# Patient Record
Sex: MALE | Race: WHITE | NOT HISPANIC OR LATINO | Employment: FULL TIME | URBAN - METROPOLITAN AREA
[De-identification: names, ages, dates, MRNs, and addresses within clinical notes are randomized per-mention and may not be internally consistent; named-entity substitution may affect disease eponyms.]

---

## 2017-01-04 ENCOUNTER — TELEPHONE (OUTPATIENT)
Dept: TRANSPLANT | Facility: CLINIC | Age: 52
End: 2017-01-04

## 2017-01-05 ENCOUNTER — TELEPHONE (OUTPATIENT)
Dept: TRANSPLANT | Facility: CLINIC | Age: 52
End: 2017-01-05

## 2017-01-05 NOTE — TELEPHONE ENCOUNTER
Heart biopsy completed on Mr. Lai on 1/3/17 by Dr. Eden Baptiste; sample sent to pathology to stain for amyloidosis. Results are still pending. Will contact Mr. Lai as soon as results are posted.

## 2017-01-06 ENCOUNTER — PATIENT MESSAGE (OUTPATIENT)
Dept: HEMATOLOGY/ONCOLOGY | Facility: CLINIC | Age: 52
End: 2017-01-06

## 2017-01-10 ENCOUNTER — TELEPHONE (OUTPATIENT)
Dept: TRANSPLANT | Facility: CLINIC | Age: 52
End: 2017-01-10

## 2017-01-10 NOTE — TELEPHONE ENCOUNTER
Informed Mr. Lai of heart biopsy results performed 1/3/17, indicating positive stain for amyloidosis. Specimen will now go to [facility] for tissue typing. Verbalized understanding of results. Dr. Lao & mAol notified of results. Bone marrow biopsy scheduled 1/12/17.

## 2017-01-12 ENCOUNTER — SURGERY (OUTPATIENT)
Age: 52
End: 2017-01-12

## 2017-01-12 PROBLEM — E85.9 AMYLOIDOSIS: Status: ACTIVE | Noted: 2017-01-12

## 2017-01-12 PROBLEM — D47.2 MONOCLONAL GAMMOPATHY: Status: ACTIVE | Noted: 2017-01-12

## 2017-01-12 RX ADMIN — LIDOCAINE HYDROCHLORIDE 5 ML: 20 INJECTION, SOLUTION EPIDURAL; INFILTRATION; INTRACAUDAL; PERINEURAL at 12:01

## 2017-01-17 DIAGNOSIS — D47.2 MONOCLONAL GAMMOPATHY: Primary | ICD-10-CM

## 2017-01-19 ENCOUNTER — OFFICE VISIT (OUTPATIENT)
Dept: HEMATOLOGY/ONCOLOGY | Facility: CLINIC | Age: 52
End: 2017-01-19
Payer: COMMERCIAL

## 2017-01-19 ENCOUNTER — HOSPITAL ENCOUNTER (OUTPATIENT)
Dept: RADIOLOGY | Facility: HOSPITAL | Age: 52
Discharge: HOME | End: 2017-01-19
Attending: INTERNAL MEDICINE
Payer: COMMERCIAL

## 2017-01-19 VITALS
SYSTOLIC BLOOD PRESSURE: 117 MMHG | RESPIRATION RATE: 16 BRPM | BODY MASS INDEX: 26.23 KG/M2 | HEIGHT: 71 IN | HEART RATE: 78 BPM | TEMPERATURE: 98 F | OXYGEN SATURATION: 99 % | WEIGHT: 187.38 LBS | DIASTOLIC BLOOD PRESSURE: 73 MMHG

## 2017-01-19 DIAGNOSIS — E85.3 SECONDARY AMYLOIDOSIS: Primary | ICD-10-CM

## 2017-01-19 DIAGNOSIS — D47.2 MONOCLONAL GAMMOPATHY: ICD-10-CM

## 2017-01-19 PROCEDURE — 99999 PR PBB SHADOW E&M-EST. PATIENT-LVL IV: CPT | Mod: PBBFAC,,, | Performed by: INTERNAL MEDICINE

## 2017-01-19 PROCEDURE — 77075 RADEX OSSEOUS SURVEY COMPL: CPT | Mod: 26,,, | Performed by: RADIOLOGY

## 2017-01-19 PROCEDURE — 99213 OFFICE O/P EST LOW 20 MIN: CPT | Mod: S$GLB,,, | Performed by: INTERNAL MEDICINE

## 2017-01-19 PROCEDURE — 1159F MED LIST DOCD IN RCRD: CPT | Mod: S$GLB,,, | Performed by: INTERNAL MEDICINE

## 2017-01-19 NOTE — LETTER
January 21, 2017        Chon Harmon MD  [address] [facility]  [address]  [facility] [address]             [facility]  [address]  [place] [address]  Phone: [phone]   Patient: Roc Lai Jr.   MR Number: [id number]   YOB: 1965   Date of Visit: 1/19/2017       Dear Dr. Harmon:    Thank you for referring Roc Lai to me for evaluation. Below are the relevant portions of my assessment and plan of care.       1. Secondary amyloidosis         Mr. Lai has a monoclonal lambda light chain that could serve as a reservoir for amyloid production.  Despite his recent negative fat pad biopsy his cardiac biopsy did show amyloid; now await subtyping from [place] but very likely AL Amlyoid.      His bone marrow biopsy on 1/12/17 showed a 60% cellular marrow with 28% plasma cells by morphology but 50% by immunohistochemistry.  Hence, he has at least a smoldering myeloma.  He has no renal, calcium or hemoglobin issues and he has no bony symptoms to suggest multiple myeloma and plain films do not show any lytic lesions as of today.  However, I will pursue an MRI study to be sure he does not have lesions that would define him as multiple myeloma.    He does not seem to have other systemic manifestations of AL amyloidosis including the absence of enlarged tongue, organomegaly, coagulopathy, neuropathy, carpal tunnel syndrome or renal disease.    Given the potential for myeloma I will need to discuss his case with the PI of the [id number] trial prior to enrollment.      All of his questions were answered in the clinic today.  I will see him back once his biopsies are complete.    If you have questions, please do not hesitate to call me. I look forward to following Roc along with you.    Sincerely,      Elvis Carmichael MD           CC  James Lao Jr., MD

## 2017-01-19 NOTE — PROGRESS NOTES
Subjective:       Patient ID: Roc Lai Jr. is a 51 y.o. male.    Chief Complaint: No chief complaint on file.    HPI  Mr. Lai is here for follow up of cardiac amyloidosis with significant marrow plasma cells.  He has no new symptoms and his recent findings are noted below and raise concern between amyloid and myeloma.  No bony pain.    History: He was previously vigorous and ran over three miles thrice weekly 3 years ago.  However, over the 18 months prior to presentation, he has noted progressive decline in his exercise capacity to only being able to do 10 min of exercise now.  He has previously been studied for CAD with normal coronaries in 3/74585.  He was diagnosed with atrial flutter and underwent cardiac ablation without improvement in his exercise capacity.  He was found to have an atrial septal defect and this was closed 9/26/16 but this did not improve his symptoms either.  His symptoms progressed over the last 3 months to the point he can no longer exercise and notes dyspnea with carrying croceries, climbing stairs, etc.  He was admitted to cardiology 11/27/16 with 17 pounds of fluid loss and his is now sleeping better.  He was diagnosed with diastolic heart failure and cardia MRI was concerning for infiltrative cardiac disease.      SPEP 12/1/16 showed decreased gamma globulins and free lambda light chains increased with M-spike of 0.19 g/dL.  Free lambda light chain was elevated at 50 mg/dL with normal lappa 1.37 mg/dL and ratio 0.03.  However, abdominal fat pad biopsy was negative for amyoid with the presence of fat cells 11/30/16.  ATTR DNA test negative for familial amyloidosis.    CBC normal 12/1/16 with normal renal function, calcium, albumin and slightly low total protein levels.      PMH  Diastolic heart failure    Review of Systems   Constitutional: Negative for activity change, appetite change, diaphoresis, fatigue, fever and unexpected weight change.   HENT: Negative for mouth  sores, sore throat and trouble swallowing.    Respiratory: Negative for cough and shortness of breath.         All improved since diuresis   Cardiovascular: Negative for chest pain and leg swelling.   Gastrointestinal: Negative for abdominal pain, blood in stool, constipation and diarrhea.   Genitourinary: Negative for dysuria and hematuria.   Musculoskeletal: Negative for back pain and neck pain.   Skin: Negative for rash.   Neurological: Negative for tremors, weakness, light-headedness and headaches.   Hematological: Negative for adenopathy.   Psychiatric/Behavioral: Negative for confusion and sleep disturbance.       Objective:      Physical Exam   Constitutional: He is oriented to person, place, and time. He appears well-developed and well-nourished. No distress.   HENT:   Head: Normocephalic and atraumatic.   Mouth/Throat: Oropharynx is clear and moist. No oropharyngeal exudate.   Tongue not enlarged   Eyes: Conjunctivae are normal. Pupils are equal, round, and reactive to light.   Neck: Neck supple.   Cardiovascular: Normal rate and regular rhythm.    Pulmonary/Chest: Effort normal and breath sounds normal. He has no rales.   Abdominal: Soft. Bowel sounds are normal. He exhibits no mass. There is no splenomegaly. There is no tenderness.   Musculoskeletal: Normal range of motion. He exhibits no edema.   No rib or spine tenderness   Lymphadenopathy:     He has no cervical adenopathy.     He has no axillary adenopathy.        Right: No inguinal adenopathy present.        Left: No inguinal adenopathy present.   Neurological: He is alert and oriented to person, place, and time.   Skin: Skin is warm and dry. No rash noted.   No unusual bruising   Psychiatric: He has a normal mood and affect. Thought content normal.       Assessment:       1. Secondary amyloidosis        Plan:       Mr. Lai has a monoclonal lambda light chain that could serve as a reservoir for amyloid production.  Despite his recent negative fat  pad biopsy his cardiac biopsy did show amyloid; now await subtyping from [place] but very likely AL Amlyoid.      His bone marrow biopsy on 1/12/17 showed a 60% cellular marrow with 28% plasma cells by morphology but 50% by immunohistochemistry.  Hence, he has at least a smoldering myeloma.  He has no renal, calcium or hemoglobin issues and he has no bony symptoms to suggest multiple myeloma and plain films do not show any lytic lesions as of today.  However, I will pursue an MRI study to be sure he does not have lesions that would define him as multiple myeloma.    He does not seem to have other systemic manifestations of AL amyloidosis including the absence of enlarged tongue, organomegaly, coagulopathy, neuropathy, carpal tunnel syndrome or renal disease.    Given the potential for myeloma I will need to discuss his case with the PI of the [id number] trial prior to enrollment.      All of his questions were answered in the clinic today.  I will see him back once his biopsies are complete.

## 2017-01-19 NOTE — Clinical Note
Please schedule for MRI of entire spine and pelvis ASAP.  Back to see me shortly after that.  Thanks

## 2017-01-26 ENCOUNTER — TELEPHONE (OUTPATIENT)
Dept: TRANSPLANT | Facility: CLINIC | Age: 52
End: 2017-01-26

## 2017-01-26 DIAGNOSIS — I43 CARDIAC AMYLOIDOSIS: Primary | ICD-10-CM

## 2017-01-26 DIAGNOSIS — E85.4 CARDIAC AMYLOIDOSIS: Primary | ICD-10-CM

## 2017-01-26 NOTE — TELEPHONE ENCOUNTER
Spoke with  Joelle today to inform him of the heart biopsy results from [facility], which confirmed AL amyloidosis. Dr. Lao is aware and Dr. Carmichael has been notified. 3 month f/u appt has been scheduled with Dr. Lao on 3/31/17 with lab on 3/28/17. Echo has already been scheduled by Dr. Mendieta on 3/28/17. Strain has been requested. No further questions at this time. Aware of all appts. Appt letter mailed as well.

## 2017-01-27 ENCOUNTER — HOSPITAL ENCOUNTER (OUTPATIENT)
Dept: RADIOLOGY | Facility: HOSPITAL | Age: 52
Discharge: HOME | End: 2017-01-27
Attending: INTERNAL MEDICINE
Payer: COMMERCIAL

## 2017-01-27 DIAGNOSIS — E85.3 SECONDARY AMYLOIDOSIS: ICD-10-CM

## 2017-01-27 PROCEDURE — 72146 MRI CHEST SPINE W/O DYE: CPT | Mod: 26,,, | Performed by: RADIOLOGY

## 2017-01-27 PROCEDURE — 72146 MRI CHEST SPINE W/O DYE: CPT | Mod: TC

## 2017-01-27 PROCEDURE — 72195 MRI PELVIS W/O DYE: CPT | Mod: 26,,, | Performed by: RADIOLOGY

## 2017-01-27 PROCEDURE — 72148 MRI LUMBAR SPINE W/O DYE: CPT | Mod: TC

## 2017-01-27 PROCEDURE — 72141 MRI NECK SPINE W/O DYE: CPT | Mod: TC

## 2017-01-27 PROCEDURE — 72141 MRI NECK SPINE W/O DYE: CPT | Mod: 26,,, | Performed by: RADIOLOGY

## 2017-01-27 PROCEDURE — 72195 MRI PELVIS W/O DYE: CPT | Mod: TC

## 2017-01-27 PROCEDURE — 72148 MRI LUMBAR SPINE W/O DYE: CPT | Mod: 26,GC,, | Performed by: RADIOLOGY

## 2017-02-01 ENCOUNTER — TELEPHONE (OUTPATIENT)
Dept: TRANSPLANT | Facility: HOSPITAL | Age: 52
End: 2017-02-01

## 2017-02-01 ENCOUNTER — OFFICE VISIT (OUTPATIENT)
Dept: HEMATOLOGY/ONCOLOGY | Facility: CLINIC | Age: 52
End: 2017-02-01
Payer: COMMERCIAL

## 2017-02-01 ENCOUNTER — RESEARCH ENCOUNTER (OUTPATIENT)
Dept: RESEARCH | Facility: HOSPITAL | Age: 52
End: 2017-02-01

## 2017-02-01 VITALS
OXYGEN SATURATION: 100 % | HEART RATE: 92 BPM | DIASTOLIC BLOOD PRESSURE: 80 MMHG | RESPIRATION RATE: 16 BRPM | SYSTOLIC BLOOD PRESSURE: 124 MMHG | HEIGHT: 73 IN | TEMPERATURE: 98 F | WEIGHT: 193.13 LBS | BODY MASS INDEX: 25.6 KG/M2

## 2017-02-01 DIAGNOSIS — I50.32 CHRONIC DIASTOLIC CONGESTIVE HEART FAILURE: Primary | ICD-10-CM

## 2017-02-01 DIAGNOSIS — E85.81 AL AMYLOIDOSIS: ICD-10-CM

## 2017-02-01 DIAGNOSIS — D47.2 MONOCLONAL GAMMOPATHY: ICD-10-CM

## 2017-02-01 PROCEDURE — 99999 PR PBB SHADOW E&M-EST. PATIENT-LVL III: CPT | Mod: PBBFAC,,, | Performed by: INTERNAL MEDICINE

## 2017-02-01 PROCEDURE — 99213 OFFICE O/P EST LOW 20 MIN: CPT | Mod: S$GLB,,, | Performed by: INTERNAL MEDICINE

## 2017-02-01 NOTE — TELEPHONE ENCOUNTER
I have reviewed the echocardiogram performed 11/28/16 and performed multiple measurements of wall thickness.  I was able to reproducibly measure septal and posterior wall thickness of 1.5-1.6 cm.    James Lao MD, [facility], [facility]

## 2017-02-01 NOTE — TELEPHONE ENCOUNTER
----- Message from Heaven Cohen RN sent at 2/1/2017  9:03 AM CST -----  Hi Dr. Lao,   Can you please confirm as our conversation earlier that the wall thickness on the patient's echo done on 11/28/16 measures between 1.5 cm and 1.6cm?  Thank you so much again!  Heaven Cohen RN

## 2017-02-01 NOTE — LETTER
February 1, 2017        Chon Harmon MD  [address]  [address]  [facility] [address]             [facility]  [address]  [place] [address]  Phone: [phone]   Patient: Roc Lai Jr.   MR Number: [id number]   YOB: 1965   Date of Visit: 2/1/2017       Dear Dr. Harmon:    Thank you for referring Roc Lai to me for evaluation. Below are the relevant portions of my assessment and plan of care.       1. Chronic diastolic congestive heart failure    2. AL amyloidosis    3. Monoclonal gammopathy         Mr. Lai has a monoclonal lambda light chain coming from a plasma cell infiltrate in the marrow that serves as the reservoir for amyloid production affecting his heart.  His cardiac amyloid has been confirmed as AL Amlyoid.      His bone marrow biopsy on 1/12/17 showed a 60% cellular marrow with 28% plasma cells by morphology but 50% by immunohistochemistry.  Hence, he has at least a smoldering myeloma.  He has no renal, calcium or hemoglobin issues and he has no bony symptoms to suggest multiple myeloma and plain films do not show any lytic lesions as of today.  MRI study did not show lesions that would define him as multiple myeloma.    He does not seem to have other systemic manifestations of AL amyloidosis including the absence of enlarged tongue, organomegaly, coagulopathy, neuropathy, carpal tunnel syndrome or renal disease.  His cardiac symptoms are related to the AL amyloid infiltration and not the plasma cells in his marrow.  There is no family history of amyloidosis.    He signed consent for the [id number] trial and we4 plan to start therapy soon.      All of his questions were answered in the clinic today.     If you have questions, please do not hesitate to call me. I look forward to following Roc along with you.    Sincerely,      Elvis Carmichael MD           CC  James Lao Jr., MD

## 2017-02-02 NOTE — PROGRESS NOTES
"  Wednesday, February 1st, 2017    Study:  "A Phase 3, randomized, multicenter, double-blind, placebo-controlled, 2-arm, efficacy and safety study of [id number] plus standard of care versus placebo plus standard of care in subjects with light chain (AL) amyloidosis"  Protocol ID# [id number]  IRB# 2015.305.A  Sponsor:  Eliseo  Investigator:  Dr. Carmichael    Research RN met with patient and Dr. Carmichael to discuss participation in above-mentioned trial.  He is alert and oriented x 3. Mood and affect appropriate to the situation. Research RN had spoken with patient on prior day to provide brief description/discussion of protocol and informed consent form was e-mailed to the patient.  Dr. Carmichael discussed patient's current disease state in regards to myeloma; all tests and procedures performed to date are not suspicious for multiple myeloma, therefore Dr. Carmichael believes it most pertinent to treat the patient's AL at this time.  Study overview and alternatives to treatment were briefly discussed.  Patient states he wishes to proceed with informed consent process as described below.    Informed Consent Process:    Patient states that he is not participating in any other research studies.   Patient states that he understands that he has AL amyloidosis and that he may be eligible to participate in the above mentioned treatment study.   Purpose of study reviewed with patient and family. Patient states understanding of this information.   Length of study and number of participants reviewed with patient and family; he verbalizes understanding regarding this information.   Study design reviewed with patient and family. Patient states understanding of this information.  Study procedures discussed in detail with patient and family. Patient states understanding of this information.  All study associated risks reviewed with patient and family. Patient verbalized understanding.   Potential benefits of study along with costs and/or " payment reimbursement per insurance discussed with patient; he states he understands that his insurance will cover cost of all standard of care medications and procedures.   Alternative treatment methods discussed with patient; he states understanding of this information.   Study related questions/compensation for injury, and whom to contact regarding rights as a research subject all reviewed with patient; he verbalizes understanding of this information.   Optional extra blood samples for future testing discussed with patient and family in details, for which the patient checked YES and signed and dated this section of the ICF.  Voluntary participation and withdrawal from research stressed to patient; he states he understands that he may withdraw consent at any time without compromise to his care.   Confidentiality and HIPAA discussed with patient; process of protection and security of database explained to patient; he verbalizes understanding of this information.    Patient states his willingness to participate in study; patient signed and dated consent form; copy of consent form given to patient along with my contact information.     Throughout the consenting process, patient was given several opportunities to ask questions; all questions addressed and answered to patient's satisfaction per myself and Dr Carmichael. Informed patient that I would be in contact with him regarding his next appointments. Patient verbalized understanding. Instructed patient to notify myself and/or Dr Carmichael for any questions and/or concerns. Verbalized understanding.

## 2017-02-02 NOTE — PROGRESS NOTES
Subjective:       Patient ID: Roc Lai Jr. is a 51 y.o. male.    Chief Complaint: No chief complaint on file.    HPI  Mr. Lai is here to sign consent for the [id number] trial to treat cardiac AL amyloidosis with significant marrow plasma cells.  He remains well and without new issues today.  However, his walk test today confirmed his candidacy for the protocol and we plan to move ahead with a few remaining studies prior to start of therapy.    History: He was previously vigorous and ran over three miles thrice weekly 3 years ago.  However, over the 18 months prior to presentation, he has noted progressive decline in his exercise capacity to only being able to do 10 min of exercise now.  He has previously been studied for CAD with normal coronaries in 3/33587.  He was diagnosed with atrial flutter and underwent cardiac ablation without improvement in his exercise capacity.  He was found to have an atrial septal defect and this was closed 9/26/16 but this did not improve his symptoms either.  His symptoms progressed over the last 3 months to the point he can no longer exercise and notes dyspnea with carrying croceries, climbing stairs, etc.  He was admitted to cardiology 11/27/16 with 17 pounds of fluid loss and his is now sleeping better.  He was diagnosed with diastolic heart failure and cardia MRI was concerning for infiltrative cardiac disease.      SPEP 12/1/16 showed decreased gamma globulins and free lambda light chains increased with M-spike of 0.19 g/dL.  Free lambda light chain was elevated at 50 mg/dL with normal lappa 1.37 mg/dL and ratio 0.03.  However, abdominal fat pad biopsy was negative for amyoid with the presence of fat cells 11/30/16.  ATTR DNA test negative for familial amyloidosis.    CBC normal 12/1/16 with normal renal function, calcium, albumin and slightly low total protein levels.      Bone marrow biopsy done 1/12/17 showed a 60% plasma cell infiltrate.     Metastatic survey 1/19/17  was negative for bony disease.  MRI scans of the cervical, thoracic, lumbar and pelvic done 1/27/17 did not disclose specific lesions to change diagnosis to myeloma.    PMH  Diastolic heart failure    Review of Systems   Constitutional: Negative for activity change, appetite change, diaphoresis, fatigue, fever and unexpected weight change.   HENT: Negative for mouth sores, sore throat and trouble swallowing.    Respiratory: Negative for cough and shortness of breath.         All improved since diuresis   Cardiovascular: Negative for chest pain and leg swelling.   Gastrointestinal: Negative for abdominal pain, blood in stool, constipation and diarrhea.   Genitourinary: Negative for dysuria and hematuria.   Musculoskeletal: Negative for back pain and neck pain.   Skin: Negative for rash.   Neurological: Negative for tremors, weakness, light-headedness and headaches.   Hematological: Negative for adenopathy.   Psychiatric/Behavioral: Negative for confusion and sleep disturbance.       Objective:      Physical Exam   Constitutional: He is oriented to person, place, and time. He appears well-developed and well-nourished. No distress.   HENT:   Head: Normocephalic and atraumatic.   Mouth/Throat: Oropharynx is clear and moist. No oropharyngeal exudate.   Tongue not enlarged   Eyes: Conjunctivae are normal. Pupils are equal, round, and reactive to light.   Neck: Neck supple.   Cardiovascular: Normal rate and regular rhythm.    Pulmonary/Chest: Effort normal and breath sounds normal. He has no rales.   Abdominal: Soft. Bowel sounds are normal. He exhibits no mass. There is no splenomegaly. There is no tenderness.   Musculoskeletal: Normal range of motion. He exhibits no edema.   No rib or spine tenderness   Lymphadenopathy:     He has no cervical adenopathy.     He has no axillary adenopathy.        Right: No inguinal adenopathy present.        Left: No inguinal adenopathy present.   Neurological: He is alert and oriented to  person, place, and time.   Skin: Skin is warm and dry. No rash noted.   No unusual bruising   Psychiatric: He has a normal mood and affect. Thought content normal.       Assessment:       1. Chronic diastolic congestive heart failure    2. AL amyloidosis    3. Monoclonal gammopathy        Plan:       Mr. Lai has a monoclonal lambda light chain coming from a plasma cell infiltrate in the marrow that serves as the reservoir for amyloid production affecting his heart.  His cardiac amyloid has been confirmed as AL Amlyoid.      His bone marrow biopsy on 1/12/17 showed a 60% cellular marrow with 28% plasma cells by morphology but 50% by immunohistochemistry.  Hence, he has at least a smoldering myeloma.  He has no renal, calcium or hemoglobin issues and he has no bony symptoms to suggest multiple myeloma and plain films do not show any lytic lesions as of today.  MRI study did not show lesions that would define him as multiple myeloma.    He does not seem to have other systemic manifestations of AL amyloidosis including the absence of enlarged tongue, organomegaly, coagulopathy, neuropathy, carpal tunnel syndrome or renal disease.  His cardiac symptoms are related to the AL amyloid infiltration and not the plasma cells in his marrow.  There is no family history of amyloidosis.    He signed consent for the [id number] trial and we4 plan to start therapy soon.      All of his questions were answered in the clinic today.

## 2017-02-03 DIAGNOSIS — Z00.6 EXAMINATION OF PARTICIPANT IN CLINICAL TRIAL: ICD-10-CM

## 2017-02-03 DIAGNOSIS — E85.81 AL AMYLOIDOSIS: Primary | ICD-10-CM

## 2017-02-06 ENCOUNTER — RESEARCH ENCOUNTER (OUTPATIENT)
Dept: RESEARCH | Facility: HOSPITAL | Age: 52
End: 2017-02-06

## 2017-02-06 NOTE — PROGRESS NOTES
"  Monday, February 6th, 2017    Study: "A Phase 3, randomized, multicenter, double-blind, placebo-controlled, 2-arm, efficacy and safety study of [id number] plus standard of care versus placebo plus standard of care in subjects with light chain (AL) amyloidosis"  Protocol ID# [id number]  IRB# 2015.305.A  Sponsor: Eliseo  Investigator: Dr. Carmichael    Screening 1 Visit    Patient presented to clinic for screening one visit procedures per above-mentioned protocol.  See MD note dated 2/1/17 for medical history and complete PE.  See pathology reports for confirmation of amyloid diagnosis.  Patient signed ICF 2/1/17, see CRC note dated same day.  Eligibility review in process, initial eligibility checklist sent to sponsor for review.  Echocardiogram dated 11/28/17 within window for use as screening echo.  Liver imaging not necessary per protocol as patient's ALT/ ALP is not > 2.5x ULN.   See flowsheet on 2/1/17 for vital signs.  See flowsheet for conmeds.  Patient completed 24 hour urine for central labs according to instruction.  Specimen processed per  EVELIA Ignacio.   See documentation from MD regarding ECOG and NYHA Classification.    Patient completed trialslate questionnaires prior to any study procedures being conducted.  Patient then reported to phlebotomy lab to have all central labs drawn.  Labs drawn the processed according to lab manual per EVELIA Ignacio.      Patient then completed 6MWT per trialslate instructions including 10 minutes rest prior as well as vitals before and after walking.  Patient walked 1479.5 feet or 450 meters in 6 minutes.  This remains less than the 550 meters allowed for eligibility, therefore patient remains eligible for protocol based on this result.    Patient then proceeded to clinic where triplicate EKG's were performed by AZUL Barillas and ANUPAMA Cohen.  EKG's were uploaded via telephone connection and received by ERT.  Additionally Dr. Carmichael reviewed these results and find them " "satisfactory in regards to continued eligibility to trial.      Historical AE Assessment:    Heart Failure, Grade 1:  Patient has diagnosis of heart failure, but states he does not have symptoms unless engaging in rigorous physical activity.  He states he and his wife "walked three miles" around their neighborhood recently.  He tolerated 6MWT without difficulty or symptoms.    Fatigue, Grade 1:  Patient reports feeling "strong" but not at maximum capacity in regards to energy.  He is able to engage in all usual activities.  Will follow.  Alkaline Phosphatase Increased, Grade 1:  147 per [facility] labs.  Dr. Carmichael aware, NCS.  AE, grade 1.  Creatinine Increased, Grade 1:  1.5 per [facility] labs.  NCS per Dr. Carmichael.    Hyperglycemia, Grade 1:  Glucose 166.  This was a non-fasting result.  NCS per Dr. Carmichael.  AE, grade 1      Patient to report on Monday, 2/13 for screening 2 visit.  States having my contact information if needed.  Patient voices interest in attending chemotherapy class.  [facility] Irwin contacted and patient was scheduled for this.  Additionally, prescription of anti-viral prophylaxis was routed to MD for signature.  Patient informed to pick this up and start on M1, D1.  States understanding.  Will follow.              "

## 2017-02-09 DIAGNOSIS — E85.89 OTHER AMYLOIDOSIS: Primary | ICD-10-CM

## 2017-02-10 RX ORDER — HEPARIN 100 UNIT/ML
500 SYRINGE INTRAVENOUS
Status: CANCELLED | OUTPATIENT
Start: 2017-02-22

## 2017-02-10 RX ORDER — HEPARIN 100 UNIT/ML
500 SYRINGE INTRAVENOUS
Status: CANCELLED | OUTPATIENT
Start: 2017-02-15

## 2017-02-10 RX ORDER — HEPARIN 100 UNIT/ML
500 SYRINGE INTRAVENOUS
Status: CANCELLED | OUTPATIENT
Start: 2017-03-08

## 2017-02-10 RX ORDER — BORTEZOMIB 3.5 MG/1
1.4 INJECTION, POWDER, LYOPHILIZED, FOR SOLUTION INTRAVENOUS; SUBCUTANEOUS
Status: CANCELLED | OUTPATIENT
Start: 2017-03-01 | End: 2017-03-01

## 2017-02-10 RX ORDER — DEXTROMETHORPHAN HYDROBROMIDE, GUAIFENESIN 5; 100 MG/5ML; MG/5ML
650 LIQUID ORAL
Status: CANCELLED | OUTPATIENT
Start: 2017-02-15 | End: 2017-02-15

## 2017-02-10 RX ORDER — SODIUM CHLORIDE 0.9 % (FLUSH) 0.9 %
10 SYRINGE (ML) INJECTION
Status: CANCELLED | OUTPATIENT
Start: 2017-02-22

## 2017-02-10 RX ORDER — SODIUM CHLORIDE 0.9 % (FLUSH) 0.9 %
10 SYRINGE (ML) INJECTION
Status: CANCELLED | OUTPATIENT
Start: 2017-03-01

## 2017-02-10 RX ORDER — BORTEZOMIB 3.5 MG/1
1.4 INJECTION, POWDER, LYOPHILIZED, FOR SOLUTION INTRAVENOUS; SUBCUTANEOUS
Status: CANCELLED | OUTPATIENT
Start: 2017-02-15 | End: 2017-02-15

## 2017-02-10 RX ORDER — BORTEZOMIB 3.5 MG/1
1.4 INJECTION, POWDER, LYOPHILIZED, FOR SOLUTION INTRAVENOUS; SUBCUTANEOUS
Status: CANCELLED | OUTPATIENT
Start: 2017-02-22 | End: 2017-02-22

## 2017-02-10 RX ORDER — HEPARIN 100 UNIT/ML
500 SYRINGE INTRAVENOUS
Status: CANCELLED | OUTPATIENT
Start: 2017-03-01

## 2017-02-10 RX ORDER — ACYCLOVIR 400 MG/1
400 TABLET ORAL 2 TIMES DAILY
Qty: 60 TABLET | Refills: 11 | Status: SHIPPED | OUTPATIENT
Start: 2017-02-10 | End: 2017-11-15 | Stop reason: SDUPTHER

## 2017-02-10 RX ORDER — BORTEZOMIB 3.5 MG/1
1.4 INJECTION, POWDER, LYOPHILIZED, FOR SOLUTION INTRAVENOUS; SUBCUTANEOUS
Status: CANCELLED | OUTPATIENT
Start: 2017-03-08 | End: 2017-03-08

## 2017-02-10 RX ORDER — SODIUM CHLORIDE 0.9 % (FLUSH) 0.9 %
10 SYRINGE (ML) INJECTION
Status: CANCELLED | OUTPATIENT
Start: 2017-03-08

## 2017-02-10 RX ORDER — SODIUM CHLORIDE 0.9 % (FLUSH) 0.9 %
10 SYRINGE (ML) INJECTION
Status: CANCELLED | OUTPATIENT
Start: 2017-02-15

## 2017-02-13 ENCOUNTER — CLINICAL SUPPORT (OUTPATIENT)
Dept: HEMATOLOGY/ONCOLOGY | Facility: CLINIC | Age: 52
End: 2017-02-13
Payer: COMMERCIAL

## 2017-02-13 ENCOUNTER — RESEARCH ENCOUNTER (OUTPATIENT)
Dept: RESEARCH | Facility: HOSPITAL | Age: 52
End: 2017-02-13

## 2017-02-13 VITALS
RESPIRATION RATE: 20 BRPM | DIASTOLIC BLOOD PRESSURE: 63 MMHG | TEMPERATURE: 98 F | SYSTOLIC BLOOD PRESSURE: 100 MMHG | HEART RATE: 98 BPM

## 2017-02-13 VITALS
BODY MASS INDEX: 25.71 KG/M2 | SYSTOLIC BLOOD PRESSURE: 104 MMHG | HEART RATE: 97 BPM | WEIGHT: 194.88 LBS | DIASTOLIC BLOOD PRESSURE: 66 MMHG

## 2017-02-13 NOTE — PROGRESS NOTES
"  Monday, February 13th, 2017     Study: "A Phase 3, randomized, multicenter, double-blind, placebo-controlled, 2-arm, efficacy and safety study of [id number] plus standard of care versus placebo plus standard of care in subjects with light chain (AL) amyloidosis"  Protocol ID# [id number]  IRB# 2015.305.A  Sponsor: Eliseo  Investigator: Dr. Carmichael     Screening 2 Visit     Patient presented to clinic to complete screening 2 visit per above-mentioned protocol. Patient is ambulatory, awake, alert, and oriented to person, place, and time.      SF-36 Health Survey, KCCQ, FACT-GOG NTX questionnaires completed per trial slate prior to any procedures being performed.  Patient then reported to phlebotomy to have all central blood work drawn.  Patient also remitted 24 hour urine specimen that he completed this morning.  All specimens were processed by EVELIA Ignacio  according to lab manual.      Patient then completed second 6MWT per trialslate instructions including 10 minutes rest prior as well as vitals before and after walking. Patient walked 1615 feet or 492 meters in 6 minutes. This remains less than the 550 meters allowed for eligibility, therefore patient remains eligible for protocol based on this result.    Update on AE's:  Patient verbalizing no new complaints since screening 1.  Patient denies new concomitant medications since last week's visit.  He states understanding to initiate acyclovir on M1, D1 as previously directed.      Registration form updated with above information and set to AZUL SPICER, who responded with confirmation from medical monitor that patient is eligible for trial.      M1, D1 appt's reviewed with patient.  He states understanding of these.  He also agreed to attend today's chemotherapy education class and is aware of this appt.      "

## 2017-02-14 ENCOUNTER — RESEARCH ENCOUNTER (OUTPATIENT)
Dept: RESEARCH | Facility: HOSPITAL | Age: 52
End: 2017-02-14

## 2017-02-14 NOTE — PROGRESS NOTES
"  2017    Study: "A Phase 3, randomized, multicenter, double-blind, placebo-controlled, 2-arm, efficacy and safety study of [id number] plus standard of care versus placebo plus standard of care in subjects with light chain (AL) amyloidosis"  Protocol ID# [id number]  IRB# 2015.305.A  Sponsor: Eliseo  Investigator: Dr. Carmichael    Eligibility    Pt was screened as eligible to participate in the above mentioned trial as per the trial's inclusion and exclusion criteria. Pt's documents were also sent to a central review which were reviewed and pt was deemed eligible to participate in the above mentioned trial.    -Pt is over 18 years of age with a  of 67, 50 y/o male.  -Pt with ECOG "1" and NYHA class "II" per Dr. Carmichael.  -Pt is a newly diagnosed with AL amyloidosis; diagnosed by endocardial biopsy on 1/3; pt's bone marrow is consistent with plasma cell dyscrasia showing 50% plasma cells per bone marrow biopsy completed 17.  Positive histochemical stain showing green birefringent material in congo red-stained tissue and confirmed by mass spectroscopy done at [facility] on 1/3/17.  -Patient does not meet requirements for immunoelectron microscopy of amyloid material.  -Pt has cardiac involvement as follows:  diagnosis of heart failure per Dr. Panda per note dated 16; left ventricular wall thickness at diastole >12mm per Echo  and endomyocardial biopsy demonstrating AL echo report showing LV wall thickness >12mm on  and NT-proBNP 2679 per CCLs on 17.  -Pt to receive first line therapy with SQ velcade per Dr. Carmichael.  -Pt has adequate bone marrow reserve, hepatic and renal function as per CCLS labs dated 17.  -Pt's seated systolic blood pressure is between of 104 documented on 17.  -Distance walked during 6MWT is > 30 meters and <550 meters. First test was 450 meters and 2nd test was 492 meters.  -Pt is not a WOCBP and spouse is in not FCBP.  -Pt signed informed " consent form 2/1/17 and states understanding of participation requirements, risks, and plan of care.    -Pt does not meet any of the following exclusion criteria: non-AL amyloidosis, NT-pro>8500pg/ml (pts is 2679 per CCLS on 2/6/17), multiple myeloma as defined by the International Myeloma Working Group, eligible for ASCT, symptomatic orthostatic hypotension, MI, angina, severe uncontrolled arrhythmias, severe valvular stenosis, severe congenital heart disease, peripheral neuropathy above a grade 2, receiving oral or iv antibiotics or antivirals or antifungals, growth factors or blood transfusions w/in past week, prior radiotherapy within past 4 weeks, major surgery within past 4 weeks, any active malignancies, HIV, HEP B or C, or currently on another study.     Patient was randomized into IXRS today and received screening #: [id number]    Pt states his willingness to continue in the study and has no further questions at this time. Pt is aware of M1, D1 appt to report tomorrow, 2/15/17.  Will follow.

## 2017-02-15 ENCOUNTER — INFUSION (OUTPATIENT)
Dept: INFUSION THERAPY | Facility: HOSPITAL | Age: 52
End: 2017-02-15
Attending: INTERNAL MEDICINE
Payer: COMMERCIAL

## 2017-02-15 ENCOUNTER — OFFICE VISIT (OUTPATIENT)
Dept: HEMATOLOGY/ONCOLOGY | Facility: CLINIC | Age: 52
End: 2017-02-15
Payer: COMMERCIAL

## 2017-02-15 ENCOUNTER — RESEARCH ENCOUNTER (OUTPATIENT)
Dept: RESEARCH | Facility: HOSPITAL | Age: 52
End: 2017-02-15

## 2017-02-15 ENCOUNTER — LAB VISIT (OUTPATIENT)
Dept: LAB | Facility: HOSPITAL | Age: 52
End: 2017-02-15
Attending: INTERNAL MEDICINE
Payer: COMMERCIAL

## 2017-02-15 VITALS
SYSTOLIC BLOOD PRESSURE: 122 MMHG | BODY MASS INDEX: 27.47 KG/M2 | WEIGHT: 196.19 LBS | HEIGHT: 71 IN | HEART RATE: 96 BPM | DIASTOLIC BLOOD PRESSURE: 78 MMHG | TEMPERATURE: 98 F | RESPIRATION RATE: 20 BRPM

## 2017-02-15 VITALS
HEART RATE: 77 BPM | SYSTOLIC BLOOD PRESSURE: 102 MMHG | TEMPERATURE: 98 F | DIASTOLIC BLOOD PRESSURE: 64 MMHG | RESPIRATION RATE: 18 BRPM

## 2017-02-15 DIAGNOSIS — E85.81 AL AMYLOIDOSIS: ICD-10-CM

## 2017-02-15 DIAGNOSIS — E85.81 AL AMYLOIDOSIS: Primary | ICD-10-CM

## 2017-02-15 DIAGNOSIS — Z00.6 EXAMINATION OF PARTICIPANT IN CLINICAL TRIAL: ICD-10-CM

## 2017-02-15 LAB
DRUG STUDY SPECIMEN TYPE: NORMAL
DRUG STUDY TEST NAME: NORMAL
DRUG STUDY TEST RESULT: NORMAL

## 2017-02-15 PROCEDURE — 96401 CHEMO ANTI-NEOPL SQ/IM: CPT

## 2017-02-15 PROCEDURE — 96413 CHEMO IV INFUSION 1 HR: CPT

## 2017-02-15 PROCEDURE — 99213 OFFICE O/P EST LOW 20 MIN: CPT | Mod: S$PBB,,, | Performed by: INTERNAL MEDICINE

## 2017-02-15 PROCEDURE — 25000003 PHARM REV CODE 250: Performed by: INTERNAL MEDICINE

## 2017-02-15 PROCEDURE — 99999 PR PBB SHADOW E&M-EST. PATIENT-LVL II: CPT | Mod: PBBFAC,,, | Performed by: INTERNAL MEDICINE

## 2017-02-15 PROCEDURE — 63600175 PHARM REV CODE 636 W HCPCS: Performed by: INTERNAL MEDICINE

## 2017-02-15 PROCEDURE — 96367 TX/PROPH/DG ADDL SEQ IV INF: CPT

## 2017-02-15 PROCEDURE — 96415 CHEMO IV INFUSION ADDL HR: CPT

## 2017-02-15 RX ORDER — ACETAMINOPHEN 650 MG/20.3ML
650 LIQUID ORAL
Status: COMPLETED | OUTPATIENT
Start: 2017-02-15 | End: 2017-02-15

## 2017-02-15 RX ORDER — HEPARIN 100 UNIT/ML
500 SYRINGE INTRAVENOUS
Status: DISCONTINUED | OUTPATIENT
Start: 2017-02-15 | End: 2017-02-15 | Stop reason: HOSPADM

## 2017-02-15 RX ORDER — BORTEZOMIB 3.5 MG/1
1.4 INJECTION, POWDER, LYOPHILIZED, FOR SOLUTION INTRAVENOUS; SUBCUTANEOUS
Status: CANCELLED | OUTPATIENT
Start: 2017-02-15 | End: 2017-02-15

## 2017-02-15 RX ORDER — SODIUM CHLORIDE 0.9 % (FLUSH) 0.9 %
10 SYRINGE (ML) INJECTION
Status: DISCONTINUED | OUTPATIENT
Start: 2017-02-15 | End: 2017-02-15 | Stop reason: HOSPADM

## 2017-02-15 RX ORDER — BORTEZOMIB 3.5 MG/1
1.4 INJECTION, POWDER, LYOPHILIZED, FOR SOLUTION INTRAVENOUS; SUBCUTANEOUS
Status: COMPLETED | OUTPATIENT
Start: 2017-02-15 | End: 2017-02-15

## 2017-02-15 RX ADMIN — BORTEZOMIB 3 MG: 3.5 INJECTION, POWDER, LYOPHILIZED, FOR SOLUTION INTRAVENOUS; SUBCUTANEOUS at 01:02

## 2017-02-15 RX ADMIN — DIPHENHYDRAMINE HYDROCHLORIDE 25 MG: 50 INJECTION, SOLUTION INTRAMUSCULAR; INTRAVENOUS at 08:02

## 2017-02-15 RX ADMIN — ACETAMINOPHEN 650 MG: 160 SOLUTION ORAL at 08:02

## 2017-02-15 NOTE — PROGRESS NOTES
"  Wednesday, February 15th, 2017     Study: "A Phase 3, randomized, multicenter, double-blind, placebo-controlled, 2-arm, efficacy and safety study of [id number] plus standard of care versus placebo plus standard of care in subjects with light chain (AL) amyloidosis"  Protocol ID# [id number]  IRB# 2015.305.A  Sponsor: Eliseo  Investigator: Dr. Carmichael    Month 1, Day 1  IV [id number] + SQ Velcade    Patient presents for month 1, day 1 treatment per above-mentioned protocol.  Patient states he has no new issues to report with the exception of some "intermittent swelling."  He is ambulatory, awake, alert, and oriented to person, place, and time, accompanied by self.  Trial Slate and questionnaires (FACT-GOG NTX and VASPI) performed prior to any study related procedures.  Patient had centrally required blood work for bioanalytical testing completed and was processed by EVELIA Ignacio . Plan of care for the day reviewed with patient.  He states understanding.  Patient states continued willingness to participate in above-mentioned trial.    Review of Baseline AE's:    Heart Failure, Grade 1: Patient has diagnosis of NYHA class II heart failure, but states he does not have symptoms unless engaging in rigorous physical activity. He states over the weekend, he noticed he was not able to walk quite as far as he had previously be able to.  No overt symptoms in clinic today.  AE ongoing, will follow.   Fatigue, Grade 1: No changes in this and patient states he continues to travel for work and engage in usual activities. Will follow.  AE ongoing, will follow.  Alkaline Phosphatase Increased, Grade 1: 147 per CCLS labs. Dr. Carmichael aware, NCS. AE ongoing, will follow..  Creatinine Increased, Grade 1: 1.4 per CCLS labs. NCS per Dr. Carmichael.  AE ongoing, will follow.  Hyperglycemia, Grade 1: Glucose 176. This was a non-fasting result. NCS per Dr. Carmichael. AE, grade 1   Total Bilirubin Increased, Grade 1:  Total bilirubin " 1.4 per CCLS lab work dated 2/13/17.  Noted as NCS per Dr. Carmichael.  AE grade 1, will follow.  Edema , localized, grade 1:  As stated above, patient states that he has had intermittent lower extremity swelling.  He states his knee in particular has had this symptom.  No appreciable swelling noted on today's exam per Dr. Carmichael.  See MD note.  AE, grade 1.      Physical performed per Dr. Carmichael, see MD note for Complete PE, symptom-directed PE, ECOG PS and NYHA Classification.  See flowsheets for height, weight, and vital signs.  NIS-LL and VASPI completed, see chart for source documentation. Patient is not WOCBP, and no blood work required today per Dr. Carmichael, therefore patient had no local labs drawn.  No central labs outside of bioanalytical labs required at this timepoint Concomitant medications reviewed, no changes to con meds noted.  Patient confirms that per Dr. Carmichael, he has initiated acyclovir.  As stated in eligibility note, patient was randomized yesterday, 2/14 and assigned study number [id number]. Per Dr. Carmichael, patient is approved to initiate treatment today, 2/15/17.  Treatment plan updated with today's weight and signed.    Patient then proceeded to infusion suite.  Study-mandated procedures performed per protocol as follows:    0845:  Premeds administered, see MAR  0927:  Pre-dose vital signs performed, see flowsheet.  Vitals read as follows:  BP 98/59, HR 79, Temp 98.3, RR 18.  0935:  Triplicate EKG performed per research staff.  *Transmission issue was discussed with AZUL Norris, DANNIELLE and ERT staff.  Per ERT staff, tracings will be automatically extracted.  New machine will be sent to site for future transmissions.    0945:  Study drug infusion initiated   1045:  One hour intra-infusion vitals performed.  Vitals as follows:  BP-106/65, T-97.7, HR-80, RR-18  11:56:  Infusion completed one minute late due to bag overfill.  11:54:  Vital signs at end of infusion performed.  Vitals as follows:   104/67  12:01:  First PK drawn and processed per EVELIA Ignacio   12:25:  1/2 hour post infusion VS performed:  BP 98/64, HR 78, T 98.2, RR 18  12:27:  PK 1/2 hour post end of infusion drawn and processed per .  12:55:  1 hour post infusion VS performed:  /64, HR 77, T 97.7, RR 18  12:58:  1 hour post infusion PK performed and processed per .  13:03:  1 hour post infusion EKG performed per Research Staff    Patient was monitored for the full 90 minutes post infusion and tolerated the above with no difficulties, see infusion RN documentation.  Patient then received SQ Velcade injection, see MAR.       All of patient's questions were answered to his satisfaction.  Upcoming appointment calendar reviewed and given to patient.  Patient states understanding of plan of care and upcoming schedule.  He states having Research RN contact information as well as that of Dr. Carmichael.

## 2017-02-15 NOTE — PLAN OF CARE
Problem: Patient Care Overview  Goal: Plan of Care Review  Outcome: Ongoing (interventions implemented as appropriate)  Pt tolerated treatment well.  No s/s of reaction. Vitals stable, NAD.

## 2017-02-15 NOTE — PROGRESS NOTES
Subjective:       Patient ID: Roc Lai Jr. is a 51 y.o. male.    Chief Complaint: No chief complaint on file.    HPI  ECOG 1, NYHA II. Mr. Lai is here to start therapy as per the Swu168 trial to treat cardiac AL amyloidosis with significant marrow plasma cells.  He remains well and without new issues today.  His recent walk test today confirmed his candidacy for the protocol and he notes some progression with having to rest after a walk on flat surface of about a mile.  No swallowing issues, neurologic problems or other limitation.  He notes mild swelling at his lower extremity R>L.    History: He was previously vigorous and ran over three miles thrice weekly 3 years ago.  However, over the 18 months prior to presentation, he has noted progressive decline in his exercise capacity to only being able to do 10 min of exercise now.  He has previously been studied for CAD with normal coronaries in 3/47816.  He was diagnosed with atrial flutter and underwent cardiac ablation without improvement in his exercise capacity.  He was found to have an atrial septal defect and this was closed 9/26/16 but this did not improve his symptoms either.  His symptoms progressed over the last 3 months to the point he can no longer exercise and notes dyspnea with carrying croceries, climbing stairs, etc.  He was admitted to cardiology 11/27/16 with 17 pounds of fluid loss and his is now sleeping better.  He was diagnosed with diastolic heart failure and cardia MRI was concerning for infiltrative cardiac disease.      SPEP 12/1/16 showed decreased gamma globulins and free lambda light chains increased with M-spike of 0.19 g/dL.  Free lambda light chain was elevated at 50 mg/dL with normal lappa 1.37 mg/dL and ratio 0.03.  However, abdominal fat pad biopsy was negative for amyoid with the presence of fat cells 11/30/16.  ATTR DNA test negative for familial amyloidosis.    CBC normal 12/1/16 with normal renal function, calcium,  albumin and slightly low total protein levels.      Bone marrow biopsy done 1/12/17 showed a 60% plasma cell infiltrate.     Metastatic survey 1/19/17 was negative for bony disease.  MRI scans of the cervical, thoracic, lumbar and pelvic done 1/27/17 did not disclose specific lesions to change diagnosis to myeloma.    PMH  Diastolic heart failure    Review of Systems   Constitutional: Negative for activity change, appetite change, diaphoresis, fatigue, fever and unexpected weight change.   HENT: Negative for mouth sores, sore throat and trouble swallowing.    Respiratory: Negative for cough and shortness of breath.         All improved since diuresis but decreased exercise tolerance   Cardiovascular: Positive for leg swelling (mild). Negative for chest pain.   Gastrointestinal: Negative for abdominal pain, blood in stool, constipation and diarrhea.   Genitourinary: Negative for dysuria and hematuria.   Musculoskeletal: Negative for back pain and neck pain.   Skin: Negative for rash.   Neurological: Negative for tremors, weakness, light-headedness and headaches.   Hematological: Negative for adenopathy.   Psychiatric/Behavioral: Negative for confusion and sleep disturbance.       Objective:      Physical Exam   Constitutional: He is oriented to person, place, and time. He appears well-developed and well-nourished. No distress.   HENT:   Head: Normocephalic and atraumatic.   Mouth/Throat: Oropharynx is clear and moist. No oropharyngeal exudate.   Tongue not enlarged   Eyes: Conjunctivae are normal. Pupils are equal, round, and reactive to light.   Neck: Neck supple.   Cardiovascular: Normal rate and regular rhythm.    Pulmonary/Chest: Effort normal and breath sounds normal. He has no rales.   Abdominal: Soft. Bowel sounds are normal. He exhibits no mass. There is no splenomegaly. There is no tenderness.   Musculoskeletal: Normal range of motion. He exhibits no edema.   No rib or spine tenderness   Lymphadenopathy:      He has no cervical adenopathy.     He has no axillary adenopathy.        Right: No inguinal adenopathy present.        Left: No inguinal adenopathy present.   Neurological: He is alert and oriented to person, place, and time.   Reflex Scores:       Bicep reflexes are 3+ on the right side and 3+ on the left side.       Patellar reflexes are 3+ on the right side and 3+ on the left side.       Achilles reflexes are 2+ on the right side and 2+ on the left side.  Normal touch, sharp and proprioception.   Skin: Skin is warm and dry. No rash noted.   No unusual bruising   Psychiatric: He has a normal mood and affect. Thought content normal.       Assessment:       1. AL amyloidosis        Plan:       Mr. Lai has a monoclonal lambda light chain coming from a plasma cell infiltrate in the marrow that serves as the reservoir for amyloid production affecting his heart.  His cardiac amyloid has been confirmed as AL Amlyoid.  His exercise tolerance has been slowly decreasing.    His bone marrow biopsy on 1/12/17 showed a 60% cellular marrow with 28% plasma cells by morphology but 50% by immunohistochemistry.  Hence, he has at least a smoldering myeloma.  He has no renal, calcium or hemoglobin issues and he has no bony symptoms to suggest multiple myeloma and plain films do not show any lytic lesions as of today.  MRI study did not show lesions that would define him as multiple myeloma.    He does not seem to have other systemic manifestations of AL amyloidosis including the absence of enlarged tongue, organomegaly, coagulopathy, neuropathy, carpal tunnel syndrome or renal disease.  His cardiac symptoms are related to the AL amyloid infiltration and not the plasma cells in his marrow.  There is no family history of amyloidosis.    He has no baseline neuropathy.    He previously signed consent for the [id number] trial and we will start therapy today.      All of his questions were answered in the clinic today and he will follow up  as per protocol.

## 2017-02-15 NOTE — LETTER
February 15, 2017        Chon Harmon MD  [address]  [address]  [facility] [address]             [facility]  [address]  [place] [address]  Phone: [phone]   Patient: Roc Lai Jr.   MR Number: [id number]   YOB: 1965   Date of Visit: 2/15/2017       Dear Dr. Harmon:    Thank you for referring Roc Lai to me for evaluation. Below are the relevant portions of my assessment and plan of care.       1. AL amyloidosis         Mr. Lai has a monoclonal lambda light chain coming from a plasma cell infiltrate in the marrow that serves as the reservoir for amyloid production affecting his heart.  His cardiac amyloid has been confirmed as AL Amlyoid.  His exercise tolerance has been slowly decreasing.    His bone marrow biopsy on 1/12/17 showed a 60% cellular marrow with 28% plasma cells by morphology but 50% by immunohistochemistry.  Hence, he has at least a smoldering myeloma.  He has no renal, calcium or hemoglobin issues and he has no bony symptoms to suggest multiple myeloma and plain films do not show any lytic lesions as of today.  MRI study did not show lesions that would define him as multiple myeloma.    He does not seem to have other systemic manifestations of AL amyloidosis including the absence of enlarged tongue, organomegaly, coagulopathy, neuropathy, carpal tunnel syndrome or renal disease.  His cardiac symptoms are related to the AL amyloid infiltration and not the plasma cells in his marrow.  There is no family history of amyloidosis.    He has no baseline neuropathy.    He previously signed consent for the Vvi589 trial and we will start therapy today.      All of his questions were answered in the clinic today and he will follow up as per protocol.     If you have questions, please do not hesitate to call me. I look forward to following Roc along with you.    Sincerely,      MD DAWNA Chang  Shilo Chung MD

## 2017-02-16 ENCOUNTER — TELEPHONE (OUTPATIENT)
Dept: RESEARCH | Facility: HOSPITAL | Age: 52
End: 2017-02-16

## 2017-02-16 ENCOUNTER — DOCUMENTATION ONLY (OUTPATIENT)
Dept: RESEARCH | Facility: HOSPITAL | Age: 52
End: 2017-02-16

## 2017-02-16 NOTE — PROGRESS NOTES
"  Tuesday, February 14th, 2017     Study: "A Phase 3, randomized, multicenter, double-blind, placebo-controlled, 2-arm, efficacy and safety study of [id number] plus standard of care versus placebo plus standard of care in subjects with light chain (AL) amyloidosis"  Protocol ID# [id number]  IRB# 2015.305.A  Sponsor: Eliseo  Investigator: Dr. Carmichael     ERT Notification     Notification from ERT  "

## 2017-02-16 NOTE — TELEPHONE ENCOUNTER
"  Thursday, February 16th, 2017     Study: "A Phase 3, randomized, multicenter, double-blind, placebo-controlled, 2-arm, efficacy and safety study of [id number] plus standard of care versus placebo plus standard of care in subjects with light chain (AL) amyloidosis"  Protocol ID# [id number]  IRB# 2015.305.A  Sponsor: Eliseo  Investigator: Dr. Carmichael     ERT Notification    Notification received from ERT staff following receipt of yesterday's pre- and post-treatment triplicate EKG's.  RN notified that one of post treatment EKG tracings show QTcb of 511 ms.  This is expected as patient had similar intervals at baseline.  Message routed to Dr. Carmichael for notification.  "

## 2017-02-21 ENCOUNTER — PATIENT MESSAGE (OUTPATIENT)
Dept: HEMATOLOGY/ONCOLOGY | Facility: CLINIC | Age: 52
End: 2017-02-21

## 2017-02-22 ENCOUNTER — INFUSION (OUTPATIENT)
Dept: INFUSION THERAPY | Facility: HOSPITAL | Age: 52
End: 2017-02-22
Attending: INTERNAL MEDICINE
Payer: COMMERCIAL

## 2017-02-22 ENCOUNTER — RESEARCH ENCOUNTER (OUTPATIENT)
Dept: RESEARCH | Facility: HOSPITAL | Age: 52
End: 2017-02-22

## 2017-02-22 VITALS
DIASTOLIC BLOOD PRESSURE: 64 MMHG | RESPIRATION RATE: 18 BRPM | HEART RATE: 86 BPM | TEMPERATURE: 98 F | SYSTOLIC BLOOD PRESSURE: 118 MMHG

## 2017-02-22 VITALS — WEIGHT: 196.88 LBS | BODY MASS INDEX: 27.46 KG/M2

## 2017-02-22 DIAGNOSIS — E85.81 AL AMYLOIDOSIS: Primary | ICD-10-CM

## 2017-02-22 PROCEDURE — 63600175 PHARM REV CODE 636 W HCPCS: Performed by: INTERNAL MEDICINE

## 2017-02-22 PROCEDURE — 96401 CHEMO ANTI-NEOPL SQ/IM: CPT

## 2017-02-22 RX ORDER — BORTEZOMIB 3.5 MG/1
1.4 INJECTION, POWDER, LYOPHILIZED, FOR SOLUTION INTRAVENOUS; SUBCUTANEOUS
Status: COMPLETED | OUTPATIENT
Start: 2017-02-22 | End: 2017-02-22

## 2017-02-22 RX ADMIN — BORTEZOMIB 3 MG: 3.5 INJECTION, POWDER, LYOPHILIZED, FOR SOLUTION INTRAVENOUS; SUBCUTANEOUS at 11:02

## 2017-02-22 NOTE — PROGRESS NOTES
"  Wednesday, February 22nd, 2017      Study: "A Phase 3, randomized, multicenter, double-blind, placebo-controlled, 2-arm, efficacy and safety study of [id number] plus standard of care versus placebo plus standard of care in subjects with light chain (AL) amyloidosis"  Protocol ID# [id number]  IRB# 2015.305.A  Sponsor: Eliseo  Investigator: Dr. Carmichael     Month 1, Day 8  IV [id number] + SQ Velcade    0800:  Patient presents to 3rd floor clinic for blood work ahead of day 8 Velcade injection.  He is ambulatory, awake, alert, and oriented to person, place, and time.  He completed Trial Slate questionnaires prior to study procedures being initiated.  AE's reviewed:  he states he has had a few episodes of restless leg syndrome which came on over the weekend.  This was discussed with Dr. Carmichael, who states this may be anxiety but that if this problem persists and interferes with patient's sleep routinely, he may necessitate neurology consult.  However patient should keep us abreast of this symptom and let us know if it worsens.  Patient states he did not have that problem last night and he is feeling well today.  He states understanding of the former.  Nervous system disorders -Other, Restless Leg Syndrome, Grade 1.      Patient was then weighed on same scale as with previous weigh ins: weight today of 89.3 kg.  He states no changes in con meds.  He confirms he continues to take acyclovir as directed.  Patient then proceeded to phlebotomy lab for central lab and local lab blood draw.  Patient then checked in for chemo and will return around 11AM for Velcade injection once labs result and look okay for treatment.  This plan was discussed with G. Abadie, who is in agreement.  It was also discussed that a full set of vitals is necessary from a study standpoint and she states understanding of this.      Patient's labs resulted with no changes in grade on hematology or chemistry.  Total bilirubin remains elevated as per " baseline.  Patient returned to infusion suite for vitals as well as Velcade injection.  VSS and patient meets requirements to receive Velcade injection per protocol, see MAR.  Tolerated well per infusion RN, see note.      Today's central labs and M1, D1 frozen specimens shipped according to lab manual instructions.   Confirmation # [id number]. Tracking # [id number].    Patient given updated calendar and states understanding of plan of care for weekly Velcade injections in addition to next cycle's appts.  Will follow.

## 2017-02-24 ENCOUNTER — PATIENT MESSAGE (OUTPATIENT)
Dept: TRANSPLANT | Facility: CLINIC | Age: 52
End: 2017-02-24

## 2017-02-24 NOTE — TELEPHONE ENCOUNTER
2/24/17 1005 Spoke to the pharmacist Lesia she states that the RX that was sent over on December 27, 2016 was placed on hold and that the RX that patient was trying to fill was the old rx and that was why insurance company was saying it was too soon to fill. She Deleted old Rx and And states that The December prescription is now fillable.

## 2017-03-01 ENCOUNTER — INFUSION (OUTPATIENT)
Dept: INFUSION THERAPY | Facility: HOSPITAL | Age: 52
End: 2017-03-01
Attending: INTERNAL MEDICINE
Payer: COMMERCIAL

## 2017-03-01 ENCOUNTER — RESEARCH ENCOUNTER (OUTPATIENT)
Dept: RESEARCH | Facility: HOSPITAL | Age: 52
End: 2017-03-01

## 2017-03-01 VITALS
TEMPERATURE: 98 F | HEART RATE: 82 BPM | RESPIRATION RATE: 22 BRPM | SYSTOLIC BLOOD PRESSURE: 114 MMHG | DIASTOLIC BLOOD PRESSURE: 68 MMHG

## 2017-03-01 VITALS — WEIGHT: 200.38 LBS | BODY MASS INDEX: 27.95 KG/M2

## 2017-03-01 DIAGNOSIS — E85.81 AL AMYLOIDOSIS: Primary | ICD-10-CM

## 2017-03-01 PROCEDURE — 96401 CHEMO ANTI-NEOPL SQ/IM: CPT

## 2017-03-01 PROCEDURE — 63600175 PHARM REV CODE 636 W HCPCS: Performed by: INTERNAL MEDICINE

## 2017-03-01 RX ORDER — BORTEZOMIB 3.5 MG/1
1.4 INJECTION, POWDER, LYOPHILIZED, FOR SOLUTION INTRAVENOUS; SUBCUTANEOUS
Status: COMPLETED | OUTPATIENT
Start: 2017-03-01 | End: 2017-03-01

## 2017-03-01 RX ADMIN — BORTEZOMIB 3 MG: 3.5 INJECTION, POWDER, LYOPHILIZED, FOR SOLUTION INTRAVENOUS; SUBCUTANEOUS at 12:03

## 2017-03-01 NOTE — PROGRESS NOTES
"  Wednesday, March 1st, 2017      Study: "A Phase 3, randomized, multicenter, double-blind, placebo-controlled, 2-arm, efficacy and safety study of [id number] plus standard of care versus placebo plus standard of care in subjects with light chain (AL) amyloidosis"  Protocol ID# [id number]  IRB# 2015.305.A  Sponsor: Eliseo  Investigator: Dr. Carmichael      Month 1, Day 15  IV [id number]- + SQ Velcade     0800: Patient presents to 3rd floor clinic for blood work ahead of day 15 Velcade injection. He is ambulatory, awake, alert, and oriented to person, place, and time. He completed Trial Slate questionnaires prior to study procedures being initiated. He states shortness of breath symptom has remained stable. AE's reviewed: he states his restless leg issues have persisted "somewhat" and that he wakes several times a night with this symptom, however it is "tolerable."  He states understanding that he will notify us if this symptom begins to interfere with his quality of life. Nervous system disorders -Other, Restless Leg Syndrome, Grade 1.      Patient was then weighed on same scale as with previous weigh ins: weight today of 90.9 kg. This represents a 1.6 kg or 3.5 lb weight increase from last cycle.  Patient states that he has been "Awais Gras-ing" this past week and has been eating poorly and attributes weight gain to this.  He states no changes in con meds. He confirms he continues to take acyclovir as directed. Patient then proceeded to phlebotomy lab for central lab and local lab blood draw. Patient then checked in for chemo and will return around 1230PM for Velcade injection once labs result and look okay for treatment. This plan was discussed with G. Abadie, who is in agreement. It was also discussed that a full set of vitals is necessary from a study standpoint and she states understanding of this.      Patient's labs resulted with no changes in grade on hematology or chemistry. Total bilirubin remains elevated " as per baseline, slightly increased from last week at 1.9.  Dr. Carmichael aware of this. Patient returned to infusion suite for vitals as well as Velcade injection. VSS and patient meets requirements to receive Velcade injection per protocol, see MAR. Tolerated well per infusion RN, see note.      Today's central labs and M1, D8 frozen specimens shipped according to lab manual instructions. Confirmation # [id number]; Tracking #: [id number],.

## 2017-03-01 NOTE — NURSING
Patient here for injection-denies any side effects from previous injection-tolerated injection well.

## 2017-03-08 ENCOUNTER — RESEARCH ENCOUNTER (OUTPATIENT)
Dept: RESEARCH | Facility: HOSPITAL | Age: 52
End: 2017-03-08

## 2017-03-08 ENCOUNTER — INFUSION (OUTPATIENT)
Dept: INFUSION THERAPY | Facility: HOSPITAL | Age: 52
End: 2017-03-08
Attending: INTERNAL MEDICINE
Payer: COMMERCIAL

## 2017-03-08 VITALS
SYSTOLIC BLOOD PRESSURE: 99 MMHG | TEMPERATURE: 98 F | DIASTOLIC BLOOD PRESSURE: 60 MMHG | RESPIRATION RATE: 20 BRPM | HEART RATE: 83 BPM

## 2017-03-08 DIAGNOSIS — E85.81 AL AMYLOIDOSIS: Primary | ICD-10-CM

## 2017-03-08 PROCEDURE — 63600175 PHARM REV CODE 636 W HCPCS: Performed by: INTERNAL MEDICINE

## 2017-03-08 PROCEDURE — 96401 CHEMO ANTI-NEOPL SQ/IM: CPT

## 2017-03-08 RX ORDER — BORTEZOMIB 3.5 MG/1
1.4 INJECTION, POWDER, LYOPHILIZED, FOR SOLUTION INTRAVENOUS; SUBCUTANEOUS
Status: COMPLETED | OUTPATIENT
Start: 2017-03-08 | End: 2017-03-08

## 2017-03-08 RX ADMIN — BORTEZOMIB 3 MG: 3.5 INJECTION, POWDER, LYOPHILIZED, FOR SOLUTION INTRAVENOUS; SUBCUTANEOUS at 11:03

## 2017-03-08 NOTE — PROGRESS NOTES
"  Wednesday, March 8th, 2017      Study: "A Phase 3, randomized, multicenter, double-blind, placebo-controlled, 2-arm, efficacy and safety study of [id number] plus standard of care versus placebo plus standard of care in subjects with light chain (AL) amyloidosis"  Protocol ID# [id number]  IRB# 2015.305.A  Sponsor: Eliseo  Investigator: Dr. Carmichael      Month 1, Day 22  IV [id number]- + SQ Velcade      0800: Patient presents to 3rd floor clinic for blood work ahead of day 22 Velcade injection. He is ambulatory, awake, alert, and oriented to person, place, and time. He completed Trial Slate questionnaires prior to study procedures being initiated. He states shortness of breath symptom has remained stable to quote "no better, but no worse."  AE's reviewed: he states his restless leg issues have persisted "and states it recurs 2-3 nights a week, however i "tolerable." He states understanding that he will notify us if this symptom begins to interfere with his quality of life. Nervous system disorders -Other, Restless Leg Syndrome, Grade 1.       Patient was then weighed on same scale as with previous weigh ins: weight today of 89.7 kg. This is down from 90.9 at last week's visit.  He states no changes in con meds. He confirms he continues to take acyclovir as directed. Patient then proceeded to phlebotomy lab for central lab and local lab blood draw. Patient then checked in for chemo and will return around 11AM for Velcade injection once labs result and look okay for treatment. This plan was discussed with G. Abadie, who is in agreement. It was also discussed that a full set of vitals is necessary from a study standpoint and she states understanding of this.       Patient's labs resulted, slight decrease of platelet count to 148,000, platelet count decreased, grade 1.  This does not require a modification of dosing for today's Velcade. Total bilirubin remains elevated as per baseline, slightly increased from last " "week at 1.7. Dr. Carmichael aware of this. Patient returned to infusion suite for vitals as well as Velcade injection. VSS and patient meets requirements to receive Velcade injection per protocol, see MAR. Tolerated well per infusion RN, see note.       Today's central labs shipped according to lab manual instructions. Patient states understanding that next week is Month 2, Day 1 and it will be his "long day."  He states understanding of appointment times as well.  Will follow.  "

## 2017-03-15 ENCOUNTER — OFFICE VISIT (OUTPATIENT)
Dept: HEMATOLOGY/ONCOLOGY | Facility: CLINIC | Age: 52
End: 2017-03-15
Payer: COMMERCIAL

## 2017-03-15 ENCOUNTER — RESEARCH ENCOUNTER (OUTPATIENT)
Dept: RESEARCH | Facility: HOSPITAL | Age: 52
End: 2017-03-15

## 2017-03-15 ENCOUNTER — INFUSION (OUTPATIENT)
Dept: INFUSION THERAPY | Facility: HOSPITAL | Age: 52
End: 2017-03-15
Attending: INTERNAL MEDICINE
Payer: COMMERCIAL

## 2017-03-15 VITALS
DIASTOLIC BLOOD PRESSURE: 63 MMHG | HEART RATE: 77 BPM | RESPIRATION RATE: 18 BRPM | SYSTOLIC BLOOD PRESSURE: 105 MMHG | TEMPERATURE: 98 F

## 2017-03-15 VITALS
SYSTOLIC BLOOD PRESSURE: 105 MMHG | RESPIRATION RATE: 17 BRPM | WEIGHT: 201.06 LBS | DIASTOLIC BLOOD PRESSURE: 69 MMHG | BODY MASS INDEX: 28.15 KG/M2 | HEIGHT: 71 IN | HEART RATE: 84 BPM | TEMPERATURE: 98 F

## 2017-03-15 DIAGNOSIS — E85.81 AL AMYLOIDOSIS: Primary | ICD-10-CM

## 2017-03-15 DIAGNOSIS — I50.32 CHRONIC DIASTOLIC CONGESTIVE HEART FAILURE: ICD-10-CM

## 2017-03-15 DIAGNOSIS — D47.2 MONOCLONAL GAMMOPATHY: ICD-10-CM

## 2017-03-15 PROCEDURE — 99999 PR PBB SHADOW E&M-EST. PATIENT-LVL III: CPT | Mod: PBBFAC,,, | Performed by: INTERNAL MEDICINE

## 2017-03-15 PROCEDURE — 99213 OFFICE O/P EST LOW 20 MIN: CPT | Mod: S$PBB,,, | Performed by: INTERNAL MEDICINE

## 2017-03-15 PROCEDURE — 63600175 PHARM REV CODE 636 W HCPCS: Performed by: INTERNAL MEDICINE

## 2017-03-15 PROCEDURE — 25000003 PHARM REV CODE 250: Performed by: INTERNAL MEDICINE

## 2017-03-15 PROCEDURE — 96401 CHEMO ANTI-NEOPL SQ/IM: CPT

## 2017-03-15 PROCEDURE — 96413 CHEMO IV INFUSION 1 HR: CPT

## 2017-03-15 RX ORDER — DIPHENHYDRAMINE HCL 25 MG
25 CAPSULE ORAL EVERY 6 HOURS PRN
Status: CANCELLED
Start: 2017-04-05

## 2017-03-15 RX ORDER — HEPARIN 100 UNIT/ML
500 SYRINGE INTRAVENOUS
Status: CANCELLED | OUTPATIENT
Start: 2017-04-05

## 2017-03-15 RX ORDER — HEPARIN 100 UNIT/ML
500 SYRINGE INTRAVENOUS
Status: CANCELLED | OUTPATIENT
Start: 2017-03-29

## 2017-03-15 RX ORDER — BORTEZOMIB 3.5 MG/1
1.4 INJECTION, POWDER, LYOPHILIZED, FOR SOLUTION INTRAVENOUS; SUBCUTANEOUS
Status: CANCELLED | OUTPATIENT
Start: 2017-04-05 | End: 2017-04-05

## 2017-03-15 RX ORDER — DIPHENHYDRAMINE HCL 25 MG
25 CAPSULE ORAL EVERY 6 HOURS PRN
Status: CANCELLED
Start: 2017-03-22

## 2017-03-15 RX ORDER — HEPARIN 100 UNIT/ML
500 SYRINGE INTRAVENOUS
Status: DISCONTINUED | OUTPATIENT
Start: 2017-03-15 | End: 2017-03-15 | Stop reason: HOSPADM

## 2017-03-15 RX ORDER — BORTEZOMIB 3.5 MG/1
1.4 INJECTION, POWDER, LYOPHILIZED, FOR SOLUTION INTRAVENOUS; SUBCUTANEOUS
Status: CANCELLED | OUTPATIENT
Start: 2017-03-22 | End: 2017-03-22

## 2017-03-15 RX ORDER — BORTEZOMIB 3.5 MG/1
1.4 INJECTION, POWDER, LYOPHILIZED, FOR SOLUTION INTRAVENOUS; SUBCUTANEOUS
Status: CANCELLED | OUTPATIENT
Start: 2017-03-29 | End: 2017-03-29

## 2017-03-15 RX ORDER — DIPHENHYDRAMINE HCL 25 MG
25 CAPSULE ORAL ONCE
Status: COMPLETED | OUTPATIENT
Start: 2017-03-15 | End: 2017-03-15

## 2017-03-15 RX ORDER — BORTEZOMIB 3.5 MG/1
1.3 INJECTION, POWDER, LYOPHILIZED, FOR SOLUTION INTRAVENOUS; SUBCUTANEOUS
Status: COMPLETED | OUTPATIENT
Start: 2017-03-15 | End: 2017-03-15

## 2017-03-15 RX ORDER — SODIUM CHLORIDE 0.9 % (FLUSH) 0.9 %
10 SYRINGE (ML) INJECTION
Status: CANCELLED | OUTPATIENT
Start: 2017-03-15

## 2017-03-15 RX ORDER — HEPARIN 100 UNIT/ML
500 SYRINGE INTRAVENOUS
Status: CANCELLED | OUTPATIENT
Start: 2017-03-15

## 2017-03-15 RX ORDER — DIPHENHYDRAMINE HCL 25 MG
25 CAPSULE ORAL EVERY 6 HOURS PRN
Status: CANCELLED
Start: 2017-03-29

## 2017-03-15 RX ORDER — DEXTROMETHORPHAN HYDROBROMIDE, GUAIFENESIN 5; 100 MG/5ML; MG/5ML
650 LIQUID ORAL
Status: CANCELLED | OUTPATIENT
Start: 2017-03-15 | End: 2017-03-15

## 2017-03-15 RX ORDER — ACETAMINOPHEN 650 MG/20.3ML
650 LIQUID ORAL
Status: COMPLETED | OUTPATIENT
Start: 2017-03-15 | End: 2017-03-15

## 2017-03-15 RX ORDER — SODIUM CHLORIDE 0.9 % (FLUSH) 0.9 %
10 SYRINGE (ML) INJECTION
Status: CANCELLED | OUTPATIENT
Start: 2017-03-29

## 2017-03-15 RX ORDER — SODIUM CHLORIDE 0.9 % (FLUSH) 0.9 %
10 SYRINGE (ML) INJECTION
Status: DISCONTINUED | OUTPATIENT
Start: 2017-03-15 | End: 2017-03-15 | Stop reason: HOSPADM

## 2017-03-15 RX ORDER — DIPHENHYDRAMINE HCL 25 MG
25 CAPSULE ORAL ONCE
Status: CANCELLED
Start: 2017-03-15 | End: 2017-03-15

## 2017-03-15 RX ORDER — BORTEZOMIB 3.5 MG/1
1.3 INJECTION, POWDER, LYOPHILIZED, FOR SOLUTION INTRAVENOUS; SUBCUTANEOUS
Status: CANCELLED | OUTPATIENT
Start: 2017-03-15 | End: 2017-03-15

## 2017-03-15 RX ORDER — SODIUM CHLORIDE 0.9 % (FLUSH) 0.9 %
10 SYRINGE (ML) INJECTION
Status: CANCELLED | OUTPATIENT
Start: 2017-03-22

## 2017-03-15 RX ORDER — HEPARIN 100 UNIT/ML
500 SYRINGE INTRAVENOUS
Status: CANCELLED | OUTPATIENT
Start: 2017-03-22

## 2017-03-15 RX ORDER — SODIUM CHLORIDE 0.9 % (FLUSH) 0.9 %
10 SYRINGE (ML) INJECTION
Status: CANCELLED | OUTPATIENT
Start: 2017-04-05

## 2017-03-15 RX ADMIN — ACETAMINOPHEN 650 MG: 160 SOLUTION ORAL at 09:03

## 2017-03-15 RX ADMIN — BORTEZOMIB 2.8 MG: 3.5 INJECTION, POWDER, LYOPHILIZED, FOR SOLUTION INTRAVENOUS; SUBCUTANEOUS at 01:03

## 2017-03-15 RX ADMIN — DIPHENHYDRAMINE HYDROCHLORIDE 25 MG: 25 CAPSULE ORAL at 09:03

## 2017-03-15 NOTE — LETTER
March 15, 2017        Chon Harmon MD  [address] [facility]  [address]  [facility] [address]             [facility]  [address]  [place] [address]  Phone: [phone]   Patient: Roc Lai Jr.   MR Number: [id number]   YOB: 1965   Date of Visit: 3/15/2017       Dear Dr. Harmon:    Thank you for referring Roc Lai to me for evaluation. Below are the relevant portions of my assessment and plan of care.       1. AL amyloidosis    2. Monoclonal gammopathy    3. Chronic diastolic congestive heart failure         Mr. Lai has a monoclonal lambda light chain coming from a plasma cell infiltrate in the marrow that serves as the reservoir for amyloid production affecting his heart.  His cardiac amyloid has been confirmed as AL Amlyoid.  He is now one month into therapy on the [id number] trial.    His exercise tolerance has been slowly decreasing.  His symptoms over the last month have increased somewhat with more light headedness and dyspnea.  He seems to be overdiuresed and we will cut lasix to once a day with continued spironolactone.  Given his diastolic dysfunction he relies on preload so we will need to monitor his overall volume status closely and he will report back later this week.    His bone marrow biopsy on 1/12/17 showed a 60% cellular marrow with 28% plasma cells by morphology but 50% by immunohistochemistry.  Hence, he has at least a smoldering myeloma.  He has no renal, calcium or hemoglobin issues and he has no bony symptoms to suggest multiple myeloma and plain films do not show any lytic lesions as of today.  MRI study did not show lesions that would define him as multiple myeloma.    He does not seem to have other systemic manifestations of AL amyloidosis including the absence of enlarged tongue, organomegaly, coagulopathy, neuropathy, carpal tunnel syndrome or renal disease.  His cardiac symptoms are related to the AL amyloid infiltration and  not the plasma cells in his marrow.  There is no family history of amyloidosis.    He has no baseline neuropathy and no progression on exam today.      All of his questions were answered in the clinic today and he will follow up as per protocol.     If you have questions, please do not hesitate to call me. I look forward to following Roc along with you.    Sincerely,      MD DAWNA Chang Jr., MD

## 2017-03-15 NOTE — PROGRESS NOTES
Subjective:       Patient ID: Roc Lai Jr. is a 51 y.o. male.    Chief Complaint: No chief complaint on file.    HPI  ECOG 1, NYHA II. Mr. Lai is here for follow up one month into the [id number] trial to treat cardiac AL amyloidosis with significant marrow plasma cells.  He remains well and without new issues today.    No swallowing issues, neurologic problems or other limitation.  He notes mild swelling at his lower extremity R>L.    Complaints of light headedness and dyspnea increased over the last month.    History: Prior to diagnosis he was previously vigorous and ran over three miles thrice weekly 3 years ago.  However, over the 18 months prior to presentation, he has noted progressive decline in his exercise capacity to only being able to do 10 min of exercise now.  He has previously been studied for CAD with normal coronaries in 3/06283.  He was diagnosed with atrial flutter and underwent cardiac ablation without improvement in his exercise capacity.  He was found to have an atrial septal defect and this was closed 9/26/16 but this did not improve his symptoms either.  His symptoms progressed over the last 3 months to the point he can no longer exercise and notes dyspnea with carrying croceries, climbing stairs, etc.  He was admitted to cardiology 11/27/16 with 17 pounds of fluid loss and his is now sleeping better.  He was diagnosed with diastolic heart failure and cardia MRI was concerning for infiltrative cardiac disease.      SPEP 12/1/16 showed decreased gamma globulins and free lambda light chains increased with M-spike of 0.19 g/dL.  Free lambda light chain was elevated at 50 mg/dL with normal lappa 1.37 mg/dL and ratio 0.03.  However, abdominal fat pad biopsy was negative for amyoid with the presence of fat cells 11/30/16.  ATTR DNA test negative for familial amyloidosis.    CBC normal 12/1/16 with normal renal function, calcium, albumin and slightly low total protein levels.      Bone marrow  biopsy done 1/12/17 showed a 60% plasma cell infiltrate.     Metastatic survey 1/19/17 was negative for bony disease.  MRI scans of the cervical, thoracic, lumbar and pelvic done 1/27/17 did not disclose specific lesions to change diagnosis to myeloma.    His pre-trial walk test today confirmed his candidacy for the protocol and he notes some progression with having to rest after a walk on flat surface of about a mile.    [facility]  Diastolic heart failure    Review of Systems   Constitutional: Negative for activity change, appetite change, diaphoresis, fatigue, fever and unexpected weight change.   HENT: Negative for mouth sores, sore throat and trouble swallowing.    Respiratory: Negative for cough and shortness of breath.         All improved since diuresis but decreased exercise tolerance   Cardiovascular: Positive for leg swelling (mild). Negative for chest pain.   Gastrointestinal: Negative for abdominal pain, blood in stool, constipation and diarrhea.   Genitourinary: Negative for dysuria and hematuria.   Musculoskeletal: Negative for back pain and neck pain.   Skin: Negative for rash.   Neurological: Negative for tremors, weakness, light-headedness and headaches.   Hematological: Negative for adenopathy.   Psychiatric/Behavioral: Negative for confusion and sleep disturbance.       Objective:      Physical Exam   Constitutional: He is oriented to person, place, and time. He appears well-developed and well-nourished. No distress.   HENT:   Head: Normocephalic and atraumatic.   Mouth/Throat: Oropharynx is clear and moist. No oropharyngeal exudate.   Tongue not enlarged  No submandibular enlargement   Eyes: Conjunctivae are normal. Pupils are equal, round, and reactive to light.   Neck: Neck supple.   Cardiovascular: Normal rate and regular rhythm.    Pulmonary/Chest: Effort normal and breath sounds normal. He has no rales.   Abdominal: Soft. Bowel sounds are normal. He exhibits no distension (No ascites) and no mass.  There is no splenomegaly. There is no tenderness.   Musculoskeletal: Normal range of motion. He exhibits no edema.   No rib or spine tenderness   Lymphadenopathy:     He has no cervical adenopathy.     He has no axillary adenopathy.        Right: No inguinal adenopathy present.        Left: No inguinal adenopathy present.   Neurological: He is alert and oriented to person, place, and time.   Reflex Scores:       Bicep reflexes are 3+ on the right side and 3+ on the left side.       Patellar reflexes are 3+ on the right side and 3+ on the left side.       Achilles reflexes are 2+ on the right side and 2+ on the left side.  Normal touch, sharp and proprioception.   Skin: Skin is warm and dry. No rash noted.   No unusual bruising   Psychiatric: He has a normal mood and affect. Thought content normal.       Assessment:       1. AL amyloidosis    2. Monoclonal gammopathy    3. Chronic diastolic congestive heart failure        Plan:       Mr. Lai has a monoclonal lambda light chain coming from a plasma cell infiltrate in the marrow that serves as the reservoir for amyloid production affecting his heart.  His cardiac amyloid has been confirmed as AL Amlyoid.  He is now one month into therapy on the [id number] trial.    His exercise tolerance has been slowly decreasing.  His symptoms over the last month have increased somewhat with more light headedness and dyspnea.  He seems to be overdiuresed and we will cut lasix to once a day with continued spironolactone.  Given his diastolic dysfunction he relies on preload so we will need to monitor his overall volume status closely and he will report back later this week.    His bone marrow biopsy on 1/12/17 showed a 60% cellular marrow with 28% plasma cells by morphology but 50% by immunohistochemistry.  Hence, he has at least a smoldering myeloma.  He has no renal, calcium or hemoglobin issues and he has no bony symptoms to suggest multiple myeloma and plain films do not show any  lytic lesions as of today.  MRI study did not show lesions that would define him as multiple myeloma.    He does not seem to have other systemic manifestations of AL amyloidosis including the absence of enlarged tongue, organomegaly, coagulopathy, neuropathy, carpal tunnel syndrome or renal disease.  His cardiac symptoms are related to the AL amyloid infiltration and not the plasma cells in his marrow.  There is no family history of amyloidosis.    He has no baseline neuropathy and no progression on exam today.      All of his questions were answered in the clinic today and he will follow up as per protocol.

## 2017-03-22 ENCOUNTER — INFUSION (OUTPATIENT)
Dept: INFUSION THERAPY | Facility: HOSPITAL | Age: 52
End: 2017-03-22
Attending: INTERNAL MEDICINE
Payer: COMMERCIAL

## 2017-03-22 ENCOUNTER — RESEARCH ENCOUNTER (OUTPATIENT)
Dept: RESEARCH | Facility: HOSPITAL | Age: 52
End: 2017-03-22

## 2017-03-22 VITALS
HEART RATE: 77 BPM | TEMPERATURE: 98 F | DIASTOLIC BLOOD PRESSURE: 66 MMHG | RESPIRATION RATE: 16 BRPM | SYSTOLIC BLOOD PRESSURE: 107 MMHG

## 2017-03-22 DIAGNOSIS — E85.81 AL AMYLOIDOSIS: Primary | ICD-10-CM

## 2017-03-22 PROCEDURE — 96401 CHEMO ANTI-NEOPL SQ/IM: CPT

## 2017-03-22 PROCEDURE — 63600175 PHARM REV CODE 636 W HCPCS: Performed by: INTERNAL MEDICINE

## 2017-03-22 RX ORDER — BORTEZOMIB 3.5 MG/1
1.4 INJECTION, POWDER, LYOPHILIZED, FOR SOLUTION INTRAVENOUS; SUBCUTANEOUS
Status: COMPLETED | OUTPATIENT
Start: 2017-03-22 | End: 2017-03-22

## 2017-03-22 RX ADMIN — BORTEZOMIB 3 MG: 3.5 INJECTION, POWDER, LYOPHILIZED, FOR SOLUTION INTRAVENOUS; SUBCUTANEOUS at 11:03

## 2017-03-22 NOTE — PROGRESS NOTES
"Wednesday, March 22nd, 2017     Study: "A Phase 3, randomized, multicenter, double-blind, placebo-controlled, 2-arm, efficacy and safety study of [id number] plus standard of care versus placebo plus standard of care in subjects with light chain (AL) amyloidosis"  Protocol ID# [id number]  IRB# 2015.305.A  Sponsor: Eliseo  Investigator: Dr. Carmichael    Month 2, Day 8   SQ Velcade only today    Patient presents for month 2, day 8 treatment per above-mentioned protocol.  Patient states he is doing "okay" today. Pt states that Dr. Carmichael decreased his lasix dose last week, but the patient had to increase his dose due to increase in fluid gain. Pt stated that he increased his dose as of Friday 3/17/17. He states his RLS symptoms have remained stable and does not wish to further pursue this at this time.  He is ambulatory, awake, alert, and oriented to person, place, and time, accompanied by self.  Lower extremity edema stable.  He states fatigue is present but not limiting his activities.  Trial Slate and questionnaires (FACT-GOG NTX) performed prior to any study related procedures.  Patient had centrally required blood work for bioanalytical testing completed  and was processed by EVELIA Ignacio, . Pt also had local CBC and chemistry done.  Plan of care for the day reviewed with patient.  He states understanding.  Patient states continued willingness to participate in above-mentioned trial.    Review of AE's:    Restless Leg Syndrome, grade 1:  As stated above, patient states this is stable.  Occurs 2-3 nights per week, but tolerable.  AE ongoing.  Platelet Count decreased, grade 1:  Platelet count [id number] today.  Remains grade 1.  AE ongoing.  Total Bilirubin Increased, Grade 2:  Total bilirubin 2.1 per local blood work performed today.  Noted as NCS per Dr. Carmichael.  AE grade 2, will follow.      No other change in grade in baseline AE's. Concomitant medications reviewed.   Pt received his dose of SQ velcade " today without any difficulty.  Today's weight is 91.1kg and VS stable for today.        All of patient's questions were answered to his satisfaction.  Upcoming appointment calendar reviewed and given to patient.  Patient states understanding of plan of care and upcoming schedule.  He states having [facility] RN contact information as well as that of Dr. Carmichael.

## 2017-03-28 ENCOUNTER — LAB VISIT (OUTPATIENT)
Dept: LAB | Facility: HOSPITAL | Age: 52
End: 2017-03-28
Attending: INTERNAL MEDICINE
Payer: COMMERCIAL

## 2017-03-28 ENCOUNTER — HOSPITAL ENCOUNTER (OUTPATIENT)
Dept: CARDIOLOGY | Facility: CLINIC | Age: 52
Discharge: HOME | End: 2017-03-28
Payer: COMMERCIAL

## 2017-03-28 ENCOUNTER — OFFICE VISIT (OUTPATIENT)
Dept: CARDIOLOGY | Facility: CLINIC | Age: 52
End: 2017-03-28
Payer: COMMERCIAL

## 2017-03-28 VITALS
SYSTOLIC BLOOD PRESSURE: 115 MMHG | DIASTOLIC BLOOD PRESSURE: 76 MMHG | HEART RATE: 79 BPM | OXYGEN SATURATION: 95 % | HEIGHT: 71 IN | WEIGHT: 203.69 LBS | BODY MASS INDEX: 28.52 KG/M2

## 2017-03-28 DIAGNOSIS — E85.81 AL AMYLOIDOSIS: ICD-10-CM

## 2017-03-28 DIAGNOSIS — Q21.10 ASD (ATRIAL SEPTAL DEFECT): ICD-10-CM

## 2017-03-28 DIAGNOSIS — Q21.11 ASD (ATRIAL SEPTAL DEFECT), OSTIUM SECUNDUM: ICD-10-CM

## 2017-03-28 DIAGNOSIS — I43 CARDIAC AMYLOIDOSIS: ICD-10-CM

## 2017-03-28 DIAGNOSIS — Q21.11 ASD (ATRIAL SEPTAL DEFECT), OSTIUM SECUNDUM: Primary | ICD-10-CM

## 2017-03-28 DIAGNOSIS — E85.4 CARDIAC AMYLOIDOSIS: ICD-10-CM

## 2017-03-28 LAB
ANION GAP SERPL CALC-SCNC: 10 MMOL/L
BNP SERPL-MCNC: 708 PG/ML
BUN SERPL-MCNC: 26 MG/DL
CALCIUM SERPL-MCNC: 9.3 MG/DL
CHLORIDE SERPL-SCNC: 103 MMOL/L
CO2 SERPL-SCNC: 24 MMOL/L
CREAT SERPL-MCNC: 1.3 MG/DL
DIASTOLIC DYSFUNCTION: YES
EST. GFR  (AFRICAN AMERICAN): >60 ML/MIN/1.73 M^2
EST. GFR  (NON AFRICAN AMERICAN): >60 ML/MIN/1.73 M^2
GLOBAL PERICARDIAL EFFUSION: ABNORMAL
GLUCOSE SERPL-MCNC: 106 MG/DL
POTASSIUM SERPL-SCNC: 4.1 MMOL/L
RETIRED EF AND QEF - SEE NOTES: 40 (ref 55–65)
SODIUM SERPL-SCNC: 137 MMOL/L
TROPONIN I SERPL DL<=0.01 NG/ML-MCNC: 3.7 NG/ML

## 2017-03-28 PROCEDURE — 36415 COLL VENOUS BLD VENIPUNCTURE: CPT

## 2017-03-28 PROCEDURE — 1160F RVW MEDS BY RX/DR IN RCRD: CPT | Mod: S$GLB,,, | Performed by: INTERNAL MEDICINE

## 2017-03-28 PROCEDURE — 99999 PR PBB SHADOW E&M-EST. PATIENT-LVL IV: CPT | Mod: PBBFAC,,, | Performed by: INTERNAL MEDICINE

## 2017-03-28 PROCEDURE — 83880 ASSAY OF NATRIURETIC PEPTIDE: CPT

## 2017-03-28 PROCEDURE — 0399T 2D ECHO WITH BUBBLE CONTRAST: CPT | Mod: S$GLB,,, | Performed by: INTERNAL MEDICINE

## 2017-03-28 PROCEDURE — 93307 TTE W/O DOPPLER COMPLETE: CPT | Mod: S$GLB,,, | Performed by: INTERNAL MEDICINE

## 2017-03-28 PROCEDURE — 84484 ASSAY OF TROPONIN QUANT: CPT

## 2017-03-28 PROCEDURE — 99212 OFFICE O/P EST SF 10 MIN: CPT | Mod: S$GLB,,, | Performed by: INTERNAL MEDICINE

## 2017-03-28 PROCEDURE — 80048 BASIC METABOLIC PNL TOTAL CA: CPT

## 2017-03-28 NOTE — PROGRESS NOTES
Subjective:    Patient ID:  Roc Lai Jr. is a 51 y.o. male who presents for follow-up of PFO/ASD (6 months post closure)      HPI     Prior atrial flutter post ablation. ASD closed 9/26/16 then diagnosed with Amyloid now on chemotherapy directed by Dr. Carmichael and NYHA II treated with diuretics by Dr. Lao. He reports mildly worsening SOB on exertion, with 1+ PT edema and ECHO demonstrates LV wall thickness increase from .55cm in 11/16 to .79cm today. He is here today for 6 month follow up from ASD closure.       Review of Systems   Constitution: Positive for malaise/fatigue. Negative for weight gain and weight loss.   HENT: Negative for headaches.    Eyes: Negative for blurred vision, double vision, vision loss in left eye, vision loss in right eye and visual disturbance.   Cardiovascular: Positive for dyspnea on exertion (NYHA II). Negative for claudication, irregular heartbeat, leg swelling, near-syncope, orthopnea and palpitations.   Respiratory: Positive for shortness of breath (on exertion). Negative for snoring.    Hematologic/Lymphatic: Does not bruise/bleed easily.   Skin: Negative for rash.   Musculoskeletal: Negative for myalgias.   Gastrointestinal: Negative for bloating, abdominal pain, constipation, diarrhea, hematemesis, hematochezia, melena, nausea and vomiting.   Neurological: Negative for excessive daytime sleepiness, dizziness, light-headedness, loss of balance, numbness and vertigo.   Psychiatric/Behavioral: Negative for altered mental status.        Objective:    Physical Exam   Constitutional: He is oriented to person, place, and time. Vital signs are normal. He appears well-developed and well-nourished.   HENT:   Head: Normocephalic.   Eyes: Conjunctivae and EOM are normal. Pupils are equal, round, and reactive to light.   Neck: Normal range of motion. Neck supple. No hepatojugular reflux and no JVD present. Carotid bruit is not present. No tracheal deviation present. No thyromegaly  present.   Cardiovascular: Normal rate, regular rhythm, normal heart sounds, intact distal pulses and normal pulses.    Pulses:       Carotid pulses are 2+ on the right side, and 2+ on the left side.       Radial pulses are 2+ on the right side, and 2+ on the left side.        Femoral pulses are 2+ on the right side, and 2+ on the left side.       Popliteal pulses are 2+ on the right side, and 2+ on the left side.        Dorsalis pedis pulses are 2+ on the right side, and 2+ on the left side.        Posterior tibial pulses are 2+ on the right side, and 2+ on the left side.   Pulmonary/Chest: Effort normal and breath sounds normal. No respiratory distress.   Abdominal: Soft. Bowel sounds are normal. There is no hepatosplenomegaly.   Musculoskeletal: Normal range of motion. He exhibits edema (1+ PT edema.).   Neurological: He is alert and oriented to person, place, and time. He has normal strength and normal reflexes.   Skin: Skin is warm and dry.   Psychiatric: He has a normal mood and affect.   Nursing note and vitals reviewed.        Assessment:       1. ASD (atrial septal defect), ostium secundum s/p closure - Device positioned well with minimal bubbles.   2. AL amyloidosis         Plan:       Follow up in 1 yr with ECHO/no bubbles. D/C Plavix.   Follow up with Shilo in 3 days for medication titration.   Continued chemotherapy for Amyloidosis.

## 2017-03-29 ENCOUNTER — RESEARCH ENCOUNTER (OUTPATIENT)
Dept: RESEARCH | Facility: HOSPITAL | Age: 52
End: 2017-03-29

## 2017-03-29 ENCOUNTER — INFUSION (OUTPATIENT)
Dept: INFUSION THERAPY | Facility: HOSPITAL | Age: 52
End: 2017-03-29
Attending: INTERNAL MEDICINE
Payer: COMMERCIAL

## 2017-03-29 VITALS
TEMPERATURE: 97 F | HEART RATE: 81 BPM | WEIGHT: 202.19 LBS | DIASTOLIC BLOOD PRESSURE: 63 MMHG | SYSTOLIC BLOOD PRESSURE: 99 MMHG | RESPIRATION RATE: 28 BRPM | BODY MASS INDEX: 28.2 KG/M2

## 2017-03-29 DIAGNOSIS — E85.81 AL AMYLOIDOSIS: Primary | ICD-10-CM

## 2017-03-29 PROCEDURE — 63600175 PHARM REV CODE 636 W HCPCS: Performed by: INTERNAL MEDICINE

## 2017-03-29 PROCEDURE — 96401 CHEMO ANTI-NEOPL SQ/IM: CPT

## 2017-03-29 RX ORDER — BORTEZOMIB 3.5 MG/1
1.4 INJECTION, POWDER, LYOPHILIZED, FOR SOLUTION INTRAVENOUS; SUBCUTANEOUS
Status: COMPLETED | OUTPATIENT
Start: 2017-03-29 | End: 2017-03-29

## 2017-03-29 RX ADMIN — BORTEZOMIB 3 MG: 3.5 INJECTION, POWDER, LYOPHILIZED, FOR SOLUTION INTRAVENOUS; SUBCUTANEOUS at 11:03

## 2017-03-29 NOTE — NURSING
"Patient here for injection-complains of "light-headedness" and fatigue-instructed to rise slowly-tolerated injection well.  "

## 2017-03-29 NOTE — PROGRESS NOTES
"  Wednesday, March 29th, 2017      Study: "A Phase 3, randomized, multicenter, double-blind, placebo-controlled, 2-arm, efficacy and safety study of [id number] plus standard of care versus placebo plus standard of care in subjects with light chain (AL) amyloidosis"  Protocol ID# [id number]  IRB# 2015.305.A  Sponsor: Eliseo  Investigator: Dr. Carmichael      Month 2, Day 15  IV [id number] + SQ Velcade     0800: Patient presents to 3rd floor clinic for blood work ahead of day 15 Velcade injection. He is ambulatory, awake, alert, and oriented to person, place, and time. He completed Trial Slate questionnaires prior to study procedures being initiated. He states shortness of breath symptom has remained stable. AE's reviewed:his RLS symptoms continue to be "once or twice a week at night" and are stable. Nervous system disorders -Other, Restless Leg Syndrome, Grade 1.  He also states his shortness of breath and lower extremity edema are stable from that of baseline.  He is also reporting lightheadedness which Dr. Carmichael is aware of.  He states this usually happens after standing after being in a seated position for an extended period of time.  He was advised to change position carefully and take his time when going from a seated or lying position to standing.  He states understanding of this.  Dizziness, grade 1.       Patient was then weighed on same scale as with previous weigh ins: weight today of 91.7 kg. This is up from last week's weight of 91.1.  He states no changes in his intake since last week.  He states his cardiologist who performed his ASD closure took him off of Plavix and is no longer taking this medication. He confirms he continues to take acyclovir as directed. Patient then proceeded to phlebotomy lab for central lab and local lab blood draw. Patient then checked in for chemo and will return around 11AM for Velcade injection once labs result and look okay for treatment. This plan was discussed with RODNEY" Abadie, who is in agreement; she is also aware that a full set of vitals is necessary from a study standpoint.     Patient's labs resulted with no changes in grade on hematology or chemistry. Platelets WNL today at [id number].  AE resolved for now. Total bilirubin remains elevated as per baseline, stable from last week at 2.1.  Dr. Carmichael aware of this. Patient returned to infusion suite for vitals as well as Velcade injection. VSS and patient meets requirements to receive Velcade injection per protocol, see MAR. Tolerated well per infusion RN, see note.      Today's central lab specimens shipped according to lab manual instructions. Confirmation # [id number]; Tracking #: [id number],.

## 2017-03-30 ENCOUNTER — PATIENT MESSAGE (OUTPATIENT)
Dept: TRANSPLANT | Facility: CLINIC | Age: 52
End: 2017-03-30

## 2017-03-31 ENCOUNTER — OFFICE VISIT (OUTPATIENT)
Dept: TRANSPLANT | Facility: CLINIC | Age: 52
End: 2017-03-31
Attending: INTERNAL MEDICINE
Payer: COMMERCIAL

## 2017-03-31 VITALS
BODY MASS INDEX: 28.67 KG/M2 | DIASTOLIC BLOOD PRESSURE: 73 MMHG | WEIGHT: 204.81 LBS | HEART RATE: 84 BPM | HEIGHT: 71 IN | SYSTOLIC BLOOD PRESSURE: 106 MMHG

## 2017-03-31 DIAGNOSIS — I48.3 TYPICAL ATRIAL FLUTTER: ICD-10-CM

## 2017-03-31 DIAGNOSIS — E85.81 AL AMYLOIDOSIS: ICD-10-CM

## 2017-03-31 DIAGNOSIS — I42.8 OTHER CARDIOMYOPATHY: ICD-10-CM

## 2017-03-31 DIAGNOSIS — I50.33 ACUTE ON CHRONIC DIASTOLIC CHF (CONGESTIVE HEART FAILURE), NYHA CLASS 1: Primary | ICD-10-CM

## 2017-03-31 PROCEDURE — 99999 PR PBB SHADOW E&M-EST. PATIENT-LVL III: CPT | Mod: PBBFAC,,, | Performed by: INTERNAL MEDICINE

## 2017-03-31 PROCEDURE — 99214 OFFICE O/P EST MOD 30 MIN: CPT | Mod: S$GLB,,, | Performed by: INTERNAL MEDICINE

## 2017-03-31 PROCEDURE — 1160F RVW MEDS BY RX/DR IN RCRD: CPT | Mod: S$GLB,,, | Performed by: INTERNAL MEDICINE

## 2017-03-31 RX ORDER — BUMETANIDE 2 MG/1
2 TABLET ORAL 2 TIMES DAILY
Qty: 60 TABLET | Refills: 1 | Status: SHIPPED | OUTPATIENT
Start: 2017-03-31 | End: 2017-05-29 | Stop reason: SDUPTHER

## 2017-03-31 RX ORDER — POTASSIUM CHLORIDE 20 MEQ/1
20 TABLET, EXTENDED RELEASE ORAL 2 TIMES DAILY
Qty: 60 TABLET | Refills: 1 | Status: SHIPPED | OUTPATIENT
Start: 2017-03-31 | End: 2017-05-29 | Stop reason: SDUPTHER

## 2017-03-31 NOTE — PROGRESS NOTES
"Subjective:     Patient ID:  Roc Lai Jr. is a 51 y.o. male who presents for follow-up of Congestive Heart Failure    HPI:  50 yo WM with dx of AL amyloidosis here for f/u and reports PAULSON at 10 yds, sleeps on 1-2 pillows without PND.  On 3/28/17 EF dropped to 40-45%.  Wt up 9# past week.  Does feel lightheaded at times and having diarrhea day after velcade lasting 24 hrs.  No angina.    Review of Systems   Constitution: Positive for chills and weight gain (9# since last visit). Negative for fever, weakness and night sweats.   Cardiovascular: Positive for chest pain, dyspnea on exertion (30 feet--much worse that last visit), leg swelling and orthopnea (1-2 pillows). Negative for paroxysmal nocturnal dyspnea and syncope.   Respiratory: Positive for cough. Negative for sputum production and wheezing.    Endocrine: Positive for cold intolerance.   Gastrointestinal: Positive for bloating and diarrhea (lasts 24 hrs and starts day after velcade).   Genitourinary: Positive for nocturia.     Objective:   Physical Exam   Constitutional: He is oriented to person, place, and time. He appears well-developed and well-nourished. No distress.   /73  Pulse 84  Ht 5' 11" (1.803 m)  Wt 92.9 kg (204 lb 12.9 oz)  BMI 28.56 kg/m2   HENT:   Head: Normocephalic and atraumatic.   Eyes: Conjunctivae are normal. No scleral icterus.   Neck: JVD (20 cm) present. No tracheal deviation present. No thyromegaly present.   Cardiovascular: Normal rate, regular rhythm and normal heart sounds.  Exam reveals no gallop.    No murmur heard.  Pulmonary/Chest: Effort normal and breath sounds normal.   Abdominal: Soft. Bowel sounds are normal. He exhibits distension (liver span 16-18 cm). There is no tenderness. There is no rebound and no guarding.   Musculoskeletal: He exhibits edema (2+ edema). He exhibits no tenderness.   Neurological: He is alert and oriented to person, place, and time.   Skin: Skin is warm and dry. He is not " diaphoretic.   Psychiatric: He has a normal mood and affect. His behavior is normal.      3/28/17 ECHO CONCLUSIONS     1 - Concentric remodeling.     2 - Mildly to moderately depressed left ventricular systolic function (EF 40-45%).     3 - Left ventricular diastolic dysfunction.     4 - Biatrial enlargement.     5 - Right ventricular hypertrophy.     6 - Moderately to severely depressed right ventricular systolic function .     7 - Small pericardial effusion.     8 - Increased central venous pressure.     9 - Bilateral pleural effusion.     10 - There is an increased density to the myocardium, suggesting a myocardial infiltrative process (clinical correlation required).     11 - Atrial septal closure device present  with right to left shunt across the atrial septum.       Assessment:     1. Acute on chronic diastolic CHF (congestive heart failure), NYHA class 1    2. Other cardiomyopathy    3. Typical atrial flutter s/p ablation and restoration sinus rhythm 2016    4. AL amyloidosis         Plan:   Change lasix to bumex 2 mg BID with KCl 20 meq BID  Call Monday to update on wt loss  Looks like Dr. Carmichael checking CMP on Wednesday with velcade so I will use that lab and call results--BE CERTAIN THAT I or ONE OF THE HEART FAILURE NURSES CALL YOU Wednesday AFTERNOON TO REVIEW LABS AND WTS  Letter for [employer] (DONE) that due to medical illness he cannot travel even by air more than 2 hours for the foreseeable future  RTC 3 months assuming all OK    ADDENDUM  On 4/3/17 reported wt 182.5 and feeling much better.  BUN 37, Cr 1.5 K 3.8  Reduce bumex from 2 mg BID to 2 mg qd and see if able to keep euvolemic

## 2017-03-31 NOTE — PATIENT INSTRUCTIONS
Change lasix (furosemide) to bumetanide 2 mg twice a day  Add KCl 20 meq twice a day  Call Monday to update on wt loss  Looks like Dr. Carmichael checking CMP on Wednesday with velcade so I will use that lab and call results--BE CERTAIN THAT I or ONE OF THE HEART FAILURE NURSES CALL YOU Wednesday AFTERNOON TO REVIEW LABS AND WTS  Letter for [employer] that due to medical illness he cannot travel even by air more than 2 hours for the foreseeable future  RTC 3 months assuming all OK

## 2017-03-31 NOTE — MR AVS SNAPSHOT
[facility]  [address]  [facility] [address]  Phone: [phone]                  Roc Lai JrChristina   3/31/2017 2:00 PM   Office Visit    Description:  Male : 1965   Provider:  James Lao Jr., MD   Department:  [facility]           Reason for Visit     Congestive Heart Failure           Diagnoses this Visit        Comments    Acute on chronic diastolic CHF (congestive heart failure), NYHA class 1    -  Primary     Other cardiomyopathy         Typical atrial flutter         AL amyloidosis                To Do List           Future Appointments        Provider Department Dept Phone    2017 8:00 AM LAB, [facility] CANCER BLDG [facility] [phone]    2017 11:30 AM INJECTION, [facility] INFUSION [facility] [phone]    4/10/2017 8:10 AM LAB, [facility] CANCER BLDG [facility] [phone]    2017 8:00 AM MD [facility] [phone]    2017 8:30 AM [facility], CHEMO [facility] [phone]      Goals (5 Years of Data)     None       These Medications        Disp Refills Start End    bumetanide (BUMEX) 2 MG tablet 60 tablet 1 3/31/2017     Take 1 tablet (2 mg total) by mouth 2 (two) times daily. - Oral    Pharmacy: [facility] #[id number] - [place] - [address] Ph #: [phone]       potassium chloride SA (K-DUR,KLOR-CON) 20 MEQ tablet 60 tablet 1 3/31/2017     Take 1 tablet (20 mEq total) by mouth 2 (two) times daily. - Oral    Pharmacy: [facility]/pharm[facility] #[id number] - [place] - [address] Ph #: [phone]         [facility]     [facility] Care Line -  Assistance  Unless otherwise directed by your provider, please contact [facility], our nurse care line that is available for  assistance.     Registered nurses in the [facility] provide: appointment scheduling, clinical advisement,  "health education, and other advisory services.  Call: [phone] (toll free)               Medications           START taking these NEW medications        Refills    bumetanide (BUMEX) 2 MG tablet 1    Sig: Take 1 tablet (2 mg total) by mouth 2 (two) times daily.    Class: Normal    Route: Oral    potassium chloride SA (K-DUR,KLOR-CON) 20 MEQ tablet 1    Sig: Take 1 tablet (20 mEq total) by mouth 2 (two) times daily.    Class: Normal    Route: Oral      STOP taking these medications     clopidogrel (PLAVIX) 75 mg tablet Take 75 mg by mouth once daily.    furosemide (LASIX) 40 MG tablet Take 1 tablet (40 mg total) by mouth 2 (two) times daily. If weight exceeds 189# then take 80 mg BID that day           Verify that the below list of medications is an accurate representation of the medications you are currently taking.  If none reported, the list may be blank. If incorrect, please contact your healthcare provider. Carry this list with you in case of emergency.           Current Medications     acyclovir (ZOVIRAX) 400 MG tablet Take 1 tablet (400 mg total) by mouth 2 (two) times daily.    aspirin (ECOTRIN) 81 MG EC tablet Take 1 tablet (81 mg total) by mouth once daily.    BORTEZOMIB (VELCADE INJ) Inject as directed every 7 days.    spironolactone (ALDACTONE) 25 MG tablet Take 1 tablet (25 mg total) by mouth once daily.    bumetanide (BUMEX) 2 MG tablet Take 1 tablet (2 mg total) by mouth 2 (two) times daily.    potassium chloride SA (K-DUR,KLOR-CON) 20 MEQ tablet Take 1 tablet (20 mEq total) by mouth 2 (two) times daily.           Clinical Reference Information           Your Vitals Were     BP Pulse Height Weight BMI    106/73 84 5' 11" (1.803 m) 92.9 kg (204 lb 12.9 oz) 28.56 kg/m2      Blood Pressure          Most Recent Value    BP  106/73      Allergies as of 3/31/2017     No Known Allergies      Immunizations Administered on Date of Encounter - 3/31/2017     None      Instructions    Change lasix " (furosemide) to bumetanide 2 mg twice a day  Add KCl 20 meq twice a day  Call Monday to update on wt loss  Looks like Dr. Carmichael checking CMP on Wednesday with velcade so I will use that lab and call results--BE CERTAIN THAT I or ONE OF THE HEART FAILURE NURSES CALL YOU Wednesday AFTERNOON TO REVIEW LABS AND WTS  Letter for [facility] [employer] [facility] that due to medical illness he cannot travel even by air more than 2 hours for the foreseeable future  RTC 3 months assuming all OK       Language Assistance Services     ATTENTION: Language assistance services are available, free of charge. Please call [phone].      ATENCIÓN: Si habla español, tiene a mallory disposición servicios gratuitos de asistencia lingüística. Davide al [phone].     CHÚ Ý: N?u b?n nói Ti?ng Vi?t, có các d?ch v? h? tr? ngôn ng? mi?n phí dành cho b?n. G?i s? [phone].         [facility] complies with applicable Federal civil rights laws and does not discriminate on the basis of race, color, national origin, age, disability, or sex.

## 2017-04-03 ENCOUNTER — PATIENT MESSAGE (OUTPATIENT)
Dept: TRANSPLANT | Facility: CLINIC | Age: 52
End: 2017-04-03

## 2017-04-05 ENCOUNTER — INFUSION (OUTPATIENT)
Dept: INFUSION THERAPY | Facility: HOSPITAL | Age: 52
End: 2017-04-05
Attending: INTERNAL MEDICINE
Payer: COMMERCIAL

## 2017-04-05 ENCOUNTER — RESEARCH ENCOUNTER (OUTPATIENT)
Dept: RESEARCH | Facility: HOSPITAL | Age: 52
End: 2017-04-05

## 2017-04-05 VITALS
HEART RATE: 77 BPM | DIASTOLIC BLOOD PRESSURE: 68 MMHG | SYSTOLIC BLOOD PRESSURE: 101 MMHG | TEMPERATURE: 97 F | RESPIRATION RATE: 20 BRPM

## 2017-04-05 VITALS — WEIGHT: 188.94 LBS | BODY MASS INDEX: 26.35 KG/M2

## 2017-04-05 DIAGNOSIS — E85.81 AL AMYLOIDOSIS: Primary | ICD-10-CM

## 2017-04-05 PROCEDURE — 63600175 PHARM REV CODE 636 W HCPCS: Performed by: INTERNAL MEDICINE

## 2017-04-05 PROCEDURE — 96401 CHEMO ANTI-NEOPL SQ/IM: CPT

## 2017-04-05 RX ORDER — BORTEZOMIB 3.5 MG/1
1.4 INJECTION, POWDER, LYOPHILIZED, FOR SOLUTION INTRAVENOUS; SUBCUTANEOUS
Status: COMPLETED | OUTPATIENT
Start: 2017-04-05 | End: 2017-04-05

## 2017-04-05 RX ADMIN — BORTEZOMIB 3 MG: 3.5 INJECTION, POWDER, LYOPHILIZED, FOR SOLUTION INTRAVENOUS; SUBCUTANEOUS at 11:04

## 2017-04-05 NOTE — PROGRESS NOTES
"  Wednesday, April 5th, 2017      Study: "A Phase 3, randomized, multicenter, double-blind, placebo-controlled, 2-arm, efficacy and safety study of [id number] plus standard of care versus placebo plus standard of care in subjects with light chain (AL) amyloidosis"  Protocol ID# [id number]  IRB# 2015.305.A  Sponsor: Eliseo  Investigator: Dr. Carmichael      Month 2, Day 22  IV [id number] + SQ Velcade      0800: Patient presents to 3rd floor clinic for blood work ahead of day 22 Velcade injection. He is ambulatory, awake, alert, and oriented to person, place, and time. He completed Trial Slate questionnaires prior to study procedures being initiated.     He states he saw Dr. Panda in clinic on Friday and he made some changes to his medications.  He states he took him off of lasix and started Bumex and also added PO KCl.  The patient states he has lost 16 pounds since he started the Bumex and he states Dr. Panda is aware of this.  MD looking to check chemistries based on today's lab draw.  Patient also states that the diarrhea experienced the day or two following Velcade injections has been particularly worse this past week.  He states he was up every 50 minutes during the night having diarrhea.  He states he started immodium during this episode, but it did not seem to help much once it already was started.  Plans to discuss this in detail at visit with Dr. Carmichael on Tuesday.  Diarrhea, grade 2.        Patient was then weighed on same scale as with previous weigh ins: weight today of 85.7 kg.  As stated above this is a significant decrease since last week due to initiation of Bumex. He confirms he continues to take acyclovir as directed. Patient then proceeded to phlebotomy lab for central lab and local lab blood draw. Patient then checked in for chemo and will return around 11AM for Velcade injection once labs result and look okay for treatment. This plan was discussed with G. Abadie, who is in agreement. It " "was also discussed that a full set of vitals is necessary from a study standpoint and she states understanding of this.       Patient's labs resulted platelet count again slightly decreased at 144,000.  Platelet count decreased, grade 1.  This does not require a modification of dosing for today's Velcade. Total bilirubin remains elevated as per baseline, slightly increased from last week at 1.7. Dr. Carmichael aware of this. Patient returned to infusion suite for vitals as well as Velcade injection. VSS and patient meets requirements to receive Velcade injection per protocol, see MAR. Tolerated well per infusion RN, see note.       Today's central labs shipped according to lab manual instructions. Patient states understanding that next week is Month 3 Day 1 and it will be his "long day" and he is aware of these appointments.  He was provided with jug for 24 hour urine collection which is required at next week's visit.  He states understanding of appointment times as well.  Will follow.      "

## 2017-04-05 NOTE — NURSING
Patient here for injection-states lost 16lbs over week-end by taking diuretic-breathing has improved-states has diarrhea usually after velcade injection-tolerated injection well.

## 2017-04-06 ENCOUNTER — TELEPHONE (OUTPATIENT)
Dept: TRANSPLANT | Facility: CLINIC | Age: 52
End: 2017-04-06

## 2017-04-06 NOTE — TELEPHONE ENCOUNTER
"4/3/17 Pt reports 16lb wt loss taking bumex 2mg twice daily. Pt reports "feeling much better, denies any complaints or concerns. Wt 182.5 Pt says urine output is normal, and not concentrated. Per Dr. Almendarez, I instructed pt to decrease bumex to 2mg once daily. Pt verbalized understanding.  4/6/17 Reviewed labs. Bun/ cr 37/1.5 k 3.8. Pt reports wt stable no wt gain. Pt to continue bumex 2mg qd. Pt verbalizes understanding.    "

## 2017-04-10 ENCOUNTER — RESEARCH ENCOUNTER (OUTPATIENT)
Dept: RESEARCH | Facility: HOSPITAL | Age: 52
End: 2017-04-10

## 2017-04-11 ENCOUNTER — OFFICE VISIT (OUTPATIENT)
Dept: HEMATOLOGY/ONCOLOGY | Facility: CLINIC | Age: 52
End: 2017-04-11
Payer: COMMERCIAL

## 2017-04-11 ENCOUNTER — INFUSION (OUTPATIENT)
Dept: INFUSION THERAPY | Facility: HOSPITAL | Age: 52
End: 2017-04-11
Attending: INTERNAL MEDICINE
Payer: COMMERCIAL

## 2017-04-11 ENCOUNTER — RESEARCH ENCOUNTER (OUTPATIENT)
Dept: RESEARCH | Facility: HOSPITAL | Age: 52
End: 2017-04-11

## 2017-04-11 VITALS
HEART RATE: 74 BPM | SYSTOLIC BLOOD PRESSURE: 109 MMHG | RESPIRATION RATE: 18 BRPM | TEMPERATURE: 98 F | DIASTOLIC BLOOD PRESSURE: 56 MMHG

## 2017-04-11 VITALS
RESPIRATION RATE: 20 BRPM | TEMPERATURE: 98 F | DIASTOLIC BLOOD PRESSURE: 78 MMHG | HEART RATE: 78 BPM | HEART RATE: 85 BPM | BODY MASS INDEX: 26.14 KG/M2 | DIASTOLIC BLOOD PRESSURE: 77 MMHG | TEMPERATURE: 97 F | WEIGHT: 186.75 LBS | SYSTOLIC BLOOD PRESSURE: 105 MMHG | WEIGHT: 187.38 LBS | BODY MASS INDEX: 26.04 KG/M2 | SYSTOLIC BLOOD PRESSURE: 124 MMHG

## 2017-04-11 DIAGNOSIS — E85.81 AL AMYLOIDOSIS: Primary | ICD-10-CM

## 2017-04-11 DIAGNOSIS — I50.32 CHRONIC DIASTOLIC CONGESTIVE HEART FAILURE: ICD-10-CM

## 2017-04-11 DIAGNOSIS — D47.2 MONOCLONAL GAMMOPATHY: ICD-10-CM

## 2017-04-11 PROCEDURE — 96401 CHEMO ANTI-NEOPL SQ/IM: CPT

## 2017-04-11 PROCEDURE — 63600175 PHARM REV CODE 636 W HCPCS: Mod: JW | Performed by: INTERNAL MEDICINE

## 2017-04-11 PROCEDURE — 25000003 PHARM REV CODE 250: Performed by: INTERNAL MEDICINE

## 2017-04-11 PROCEDURE — 96413 CHEMO IV INFUSION 1 HR: CPT

## 2017-04-11 PROCEDURE — 99213 OFFICE O/P EST LOW 20 MIN: CPT | Mod: S$PBB,,, | Performed by: INTERNAL MEDICINE

## 2017-04-11 PROCEDURE — 99999 PR PBB SHADOW E&M-EST. PATIENT-LVL II: CPT | Mod: PBBFAC,,, | Performed by: INTERNAL MEDICINE

## 2017-04-11 RX ORDER — ACETAMINOPHEN 325 MG/1
650 TABLET ORAL
Status: COMPLETED | OUTPATIENT
Start: 2017-04-11 | End: 2017-04-11

## 2017-04-11 RX ORDER — HEPARIN 100 UNIT/ML
500 SYRINGE INTRAVENOUS
Status: CANCELLED | OUTPATIENT
Start: 2017-04-11

## 2017-04-11 RX ORDER — HEPARIN 100 UNIT/ML
500 SYRINGE INTRAVENOUS
Status: DISCONTINUED | OUTPATIENT
Start: 2017-04-11 | End: 2017-04-11 | Stop reason: HOSPADM

## 2017-04-11 RX ORDER — BORTEZOMIB 3.5 MG/1
1.4 INJECTION, POWDER, LYOPHILIZED, FOR SOLUTION INTRAVENOUS; SUBCUTANEOUS
Status: CANCELLED | OUTPATIENT
Start: 2017-04-11 | End: 2017-04-11

## 2017-04-11 RX ORDER — SODIUM CHLORIDE 0.9 % (FLUSH) 0.9 %
10 SYRINGE (ML) INJECTION
Status: DISCONTINUED | OUTPATIENT
Start: 2017-04-11 | End: 2017-04-11 | Stop reason: HOSPADM

## 2017-04-11 RX ORDER — DIPHENHYDRAMINE HCL 25 MG
25 CAPSULE ORAL ONCE
Status: COMPLETED | OUTPATIENT
Start: 2017-04-11 | End: 2017-04-11

## 2017-04-11 RX ORDER — DIPHENHYDRAMINE HCL 25 MG
25 CAPSULE ORAL ONCE
Status: CANCELLED
Start: 2017-04-11 | End: 2017-04-11

## 2017-04-11 RX ORDER — LOPERAMIDE HYDROCHLORIDE 2 MG/1
2 CAPSULE ORAL ONCE AS NEEDED
Status: COMPLETED | OUTPATIENT
Start: 2017-04-11 | End: 2017-04-11

## 2017-04-11 RX ORDER — DEXTROMETHORPHAN HYDROBROMIDE, GUAIFENESIN 5; 100 MG/5ML; MG/5ML
650 LIQUID ORAL
Status: CANCELLED | OUTPATIENT
Start: 2017-04-11 | End: 2017-04-11

## 2017-04-11 RX ORDER — SODIUM CHLORIDE 0.9 % (FLUSH) 0.9 %
10 SYRINGE (ML) INJECTION
Status: CANCELLED | OUTPATIENT
Start: 2017-04-11

## 2017-04-11 RX ORDER — BORTEZOMIB 3.5 MG/1
1.4 INJECTION, POWDER, LYOPHILIZED, FOR SOLUTION INTRAVENOUS; SUBCUTANEOUS
Status: COMPLETED | OUTPATIENT
Start: 2017-04-11 | End: 2017-04-11

## 2017-04-11 RX ADMIN — DIPHENHYDRAMINE HYDROCHLORIDE 25 MG: 25 CAPSULE ORAL at 09:04

## 2017-04-11 RX ADMIN — LOPERAMIDE HYDROCHLORIDE 2 MG: 2 CAPSULE ORAL at 11:04

## 2017-04-11 RX ADMIN — ACETAMINOPHEN 650 MG: 325 TABLET ORAL at 09:04

## 2017-04-11 RX ADMIN — BORTEZOMIB 2.9 MG: 3.5 INJECTION, POWDER, LYOPHILIZED, FOR SOLUTION INTRAVENOUS; SUBCUTANEOUS at 12:04

## 2017-04-11 NOTE — PROGRESS NOTES
"    Tuesday, April 11th, 2017     Study: "A Phase 3, randomized, multicenter, double-blind, placebo-controlled, 2-arm, efficacy and safety study of [id number] plus standard of care versus placebo plus standard of care in subjects with light chain (AL) amyloidosis"  Protocol ID# [id number]  IRB# 2015.305.A  Sponsor: Elise[employer]  Investigator: Dr. Carmichael    Month 3, Day 1  IV [id number] + SQ Velcade    Patient presents for month 3, day 1 treatment per above-mentioned protocol.  He is ambulatory, awake, alert, and oriented to person, place, and time, accompanied by self. Patient states he is doing "okay" and that shortness of breath and "lightheadedness" remain present, but that lightheadedness is improved from that of last month since Dr. Panda initiated him on bumex and took him off of lasix.  He states his RLS symptoms has remained stable though he now describes it as "tension" not pain.   Lower extremity edema is no longer present.  He states fatigue is present but not limiting his activities.  He is reporting occasionally feeling "chilly," but Dr. Carmichael attributes this to intermittently low blood pressure.  He states that diarrhea consistently appears following each weekly dose of Velcade and wishes to discuss this further at today's visit, see below.    See note from 4/10/17 regarding completion of trialslate questionnaires, 6MWT, and central labs on day -1.    Review of AE's:    Restless Leg Syndrome, grade 1:  As stated above, patient states this is stable.  Occurs 2-3 nights per week, but tolerable.  However it is attributing to him not being able to sleep well from 11pm to 3am.  He states that right at 3AM it seems to disappear and he does not experience this again the rest of the night and he is able to sleep.  AE ongoing.  Platelet Count decreased, grade 1:  Platelet count 128190 today.  Remains grade 1.  AE ongoing.  Total Bilirubin Increased, Grade 2:  Total bilirubin 1.9 per local blood work " "performed today.  Noted as NCS per Dr. Carmichael.  Dr. Carmichael is not concerned with this finding.  AE grade 2, will follow.  Hyperglycemia, Grade 1: Glucose WNL today.  Resolved for now. AE resolved  Diarrhea:  As stated above, patient is reporting diarrhea following each dose of Velcade.  He states it comes on the day after and persists for about a day and a half to two days.  He states his appetite is poor while he is experiencing the diarrhea.  Per Dr. Carmichael, he should take imodium prior to each dose of Velcade as a precaution.  Patient states understanding and is in agreement with this.  Dizziness (Lightheadedness), grade 1:  Patient states this is much improved since initiating Bumex.   Dry Skin, grade 1:  Patient points out a few "scabs" on his back that Dr. Carmichael examined at today's visit.  Per MD, this is likely dry skin and patient should begin using moisturizers.  He states understanding.  AE ongoing, will follow.  Hyponatremia, grade 1:  Sodium level 134 today.  NCS per Dr. Carmichael.      No other change in grade in baseline AE's noted.      Physical performed per Dr. Carmichael, see MD note for Complete PE, symptom-directed PE, ECOG PS and NYHA Classification.  See flowsheets for height, weight, and vital signs.  NIS-LL and VASPI completed, see chart for source documentation. Patient denies numbness/tingling secondary to Velcade.  Patient is not WOCBP, and no blood work required today per Dr. Carmichael, therefore patient had no local labs drawn.  Central labs and spot urine collected and shipped to [facility].   Concomitant medications reviewed.  Patient confirms that per Dr. Carmichael, he continues on acyclovir.  Per Dr. Carmichael, patient is approved to initiate M3,D1 treatment today.  Treatment plan updated with today's weight and signed.  Benadryl adjusted to PO due to previous experience with RLS.      Dr. Carmichael reiterated that it could take up to 6 months to see improvement from this treatment and that patient " should try to have patience with this.  He states understanding.      Patient then proceeded to infusion suite.  Study-mandated procedures performed per protocol as follows:    0900: Pre-dose PK's drawn per protocol  0905:  Premeds administered, see MAR  0951:  Pre-dose vital signs performed, see flowsheet.  Vitals read as follows:  BP 97/56, HR 76, Temp 97.7, RR 18.  1008:  Triplicate EKG performed per research staff.  *Transmission issue was discussed with DANNIELLE Gu and ERT staff.  Per ERT staff, tracings will be automatically extracted.  New machine will be sent to site for future transmissions.    1010:  Study drug infusion initiated   1113:  Infusion completed +3 minutes, well within 10 minute window..  1113:  Vital signs at end of infusion performed.  Vitals as follows:  108/64, 79, 18, 97.5  1213:  1 hour post infusion VS performed:  /56, HR 56, T 97.5, RR 18    Patient was monitored for the full 90 minutes post infusion (until 1243) and tolerated the above with no difficulties, see infusion RN documentation.  Patient then received SQ Velcade injection, see MAR.       All of patient's questions were answered to his satisfaction.  Upcoming appointment calendar reviewed and given to patient.  Patient states understanding of plan of care and upcoming schedule.  He states having Research RN contact information as well as that of Dr. Carmichael.

## 2017-04-11 NOTE — PROGRESS NOTES
"  Monday, April 10th, 2017    Study: "A Phase 3, randomized, multicenter, double-blind, placebo-controlled, 2-arm, efficacy and safety study of [id number] plus standard of care versus placebo plus standard of care in subjects with light chain (AL) amyloidosis"  Protocol ID# [id number]  IRB# 2015.305.A  Sponsor: Eliseo  Investigator: Dr. Carmichael     Month 3, Day -1    Patient presents today for blood work, questionnaires, and 6MWT per above-mentioned protocol.  Patient is awake, alert, and oriented to person, place, and time.  He states no worsening in symptoms for that of last report last week.  States he feels "better" today than has in previous weeks.  He is remitting 24 hour urine specimen per protocol.      Patient performed questionnaires per TrialSlate prior to any other procedures being performed.  Patient then presented for blood work including send outs and safety labs for tomorrow's infusion.  Patient completed 6MWT per manual and TrialSlate prompts including 10 minute rest period prior as well as vitals before and after walking, see flowsheets.  Patient walked 1333 feet in 6 minutes.    Patient then had weight performed per lab requisition.  All labs processed by  EVELIA Ignacio.  Tracking #: [id number].    Patient to report for M3, D1 tomorrow, 4/11.  He states understanding of these appointments.  "

## 2017-04-11 NOTE — LETTER
April 11, 2017        Chon Harmon MD  [address]  [address]  [facility] [address]             [facility]  [address]  [facility] [address]  Phone: [phone]   Patient: Roc Lai Jr.   MR Number: [id number]   YOB: 1965   Date of Visit: 4/11/2017       Dear Dr. Harmon:    Thank you for referring Roc Lai to me for evaluation. Below are the relevant portions of my assessment and plan of care.       1. AL amyloidosis    2. Monoclonal gammopathy    3. Chronic diastolic congestive heart failure         Mr. Lai has a monoclonal lambda light chain coming from a plasma cell infiltrate in the marrow that serves as the reservoir for amyloid production affecting his heart.  His cardiac amyloid has been confirmed as AL Amlyoid.  He is now two months into therapy on the [id number] trial.    His exercise tolerance has improved with Bumex and better diuresis.  Still on sprionolactone as well which he will discuss with Dr. Lao.  Given his diastolic dysfunction he relies on preload so we will need to monitor his overall volume status closely and he will report back later this week.    His bone marrow biopsy on 1/12/17 showed a 60% cellular marrow with 28% plasma cells by morphology but 50% by immunohistochemistry.  Hence, he has at least a smoldering myeloma.  He has no renal, calcium or hemoglobin issues and he has no bony symptoms to suggest multiple myeloma and plain films do not show any lytic lesions as of today.  MRI study did not show lesions that would define him as multiple myeloma.    He does not seem to have other systemic manifestations of AL amyloidosis including the absence of enlarged tongue, organomegaly, coagulopathy, neuropathy, carpal tunnel syndrome or renal disease.  His cardiac symptoms are related to the AL amyloid infiltration and not the plasma cells in his marrow.  There is no family history of amyloidosis.    He has no baseline  neuropathy and no progression on exam today.      All of his questions were answered in the clinic today and he will follow up as per protocol.           If you have questions, please do not hesitate to call me. I look forward to following Roc along with you.    Sincerely,      MD DAWNA Chang Jr., MD

## 2017-04-11 NOTE — PROGRESS NOTES
Subjective:       Patient ID: Roc Lai Jr. is a 51 y.o. male.    Chief Complaint: No chief complaint on file.    HPI  ECOG 1, NYHA II. Mr. Lai is here for follow up two months into the [id number] trial to treat cardiac AL amyloidosis with significant marrow plasma cells.  He remains well and without new issues today with improved diuresis on Bumex over the past 10 days.    No swallowing issues, neurologic problems or other limitation.  He notes mild swelling at his lower extremity R>L.    Complaints of light headedness and dyspnea increased over the last month.  Now on Bumex for past 10 days with much improved fluid output, weight loss and less dyspnea.  Able to climb stairs with less difficulty.  Some anorexia and diarrhea with his does of bortezomib.    History: Prior to diagnosis he was previously vigorous and ran over three miles thrice weekly 3 years ago.  However, over the 18 months prior to presentation, he has noted progressive decline in his exercise capacity to only being able to do 10 min of exercise now.  He has previously been studied for CAD with normal coronaries in 3/11118.  He was diagnosed with atrial flutter and underwent cardiac ablation without improvement in his exercise capacity.  He was found to have an atrial septal defect and this was closed 9/26/16 but this did not improve his symptoms either.  His symptoms progressed over the last 3 months to the point he can no longer exercise and notes dyspnea with carrying croceries, climbing stairs, etc.  He was admitted to cardiology 11/27/16 with 17 pounds of fluid loss and his is now sleeping better.  He was diagnosed with diastolic heart failure and cardia MRI was concerning for infiltrative cardiac disease.      SPEP 12/1/16 showed decreased gamma globulins and free lambda light chains increased with M-spike of 0.19 g/dL.  Free lambda light chain was elevated at 50 mg/dL with normal lappa 1.37 mg/dL and ratio 0.03.  However, abdominal fat  pad biopsy was negative for amyoid with the presence of fat cells 11/30/16.  ATTR DNA test negative for familial amyloidosis.    CBC normal 12/1/16 with normal renal function, calcium, albumin and slightly low total protein levels.      Bone marrow biopsy done 1/12/17 showed a 60% plasma cell infiltrate.     Metastatic survey 1/19/17 was negative for bony disease.  MRI scans of the cervical, thoracic, lumbar and pelvic done 1/27/17 did not disclose specific lesions to change diagnosis to myeloma.    His pre-trial walk test today confirmed his candidacy for the protocol and he notes some progression with having to rest after a walk on flat surface of about a mile.    PMH  Diastolic heart failure    Review of Systems   Constitutional: Negative for activity change, appetite change, diaphoresis, fatigue, fever and unexpected weight change.   HENT: Negative for mouth sores, sore throat and trouble swallowing.    Respiratory: Negative for cough and shortness of breath.         All improved since diuresis with increased exercise tolerance   Cardiovascular: Positive for leg swelling (minimal). Negative for chest pain.   Gastrointestinal: Negative for abdominal pain, blood in stool, constipation and diarrhea.   Genitourinary: Negative for dysuria and hematuria.   Musculoskeletal: Negative for back pain and neck pain.   Skin: Negative for rash.   Neurological: Negative for tremors, weakness, light-headedness and headaches.   Hematological: Negative for adenopathy.   Psychiatric/Behavioral: Negative for confusion and sleep disturbance.       Objective:      Physical Exam   Constitutional: He is oriented to person, place, and time. He appears well-developed and well-nourished. No distress.   HENT:   Head: Normocephalic and atraumatic.   Mouth/Throat: Oropharynx is clear and moist. No oropharyngeal exudate.   Tongue not enlarged  No submandibular enlargement   Eyes: Conjunctivae are normal. Pupils are equal, round, and reactive  to light.   Neck: Neck supple.   Cardiovascular: Normal rate and regular rhythm.    Pulmonary/Chest: Effort normal and breath sounds normal. He has no rales.   Abdominal: Soft. Bowel sounds are normal. He exhibits no distension (No ascites) and no mass. There is no splenomegaly. There is no tenderness.   Musculoskeletal: Normal range of motion. He exhibits no edema.   No rib or spine tenderness   Lymphadenopathy:     He has no cervical adenopathy.     He has no axillary adenopathy.        Right: No inguinal adenopathy present.        Left: No inguinal adenopathy present.   Neurological: He is alert and oriented to person, place, and time.   Reflex Scores:       Bicep reflexes are 3+ on the right side and 3+ on the left side.       Patellar reflexes are 3+ on the right side and 3+ on the left side.       Achilles reflexes are 2+ on the right side and 2+ on the left side.  Normal touch, sharp and proprioception.   Skin: Skin is warm and dry. No rash noted.   No unusual bruising   Psychiatric: He has a normal mood and affect. Thought content normal.       Assessment:       1. AL amyloidosis    2. Monoclonal gammopathy    3. Chronic diastolic congestive heart failure        Plan:       Mr. Lai has a monoclonal lambda light chain coming from a plasma cell infiltrate in the marrow that serves as the reservoir for amyloid production affecting his heart.  His cardiac amyloid has been confirmed as AL Amlyoid.  He is now two months into therapy on the [id number] trial.    His exercise tolerance has improved with Bumex and better diuresis.  Still on sprionolactone as well which he will discuss with Dr. Lao.  Given his diastolic dysfunction he relies on preload so we will need to monitor his overall volume status closely and he will report back later this week.    His bone marrow biopsy on 1/12/17 showed a 60% cellular marrow with 28% plasma cells by morphology but 50% by immunohistochemistry.  Hence, he has at least a  smoldering myeloma.  He has no renal, calcium or hemoglobin issues and he has no bony symptoms to suggest multiple myeloma and plain films do not show any lytic lesions as of today.  MRI study did not show lesions that would define him as multiple myeloma.    He does not seem to have other systemic manifestations of AL amyloidosis including the absence of enlarged tongue, organomegaly, coagulopathy, neuropathy, carpal tunnel syndrome or renal disease.  His cardiac symptoms are related to the AL amyloid infiltration and not the plasma cells in his marrow.  There is no family history of amyloidosis.    He has no baseline neuropathy and no progression on exam today.      All of his questions were answered in the clinic today and he will follow up as per protocol.

## 2017-04-18 RX ORDER — BORTEZOMIB 3.5 MG/1
1.4 INJECTION, POWDER, LYOPHILIZED, FOR SOLUTION INTRAVENOUS; SUBCUTANEOUS
Status: CANCELLED | OUTPATIENT
Start: 2017-04-18 | End: 2017-04-18

## 2017-04-18 RX ORDER — SODIUM CHLORIDE 0.9 % (FLUSH) 0.9 %
10 SYRINGE (ML) INJECTION
Status: CANCELLED | OUTPATIENT
Start: 2017-05-02

## 2017-04-18 RX ORDER — DIPHENHYDRAMINE HCL 25 MG
25 CAPSULE ORAL EVERY 6 HOURS PRN
Status: CANCELLED
Start: 2017-04-25

## 2017-04-18 RX ORDER — HEPARIN 100 UNIT/ML
500 SYRINGE INTRAVENOUS
Status: CANCELLED | OUTPATIENT
Start: 2017-05-02

## 2017-04-18 RX ORDER — DIPHENHYDRAMINE HCL 25 MG
25 CAPSULE ORAL EVERY 6 HOURS PRN
Status: CANCELLED
Start: 2017-05-02

## 2017-04-18 RX ORDER — BORTEZOMIB 3.5 MG/1
1.4 INJECTION, POWDER, LYOPHILIZED, FOR SOLUTION INTRAVENOUS; SUBCUTANEOUS
Status: CANCELLED | OUTPATIENT
Start: 2017-05-02 | End: 2017-05-02

## 2017-04-18 RX ORDER — HEPARIN 100 UNIT/ML
500 SYRINGE INTRAVENOUS
Status: CANCELLED | OUTPATIENT
Start: 2017-04-25

## 2017-04-18 RX ORDER — DIPHENHYDRAMINE HCL 25 MG
25 CAPSULE ORAL EVERY 6 HOURS PRN
Status: CANCELLED
Start: 2017-04-18

## 2017-04-18 RX ORDER — BORTEZOMIB 3.5 MG/1
1.4 INJECTION, POWDER, LYOPHILIZED, FOR SOLUTION INTRAVENOUS; SUBCUTANEOUS
Status: CANCELLED | OUTPATIENT
Start: 2017-04-25 | End: 2017-04-25

## 2017-04-18 RX ORDER — HEPARIN 100 UNIT/ML
500 SYRINGE INTRAVENOUS
Status: CANCELLED | OUTPATIENT
Start: 2017-04-18

## 2017-04-18 RX ORDER — SODIUM CHLORIDE 0.9 % (FLUSH) 0.9 %
10 SYRINGE (ML) INJECTION
Status: CANCELLED | OUTPATIENT
Start: 2017-04-25

## 2017-04-18 RX ORDER — SODIUM CHLORIDE 0.9 % (FLUSH) 0.9 %
10 SYRINGE (ML) INJECTION
Status: CANCELLED | OUTPATIENT
Start: 2017-04-18

## 2017-04-19 ENCOUNTER — INFUSION (OUTPATIENT)
Dept: INFUSION THERAPY | Facility: HOSPITAL | Age: 52
End: 2017-04-19
Attending: INTERNAL MEDICINE
Payer: COMMERCIAL

## 2017-04-19 ENCOUNTER — RESEARCH ENCOUNTER (OUTPATIENT)
Dept: RESEARCH | Facility: HOSPITAL | Age: 52
End: 2017-04-19

## 2017-04-19 VITALS
HEART RATE: 85 BPM | RESPIRATION RATE: 17 BRPM | DIASTOLIC BLOOD PRESSURE: 63 MMHG | SYSTOLIC BLOOD PRESSURE: 104 MMHG | TEMPERATURE: 98 F

## 2017-04-19 VITALS — WEIGHT: 189.81 LBS | BODY MASS INDEX: 26.47 KG/M2

## 2017-04-19 DIAGNOSIS — E85.81 AL AMYLOIDOSIS: Primary | ICD-10-CM

## 2017-04-19 PROCEDURE — 63600175 PHARM REV CODE 636 W HCPCS: Mod: JW | Performed by: INTERNAL MEDICINE

## 2017-04-19 PROCEDURE — 96401 CHEMO ANTI-NEOPL SQ/IM: CPT

## 2017-04-19 RX ORDER — BORTEZOMIB 3.5 MG/1
1.4 INJECTION, POWDER, LYOPHILIZED, FOR SOLUTION INTRAVENOUS; SUBCUTANEOUS
Status: COMPLETED | OUTPATIENT
Start: 2017-04-19 | End: 2017-04-19

## 2017-04-19 RX ADMIN — BORTEZOMIB 3 MG: 3.5 INJECTION, POWDER, LYOPHILIZED, FOR SOLUTION INTRAVENOUS; SUBCUTANEOUS at 11:04

## 2017-04-19 NOTE — PROGRESS NOTES
"  Wednesday, April 19th, 2017     Study: "A Phase 3, randomized, multicenter, double-blind, placebo-controlled, 2-arm, efficacy and safety study of [id number] plus standard of care versus placebo plus standard of care in subjects with light chain (AL) amyloidosis"  Protocol ID# [id number]  IRB# 2015.305.A  Sponsor: Eliseo  Investigator: Dr. Carmichael    Month 3, Day 8  SQ Velcade only today    Patient presents for month 3, day 8 treatment per above-mentioned protocol.  Patient states he had "the best two nights of sleep I have had in 2 months" over the past two nights.  He also states he has also had an "unusually good couple of days" and feels good today.  He took the two flights of stairs up to the 3rd floor today and tolerated the activity well with minimal dyspnea.  He states he had some significant lightheadedness over the weekend that was accompanied by a "significant amount of drinking [alcohol]."   Trial Slate and questionnaires (FACT-GOG NTX) performed prior to any study related procedures.  Patient had centrally required blood work for bioanalytical testing completed  and was processed by EVELIA Ignacio, . Pt also had local CBC and chemistry done.  Plan of care for the day reviewed with patient.  He states understanding.  Patient states continued willingness to participate in above-mentioned trial.    Review of pertinent AE's:    Restless Leg Syndrome, grade 1:  Occasionally present, but no complaints of this today.  AE ongoing.  Platelet Count decreased, grade 1:  Platelet count [id number] today.  Resolved for now.  Lymphocyte Count Decreased, Grade 1:  Total lymphocytes 0.9 today.  AE, grade 1.    Diarrhea, Grade 1:  Patient states the "preemptive strike" against the diarrhea [taking imodium before Velcade dose] seems to be helping and he will continue to do this.  AE ongoing.    No other change in grade in baseline or active AE's. Concomitant medications reviewed.   Pt received his dose of SQ velcade " today without any difficulty.  Today's weight is 86.1 kg and VS stable for today.     All of patient's questions were answered to his satisfaction. Patient states understanding of plan of care and upcoming schedule.  He states having Research RN contact information as well as that of Dr. Carmichael.

## 2017-04-26 ENCOUNTER — RESEARCH ENCOUNTER (OUTPATIENT)
Dept: RESEARCH | Facility: HOSPITAL | Age: 52
End: 2017-04-26

## 2017-04-26 ENCOUNTER — INFUSION (OUTPATIENT)
Dept: INFUSION THERAPY | Facility: HOSPITAL | Age: 52
End: 2017-04-26
Attending: INTERNAL MEDICINE
Payer: COMMERCIAL

## 2017-04-26 ENCOUNTER — TELEPHONE (OUTPATIENT)
Dept: RESEARCH | Facility: HOSPITAL | Age: 52
End: 2017-04-26

## 2017-04-26 VITALS
SYSTOLIC BLOOD PRESSURE: 97 MMHG | TEMPERATURE: 98 F | RESPIRATION RATE: 16 BRPM | HEART RATE: 80 BPM | DIASTOLIC BLOOD PRESSURE: 55 MMHG

## 2017-04-26 VITALS
BODY MASS INDEX: 26.69 KG/M2 | SYSTOLIC BLOOD PRESSURE: 102 MMHG | DIASTOLIC BLOOD PRESSURE: 63 MMHG | WEIGHT: 191.38 LBS

## 2017-04-26 DIAGNOSIS — E85.81 AL AMYLOIDOSIS: Primary | ICD-10-CM

## 2017-04-26 PROCEDURE — 96401 CHEMO ANTI-NEOPL SQ/IM: CPT

## 2017-04-26 PROCEDURE — 63600175 PHARM REV CODE 636 W HCPCS: Performed by: INTERNAL MEDICINE

## 2017-04-26 RX ORDER — CLOPIDOGREL BISULFATE 75 MG/1
75 TABLET ORAL DAILY
Refills: 5 | COMMUNITY
Start: 2017-02-26 | End: 2017-05-10

## 2017-04-26 RX ORDER — BORTEZOMIB 3.5 MG/1
1.4 INJECTION, POWDER, LYOPHILIZED, FOR SOLUTION INTRAVENOUS; SUBCUTANEOUS
Status: COMPLETED | OUTPATIENT
Start: 2017-04-26 | End: 2017-04-26

## 2017-04-26 RX ADMIN — BORTEZOMIB 3 MG: 3.5 INJECTION, POWDER, LYOPHILIZED, FOR SOLUTION INTRAVENOUS; SUBCUTANEOUS at 01:04

## 2017-04-26 NOTE — TELEPHONE ENCOUNTER
"Dr. Panda,    As you know your patient Mr. Lai is on our [id number] trial for his AL being followed by Dr. Carmichael.  He reports to us weekly for treatments.      He reported to me today that he had two episodes of syncope and falls on Sunday, 4/23.  He has been monitoring his BP and recording the readings.  Please see below:                He states the syncope occurred on the morning of 4/23 and evening of 4/23.  His falls followed a brisk "jog" out to get the paper in the rain and a "coughing spell."  Neither fall caused injury with the exception of a brush burn to his face from the second fall.      He has been adjusting Bumex per your instructions, usually taking qAM, but 2-3 times/week also takes in PM. His weight this morning was 86.8 kg.    Please let me know if you would like to see him or have any further instructions as it relates to this.    Thanks,    Camelia Fernandez  [phone]  "

## 2017-04-26 NOTE — PROGRESS NOTES
"  Wednesday, April 26th, 2017      Study: "A Phase 3, randomized, multicenter, double-blind, placebo-controlled, 2-arm, efficacy and safety study of [id number] plus standard of care versus placebo plus standard of care in subjects with light chain (AL) amyloidosis"  Protocol ID# [id number]  IRB# 2015.305.A  Sponsor: Eliseo  Investigator: Dr. Carmichael      Month 3, Day 15  IV [id number]- + SQ Velcade     0800: Patient presents to 3rd floor clinic for blood work ahead of day 15 Velcade injection. He is ambulatory, awake, alert, and oriented to person, place, and time. He completed Trial Slate questionnaires prior to study procedures being initiated. He is reporting two episodes of syncope, where he "briefly" lost consciousness and fell on two occassions on Sunday, 4/23.  The first occurred when he was "jogging" out to get the newspaper in the rain.  He was jogging back and got presyncopal, leaned against his car, then "passed out" and fell on "his bottom."  He woke up shortly thereafter and denies any injuries relating to that.  Later that evening he had a "coughing spell" and got up from seated position immediately after.  He states he again lost conciousness and fell face first onto the carpet.  He has a brush burn to his left face from this.  Syncope, grade 3.  Patient states he has purchased a BP machine that automatically logs all of his BP readings on his phone.  He forwarded these to me and they read as follows:                       He states he has taken the above readings while in a seated position.  Of note, the "79/43" reading from 4/24 at 0759PM was after he had been lying down for "a while."  He was not syncopal at this time.  The above information was relayed to Dr. Panda for review.  Will await further orders or instructions regarding this.      No change in other AE's noted.     Patient was then weighed on same scale as with previous weigh ins: weight today of 86.8 kg.   He states no changes in " his intake since last week.  He confirms he continues to take acyclovir as directed, no change in con meds noted. Patient then proceeded to phlebotomy lab for central lab and local lab blood draw. Patient then checked in for chemo and will return around 1PM for Velcade injection once labs result and look okay for treatment. I advised him I would like to obtain orthostatic BP's on him when he returns to report to Dr. Panda.  He states understanding of this.  This plan was discussed with G. Abadie, who is in agreement; she is also aware that a full set of vitals is necessary from a study standpoint.     Patient's local labs resulted with no changes in grade on hematology or chemistry.  Total bilirubin remains elevated as per baseline, slightly improved from last week from last week at 1.9.  Dr. Carmichael aware of this. Patient returned to infusion suite for vitals as well as Velcade injection. VSS and patient meets requirements to receive Velcade injection per protocol, see MAR. Tolerated well per infusion RN, see note.      Today's central lab specimens shipped according to lab manual instructions. Confirmation # [id number]; Tracking #: [id number].      Addendum:     Orthostatics as follows:    Lyin/67  Sittin/64  Standin/63

## 2017-05-03 ENCOUNTER — RESEARCH ENCOUNTER (OUTPATIENT)
Dept: RESEARCH | Facility: HOSPITAL | Age: 52
End: 2017-05-03

## 2017-05-03 ENCOUNTER — INFUSION (OUTPATIENT)
Dept: INFUSION THERAPY | Facility: HOSPITAL | Age: 52
End: 2017-05-03
Attending: INTERNAL MEDICINE
Payer: COMMERCIAL

## 2017-05-03 VITALS
HEART RATE: 81 BPM | RESPIRATION RATE: 20 BRPM | TEMPERATURE: 98 F | SYSTOLIC BLOOD PRESSURE: 107 MMHG | DIASTOLIC BLOOD PRESSURE: 66 MMHG

## 2017-05-03 DIAGNOSIS — E85.81 AL AMYLOIDOSIS: Primary | ICD-10-CM

## 2017-05-03 PROCEDURE — 63600175 PHARM REV CODE 636 W HCPCS: Performed by: INTERNAL MEDICINE

## 2017-05-03 PROCEDURE — 96401 CHEMO ANTI-NEOPL SQ/IM: CPT

## 2017-05-03 RX ORDER — BORTEZOMIB 3.5 MG/1
1.4 INJECTION, POWDER, LYOPHILIZED, FOR SOLUTION INTRAVENOUS; SUBCUTANEOUS
Status: COMPLETED | OUTPATIENT
Start: 2017-05-03 | End: 2017-05-03

## 2017-05-03 RX ADMIN — BORTEZOMIB 3 MG: 3.5 INJECTION, POWDER, LYOPHILIZED, FOR SOLUTION INTRAVENOUS; SUBCUTANEOUS at 12:05

## 2017-05-03 NOTE — NURSING
Patient here for injection-still with light-headness when rises too fast-tolerated injection well.

## 2017-05-03 NOTE — PROGRESS NOTES
"  Wednesday, May 3rd, 2017      Study: "A Phase 3, randomized, multicenter, double-blind, placebo-controlled, 2-arm, efficacy and safety study of [id number] plus standard of care versus placebo plus standard of care in subjects with light chain (AL) amyloidosis"  Protocol ID# [id number]  IRB# 2015.305.A  Sponsor: Eliseo  Investigator: Dr. Carmichael      Month 3, Day 22  IV [id number]- + SQ Velcade     0800: Patient presents to 3rd floor clinic for blood work ahead of day 22 Velcade injection. He is ambulatory, awake, alert, and oriented to person, place, and time. Pt expresses willingness to continue to participate in the above mentioned protocol. He completed Trial Slate questionnaires prior to study procedures being initiated. Pt was weighed on same scale as with previous weigh ins: weight today is 88 kg. He confirms he continues to take acyclovir as directed, no change in con meds noted. Patient then proceeded to phlebotomy lab for central lab and local lab blood draw. Patient then checked in for chemo and will return around 12pm for Velcade injection once labs result and look okay for treatment.  Pt states that he has not had any "passing out" episodes since his last visit. Pt is taking his Bumex only once per day and tolerating it well. Pt still complains of the lightheadedness/dizziness but states that it is stable, and he has been "taking it easy". Pt does state that for the past week, he has been having a bloody nose. He states that he will put a tissue covering his nostrils for a few minutes and it will stop the bleeding. Advised pt to try a nasal saline spray as this may be due to dry nostrils. Pt voiced understanding. Platelet count noted to be 155,000 today.   Patient's local labs resulted with no changes in grade on hematology or chemistry.  Total bilirubin remains elevated as per baseline, slightly improved from last week with level at 1.7 today.    Patient returned to infusion suite for vitals as " "well as Velcade injection. VSS and patient meets requirements to receive Velcade injection per protocol, see MAR. Tolerated well per infusion RN, see note. Pt aware of appts for M4D1 on 5/10/17. Patient aware to call MD or research for any worsening symptoms.      Today's central lab specimens shipped via UPS as per study protocol: Tracking # [id number]    Review of pertinent AE's:    Restless Leg Syndrome, grade 1:  Occasionally present, but no complaints of this today.  AE ongoing.  Lymphocyte Count Decreased, Grade 1:  Total lymphocytes 1.09 today.  AE, grade 1.    Diarrhea, Grade 1:  Patient states the "preemptive strike" against the diarrhea [taking imodium before Velcade dose] seems to be helping and he will continue to do this.  AE ongoing.   Syncope, grade 3: Pt denies any fainting spells this week. Pt states that he has been "taking it easy." Resolved  Epistaxis, grade 1: See note above. AE ongoing.  "

## 2017-05-10 ENCOUNTER — RESEARCH ENCOUNTER (OUTPATIENT)
Dept: RESEARCH | Facility: HOSPITAL | Age: 52
End: 2017-05-10

## 2017-05-10 ENCOUNTER — OFFICE VISIT (OUTPATIENT)
Dept: HEMATOLOGY/ONCOLOGY | Facility: CLINIC | Age: 52
End: 2017-05-10
Payer: COMMERCIAL

## 2017-05-10 ENCOUNTER — INFUSION (OUTPATIENT)
Dept: INFUSION THERAPY | Facility: HOSPITAL | Age: 52
End: 2017-05-10
Attending: INTERNAL MEDICINE
Payer: COMMERCIAL

## 2017-05-10 VITALS
HEIGHT: 71 IN | HEART RATE: 80 BPM | SYSTOLIC BLOOD PRESSURE: 112 MMHG | BODY MASS INDEX: 27.28 KG/M2 | DIASTOLIC BLOOD PRESSURE: 78 MMHG | WEIGHT: 194.88 LBS | TEMPERATURE: 98 F

## 2017-05-10 VITALS
SYSTOLIC BLOOD PRESSURE: 106 MMHG | DIASTOLIC BLOOD PRESSURE: 66 MMHG | TEMPERATURE: 97 F | HEART RATE: 77 BPM | RESPIRATION RATE: 18 BRPM

## 2017-05-10 DIAGNOSIS — E85.81 AL AMYLOIDOSIS: Primary | ICD-10-CM

## 2017-05-10 PROCEDURE — 63600175 PHARM REV CODE 636 W HCPCS: Performed by: INTERNAL MEDICINE

## 2017-05-10 PROCEDURE — 25000003 PHARM REV CODE 250: Performed by: INTERNAL MEDICINE

## 2017-05-10 PROCEDURE — 96413 CHEMO IV INFUSION 1 HR: CPT

## 2017-05-10 PROCEDURE — 99999 PR PBB SHADOW E&M-EST. PATIENT-LVL III: CPT | Mod: PBBFAC,,, | Performed by: INTERNAL MEDICINE

## 2017-05-10 PROCEDURE — 96401 CHEMO ANTI-NEOPL SQ/IM: CPT

## 2017-05-10 PROCEDURE — 1160F RVW MEDS BY RX/DR IN RCRD: CPT | Mod: ,,, | Performed by: INTERNAL MEDICINE

## 2017-05-10 PROCEDURE — 99213 OFFICE O/P EST LOW 20 MIN: CPT | Mod: S$PBB,,, | Performed by: INTERNAL MEDICINE

## 2017-05-10 RX ORDER — SODIUM CHLORIDE 0.9 % (FLUSH) 0.9 %
10 SYRINGE (ML) INJECTION
Status: CANCELLED | OUTPATIENT
Start: 2017-05-16

## 2017-05-10 RX ORDER — SODIUM CHLORIDE 0.9 % (FLUSH) 0.9 %
10 SYRINGE (ML) INJECTION
Status: CANCELLED | OUTPATIENT
Start: 2017-05-10

## 2017-05-10 RX ORDER — BORTEZOMIB 3.5 MG/1
1.4 INJECTION, POWDER, LYOPHILIZED, FOR SOLUTION INTRAVENOUS; SUBCUTANEOUS
Status: CANCELLED | OUTPATIENT
Start: 2017-05-10 | End: 2017-05-10

## 2017-05-10 RX ORDER — BORTEZOMIB 3.5 MG/1
1.4 INJECTION, POWDER, LYOPHILIZED, FOR SOLUTION INTRAVENOUS; SUBCUTANEOUS
Status: CANCELLED | OUTPATIENT
Start: 2017-05-30 | End: 2017-05-30

## 2017-05-10 RX ORDER — BORTEZOMIB 3.5 MG/1
1.4 INJECTION, POWDER, LYOPHILIZED, FOR SOLUTION INTRAVENOUS; SUBCUTANEOUS
Status: CANCELLED | OUTPATIENT
Start: 2017-05-16 | End: 2017-05-16

## 2017-05-10 RX ORDER — BORTEZOMIB 3.5 MG/1
1.4 INJECTION, POWDER, LYOPHILIZED, FOR SOLUTION INTRAVENOUS; SUBCUTANEOUS
Status: COMPLETED | OUTPATIENT
Start: 2017-05-10 | End: 2017-05-10

## 2017-05-10 RX ORDER — SODIUM CHLORIDE 0.9 % (FLUSH) 0.9 %
10 SYRINGE (ML) INJECTION
Status: DISCONTINUED | OUTPATIENT
Start: 2017-05-10 | End: 2017-05-10 | Stop reason: HOSPADM

## 2017-05-10 RX ORDER — HEPARIN 100 UNIT/ML
500 SYRINGE INTRAVENOUS
Status: CANCELLED | OUTPATIENT
Start: 2017-05-16

## 2017-05-10 RX ORDER — LANOLIN ALCOHOL/MO/W.PET/CERES
400 CREAM (GRAM) TOPICAL DAILY
Qty: 30 TABLET | Refills: 4 | Status: SHIPPED | OUTPATIENT
Start: 2017-05-10 | End: 2017-09-26 | Stop reason: SDUPTHER

## 2017-05-10 RX ORDER — BORTEZOMIB 3.5 MG/1
1.4 INJECTION, POWDER, LYOPHILIZED, FOR SOLUTION INTRAVENOUS; SUBCUTANEOUS
Status: CANCELLED | OUTPATIENT
Start: 2017-05-23 | End: 2017-05-23

## 2017-05-10 RX ORDER — ACETAMINOPHEN 650 MG/20.3ML
650 LIQUID ORAL
Status: COMPLETED | OUTPATIENT
Start: 2017-05-10 | End: 2017-05-10

## 2017-05-10 RX ORDER — DEXTROMETHORPHAN HYDROBROMIDE, GUAIFENESIN 5; 100 MG/5ML; MG/5ML
650 LIQUID ORAL
Status: CANCELLED | OUTPATIENT
Start: 2017-05-10 | End: 2017-05-10

## 2017-05-10 RX ORDER — SODIUM CHLORIDE 0.9 % (FLUSH) 0.9 %
10 SYRINGE (ML) INJECTION
Status: CANCELLED | OUTPATIENT
Start: 2017-05-23

## 2017-05-10 RX ORDER — DIPHENHYDRAMINE HCL 25 MG
25 CAPSULE ORAL ONCE
Status: COMPLETED | OUTPATIENT
Start: 2017-05-10 | End: 2017-05-10

## 2017-05-10 RX ORDER — HEPARIN 100 UNIT/ML
500 SYRINGE INTRAVENOUS
Status: DISCONTINUED | OUTPATIENT
Start: 2017-05-10 | End: 2017-05-10 | Stop reason: HOSPADM

## 2017-05-10 RX ORDER — HEPARIN 100 UNIT/ML
500 SYRINGE INTRAVENOUS
Status: CANCELLED | OUTPATIENT
Start: 2017-05-30

## 2017-05-10 RX ORDER — DIPHENHYDRAMINE HCL 25 MG
25 CAPSULE ORAL EVERY 6 HOURS PRN
Status: CANCELLED
Start: 2017-05-23

## 2017-05-10 RX ORDER — DIPHENHYDRAMINE HCL 25 MG
25 CAPSULE ORAL EVERY 6 HOURS PRN
Status: CANCELLED
Start: 2017-05-30

## 2017-05-10 RX ORDER — DIPHENHYDRAMINE HCL 25 MG
25 CAPSULE ORAL ONCE
Status: CANCELLED
Start: 2017-05-10 | End: 2017-05-10

## 2017-05-10 RX ORDER — HEPARIN 100 UNIT/ML
500 SYRINGE INTRAVENOUS
Status: CANCELLED | OUTPATIENT
Start: 2017-05-10

## 2017-05-10 RX ORDER — HEPARIN 100 UNIT/ML
500 SYRINGE INTRAVENOUS
Status: CANCELLED | OUTPATIENT
Start: 2017-05-23

## 2017-05-10 RX ORDER — DIPHENHYDRAMINE HCL 25 MG
25 CAPSULE ORAL EVERY 6 HOURS PRN
Status: CANCELLED
Start: 2017-05-16

## 2017-05-10 RX ORDER — SODIUM CHLORIDE 0.9 % (FLUSH) 0.9 %
10 SYRINGE (ML) INJECTION
Status: CANCELLED | OUTPATIENT
Start: 2017-05-30

## 2017-05-10 RX ADMIN — BORTEZOMIB 2.9 MG: 3.5 INJECTION, POWDER, LYOPHILIZED, FOR SOLUTION INTRAVENOUS; SUBCUTANEOUS at 01:05

## 2017-05-10 RX ADMIN — ACETAMINOPHEN 650 MG: 160 SOLUTION ORAL at 10:05

## 2017-05-10 RX ADMIN — DIPHENHYDRAMINE HYDROCHLORIDE 25 MG: 25 CAPSULE ORAL at 10:05

## 2017-05-10 NOTE — LETTER
May 10, 2017        James Lao Jr., MD  [address]  [facility] [address]             [facility]  [address]  [facility] [address]  Phone: [phone]   Patient: Roc Lai Jr.   MR Number: [id number]   YOB: 1965   Date of Visit: 5/10/2017       Dear Dr. Lao:    Thank you for referring Roc Lai to me for evaluation. Below are the relevant portions of my assessment and plan of care.       1. AL amyloidosis         Mr. Lai has a monoclonal lambda light chain coming from a plasma cell infiltrate in the marrow that serves as the reservoir for amyloid production affecting his heart.  His cardiac amyloid has been confirmed as AL Amlyoid.  He is now three months into therapy on the Bsc888 trial.    His exercise tolerance is stable on Bumex and he continues to push himself in activities, sometimes to instability and I discussed the greater care he should observe early in his course until we see clear improvement in his cardiac function.  Diuretic dose decreased as per Dr. Lao.  Given his diastolic dysfunction he relies on preload so we will need to monitor his overall volume status closely.    His bone marrow biopsy on 1/12/17 showed a 60% cellular marrow with 28% plasma cells by morphology but 50% by immunohistochemistry.  Hence, he has at least a smoldering myeloma.  He has no renal, calcium or hemoglobin issues and he has no bony symptoms to suggest multiple myeloma and plain films do not show any lytic lesions as of 1/2017.  MRI 1/2017 did not show lesions that would define him as multiple myeloma.    He does not seem to have other systemic manifestations of AL amyloidosis including the absence of enlarged tongue, organomegaly, coagulopathy, neuropathy, carpal tunnel syndrome or renal disease.  There is no family history of amyloidosis.  He has no baseline neuropathy and no progression on exam today.      All of his questions were  answered in the clinic today and he will follow up as per protocol in one month with continued therapy as per protocol.       If you have questions, please do not hesitate to call me. I look forward to following Roc along with you.    Sincerely,      Elvis Carmichael MD           CC  Chon Harmon MD

## 2017-05-10 NOTE — PROGRESS NOTES
"    Wednesday, May 10th, 2017     Study: "A Phase 3, randomized, multicenter, double-blind, placebo-controlled, 2-arm, efficacy and safety study of [id number] plus standard of care versus placebo plus standard of care in subjects with light chain (AL) amyloidosis"  Protocol ID# [id number]  IRB# 2015.305.A  Sponsor: Eliseo  Investigator: Dr. Carmichael    Month 4, Day 1  IV [id number]- + SQ Velcade    Patient presents for month 4, day 1 treatment per above-mentioned protocol.  He is ambulatory, awake, alert, and oriented to person, place, and time, accompanied by self. Patient notes having a "list" of issues to discuss today.  He states that his primary problem currently is that of lack of sleep.  He states that he is up sometimes every 50 minutes to hour each night due to "tension" in his legs.  He has to get up and walk around to relieve the tension.  He states his dizziness is also a "problem."  He denies subsequent episodes of syncope but states that he is noticing the syncope when he sits or stands from a sitting or lying position.   He states also having an occasional nose bleed but denies this today.  He states his diarrhea has improved with use of imodium.  He states that per Dr. Panda's instruction, he is taking Bumex once per day, with the exception of this Saturday when he had a particularly busy day and was on his feet a lot.  Discussion to follow with Dr. Carmichael regarding expectations of activity level given his condition.  Patient states continued willingness to participate in above-mentioned trial.    Patient completed TrialSlate questionnaires prior to any study-related procedures being performed.     Review of AE's:    Restless Leg Syndrome, grade 1:  This is continuing and affecting his ability to sleep as stated above.  Per Dr. Carmichael, this could be an electrolyte imbalance related to Bumex usage.  Per MD he should continue potassium and initiate mag ox.  May potentially add tonic water if this " is thought to be vascular.  AE ongoing.  Platelet Count decreased, grade 1:  Platelet count WNL today at 162,000.  Remains grade 1.  AE resolved for now.  Total Bilirubin Increased, Grade 2:  Total bilirubin 2.1 per local blood work performed today.  Noted as NCS per Dr. Carmichael.  Dr. Carmichael is not concerned with this finding.  AE grade 2, will follow.  Hyperglycemia, Grade 1: Glucose 122 today. NCS per Dr. Carmichael, AE, grade 1.  Diarrhea:  This has improved with use of prophylactic imodium prior to each dose of Velcade, see above..  AE, grade 1.  Dizziness (Lightheadedness), grade 1:  This is ongoing and patient was encouraged to use caution when rising from a lying or seated position by sitting for 20-30 seconds before standing.  He states understanding of this.  AE ongoing.    Dry Skin, grade 1:  States this has mostly resolved with use of moisturizers and hydrocortisone cream.  But notes dry hands today.  AE ongoing.  Hyponatremia, grade 1:  Sodium level 135 today.  NCS per Dr. Carmichael.    Peripheral sensory neuropathy, grade 1:  Patient now reporting some slight intermittent numbness that per Dr. Carmichael is related to Velcade.  He states it is not painful, burning, or sharp and it is not persistent.  Patient instructed to pay close attention to this symptom and to let us know if this worsens.      No other change in grade in baseline AE's noted.      Physical performed per Dr. Carmichael, see MD note for Complete PE, symptom-directed PE, ECOG PS and NYHA Classification.  See flowsheets for height, weight, and vital signs.  NIS-LL and VASPI completed, see chart for source documentation.  Patient is not WOCBP, and no blood work required today per Dr. Carmichael, therefore patient had no local labs drawn.  Central [facility] and [facility]   Concomitant medications reviewed.  Patient confirms that per Dr. Carmichael, he continues on acyclovir.  Per Dr. Carmichael, patient is approved to initiate M4,D1  treatment today.  Treatment plan updated with today's weight and signed.  Benadryl adjusted to PO due to previous experience with RLS.      Dr. Carmichael reiterated that it could take up to 6 months to see improvement from this treatment and that patient should try to have patience with this.  He states understanding.      Patient then proceeded to infusion suite.  Study-mandated procedures performed per protocol as follows:    08:00 Pre-dose PK's drawn per protocol  10:08  Premeds administered, see MAR  11:05  Pre-dose vital signs performed, see flowsheet.  Vitals read as follows:  /69, HR 75, Temp 97.4, RR 18.  11:02  Triplicate EKG performed per research staff.  Tracings sent to   11:06  Study drug infusion initiated   12:08  Infusion completed +2 minutes, well within 10 minute window..  12:13  Vital signs at end of infusion performed.  Vitals as follows:  106/66, 78, 18, 97.4  13:11  1 hour post infusion VS performed:  /66, HR 77, T 97.4, RR 18    Patient was monitored for the full 90 minutes post infusion (until 1340) and tolerated the above with no difficulties, see infusion RN documentation.  Patient then received SQ Velcade injection, see MAR.       All of patient's questions were answered to his satisfaction.  Upcoming appointment calendar reviewed and given to patient.  Patient states understanding of plan of care and upcoming schedule.  He states having Research RN contact information as well as that of Dr. Carmichael.

## 2017-05-10 NOTE — PROGRESS NOTES
Subjective:       Patient ID: Roc Lai Jr. is a 51 y.o. male.    Chief Complaint: No chief complaint on file.    HPI  ECOG 1, NYHA II. Mr. 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  He remains well and without new issues today stable fluid status on lower Bumex dosing.    No swallowing issues, neurologic problems or other limitation.  He notes mild swelling at his lower extremity R>L.    Complaints of light headedness still intermittently with less dyspnea over the last month.  Able to climb stairs with less difficulty.  Some anorexia and diarrhea with his does of bortezomib but no progressive neuropathy.    History: Prior to diagnosis he was previously vigorous and ran over three miles thrice weekly 3 years ago.  However, over the 18 months prior to presentation, he has noted progressive decline in his exercise capacity to only being able to do 10 min of exercise now.  He has previously been studied for CAD with normal coronaries in 3/31578.  He was diagnosed with atrial flutter and underwent cardiac ablation without improvement in his exercise capacity.  He was found to have an atrial septal defect and this was closed 9/26/16 but this did not improve his symptoms either.  His symptoms progressed over the last 3 months to the point he can no longer exercise and notes dyspnea with carrying croceries, climbing stairs, etc.  He was admitted to cardiology 11/27/16 with 17 pounds of fluid loss and his is now sleeping better.  He was diagnosed with diastolic heart failure and cardia MRI was concerning for infiltrative cardiac disease.      SPEP 12/1/16 showed decreased gamma globulins and free lambda light chains increased with M-spike of 0.19 g/dL.  Free lambda light chain was elevated at 50 mg/dL with normal lappa 1.37 mg/dL and ratio 0.03.  However, abdominal fat pad biopsy was negative for amyoid with the presence of  fat cells 11/30/16.  ATTR DNA test negative for familial amyloidosis.    CBC normal 12/1/16 with normal renal function, calcium, albumin and slightly low total protein levels.      Bone marrow biopsy done 1/12/17 showed a 60% plasma cell infiltrate.     Metastatic survey 1/19/17 was negative for bony disease.  MRI scans of the cervical, thoracic, lumbar and pelvic done 1/27/17 did not disclose specific lesions to change diagnosis to myeloma.    His pre-trial walk test today confirmed his candidacy for the protocol and he notes some progression with having to rest after a walk on flat surface of about a mile.    [facility]  Diastolic heart failure    Review of Systems   Constitutional: Negative for activity change, appetite change, diaphoresis, fatigue, fever and unexpected weight change.   HENT: Negative for mouth sores, sore throat and trouble swallowing.    Respiratory: Negative for cough and shortness of breath.         All improved since diuresis with increased exercise tolerance   Cardiovascular: Positive for leg swelling (minimal). Negative for chest pain.   Gastrointestinal: Negative for abdominal pain, blood in stool, constipation and diarrhea.   Genitourinary: Negative for dysuria and hematuria.   Musculoskeletal: Negative for back pain and neck pain.   Skin: Negative for rash.   Neurological: Negative for tremors, weakness, light-headedness and headaches.   Hematological: Negative for adenopathy.   Psychiatric/Behavioral: Negative for confusion and sleep disturbance.       Objective:      Physical Exam   Constitutional: He is oriented to person, place, and time. He appears well-developed and well-nourished. No distress.   HENT:   Head: Normocephalic and atraumatic.   Mouth/Throat: Oropharynx is clear and moist. No oropharyngeal exudate.   Tongue not enlarged  No submandibular enlargement   Eyes: Conjunctivae are normal. Pupils are equal, round, and reactive to light.   Neck: Neck supple.   Cardiovascular: Normal  rate and regular rhythm.    Pulmonary/Chest: Effort normal and breath sounds normal. He has no rales.   Abdominal: Soft. Bowel sounds are normal. He exhibits no distension (No ascites) and no mass. There is no splenomegaly. There is no tenderness.   Musculoskeletal: Normal range of motion. He exhibits no edema.   No rib or spine tenderness   Lymphadenopathy:     He has no cervical adenopathy.     He has no axillary adenopathy.        Right: No inguinal adenopathy present.        Left: No inguinal adenopathy present.   Neurological: He is alert and oriented to person, place, and time.   Reflex Scores:       Bicep reflexes are 3+ on the right side and 3+ on the left side.       Patellar reflexes are 3+ on the right side and 3+ on the left side.       Achilles reflexes are 2+ on the right side and 2+ on the left side.  Normal touch, sharp and proprioception.   Skin: Skin is warm and dry. No rash noted.   No unusual bruising   Psychiatric: He has a normal mood and affect. Thought content normal.       Assessment:       1. AL amyloidosis        Plan:       Mr. Lai has a monoclonal lambda light chain coming from a plasma cell infiltrate in the marrow that serves as the reservoir for amyloid production affecting his heart.  His cardiac amyloid has been confirmed as AL Amlyoid.  He is now three months into therapy on the [id number] trial.    His exercise tolerance is stable on Bumex and he continues to push himself in activities, sometimes to instability and I discussed the greater care he should observe early in his course until we see clear improvement in his cardiac function.  Diuretic dose decreased as per Dr. Lao.  Given his diastolic dysfunction he relies on preload so we will need to monitor his overall volume status closely.    His bone marrow biopsy on 1/12/17 showed a 60% cellular marrow with 28% plasma cells by morphology but 50% by immunohistochemistry.  Hence, he has at least a smoldering myeloma.  He has  no renal, calcium or hemoglobin issues and he has no bony symptoms to suggest multiple myeloma and plain films do not show any lytic lesions as of 1/2017.  MRI 1/2017 did not show lesions that would define him as multiple myeloma.    He does not seem to have other systemic manifestations of AL amyloidosis including the absence of enlarged tongue, organomegaly, coagulopathy, neuropathy, carpal tunnel syndrome or renal disease.  There is no family history of amyloidosis.  He has no baseline neuropathy and no progression on exam today.      All of his questions were answered in the clinic today and he will follow up as per protocol in one month with continued therapy as per protocol.

## 2017-05-12 ENCOUNTER — PATIENT MESSAGE (OUTPATIENT)
Dept: HEMATOLOGY/ONCOLOGY | Facility: CLINIC | Age: 52
End: 2017-05-12

## 2017-05-17 ENCOUNTER — INFUSION (OUTPATIENT)
Dept: INFUSION THERAPY | Facility: HOSPITAL | Age: 52
End: 2017-05-17
Attending: INTERNAL MEDICINE
Payer: COMMERCIAL

## 2017-05-17 VITALS
DIASTOLIC BLOOD PRESSURE: 65 MMHG | BODY MASS INDEX: 27.9 KG/M2 | RESPIRATION RATE: 20 BRPM | HEIGHT: 71 IN | HEART RATE: 86 BPM | TEMPERATURE: 98 F | WEIGHT: 199.31 LBS | SYSTOLIC BLOOD PRESSURE: 110 MMHG

## 2017-05-17 DIAGNOSIS — E85.81 AL AMYLOIDOSIS: Primary | ICD-10-CM

## 2017-05-17 PROCEDURE — 63600175 PHARM REV CODE 636 W HCPCS: Performed by: INTERNAL MEDICINE

## 2017-05-17 PROCEDURE — 96401 CHEMO ANTI-NEOPL SQ/IM: CPT

## 2017-05-17 RX ORDER — BORTEZOMIB 3.5 MG/1
1.4 INJECTION, POWDER, LYOPHILIZED, FOR SOLUTION INTRAVENOUS; SUBCUTANEOUS
Status: COMPLETED | OUTPATIENT
Start: 2017-05-17 | End: 2017-05-17

## 2017-05-17 RX ADMIN — BORTEZOMIB 3 MG: 3.5 INJECTION, POWDER, LYOPHILIZED, FOR SOLUTION INTRAVENOUS; SUBCUTANEOUS at 08:05

## 2017-05-22 ENCOUNTER — PATIENT MESSAGE (OUTPATIENT)
Dept: HEMATOLOGY/ONCOLOGY | Facility: CLINIC | Age: 52
End: 2017-05-22

## 2017-05-24 ENCOUNTER — INFUSION (OUTPATIENT)
Dept: INFUSION THERAPY | Facility: HOSPITAL | Age: 52
End: 2017-05-24
Attending: INTERNAL MEDICINE
Payer: COMMERCIAL

## 2017-05-24 ENCOUNTER — PATIENT MESSAGE (OUTPATIENT)
Dept: TRANSPLANT | Facility: CLINIC | Age: 52
End: 2017-05-24

## 2017-05-24 ENCOUNTER — RESEARCH ENCOUNTER (OUTPATIENT)
Dept: RESEARCH | Facility: HOSPITAL | Age: 52
End: 2017-05-24

## 2017-05-24 VITALS — DIASTOLIC BLOOD PRESSURE: 54 MMHG | RESPIRATION RATE: 24 BRPM | HEART RATE: 91 BPM | SYSTOLIC BLOOD PRESSURE: 89 MMHG

## 2017-05-24 DIAGNOSIS — I50.33 ACUTE ON CHRONIC DIASTOLIC CHF (CONGESTIVE HEART FAILURE), NYHA CLASS 1: ICD-10-CM

## 2017-05-24 DIAGNOSIS — E85.81 AL AMYLOIDOSIS: Primary | ICD-10-CM

## 2017-05-24 PROCEDURE — 96401 CHEMO ANTI-NEOPL SQ/IM: CPT

## 2017-05-24 PROCEDURE — 63600175 PHARM REV CODE 636 W HCPCS: Performed by: INTERNAL MEDICINE

## 2017-05-24 RX ORDER — BORTEZOMIB 3.5 MG/1
1.4 INJECTION, POWDER, LYOPHILIZED, FOR SOLUTION INTRAVENOUS; SUBCUTANEOUS
Status: COMPLETED | OUTPATIENT
Start: 2017-05-24 | End: 2017-05-24

## 2017-05-24 RX ORDER — POTASSIUM CHLORIDE 20 MEQ/1
20 TABLET, EXTENDED RELEASE ORAL 2 TIMES DAILY
Qty: 60 TABLET | Refills: 1 | Status: CANCELLED | OUTPATIENT
Start: 2017-05-24

## 2017-05-24 RX ORDER — CHOLECALCIFEROL (VITAMIN D3) 25 MCG
1000 TABLET ORAL DAILY
COMMUNITY
End: 2018-02-05

## 2017-05-24 RX ORDER — IBUPROFEN 200 MG
1 TABLET ORAL DAILY
COMMUNITY
End: 2018-02-05

## 2017-05-24 RX ORDER — VITAMIN E 268 MG
400 CAPSULE ORAL DAILY
COMMUNITY
End: 2018-02-05

## 2017-05-24 RX ADMIN — BORTEZOMIB 3 MG: 3.5 INJECTION, POWDER, LYOPHILIZED, FOR SOLUTION INTRAVENOUS; SUBCUTANEOUS at 08:05

## 2017-05-24 NOTE — PROGRESS NOTES
"  Wednesday, May 24th, 2017    Intra-Cycle Velcade (non-protocol related)    Patient presented to infusion suite to receive SQ Velcade per Dr. Carmichael.  Research RN visited with patient to get update on symptoms/condition.  Patient states things are about the same.  He continues to have trouble sleeping, though he states he "can't pin-point a reason."  Per Dr. Carmichael this may be related to breathing issues secondary to cardiac dysfunction and he was advised he may sleep on pillows or sitting up and that may assist with symptoms.  He was instructed on this and states understanding.  He states lightheadedness and shortness of breath continue to slow him down and expresses frustration his continuing symptoms and was offered reassurance and encouraged to remain patient with symptom improvement.  Discussed with Dr. Carmichael who agrees this is something which will take time to see improvement.     Patient also stating that neuropathy remains "noticeable" though has not changed in quality or frequency.  Research RN offered information on OTC neuropathy treatments, which were discussed with Dr. Carmichael.  Per Dr. Carmichael, patient may try taking Vitamin B complex, Vitamin E, and Vitamin D to assist in symptom control.  He was also instructed on ensuring diet is rich in magnesium, potassium, calcium and was instructed on which foods are rich in these nutrients.  Also instructed that he may take tonic water to help with cramping as per Dr. Carmichael' recommendation.  Additionally instructed that cocoa butter may help with symptoms as well.  He states understanding of the above.    No change in grade in above-noted symptoms.    Patient was encouraged to update us on changes or worsening of symptoms.  He states understanding.                  "

## 2017-05-25 DIAGNOSIS — I50.33 ACUTE ON CHRONIC DIASTOLIC CHF (CONGESTIVE HEART FAILURE), NYHA CLASS 1: ICD-10-CM

## 2017-05-25 RX ORDER — POTASSIUM CHLORIDE 20 MEQ/1
20 TABLET, EXTENDED RELEASE ORAL 2 TIMES DAILY
Qty: 60 TABLET | Refills: 3 | Status: CANCELLED | OUTPATIENT
Start: 2017-05-25

## 2017-05-26 DIAGNOSIS — I50.33 ACUTE ON CHRONIC DIASTOLIC CHF (CONGESTIVE HEART FAILURE), NYHA CLASS 1: ICD-10-CM

## 2017-05-26 RX ORDER — POTASSIUM CHLORIDE 20 MEQ/1
20 TABLET, EXTENDED RELEASE ORAL 2 TIMES DAILY
Qty: 60 TABLET | Refills: 5 | Status: CANCELLED | OUTPATIENT
Start: 2017-05-26

## 2017-05-29 DIAGNOSIS — I50.33 ACUTE ON CHRONIC DIASTOLIC CHF (CONGESTIVE HEART FAILURE), NYHA CLASS 1: ICD-10-CM

## 2017-05-29 RX ORDER — POTASSIUM CHLORIDE 20 MEQ/1
20 TABLET, EXTENDED RELEASE ORAL 2 TIMES DAILY
Qty: 60 TABLET | Refills: 1 | Status: SHIPPED | OUTPATIENT
Start: 2017-05-29 | End: 2017-05-30 | Stop reason: SDUPTHER

## 2017-05-29 RX ORDER — BUMETANIDE 2 MG/1
2 TABLET ORAL 2 TIMES DAILY
Qty: 60 TABLET | Refills: 1 | Status: SHIPPED | OUTPATIENT
Start: 2017-05-29 | End: 2017-07-07

## 2017-05-30 ENCOUNTER — INFUSION (OUTPATIENT)
Dept: INFUSION THERAPY | Facility: HOSPITAL | Age: 52
End: 2017-05-30
Attending: INTERNAL MEDICINE
Payer: COMMERCIAL

## 2017-05-30 VITALS
RESPIRATION RATE: 22 BRPM | TEMPERATURE: 98 F | HEART RATE: 82 BPM | SYSTOLIC BLOOD PRESSURE: 114 MMHG | DIASTOLIC BLOOD PRESSURE: 69 MMHG

## 2017-05-30 DIAGNOSIS — I50.33 ACUTE ON CHRONIC DIASTOLIC CHF (CONGESTIVE HEART FAILURE), NYHA CLASS 1: ICD-10-CM

## 2017-05-30 DIAGNOSIS — E85.81 AL AMYLOIDOSIS: Primary | ICD-10-CM

## 2017-05-30 PROCEDURE — 96401 CHEMO ANTI-NEOPL SQ/IM: CPT

## 2017-05-30 PROCEDURE — 63600175 PHARM REV CODE 636 W HCPCS: Performed by: INTERNAL MEDICINE

## 2017-05-30 RX ORDER — POTASSIUM CHLORIDE 20 MEQ/1
20 TABLET, EXTENDED RELEASE ORAL 2 TIMES DAILY
Qty: 60 TABLET | Refills: 3 | Status: SHIPPED | OUTPATIENT
Start: 2017-05-30 | End: 2017-07-25

## 2017-05-30 RX ORDER — BUMETANIDE 2 MG/1
2 TABLET ORAL 2 TIMES DAILY
Qty: 60 TABLET | Refills: 1 | Status: SHIPPED | OUTPATIENT
Start: 2017-05-30 | End: 2017-07-07 | Stop reason: SDUPTHER

## 2017-05-30 RX ORDER — BORTEZOMIB 3.5 MG/1
1.4 INJECTION, POWDER, LYOPHILIZED, FOR SOLUTION INTRAVENOUS; SUBCUTANEOUS
Status: COMPLETED | OUTPATIENT
Start: 2017-05-30 | End: 2017-05-30

## 2017-05-30 RX ORDER — DIPHENHYDRAMINE HCL 25 MG
25 CAPSULE ORAL EVERY 6 HOURS PRN
Status: DISCONTINUED | OUTPATIENT
Start: 2017-05-30 | End: 2017-05-30 | Stop reason: HOSPADM

## 2017-05-30 RX ADMIN — BORTEZOMIB 3 MG: 3.5 INJECTION, POWDER, LYOPHILIZED, FOR SOLUTION INTRAVENOUS; SUBCUTANEOUS at 08:05

## 2017-05-30 NOTE — NURSING
Patient here for injection of velcade-still with dizziness on rising quickly-leaving for trip to [place]-tolerated injection well.

## 2017-05-30 NOTE — PROGRESS NOTES
Refill request:  Rx Auth     Chon Mancia MA   You 5 days ago      Roc Lai Jr.   James Lao Jr., MD 20 hours ago (1:57 PM)         My potassium prescription will  over the Memorial Day weekend and only 1 refill was authorized which has already been used. Do you still want me taking this twice daily?       (Routing comment)      Yes he will take with bumex  I left message on phone today

## 2017-06-05 ENCOUNTER — LAB VISIT (OUTPATIENT)
Dept: LAB | Facility: HOSPITAL | Age: 52
End: 2017-06-05
Attending: INTERNAL MEDICINE
Payer: COMMERCIAL

## 2017-06-05 DIAGNOSIS — Z00.6 EXAMINATION OF PARTICIPANT IN CLINICAL TRIAL: ICD-10-CM

## 2017-06-05 DIAGNOSIS — E85.81 AL AMYLOIDOSIS: ICD-10-CM

## 2017-06-05 LAB
ALBUMIN SERPL BCP-MCNC: 3.9 G/DL
ALP SERPL-CCNC: 168 U/L
ALT SERPL W/O P-5'-P-CCNC: 25 U/L
ANION GAP SERPL CALC-SCNC: 11 MMOL/L
AST SERPL-CCNC: 36 U/L
BASOPHILS # BLD AUTO: 0.01 K/UL
BASOPHILS NFR BLD: 0.1 %
BILIRUB DIRECT SERPL-MCNC: 1.1 MG/DL
BILIRUB SERPL-MCNC: 2 MG/DL
BUN SERPL-MCNC: 41 MG/DL
CALCIUM SERPL-MCNC: 9.5 MG/DL
CHLORIDE SERPL-SCNC: 100 MMOL/L
CO2 SERPL-SCNC: 24 MMOL/L
CREAT SERPL-MCNC: 1.6 MG/DL
DIFFERENTIAL METHOD: ABNORMAL
DRUG STUDY SPECIMEN TYPE: NORMAL
DRUG STUDY TEST NAME: NORMAL
DRUG STUDY TEST RESULT: NORMAL
EOSINOPHIL # BLD AUTO: 0.1 K/UL
EOSINOPHIL NFR BLD: 0.7 %
ERYTHROCYTE [DISTWIDTH] IN BLOOD BY AUTOMATED COUNT: 17.1 %
EST. GFR  (AFRICAN AMERICAN): 57.2 ML/MIN/1.73 M^2
EST. GFR  (NON AFRICAN AMERICAN): 49.5 ML/MIN/1.73 M^2
GLUCOSE SERPL-MCNC: 151 MG/DL
HCT VFR BLD AUTO: 38.5 %
HGB BLD-MCNC: 12.8 G/DL
LDH SERPL L TO P-CCNC: 299 U/L
LYMPHOCYTES # BLD AUTO: 0.8 K/UL
LYMPHOCYTES NFR BLD: 9.3 %
MAGNESIUM SERPL-MCNC: 2.4 MG/DL
MCH RBC QN AUTO: 31.3 PG
MCHC RBC AUTO-ENTMCNC: 33.2 %
MCV RBC AUTO: 94 FL
MONOCYTES # BLD AUTO: 0.7 K/UL
MONOCYTES NFR BLD: 8.8 %
NEUTROPHILS # BLD AUTO: 6.8 K/UL
NEUTROPHILS NFR BLD: 80.4 %
PHOSPHATE SERPL-MCNC: 3.6 MG/DL
PLATELET # BLD AUTO: 122 K/UL
PMV BLD AUTO: 12.1 FL
POTASSIUM SERPL-SCNC: 4.2 MMOL/L
PROT SERPL-MCNC: 6.8 G/DL
RBC # BLD AUTO: 4.09 M/UL
SODIUM SERPL-SCNC: 135 MMOL/L
WBC # BLD AUTO: 8.41 K/UL

## 2017-06-05 PROCEDURE — 83735 ASSAY OF MAGNESIUM: CPT

## 2017-06-05 PROCEDURE — 84100 ASSAY OF PHOSPHORUS: CPT

## 2017-06-05 PROCEDURE — 36415 COLL VENOUS BLD VENIPUNCTURE: CPT

## 2017-06-05 PROCEDURE — 85025 COMPLETE CBC W/AUTO DIFF WBC: CPT

## 2017-06-05 PROCEDURE — 83615 LACTATE (LD) (LDH) ENZYME: CPT

## 2017-06-05 PROCEDURE — 99000 SPECIMEN HANDLING OFFICE-LAB: CPT

## 2017-06-05 PROCEDURE — 80053 COMPREHEN METABOLIC PANEL: CPT

## 2017-06-05 PROCEDURE — 82248 BILIRUBIN DIRECT: CPT

## 2017-06-07 ENCOUNTER — OFFICE VISIT (OUTPATIENT)
Dept: HEMATOLOGY/ONCOLOGY | Facility: CLINIC | Age: 52
End: 2017-06-07
Payer: COMMERCIAL

## 2017-06-07 ENCOUNTER — INFUSION (OUTPATIENT)
Dept: INFUSION THERAPY | Facility: HOSPITAL | Age: 52
End: 2017-06-07
Attending: INTERNAL MEDICINE
Payer: COMMERCIAL

## 2017-06-07 ENCOUNTER — RESEARCH ENCOUNTER (OUTPATIENT)
Dept: RESEARCH | Facility: HOSPITAL | Age: 52
End: 2017-06-07

## 2017-06-07 VITALS
HEART RATE: 82 BPM | RESPIRATION RATE: 19 BRPM | SYSTOLIC BLOOD PRESSURE: 100 MMHG | TEMPERATURE: 98 F | DIASTOLIC BLOOD PRESSURE: 61 MMHG

## 2017-06-07 VITALS
SYSTOLIC BLOOD PRESSURE: 116 MMHG | HEART RATE: 86 BPM | HEIGHT: 71 IN | WEIGHT: 198.63 LBS | DIASTOLIC BLOOD PRESSURE: 74 MMHG | TEMPERATURE: 99 F | BODY MASS INDEX: 27.81 KG/M2

## 2017-06-07 DIAGNOSIS — E85.9 AMYLOIDOSIS, UNSPECIFIED TYPE: Primary | ICD-10-CM

## 2017-06-07 DIAGNOSIS — I42.8 OTHER CARDIOMYOPATHY: ICD-10-CM

## 2017-06-07 DIAGNOSIS — E85.81 AL AMYLOIDOSIS: Primary | ICD-10-CM

## 2017-06-07 DIAGNOSIS — I50.32 CHRONIC DIASTOLIC CONGESTIVE HEART FAILURE: ICD-10-CM

## 2017-06-07 DIAGNOSIS — D47.2 MONOCLONAL GAMMOPATHY: ICD-10-CM

## 2017-06-07 PROCEDURE — 63600175 PHARM REV CODE 636 W HCPCS: Performed by: INTERNAL MEDICINE

## 2017-06-07 PROCEDURE — 99213 OFFICE O/P EST LOW 20 MIN: CPT | Mod: S$PBB,,, | Performed by: INTERNAL MEDICINE

## 2017-06-07 PROCEDURE — 25000003 PHARM REV CODE 250: Performed by: INTERNAL MEDICINE

## 2017-06-07 PROCEDURE — 96401 CHEMO ANTI-NEOPL SQ/IM: CPT

## 2017-06-07 PROCEDURE — 96413 CHEMO IV INFUSION 1 HR: CPT

## 2017-06-07 PROCEDURE — 99999 PR PBB SHADOW E&M-EST. PATIENT-LVL III: CPT | Mod: PBBFAC,,, | Performed by: INTERNAL MEDICINE

## 2017-06-07 RX ORDER — ACETAMINOPHEN 325 MG/1
650 TABLET ORAL
Status: COMPLETED | OUTPATIENT
Start: 2017-06-07 | End: 2017-06-07

## 2017-06-07 RX ORDER — HEPARIN 100 UNIT/ML
500 SYRINGE INTRAVENOUS
Status: CANCELLED | OUTPATIENT
Start: 2017-06-07

## 2017-06-07 RX ORDER — HEPARIN 100 UNIT/ML
500 SYRINGE INTRAVENOUS
Status: DISCONTINUED | OUTPATIENT
Start: 2017-06-07 | End: 2017-06-07 | Stop reason: HOSPADM

## 2017-06-07 RX ORDER — DIPHENHYDRAMINE HCL 25 MG
25 CAPSULE ORAL ONCE
Status: COMPLETED | OUTPATIENT
Start: 2017-06-07 | End: 2017-06-07

## 2017-06-07 RX ORDER — DEXTROMETHORPHAN HYDROBROMIDE, GUAIFENESIN 5; 100 MG/5ML; MG/5ML
650 LIQUID ORAL
Status: CANCELLED | OUTPATIENT
Start: 2017-06-07 | End: 2017-06-07

## 2017-06-07 RX ORDER — SODIUM CHLORIDE 0.9 % (FLUSH) 0.9 %
10 SYRINGE (ML) INJECTION
Status: DISCONTINUED | OUTPATIENT
Start: 2017-06-07 | End: 2017-06-07 | Stop reason: HOSPADM

## 2017-06-07 RX ORDER — DIPHENHYDRAMINE HCL 25 MG
25 CAPSULE ORAL ONCE
Status: CANCELLED
Start: 2017-06-07 | End: 2017-06-07

## 2017-06-07 RX ORDER — SODIUM CHLORIDE 0.9 % (FLUSH) 0.9 %
10 SYRINGE (ML) INJECTION
Status: CANCELLED | OUTPATIENT
Start: 2017-06-07

## 2017-06-07 RX ORDER — BORTEZOMIB 3.5 MG/1
1.4 INJECTION, POWDER, LYOPHILIZED, FOR SOLUTION INTRAVENOUS; SUBCUTANEOUS
Status: COMPLETED | OUTPATIENT
Start: 2017-06-07 | End: 2017-06-07

## 2017-06-07 RX ORDER — BORTEZOMIB 3.5 MG/1
1.4 INJECTION, POWDER, LYOPHILIZED, FOR SOLUTION INTRAVENOUS; SUBCUTANEOUS
Status: CANCELLED | OUTPATIENT
Start: 2017-06-07 | End: 2017-06-07

## 2017-06-07 RX ADMIN — ACETAMINOPHEN 650 MG: 325 TABLET ORAL at 10:06

## 2017-06-07 RX ADMIN — BORTEZOMIB 3 MG: 3.5 INJECTION, POWDER, LYOPHILIZED, FOR SOLUTION INTRAVENOUS; SUBCUTANEOUS at 02:06

## 2017-06-07 RX ADMIN — SODIUM CHLORIDE: 9 INJECTION, SOLUTION INTRAVENOUS at 09:06

## 2017-06-07 RX ADMIN — DIPHENHYDRAMINE HYDROCHLORIDE 25 MG: 25 CAPSULE ORAL at 10:06

## 2017-06-07 NOTE — PROGRESS NOTES
Subjective:       Patient ID: Roc Lai Jr. is a 51 y.o. male.    Chief Complaint: No chief complaint on file.    HPI  ECOG 1, NYHA II. Mr. 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  He notes increasing weakness subtly with stable fluid status on lower Bumex dosing.  He will stop traveling for his work as it has become too taxing.  No swallowing issues but he does have progressive neurologic problems with numbness at fingers and toes.  He notes mild swelling at his lower extremity R>L.    Light headedness still intermittently with less dyspnea over the last month.  Able to climb stairs with difficulty.  Some anorexia and diarrhea with his does of bortezomib.    History: Prior to diagnosis he was previously vigorous and ran over three miles thrice weekly 3 years ago.  However, over the 18 months prior to presentation, he has noted progressive decline in his exercise capacity to only being able to do 10 min of exercise now.  He has previously been studied for CAD with normal coronaries in 3/82486.  He was diagnosed with atrial flutter and underwent cardiac ablation without improvement in his exercise capacity.  He was found to have an atrial septal defect and this was closed 9/26/16 but this did not improve his symptoms either.  His symptoms progressed over the last 3 months to the point he can no longer exercise and notes dyspnea with carrying croceries, climbing stairs, etc.  He was admitted to cardiology 11/27/16 with 17 pounds of fluid loss and his is now sleeping better.  He was diagnosed with diastolic heart failure and cardia MRI was concerning for infiltrative cardiac disease.      SPEP 12/1/16 showed decreased gamma globulins and free lambda light chains increased with M-spike of 0.19 g/dL.  Free lambda light chain was elevated at 50 mg/dL with normal lappa 1.37 mg/dL and ratio 0.03.  However, abdominal fat pad  biopsy was negative for amyoid with the presence of fat cells 11/30/16.  ATTR DNA test negative for familial amyloidosis.    CBC normal 12/1/16 with normal renal function, calcium, albumin and slightly low total protein levels.      Bone marrow biopsy done 1/12/17 showed a 60% plasma cell infiltrate.     Metastatic survey 1/19/17 was negative for bony disease.  MRI scans of the cervical, thoracic, lumbar and pelvic done 1/27/17 did not disclose specific lesions to change diagnosis to myeloma.    His pre-trial walk test today confirmed his candidacy for the protocol and he notes some progression with having to rest after a walk on flat surface of about a mile.    [facility]  Diastolic heart failure    Review of Systems   Constitutional: Negative for activity change, appetite change, diaphoresis, fatigue, fever and unexpected weight change.   HENT: Negative for mouth sores, sore throat and trouble swallowing.    Respiratory: Positive for shortness of breath (slight increase). Negative for cough.         All improved since diuresis with increased exercise tolerance   Cardiovascular: Positive for leg swelling (minimal). Negative for chest pain.   Gastrointestinal: Positive for diarrhea (mild following bortezomib). Negative for abdominal pain, blood in stool and constipation.   Genitourinary: Negative for dysuria and hematuria.   Musculoskeletal: Negative for back pain and neck pain.   Skin: Negative for rash.   Neurological: Positive for light-headedness and numbness (hands and toes/sole bilaterally). Negative for tremors, weakness and headaches.   Hematological: Negative for adenopathy.   Psychiatric/Behavioral: Positive for sleep disturbance. Negative for confusion.       Objective:      Physical Exam   Constitutional: He is oriented to person, place, and time. He appears well-developed and well-nourished. No distress.   HENT:   Head: Normocephalic and atraumatic.   Mouth/Throat: Oropharynx is clear and moist. No  oropharyngeal exudate.   Tongue not enlarged  No submandibular enlargement   Eyes: Conjunctivae are normal. Pupils are equal, round, and reactive to light.   Neck: Neck supple.   Cardiovascular: Normal rate and regular rhythm.    Pulmonary/Chest: Effort normal and breath sounds normal. He has no rales.   Abdominal: Soft. Bowel sounds are normal. He exhibits no distension (No ascites) and no mass. There is no splenomegaly. There is no tenderness.   Musculoskeletal: Normal range of motion. He exhibits no edema.   No rib or spine tenderness   Lymphadenopathy:     He has no cervical adenopathy.     He has no axillary adenopathy.        Right: No inguinal adenopathy present.        Left: No inguinal adenopathy present.   Neurological: He is alert and oriented to person, place, and time.   Reflex Scores:       Bicep reflexes are 3+ on the right side and 3+ on the left side.       Patellar reflexes are 3+ on the right side and 3+ on the left side.       Achilles reflexes are 2+ on the right side and 2+ on the left side.  Normal touch, sharp and proprioception.   Skin: Skin is warm and dry. No rash noted.   No unusual bruising   Psychiatric: He has a normal mood and affect. Thought content normal.       Assessment:       1. AL amyloidosis    2. Monoclonal gammopathy    3. Chronic diastolic congestive heart failure    4. Other cardiomyopathy        Plan:       Mr. Lai has a monoclonal lambda light chain coming from a plasma cell infiltrate in the marrow that serves as the reservoir for amyloid production affecting his heart.  His cardiac amyloid has been confirmed as AL Amlyoid.  He is now four months into therapy on the Mif308 trial.    His exercise tolerance has decreased despite Bumex and he continues to push himself in activities, though he will now limit his travel until we see clear improvement in his cardiac function.  Given his diastolic dysfunction he relies on preload so we will need to monitor his overall  volume status closely.    His bone marrow biopsy on 1/12/17 showed a 60% cellular marrow with 28% plasma cells by morphology but 50% by immunohistochemistry.  Hence, he has at least a smoldering myeloma.  He has no renal, calcium or hemoglobin issues and he has no bony symptoms to suggest multiple myeloma and plain films do not show any lytic lesions as of 1/2017.  MRI 1/2017 did not show lesions that would define him as multiple myeloma.    He does not seem to have other systemic manifestations of AL amyloidosis including the absence of enlarged tongue, organomegaly, coagulopathy, neuropathy, carpal tunnel syndrome or renal disease.  There is no family history of amyloidosis.  However, his neuropathy has been slightly progressive on weekly bortezomib.  Hence, he will switch to every other week therapy and may decrease to monthly therapy at his next visit depending on light chain control (excellent) and neurologic progression.    All of his questions were answered in the clinic today and he will follow up as per protocol in one month with continued therapy as per protocol.

## 2017-06-07 NOTE — PROGRESS NOTES
"  Wednesday, June 7th, 2017     Study: "A Phase 3, randomized, multicenter, double-blind, placebo-controlled, 2-arm, efficacy and safety study of [id number] plus standard of care versus placebo plus standard of care in subjects with light chain (AL) amyloidosis"  Protocol ID# [id number]  IRB# 2015.305.A  Sponsor: Eliseo  Investigator: Dr. Carmichael    Month 5, Day 1  IV [id number]/Placebo + SQ Velcade    Patient presents for month 5, day 1 treatment per above-mentioned protocol.  He is ambulatory, awake, alert, and oriented to person, place, and time, accompanied by self.  He states continued issues with sleeping but is "trying new things" to help sleep.  He states some of his difficulty sleeping is "stuffiness" and was encouraged use saline spray and humidifier recommended by Dr. Carmichael.  He states his diarrhea has improved with use of imodium.  He states having recent work trip that was tough on him with increased shortness of breath so he took a couple extra doses of Bumex which worsened his lightheadedness symptoms.  Was advised to use discretion in "doubling up" on Bumex especially given increased BUN.  Patient states continued willingness to participate in above-mentioned trial.    Patient completed [employer] questionnaires prior to any study-related procedures being performed today.     Review of AE's:    Restless Leg Syndrome, grade 1:  This is continuing and affecting his ability to sleep as stated above and may or may not be related to neuropathy.  Per Dr. Carmichael, this could be an electrolyte imbalance related to Bumex usage and he should continue potassium and mag ox.  Also taking vitamin supplements as stated below.  AE ongoing.  Platelet Count decreased, grade 1:  Platelet count WNL today at 122,000.  Remains grade 1.  AE ongoing.  Total Bilirubin Increased, Grade 2:  Total bilirubin 2.0 per local blood work performed today.  Noted as NCS per Dr. Carmichael.  Dr. Carmichael is not concerned with this " "finding.  AE grade 2, will follow.  Hyperglycemia, Grade 1: Glucose 151 today. NCS per Dr. Carmichael, AE, grade 1.  Diarrhea:  This has improved with use of prophylactic imodium prior to each dose of Velcade, see above..  AE, grade 1.  Dizziness (Lightheadedness), grade 1:  He states this is about the same.  As stated above, he plans to decrease his travel with work, which has been hard on him as of late.  This is ongoing and patient was encouraged to use caution when rising from a lying or seated position by sitting for 20-30 seconds before standing.  He states understanding of this.  AE ongoing.    Dry Skin, grade 1:  Continues to note dry skin to hands and occasional "dry spots."   Advised to use moisturizers.  AE ongoing.  Hyponatremia, grade 1:  Sodium level 135 today.  NCS per Dr. Carmichael.    Peripheral sensory neuropathy, grade 1:  Patient reporting that numbness and tingling to fingers, toes, and inside base of feet and has increased from a 3 to around a 4-5 out of 10 scale.  He states having minor, occasionally sharp pain but generally is non-painful.  Per Dr. Carmichael this is definitely related to Velcade.  He has been taking vitamins (vitamin B complex, vit D, vit E, and folic acid) as recommended for neuropathic symptoms but admits "I have not been great with keeping up with those lately."  Dr. Carmichael to decrease frequency of Velcade as described below. Patient to continue with magnesium supplement as well as was encouraged to try tonic water at nighttime.  Patient instructed to pay close attention to this symptom and to let us know if this worsens.   AE, grade 1 ongoing.  Creatinine Increased, Grade 2 (worsened):  Patient with creatinine of 1.6 today.  This was noted per Dr. Carmichael.  Likely secondary to diuretic use, particularly increased diuretic use during recent work travel.  AE, grade 2.       No other change in grade in baseline AE's noted.      Physical performed per Dr. Carmichael, see MD note for " Complete PE, symptom-directed PE, ECOG PS and NYHA Classification.  See flowsheets for height, weight, and vital signs.  NIS-LL and VASPI completed, see chart for source documentation.  Patient is not WOCBP, and no blood work required today per Dr. Carmichael, therefore patient had no local labs drawn.  Central labs and 24 hour urine collected 6/5 [facility], within 5 day window as per protocol.   Concomitant medications reviewed.  Patient confirms that per Dr. Carmichael, he continues on acyclovir.  Per Dr. Carmichael, patient is approved to initiate M5,D1 treatment today.  Treatment plan updated with today's weight and signed.  Benadryl adjusted to PO due to previous experience with RLS.      Patient was reminded per Dr. Carmichael that Velcade is slowing down new light chain production, but if he is getting study drug and not placebo, we likely not see improvement of symtpom for around 6 months from start of treatment.  Patient states understanding of this.    Regarding SOC Velcade, Dr. Carmichael reviewed patient's response.  He states patient has had significant response based on light chains and has likely received maximal benefit from weekly Velcade.  Per MD, move to every other week Velcade and will receive one dose today.      Dr. Carmichael reiterated that it could take up to 6 months to see improvement from this treatment and that patient should try to have patience with this.  He states understanding.      Patient then proceeded to infusion suite.  Study-mandated procedures performed per protocol as follows:    09:30: Pre-dose PK's drawn per protocol  10:50  Premeds administered, see MAR  11:00  Pre-dose vital signs performed, see flowsheet.  Vitals read as follows:  /69, HR 75, Temp 97.4, RR 18.  11:15  Triplicate EKG performed per research staff.  Tracings sent to   11:26  Study drug infusion initiated   12:31  Infusion completed +2 minutes, well within 10 minute window..  12:39  Vital signs at end of  infusion performed.  Vitals as follows:  /68, HR 83, RR 20, T 97.5    One hour post-infusion vitals performed, see flowsheet.  Patient was monitored for the full 90 minutes post infusion (until 1401) and tolerated the above with no difficulties, see infusion RN documentation.  Patient then received SQ Velcade injection, see MAR.       All of patient's questions were answered to his satisfaction.  Upcoming appointment calendar reviewed and given to patient.  Patient states understanding of plan of care and upcoming schedule.  He states having Research RN contact information as well as that of Dr. Carmichael.

## 2017-06-07 NOTE — PLAN OF CARE
Problem: Patient Care Overview  Goal: Plan of Care Review  Outcome: Ongoing (interventions implemented as appropriate)  Pt arrived, v/s stable, iv started without difficulty, Pt voices, no nausea/ vomiting,some neuropathy and shortness of breath. Premeds given as ordered, INV/ [id number], velcade infused without any adverse reactions, pt education on looking out for fevers 100.4 or greater or any signs of infection.pt tolerated treatment/procedure well, no concerns or complaints at this time. Instructed to call MD if problems occur.

## 2017-06-13 DIAGNOSIS — E85.81 AL AMYLOIDOSIS: Primary | ICD-10-CM

## 2017-06-13 DIAGNOSIS — Z00.6 EXAMINATION OF PARTICIPANT IN CLINICAL TRIAL: ICD-10-CM

## 2017-06-20 ENCOUNTER — TELEPHONE (OUTPATIENT)
Dept: RESEARCH | Facility: HOSPITAL | Age: 52
End: 2017-06-20

## 2017-06-20 RX ORDER — BORTEZOMIB 3.5 MG/1
1.4 INJECTION, POWDER, LYOPHILIZED, FOR SOLUTION INTRAVENOUS; SUBCUTANEOUS
Status: CANCELLED | OUTPATIENT
Start: 2017-06-21 | End: 2017-06-21

## 2017-06-20 RX ORDER — HEPARIN 100 UNIT/ML
500 SYRINGE INTRAVENOUS
Status: CANCELLED | OUTPATIENT
Start: 2017-06-21

## 2017-06-20 RX ORDER — DIPHENHYDRAMINE HCL 25 MG
25 CAPSULE ORAL EVERY 6 HOURS PRN
Status: CANCELLED
Start: 2017-06-21

## 2017-06-20 RX ORDER — SODIUM CHLORIDE 0.9 % (FLUSH) 0.9 %
10 SYRINGE (ML) INJECTION
Status: CANCELLED | OUTPATIENT
Start: 2017-06-21

## 2017-06-21 ENCOUNTER — INFUSION (OUTPATIENT)
Dept: INFUSION THERAPY | Facility: HOSPITAL | Age: 52
End: 2017-06-21
Attending: INTERNAL MEDICINE
Payer: COMMERCIAL

## 2017-06-21 VITALS
SYSTOLIC BLOOD PRESSURE: 101 MMHG | HEART RATE: 87 BPM | TEMPERATURE: 98 F | RESPIRATION RATE: 18 BRPM | DIASTOLIC BLOOD PRESSURE: 65 MMHG

## 2017-06-21 DIAGNOSIS — E85.81 AL AMYLOIDOSIS: Primary | ICD-10-CM

## 2017-06-21 PROCEDURE — 96401 CHEMO ANTI-NEOPL SQ/IM: CPT

## 2017-06-21 PROCEDURE — 63600175 PHARM REV CODE 636 W HCPCS: Mod: JW | Performed by: INTERNAL MEDICINE

## 2017-06-21 RX ORDER — BORTEZOMIB 3.5 MG/1
1.4 INJECTION, POWDER, LYOPHILIZED, FOR SOLUTION INTRAVENOUS; SUBCUTANEOUS
Status: COMPLETED | OUTPATIENT
Start: 2017-06-21 | End: 2017-06-21

## 2017-06-21 RX ADMIN — BORTEZOMIB 3 MG: 3.5 INJECTION, POWDER, LYOPHILIZED, FOR SOLUTION INTRAVENOUS; SUBCUTANEOUS at 09:06

## 2017-07-05 ENCOUNTER — RESEARCH ENCOUNTER (OUTPATIENT)
Dept: RESEARCH | Facility: HOSPITAL | Age: 52
End: 2017-07-05

## 2017-07-05 ENCOUNTER — LAB VISIT (OUTPATIENT)
Dept: LAB | Facility: HOSPITAL | Age: 52
End: 2017-07-05
Attending: INTERNAL MEDICINE
Payer: COMMERCIAL

## 2017-07-05 ENCOUNTER — HOSPITAL ENCOUNTER (OUTPATIENT)
Dept: CARDIOLOGY | Facility: CLINIC | Age: 52
Discharge: HOME | End: 2017-07-05
Payer: COMMERCIAL

## 2017-07-05 VITALS
WEIGHT: 194.69 LBS | DIASTOLIC BLOOD PRESSURE: 87 MMHG | BODY MASS INDEX: 27.15 KG/M2 | SYSTOLIC BLOOD PRESSURE: 119 MMHG | TEMPERATURE: 98 F | RESPIRATION RATE: 21 BRPM | HEART RATE: 96 BPM

## 2017-07-05 DIAGNOSIS — E85.81 AL AMYLOIDOSIS: ICD-10-CM

## 2017-07-05 DIAGNOSIS — Z00.6 EXAMINATION OF PARTICIPANT IN CLINICAL TRIAL: ICD-10-CM

## 2017-07-05 DIAGNOSIS — E85.9 AMYLOIDOSIS, UNSPECIFIED TYPE: ICD-10-CM

## 2017-07-05 LAB
ALBUMIN SERPL BCP-MCNC: 3.8 G/DL
ALP SERPL-CCNC: 174 U/L
ALT SERPL W/O P-5'-P-CCNC: 22 U/L
ANION GAP SERPL CALC-SCNC: 11 MMOL/L
AST SERPL-CCNC: 30 U/L
BASOPHILS # BLD AUTO: 0.05 K/UL
BASOPHILS NFR BLD: 0.7 %
BILIRUB DIRECT SERPL-MCNC: 1.4 MG/DL
BILIRUB SERPL-MCNC: 2.8 MG/DL
BUN SERPL-MCNC: 29 MG/DL
CALCIUM SERPL-MCNC: 9.2 MG/DL
CHLORIDE SERPL-SCNC: 101 MMOL/L
CO2 SERPL-SCNC: 25 MMOL/L
CREAT SERPL-MCNC: 1.4 MG/DL
DIFFERENTIAL METHOD: ABNORMAL
DRUG STUDY SPECIMEN TYPE: NORMAL
DRUG STUDY TEST NAME: NORMAL
DRUG STUDY TEST RESULT: NORMAL
EOSINOPHIL # BLD AUTO: 0.2 K/UL
EOSINOPHIL NFR BLD: 2.2 %
ERYTHROCYTE [DISTWIDTH] IN BLOOD BY AUTOMATED COUNT: 16 %
EST. GFR  (AFRICAN AMERICAN): >60 ML/MIN/1.73 M^2
EST. GFR  (NON AFRICAN AMERICAN): 58.2 ML/MIN/1.73 M^2
GLOBAL PERICARDIAL EFFUSION: ABNORMAL
GLUCOSE SERPL-MCNC: 147 MG/DL
HCT VFR BLD AUTO: 38.1 %
HGB BLD-MCNC: 13 G/DL
LYMPHOCYTES # BLD AUTO: 0.7 K/UL
LYMPHOCYTES NFR BLD: 8.8 %
MAGNESIUM SERPL-MCNC: 2.3 MG/DL
MCH RBC QN AUTO: 32.4 PG
MCHC RBC AUTO-ENTMCNC: 34.1 %
MCV RBC AUTO: 95 FL
MITRAL VALVE REGURGITATION: ABNORMAL
MONOCYTES # BLD AUTO: 0.6 K/UL
MONOCYTES NFR BLD: 8.6 %
NEUTROPHILS # BLD AUTO: 5.9 K/UL
NEUTROPHILS NFR BLD: 79.4 %
PHOSPHATE SERPL-MCNC: 3.1 MG/DL
PLATELET # BLD AUTO: 196 K/UL
PMV BLD AUTO: 9.6 FL
POTASSIUM SERPL-SCNC: 3.9 MMOL/L
PROT SERPL-MCNC: 6.7 G/DL
RBC # BLD AUTO: 4.01 M/UL
RETIRED EF AND QEF - SEE NOTES: 40 (ref 55–65)
SODIUM SERPL-SCNC: 137 MMOL/L
TRICUSPID VALVE REGURGITATION: ABNORMAL
WBC # BLD AUTO: 7.36 K/UL

## 2017-07-05 PROCEDURE — 82248 BILIRUBIN DIRECT: CPT

## 2017-07-05 PROCEDURE — 99000 SPECIMEN HANDLING OFFICE-LAB: CPT

## 2017-07-05 PROCEDURE — 93306 TTE W/DOPPLER COMPLETE: CPT | Mod: PBBFAC | Performed by: INTERNAL MEDICINE

## 2017-07-05 PROCEDURE — 36415 COLL VENOUS BLD VENIPUNCTURE: CPT

## 2017-07-05 PROCEDURE — 84100 ASSAY OF PHOSPHORUS: CPT

## 2017-07-05 PROCEDURE — 85025 COMPLETE CBC W/AUTO DIFF WBC: CPT

## 2017-07-05 PROCEDURE — 83520 IMMUNOASSAY QUANT NOS NONAB: CPT

## 2017-07-05 PROCEDURE — 0399T 2D ECHO WITH COLOR FLOW DOPPLER: CPT | Mod: 26,S$PBB,, | Performed by: INTERNAL MEDICINE

## 2017-07-05 PROCEDURE — 80053 COMPREHEN METABOLIC PANEL: CPT

## 2017-07-05 PROCEDURE — 83735 ASSAY OF MAGNESIUM: CPT

## 2017-07-06 ENCOUNTER — RESEARCH ENCOUNTER (OUTPATIENT)
Dept: RESEARCH | Facility: HOSPITAL | Age: 52
End: 2017-07-06

## 2017-07-06 ENCOUNTER — OFFICE VISIT (OUTPATIENT)
Dept: HEMATOLOGY/ONCOLOGY | Facility: CLINIC | Age: 52
End: 2017-07-06
Payer: COMMERCIAL

## 2017-07-06 ENCOUNTER — INFUSION (OUTPATIENT)
Dept: INFUSION THERAPY | Facility: HOSPITAL | Age: 52
End: 2017-07-06
Attending: INTERNAL MEDICINE
Payer: COMMERCIAL

## 2017-07-06 VITALS
HEART RATE: 93 BPM | SYSTOLIC BLOOD PRESSURE: 116 MMHG | BODY MASS INDEX: 27.16 KG/M2 | HEIGHT: 71 IN | WEIGHT: 194 LBS | TEMPERATURE: 98 F | DIASTOLIC BLOOD PRESSURE: 72 MMHG

## 2017-07-06 VITALS
RESPIRATION RATE: 20 BRPM | TEMPERATURE: 97 F | DIASTOLIC BLOOD PRESSURE: 76 MMHG | HEART RATE: 84 BPM | SYSTOLIC BLOOD PRESSURE: 113 MMHG

## 2017-07-06 DIAGNOSIS — G47.00 INSOMNIA, UNSPECIFIED TYPE: ICD-10-CM

## 2017-07-06 DIAGNOSIS — D47.2 MONOCLONAL GAMMOPATHY: ICD-10-CM

## 2017-07-06 DIAGNOSIS — E85.81 AL AMYLOIDOSIS: Primary | ICD-10-CM

## 2017-07-06 DIAGNOSIS — I50.32 CHRONIC DIASTOLIC CONGESTIVE HEART FAILURE: Primary | ICD-10-CM

## 2017-07-06 DIAGNOSIS — E85.81 AL AMYLOIDOSIS: ICD-10-CM

## 2017-07-06 LAB
KAPPA LC SER QL IA: 1.86 MG/DL
KAPPA LC/LAMBDA SER IA: 1.66
LAMBDA LC SER QL IA: 1.12 MG/DL

## 2017-07-06 PROCEDURE — 99213 OFFICE O/P EST LOW 20 MIN: CPT | Mod: S$PBB,,, | Performed by: INTERNAL MEDICINE

## 2017-07-06 PROCEDURE — 99999 PR PBB SHADOW E&M-EST. PATIENT-LVL III: CPT | Mod: PBBFAC,,, | Performed by: INTERNAL MEDICINE

## 2017-07-06 PROCEDURE — 63600175 PHARM REV CODE 636 W HCPCS: Performed by: INTERNAL MEDICINE

## 2017-07-06 PROCEDURE — 25000003 PHARM REV CODE 250: Performed by: INTERNAL MEDICINE

## 2017-07-06 PROCEDURE — 96401 CHEMO ANTI-NEOPL SQ/IM: CPT

## 2017-07-06 RX ORDER — MELATONIN 10 MG
10 CAPSULE ORAL NIGHTLY PRN
Status: CANCELLED | COMMUNITY
Start: 2017-07-06

## 2017-07-06 RX ORDER — SODIUM CHLORIDE 0.9 % (FLUSH) 0.9 %
10 SYRINGE (ML) INJECTION
Status: CANCELLED | OUTPATIENT
Start: 2017-07-06

## 2017-07-06 RX ORDER — HEPARIN 100 UNIT/ML
500 SYRINGE INTRAVENOUS
Status: DISCONTINUED | OUTPATIENT
Start: 2017-07-06 | End: 2017-07-06 | Stop reason: HOSPADM

## 2017-07-06 RX ORDER — BORTEZOMIB 3.5 MG/1
1.4 INJECTION, POWDER, LYOPHILIZED, FOR SOLUTION INTRAVENOUS; SUBCUTANEOUS
Status: CANCELLED | OUTPATIENT
Start: 2017-07-06 | End: 2017-07-06

## 2017-07-06 RX ORDER — BORTEZOMIB 3.5 MG/1
1.4 INJECTION, POWDER, LYOPHILIZED, FOR SOLUTION INTRAVENOUS; SUBCUTANEOUS
Status: COMPLETED | OUTPATIENT
Start: 2017-07-06 | End: 2017-07-06

## 2017-07-06 RX ORDER — SODIUM CHLORIDE 0.9 % (FLUSH) 0.9 %
10 SYRINGE (ML) INJECTION
Status: DISCONTINUED | OUTPATIENT
Start: 2017-07-06 | End: 2017-07-06 | Stop reason: HOSPADM

## 2017-07-06 RX ORDER — DEXTROMETHORPHAN HYDROBROMIDE, GUAIFENESIN 5; 100 MG/5ML; MG/5ML
650 LIQUID ORAL
Status: CANCELLED | OUTPATIENT
Start: 2017-07-06 | End: 2017-07-06

## 2017-07-06 RX ORDER — DIPHENHYDRAMINE HCL 25 MG
25 CAPSULE ORAL ONCE
Status: CANCELLED
Start: 2017-07-06 | End: 2017-07-06

## 2017-07-06 RX ORDER — HEPARIN 100 UNIT/ML
500 SYRINGE INTRAVENOUS
Status: CANCELLED | OUTPATIENT
Start: 2017-07-06

## 2017-07-06 RX ORDER — ACETAMINOPHEN 325 MG/1
650 TABLET ORAL
Status: COMPLETED | OUTPATIENT
Start: 2017-07-06 | End: 2017-07-06

## 2017-07-06 RX ORDER — DIPHENHYDRAMINE HCL 25 MG
25 CAPSULE ORAL ONCE
Status: COMPLETED | OUTPATIENT
Start: 2017-07-06 | End: 2017-07-06

## 2017-07-06 RX ADMIN — BORTEZOMIB 2.9 MG: 3.5 INJECTION, POWDER, LYOPHILIZED, FOR SOLUTION INTRAVENOUS; SUBCUTANEOUS at 01:07

## 2017-07-06 RX ADMIN — DIPHENHYDRAMINE HYDROCHLORIDE 25 MG: 25 CAPSULE ORAL at 10:07

## 2017-07-06 RX ADMIN — SODIUM CHLORIDE: 9 INJECTION, SOLUTION INTRAVENOUS at 10:07

## 2017-07-06 RX ADMIN — ACETAMINOPHEN 650 MG: 325 TABLET ORAL at 10:07

## 2017-07-06 NOTE — PROGRESS NOTES
"  Thursday, July 6th, 2017     Study: "A Phase 3, randomized, multicenter, double-blind, placebo-controlled, 2-arm, efficacy and safety study of [id number] plus standard of care versus placebo plus standard of care in subjects with light chain (AL) amyloidosis"  Protocol ID# [id number]  IRB# 2015.305.A  Sponsor: Eliseo  Investigator: Dr. Carmichael    Month 6, Day 1  IV [id number]/Placebo + SQ Velcade    Patient presents for month 6, day 1 treatment per above-mentioned protocol.  He is ambulatory, awake, alert, and oriented to person, place, and time, accompanied by self.  As reported yesterday, patient states he has noticed improvement of primary symptoms of dyspnea and dizziness.  He states he continues to have tension in legs at nighttime and is not sleeping well.  He reports swelling today and needed one extra dose of Bumex over weekend when he went on a fishing trip.  Patient completed [employer] questionnaires prior to any study-related procedures being performed yesterday, 7/5.  Patient states continued willingness to participate in above-mentioned trial.    Review of AE's:    Restless Leg Syndrome, grade 1:  As stated above, this continues.  Per patient this primarily occurs at night.  Per Dr. Carmichael, may be side effect of Velcade. MD prescribed Melatonin to assist patient with sleeping issues.  Patient to notify RN if no improvement.  Patient continues supplements as per med list.  AE ongoing.  Platelet Count decreased, grade 1:  Platelet count WNL 7/5 at 169,000.  AE resolved for now.  Total Bilirubin Increased, Grade 2:  Total bilirubin 2.8 per local blood work performed yesterday.  Discussed with Dr. Carmichael and this is NCS and does not affect dosing of further therapy. AE grade 2, will follow.  Hyperglycemia, Grade 1: Glucose 147 today. NCS per Dr. Carmichael, AE, grade 1.  Diarrhea:  This has improved with use of prophylactic imodium prior to each dose of Velcade.  AE, grade 1.  Dizziness " "(Lightheadedness), grade 1:  As stated above, she states this is improved and he has noticed improvement for example, not needing to use a cart in the store to lean on.  He was reminded to use caution when rising from a lying or seated position by sitting for 20-30 seconds before standing.  He states understanding of this.  AE ongoing.    Dry Skin, grade 1:  No complaints of worsening symptoms.  AE ongoing.  Hyponatremia, grade 1:  Sodium level WNL at 137 on 7/5.  NCS per Dr. Carmichael.    Peripheral sensory neuropathy, grade 1:  Patient continues to suffer from neuropathy and even though the Velcade frequency has decreased to monthly he states he believes these symptoms have "gotten worse if that's possible" but states it is not painful.   Patient continues supplements as per med list.  AE, grade 1 ongoing.  Creatinine Increased, Grade 2:  Patient with borderline normal creatinine of 1.4 on 7/6.  AE resolved for now.   Insomnia, Grade 2: Patient experiencing this with his quality of life now affecting.  States he only sleeps in spurts and finds himself being very tired during the day.  At times getting drowsy on the drive home from work.  Dr. Carmichael prescribed Melatonin at today's visit and patient states he will try this.  If he does not notice effects from this, he may call and notify us and Dr. Carmichael will send rx for Ambien.  He states understanding of this.  Insomnia, grade 2.     No other change in grade in baseline AE's noted.      ECHO completed yesterday, 7/5.  Results as follows:      1 - Concentric hypertrophy.     2 - Mildly to moderately depressed left ventricular systolic function (EF 40-45%).     3 - Biatrial enlargement.     4 - Trivial mitral regurgitation.     5 - Severe tricuspid regurgitation.     6 - Small pericardial effusion.     7 - Increased central venous pressure.     8 - Atrial septal closure device present .     9 - Right pleural effusion.     10 - Right ventricular enlargement with " hypertrophy, with moderately depressed systolic function.     11 - There is an increased density to the myocardium, suggesting a myocardial infiltrative process (clinical correlation required).   This was noted per Dr. Carmichael.  No improvement noted since last ECHO per MD.  As previously stated, this is a slow process that will take some time to see improvement.  Even if patient is receiving placebo, per MD patient should slowly shot own immunologic response.  Patient educated on this and states understanding.    Physical performed per Dr. Carmichael, see MD note for Complete PE, symptom-directed PE, ECOG PS and NYHA Classification.  See flowsheets for height, weight, and vital signs.  NIS-LL and VASPI completed, see chart for source documentation.  Patient is not WOCBP, and no blood work required today per Dr. Carmichael, therefore patient had no local labs drawn.  Central labs collected 7/5 and shipped to [facility] lab, within 5 day window as per protocol.   Concomitant medications reviewed.  Patient confirms that per Dr. Carmichael, he continues on acyclovir.  Per Dr. Carmichael, patient is approved to initiate M6,D1 treatment today.  Treatment plan updated with today's weight (88kg) and BSA (2.1) and signed.  Benadryl adjusted to PO due to previous experience with RLS.      Patient was reminded per Dr. Carmichael that Velcade has significantly decreased light chain production (and is now normal), but if he is getting study drug and not placebo, we likely not see improvement of symtpom for around 6 months from start of treatment.  Patient states understanding of this. Regarding SOC Velcade, Dr. Carmichael reviewed patient's response.  He states patient has had significant response based on light chains and has likely received maximal benefit from weekly Velcade.  Per MD, will change schedule to once monthly Velcade and will receive one dose today after study drug infusion.      Patient then proceeded to infusion suite.  Study-mandated  procedures performed per protocol as follows:    1025: Pre-dose PK's drawn per protocol  1031  Premeds administered, see MAR  1043  Pre-dose vital signs performed, see flowsheet.  Vitals read as follows:  /64, HR 64, Temp 97.5, RR 20.  1102  Triplicate EKG performed per research staff.  Tracings sent to   1105  Study drug infusion initiated   1204?  Infusion completed +2 minutes, well within 10 minute window..  1208  Vital signs at end of infusion performed.  Vitals as follows:  /77, HR 83, RR 18, T 97.5  1208 End of Infusion PK's Drawn  1238 1/2 hour post end of infusion PK's Drawn  1306 1 hour post end of infusion Vitals: /76, HR 84, RR 20, T 97.4  1308 1 hour post end of infusion PK's  1335 End of Observation period  1335 Velcade Administration    Patient was monitored for the full 90 minutes post infusion (until 1335) and tolerated the above with no difficulties, see infusion RN documentation.       All of patient's questions were answered to his satisfaction.  Upcoming appointment calendar reviewed and given to patient.  Patient states understanding of plan of care and upcoming schedule.  He states having Research RN contact information as well as that of Dr. Carmichael.

## 2017-07-06 NOTE — LETTER
July 7, 2017        James Lao Jr., MD  [address]  [facility] [address]             [facility]  [address]  [facility] [address]  Phone: [phone]   Patient: Roc Lai Jr.   MR Number: [id number]   YOB: 1965   Date of Visit: 7/6/2017       Dear Dr. Lao:    Thank you for referring Roc Lai to me for evaluation. Below are the relevant portions of my assessment and plan of care.        1. Chronic diastolic congestive heart failure    2. AL amyloidosis    3. Monoclonal gammopathy          Mr. Lai has a monoclonal lambda light chain coming from a plasma cell infiltrate in the marrow that serves as the reservoir for amyloid production affecting his heart.  His cardiac amyloid has been confirmed as AL Amlyoid by biopsy and testing at [facility] in 1/2017.  He is now five months into therapy on the [id number] trial and will start cycle 6 today.  We plan to move therapy to monthly now given the suppression of his light chain production so his next dose will be 8/3/17.  He has a mild sensory neuropathy but no real objective findings with preserved function overall.    His exercise tolerance has stabilized on Bumex and he continues to push himself in activities, though he will now limit his travel until we see clear improvement in his cardiac function.  Given his diastolic dysfunction he relies on preload so we will need to monitor his overall volume status closely.  His recent cardiac echo showed worsening tricuspid regurgitation with stable LVEF.  However, he states he feels somewhat better and has been doing more including recent trip to [place] during which he was very active.  We would typically expect a 6-9 month latency for response to bortezomib which he is just coming to now.    His bone marrow biopsy on 1/12/17 showed a 60% cellular marrow with 28% plasma cells by morphology but 50% by immunohistochemistry.  Hence, he has at least a  smoldering myeloma.  He has no renal, calcium or hemoglobin issues and he has no bony symptoms to suggest multiple myeloma and plain films do not show any lytic lesions as of 1/2017.  MRI 1/2017 did not show lesions that would define him as multiple myeloma.    He does not seem to have other systemic manifestations of AL amyloidosis including the absence of enlarged tongue, organomegaly, coagulopathy, neuropathy, carpal tunnel syndrome or renal disease.  There is no family history of amyloidosis.      Distress Score    Distress Score: 0        Practical Problems Physical Problems   : No Appearance: No   Housing: No Bathing / Dressing: No   Insurance / Financial: No Breathing: No    Transportation: No  Changes in Urination: No    Work / School: No  Constipation: No   Treatment Decisions: No  Diarrhea: No     Eating: No    Family Problems Fatigue: No    Dealing with Children: No Feeling Swollen: No    Dealing with Partner: No Fevers: No    Ability to Have Children: No  Getting Around: No    Family Health Issues: No  Indigestion: No     Memory / Concentration: No   Emotional Problems Mouth Sores: No    Depression: No  Nausea: No    Fears: No  Nose Dry / Congested: No    Nervousness: No  Pain: No    Sadness: No Sexual: No    Worry: No Skin Dry / Itchy: No    Lost of Interest in Usual Activities: No Sleep: No          Spiritual/Religions Concerns Tingling in Hands / Feet: No   Spritual / Judaism Concerns: No         Other Problems            All of his questions were answered in the clinic today and he will follow up as per protocol in one month with continued therapy as per protocol in one month.         If you have questions, please do not hesitate to call me. I look forward to following Roc along with you.    Sincerely,      Elvis Carmichael MD           CC  Chon Harmon MD

## 2017-07-06 NOTE — PROGRESS NOTES
Subjective:       Patient ID: Roc Lai Jr. is a 51 y.o. male.    Chief Complaint: No chief complaint on file.    HPI  ECOG 1, NYHA II. Mr. Lai is here for follow up five months into the [id number] trial to treat cardiac AL amyloidosis with significant marrow plasma cells.  He notes increasing weakness subtly with stable fluid status on lower Bumex dosing.  He will stop traveling for his work as it has become too taxing.  No swallowing issues but he does have progressive neurologic problems with numbness at fingers and toes.  He notes mild swelling at his lower extremity R>L.    Light headedness still intermittently with less dyspnea over the last month.  Able to climb stairs with difficulty.  Some anorexia and diarrhea with his does of bortezomib.    Repeat cardiac echo 7/5/17 shows ongoing LVEF of 40% with concentric thickening and new tricuspid regurgitation.      History: Prior to diagnosis he was previously vigorous and ran over three miles thrice weekly 3 years ago.  However, over the 18 months prior to presentation, he has noted progressive decline in his exercise capacity to only being able to do 10 min of exercise now.  He has previously been studied for CAD with normal coronaries in 3/40288.  He was diagnosed with atrial flutter and underwent cardiac ablation without improvement in his exercise capacity.  He was found to have an atrial septal defect and this was closed 9/26/16 but this did not improve his symptoms either.  His symptoms progressed over the last 3 months to the point he can no longer exercise and notes dyspnea with carrying croceries, climbing stairs, etc.  He was admitted to cardiology 11/27/16 with 17 pounds of fluid loss and his is now sleeping better.  He was diagnosed with diastolic heart failure and cardia MRI was concerning for infiltrative cardiac disease.      SPEP 12/1/16 showed decreased gamma globulins and free lambda light chains increased with M-spike of 0.19 g/dL.  Free  lambda light chain was elevated at 50 mg/dL with normal lappa 1.37 mg/dL and ratio 0.03.  However, abdominal fat pad biopsy was negative for amyoid with the presence of fat cells 11/30/16.  ATTR DNA test negative for familial amyloidosis.    CBC normal 12/1/16 with normal renal function, calcium, albumin and slightly low total protein levels.      Cardiac biopsy done 1/3/17 confirmed cardiac involvement with AL amyloid based on congo red staining and liquid chromatography tandem mass spectrometry.     Bone marrow biopsy done 1/12/17 showed a 60% plasma cell infiltrate.     Metastatic survey 1/19/17 was negative for bony disease.  MRI scans of the cervical, thoracic, lumbar and pelvic done 1/27/17 did not disclose specific lesions to change diagnosis to myeloma.     His pre-trial walk test confirmed his candidacy for the protocol and he notes some progression with having to rest after a walk on flat surface of about a mile.    PMH  Diastolic heart failure    Review of Systems   Constitutional: Negative for activity change, appetite change, diaphoresis, fatigue, fever and unexpected weight change.   HENT: Negative for mouth sores, sore throat and trouble swallowing.    Respiratory: Positive for shortness of breath (slight increase). Negative for cough.         All improved since diuresis with increased exercise tolerance   Cardiovascular: Positive for leg swelling (minimal). Negative for chest pain.   Gastrointestinal: Positive for diarrhea (mild following bortezomib). Negative for abdominal pain, blood in stool and constipation.   Genitourinary: Negative for dysuria and hematuria.   Musculoskeletal: Negative for back pain and neck pain.   Skin: Negative for rash.   Neurological: Positive for light-headedness and numbness (hands and toes/sole bilaterally). Negative for tremors, weakness and headaches.   Hematological: Negative for adenopathy.   Psychiatric/Behavioral: Positive for sleep disturbance. Negative for  confusion.       Objective:      Physical Exam   Constitutional: He is oriented to person, place, and time. He appears well-developed and well-nourished. No distress.   HENT:   Head: Normocephalic and atraumatic.   Mouth/Throat: Oropharynx is clear and moist. No oropharyngeal exudate.   Tongue not enlarged  No submandibular enlargement   Eyes: Conjunctivae are normal. Pupils are equal, round, and reactive to light.   Neck: Neck supple.   Cardiovascular: Normal rate and regular rhythm.    Pulmonary/Chest: Effort normal and breath sounds normal. He has no rales.   Abdominal: Soft. Bowel sounds are normal. He exhibits no distension (No ascites) and no mass. There is no splenomegaly. There is no tenderness.   Musculoskeletal: Normal range of motion. He exhibits no edema.   No rib or spine tenderness   Lymphadenopathy:     He has no cervical adenopathy.     He has no axillary adenopathy.        Right: No inguinal adenopathy present.        Left: No inguinal adenopathy present.   Neurological: He is alert and oriented to person, place, and time.   Reflex Scores:       Bicep reflexes are 3+ on the right side and 3+ on the left side.       Patellar reflexes are 3+ on the right side and 3+ on the left side.       Achilles reflexes are 2+ on the right side and 2+ on the left side.  Normal touch, sharp and proprioception.   Skin: Skin is warm and dry. No rash noted.   No unusual bruising   Psychiatric: He has a normal mood and affect. Thought content normal.       Assessment:       1. Chronic diastolic congestive heart failure    2. AL amyloidosis    3. Monoclonal gammopathy    4. Insomnia, unspecified type        Plan:       Mr. Lai has a monoclonal lambda light chain coming from a plasma cell infiltrate in the marrow that serves as the reservoir for amyloid production affecting his heart.  His cardiac amyloid has been confirmed as AL Amlyoid by biopsy and testing at [facility] in 1/2017.  He is now five months into therapy on  the [id number] trial and will start cycle 6 today.  We plan to move therapy to monthly now given the suppression of his light chain production so his next dose will be 8/3/17.  He has a mild sensory neuropathy but no real objective findings with preserved function overall.    His exercise tolerance has stabilized on Bumex and he continues to push himself in activities, though he will now limit his travel until we see clear improvement in his cardiac function.  Given his diastolic dysfunction he relies on preload so we will need to monitor his overall volume status closely.  His recent cardiac echo showed worsening tricuspid regurgitation with stable LVEF.  However, he states he feels somewhat better and has been doing more including recent trip to [place] during which he was very active.  We would typically expect a 6-9 month latency for response to bortezomib which he is just coming to now.    His bone marrow biopsy on 1/12/17 showed a 60% cellular marrow with 28% plasma cells by morphology but 50% by immunohistochemistry.  Hence, he has at least a smoldering myeloma.  He has no renal, calcium or hemoglobin issues and he has no bony symptoms to suggest multiple myeloma and plain films do not show any lytic lesions as of 1/2017.  MRI 1/2017 did not show lesions that would define him as multiple myeloma.    He does not seem to have other systemic manifestations of AL amyloidosis including the absence of enlarged tongue, organomegaly, coagulopathy, neuropathy, carpal tunnel syndrome or renal disease.  There is no family history of amyloidosis.      Distress Score    Distress Score: 0        Practical Problems Physical Problems   : No Appearance: No   Housing: No Bathing / Dressing: No   Insurance / Financial: No Breathing: No    Transportation: No  Changes in Urination: No    Work / School: No  Constipation: No   Treatment Decisions: No  Diarrhea: No     Eating: No    Family Problems Fatigue: No    Dealing  with Children: No Feeling Swollen: No    Dealing with Partner: No Fevers: No    Ability to Have Children: No  Getting Around: No    Family Health Issues: No  Indigestion: No     Memory / Concentration: No   Emotional Problems Mouth Sores: No    Depression: No  Nausea: No    Fears: No  Nose Dry / Congested: No    Nervousness: No  Pain: No    Sadness: No Sexual: No    Worry: No Skin Dry / Itchy: No    Lost of Interest in Usual Activities: No Sleep: No          Spiritual/Religions Concerns Tingling in Hands / Feet: No   Spritual / Yazidism Concerns: No         Other Problems            He continues to have some sleep issues and we will try him on ramelteon.  If that fails we will try zolpidem.      All of his questions were answered in the clinic today and he will follow up as per protocol in one month with continued therapy as per protocol in one month.

## 2017-07-06 NOTE — PLAN OF CARE
Problem: Patient Care Overview  Goal: Plan of Care Review  Outcome: Ongoing (interventions implemented as appropriate)  Pt  tolerated study drug and velcade injection with VSS. Study protocol followed. Pt instructed to call MD with any problems. AVS given and pt discharged home

## 2017-07-07 ENCOUNTER — OFFICE VISIT (OUTPATIENT)
Dept: TRANSPLANT | Facility: CLINIC | Age: 52
End: 2017-07-07
Attending: INTERNAL MEDICINE
Payer: COMMERCIAL

## 2017-07-07 VITALS
HEIGHT: 71 IN | SYSTOLIC BLOOD PRESSURE: 114 MMHG | HEART RATE: 87 BPM | DIASTOLIC BLOOD PRESSURE: 76 MMHG | BODY MASS INDEX: 27.44 KG/M2 | WEIGHT: 196 LBS

## 2017-07-07 DIAGNOSIS — J90 PLEURAL EFFUSION, RIGHT: ICD-10-CM

## 2017-07-07 DIAGNOSIS — I50.33 ACUTE ON CHRONIC DIASTOLIC CONGESTIVE HEART FAILURE: Primary | ICD-10-CM

## 2017-07-07 DIAGNOSIS — I42.8 OTHER CARDIOMYOPATHY: ICD-10-CM

## 2017-07-07 DIAGNOSIS — I48.3 TYPICAL ATRIAL FLUTTER: ICD-10-CM

## 2017-07-07 DIAGNOSIS — R79.89 ABNORMAL LIVER FUNCTION TEST: ICD-10-CM

## 2017-07-07 DIAGNOSIS — I50.33 ACUTE ON CHRONIC DIASTOLIC CHF (CONGESTIVE HEART FAILURE), NYHA CLASS 1: ICD-10-CM

## 2017-07-07 PROCEDURE — 99214 OFFICE O/P EST MOD 30 MIN: CPT | Mod: S$GLB,,, | Performed by: INTERNAL MEDICINE

## 2017-07-07 PROCEDURE — 99999 PR PBB SHADOW E&M-EST. PATIENT-LVL III: CPT | Mod: PBBFAC,,, | Performed by: INTERNAL MEDICINE

## 2017-07-07 RX ORDER — BUMETANIDE 2 MG/1
2 TABLET ORAL 3 TIMES DAILY
Qty: 90 TABLET | Refills: 3 | Status: SHIPPED | OUTPATIENT
Start: 2017-07-07 | End: 2017-10-26 | Stop reason: SDUPTHER

## 2017-07-07 RX ORDER — RAMELTEON 8 MG/1
8 TABLET ORAL NIGHTLY PRN
Qty: 14 TABLET | Refills: 0 | Status: SHIPPED | OUTPATIENT
Start: 2017-07-07 | End: 2017-07-28 | Stop reason: SDUPTHER

## 2017-07-07 NOTE — PROGRESS NOTES
"Subjective:     Patient ID:  Roc Lai Jr. is a 51 y.o. 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  He is treated and followed by Dr. Carmichael and please see his note from yesterday.  He tells me that compared to a month ago he can walk further and climb 1 flight of stairs without stopping but no where close to his activity before onset of illness.  Sleeping 2 pillows without PND but still having problems sleeping, wakes up every hour but not sure if breathing related and not for nocturia.  Took melatonin last PM for first time.  He does not feel depressed.    Home scale 190#--1 month ago 193 and highest since last visit 198#    Review of Systems   Constitution: Positive for malaise/fatigue and weight loss (but seems to be slowing).   Cardiovascular: Positive for dyspnea on exertion, leg swelling and orthopnea. Negative for paroxysmal nocturnal dyspnea (see HPI).   Respiratory: Negative for cough, sputum production and wheezing.    Hematologic/Lymphatic: Does not bruise/bleed easily.   Gastrointestinal: Positive for diarrhea (with velcade but not bad). Negative for abdominal pain, constipation, nausea and vomiting.   Genitourinary: Positive for nocturia (2x).   Neurological: Positive for numbness (hands and progressive) and paresthesias (hands and progressive).   Psychiatric/Behavioral: The patient has insomnia.      Objective:   Physical Exam   Constitutional: He appears well-developed and well-nourished. No distress.   /76   Pulse 87   Ht 5' 11" (1.803 m)   Wt 88.9 kg (195 lb 15.8 oz)   BMI 27.33 kg/m²    HENT:   Head: Normocephalic and atraumatic.   Eyes: Conjunctivae are normal. Right eye exhibits no discharge. Left eye exhibits no discharge. No " scleral icterus.   Neck: JVD (to jaw sitting with HJR) present. No thyromegaly present.   Cardiovascular: Normal rate and regular rhythm.  Exam reveals gallop (S3 present).    No murmur heard.  Pulmonary/Chest: Effort normal. No respiratory distress. He has no wheezes. He has no rales.   Dullness and diminished BS right base   Abdominal: Soft. Bowel sounds are normal. He exhibits no distension (liver span 18 cm). There is no tenderness. There is no rebound and no guarding.   Musculoskeletal: He exhibits edema (2+ below knee bilaterally). He exhibits no tenderness.   Neurological: He is alert.   Skin: Skin is warm and dry. He is not diaphoretic.   Psychiatric: He has a normal mood and affect. His behavior is normal. Judgment and thought content normal.      Lab Results   Component Value Date     07/05/2017    K 3.9 07/05/2017    MG 2.3 07/05/2017     07/05/2017    CO2 25 07/05/2017    BUN 29 (H) 07/05/2017    CREATININE 1.4 07/05/2017     (H) 07/05/2017    AST 30 07/05/2017    ALT 22 07/05/2017    ALBUMIN 3.8 07/05/2017    PROT 6.7 07/05/2017    BILITOT 2.8 (H) 07/05/2017    WBC 7.36 07/05/2017    HGB 13.0 (L) 07/05/2017    HCT 38.1 (L) 07/05/2017     07/05/2017 7/5/17 ECHO CONCLUSIONS     1 - Concentric hypertrophy.     2 - Mildly to moderately depressed left ventricular systolic function (EF 40-45%).     3 - Biatrial enlargement.     4 - Trivial mitral regurgitation.     5 - Severe tricuspid regurgitation.     6 - Small pericardial effusion.     7 - Increased central venous pressure.     8 - Atrial septal closure device present .     9 - Right pleural effusion.     10 - Right ventricular enlargement with hypertrophy, with moderately depressed systolic function.     11 - There is an increased density to the myocardium, suggesting a myocardial infiltrative process (clinical correlation required).     Assessment:     1. Acute on chronic diastolic congestive heart failure    2. Abnormal  liver function test    3. Other cardiomyopathy    4. Pleural effusion, right    5. Typical atrial flutter s/p ablation and restoration sinus rhythm 2016      Plan:   Increase Bumex 2 mg TID to mobilize volume  LFT's due to congestion in my opinion  Fluid restriction of 1.5 qts to max of 2 qts/day  BMP in 2 weeks and call results and obtain his new home wt range  RTC 6 weeks with CMP

## 2017-07-07 NOTE — PATIENT INSTRUCTIONS
Increase Bumex 2 mg 3 times a day to mobilize volume  Fluid restriction of 1.5 qts to max of 2 qts/day  BMP in 2 weeks and call results and obtain his new home wt range  RTC 6 weeks with CMP

## 2017-07-21 ENCOUNTER — TELEPHONE (OUTPATIENT)
Dept: TRANSPLANT | Facility: CLINIC | Age: 52
End: 2017-07-21

## 2017-07-21 ENCOUNTER — LAB VISIT (OUTPATIENT)
Dept: LAB | Facility: HOSPITAL | Age: 52
End: 2017-07-21
Attending: INTERNAL MEDICINE
Payer: COMMERCIAL

## 2017-07-21 DIAGNOSIS — R79.89 ABNORMAL LIVER FUNCTION TEST: ICD-10-CM

## 2017-07-21 DIAGNOSIS — I50.33 ACUTE ON CHRONIC DIASTOLIC CONGESTIVE HEART FAILURE: ICD-10-CM

## 2017-07-21 LAB
ANION GAP SERPL CALC-SCNC: 8 MMOL/L
BUN SERPL-MCNC: 32 MG/DL
CALCIUM SERPL-MCNC: 9.7 MG/DL
CHLORIDE SERPL-SCNC: 100 MMOL/L
CO2 SERPL-SCNC: 30 MMOL/L
CREAT SERPL-MCNC: 1.4 MG/DL
EST. GFR  (AFRICAN AMERICAN): >60 ML/MIN/1.73 M^2
EST. GFR  (NON AFRICAN AMERICAN): 58.2 ML/MIN/1.73 M^2
GLUCOSE SERPL-MCNC: 124 MG/DL
POTASSIUM SERPL-SCNC: 4.2 MMOL/L
SODIUM SERPL-SCNC: 138 MMOL/L

## 2017-07-21 PROCEDURE — 80048 BASIC METABOLIC PNL TOTAL CA: CPT

## 2017-07-21 PROCEDURE — 36415 COLL VENOUS BLD VENIPUNCTURE: CPT

## 2017-07-21 NOTE — TELEPHONE ENCOUNTER
Labs drawn 7/21/17 K 4.2, Bun/cr 32/1.4,(which is patients baseline) Na 138. Todays wt is 175lbs. Pt reports his wt is 175-180lb( he says this is his baseline, and he feels better). Pt says he only took bumex 2mg  tid for 3days because by the end of the 3rd day he'd lost 15lbs. Pt reports mobility, and activity level is better. Pt says he's walked an hour Sat and is working out. Per Dr. Lao, I instructed patient to take 1 extra bumex 2mg when wt exceeds 180lbs, and to notify chf nurse when this occurs, so that we may phone assess, and montior labs. Pt verbalized understanding

## 2017-07-25 DIAGNOSIS — I50.33 ACUTE ON CHRONIC DIASTOLIC CHF (CONGESTIVE HEART FAILURE), NYHA CLASS 1: ICD-10-CM

## 2017-07-25 RX ORDER — POTASSIUM CHLORIDE 20 MEQ/1
20 TABLET, EXTENDED RELEASE ORAL 2 TIMES DAILY
Qty: 60 TABLET | Refills: 1 | Status: SHIPPED | OUTPATIENT
Start: 2017-07-25 | End: 2017-07-25 | Stop reason: SDUPTHER

## 2017-07-25 RX ORDER — POTASSIUM CHLORIDE 20 MEQ/1
20 TABLET, EXTENDED RELEASE ORAL 2 TIMES DAILY
Qty: 60 TABLET | Refills: 1 | Status: SHIPPED | OUTPATIENT
Start: 2017-07-25 | End: 2017-08-23 | Stop reason: SDUPTHER

## 2017-07-28 ENCOUNTER — OFFICE VISIT (OUTPATIENT)
Dept: HEMATOLOGY/ONCOLOGY | Facility: CLINIC | Age: 52
End: 2017-07-28
Payer: COMMERCIAL

## 2017-07-28 ENCOUNTER — RESEARCH ENCOUNTER (OUTPATIENT)
Dept: RESEARCH | Facility: HOSPITAL | Age: 52
End: 2017-07-28

## 2017-07-28 VITALS
BODY MASS INDEX: 25.31 KG/M2 | DIASTOLIC BLOOD PRESSURE: 70 MMHG | HEART RATE: 91 BPM | SYSTOLIC BLOOD PRESSURE: 112 MMHG | HEIGHT: 71 IN | WEIGHT: 180.75 LBS | TEMPERATURE: 98 F

## 2017-07-28 DIAGNOSIS — J90 PLEURAL EFFUSION, RIGHT: ICD-10-CM

## 2017-07-28 DIAGNOSIS — G47.00 INSOMNIA, UNSPECIFIED TYPE: ICD-10-CM

## 2017-07-28 DIAGNOSIS — D47.2 MONOCLONAL GAMMOPATHY: ICD-10-CM

## 2017-07-28 DIAGNOSIS — I42.8 OTHER CARDIOMYOPATHY: Primary | ICD-10-CM

## 2017-07-28 DIAGNOSIS — E85.81 AL AMYLOIDOSIS: ICD-10-CM

## 2017-07-28 PROCEDURE — 99213 OFFICE O/P EST LOW 20 MIN: CPT | Mod: S$GLB,,, | Performed by: INTERNAL MEDICINE

## 2017-07-28 PROCEDURE — 99999 PR PBB SHADOW E&M-EST. PATIENT-LVL III: CPT | Mod: PBBFAC,,, | Performed by: INTERNAL MEDICINE

## 2017-07-28 RX ORDER — DIPHENHYDRAMINE HCL 25 MG
25 CAPSULE ORAL ONCE
Status: CANCELLED
Start: 2017-07-31 | End: 2017-07-31

## 2017-07-28 RX ORDER — BORTEZOMIB 3.5 MG/1
1.4 INJECTION, POWDER, LYOPHILIZED, FOR SOLUTION INTRAVENOUS; SUBCUTANEOUS
Status: CANCELLED | OUTPATIENT
Start: 2017-07-31 | End: 2017-07-31

## 2017-07-28 RX ORDER — SODIUM CHLORIDE 0.9 % (FLUSH) 0.9 %
10 SYRINGE (ML) INJECTION
Status: CANCELLED | OUTPATIENT
Start: 2017-07-31

## 2017-07-28 RX ORDER — HEPARIN 100 UNIT/ML
500 SYRINGE INTRAVENOUS
Status: CANCELLED | OUTPATIENT
Start: 2017-07-31

## 2017-07-28 RX ORDER — RAMELTEON 8 MG/1
8 TABLET ORAL NIGHTLY PRN
Qty: 30 TABLET | Refills: 0 | Status: SHIPPED | OUTPATIENT
Start: 2017-07-28 | End: 2018-02-05

## 2017-07-28 RX ORDER — DEXTROMETHORPHAN HYDROBROMIDE, GUAIFENESIN 5; 100 MG/5ML; MG/5ML
650 LIQUID ORAL
Status: CANCELLED | OUTPATIENT
Start: 2017-07-31 | End: 2017-07-31

## 2017-07-28 NOTE — PROGRESS NOTES
"  Friday, July 28th, 2017     Study: "A Phase 3, randomized, multicenter, double-blind, placebo-controlled, 2-arm, efficacy and safety study of [id number] plus standard of care versus placebo plus standard of care in subjects with light chain (AL) amyloidosis"  Protocol ID# [id number]  IRB# 2015.305.A  Sponsor: Eliseo  Investigator: Dr. Carmichael    Month 7, Day -3  IV [id number]/Placebo + SQ Velcade    Patient presents for evaluation and ability to proceed with month 7, day 1 treatment per above-mentioned protocol.  He is ambulatory, awake, alert, and oriented to person, place, and time, accompanied by self.  Patient reports today that he is feeling "really good, and it is kind of scaring me."  He states he has experienced no lightheadedness or dizziness this month.  He states he is again able to engage in rigorous physical activity; he recently walked 3 miles around his neighborhood and reports doing 25 push-ups this morning.  He states he is again able to travel for work and has a couple of trips planned in the coming weeks.  He reports continued "tension" at night but is improved.  Also continues to report neuropathy which as remained rather unchanged.      He reports 3 days ahead of day 1 due to Dr. Carmichael unavailability next week.  Discussed with AZUL Norris, study monitor, see e-mail dated 19-JUL.  Patient states continued willingness to participate in above-mentioned trial.    Review of AE's:    Restless Leg Syndrome, grade 1:  Improved but still present.  Per Dr. Carmichael, may be side effect of Velcade. MD prescribed Melatonin to assist patient with sleeping issues.  Patient to notify RN if no improvement.  Patient continues supplements as per med list.  AE ongoing.  Total Bilirubin Increased, Grade 2:  Total bilirubin 1.7 per local blood work performed yesterday.  Discussed with Dr. Carmichael and this is NCS and does not affect dosing of further therapy. AE grade , will follow.  Diarrhea:  Patient reports no " diarrhea following most recent dose of Velcade. AE, grade 1.  Dizziness (Lightheadedness), grade 1:  Patient states this has resolved and has not experienced this recently, even with increasing activity.  AE resolved for now.    Dry Skin, grade 1:  No complaints of worsening symptoms.  AE ongoing.  Hyponatremia, grade 1:  Sodium level WNL at 139 on 7/5.  AE resolved for now.  Peripheral sensory neuropathy, grade 1:  See patient's reports above regarding stable neuropathy reports.  States he has numbness to fingertips, but states it is not painful.   Patient continues supplements as per med list.  AE, grade 1 ongoing.  Creatinine Increased, Grade 2:  Patient with creatinine of 1.6 today.  Patient has increased Bumex frequency per Dr. Panda and this is expected.  AE, grade 1.   Insomnia, Grade 2: Patient started ramelton last month per Dr. Carmichael and states this has helped significantly.  He still does not sleep through the night regularly, but not waking as much.  Rx refilled per patient request, Research RN completed prior off with insurance company, auth now good through 12/31/2039.  Insomnia, grade 2.     No other change in grade in baseline AE's noted.      Physical performed per Dr. Carmichael, see MD note for Complete PE, symptom-directed PE, ECOG PS and NYHA Classification.  See flowsheets for height, weight, and vital signs.  NIS-LL and VASPI completed, see chart for source documentation.  Patient is not WOCBP, and no blood work required today per Dr. Carmichael, therefore patient had no local labs drawn.  Central labs [facility] 7[facility]5 [facility], within 5 day window as per protocol.   Concomitant medications reviewed.  Patient confirms that per Dr. Carmichael, he continues on acyclovir.  Per Dr. Carmichael, patient is approved to initiate M6,D1 treatment today.  Treatment plan updated with today's weight (82kg) and BSA (2.03) and signed per Dr. Carmichael.  Benadryl adjusted to PO due to previous experience with  RLS.      Now that patinet has broached 6 month rosemary, patient's progress is on target per Dr. Carmichael, as MD stated patient would start seeing improvement around this time.  MD and patient pleased with patient's progress.  He will continue monthly Velcade per Dr. Carmichael.      Patient states understanding that he will perform 24 hour urine collection starting Sunday, 7/30.  He will report to cancer center Monday, 7/31 for Month 7, Day 1 central lab collection and treatment.  Will follow.

## 2017-07-28 NOTE — PROGRESS NOTES
Subjective:       Patient ID: Roc Lai Jr. is a 52 y.o. male.    Chief Complaint: No chief complaint on file.    HPI  ECOG 1, NYHA II. Mr. Lai is here for follow up six months into the [id number] trial to treat cardiac AL amyloidosis with significant marrow plasma cells.  He notes less weakness with improved physical ability including climbing stairs on Bumex dosing.  He will restart traveling for his work.  No swallowing issues but he does have progressive neurologic problems with numbness at fingers and toes.  He notes mild swelling at his lower extremity R>L.    No light headedness with less dyspnea over the last month.  Minimal anorexia and diarrhea with his does of bortezomib.    Repeat cardiac echo 7/5/17 shows ongoing LVEF of 40% with concentric thickening and new tricuspid regurgitation.      History: Prior to diagnosis he was previously vigorous and ran over three miles thrice weekly 3 years ago.  However, over the 18 months prior to presentation, he has noted progressive decline in his exercise capacity to only being able to do 10 min of exercise now.  He has previously been studied for CAD with normal coronaries in 3/21501.  He was diagnosed with atrial flutter and underwent cardiac ablation without improvement in his exercise capacity.  He was found to have an atrial septal defect and this was closed 9/26/16 but this did not improve his symptoms either.  His symptoms progressed over the last 3 months to the point he can no longer exercise and notes dyspnea with carrying croceries, climbing stairs, etc.  He was admitted to cardiology 11/27/16 with 17 pounds of fluid loss and his is now sleeping better.  He was diagnosed with diastolic heart failure and cardia MRI was concerning for infiltrative cardiac disease.      SPEP 12/1/16 showed decreased gamma globulins and free lambda light chains increased with M-spike of 0.19 g/dL.  Free lambda light chain was elevated at 50 mg/dL with normal lappa 1.37  mg/dL and ratio 0.03.  However, abdominal fat pad biopsy was negative for amyoid with the presence of fat cells 11/30/16.  ATTR DNA test negative for familial amyloidosis.    CBC normal 12/1/16 with normal renal function, calcium, albumin and slightly low total protein levels.      Cardiac biopsy done 1/3/17 confirmed cardiac involvement with AL amyloid based on congo red staining and liquid chromatography tandem mass spectrometry.     Bone marrow biopsy done 1/12/17 showed a 60% plasma cell infiltrate.     Metastatic survey 1/19/17 was negative for bony disease.  MRI scans of the cervical, thoracic, lumbar and pelvic done 1/27/17 did not disclose specific lesions to change diagnosis to myeloma.     His pre-trial walk test confirmed his candidacy for the protocol and he notes some progression with having to rest after a walk on flat surface of about a mile.    [facility]  Diastolic heart failure    Review of Systems   Constitutional: Negative for activity change, appetite change, diaphoresis, fatigue, fever and unexpected weight change.   HENT: Negative for mouth sores, sore throat and trouble swallowing.    Respiratory: Negative for cough and shortness of breath.         All improved since diuresis with increased exercise tolerance   Cardiovascular: Negative for chest pain and leg swelling.   Gastrointestinal: Negative for abdominal pain, blood in stool, constipation and diarrhea.   Genitourinary: Negative for dysuria and hematuria.   Musculoskeletal: Negative for back pain and neck pain.   Skin: Negative for rash.   Neurological: Positive for numbness (hands and toes/sole bilaterally variable). Negative for tremors, weakness, light-headedness and headaches.   Hematological: Negative for adenopathy.   Psychiatric/Behavioral: Positive for sleep disturbance. Negative for confusion.       Objective:      Physical Exam   Constitutional: He is oriented to person, place, and time. He appears well-developed and well-nourished.  No distress.   HENT:   Head: Normocephalic and atraumatic.   Mouth/Throat: Oropharynx is clear and moist. No oropharyngeal exudate.   Tongue not enlarged  No submandibular enlargement   Eyes: Conjunctivae are normal. Pupils are equal, round, and reactive to light.   Neck: Neck supple.   Cardiovascular: Normal rate and regular rhythm.    Pulmonary/Chest: Effort normal and breath sounds normal. He has no rales.   Abdominal: Soft. Bowel sounds are normal. He exhibits no distension (No ascites) and no mass. There is no splenomegaly. There is no tenderness.   Musculoskeletal: Normal range of motion. He exhibits no edema.   No rib or spine tenderness   Lymphadenopathy:     He has no cervical adenopathy.     He has no axillary adenopathy.        Right: No inguinal adenopathy present.        Left: No inguinal adenopathy present.   Neurological: He is alert and oriented to person, place, and time.   Reflex Scores:       Bicep reflexes are 3+ on the right side and 3+ on the left side.       Patellar reflexes are 3+ on the right side and 3+ on the left side.       Achilles reflexes are 2+ on the right side and 2+ on the left side.  Normal touch, sharp and proprioception.   Skin: Skin is warm and dry. No rash noted.   No unusual bruising   Psychiatric: He has a normal mood and affect. Thought content normal.       Assessment:       1. Other cardiomyopathy    2. AL amyloidosis    3. Monoclonal gammopathy    4. Pleural effusion, right        Plan:       Mr. Lai has a monoclonal lambda light chain coming from a plasma cell infiltrate in the marrow that serves as the reservoir for amyloid production affecting his heart.  His cardiac amyloid has been confirmed as AL Amlyoid by biopsy and testing at [place] in 1/2017.  He is now six months into therapy on the [id number] trial and will start cycle 7 next month.  We plan to move therapy to monthly now given the suppression of his light chain production so his next dose will be 8/3/17.  He  has a mild sensory neuropathy but no real objective findings with preserved function overall.    His exercise tolerance has improved on Bumex and he continues to push himself in activities and will increase his travel.  Given his diastolic dysfunction he relies on preload so we will need to monitor his overall volume status closely.  His recent cardiac echo showed worsening tricuspid regurgitation with stable LVEF.  However, he states he feels somewhat better and has been doing more including climbing stairs without problems.  We would typically expect a 6-9 month latency for response to bortezomib which he is just coming to now.    His bone marrow biopsy on 1/12/17 showed a 60% cellular marrow with 28% plasma cells by morphology but 50% by immunohistochemistry.  Hence, he has at least a smoldering myeloma.  He has no renal, calcium or hemoglobin issues and he has no bony symptoms to suggest multiple myeloma and plain films do not show any lytic lesions as of 1/2017.  MRI 1/2017 did not show lesions that would define him as multiple myeloma.    He does not seem to have other systemic manifestations of AL amyloidosis including the absence of enlarged tongue, organomegaly, coagulopathy, neuropathy, carpal tunnel syndrome or renal disease.       He continues to have some sleep issues and has responded to ramelteon.     Distress Score    Distress Score: 0        Practical Problems Physical Problems   : No Appearance: No   Housing: No Bathing / Dressing: No   Insurance / Financial: No Breathing: No    Transportation: No  Changes in Urination: No    Work / School: No  Constipation: No   Treatment Decisions: No  Diarrhea: No     Eating: No    Family Problems Fatigue: No    Dealing with Children: No Feeling Swollen: No    Dealing with Partner: No Fevers: No    Ability to Have Children: No  Getting Around: No    Family Health Issues: No  Indigestion: No     Memory / Concentration: No   Emotional Problems Mouth  Sores: No    Depression: No  Nausea: No    Fears: No  Nose Dry / Congested: No    Nervousness: No  Pain: No    Sadness: No Sexual: No    Worry: No Skin Dry / Itchy: No    Lost of Interest in Usual Activities: No Sleep: No          Spiritual/Religions Concerns Tingling in Hands / Feet: No   Spritual / Holiness Concerns: No         Other Problems            Distress score 0 and no intervention required.  All of his questions were answered in the clinic today and he will follow up as per protocol in one month with continued therapy as per protocol in one month.

## 2017-07-28 NOTE — LETTER
July 28, 2017        Chon Harmon MD  [address]  [address]  [place] [address]             [facility]  [address]  [facility] [address]  Phone: [phone]   Patient: Roc Lai Jr.   MR Number: [id number]   YOB: 1965   Date of Visit: 7/28/2017       Dear Dr. Harmon:    Thank you for referring Roc Lai to me for evaluation. Below are the relevant portions of my assessment and plan of care.        1. Other cardiomyopathy    2. AL amyloidosis    3. Monoclonal gammopathy    4. Pleural effusion, right          Mr. Lai has a monoclonal lambda light chain coming from a plasma cell infiltrate in the marrow that serves as the reservoir for amyloid production affecting his heart.  His cardiac amyloid has been confirmed as AL Amlyoid by biopsy and testing at [place] in 1/2017.  He is now six months into therapy on the [id number] trial and will start cycle 7 next month.  We plan to move therapy to monthly now given the suppression of his light chain production so his next dose will be 8/3/17.  He has a mild sensory neuropathy but no real objective findings with preserved function overall.    His exercise tolerance has improved on Bumex and he continues to push himself in activities and will increase his travel.  Given his diastolic dysfunction he relies on preload so we will need to monitor his overall volume status closely.  His recent cardiac echo showed worsening tricuspid regurgitation with stable LVEF.  However, he states he feels somewhat better and has been doing more including climbing stairs without problems.  We would typically expect a 6-9 month latency for response to bortezomib which he is just coming to now.    His bone marrow biopsy on 1/12/17 showed a 60% cellular marrow with 28% plasma cells by morphology but 50% by immunohistochemistry.  Hence, he has at least a smoldering myeloma.  He has no renal, calcium or hemoglobin issues and he has  no bony symptoms to suggest multiple myeloma and plain films do not show any lytic lesions as of 1/2017.  MRI 1/2017 did not show lesions that would define him as multiple myeloma.    He does not seem to have other systemic manifestations of AL amyloidosis including the absence of enlarged tongue, organomegaly, coagulopathy, neuropathy, carpal tunnel syndrome or renal disease.       He continues to have some sleep issues and has responded to ramelteon.     Distress Score    Distress Score: 0        Practical Problems Physical Problems   : No Appearance: No   Housing: No Bathing / Dressing: No   Insurance / Financial: No Breathing: No    Transportation: No  Changes in Urination: No    Work / School: No  Constipation: No   Treatment Decisions: No  Diarrhea: No     Eating: No    Family Problems Fatigue: No    Dealing with Children: No Feeling Swollen: No    Dealing with Partner: No Fevers: No    Ability to Have Children: No  Getting Around: No    Family Health Issues: No  Indigestion: No     Memory / Concentration: No   Emotional Problems Mouth Sores: No    Depression: No  Nausea: No    Fears: No  Nose Dry / Congested: No    Nervousness: No  Pain: No    Sadness: No Sexual: No    Worry: No Skin Dry / Itchy: No    Lost of Interest in Usual Activities: No Sleep: No          Spiritual/Religions Concerns Tingling in Hands / Feet: No   Spritual / Tenriism Concerns: No         Other Problems            Distress score 0 and no intervention required.  All of his questions were answered in the clinic today and he will follow up as per protocol in one month with continued therapy as per protocol in one month.       If you have questions, please do not hesitate to call me. I look forward to following Roc along with you.    Sincerely,      MD DAWNA Chang Jr., MD

## 2017-07-31 ENCOUNTER — RESEARCH ENCOUNTER (OUTPATIENT)
Dept: RESEARCH | Facility: HOSPITAL | Age: 52
End: 2017-07-31

## 2017-07-31 ENCOUNTER — INFUSION (OUTPATIENT)
Dept: INFUSION THERAPY | Facility: HOSPITAL | Age: 52
End: 2017-07-31
Attending: INTERNAL MEDICINE
Payer: COMMERCIAL

## 2017-07-31 VITALS
HEART RATE: 82 BPM | OXYGEN SATURATION: 99 % | SYSTOLIC BLOOD PRESSURE: 102 MMHG | BODY MASS INDEX: 25.61 KG/M2 | RESPIRATION RATE: 20 BRPM | TEMPERATURE: 98 F | WEIGHT: 183.63 LBS | DIASTOLIC BLOOD PRESSURE: 67 MMHG

## 2017-07-31 VITALS — WEIGHT: 183.63 LBS | BODY MASS INDEX: 25.61 KG/M2

## 2017-07-31 DIAGNOSIS — E85.81 AL AMYLOIDOSIS: Primary | ICD-10-CM

## 2017-07-31 RX ORDER — DIPHENHYDRAMINE HCL 25 MG
25 CAPSULE ORAL ONCE
Status: COMPLETED | OUTPATIENT
Start: 2017-07-31 | End: 2017-07-31

## 2017-07-31 RX ORDER — SODIUM CHLORIDE 0.9 % (FLUSH) 0.9 %
10 SYRINGE (ML) INJECTION
Status: DISCONTINUED | OUTPATIENT
Start: 2017-07-31 | End: 2017-07-31 | Stop reason: HOSPADM

## 2017-07-31 RX ORDER — BORTEZOMIB 3.5 MG/1
1.4 INJECTION, POWDER, LYOPHILIZED, FOR SOLUTION INTRAVENOUS; SUBCUTANEOUS
Status: COMPLETED | OUTPATIENT
Start: 2017-07-31 | End: 2017-07-31

## 2017-07-31 RX ORDER — ACETAMINOPHEN 325 MG/1
650 TABLET ORAL
Status: COMPLETED | OUTPATIENT
Start: 2017-07-31 | End: 2017-07-31

## 2017-07-31 RX ADMIN — DIPHENHYDRAMINE HYDROCHLORIDE 25 MG: 25 CAPSULE ORAL at 08:07

## 2017-07-31 RX ADMIN — ACETAMINOPHEN 650 MG: 325 TABLET ORAL at 08:07

## 2017-07-31 RX ADMIN — BORTEZOMIB 2.8 MG: 3.5 INJECTION, POWDER, LYOPHILIZED, FOR SOLUTION INTRAVENOUS; SUBCUTANEOUS at 11:07

## 2017-07-31 RX ADMIN — SODIUM CHLORIDE: 9 INJECTION, SOLUTION INTRAVENOUS at 09:07

## 2017-07-31 NOTE — PROGRESS NOTES
"  Monday, July 31st, 2017     Study: "A Phase 3, randomized, multicenter, double-blind, placebo-controlled, 2-arm, efficacy and safety study of [id number] plus standard of care versus placebo plus standard of care in subjects with light chain (AL) amyloidosis"  Protocol ID# [id number]  IRB# 2015.305.A  Sponsor: [employer]  Investigator: Dr. Carmichael    Month 7, Day 1  IV [id number]/Placebo + SQ Velcade    Patient presents for Month 7, Day 1 infusion per above-mentioned protocol.  Patient is awake, alert, and oriented to person, place, and time.  He states he has no new issues since office visit on Friday, 7/28.  See 7/28 RN and MD note for AE's, physical exam, etc.  He comes to remit 24 hour urine collection and have central labs drawn per above-mentioned protocol.  Patient then presented to infusion suite for M7,D1 infusion.    Study-mandated procedures performed per protocol as follows:      0800:  Pre-dose PK's drawn per protocol  0836:  Premeds administered, see MAR  0910:  Pre-dose vital signs performed, see flowsheet.  Vitals read as follows:  BP 99/61, HR 88, Temp 97.9, RR 16.  0920:  Triplicate EKG performed per research staff.  Tracings transmitted to ERT for central review.  0923:  Study drug infusion initiated   1028:  Infusion completed.  1028:  Vital signs at end of infusion performed.  Vitals as follows:  BP , HR , RR , T   1128:  1 hour post end of infusion Vitals: BP , HR , RR , T   1158:  End of Observation period  1156:  Velcade Administration    Patient was monitored for the full 90 minutes post infusion and tolerated the above with no difficulties, see infusion RN documentation.       All of patient's questions were answered to his satisfaction.  Upcoming appointment calendar reviewed and given to patient with appt's for next cycle on 8/24/17.  Patient states understanding of plan of care and upcoming schedule.  He states having Research RN contact information as well as that of Dr. Carmichael.      "

## 2017-08-23 DIAGNOSIS — I50.33 ACUTE ON CHRONIC DIASTOLIC CHF (CONGESTIVE HEART FAILURE), NYHA CLASS 1: ICD-10-CM

## 2017-08-23 RX ORDER — POTASSIUM CHLORIDE 20 MEQ/1
20 TABLET, EXTENDED RELEASE ORAL 2 TIMES DAILY
Qty: 180 TABLET | Refills: 1 | Status: SHIPPED | OUTPATIENT
Start: 2017-08-23 | End: 2018-02-05 | Stop reason: SDUPTHER

## 2017-08-24 ENCOUNTER — INFUSION (OUTPATIENT)
Dept: INFUSION THERAPY | Facility: HOSPITAL | Age: 52
End: 2017-08-24
Attending: INTERNAL MEDICINE
Payer: COMMERCIAL

## 2017-08-24 ENCOUNTER — OFFICE VISIT (OUTPATIENT)
Dept: HEMATOLOGY/ONCOLOGY | Facility: CLINIC | Age: 52
End: 2017-08-24
Payer: COMMERCIAL

## 2017-08-24 ENCOUNTER — LAB VISIT (OUTPATIENT)
Dept: LAB | Facility: HOSPITAL | Age: 52
End: 2017-08-24
Attending: INTERNAL MEDICINE
Payer: COMMERCIAL

## 2017-08-24 ENCOUNTER — RESEARCH ENCOUNTER (OUTPATIENT)
Dept: RESEARCH | Facility: HOSPITAL | Age: 52
End: 2017-08-24

## 2017-08-24 VITALS
DIASTOLIC BLOOD PRESSURE: 73 MMHG | BODY MASS INDEX: 25.16 KG/M2 | HEIGHT: 71 IN | HEART RATE: 87 BPM | TEMPERATURE: 98 F | SYSTOLIC BLOOD PRESSURE: 111 MMHG | WEIGHT: 179.69 LBS

## 2017-08-24 VITALS
DIASTOLIC BLOOD PRESSURE: 60 MMHG | TEMPERATURE: 98 F | HEART RATE: 75 BPM | SYSTOLIC BLOOD PRESSURE: 105 MMHG | RESPIRATION RATE: 18 BRPM

## 2017-08-24 DIAGNOSIS — E85.81 AL AMYLOIDOSIS: ICD-10-CM

## 2017-08-24 DIAGNOSIS — E85.9 AMYLOIDOSIS, UNSPECIFIED TYPE: ICD-10-CM

## 2017-08-24 DIAGNOSIS — E85.81 AL AMYLOIDOSIS: Primary | ICD-10-CM

## 2017-08-24 DIAGNOSIS — I50.32 CHRONIC DIASTOLIC CONGESTIVE HEART FAILURE: ICD-10-CM

## 2017-08-24 DIAGNOSIS — D47.2 MONOCLONAL GAMMOPATHY: ICD-10-CM

## 2017-08-24 DIAGNOSIS — G62.2 NEUROPATHY DUE TO CHEMICAL SUBSTANCE: ICD-10-CM

## 2017-08-24 DIAGNOSIS — Z00.6 EXAMINATION OF PARTICIPANT IN CLINICAL TRIAL: ICD-10-CM

## 2017-08-24 LAB
ALBUMIN SERPL BCP-MCNC: 3.9 G/DL
ALP SERPL-CCNC: 214 U/L
ALT SERPL W/O P-5'-P-CCNC: 30 U/L
ANION GAP SERPL CALC-SCNC: 11 MMOL/L
AST SERPL-CCNC: 33 U/L
BASOPHILS # BLD AUTO: 0.05 K/UL
BASOPHILS NFR BLD: 0.8 %
BILIRUB DIRECT SERPL-MCNC: 0.8 MG/DL
BILIRUB SERPL-MCNC: 1.6 MG/DL
BUN SERPL-MCNC: 25 MG/DL
CALCIUM SERPL-MCNC: 9.6 MG/DL
CHLORIDE SERPL-SCNC: 102 MMOL/L
CO2 SERPL-SCNC: 26 MMOL/L
CREAT SERPL-MCNC: 1.5 MG/DL
DIFFERENTIAL METHOD: ABNORMAL
DRUG STUDY SPECIMEN TYPE: NORMAL
DRUG STUDY TEST NAME: NORMAL
DRUG STUDY TEST RESULT: NORMAL
EOSINOPHIL # BLD AUTO: 0.2 K/UL
EOSINOPHIL NFR BLD: 2.4 %
ERYTHROCYTE [DISTWIDTH] IN BLOOD BY AUTOMATED COUNT: 14.2 %
EST. GFR  (AFRICAN AMERICAN): >60 ML/MIN/1.73 M^2
EST. GFR  (NON AFRICAN AMERICAN): 53.1 ML/MIN/1.73 M^2
GLUCOSE SERPL-MCNC: 150 MG/DL
HCT VFR BLD AUTO: 40.2 %
HGB BLD-MCNC: 14.1 G/DL
LYMPHOCYTES # BLD AUTO: 1.5 K/UL
LYMPHOCYTES NFR BLD: 22.3 %
MCH RBC QN AUTO: 32.9 PG
MCHC RBC AUTO-ENTMCNC: 35.1 G/DL
MCV RBC AUTO: 94 FL
MONOCYTES # BLD AUTO: 0.4 K/UL
MONOCYTES NFR BLD: 5.7 %
NEUTROPHILS # BLD AUTO: 4.5 K/UL
NEUTROPHILS NFR BLD: 68.5 %
PLATELET # BLD AUTO: 165 K/UL
PMV BLD AUTO: 9.4 FL
POTASSIUM SERPL-SCNC: 4.9 MMOL/L
PROT SERPL-MCNC: 7.5 G/DL
RBC # BLD AUTO: 4.29 M/UL
SODIUM SERPL-SCNC: 139 MMOL/L
WBC # BLD AUTO: 6.63 K/UL

## 2017-08-24 PROCEDURE — 25000003 PHARM REV CODE 250: Performed by: INTERNAL MEDICINE

## 2017-08-24 PROCEDURE — 85025 COMPLETE CBC W/AUTO DIFF WBC: CPT

## 2017-08-24 PROCEDURE — 82248 BILIRUBIN DIRECT: CPT

## 2017-08-24 PROCEDURE — 99000 SPECIMEN HANDLING OFFICE-LAB: CPT

## 2017-08-24 PROCEDURE — 96401 CHEMO ANTI-NEOPL SQ/IM: CPT

## 2017-08-24 PROCEDURE — 83520 IMMUNOASSAY QUANT NOS NONAB: CPT

## 2017-08-24 PROCEDURE — 99213 OFFICE O/P EST LOW 20 MIN: CPT | Mod: S$PBB,,, | Performed by: INTERNAL MEDICINE

## 2017-08-24 PROCEDURE — 3008F BODY MASS INDEX DOCD: CPT | Mod: ,,, | Performed by: INTERNAL MEDICINE

## 2017-08-24 PROCEDURE — 96413 CHEMO IV INFUSION 1 HR: CPT

## 2017-08-24 PROCEDURE — 36415 COLL VENOUS BLD VENIPUNCTURE: CPT

## 2017-08-24 PROCEDURE — 63600175 PHARM REV CODE 636 W HCPCS: Performed by: INTERNAL MEDICINE

## 2017-08-24 PROCEDURE — 80053 COMPREHEN METABOLIC PANEL: CPT

## 2017-08-24 PROCEDURE — 99999 PR PBB SHADOW E&M-EST. PATIENT-LVL III: CPT | Mod: PBBFAC,,, | Performed by: INTERNAL MEDICINE

## 2017-08-24 RX ORDER — HEPARIN 100 UNIT/ML
500 SYRINGE INTRAVENOUS
Status: CANCELLED | OUTPATIENT
Start: 2017-08-28

## 2017-08-24 RX ORDER — BORTEZOMIB 3.5 MG/1
1.4 INJECTION, POWDER, LYOPHILIZED, FOR SOLUTION INTRAVENOUS; SUBCUTANEOUS
Status: CANCELLED | OUTPATIENT
Start: 2017-08-28 | End: 2017-08-28

## 2017-08-24 RX ORDER — SODIUM CHLORIDE 0.9 % (FLUSH) 0.9 %
10 SYRINGE (ML) INJECTION
Status: DISCONTINUED | OUTPATIENT
Start: 2017-08-24 | End: 2017-08-24 | Stop reason: HOSPADM

## 2017-08-24 RX ORDER — ACETAMINOPHEN 650 MG/20.3ML
650 LIQUID ORAL
Status: COMPLETED | OUTPATIENT
Start: 2017-08-24 | End: 2017-08-24

## 2017-08-24 RX ORDER — DIPHENHYDRAMINE HCL 25 MG
25 CAPSULE ORAL ONCE
Status: CANCELLED
Start: 2017-08-28 | End: 2017-08-28

## 2017-08-24 RX ORDER — DIPHENHYDRAMINE HCL 25 MG
25 CAPSULE ORAL ONCE
Status: COMPLETED | OUTPATIENT
Start: 2017-08-24 | End: 2017-08-24

## 2017-08-24 RX ORDER — GABAPENTIN 100 MG/1
300 CAPSULE ORAL NIGHTLY
Qty: 90 CAPSULE | Refills: 2 | Status: SHIPPED | OUTPATIENT
Start: 2017-08-24 | End: 2017-10-26 | Stop reason: SDUPTHER

## 2017-08-24 RX ORDER — SODIUM CHLORIDE 0.9 % (FLUSH) 0.9 %
10 SYRINGE (ML) INJECTION
Status: CANCELLED | OUTPATIENT
Start: 2017-08-28

## 2017-08-24 RX ORDER — DEXTROMETHORPHAN HYDROBROMIDE, GUAIFENESIN 5; 100 MG/5ML; MG/5ML
650 LIQUID ORAL
Status: CANCELLED | OUTPATIENT
Start: 2017-08-28 | End: 2017-08-28

## 2017-08-24 RX ORDER — BORTEZOMIB 3.5 MG/1
1.4 INJECTION, POWDER, LYOPHILIZED, FOR SOLUTION INTRAVENOUS; SUBCUTANEOUS
Status: COMPLETED | OUTPATIENT
Start: 2017-08-24 | End: 2017-08-24

## 2017-08-24 RX ADMIN — ACETAMINOPHEN 650 MG: 650 SOLUTION ORAL at 10:08

## 2017-08-24 RX ADMIN — SODIUM CHLORIDE: 0.9 INJECTION, SOLUTION INTRAVENOUS at 10:08

## 2017-08-24 RX ADMIN — BORTEZOMIB 2.8 MG: 3.5 INJECTION, POWDER, LYOPHILIZED, FOR SOLUTION INTRAVENOUS; SUBCUTANEOUS at 01:08

## 2017-08-24 RX ADMIN — DIPHENHYDRAMINE HYDROCHLORIDE 25 MG: 25 CAPSULE ORAL at 10:08

## 2017-08-24 NOTE — PLAN OF CARE
Problem: Patient Care Overview  Goal: Plan of Care Review  Outcome: Ongoing (interventions implemented as appropriate)  Tolerated treatment well.  Advised to call MD for any problems or concerns.  AVS given.  RTC as schedule.  NAD noted upon discharge.

## 2017-08-24 NOTE — PROGRESS NOTES
"  Thursday, August 24th, 2017     Study: "A Phase 3, randomized, multicenter, double-blind, placebo-controlled, 2-arm, efficacy and safety study of [id number] plus standard of care versus placebo plus standard of care in subjects with light chain (AL) amyloidosis"  Protocol ID# [id number]  IRB# 2015.305.A  Sponsor: [employer]  Investigator: Dr. Carmichael    Month 9, Day 1  IV [id number]/Placebo + SQ Velcade    Patient presents for Month 8, Day 1 infusion per above-mentioned protocol.  Patient is awake, alert, and oriented to person, place, and time.  He states he continues to do well and has had no new or worsening issues since last visit and states that he "actually feels better."  He states he took the stairs up this morning with no SOB and did 42 push-ups after waking up.  Dr. Carmichael is very pleased with his progress.  He states he may actually be able to back off from frequency of Bumex, but leaves that to Dr. Lao.  Patient verbalizes continued willingness to participate in above-mentioned trial.      Review of AE's:     Restless Leg Syndrome, grade 1:  Much improved.  Still has minor issues at nighttime.  Per Dr. Carmichael, may be side effect of Velcade. MD prescribed Neurontin for this. Patient continues supplements as per med list.  AE ongoing.  Total Bilirubin Increased, Grade 2:  Total bilirubin 1.6 per local blood work performed yesterday.  Discussed with Dr. Carmichael and this is NCS and does not affect dosing of further therapy. AE grade , will follow.  Diarrhea:  Patient reports no diarrhea following most recent dose of Velcade. AE resolved.  Dry Skin, grade 1:  No complaints of worsening symptoms.  AE ongoing.  Peripheral sensory neuropathy, grade 1:  Neuropathy remains primary complaint as of now.  Patient is reporting more frequent shooting pains to lower extremities.  States he has numbness to fingertips, but states it is not painful.   Patient continues supplements as per med list.  As stated   AE, " grade 1 ongoing.  Creatinine Increased, Grade 2:  Patient with creatinine of 1.5 today.  Patient on Bumex per Dr. Panda and this is expected.  AE, grade 1.   Insomnia, Grade 2: Continuing Ramelton per Dr. Carmichael. He still does not sleep through the night regularly, but not waking as much. Will start Neurontin in hopes of assisting with sleep/neuropathy issues at night.  Insomnia, grade 2.      Physical performed per Dr. Carmichael, see MD note for Complete PE, symptom-directed PE, ECOG PS and NYHA Classification.  See flowsheets for height, weight, and vital signs.  NIS-LL and VASPI completed, see chart for source documentation.  Patient is not WOCBP, and no blood work required today per Dr. Carmichael, therefore patient had no local labs drawn.  [facility]   Concomitant medications reviewed.  Patient confirms that per Dr. Carmichael, he continues on acyclovir.  Per Dr. Carmichael, patient is approved to initiate M6,D1 treatment today.  Treatment plan updated with today's weight (81.5kg) and BSA (2.02) and signed per Dr. Carmichael.  Benadryl adjusted to PO due to previous experience with RLS.       Study-mandated procedures performed per protocol as follows:      0800:  Pre-dose PK's drawn per protocol  1024:  Premeds administered, see MAR  1105:  Pre-dose vital signs performed, see flowsheet.  Vitals read as follows:  /68, HR 78, Temp 98.1, RR 18.  1102:  Triplicate EKG performed per research staff.  Tracings transmitted to [facility] for central review.  1106:  Study drug infusion initiated   1210:  Infusion completed.  1210:  Vital signs at end of infusion performed.  Vitals as follows:  /74, HR 79, RR 20, T 98.4   1310:  1 hour post end of infusion Vitals: /60, HR 75, RR 18, T 98.1  1340:  End of Observation period  1340:  Velcade Administration    Patient was monitored for the full 90 minutes post infusion and tolerated the above with no difficulties, see infusion RN  documentation.      Dr. Carmichael discussed possibly lengthening Velcade administration even further in near future to every 6 weeks, but will make decision about this following next cardiac visit and work up.  Patient verbalizes understanding of this and pleased with this plan.  Patient questioning continuing participation in trial at this visit.  He states he has visit with DrChristina's Micaela on 9/25 and will make a decision regarding continuing study participation at that time.     All of patient's questions were answered to his satisfaction.  Upcoming appointment calendar reviewed and given to patient with appt's for next cycle on 9/26/17.  Patient states understanding of plan of care and upcoming schedule.  He states having Research RN contact information as well as that of Dr. Carmichael.

## 2017-08-24 NOTE — PROGRESS NOTES
Subjective:       Patient ID: Roc Lai Jr. is a 52 y.o. male.    Chief Complaint: No chief complaint on file.    HPI  ECOG 1, NYHA II. Mr. Lai is here for follow up six months into the Hnd344 trial to treat cardiac AL amyloidosis with significant marrow plasma cells.  He notes less weakness with improved physical ability including climbing stairs on daily Bumex dosing (cut back).  He will restart traveling for his work.  No swallowing issues but he does have progressive neurologic problems with numbness at fingers and toes.  He notes mild swelling at his lower extremity R>L.    No light headedness with less dyspnea over the last month.  Minimal anorexia and diarrhea with his does of bortezomib.    Repeat cardiac echo 7/5/17 shows ongoing LVEF of 40% with concentric thickening and new tricuspid regurgitation.      History: Prior to diagnosis he was previously vigorous and ran over three miles thrice weekly 3 years ago.  However, over the 18 months prior to presentation, he has noted progressive decline in his exercise capacity to only being able to do 10 min of exercise now.  He has previously been studied for CAD with normal coronaries in 3/82666.  He was diagnosed with atrial flutter and underwent cardiac ablation without improvement in his exercise capacity.  He was found to have an atrial septal defect and this was closed 9/26/16 but this did not improve his symptoms either.  His symptoms progressed over the last 3 months to the point he can no longer exercise and notes dyspnea with carrying croceries, climbing stairs, etc.  He was admitted to cardiology 11/27/16 with 17 pounds of fluid loss and his is now sleeping better.  He was diagnosed with diastolic heart failure and cardia MRI was concerning for infiltrative cardiac disease.      SPEP 12/1/16 showed decreased gamma globulins and free lambda light chains increased with M-spike of 0.19 g/dL.  Free lambda light chain was elevated at 50 mg/dL with  normal lappa 1.37 mg/dL and ratio 0.03.  However, abdominal fat pad biopsy was negative for amyoid with the presence of fat cells 11/30/16.  ATTR DNA test negative for familial amyloidosis.    CBC normal 12/1/16 with normal renal function, calcium, albumin and slightly low total protein levels.      Cardiac biopsy done 1/3/17 confirmed cardiac involvement with AL amyloid based on congo red staining and liquid chromatography tandem mass spectrometry.     Bone marrow biopsy done 1/12/17 showed a 60% plasma cell infiltrate.     Metastatic survey 1/19/17 was negative for bony disease.  MRI scans of the cervical, thoracic, lumbar and pelvic done 1/27/17 did not disclose specific lesions to change diagnosis to myeloma.     His pre-trial walk test confirmed his candidacy for the protocol and he notes some progression with having to rest after a walk on flat surface of about a mile.    [facility]  Diastolic heart failure    Review of Systems   Constitutional: Negative for activity change, appetite change, diaphoresis, fatigue, fever and unexpected weight change.   HENT: Negative for mouth sores, sore throat and trouble swallowing.    Respiratory: Negative for cough and shortness of breath.         All improved since diuresis with increased exercise tolerance   Cardiovascular: Negative for chest pain and leg swelling.   Gastrointestinal: Negative for abdominal pain, blood in stool, constipation and diarrhea.   Genitourinary: Negative for dysuria and hematuria.   Musculoskeletal: Negative for back pain and neck pain.   Skin: Negative for rash.   Neurological: Positive for numbness (hands and toes/sole bilaterally variable). Negative for tremors, weakness, light-headedness and headaches.   Hematological: Negative for adenopathy.   Psychiatric/Behavioral: Positive for sleep disturbance. Negative for confusion.       Objective:      Physical Exam   Constitutional: He is oriented to person, place, and time. He appears well-developed and  well-nourished. No distress.   HENT:   Head: Normocephalic and atraumatic.   Mouth/Throat: Oropharynx is clear and moist. No oropharyngeal exudate.   Tongue not enlarged  No submandibular enlargement   Eyes: Conjunctivae are normal. Pupils are equal, round, and reactive to light.   Neck: Neck supple.   Cardiovascular: Normal rate and regular rhythm.    Pulmonary/Chest: Effort normal and breath sounds normal. He has no rales.   Abdominal: Soft. Bowel sounds are normal. He exhibits no distension (No ascites) and no mass. There is no splenomegaly. There is no tenderness.   Musculoskeletal: Normal range of motion. He exhibits no edema.   No rib or spine tenderness   Lymphadenopathy:     He has no cervical adenopathy.     He has no axillary adenopathy.        Right: No inguinal adenopathy present.        Left: No inguinal adenopathy present.   Neurological: He is alert and oriented to person, place, and time.   Reflex Scores:       Bicep reflexes are 3+ on the right side and 3+ on the left side.       Patellar reflexes are 3+ on the right side and 3+ on the left side.       Achilles reflexes are 2+ on the right side and 2+ on the left side.  Normal touch, sharp and proprioception.  Decreased vibration at left foot   Skin: Skin is warm and dry. No rash noted.   No unusual bruising   Psychiatric: He has a normal mood and affect. Thought content normal.       Assessment:       1. AL amyloidosis    2. Monoclonal gammopathy    3. Chronic diastolic congestive heart failure        Plan:       Mr. Lai has a monoclonal lambda light chain coming from a plasma cell infiltrate in the marrow that serves as the reservoir for amyloid production affecting his heart.  His cardiac amyloid has been confirmed as AL Amlyoid by biopsy and testing at [place] in 1/2017.  He is now seven months into therapy on the Skx194 trial and will start cycle 8 next month.  He is now on monthly therapy given the suppression of his light chain production  and we may even move to every 6 weeks.  He has a mild sensory neuropathy but no real objective findings with preserved function overall.    His exercise tolerance has improved on Bumex with is only daily now and he continues to push himself in activities and has increased his travel without issues.  His last cardiac echo showed worsening tricuspid regurgitation with stable LVEF.  However, he he continues to feel better and has been doing more including climbing stairs without problems.  We would typically expect a 6-9 month latency for response to bortezomib which he has passed.    His bone marrow biopsy on 1/12/17 showed a 60% cellular marrow with 28% plasma cells by morphology but 50% by immunohistochemistry.  Hence, he has at least a smoldering myeloma.  He has no renal, calcium or hemoglobin issues and he has no bony symptoms to suggest multiple myeloma and plain films do not show any lytic lesions as of 1/2017.  MRI 1/2017 did not show lesions that would define him as multiple myeloma.    He does not seem to have other systemic manifestations of AL amyloidosis including the absence of enlarged tongue, organomegaly, coagulopathy, neuropathy, carpal tunnel syndrome or renal disease.       He continues to have some sleep issues and has responded to ramelteon.  However, his evolving neuropathy plays a role with his lack of sleep and he will try Neurontin at night (300 mg PRN).    All of his questions were answered in the clinic today and he will follow up as per protocol in one month with continued therapy as per protocol in one month.  Given how much better he is he has raised the issue of coming off protocol but will think about this over the next month.  His progress is very encouraging.

## 2017-08-25 LAB
KAPPA LC SER QL IA: 1.67 MG/DL
KAPPA LC/LAMBDA SER IA: 1.72
LAMBDA LC SER QL IA: 0.97 MG/DL

## 2017-09-25 ENCOUNTER — LAB VISIT (OUTPATIENT)
Dept: LAB | Facility: HOSPITAL | Age: 52
End: 2017-09-25
Attending: INTERNAL MEDICINE
Payer: COMMERCIAL

## 2017-09-25 ENCOUNTER — OFFICE VISIT (OUTPATIENT)
Dept: HEMATOLOGY/ONCOLOGY | Facility: CLINIC | Age: 52
End: 2017-09-25
Payer: COMMERCIAL

## 2017-09-25 ENCOUNTER — OFFICE VISIT (OUTPATIENT)
Dept: TRANSPLANT | Facility: CLINIC | Age: 52
End: 2017-09-25
Attending: INTERNAL MEDICINE
Payer: COMMERCIAL

## 2017-09-25 ENCOUNTER — RESEARCH ENCOUNTER (OUTPATIENT)
Dept: RESEARCH | Facility: HOSPITAL | Age: 52
End: 2017-09-25

## 2017-09-25 VITALS
WEIGHT: 185.19 LBS | SYSTOLIC BLOOD PRESSURE: 116 MMHG | HEART RATE: 90 BPM | HEIGHT: 71 IN | BODY MASS INDEX: 25.93 KG/M2 | DIASTOLIC BLOOD PRESSURE: 80 MMHG

## 2017-09-25 VITALS
BODY MASS INDEX: 25.77 KG/M2 | TEMPERATURE: 98 F | WEIGHT: 184.06 LBS | SYSTOLIC BLOOD PRESSURE: 117 MMHG | HEART RATE: 92 BPM | HEART RATE: 87 BPM | HEIGHT: 71 IN | DIASTOLIC BLOOD PRESSURE: 80 MMHG | DIASTOLIC BLOOD PRESSURE: 75 MMHG | RESPIRATION RATE: 18 BRPM | SYSTOLIC BLOOD PRESSURE: 129 MMHG | OXYGEN SATURATION: 100 %

## 2017-09-25 DIAGNOSIS — I50.32 CHRONIC DIASTOLIC CONGESTIVE HEART FAILURE: Primary | ICD-10-CM

## 2017-09-25 DIAGNOSIS — D47.2 MONOCLONAL GAMMOPATHY: ICD-10-CM

## 2017-09-25 DIAGNOSIS — E85.81 AL AMYLOIDOSIS: ICD-10-CM

## 2017-09-25 DIAGNOSIS — I50.32 CHRONIC DIASTOLIC CONGESTIVE HEART FAILURE: ICD-10-CM

## 2017-09-25 DIAGNOSIS — Q21.11 ASD (ATRIAL SEPTAL DEFECT), OSTIUM SECUNDUM: ICD-10-CM

## 2017-09-25 DIAGNOSIS — G62.2 NEUROPATHY DUE TO CHEMICAL SUBSTANCE: Primary | ICD-10-CM

## 2017-09-25 DIAGNOSIS — E85.9 AMYLOIDOSIS, UNSPECIFIED TYPE: ICD-10-CM

## 2017-09-25 DIAGNOSIS — Z00.6 EXAMINATION OF PARTICIPANT IN CLINICAL TRIAL: ICD-10-CM

## 2017-09-25 DIAGNOSIS — R79.89 ABNORMAL LIVER FUNCTION TEST: ICD-10-CM

## 2017-09-25 DIAGNOSIS — I42.5 OTHER RESTRICTIVE CARDIOMYOPATHY: ICD-10-CM

## 2017-09-25 DIAGNOSIS — I48.3 TYPICAL ATRIAL FLUTTER: ICD-10-CM

## 2017-09-25 LAB
ALBUMIN SERPL BCP-MCNC: 3.9 G/DL
ALP SERPL-CCNC: 197 U/L
ALT SERPL W/O P-5'-P-CCNC: 30 U/L
ANION GAP SERPL CALC-SCNC: 11 MMOL/L
AST SERPL-CCNC: 35 U/L
BASOPHILS # BLD AUTO: 0.03 K/UL
BASOPHILS NFR BLD: 0.7 %
BILIRUB SERPL-MCNC: 1.5 MG/DL
BUN SERPL-MCNC: 25 MG/DL
CALCIUM SERPL-MCNC: 9.5 MG/DL
CHLORIDE SERPL-SCNC: 103 MMOL/L
CO2 SERPL-SCNC: 25 MMOL/L
CREAT SERPL-MCNC: 1.4 MG/DL
DIFFERENTIAL METHOD: ABNORMAL
DRUG STUDY SPECIMEN TYPE: NORMAL
DRUG STUDY TEST NAME: NORMAL
DRUG STUDY TEST RESULT: NORMAL
EOSINOPHIL # BLD AUTO: 0.1 K/UL
EOSINOPHIL NFR BLD: 2.6 %
ERYTHROCYTE [DISTWIDTH] IN BLOOD BY AUTOMATED COUNT: 14 %
EST. GFR  (AFRICAN AMERICAN): >60 ML/MIN/1.73 M^2
EST. GFR  (NON AFRICAN AMERICAN): 57.8 ML/MIN/1.73 M^2
GLUCOSE SERPL-MCNC: 122 MG/DL
HCT VFR BLD AUTO: 41.8 %
HGB BLD-MCNC: 14.2 G/DL
LYMPHOCYTES # BLD AUTO: 0.7 K/UL
LYMPHOCYTES NFR BLD: 15.3 %
MCH RBC QN AUTO: 32.6 PG
MCHC RBC AUTO-ENTMCNC: 34 G/DL
MCV RBC AUTO: 96 FL
MONOCYTES # BLD AUTO: 0.7 K/UL
MONOCYTES NFR BLD: 17.2 %
NEUTROPHILS # BLD AUTO: 2.8 K/UL
NEUTROPHILS NFR BLD: 64 %
PLATELET # BLD AUTO: 136 K/UL
PMV BLD AUTO: 9.3 FL
POTASSIUM SERPL-SCNC: 4.3 MMOL/L
PROT SERPL-MCNC: 7.7 G/DL
RBC # BLD AUTO: 4.36 M/UL
SODIUM SERPL-SCNC: 139 MMOL/L
WBC # BLD AUTO: 4.3 K/UL

## 2017-09-25 PROCEDURE — 3008F BODY MASS INDEX DOCD: CPT | Mod: S$GLB,,, | Performed by: INTERNAL MEDICINE

## 2017-09-25 PROCEDURE — 36415 COLL VENOUS BLD VENIPUNCTURE: CPT

## 2017-09-25 PROCEDURE — 99999 PR PBB SHADOW E&M-EST. PATIENT-LVL III: CPT | Mod: PBBFAC,,, | Performed by: INTERNAL MEDICINE

## 2017-09-25 PROCEDURE — 99000 SPECIMEN HANDLING OFFICE-LAB: CPT

## 2017-09-25 PROCEDURE — 99213 OFFICE O/P EST LOW 20 MIN: CPT | Mod: S$PBB,,, | Performed by: INTERNAL MEDICINE

## 2017-09-25 PROCEDURE — 83520 IMMUNOASSAY QUANT NOS NONAB: CPT

## 2017-09-25 PROCEDURE — 85025 COMPLETE CBC W/AUTO DIFF WBC: CPT

## 2017-09-25 PROCEDURE — 99213 OFFICE O/P EST LOW 20 MIN: CPT | Mod: S$GLB,,, | Performed by: INTERNAL MEDICINE

## 2017-09-25 PROCEDURE — 80053 COMPREHEN METABOLIC PANEL: CPT

## 2017-09-25 PROCEDURE — 99999 PR PBB SHADOW E&M-EST. PATIENT-LVL IV: CPT | Mod: PBBFAC,,, | Performed by: INTERNAL MEDICINE

## 2017-09-25 PROCEDURE — 3008F BODY MASS INDEX DOCD: CPT | Mod: ,,, | Performed by: INTERNAL MEDICINE

## 2017-09-25 RX ORDER — HEPARIN 100 UNIT/ML
500 SYRINGE INTRAVENOUS
Status: CANCELLED | OUTPATIENT
Start: 2017-09-26

## 2017-09-25 RX ORDER — BORTEZOMIB 3.5 MG/1
1.4 INJECTION, POWDER, LYOPHILIZED, FOR SOLUTION INTRAVENOUS; SUBCUTANEOUS
Status: CANCELLED | OUTPATIENT
Start: 2017-10-10 | End: 2017-10-10

## 2017-09-25 RX ORDER — SODIUM CHLORIDE 0.9 % (FLUSH) 0.9 %
10 SYRINGE (ML) INJECTION
Status: CANCELLED | OUTPATIENT
Start: 2017-09-26

## 2017-09-25 RX ORDER — DIPHENHYDRAMINE HCL 25 MG
25 CAPSULE ORAL ONCE
Status: CANCELLED
Start: 2017-09-26 | End: 2017-09-26

## 2017-09-25 RX ORDER — SODIUM CHLORIDE 0.9 % (FLUSH) 0.9 %
10 SYRINGE (ML) INJECTION
Status: CANCELLED | OUTPATIENT
Start: 2017-10-10

## 2017-09-25 RX ORDER — HEPARIN 100 UNIT/ML
500 SYRINGE INTRAVENOUS
Status: CANCELLED | OUTPATIENT
Start: 2017-10-10

## 2017-09-25 RX ORDER — DEXTROMETHORPHAN HYDROBROMIDE, GUAIFENESIN 5; 100 MG/5ML; MG/5ML
650 LIQUID ORAL
Status: CANCELLED | OUTPATIENT
Start: 2017-09-26 | End: 2017-09-26

## 2017-09-25 NOTE — PROGRESS NOTES
"Subjective:     Patient ID:  Roc Lai Jr. is a 52 y.o. male who presents for follow-up of Congestive Heart Failure    HPI:  52 yo WM retired from [employer] but still working full time returns for f/u of infiltrative CM due to AL amyloid.  He is treated and followed by Dr. Carmichael. At last visit 7/7/17, I was concerned total bilirubin elevation was due to congestion.  At that time he reported his home scale 190#--1 month prior 193. I increased Bumex 2 mg TID to mobilize volume and he reports wt went down 10# very quickly.  Since that time labs demonstrate reduction in total bilirubin and acceptable Cr. 1.4-1.6 range.  He is here today for routine visit and wt on my scale down 10#.  I learned today that on his own after speaking with Oncology nurse they reduced the bumex from 2 mg TID to daily.  He reports that he was no longer having PAULSON, sleeping better and more active.  He feel since lower dose wt is still stable and feels really well.  Working full time. He is biking and walking as well as push up 40/morning.  Back traveling and feeling better.  Took one extra PM dose bumex one day with traveling.  He feels much better to point that he reports that he can really feel a difference.  He did try to jog once but had to convert to walk but did for a full week.    Neuropathy is his biggest problem now.    Review of Systems   Constitution: Positive for weight loss (10# on my scale).     Objective:   Physical Exam   Constitutional: He appears well-developed and well-nourished. No distress.   /80 (BP Location: Left arm, Patient Position: Sitting, BP Method: Large (Automatic))   Pulse 90   Ht 5' 11" (1.803 m)   Wt 84 kg (185 lb 3 oz)   BMI 25.83 kg/m²   Last visit Wt 88.9 kg (195 lb 15.8 oz)    HENT:   Head: Normocephalic and atraumatic.   Eyes: Conjunctivae are normal. Right eye exhibits no discharge. Left eye exhibits no discharge. No scleral icterus.   Neck: JVD (half neck for V wave while " sitting with HJR) present. No thyromegaly present.   Cardiovascular: Normal rate and regular rhythm.  Exam reveals gallop (S3 present; had to roll onto left side to hear today).    No murmur heard.  Pulmonary/Chest: Effort normal and breath sounds normal. No respiratory distress. He has no wheezes. He has no rales.   Abdominal: Soft. Bowel sounds are normal. He exhibits no distension (liver span 18 cm). There is no tenderness.   Musculoskeletal: He exhibits edema (at most trace edema today). He exhibits no tenderness.   Neurological: He is alert.   Skin: Skin is warm and dry. He is not diaphoretic.     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

## 2017-09-25 NOTE — LETTER
September 30, 2017        James Lao Jr., MD  [address]  [facility] [address]             [facility]  [address]  [facility] [address]  Phone: [phone]   Patient: Roc Lai Jr.   MR Number: [id number]   YOB: 1965   Date of Visit: 9/25/2017       Dear Dr. Lao:    Thank you for referring Roc Lai to me for evaluation. Below are the relevant portions of my assessment and plan of care.        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 Chronic diastolic congestive heart failure    3. AL amyloidosis    4. Monoclonal gammopathy          Mr. Lai has a monoclonal lambda light chain coming from a plasma cell infiltrate in the marrow that serves as the reservoir for amyloid production affecting his heart.  His cardiac amyloid has been confirmed as AL Amlyoid by biopsy and testing at [place] in 1/2017.  He is now seven months into therapy on the [id number] trial and will start cycle 8 next month.  He is now on monthly therapy given the suppression of his light chain production and we may even move to every 6 weeks.  He has a mild sensory neuropathy but no real objective findings with preserved function overall.    His exercise tolerance has improved on less diuretic and he continues to push himself in activities and has increased his travel without issues.  His cardiac echo in July showed worsening tricuspid regurgitation with stable LVEF.  However, he he continues to feel better and has been doing more including climbing stairs without problems.  We would typically expect a 6-9 month latency for response to bortezomib which he has passed.    His bone marrow biopsy on 1/12/17 showed a 60% cellular marrow with 28% plasma cells by morphology but 50% by immunohistochemistry.  Hence, he has at least a smoldering myeloma.  He has no renal, calcium or hemoglobin issues and he has no bony symptoms to suggest multiple myeloma and plain films do not show  any lytic lesions as of 1/2017.  MRI 1/2017 did not show lesions that would define him as multiple myeloma.    He does not seem to have other systemic manifestations of AL amyloidosis including the absence of enlarged tongue, organomegaly, coagulopathy, neuropathy, carpal tunnel syndrome or renal disease.       Sleep issues have improved with evolving neuropathy playing a key role with his lack of sleep and he will continue Neurontin.    All of his questions were answered in the clinic today and he will follow up as per protocol in one month with continued therapy as per protocol in one month.  Given how much better he is he has raised the issue of coming off protocol but he will continue to follow up for now.  His progress is very encouraging and he may need another cardiac evaluation soon.      If you have questions, please do not hesitate to call me. I look forward to following Roc along with you.    Sincerely,      Elvis Carmichael MD           CC  Chon Harmon MD

## 2017-09-25 NOTE — PROGRESS NOTES
"  Monday, September 25th, 2017     Study: "A Phase 3, randomized, multicenter, double-blind, placebo-controlled, 2-arm, efficacy and safety study of [id number] plus standard of care versus placebo plus standard of care in subjects with light chain (AL) amyloidosis"  Protocol ID# [id number]  IRB# 2015.305.A  Sponsor: [employer]  Investigator: Dr. Carmichael    Month 9, Day 1  Office Visit/6MWT    Patient presents for Month 9, Day 1 infusion per above-mentioned protocol.  Patient is awake, alert, and oriented to person, place, and time.  He states he continues to experiences improvement in quality of life.  He states he took the stairs up this morning with no SOB and did 42 push-ups this morning and exercising regularly.  Dr. Carmichael is very pleased with his progress.  Patient presents with questions regarding his disease process and expectations of therapy following his visit with Dr. Lao this morning.   Patient verbalizes continued willingness to participate in above-mentioned trial at this time.      Review of AE's:     Restless Leg Syndrome, grade 1:  No reports of this today. Taking nightly gabapentin, which helps him rest. Patient states he intermittently takes vitamin supplements as per med list.  AE ongoing.  Total Bilirubin Increased, Grade 2:  Total bilirubin 1.5 per local blood work performed yesterday.  This has improved as of late, now grade 1.   Discussed with Dr. Carmichael and this is NCS and does not affect dosing of further therapy. AE grade 1, will follow.  Dry Skin, grade 1:  No complaints of worsening symptoms.  AE ongoing.  Peripheral sensory neuropathy, grade 1:  Neuropathy remains primary complaint today, but states it is stable.  States he has numbness to fingertips, but states it is not painful.   Patient continues supplements as per med list.   AE, grade 1 ongoing.  Creatinine Increased, Grade 2:  Patient with creatinine of 1.4 today, now WNL.  Patient on Bumex per Dr. Panda and this is " expected.  AE resolved for now.   Insomnia, Grade 2: This is improved. He still does not sleep through the night regularly, but not waking as much. Still taking Neurontin which he states assists with sleep/neuropathy issues at night.  Only taking Ramelton sparingly.  AE improved, now grade 1.       Physical performed per Dr. Carmichael, see MD note for Complete PE, symptom-directed PE, ECOG PS and NYHA Classification.  24 hour urine performed per protocol.  See flowsheets for height, weight, con meds and vital signs.  Dr. Lao made no changes to his con meds at his visit this morning.  NIS-LL and VASPI completed, see chart for source documentation.  Patient is not WOCBP.  Local blood work performed and reviewed today per Dr. Carmichael.  Central labs collected shipped to [facility] lab today with exception of PK's that will be collected ahead of infusion tomorrow.   Concomitant medications reviewed.  Patient confirms that per Dr. Carmichael, he continues on acyclovir.  Per Dr. Carmichael, patient is approved to initiate M9,D1 treatment today.  Treatment plan updated with today's weight (83.5 kg) and BSA (2.05 m2) and signed per Dr. Carmichael.  Benadryl adjusted to PO due to previous experience with RLS.      Based on patient continuing to show light chain response and feeling well, Dr. Carmichael will increase frequency of SOC Velcade to every 6 weeks.  Patient verbalizes understanding of this and pleased with this plan.      As stated above, Dr. Carmichael had lengthy conversation with patient regarding disease process and expectations of therapy.  He clarified per the patient that the Velcade is indeed helping to decrease the light chain production that is causing his cardiac dysfunction.  The explanation of why he is doing better is attributable to one of two thEings: 1) His immune system is assisting with the reabsorption of the amyloid deposits, 2) he is getting the KANIKA drug that is hastening this removal.  In any case, patient is doing  clinically better.  He opts to remain on the study at this time, at least through the timepoint of his next Echo.    Patient successfully completed 6MWT following clinic visit today.  6MWT 9 laps @ 160 ft/lap + 141 ft = 1581 ft.  6MWT performed according to manual and information transmitted as instructed.    All of patient's questions were answered to his satisfaction.  Patient states understanding of plan of care and upcoming schedule.  He states having Research RN contact information as well as that of Dr. Carmichael.  He will report tomorrow, 9/26 for study drug infusion.

## 2017-09-26 ENCOUNTER — RESEARCH ENCOUNTER (OUTPATIENT)
Dept: RESEARCH | Facility: HOSPITAL | Age: 52
End: 2017-09-26

## 2017-09-26 ENCOUNTER — INFUSION (OUTPATIENT)
Dept: INFUSION THERAPY | Facility: HOSPITAL | Age: 52
End: 2017-09-26
Attending: INTERNAL MEDICINE
Payer: COMMERCIAL

## 2017-09-26 VITALS
TEMPERATURE: 99 F | HEART RATE: 90 BPM | SYSTOLIC BLOOD PRESSURE: 101 MMHG | DIASTOLIC BLOOD PRESSURE: 65 MMHG | RESPIRATION RATE: 18 BRPM

## 2017-09-26 DIAGNOSIS — E85.81 AL AMYLOIDOSIS: Primary | ICD-10-CM

## 2017-09-26 DIAGNOSIS — E85.81 AL AMYLOIDOSIS: ICD-10-CM

## 2017-09-26 LAB
KAPPA LC SER QL IA: 1.69 MG/DL
KAPPA LC/LAMBDA SER IA: 1.58
LAMBDA LC SER QL IA: 1.07 MG/DL

## 2017-09-26 PROCEDURE — 96413 CHEMO IV INFUSION 1 HR: CPT

## 2017-09-26 PROCEDURE — 25000003 PHARM REV CODE 250: Performed by: INTERNAL MEDICINE

## 2017-09-26 RX ORDER — LANOLIN ALCOHOL/MO/W.PET/CERES
400 CREAM (GRAM) TOPICAL DAILY
Qty: 30 TABLET | Refills: 4 | Status: SHIPPED | OUTPATIENT
Start: 2017-09-26 | End: 2017-11-15 | Stop reason: SDUPTHER

## 2017-09-26 RX ORDER — ACETAMINOPHEN 325 MG/1
650 TABLET ORAL
Status: COMPLETED | OUTPATIENT
Start: 2017-09-26 | End: 2017-09-26

## 2017-09-26 RX ORDER — DIPHENHYDRAMINE HCL 25 MG
25 CAPSULE ORAL ONCE
Status: COMPLETED | OUTPATIENT
Start: 2017-09-26 | End: 2017-09-26

## 2017-09-26 RX ORDER — SODIUM CHLORIDE 0.9 % (FLUSH) 0.9 %
10 SYRINGE (ML) INJECTION
Status: DISCONTINUED | OUTPATIENT
Start: 2017-09-26 | End: 2017-09-26 | Stop reason: HOSPADM

## 2017-09-26 RX ORDER — HEPARIN 100 UNIT/ML
500 SYRINGE INTRAVENOUS
Status: DISCONTINUED | OUTPATIENT
Start: 2017-09-26 | End: 2017-09-26 | Stop reason: HOSPADM

## 2017-09-26 RX ADMIN — SODIUM CHLORIDE: 0.9 INJECTION, SOLUTION INTRAVENOUS at 07:09

## 2017-09-26 RX ADMIN — ACETAMINOPHEN 650 MG: 325 TABLET ORAL at 07:09

## 2017-09-26 RX ADMIN — DIPHENHYDRAMINE HYDROCHLORIDE 25 MG: 25 CAPSULE ORAL at 07:09

## 2017-09-26 NOTE — PLAN OF CARE
Problem: Patient Care Overview  Goal: Plan of Care Review  Outcome: Ongoing (interventions implemented as appropriate)  Pt tolerated [id number] study treatment well.  Observed for 90min post infusion. No s/s of reaction. Vitals taken per protocol, all stable. NAD.

## 2017-09-26 NOTE — PROGRESS NOTES
"  Tuesday, September 26th, 2017     Study: "A Phase 3, randomized, multicenter, double-blind, placebo-controlled, 2-arm, efficacy and safety study of [id number] plus standard of care versus placebo plus standard of care in subjects with light chain (AL) amyloidosis"  Protocol ID# [id number]  IRB# 2015.305.A  Sponsor: [employer]  Investigator: Dr. Carmichael    Month 9, Day 1  IV [id number]/Placebo    Patient presents for month 9, day 1 treatment per above-mentioned protocol.  He is ambulatory, awake, alert, and oriented to person, place, and time, accompanied by self.  See 9/25 progress note.  No new symptoms to report from that of yesterday, 9/25.  Patient completed [employer] questionnaires prior to any study-related procedures being performed today.  Patient states continued willingness to participate in above-mentioned trial.    See AE review from yesterday, 9/25.    Patient then proceeded to infusion suite.  Study-mandated procedures performed per protocol as follows:    0750: Pre-dose PK's drawn per protocol  0759:  Premeds administered, see MAR  0852:  Pre-dose vital signs performed, see flowsheet.  Vitals read as follows:  /64, HR 64, Temp 97.5, RR 20.  0900:  Triplicate EKG performed per research staff.  Tracings transmitted to ERT.  0904:  Study drug infusion initiated   1004  Infusion completed.  1012:  Vital signs at end of infusion performed.  Vitals as follows:  /66, HR 86, RR 18, T 98.6  1112: 1 hour post end of infusion Vitals: /65, HR 90, RR 18 T 98.6  1125: End of Observation period    Patient was monitored for the full 90 minutes post infusion (until ) and tolerated the above with no difficulties, see infusion RN documentation.       All of patient's questions were answered to his satisfaction.  Patient scheduled for SQ Velcade in two weeks (10/9) as Dr. Carmichael is now moving his Velcade to [id number].  Additionally, patient's cycle 10 was scheduled for Tuesday, October 24th.  Patient " states understanding of plan of care and upcoming schedule.  He states having Research RN contact information as well as that of Dr. Carmichael.

## 2017-10-09 ENCOUNTER — INFUSION (OUTPATIENT)
Dept: INFUSION THERAPY | Facility: HOSPITAL | Age: 52
End: 2017-10-09
Attending: INTERNAL MEDICINE
Payer: COMMERCIAL

## 2017-10-09 VITALS
TEMPERATURE: 98 F | RESPIRATION RATE: 18 BRPM | HEART RATE: 84 BPM | DIASTOLIC BLOOD PRESSURE: 60 MMHG | SYSTOLIC BLOOD PRESSURE: 105 MMHG

## 2017-10-09 DIAGNOSIS — E85.81 AL AMYLOIDOSIS: Primary | ICD-10-CM

## 2017-10-09 PROCEDURE — 96401 CHEMO ANTI-NEOPL SQ/IM: CPT

## 2017-10-09 PROCEDURE — 63600175 PHARM REV CODE 636 W HCPCS: Performed by: INTERNAL MEDICINE

## 2017-10-09 RX ORDER — BORTEZOMIB 3.5 MG/1
1.4 INJECTION, POWDER, LYOPHILIZED, FOR SOLUTION INTRAVENOUS; SUBCUTANEOUS
Status: COMPLETED | OUTPATIENT
Start: 2017-10-09 | End: 2017-10-09

## 2017-10-09 RX ADMIN — BORTEZOMIB 2.9 MG: 3.5 INJECTION, POWDER, LYOPHILIZED, FOR SOLUTION INTRAVENOUS; SUBCUTANEOUS at 09:10

## 2017-10-24 ENCOUNTER — LAB VISIT (OUTPATIENT)
Dept: LAB | Facility: HOSPITAL | Age: 52
End: 2017-10-24
Attending: INTERNAL MEDICINE
Payer: COMMERCIAL

## 2017-10-24 ENCOUNTER — RESEARCH ENCOUNTER (OUTPATIENT)
Dept: RESEARCH | Facility: HOSPITAL | Age: 52
End: 2017-10-24

## 2017-10-24 ENCOUNTER — OFFICE VISIT (OUTPATIENT)
Dept: HEMATOLOGY/ONCOLOGY | Facility: CLINIC | Age: 52
End: 2017-10-24
Payer: COMMERCIAL

## 2017-10-24 ENCOUNTER — INFUSION (OUTPATIENT)
Dept: INFUSION THERAPY | Facility: HOSPITAL | Age: 52
End: 2017-10-24
Attending: INTERNAL MEDICINE
Payer: COMMERCIAL

## 2017-10-24 VITALS
RESPIRATION RATE: 18 BRPM | TEMPERATURE: 98 F | SYSTOLIC BLOOD PRESSURE: 102 MMHG | HEART RATE: 83 BPM | DIASTOLIC BLOOD PRESSURE: 64 MMHG

## 2017-10-24 VITALS
BODY MASS INDEX: 25.83 KG/M2 | HEART RATE: 98 BPM | TEMPERATURE: 98 F | SYSTOLIC BLOOD PRESSURE: 111 MMHG | WEIGHT: 185.19 LBS | RESPIRATION RATE: 18 BRPM | DIASTOLIC BLOOD PRESSURE: 59 MMHG

## 2017-10-24 DIAGNOSIS — D47.2 MONOCLONAL GAMMOPATHY: ICD-10-CM

## 2017-10-24 DIAGNOSIS — E85.9 AMYLOIDOSIS, UNSPECIFIED TYPE: ICD-10-CM

## 2017-10-24 DIAGNOSIS — E85.81 AL AMYLOIDOSIS: ICD-10-CM

## 2017-10-24 DIAGNOSIS — E85.81 AL AMYLOIDOSIS: Primary | ICD-10-CM

## 2017-10-24 DIAGNOSIS — Z00.6 EXAMINATION OF PARTICIPANT IN CLINICAL TRIAL: ICD-10-CM

## 2017-10-24 DIAGNOSIS — G62.2 NEUROPATHY DUE TO CHEMICAL SUBSTANCE: Primary | ICD-10-CM

## 2017-10-24 DIAGNOSIS — I42.5 OTHER RESTRICTIVE CARDIOMYOPATHY: ICD-10-CM

## 2017-10-24 LAB
ALBUMIN SERPL BCP-MCNC: 4 G/DL
ALP SERPL-CCNC: 195 U/L
ALT SERPL W/O P-5'-P-CCNC: 30 U/L
ANION GAP SERPL CALC-SCNC: 13 MMOL/L
AST SERPL-CCNC: 34 U/L
BASOPHILS # BLD AUTO: 0.04 K/UL
BASOPHILS NFR BLD: 0.6 %
BILIRUB SERPL-MCNC: 1.3 MG/DL
BUN SERPL-MCNC: 26 MG/DL
CALCIUM SERPL-MCNC: 9.7 MG/DL
CHLORIDE SERPL-SCNC: 101 MMOL/L
CO2 SERPL-SCNC: 26 MMOL/L
CREAT SERPL-MCNC: 1.5 MG/DL
DIFFERENTIAL METHOD: ABNORMAL
DRUG STUDY SPECIMEN TYPE: NORMAL
DRUG STUDY TEST NAME: NORMAL
DRUG STUDY TEST RESULT: NORMAL
EOSINOPHIL # BLD AUTO: 0.2 K/UL
EOSINOPHIL NFR BLD: 3.2 %
ERYTHROCYTE [DISTWIDTH] IN BLOOD BY AUTOMATED COUNT: 13.5 %
EST. GFR  (AFRICAN AMERICAN): >60 ML/MIN/1.73 M^2
EST. GFR  (NON AFRICAN AMERICAN): 53.1 ML/MIN/1.73 M^2
GLUCOSE SERPL-MCNC: 127 MG/DL
HCT VFR BLD AUTO: 41.8 %
HGB BLD-MCNC: 14 G/DL
IMM GRANULOCYTES # BLD AUTO: 0.03 K/UL
IMM GRANULOCYTES NFR BLD AUTO: 0.5 %
LYMPHOCYTES # BLD AUTO: 1.3 K/UL
LYMPHOCYTES NFR BLD: 19.4 %
MCH RBC QN AUTO: 32.7 PG
MCHC RBC AUTO-ENTMCNC: 33.5 G/DL
MCV RBC AUTO: 98 FL
MONOCYTES # BLD AUTO: 0.7 K/UL
MONOCYTES NFR BLD: 11 %
NEUTROPHILS # BLD AUTO: 4.2 K/UL
NEUTROPHILS NFR BLD: 65.3 %
NRBC BLD-RTO: 0 /100 WBC
PLATELET # BLD AUTO: 175 K/UL
PMV BLD AUTO: 10.1 FL
POTASSIUM SERPL-SCNC: 3.9 MMOL/L
PROT SERPL-MCNC: 7.7 G/DL
RBC # BLD AUTO: 4.28 M/UL
SODIUM SERPL-SCNC: 140 MMOL/L
WBC # BLD AUTO: 6.48 K/UL

## 2017-10-24 PROCEDURE — 25000003 PHARM REV CODE 250: Performed by: INTERNAL MEDICINE

## 2017-10-24 PROCEDURE — 96413 CHEMO IV INFUSION 1 HR: CPT

## 2017-10-24 PROCEDURE — 99000 SPECIMEN HANDLING OFFICE-LAB: CPT

## 2017-10-24 PROCEDURE — 99999 PR PBB SHADOW E&M-EST. PATIENT-LVL II: CPT | Mod: PBBFAC,,, | Performed by: INTERNAL MEDICINE

## 2017-10-24 PROCEDURE — 83520 IMMUNOASSAY QUANT NOS NONAB: CPT

## 2017-10-24 PROCEDURE — 85025 COMPLETE CBC W/AUTO DIFF WBC: CPT

## 2017-10-24 PROCEDURE — 99213 OFFICE O/P EST LOW 20 MIN: CPT | Mod: S$PBB,,, | Performed by: INTERNAL MEDICINE

## 2017-10-24 PROCEDURE — 36415 COLL VENOUS BLD VENIPUNCTURE: CPT

## 2017-10-24 PROCEDURE — 80053 COMPREHEN METABOLIC PANEL: CPT

## 2017-10-24 RX ORDER — SODIUM CHLORIDE 0.9 % (FLUSH) 0.9 %
10 SYRINGE (ML) INJECTION
Status: DISCONTINUED | OUTPATIENT
Start: 2017-10-24 | End: 2017-10-24 | Stop reason: HOSPADM

## 2017-10-24 RX ORDER — SODIUM CHLORIDE 0.9 % (FLUSH) 0.9 %
10 SYRINGE (ML) INJECTION
Status: CANCELLED | OUTPATIENT
Start: 2017-10-24

## 2017-10-24 RX ORDER — HEPARIN 100 UNIT/ML
500 SYRINGE INTRAVENOUS
Status: CANCELLED | OUTPATIENT
Start: 2017-10-24

## 2017-10-24 RX ORDER — DIPHENHYDRAMINE HCL 25 MG
25 CAPSULE ORAL ONCE
Status: CANCELLED
Start: 2017-10-24 | End: 2017-10-24

## 2017-10-24 RX ORDER — ACETAMINOPHEN 325 MG/1
650 TABLET ORAL
Status: COMPLETED | OUTPATIENT
Start: 2017-10-24 | End: 2017-10-24

## 2017-10-24 RX ORDER — DIPHENHYDRAMINE HCL 25 MG
25 CAPSULE ORAL ONCE
Status: COMPLETED | OUTPATIENT
Start: 2017-10-24 | End: 2017-10-24

## 2017-10-24 RX ORDER — HEPARIN 100 UNIT/ML
500 SYRINGE INTRAVENOUS
Status: DISCONTINUED | OUTPATIENT
Start: 2017-10-24 | End: 2017-10-24 | Stop reason: HOSPADM

## 2017-10-24 RX ORDER — DEXTROMETHORPHAN HYDROBROMIDE, GUAIFENESIN 5; 100 MG/5ML; MG/5ML
650 LIQUID ORAL
Status: CANCELLED | OUTPATIENT
Start: 2017-10-24 | End: 2017-10-24

## 2017-10-24 RX ADMIN — DIPHENHYDRAMINE HYDROCHLORIDE 25 MG: 25 CAPSULE ORAL at 08:10

## 2017-10-24 RX ADMIN — ACETAMINOPHEN 650 MG: 325 TABLET ORAL at 08:10

## 2017-10-24 RX ADMIN — SODIUM CHLORIDE: 900 INJECTION, SOLUTION INTRAVENOUS at 09:10

## 2017-10-24 NOTE — PROGRESS NOTES
"  Tuesday, October 24th, 2017     Study: "A Phase 3, randomized, multicenter, double-blind, placebo-controlled, 2-arm, efficacy and safety study of [id number] plus standard of care versus placebo plus standard of care in subjects with light chain (AL) amyloidosis"  Protocol ID# [id number]  IRB# 2015.305.A  Sponsor: [employer]  Investigator: Dr. Carmichael    Month 10, Day 1  IV [id number]/Placebo     Patient presents for Month 10, Day 1 infusion per above-mentioned protocol.  Patient is awake, alert, and oriented to person, place, and time.  He states he has had a "great month."  He states he took the stairs up this morning with no SOB and has worked his way up to 50 push-ups this morning and exercising regularly.  He denies SOB or swelling.  His only significant ongoing issue is that of neuropathy.  Dr. Carmichael is very pleased with his progress.  Patient verbalizes continued willingness to participate in above-mentioned trial at this time.      Review of AE's:     Restless Leg Syndrome, grade 1:  No reports of this today. Taking nightly gabapentin, which helps him rest. Patient states he intermittently takes vitamin supplements as per med list.  AE ongoing.  Total Bilirubin Increased, Grade 1:  Total bilirubin has improved to 1.3 today.   Discussed with Dr. Carmichael and this is NCS and does not affect dosing of further therapy. AE grade 1, will follow.  Dry Skin, grade 1:  No complaints of worsening symptoms.  AE ongoing.  Peripheral sensory neuropathy, grade 1:  States this "comes and goes" but it is tolerable.  Gabapentin at nighttime helps him sleep.  States he has numbness to fingertips, but states it is not painful.   Patient continues supplements as per med list.   AE, grade 1 ongoing.  Creatinine Increased, Grade 1:  Patient with creatinine remains borderline high at 1.5 today.  Patient on Bumex per Dr. Panda and this is expected.  AE grade 1.   Insomnia, Grade 2: This is improved. Still taking Neurontin which " he states assists with sleep/neuropathy issues at night.  Only taking Ramelton sparingly.  AE improved, now grade 1.       Physical performed per Dr. Carmichael, see MD note for Complete PE, symptom-directed PE, ECOG PS and NYHA Classification.  Questionnaires completed per patient without assistance prior to visit.  24 hour urine not required at this timepoint.  6MWT not required at this timepoint.  See flowsheets for height, weight, con meds and vital signs.  NIS-LL and VASPI completed, see chart for source documentation.  Patient is not WOCBP.  Local blood work performed and reviewed today per Dr. Carmichael.  [facility] with exception of PK's that will be collected ahead of infusion tomorrow.   Concomitant medications reviewed.  Patient confirms that per Dr. Carmichael, he continues on acyclovir.  Per Dr. Carmichael, patient is approved to initiate M10,D1 treatment today.  Treatment plan updated with today's weight (84.0 kg) and signed per Dr. Carmichael.  Benadryl adjusted to PO due to previous experience with RLS.      Based on patient continuing to show light chain response and feeling well, Dr. Carmichael will continue frequency of SOC Velcade at every 6 weeks.  Next dose will be due at Month 11, Day 1 infusion visit.  Patient verbalizes understanding of this and pleased with this plan.      All of patient's questions were answered to his satisfaction.  Patient states understanding of plan of care and upcoming schedule.  He states having Research RN contact information as well as that of Dr. Carmichael.  He then reported to infusion suite for study drug/placebo infusion.    Patient then proceeded to infusion suite.  Study-mandated procedures performed per protocol as follows:    08:00: Pre-dose PK's drawn per protocol  08:51:  Premeds administered, see MAR  0941:  Pre-dose vital signs performed, see flowsheet.  Vitals read as follows:  /64, HR 88, Temp 98.3, RR 18.0934  09:34:  Triplicate EKG  performed per research staff.  Tracings transmitted to ERT.  09:42:  Study drug infusion initiated   10:42:  Infusion completed.  10:45:  Vital signs at end of infusion performed.  Vitals as follows:  /66, HR 81, RR 18, T 98.1  11:45: 1 hour post end of infusion Vitals: /64, HR 83, RR 18 T 97.7  12:15: End of Observation period    Patient was monitored for the full 90 minutes post infusion (until 12:15) and tolerated the above with no difficulties, see infusion RN documentation.       All of patient's questions were answered to his satisfaction.  Additionally, patient's cycle 11 was scheduled for Tuesday, November 21st.  Patient states understanding of plan of care and upcoming schedule.  He states having Research RN contact information as well as that of Dr. Carmichael.

## 2017-10-24 NOTE — PROGRESS NOTES
Subjective:       Patient ID: Roc Lai Jr. is a 52 y.o. male.    Chief Complaint: No chief complaint on file.    HPI  ECOG 1, NYHA II. Mr. Lai is here for follow up eight months into the Fbl021 trial to treat cardiac AL amyloidosis with significant marrow plasma cells.  He continues to improve with improved physical ability including climbing stairs on less diuretic.  He is back to traveling for his work and finds no difficulty with that or any other activity.  No swallowing issues but he does ongoing neurologic problems with numbness at fingers and toes.  No significant residual lower extremity swelling.    No light headedness with less dyspnea over the last month.  Minimal anorexia and diarrhea with his does of bortezomib.    Repeat cardiac echo 7/5/17 showed ongoing LVEF of 40% with concentric thickening and new tricuspid regurgitation.      History: Prior to diagnosis he was previously vigorous and ran over three miles thrice weekly 3 years ago.  However, over the 18 months prior to presentation, he has noted progressive decline in his exercise capacity to only being able to do 10 min of exercise now.  He has previously been studied for CAD with normal coronaries in [id number].  He was diagnosed with atrial flutter and underwent cardiac ablation without improvement in his exercise capacity.  He was found to have an atrial septal defect and this was closed 9/26/16 but this did not improve his symptoms either.  His symptoms progressed over the last 3 months to the point he can no longer exercise and notes dyspnea with carrying croceries, climbing stairs, etc.  He was admitted to cardiology 11/27/16 with 17 pounds of fluid loss and his is now sleeping better.  He was diagnosed with diastolic heart failure and cardia MRI was concerning for infiltrative cardiac disease.      SPEP 12/1/16 showed decreased gamma globulins and free lambda light chains increased with M-spike of 0.19 g/dL.  Free lambda light  chain was elevated at 50 mg/dL with normal lappa 1.37 mg/dL and ratio 0.03.  However, abdominal fat pad biopsy was negative for amyoid with the presence of fat cells 11/30/16.  ATTR DNA test negative for familial amyloidosis.    CBC normal 12/1/16 with normal renal function, calcium, albumin and slightly low total protein levels.      Cardiac biopsy done 1/3/17 confirmed cardiac involvement with AL amyloid based on congo red staining and liquid chromatography tandem mass spectrometry.     Bone marrow biopsy done 1/12/17 showed a 60% plasma cell infiltrate.     Metastatic survey 1/19/17 was negative for bony disease.  MRI scans of the cervical, thoracic, lumbar and pelvic done 1/27/17 did not disclose specific lesions to change diagnosis to myeloma.     His pre-trial walk test confirmed his candidacy for the protocol and he notes some progression with having to rest after a walk on flat surface of about a mile.    PMH  Diastolic heart failure    Review of Systems   Constitutional: Negative for activity change, appetite change, diaphoresis, fatigue, fever and unexpected weight change.   HENT: Negative for mouth sores, sore throat and trouble swallowing.    Respiratory: Negative for cough and shortness of breath.         All improved since diuresis with increased exercise tolerance   Cardiovascular: Negative for chest pain and leg swelling.   Gastrointestinal: Negative for abdominal pain, blood in stool, constipation and diarrhea.   Genitourinary: Negative for dysuria and hematuria.   Musculoskeletal: Negative for back pain and neck pain.   Skin: Negative for rash.        Slight nipple tenderness   Neurological: Positive for numbness (hands and toes/sole bilaterally variable). Negative for tremors, weakness, light-headedness and headaches.   Hematological: Negative for adenopathy.   Psychiatric/Behavioral: Positive for sleep disturbance. Negative for confusion.       Objective:      Physical Exam   Constitutional: He  is oriented to person, place, and time. He appears well-developed and well-nourished. No distress.   HENT:   Head: Normocephalic and atraumatic.   Mouth/Throat: Oropharynx is clear and moist. No oropharyngeal exudate.   Tongue not enlarged  No submandibular enlargement   Eyes: Conjunctivae are normal. Pupils are equal, round, and reactive to light.   Neck: Neck supple.   Cardiovascular: Normal rate and regular rhythm.    Pulmonary/Chest: Effort normal and breath sounds normal. He has no rales.   Abdominal: Soft. Bowel sounds are normal. He exhibits no distension (No ascites) and no mass. There is no splenomegaly. There is no tenderness.   Musculoskeletal: Normal range of motion. He exhibits no edema.   No rib or spine tenderness   Lymphadenopathy:     He has no cervical adenopathy.     He has no axillary adenopathy.        Right: No inguinal adenopathy present.        Left: No inguinal adenopathy present.   Neurological: He is alert and oriented to person, place, and time.   Reflex Scores:       Bicep reflexes are 3+ on the right side and 3+ on the left side.       Patellar reflexes are 3+ on the right side and 3+ on the left side.       Achilles reflexes are 2+ on the right side and 2+ on the left side.  Normal touch, sharp, vibration and proprioception.     Skin: Skin is warm and dry. No rash noted.   No unusual bruising   Psychiatric: He has a normal mood and affect. Thought content normal.       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 Other restrictive cardiomyopathy    3. AL amyloidosis    4. Monoclonal gammopathy        Plan:       Mr. Lai has a monoclonal lambda light chain coming from a plasma cell infiltrate in the marrow that serves as the reservoir for amyloid production affecting his heart.  His cardiac amyloid has been confirmed as AL Amlyoid by biopsy and testing at [place] in 1/2017.  He is now eight months into therapy on the Uat993 trial and will start cycle 9 next month.   He continues on monthly [id number] therapy but we have decreased bortezomib dosing to every 6 weeks given the suppression of his light chain production.  He has a mild sensory neuropathy but no real objective findings with preserved function overall.    His exercise tolerance has improved on less diuretic and he continues to push himself in activities and has increased his travel without issues.  His cardiac echo in July showed worsening tricuspid regurgitation with stable LVEF.  However, he he continues to feel better and has been doing more including climbing stairs without problems.  We would typically expect a 6-9 month latency for response to bortezomib which he is at now.    His bone marrow biopsy on 1/12/17 showed a 60% cellular marrow with 28% plasma cells by morphology but 50% by immunohistochemistry.  Hence, he has at least a smoldering myeloma.  He has no renal, calcium or hemoglobin issues and he has no bony symptoms to suggest multiple myeloma and plain films do not show any lytic lesions as of 1/2017.  MRI 1/2017 did not show lesions that would define him as multiple myeloma.    He does not seem to have other systemic manifestations of AL amyloidosis including the absence of enlarged tongue, organomegaly, coagulopathy, neuropathy, carpal tunnel syndrome or renal disease.       Sleep issues have improved with evolving neuropathy playing a key role with his lack of sleep and he will continue Neurontin.    All of his questions were answered in the clinic today and he will follow up as per protocol in one month with continued therapy as per protocol with less bortezomib.  Given how much better he is he has raised the issue of coming off protocol but he will continue to follow up for now.  His progress is very encouraging and he may need another cardiac evaluation soon.

## 2017-10-24 NOTE — PLAN OF CARE
Problem: Patient Care Overview  Goal: Plan of Care Review  Outcome: Ongoing (interventions implemented as appropriate)  Pt tolerated treatment well.  Observed patient for 90min post infusion. No s/s of reaction. Vitals stable, NAD.

## 2017-10-24 NOTE — LETTER
October 24, 2017        James Lao Jr., MD  [address]  [facility] [address]             [facility]  [address]  [facility] [address]  Phone: [phone]   Patient: Roc Lai Jr.   MR Number: [id number]   YOB: 1965   Date of Visit: 10/24/2017       Dear Dr. Lao:    Thank you for referring Roc Lai to me for evaluation. Below are the relevant portions of my assessment and plan of care.        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 Other restrictive cardiomyopathy    3. AL amyloidosis    4. Monoclonal gammopathy          Mr. Lai has a monoclonal lambda light chain coming from a plasma cell infiltrate in the marrow that serves as the reservoir for amyloid production affecting his heart.  His cardiac amyloid has been confirmed as AL Amlyoid by biopsy and testing at [place] in 1/2017.  He is now eight months into therapy on the Rlq217 trial and will start cycle 9 next month.  He continues on monthly [id number] therapy but we have decreased bortezomib dosing to every 6 weeks given the suppression of his light chain production.  He has a mild sensory neuropathy but no real objective findings with preserved function overall.    His exercise tolerance has improved on less diuretic and he continues to push himself in activities and has increased his travel without issues.  His cardiac echo in July showed worsening tricuspid regurgitation with stable LVEF.  However, he he continues to feel better and has been doing more including climbing stairs without problems.  We would typically expect a 6-9 month latency for response to bortezomib which he is at now.    His bone marrow biopsy on 1/12/17 showed a 60% cellular marrow with 28% plasma cells by morphology but 50% by immunohistochemistry.  Hence, he has at least a smoldering myeloma.  He has no renal, calcium or hemoglobin issues and he has no bony symptoms to suggest multiple myeloma and plain  films do not show any lytic lesions as of 1/2017.  MRI 1/2017 did not show lesions that would define him as multiple myeloma.    He does not seem to have other systemic manifestations of AL amyloidosis including the absence of enlarged tongue, organomegaly, coagulopathy, neuropathy, carpal tunnel syndrome or renal disease.       Sleep issues have improved with evolving neuropathy playing a key role with his lack of sleep and he will continue Neurontin.    All of his questions were answered in the clinic today and he will follow up as per protocol in one month with continued therapy as per protocol with less bortezomib.  Given how much better he is he has raised the issue of coming off protocol but he will continue to follow up for now.  His progress is very encouraging and he may need another cardiac evaluation soon.      If you have questions, please do not hesitate to call me. I look forward to following Roc along with you.    Sincerely,      Elvis Carmichael MD           CC  Chon Harmon MD

## 2017-10-25 LAB
KAPPA LC SER QL IA: 1.6 MG/DL
KAPPA LC/LAMBDA SER IA: 1.72
LAMBDA LC SER QL IA: 0.93 MG/DL

## 2017-10-26 DIAGNOSIS — G62.2 NEUROPATHY DUE TO CHEMICAL SUBSTANCE: ICD-10-CM

## 2017-10-26 DIAGNOSIS — I50.33 ACUTE ON CHRONIC DIASTOLIC CHF (CONGESTIVE HEART FAILURE), NYHA CLASS 1: ICD-10-CM

## 2017-10-26 RX ORDER — GABAPENTIN 100 MG/1
300 CAPSULE ORAL NIGHTLY
Qty: 90 CAPSULE | Refills: 2 | Status: SHIPPED | OUTPATIENT
Start: 2017-10-26 | End: 2017-10-27 | Stop reason: SDUPTHER

## 2017-10-27 DIAGNOSIS — I50.33 ACUTE ON CHRONIC DIASTOLIC CHF (CONGESTIVE HEART FAILURE), NYHA CLASS 1: ICD-10-CM

## 2017-10-27 DIAGNOSIS — G62.2 NEUROPATHY DUE TO CHEMICAL SUBSTANCE: ICD-10-CM

## 2017-10-27 RX ORDER — BUMETANIDE 2 MG/1
2 TABLET ORAL 3 TIMES DAILY
Qty: 270 TABLET | Refills: 3 | Status: CANCELLED | OUTPATIENT
Start: 2017-10-27

## 2017-10-27 RX ORDER — BUMETANIDE 2 MG/1
2 TABLET ORAL 3 TIMES DAILY
Qty: 90 TABLET | Refills: 3 | Status: SHIPPED | OUTPATIENT
Start: 2017-10-27 | End: 2017-10-31 | Stop reason: SDUPTHER

## 2017-10-27 RX ORDER — GABAPENTIN 100 MG/1
300 CAPSULE ORAL NIGHTLY
Qty: 90 CAPSULE | Refills: 2 | Status: SHIPPED | OUTPATIENT
Start: 2017-10-27 | End: 2017-11-15 | Stop reason: SDUPTHER

## 2017-10-31 DIAGNOSIS — I50.33 ACUTE ON CHRONIC DIASTOLIC CHF (CONGESTIVE HEART FAILURE), NYHA CLASS 1: ICD-10-CM

## 2017-10-31 NOTE — TELEPHONE ENCOUNTER
Pt says he discussed with Dr. Lao in September that he has only been needing to take one tablet once daily except while traveling he may take an extra. Pt asked that I call in another script indicating how hes actually taking the bumex. Med card Updated. Script called to Lesia at [facility].

## 2017-11-01 RX ORDER — BUMETANIDE 2 MG/1
TABLET ORAL
Qty: 120 TABLET | Refills: 3 | Status: SHIPPED | OUTPATIENT
Start: 2017-11-01 | End: 2017-11-15 | Stop reason: SDUPTHER

## 2017-11-15 DIAGNOSIS — I50.32 CHRONIC DIASTOLIC CONGESTIVE HEART FAILURE: ICD-10-CM

## 2017-11-15 DIAGNOSIS — E85.89 OTHER AMYLOIDOSIS: ICD-10-CM

## 2017-11-15 DIAGNOSIS — I50.33 ACUTE ON CHRONIC DIASTOLIC CHF (CONGESTIVE HEART FAILURE), NYHA CLASS 1: ICD-10-CM

## 2017-11-15 DIAGNOSIS — E85.81 AL AMYLOIDOSIS: ICD-10-CM

## 2017-11-15 DIAGNOSIS — G62.2 NEUROPATHY DUE TO CHEMICAL SUBSTANCE: ICD-10-CM

## 2017-11-16 RX ORDER — BUMETANIDE 2 MG/1
TABLET ORAL
Qty: 120 TABLET | Refills: 3 | Status: SHIPPED | OUTPATIENT
Start: 2017-11-16 | End: 2018-02-05 | Stop reason: SDUPTHER

## 2017-11-16 RX ORDER — GABAPENTIN 100 MG/1
300 CAPSULE ORAL NIGHTLY
Qty: 90 CAPSULE | Refills: 2 | Status: SHIPPED | OUTPATIENT
Start: 2017-11-16 | End: 2018-02-20 | Stop reason: SDUPTHER

## 2017-11-16 RX ORDER — ACYCLOVIR 400 MG/1
400 TABLET ORAL 2 TIMES DAILY
Qty: 60 TABLET | Refills: 11 | Status: SHIPPED | OUTPATIENT
Start: 2017-11-16 | End: 2018-11-09 | Stop reason: SDUPTHER

## 2017-11-16 RX ORDER — SPIRONOLACTONE 25 MG/1
25 TABLET ORAL DAILY
Qty: 90 TABLET | Refills: 3 | Status: SHIPPED | OUTPATIENT
Start: 2017-11-16 | End: 2018-08-08 | Stop reason: SDUPTHER

## 2017-11-16 RX ORDER — LANOLIN ALCOHOL/MO/W.PET/CERES
400 CREAM (GRAM) TOPICAL DAILY
Qty: 30 TABLET | Refills: 4 | Status: SHIPPED | OUTPATIENT
Start: 2017-11-16 | End: 2018-02-05 | Stop reason: SDUPTHER

## 2017-11-21 ENCOUNTER — LAB VISIT (OUTPATIENT)
Dept: LAB | Facility: HOSPITAL | Age: 52
End: 2017-11-21
Attending: INTERNAL MEDICINE
Payer: COMMERCIAL

## 2017-11-21 ENCOUNTER — RESEARCH ENCOUNTER (OUTPATIENT)
Dept: RESEARCH | Facility: HOSPITAL | Age: 52
End: 2017-11-21

## 2017-11-21 ENCOUNTER — INFUSION (OUTPATIENT)
Dept: INFUSION THERAPY | Facility: HOSPITAL | Age: 52
End: 2017-11-21
Attending: INTERNAL MEDICINE
Payer: COMMERCIAL

## 2017-11-21 ENCOUNTER — OFFICE VISIT (OUTPATIENT)
Dept: HEMATOLOGY/ONCOLOGY | Facility: CLINIC | Age: 52
End: 2017-11-21
Payer: COMMERCIAL

## 2017-11-21 VITALS
DIASTOLIC BLOOD PRESSURE: 76 MMHG | SYSTOLIC BLOOD PRESSURE: 121 MMHG | HEART RATE: 92 BPM | RESPIRATION RATE: 18 BRPM | TEMPERATURE: 98 F

## 2017-11-21 VITALS
DIASTOLIC BLOOD PRESSURE: 72 MMHG | BODY MASS INDEX: 25.86 KG/M2 | TEMPERATURE: 99 F | HEART RATE: 100 BPM | RESPIRATION RATE: 18 BRPM | WEIGHT: 184.75 LBS | HEIGHT: 71 IN | SYSTOLIC BLOOD PRESSURE: 134 MMHG | OXYGEN SATURATION: 99 %

## 2017-11-21 DIAGNOSIS — G62.2 NEUROPATHY DUE TO CHEMICAL SUBSTANCE: Primary | ICD-10-CM

## 2017-11-21 DIAGNOSIS — E85.81 AL AMYLOIDOSIS: Primary | ICD-10-CM

## 2017-11-21 DIAGNOSIS — E85.81 AL AMYLOIDOSIS: ICD-10-CM

## 2017-11-21 DIAGNOSIS — E85.9 AMYLOIDOSIS, UNSPECIFIED TYPE: ICD-10-CM

## 2017-11-21 DIAGNOSIS — I48.3 TYPICAL ATRIAL FLUTTER: ICD-10-CM

## 2017-11-21 DIAGNOSIS — Z00.6 EXAMINATION OF PARTICIPANT IN CLINICAL TRIAL: ICD-10-CM

## 2017-11-21 LAB
ALBUMIN SERPL BCP-MCNC: 3.9 G/DL
ALP SERPL-CCNC: 180 U/L
ALT SERPL W/O P-5'-P-CCNC: 28 U/L
ANION GAP SERPL CALC-SCNC: 10 MMOL/L
AST SERPL-CCNC: 33 U/L
BASOPHILS # BLD AUTO: 0.02 K/UL
BASOPHILS NFR BLD: 0.3 %
BILIRUB SERPL-MCNC: 1.8 MG/DL
BUN SERPL-MCNC: 23 MG/DL
CALCIUM SERPL-MCNC: 10.1 MG/DL
CHLORIDE SERPL-SCNC: 104 MMOL/L
CO2 SERPL-SCNC: 26 MMOL/L
CREAT SERPL-MCNC: 1.5 MG/DL
DIFFERENTIAL METHOD: ABNORMAL
DRUG STUDY SPECIMEN TYPE: NORMAL
DRUG STUDY TEST NAME: NORMAL
DRUG STUDY TEST RESULT: NORMAL
EOSINOPHIL # BLD AUTO: 0.1 K/UL
EOSINOPHIL NFR BLD: 1.2 %
ERYTHROCYTE [DISTWIDTH] IN BLOOD BY AUTOMATED COUNT: 13.2 %
EST. GFR  (AFRICAN AMERICAN): >60 ML/MIN/1.73 M^2
EST. GFR  (NON AFRICAN AMERICAN): 53.1 ML/MIN/1.73 M^2
GLUCOSE SERPL-MCNC: 191 MG/DL
HCT VFR BLD AUTO: 41.8 %
HGB BLD-MCNC: 14 G/DL
IMM GRANULOCYTES # BLD AUTO: 0.03 K/UL
IMM GRANULOCYTES NFR BLD AUTO: 0.4 %
LYMPHOCYTES # BLD AUTO: 0.9 K/UL
LYMPHOCYTES NFR BLD: 11.7 %
MCH RBC QN AUTO: 32.3 PG
MCHC RBC AUTO-ENTMCNC: 33.5 G/DL
MCV RBC AUTO: 97 FL
MONOCYTES # BLD AUTO: 0.4 K/UL
MONOCYTES NFR BLD: 5.5 %
NEUTROPHILS # BLD AUTO: 5.9 K/UL
NEUTROPHILS NFR BLD: 80.9 %
NRBC BLD-RTO: 0 /100 WBC
PLATELET # BLD AUTO: 177 K/UL
PMV BLD AUTO: 9.7 FL
POTASSIUM SERPL-SCNC: 4.6 MMOL/L
PROT SERPL-MCNC: 7.5 G/DL
RBC # BLD AUTO: 4.33 M/UL
SODIUM SERPL-SCNC: 140 MMOL/L
WBC # BLD AUTO: 7.32 K/UL

## 2017-11-21 PROCEDURE — 25000003 PHARM REV CODE 250: Performed by: INTERNAL MEDICINE

## 2017-11-21 PROCEDURE — 99213 OFFICE O/P EST LOW 20 MIN: CPT | Mod: S$PBB,,, | Performed by: INTERNAL MEDICINE

## 2017-11-21 PROCEDURE — 36415 COLL VENOUS BLD VENIPUNCTURE: CPT

## 2017-11-21 PROCEDURE — 80053 COMPREHEN METABOLIC PANEL: CPT

## 2017-11-21 PROCEDURE — 85025 COMPLETE CBC W/AUTO DIFF WBC: CPT

## 2017-11-21 PROCEDURE — 96401 CHEMO ANTI-NEOPL SQ/IM: CPT

## 2017-11-21 PROCEDURE — 99000 SPECIMEN HANDLING OFFICE-LAB: CPT

## 2017-11-21 PROCEDURE — 99999 PR PBB SHADOW E&M-EST. PATIENT-LVL IV: CPT | Mod: PBBFAC,,, | Performed by: INTERNAL MEDICINE

## 2017-11-21 PROCEDURE — 63600175 PHARM REV CODE 636 W HCPCS: Performed by: INTERNAL MEDICINE

## 2017-11-21 PROCEDURE — 96413 CHEMO IV INFUSION 1 HR: CPT

## 2017-11-21 PROCEDURE — 83520 IMMUNOASSAY QUANT NOS NONAB: CPT

## 2017-11-21 RX ORDER — BORTEZOMIB 3.5 MG/1
1.4 INJECTION, POWDER, LYOPHILIZED, FOR SOLUTION INTRAVENOUS; SUBCUTANEOUS
Status: COMPLETED | OUTPATIENT
Start: 2017-11-21 | End: 2017-11-21

## 2017-11-21 RX ORDER — DIPHENHYDRAMINE HCL 25 MG
25 CAPSULE ORAL ONCE
Status: COMPLETED | OUTPATIENT
Start: 2017-11-21 | End: 2017-11-21

## 2017-11-21 RX ORDER — BORTEZOMIB 3.5 MG/1
1.4 INJECTION, POWDER, LYOPHILIZED, FOR SOLUTION INTRAVENOUS; SUBCUTANEOUS
Status: CANCELLED | OUTPATIENT
Start: 2017-11-21 | End: 2017-11-21

## 2017-11-21 RX ORDER — HEPARIN 100 UNIT/ML
500 SYRINGE INTRAVENOUS
Status: CANCELLED | OUTPATIENT
Start: 2017-11-21

## 2017-11-21 RX ORDER — SODIUM CHLORIDE 0.9 % (FLUSH) 0.9 %
10 SYRINGE (ML) INJECTION
Status: CANCELLED | OUTPATIENT
Start: 2017-11-21

## 2017-11-21 RX ORDER — HEPARIN 100 UNIT/ML
500 SYRINGE INTRAVENOUS
Status: DISCONTINUED | OUTPATIENT
Start: 2017-11-21 | End: 2017-11-21 | Stop reason: HOSPADM

## 2017-11-21 RX ORDER — SODIUM CHLORIDE 0.9 % (FLUSH) 0.9 %
10 SYRINGE (ML) INJECTION
Status: DISCONTINUED | OUTPATIENT
Start: 2017-11-21 | End: 2017-11-21 | Stop reason: HOSPADM

## 2017-11-21 RX ORDER — DIPHENHYDRAMINE HCL 25 MG
25 CAPSULE ORAL ONCE
Status: CANCELLED
Start: 2017-11-21 | End: 2017-11-21

## 2017-11-21 RX ORDER — ACETAMINOPHEN 650 MG/20.3ML
650 LIQUID ORAL
Status: COMPLETED | OUTPATIENT
Start: 2017-11-21 | End: 2017-11-21

## 2017-11-21 RX ORDER — DEXTROMETHORPHAN HYDROBROMIDE, GUAIFENESIN 5; 100 MG/5ML; MG/5ML
650 LIQUID ORAL
Status: CANCELLED | OUTPATIENT
Start: 2017-11-21 | End: 2017-11-21

## 2017-11-21 RX ADMIN — DIPHENHYDRAMINE HYDROCHLORIDE 25 MG: 25 CAPSULE ORAL at 09:11

## 2017-11-21 RX ADMIN — BORTEZOMIB 2.9 MG: 3.5 INJECTION, POWDER, LYOPHILIZED, FOR SOLUTION INTRAVENOUS; SUBCUTANEOUS at 12:11

## 2017-11-21 RX ADMIN — SODIUM CHLORIDE: 900 INJECTION, SOLUTION INTRAVENOUS at 09:11

## 2017-11-21 RX ADMIN — ACETAMINOPHEN 650 MG: 650 SOLUTION ORAL at 09:11

## 2017-11-21 NOTE — PROGRESS NOTES
Subjective:       Patient ID: Roc Lai Jr. is a 52 y.o. male.    Chief Complaint: AL amyloidosis    HPI  ECOG 1, NYHA II. Mr. Lai is here for follow up nine months into the Hdm761 trial to treat cardiac AL amyloidosis with significant marrow plasma cells.  He continues to improve and was able to mow his entire large yard with a push mower on daily diuretic.  He is back to traveling for his work and finds no difficulty with that or any other activity.  No swallowing issues and minimal neurologic problems with resolving numbness at fingers and toes.  No significant residual lower extremity swelling.    No light headedness with less dyspnea over the last month.  Minimal anorexia and diarrhea with his does of bortezomib.    Repeat cardiac echo 7/5/17 showed ongoing LVEF of 40% with concentric thickening and new tricuspid regurgitation.      History: Prior to diagnosis he was previously vigorous and ran over three miles thrice weekly 3 years ago.  However, over the 18 months prior to presentation, he has noted progressive decline in his exercise capacity to only being able to do 10 min of exercise now.  He has previously been studied for CAD with normal coronaries in 3/41016.  He was diagnosed with atrial flutter and underwent cardiac ablation without improvement in his exercise capacity.  He was found to have an atrial septal defect and this was closed 9/26/16 but this did not improve his symptoms either.  His symptoms progressed over the last 3 months to the point he can no longer exercise and notes dyspnea with carrying croceries, climbing stairs, etc.  He was admitted to cardiology 11/27/16 with 17 pounds of fluid loss and his is now sleeping better.  He was diagnosed with diastolic heart failure and cardia MRI was concerning for infiltrative cardiac disease.      SPEP 12/1/16 showed decreased gamma globulins and free lambda light chains increased with M-spike of 0.19 g/dL.  Free lambda light chain was  elevated at 50 mg/dL with normal lappa 1.37 mg/dL and ratio 0.03.  However, abdominal fat pad biopsy was negative for amyoid with the presence of fat cells 11/30/16.  ATTR DNA test negative for familial amyloidosis.    CBC normal 12/1/16 with normal renal function, calcium, albumin and slightly low total protein levels.      Cardiac biopsy done 1/3/17 confirmed cardiac involvement with AL amyloid based on congo red staining and liquid chromatography tandem mass spectrometry.     Bone marrow biopsy done 1/12/17 showed a 60% plasma cell infiltrate.     Metastatic survey 1/19/17 was negative for bony disease.  MRI scans of the cervical, thoracic, lumbar and pelvic done 1/27/17 did not disclose specific lesions to change diagnosis to myeloma.     His pre-trial walk test confirmed his candidacy for the protocol and he notes some progression with having to rest after a walk on flat surface of about a mile.    [facility]  Diastolic heart failure    Review of Systems   Constitutional: Negative for activity change, appetite change, diaphoresis, fatigue, fever and unexpected weight change.   HENT: Negative for mouth sores, sore throat and trouble swallowing.    Respiratory: Negative for cough and shortness of breath.         All improved since diuresis with increased exercise tolerance   Cardiovascular: Negative for chest pain and leg swelling.   Gastrointestinal: Negative for abdominal pain, blood in stool, constipation and diarrhea.   Genitourinary: Negative for dysuria and hematuria.   Musculoskeletal: Negative for back pain and neck pain.   Skin: Negative for rash.        Slight nipple tenderness   Neurological: Positive for numbness (hands and toes/sole bilaterally variable). Negative for tremors, weakness, light-headedness and headaches.   Hematological: Negative for adenopathy.   Psychiatric/Behavioral: Positive for sleep disturbance. Negative for confusion.       Objective:      Physical Exam   Constitutional: He is oriented  to person, place, and time. He appears well-developed and well-nourished. No distress.   HENT:   Head: Normocephalic and atraumatic.   Mouth/Throat: Oropharynx is clear and moist. No oropharyngeal exudate.   Tongue not enlarged  No submandibular enlargement   Eyes: Conjunctivae are normal. Pupils are equal, round, and reactive to light.   Neck: Neck supple.   Cardiovascular: Normal rate and regular rhythm.    Pulmonary/Chest: Effort normal and breath sounds normal. He has no rales.   Abdominal: Soft. Bowel sounds are normal. He exhibits no distension (No ascites) and no mass. There is no splenomegaly. There is no tenderness.   Musculoskeletal: Normal range of motion. He exhibits no edema.   No rib or spine tenderness   Lymphadenopathy:     He has no cervical adenopathy.     He has no axillary adenopathy.        Right: No inguinal adenopathy present.        Left: No inguinal adenopathy present.   Neurological: He is alert and oriented to person, place, and time.   Reflex Scores:       Bicep reflexes are 3+ on the right side and 3+ on the left side.       Patellar reflexes are 3+ on the right side and 3+ on the left side.       Achilles reflexes are 2+ on the right side and 2+ on the left side.  Normal touch, sharp, vibration and proprioception.     Skin: Skin is warm and dry. No rash noted.   No unusual bruising   Psychiatric: He has a normal mood and affect. Thought content normal.       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 Typical atrial flutter s/p ablation and restoration sinus rhythm 2016    3. AL amyloidosis        Plan:       Mr. Lai has a monoclonal lambda light chain coming from a plasma cell infiltrate in the marrow that serves as the reservoir for amyloid production affecting his heart.  His cardiac amyloid has been confirmed as AL Amlyoid by biopsy and testing at [facility] in 1/2017.  He is now nine months into therapy on the Hyd781 trial and will start cycle 10 next month.  He  continues on monthly Trq550 therapy but we have decreased bortezomib dosing to every 6 weeks given the suppression of his light chain production.  He has no evaluable sensory neuropathy today with excellent function overall.    His exercise tolerance has improved on less diuretic (daily) and he continues to push himself in activities and has increased his travel without issues.  His cardiac echo in July showed worsening tricuspid regurgitation with stable LVEF.  However, he he continues to feel better and has been doing more including climbing stairs without problems.  He was able to mow his entire large lawn with a push mower without stopping and felt fine at completion.  We would typically expect a 6-9 month latency for response to bortezomib which he is at now.  Given his progress and his relatively higher creatinine with recent lower weight he should probably stop his daily bumex now.  I will relay that recommendation to Dr. Lao.    His bone marrow biopsy on 1/12/17 showed a 60% cellular marrow with 28% plasma cells by morphology but 50% by immunohistochemistry.  Hence, he has at least a smoldering myeloma.  He has no renal, calcium or hemoglobin issues and he has no bony symptoms to suggest multiple myeloma and plain films do not show any lytic lesions as of 1/2017.  MRI 1/2017 did not show lesions that would define him as multiple myeloma.    He does not seem to have other systemic manifestations of AL amyloidosis including the absence of enlarged tongue, organomegaly, coagulopathy, neuropathy, carpal tunnel syndrome or renal disease.       Sleep issues have improved with less neuropathy on continued Neurontin.    Distress Score    Distress Score: 0        Practical Problems Physical Problems   : No Appearance: No   Housing: No Bathing / Dressing: No   Insurance / Financial: No Breathing: No    Transportation: No  Changes in Urination: No    Work / School: No  Constipation: No   Treatment  Decisions: No  Diarrhea: No     Eating: No    Family Problems Fatigue: No    Dealing with Children: No Feeling Swollen: No    Dealing with Partner: No Fevers: No    Ability to Have Children: No  Getting Around: No    Family Health Issues: No  Indigestion: No     Memory / Concentration: No   Emotional Problems Mouth Sores: No    Depression: No  Nausea: No    Fears: No  Nose Dry / Congested: No    Nervousness: No  Pain: No    Sadness: No Sexual: No    Worry: No Skin Dry / Itchy: No    Loss of Interest in Usual Activities: No Sleep: No     Substance Abuse: No    Spiritual/Religions Concerns Tingling in Hands / Feet: No   Spritual / Buddhism Concerns: No         Other Problems            Distress score 0 and no intervention necessary.  All of his questions were answered in the clinic today and he will follow up as per protocol in one month with continued therapy as per protocol with less bortezomib every 6 weeks (dose today) and Bharath every month (dpose today).  His progress is very encouraging and he may need another formal cardiac evaluation soon.

## 2017-11-21 NOTE — PROGRESS NOTES
"  Tuesday, November 21st, 2017     Study: "A Phase 3, randomized, multicenter, double-blind, placebo-controlled, 2-arm, efficacy and safety study of OORO056 plus standard of care versus placebo plus standard of care in subjects with light chain (AL) amyloidosis"  Protocol ID# [id number]  IRB# 2015.305.A  Sponsor: Eliseo  Investigator: Dr. Carmichael    Month 11, Day 1  IV [id number]/Placebo     Patient presents for Month 11, Day 1 infusion per above-mentioned protocol.  Patient is awake, alert, and oriented to person, place, and time.  He continues to report feeling well and enjoying a good quality of life.  He states he recently mowed his entire lawn for the first time in a year.  He denies SOB, swelling, fatigue.  He is no longer having significant issues sleeping and restless leg issues are gone.  His only significant ongoing issue is that of neuropathy.  Dr. Carmichael continues to be very pleased with his progress.  Patient verbalizes continued willingness to participate in above-mentioned trial at this time.      Review of AE's:     Restless Leg Syndrome, grade 1:  States this has been "gone for a while."  AE resolved.  Total Bilirubin Increased, Grade 1:  Total bilirubin is up again slightly to 1.8.  Not of concern at this time per Dr. Carmichael. AE grade 1, will follow.  Dry Skin, grade 1:  No complaints of worsening symptoms, states it's "not like it used to be."  AE ongoing.  Peripheral sensory neuropathy, grade 1:  States continues to be the same.  Gabapentin at nighttime helps him sleep.  States he has numbness to fingertips, but states it is not painful.   Patient continues supplements as per med list.   AE, grade 1 ongoing.  Creatinine Increased, Grade 1:  Patient with creatinine remains borderline high at 1.5 today.  Patient on Bumex per Dr. Panda and this is expected.  AE grade 1.   Insomnia, Grade 2: This is stable. Still taking Neurontin which he states assists with sleep/neuropathy issues at " "night.  Only taking Ramelton sparingly.  AE improved, now grade 1.       Physical performed per Dr. Carmichael, see MD note for Complete PE, symptom-directed PE, ECOG PS and NYHA Classification.  Questionnaires completed per patient without assistance prior to visit.  24 hour urine performed per patient and in process per  TRUNG Granados.  6MWT not required at this timepoint.  See flowsheets for height, weight, con meds and vital signs.  NIS-LL and VASPI completed, see chart for source documentation.  Patient is not WOCBP.  Local blood work performed and reviewed today per Dr. Carmichael.  [facility] including pre-dose PK's.   Concomitant medications reviewed.  Patient was instructed that per Dr. Carmichael he should try to back off the Bumex and see how he does.  He states he may need it symptomatically (for example he states he plans to "enjoy thanksgiving."  Dr. Carmichael agrees that PRN use is OK.  He also instructs that he should take Magnesium/Potassium only on days that he takes Bumex.  Patient states understanding of this.   Patient confirms that per Dr. Carmichael, he continues on acyclovir.  Per Dr. Carmichael, patient is approved to initiate M11,D1 treatment today.  Treatment plan updated with today's weight (83.8 kg) and signed per Dr. Carmichael.  Benadryl adjusted to PO due to previous experience with RLS.      Based on patient continuing to show light chain response and feeling well, Dr. Carmichael will continue frequency of SOC Velcade at every 6 weeks.  Next dose due today.  Patient verbalizes understanding of this and pleased with this plan.      All of patient's questions were answered to his satisfaction.  Patient states understanding of plan of care and upcoming schedule.  He states having Research RN contact information as well as that of Dr. Carmichael.  He then reported to infusion suite for study drug/placebo infusion.      Patient then proceeded to infusion suite.  Study-mandated " procedures performed per protocol as follows:    0700: Pre-dose PK's drawn per protocol  0928:  Premeds administered, see MAR  1016:  Pre-dose vital signs performed, see flowsheet.  Vitals read as follows:  /56, HR 90, Temp 98.2, RR 18  1007:  Triplicate EKG performed per research staff.  Tracings transmitted to ERT.  1020:  Study drug infusion initiated   1120:  Infusion completed.  1122:  Vital signs at end of infusion performed.  Vitals as follows:  /69, HR 81, RR 18, T 98.3  1219: 1 hour post end of infusion Vitals: /76, HR 92, RR 18 T 98.0  1250: End of Observation period    Patient was monitored for the full 90 minutes post infusion (until 12:50) and tolerated the above with no difficulties, see infusion RN documentation.       All of patient's questions were answered to his satisfaction.  Additionally, patient is due for 6MWT prior to next month's infusion.  Patient scheduled for Monday, December 18th for 6MWT, MD visit, and labs and Tuesday, December 19th for infusion.  Patient states understanding of plan of care and upcoming schedule.  He states having Research RN contact information as well as that of Dr. Carmichael.     Dr. Carmichael discussed his impending departure and transition of care to Dr. Chris Atkins.  Patient states understanding and is in agreement.

## 2017-11-21 NOTE — PLAN OF CARE
Problem: Patient Care Overview  Goal: Plan of Care Review  Outcome: Ongoing (interventions implemented as appropriate)  Administered study drug per protocol.  Observed for 90 min post infusion.  Velcade administered SQ.  Pt tolerated well with no s/s of reaction.  VSS. NAD.

## 2017-11-21 NOTE — LETTER
November 21, 2017        Chon Harmon MD  [address]  [address]  [facility] [address]             [facility]  [address]  [facility] [address]  Phone: [phone]   Patient: Roc Lai Jr.   MR Number: [id number]   YOB: 1965   Date of Visit: 11/21/2017       Dear Dr. aHrmon:    Thank you for referring Roc Lai to me for evaluation. Below are the relevant portions of my assessment and plan of care.        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 Typical atrial flutter s/p ablation and restoration sinus rhythm 2016    3. AL amyloidosis          Mr. Lai has a monoclonal lambda light chain coming from a plasma cell infiltrate in the marrow that serves as the reservoir for amyloid production affecting his heart.  His cardiac amyloid has been confirmed as AL Amlyoid by biopsy and testing at [facility] in 1/2017.  He is now nine months into therapy on the Wam170 trial and will start cycle 10 next month.  He continues on monthly Nhe192 therapy but we have decreased bortezomib dosing to every 6 weeks given the suppression of his light chain production.  He has no evaluable sensory neuropathy today with excellent function overall.    His exercise tolerance has improved on less diuretic (daily) and he continues to push himself in activities and has increased his travel without issues.  His cardiac echo in July showed worsening tricuspid regurgitation with stable LVEF.  However, he he continues to feel better and has been doing more including climbing stairs without problems.  He was able to mow his entire large lawn with a push mower without stopping and felt fine at completion.  We would typically expect a 6-9 month latency for response to bortezomib which he is at now.  Given his progress and his relatively higher creatinine with recent lower weight he should probably stop his daily bumex now.  I will relay that recommendation to Dr. Lao.    His  bone marrow biopsy on 1/12/17 showed a 60% cellular marrow with 28% plasma cells by morphology but 50% by immunohistochemistry.  Hence, he has at least a smoldering myeloma.  He has no renal, calcium or hemoglobin issues and he has no bony symptoms to suggest multiple myeloma and plain films do not show any lytic lesions as of 1/2017.  MRI 1/2017 did not show lesions that would define him as multiple myeloma.    He does not seem to have other systemic manifestations of AL amyloidosis including the absence of enlarged tongue, organomegaly, coagulopathy, neuropathy, carpal tunnel syndrome or renal disease.       Sleep issues have improved with less neuropathy on continued Neurontin.    Distress Score    Distress Score: 0        Practical Problems Physical Problems   : No Appearance: No   Housing: No Bathing / Dressing: No   Insurance / Financial: No Breathing: No    Transportation: No  Changes in Urination: No    Work / School: No  Constipation: No   Treatment Decisions: No  Diarrhea: No     Eating: No    Family Problems Fatigue: No    Dealing with Children: No Feeling Swollen: No    Dealing with Partner: No Fevers: No    Ability to Have Children: No  Getting Around: No    Family Health Issues: No  Indigestion: No     Memory / Concentration: No   Emotional Problems Mouth Sores: No    Depression: No  Nausea: No    Fears: No  Nose Dry / Congested: No    Nervousness: No  Pain: No    Sadness: No Sexual: No    Worry: No Skin Dry / Itchy: No    Loss of Interest in Usual Activities: No Sleep: No     Substance Abuse: No    Spiritual/Religions Concerns Tingling in Hands / Feet: No   Spritual / Orthodoxy Concerns: No         Other Problems            Distress score 0 and no intervention necessary.  All of his questions were answered in the clinic today and he will follow up as per protocol in one month with continued therapy as per protocol with less bortezomib every 6 weeks (dose today) and Bharath every month (dpose  today).  His progress is very encouraging and he may need another formal cardiac evaluation soon.      If you have questions, please do not hesitate to call me. I look forward to following Roc along with you.    Sincerely,      MD DAWNA Chang Jr., MD

## 2017-11-22 ENCOUNTER — PATIENT MESSAGE (OUTPATIENT)
Dept: HEMATOLOGY/ONCOLOGY | Facility: CLINIC | Age: 52
End: 2017-11-22

## 2017-11-22 LAB
KAPPA LC SER QL IA: 1.51 MG/DL
KAPPA LC/LAMBDA SER IA: 1.54
LAMBDA LC SER QL IA: 0.98 MG/DL

## 2017-11-30 ENCOUNTER — PATIENT MESSAGE (OUTPATIENT)
Dept: HEMATOLOGY/ONCOLOGY | Facility: CLINIC | Age: 52
End: 2017-11-30

## 2017-12-18 ENCOUNTER — RESEARCH ENCOUNTER (OUTPATIENT)
Dept: RESEARCH | Facility: HOSPITAL | Age: 52
End: 2017-12-18

## 2017-12-18 ENCOUNTER — LAB VISIT (OUTPATIENT)
Dept: LAB | Facility: HOSPITAL | Age: 52
End: 2017-12-18
Attending: INTERNAL MEDICINE
Payer: COMMERCIAL

## 2017-12-18 ENCOUNTER — OFFICE VISIT (OUTPATIENT)
Dept: HEMATOLOGY/ONCOLOGY | Facility: CLINIC | Age: 52
End: 2017-12-18
Payer: COMMERCIAL

## 2017-12-18 VITALS
SYSTOLIC BLOOD PRESSURE: 133 MMHG | DIASTOLIC BLOOD PRESSURE: 84 MMHG | WEIGHT: 188.25 LBS | HEART RATE: 83 BPM | HEIGHT: 71 IN | TEMPERATURE: 98 F | OXYGEN SATURATION: 99 % | RESPIRATION RATE: 18 BRPM | BODY MASS INDEX: 26.35 KG/M2

## 2017-12-18 DIAGNOSIS — E85.81 AL AMYLOIDOSIS: ICD-10-CM

## 2017-12-18 DIAGNOSIS — D47.2 MONOCLONAL GAMMOPATHY: ICD-10-CM

## 2017-12-18 DIAGNOSIS — Z00.6 EXAMINATION OF PARTICIPANT IN CLINICAL TRIAL: ICD-10-CM

## 2017-12-18 DIAGNOSIS — G62.2 NEUROPATHY DUE TO CHEMICAL SUBSTANCE: Primary | ICD-10-CM

## 2017-12-18 DIAGNOSIS — I42.5 OTHER RESTRICTIVE CARDIOMYOPATHY: ICD-10-CM

## 2017-12-18 DIAGNOSIS — E85.9 AMYLOIDOSIS, UNSPECIFIED TYPE: ICD-10-CM

## 2017-12-18 LAB
ALBUMIN SERPL BCP-MCNC: 4 G/DL
ALP SERPL-CCNC: 192 U/L
ALT SERPL W/O P-5'-P-CCNC: 26 U/L
ANION GAP SERPL CALC-SCNC: 9 MMOL/L
AST SERPL-CCNC: 34 U/L
BASOPHILS # BLD AUTO: 0.04 K/UL
BASOPHILS NFR BLD: 0.7 %
BILIRUB SERPL-MCNC: 1.5 MG/DL
BUN SERPL-MCNC: 23 MG/DL
CALCIUM SERPL-MCNC: 9.7 MG/DL
CHLORIDE SERPL-SCNC: 103 MMOL/L
CO2 SERPL-SCNC: 26 MMOL/L
CREAT SERPL-MCNC: 1.4 MG/DL
DIFFERENTIAL METHOD: ABNORMAL
DRUG STUDY SPECIMEN TYPE: NORMAL
DRUG STUDY TEST NAME: NORMAL
DRUG STUDY TEST RESULT: NORMAL
EOSINOPHIL # BLD AUTO: 0.1 K/UL
EOSINOPHIL NFR BLD: 1.8 %
ERYTHROCYTE [DISTWIDTH] IN BLOOD BY AUTOMATED COUNT: 13.4 %
EST. GFR  (AFRICAN AMERICAN): >60 ML/MIN/1.73 M^2
EST. GFR  (NON AFRICAN AMERICAN): 57.8 ML/MIN/1.73 M^2
GLUCOSE SERPL-MCNC: 111 MG/DL
HCT VFR BLD AUTO: 40.3 %
HGB BLD-MCNC: 13.5 G/DL
IMM GRANULOCYTES # BLD AUTO: 0.01 K/UL
IMM GRANULOCYTES NFR BLD AUTO: 0.2 %
LYMPHOCYTES # BLD AUTO: 0.8 K/UL
LYMPHOCYTES NFR BLD: 13.4 %
MAGNESIUM SERPL-MCNC: 2.2 MG/DL
MCH RBC QN AUTO: 32.5 PG
MCHC RBC AUTO-ENTMCNC: 33.5 G/DL
MCV RBC AUTO: 97 FL
MONOCYTES # BLD AUTO: 0.6 K/UL
MONOCYTES NFR BLD: 11.1 %
NEUTROPHILS # BLD AUTO: 4.2 K/UL
NEUTROPHILS NFR BLD: 72.8 %
NRBC BLD-RTO: 0 /100 WBC
PLATELET # BLD AUTO: 142 K/UL
PMV BLD AUTO: 10.1 FL
POTASSIUM SERPL-SCNC: 4.7 MMOL/L
PROT SERPL-MCNC: 7.2 G/DL
RBC # BLD AUTO: 4.16 M/UL
SODIUM SERPL-SCNC: 138 MMOL/L
WBC # BLD AUTO: 5.69 K/UL

## 2017-12-18 PROCEDURE — 99000 SPECIMEN HANDLING OFFICE-LAB: CPT

## 2017-12-18 PROCEDURE — 99213 OFFICE O/P EST LOW 20 MIN: CPT | Mod: S$PBB,,, | Performed by: INTERNAL MEDICINE

## 2017-12-18 PROCEDURE — 36415 COLL VENOUS BLD VENIPUNCTURE: CPT

## 2017-12-18 PROCEDURE — 83520 IMMUNOASSAY QUANT NOS NONAB: CPT

## 2017-12-18 PROCEDURE — 80053 COMPREHEN METABOLIC PANEL: CPT

## 2017-12-18 PROCEDURE — 99999 PR PBB SHADOW E&M-EST. PATIENT-LVL III: CPT | Mod: PBBFAC,,, | Performed by: INTERNAL MEDICINE

## 2017-12-18 PROCEDURE — 83735 ASSAY OF MAGNESIUM: CPT

## 2017-12-18 PROCEDURE — 85025 COMPLETE CBC W/AUTO DIFF WBC: CPT

## 2017-12-18 RX ORDER — ACETAMINOPHEN 325 MG/1
650 TABLET ORAL
Status: CANCELLED | OUTPATIENT
Start: 2017-12-19

## 2017-12-18 RX ORDER — BORTEZOMIB 3.5 MG/1
1.4 INJECTION, POWDER, LYOPHILIZED, FOR SOLUTION INTRAVENOUS; SUBCUTANEOUS
Status: CANCELLED | OUTPATIENT
Start: 2018-01-03

## 2017-12-18 RX ORDER — SODIUM CHLORIDE 0.9 % (FLUSH) 0.9 %
10 SYRINGE (ML) INJECTION
Status: CANCELLED | OUTPATIENT
Start: 2017-12-19

## 2017-12-18 RX ORDER — SODIUM CHLORIDE 0.9 % (FLUSH) 0.9 %
10 SYRINGE (ML) INJECTION
Status: CANCELLED | OUTPATIENT
Start: 2018-01-03

## 2017-12-18 RX ORDER — DIPHENHYDRAMINE HCL 25 MG
25 CAPSULE ORAL ONCE
Status: CANCELLED | OUTPATIENT
Start: 2017-12-19

## 2017-12-18 RX ORDER — HEPARIN 100 UNIT/ML
500 SYRINGE INTRAVENOUS
Status: CANCELLED | OUTPATIENT
Start: 2018-01-03

## 2017-12-18 RX ORDER — HEPARIN 100 UNIT/ML
500 SYRINGE INTRAVENOUS
Status: CANCELLED | OUTPATIENT
Start: 2017-12-19

## 2017-12-18 NOTE — PROGRESS NOTES
Subjective:       Patient ID: Roc Lai Jr. is a 52 y.o. male.    Chief Complaint: No chief complaint on file.    HPI  ECOG 1, NYHA II. Mr. Lai is here for follow up ten months into the Sos321 trial to treat cardiac AL amyloidosis with significant marrow plasma cells.  He continues to improve and is walking 5 miles a day on intermittent diuretic (every 2-3 days).  He is no longer traveling for his work but found no difficulty with that or any other activity.  No swallowing issues and minimal neurologic problems with resolving numbness at fingers and toes now on every 6 week bortezomib.  No significant residual lower extremity swelling.    No light headedness with less dyspnea over the last month.  Minimal anorexia and diarrhea with his does of bortezomib.    Repeat cardiac echo 7/5/17 showed ongoing LVEF of 40% with concentric thickening and new tricuspid regurgitation.      History: Prior to diagnosis he was previously vigorous and ran over three miles thrice weekly 3 years ago.  However, over the 18 months prior to presentation, he has noted progressive decline in his exercise capacity to only being able to do 10 min of exercise now.  He has previously been studied for CAD with normal coronaries in 3/48457.  He was diagnosed with atrial flutter and underwent cardiac ablation without improvement in his exercise capacity.  He was found to have an atrial septal defect and this was closed 9/26/16 but this did not improve his symptoms either.  His symptoms progressed over the last 3 months to the point he can no longer exercise and notes dyspnea with carrying croceries, climbing stairs, etc.  He was admitted to cardiology 11/27/16 with 17 pounds of fluid loss and his is now sleeping better.  He was diagnosed with diastolic heart failure and cardia MRI was concerning for infiltrative cardiac disease.      SPEP 12/1/16 showed decreased gamma globulins and free lambda light chains increased with M-spike of 0.19  g/dL.  Free lambda light chain was elevated at 50 mg/dL with normal lappa 1.37 mg/dL and ratio 0.03.  However, abdominal fat pad biopsy was negative for amyoid with the presence of fat cells 11/30/16.  ATTR DNA test negative for familial amyloidosis.    CBC normal 12/1/16 with normal renal function, calcium, albumin and slightly low total protein levels.      Cardiac biopsy done 1/3/17 confirmed cardiac involvement with AL amyloid based on congo red staining and liquid chromatography tandem mass spectrometry.     Bone marrow biopsy done 1/12/17 showed a 60% plasma cell infiltrate.     Metastatic survey 1/19/17 was negative for bony disease.  MRI scans of the cervical, thoracic, lumbar and pelvic done 1/27/17 did not disclose specific lesions to change diagnosis to myeloma.     His pre-trial walk test confirmed his candidacy for the protocol and he notes some progression with having to rest after a walk on flat surface of about a mile.    PMH  Diastolic heart failure    Review of Systems   Constitutional: Negative for activity change, appetite change, diaphoresis, fatigue, fever and unexpected weight change.   HENT: Negative for mouth sores, sore throat and trouble swallowing.    Respiratory: Negative for cough and shortness of breath.         All improved since diuresis with increased exercise tolerance   Cardiovascular: Negative for chest pain and leg swelling.   Gastrointestinal: Negative for abdominal pain, blood in stool, constipation and diarrhea.   Genitourinary: Negative for dysuria and hematuria.   Musculoskeletal: Negative for back pain and neck pain.   Skin: Negative for rash.   Neurological: Positive for numbness (hands and toes/sole bilaterally variable). Negative for tremors, weakness, light-headedness and headaches.   Hematological: Negative for adenopathy.   Psychiatric/Behavioral: Positive for sleep disturbance. Negative for confusion.       Objective:      Physical Exam   Constitutional: He is  oriented to person, place, and time. He appears well-developed and well-nourished. No distress.   HENT:   Head: Normocephalic and atraumatic.   Mouth/Throat: Oropharynx is clear and moist. No oropharyngeal exudate.   Tongue not enlarged  No submandibular enlargement   Eyes: Conjunctivae are normal. Pupils are equal, round, and reactive to light.   Neck: Neck supple.   Cardiovascular: Normal rate and regular rhythm.    Pulmonary/Chest: Effort normal and breath sounds normal. He has no rales.   Abdominal: Soft. Bowel sounds are normal. He exhibits no distension (No ascites) and no mass. There is no splenomegaly. There is no tenderness.   Musculoskeletal: Normal range of motion. He exhibits no edema.   No rib or spine tenderness   Lymphadenopathy:     He has no cervical adenopathy.     He has no axillary adenopathy.        Right: No inguinal adenopathy present.        Left: No inguinal adenopathy present.   Neurological: He is alert and oriented to person, place, and time.   Reflex Scores:       Bicep reflexes are 3+ on the right side and 3+ on the left side.       Patellar reflexes are 3+ on the right side and 3+ on the left side.       Achilles reflexes are 1+ on the right side and 1+ on the left side.  Normal touch, sharp, and proprioception.  Decreased vibration sensation at his feet bilaterally.     Skin: Skin is warm and dry. No rash noted.   No unusual bruising   Psychiatric: He has a normal mood and affect. Thought content normal.       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 AL amyloidosis    3. Monoclonal gammopathy    4. Other restrictive cardiomyopathy        Plan:       Mr. Lai has a monoclonal lambda light chain coming from a plasma cell infiltrate in the marrow that serves as the reservoir for amyloid production affecting his heart.  His cardiac amyloid has been confirmed as AL Amlyoid by biopsy and testing at [place] in 1/2017.  He is now nine months into therapy on the [id number]  trial and will start cycle 10 next month.  He continues on monthly Aeb315 therapy but we have decreased bortezomib dosing to every 6 weeks given the suppression of his light chain production.  He has no evaluable sensory neuropathy today with excellent function overall.    His exercise tolerance has improved on less diuretic (every 2-3 days now) and he continues to push himself in activities and has increased his travel without issues.  His cardiac echo in July showed worsening tricuspid regurgitation with stable LVEF.  However, he he continues to feel better and has been doing more including climbing stairs without problems.  He was able to mow his entire large lawn with a push mower without stopping and felt fine at completion.  We would typically expect a 6-9 month latency for response to bortezomib which he is at now.  Given his progress and his relatively higher creatinine with recent lower weight he should probably stop his daily bumex now.  I will relay that recommendation to Dr. Lao.    His bone marrow biopsy on 1/12/17 showed a 60% cellular marrow with 28% plasma cells by morphology but 50% by immunohistochemistry.  Hence, he has at least a smoldering myeloma.  He has no renal, calcium or hemoglobin issues and he has no bony symptoms to suggest multiple myeloma and plain films do not show any lytic lesions as of 1/2017.  MRI 1/2017 did not show lesions that would define him as multiple myeloma.    He does not seem to have other systemic manifestations of AL amyloidosis including the absence of enlarged tongue, organomegaly, coagulopathy, neuropathy, carpal tunnel syndrome or renal disease.       Sleep issues have improved with less neuropathy on continued Neurontin.    All of his questions were answered in the clinic today and he will follow up as per protocol with Dr. Atkins in one month with continued therapy as per protocol with less bortezomib every 6 weeks (dose in 2 weeks) and Bharath every month  (dose tomorrow).  His progress is very encouraging and he may need another formal cardiac evaluation soon.

## 2017-12-18 NOTE — PROGRESS NOTES
"  Monday December 18th, 2017     Study: "A Phase 3, randomized, multicenter, double-blind, placebo-controlled, 2-arm, efficacy and safety study of [id number] plus standard of care versus placebo plus standard of care in subjects with light chain (AL) amyloidosis"  Protocol ID# [id number]  IRB# 2015.305.A  Sponsor: Eliseo  Investigator: Dr. Carmichael    Month 12, Day 1   Office Visit/6MWT    Patient presents for Month 12, Day 1 MD visit per above-mentioned protocol.  Patient is awake, alert, and oriented to person, place, and time.  He performed 6MWT prior to any other study procedures being performed and per protocol, see details below.  He states he continues to experiences improvement in quality of life.  He reports engaging in numerous exercise regimens regularly with little to no limitations.  Occasional lightheadedness when doing strenuous work.  He has modified his intake of Bumex as per Dr. Carmichael' recommendations and is now taking every 3 days.  States his weight has been fluctuating on days on which he is not taking the Bumex but weight today only differs 1.5 kg from that of last cycle.  Reports taking Mag/Potassium supplements on days of Bumex as per Dr. Carmichael' recommendations.  Patient verbalizes continued willingness to participate in above-mentioned trial at this time.      Review of AE's:     Restless Leg Syndrome, grade 1:  No reports of this today. Taking nightly gabapentin, which helps him rest. Patient states he intermittently takes vitamin supplements as per med list.  AE ongoing.  Total Bilirubin Increased, Grade 1:  Total bilirubin 1.5 per local blood work performed today.  This continues to be grade 1.   Discussed with Dr. Carmichael and this is NCS and does not affect dosing of further therapy. AE grade 1, will follow.  Dry Skin, grade 1:  No complaints of worsening symptoms.  AE ongoing.  Peripheral sensory neuropathy, grade 1: Continues to report ongoing neuropathy but states it is mostly " unchanged.  Notices it more when he is highly active.  States he has numbness to fingertips, but states it is not painful.   Patient states he occasionally takes supplements as recommended.   AE, grade 1 ongoing.  Insomnia, Grade 2:  Makes no mention if issues sleeping. Still taking Neurontin which he states assists with sleep/neuropathy issues at night.  Only taking [place] sparingly.  AE improved, now grade 1.       Physical performed per Dr. Carmichael, see MD note for Complete PE, symptom-directed PE, ECOG PS and NYHA Classification.  24 hour urine not required at this timepoint.  See flowsheets for height, weight, con meds and vital signs.  NIS-LL and VASPI completed, see chart for source documentation. Patient completed all QOL's per [employer] prior to blood work, 6MWT, and MD visit.  Patient is not WOCBP.  Local blood work performed and reviewed today per Dr. Carmichael.  Central labs collected shipped to [facility] lab today with exception of PK's that will be collected ahead of infusion tomorrow.   Concomitant medications reviewed.  Patient confirms that per Dr. Carmichael, he continues on acyclovir.  Per Dr. Carmichael, patient is approved to initiate M12,D1 treatment today.  Treatment plan updated with today's weight (85.4 kg) and BSA (2.07 m2) and signed per Dr. Carmichael.  Benadryl adjusted to PO due to previous experience with RLS.  Velcade injection to remain at q6 weeks per Dr. Carmichael, not due at this month's visit.    As patient is soon to transition care to Dr. Atkins, Dr. Carmichael offered insight as to his progress and recommendations for future.  Bone marrow transplant discussed; per Dr. Carmichael, he would like for patient's cardiac function to return to normal before considering this; likely a year or more from now.  Plan for now should be to continue maintenance Velcade and study drug infusion.  Patient in agreement with this plan.      Patient successfully completed 6MWT ahead of lab draw and clinic visit today.  6MWT 10  laps @ 160 ft/lap + 103 ft = 1703 ft.  6MWT performed according to manual and information transmitted as instructed.  See flowsheet for pre and post vitals.  Walk test completed per [employer] and transmitted; approved by [facility] on 12/19/17.    All of patient's questions were answered to his satisfaction.  Patient states understanding of plan of care and upcoming schedule.  He states having [facility] RN contact information as well as that of Dr. Carmichael.  He will report tomorrow, 12/19 for study drug infusion.

## 2017-12-18 NOTE — LETTER
December 18, 2017        Chon Harmon MD  [address] [facility]  [address]  [facility] [address]             [facility]  [address]  [place] [address]  Phone: [phone]   Patient: Roc Lai Jr.   MR Number: [id number]   YOB: 1965   Date of Visit: 12/18/2017       Dear Dr. Harmon:    Thank you for referring Roc Lai to me for evaluation. Below are the relevant portions of my assessment and plan of care.        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 AL amyloidosis    3. Monoclonal gammopathy    4. Other restrictive cardiomyopathy          Mr. Lai has a monoclonal lambda light chain coming from a plasma cell infiltrate in the marrow that serves as the reservoir for amyloid production affecting his heart.  His cardiac amyloid has been confirmed as AL Amlyoid by biopsy and testing at [place] in 1/2017.  He is now nine months into therapy on the [id number] trial and will start cycle 10 next month.  He continues on monthly Mpv995 therapy but we have decreased bortezomib dosing to every 6 weeks given the suppression of his light chain production.  He has no evaluable sensory neuropathy today with excellent function overall.    His exercise tolerance has improved on less diuretic (every 2-3 days now) and he continues to push himself in activities and has increased his travel without issues.  His cardiac echo in July showed worsening tricuspid regurgitation with stable LVEF.  However, he he continues to feel better and has been doing more including climbing stairs without problems.  He was able to mow his entire large lawn with a push mower without stopping and felt fine at completion.  We would typically expect a 6-9 month latency for response to bortezomib which he is at now.  Given his progress and his relatively higher creatinine with recent lower weight he should probably stop his daily bumex now.  I will relay that recommendation to   Shilo.    His bone marrow biopsy on 1/12/17 showed a 60% cellular marrow with 28% plasma cells by morphology but 50% by immunohistochemistry.  Hence, he has at least a smoldering myeloma.  He has no renal, calcium or hemoglobin issues and he has no bony symptoms to suggest multiple myeloma and plain films do not show any lytic lesions as of 1/2017.  MRI 1/2017 did not show lesions that would define him as multiple myeloma.    He does not seem to have other systemic manifestations of AL amyloidosis including the absence of enlarged tongue, organomegaly, coagulopathy, neuropathy, carpal tunnel syndrome or renal disease.       Sleep issues have improved with less neuropathy on continued Neurontin.    All of his questions were answered in the clinic today and he will follow up as per protocol with Dr. Atkins in one month with continued therapy as per protocol with less bortezomib every 6 weeks (dose in 2 weeks) and Bharath every month (dose tomorrow).  His progress is very encouraging and he may need another formal cardiac evaluation soon.      If you have questions, please do not hesitate to call me. I look forward to following Roc along with you.    Sincerely,      MD DAWNA Chang Jr., MD Carter T. Davis, MD

## 2017-12-19 ENCOUNTER — RESEARCH ENCOUNTER (OUTPATIENT)
Dept: RESEARCH | Facility: HOSPITAL | Age: 52
End: 2017-12-19

## 2017-12-19 ENCOUNTER — INFUSION (OUTPATIENT)
Dept: INFUSION THERAPY | Facility: HOSPITAL | Age: 52
End: 2017-12-19
Attending: INTERNAL MEDICINE
Payer: COMMERCIAL

## 2017-12-19 VITALS
TEMPERATURE: 99 F | RESPIRATION RATE: 18 BRPM | HEART RATE: 82 BPM | DIASTOLIC BLOOD PRESSURE: 81 MMHG | SYSTOLIC BLOOD PRESSURE: 142 MMHG

## 2017-12-19 VITALS
RESPIRATION RATE: 18 BRPM | SYSTOLIC BLOOD PRESSURE: 126 MMHG | TEMPERATURE: 98 F | HEART RATE: 80 BPM | DIASTOLIC BLOOD PRESSURE: 76 MMHG

## 2017-12-19 DIAGNOSIS — E85.81 AL AMYLOIDOSIS: Primary | ICD-10-CM

## 2017-12-19 DIAGNOSIS — Z00.6 EXAMINATION OF PARTICIPANT IN CLINICAL TRIAL: ICD-10-CM

## 2017-12-19 LAB
KAPPA LC SER QL IA: 1.35 MG/DL
KAPPA LC/LAMBDA SER IA: 1.36
LAMBDA LC SER QL IA: 0.99 MG/DL

## 2017-12-19 PROCEDURE — 25000003 PHARM REV CODE 250: Performed by: INTERNAL MEDICINE

## 2017-12-19 PROCEDURE — 96413 CHEMO IV INFUSION 1 HR: CPT

## 2017-12-19 RX ORDER — HEPARIN 100 UNIT/ML
500 SYRINGE INTRAVENOUS
Status: DISCONTINUED | OUTPATIENT
Start: 2017-12-19 | End: 2017-12-19 | Stop reason: HOSPADM

## 2017-12-19 RX ORDER — SODIUM CHLORIDE 0.9 % (FLUSH) 0.9 %
10 SYRINGE (ML) INJECTION
Status: DISCONTINUED | OUTPATIENT
Start: 2017-12-19 | End: 2017-12-19 | Stop reason: HOSPADM

## 2017-12-19 RX ORDER — DIPHENHYDRAMINE HCL 25 MG
25 CAPSULE ORAL ONCE
Status: COMPLETED | OUTPATIENT
Start: 2017-12-19 | End: 2017-12-19

## 2017-12-19 RX ORDER — ACETAMINOPHEN 325 MG/1
650 TABLET ORAL
Status: COMPLETED | OUTPATIENT
Start: 2017-12-19 | End: 2017-12-19

## 2017-12-19 RX ADMIN — DIPHENHYDRAMINE HYDROCHLORIDE 25 MG: 25 CAPSULE ORAL at 08:12

## 2017-12-19 RX ADMIN — ACETAMINOPHEN 650 MG: 325 TABLET ORAL at 08:12

## 2017-12-19 RX ADMIN — SODIUM CHLORIDE: 900 INJECTION, SOLUTION INTRAVENOUS at 09:12

## 2017-12-19 NOTE — PROGRESS NOTES
"  Tuesday, December 19th, 2017     Study: "A Phase 3, randomized, multicenter, double-blind, placebo-controlled, 2-arm, efficacy and safety study of [id number] plus standard of care versus placebo plus standard of care in subjects with light chain (AL) amyloidosis"  Protocol ID# [id number]  IRB# 2015.305.A  Sponsor: Eliseo  Investigator: Dr. Carmichael    Month 12, Day 1  IV [id number]-/Placebo    Patient presents for month 12, day 1 treatment per above-mentioned protocol.  He is ambulatory, awake, alert, and oriented to person, place, and time, accompanied by self.  See 12/18 progress note.  No new symptoms to report from that of yesterday, 12/18. Patient states continued willingness to participate in above-mentioned trial.    See AE review from yesterday, 12/18.    Study-mandated procedures performed per protocol as follows:     -PREDOSE STUDY LABS TO BE DRAWN AT IV START:   Time: __0810____  -PREMEDS TO BE GIVEN W/IN 30-90 MINUTES PRIOR TO START OF INFUSION   Time:___0815____  - PREDOSE COMPLETE VITAL SIGNS AFTER PREMEDS BUT W/IN 30 MIN PRIOR TO INFUSION   Time:____0908_____ Vitals:__122/73, 79, 18, 98.4__  -EKG TO BE PERFORMED BY STUDY RN WITHIN 30 MINUTES OF INFUSION   Time:___0902___  -CHEMO ADMINISTERED OVER 60 +/- 10 MINUTES (275 ML/HR); please document the saline flush.   Start Time:__0909__  Stop Time: __1009___  -COMPLETE VITAL SIGNS AT END OF INFUSION (which includes completion of the 30 mL saline flush) (+10min window allowed)   Time:__1018_   Vitals: __107/68, 71, 18, 98.2___  -STUDY LABS TO BE DRAWN AT END OF INFUSION   Time: __1018__   -STUDY LABS TO BE DRAWN 0.5 HOURS AFTER END OF INFUSION   Time: _1050_  -COMPLETE VITAL SIGNS AT 1 HOUR POST INFUSION (+/- 10min window allowed)   Time:__1120__  Vitals:__126/76, 80, 18, 98.4_  -STUDY LABS TO BE DRAWN 1 HOUR AFTER END OF INFUSION   Time: __1120__  -OBSERVE PATIENT FOR 90 MINUTES (+/- 10 minutes) AFTER STUDY DRUG COMPLETION   Observation period end " time: __1140__  *No Velcade at this visit    Patient tolerated the above with no difficulties, see infusion RN documentation.       All of patient's questions were answered to his satisfaction.  Patient scheduled for SQ Velcade in two weeks (01/02/18) as per Dr. Carmichael' recommendation.  Additionally, patient's cycles 13 and 14 were scheduled and discussed at length with patient who states understanding.  One year ECHO scheduled ahead of Dr. Lao visit on 2/5/18.  Patient states understanding of plan of care and upcoming schedule.  He states having [facility] RN contact information as well as that of BMT clinic.

## 2017-12-19 NOTE — PLAN OF CARE
Problem: Patient Care Overview  Goal: Plan of Care Review  Outcome: Ongoing (interventions implemented as appropriate)  Pt tolerated study treatment well. Observed for 90min after infusion.  Labs drawn per protocol.  No s/s of reaction. Vitals stable. NAD.

## 2018-01-02 ENCOUNTER — INFUSION (OUTPATIENT)
Dept: INFUSION THERAPY | Facility: HOSPITAL | Age: 53
End: 2018-01-02
Attending: INTERNAL MEDICINE
Payer: COMMERCIAL

## 2018-01-02 VITALS
DIASTOLIC BLOOD PRESSURE: 69 MMHG | HEART RATE: 89 BPM | RESPIRATION RATE: 18 BRPM | TEMPERATURE: 99 F | SYSTOLIC BLOOD PRESSURE: 122 MMHG

## 2018-01-02 DIAGNOSIS — E85.81 AL AMYLOIDOSIS: Primary | ICD-10-CM

## 2018-01-02 PROCEDURE — 63600175 PHARM REV CODE 636 W HCPCS: Mod: JG | Performed by: INTERNAL MEDICINE

## 2018-01-02 PROCEDURE — 96401 CHEMO ANTI-NEOPL SQ/IM: CPT

## 2018-01-02 RX ORDER — BORTEZOMIB 3.5 MG/1
1.4 INJECTION, POWDER, LYOPHILIZED, FOR SOLUTION INTRAVENOUS; SUBCUTANEOUS
Status: COMPLETED | OUTPATIENT
Start: 2018-01-02 | End: 2018-01-02

## 2018-01-02 RX ADMIN — BORTEZOMIB 2.9 MG: 3.5 INJECTION, POWDER, LYOPHILIZED, FOR SOLUTION INTRAVENOUS; SUBCUTANEOUS at 10:01

## 2018-01-02 NOTE — NURSING
Pt arrived for velcade.  Pt tolerated injection SQ to left side of upper abd.  DIscharged to home.

## 2018-01-11 DIAGNOSIS — Q21.10 ASD (ATRIAL SEPTAL DEFECT): Primary | ICD-10-CM

## 2018-01-12 DIAGNOSIS — E85.81 AL AMYLOIDOSIS: Primary | ICD-10-CM

## 2018-01-18 ENCOUNTER — INFUSION (OUTPATIENT)
Dept: INFUSION THERAPY | Facility: HOSPITAL | Age: 53
End: 2018-01-18
Attending: INTERNAL MEDICINE
Payer: COMMERCIAL

## 2018-01-18 ENCOUNTER — OFFICE VISIT (OUTPATIENT)
Dept: HEMATOLOGY/ONCOLOGY | Facility: CLINIC | Age: 53
End: 2018-01-18
Payer: COMMERCIAL

## 2018-01-18 ENCOUNTER — LAB VISIT (OUTPATIENT)
Dept: LAB | Facility: HOSPITAL | Age: 53
End: 2018-01-18
Attending: INTERNAL MEDICINE
Payer: COMMERCIAL

## 2018-01-18 ENCOUNTER — RESEARCH ENCOUNTER (OUTPATIENT)
Dept: RESEARCH | Facility: HOSPITAL | Age: 53
End: 2018-01-18

## 2018-01-18 VITALS
BODY MASS INDEX: 26.82 KG/M2 | OXYGEN SATURATION: 99 % | HEART RATE: 82 BPM | TEMPERATURE: 98 F | DIASTOLIC BLOOD PRESSURE: 78 MMHG | WEIGHT: 191.56 LBS | SYSTOLIC BLOOD PRESSURE: 119 MMHG | RESPIRATION RATE: 16 BRPM | HEIGHT: 71 IN

## 2018-01-18 VITALS
TEMPERATURE: 98 F | HEART RATE: 80 BPM | DIASTOLIC BLOOD PRESSURE: 68 MMHG | RESPIRATION RATE: 18 BRPM | SYSTOLIC BLOOD PRESSURE: 119 MMHG

## 2018-01-18 DIAGNOSIS — E85.81 AL AMYLOIDOSIS: Primary | ICD-10-CM

## 2018-01-18 DIAGNOSIS — I42.5 OTHER RESTRICTIVE CARDIOMYOPATHY: ICD-10-CM

## 2018-01-18 DIAGNOSIS — G62.2 NEUROPATHY DUE TO CHEMICAL SUBSTANCE: ICD-10-CM

## 2018-01-18 DIAGNOSIS — I50.32 CHRONIC DIASTOLIC CONGESTIVE HEART FAILURE: ICD-10-CM

## 2018-01-18 DIAGNOSIS — Z00.6 EXAMINATION OF PARTICIPANT IN CLINICAL TRIAL: ICD-10-CM

## 2018-01-18 DIAGNOSIS — E85.81 AL AMYLOIDOSIS: ICD-10-CM

## 2018-01-18 LAB
ALBUMIN SERPL BCP-MCNC: 4.1 G/DL
ALP SERPL-CCNC: 184 U/L
ALT SERPL W/O P-5'-P-CCNC: 29 U/L
ANION GAP SERPL CALC-SCNC: 11 MMOL/L
AST SERPL-CCNC: 33 U/L
BASOPHILS # BLD AUTO: 0.06 K/UL
BASOPHILS NFR BLD: 1 %
BILIRUB SERPL-MCNC: 1.6 MG/DL
BUN SERPL-MCNC: 19 MG/DL
CALCIUM SERPL-MCNC: 9.3 MG/DL
CHLORIDE SERPL-SCNC: 105 MMOL/L
CO2 SERPL-SCNC: 22 MMOL/L
CREAT SERPL-MCNC: 1.3 MG/DL
DIFFERENTIAL METHOD: ABNORMAL
DRUG STUDY SPECIMEN TYPE: NORMAL
DRUG STUDY TEST NAME: NORMAL
DRUG STUDY TEST RESULT: NORMAL
EOSINOPHIL # BLD AUTO: 0.1 K/UL
EOSINOPHIL NFR BLD: 1.6 %
ERYTHROCYTE [DISTWIDTH] IN BLOOD BY AUTOMATED COUNT: 13.3 %
EST. GFR  (AFRICAN AMERICAN): >60 ML/MIN/1.73 M^2
EST. GFR  (NON AFRICAN AMERICAN): >60 ML/MIN/1.73 M^2
GLUCOSE SERPL-MCNC: 121 MG/DL
HCT VFR BLD AUTO: 43.3 %
HGB BLD-MCNC: 14.2 G/DL
IMM GRANULOCYTES # BLD AUTO: 0.03 K/UL
IMM GRANULOCYTES NFR BLD AUTO: 0.5 %
LYMPHOCYTES # BLD AUTO: 1 K/UL
LYMPHOCYTES NFR BLD: 15.8 %
MAGNESIUM SERPL-MCNC: 2.1 MG/DL
MCH RBC QN AUTO: 32.1 PG
MCHC RBC AUTO-ENTMCNC: 32.8 G/DL
MCV RBC AUTO: 98 FL
MONOCYTES # BLD AUTO: 0.6 K/UL
MONOCYTES NFR BLD: 10.2 %
NEUTROPHILS # BLD AUTO: 4.4 K/UL
NEUTROPHILS NFR BLD: 70.9 %
NRBC BLD-RTO: 0 /100 WBC
PLATELET # BLD AUTO: 180 K/UL
PMV BLD AUTO: 9.8 FL
POTASSIUM SERPL-SCNC: 4.7 MMOL/L
PROT SERPL-MCNC: 7.1 G/DL
RBC # BLD AUTO: 4.42 M/UL
SODIUM SERPL-SCNC: 138 MMOL/L
WBC # BLD AUTO: 6.26 K/UL

## 2018-01-18 PROCEDURE — 99999 PR PBB SHADOW E&M-EST. PATIENT-LVL III: CPT | Mod: PBBFAC,,, | Performed by: INTERNAL MEDICINE

## 2018-01-18 PROCEDURE — 36415 COLL VENOUS BLD VENIPUNCTURE: CPT

## 2018-01-18 PROCEDURE — 96413 CHEMO IV INFUSION 1 HR: CPT

## 2018-01-18 PROCEDURE — 99215 OFFICE O/P EST HI 40 MIN: CPT | Mod: S$PBB,,, | Performed by: INTERNAL MEDICINE

## 2018-01-18 PROCEDURE — 80053 COMPREHEN METABOLIC PANEL: CPT

## 2018-01-18 PROCEDURE — 83735 ASSAY OF MAGNESIUM: CPT

## 2018-01-18 PROCEDURE — 83520 IMMUNOASSAY QUANT NOS NONAB: CPT | Mod: 59

## 2018-01-18 PROCEDURE — 99000 SPECIMEN HANDLING OFFICE-LAB: CPT

## 2018-01-18 PROCEDURE — 85025 COMPLETE CBC W/AUTO DIFF WBC: CPT

## 2018-01-18 PROCEDURE — 25000003 PHARM REV CODE 250: Performed by: INTERNAL MEDICINE

## 2018-01-18 RX ORDER — HEPARIN 100 UNIT/ML
500 SYRINGE INTRAVENOUS
Status: DISCONTINUED | OUTPATIENT
Start: 2018-01-18 | End: 2018-01-18 | Stop reason: HOSPADM

## 2018-01-18 RX ORDER — DIPHENHYDRAMINE HCL 25 MG
25 CAPSULE ORAL ONCE
Status: COMPLETED | OUTPATIENT
Start: 2018-01-18 | End: 2018-01-18

## 2018-01-18 RX ORDER — DIPHENHYDRAMINE HCL 25 MG
25 CAPSULE ORAL ONCE
Status: CANCELLED | OUTPATIENT
Start: 2018-01-18

## 2018-01-18 RX ORDER — HEPARIN 100 UNIT/ML
500 SYRINGE INTRAVENOUS
Status: CANCELLED | OUTPATIENT
Start: 2018-01-18

## 2018-01-18 RX ORDER — ACETAMINOPHEN 325 MG/1
650 TABLET ORAL
Status: CANCELLED | OUTPATIENT
Start: 2018-01-18

## 2018-01-18 RX ORDER — SODIUM CHLORIDE 0.9 % (FLUSH) 0.9 %
10 SYRINGE (ML) INJECTION
Status: CANCELLED | OUTPATIENT
Start: 2018-01-18

## 2018-01-18 RX ORDER — SODIUM CHLORIDE 0.9 % (FLUSH) 0.9 %
10 SYRINGE (ML) INJECTION
Status: DISCONTINUED | OUTPATIENT
Start: 2018-01-18 | End: 2018-01-18 | Stop reason: HOSPADM

## 2018-01-18 RX ORDER — ACETAMINOPHEN 325 MG/1
650 TABLET ORAL
Status: COMPLETED | OUTPATIENT
Start: 2018-01-18 | End: 2018-01-18

## 2018-01-18 RX ADMIN — SODIUM CHLORIDE: 0.9 INJECTION, SOLUTION INTRAVENOUS at 10:01

## 2018-01-18 RX ADMIN — DIPHENHYDRAMINE HYDROCHLORIDE 25 MG: 25 CAPSULE ORAL at 10:01

## 2018-01-18 RX ADMIN — ACETAMINOPHEN 650 MG: 325 TABLET ORAL at 10:01

## 2018-01-18 NOTE — PLAN OF CARE
Problem: Patient Care Overview  Goal: Plan of Care Review  Outcome: Ongoing (interventions implemented as appropriate)  Pt tolerated Investigational drug well with no s/s of reaction. Vitals signs stable.  NAD.

## 2018-01-19 LAB
KAPPA LC SER QL IA: 1.51 MG/DL
KAPPA LC/LAMBDA SER IA: 1.41
LAMBDA LC SER QL IA: 1.07 MG/DL

## 2018-01-22 NOTE — PROGRESS NOTES
HEMATOLOGIC MALIGNANCIES PROGRESS NOTE    IDENTIFYING STATEMENT   Roc Lai Jr. ([place]) is a 52 y.o. male with a  of 1965 from [place] with the diagnosis of AL amyloid.      ONCOLOGY HISTORY:    1. AL amyloid with cardiac involvement   A. 2016: Initial evaluation with Dr. Carmichael. Reported 18 months of progressive decline (previously ran 3 miles easily, now can only walk 10 minutes). M-protein 0.19 g/dl, kappa 1.37 mg/dl, lambda 50 mg/dl, ratio 0.03. Abdominal fat pad biopsy had been negative for presence of amyloid. Cardiac MRI suggestive of infiltrative cardiomyopathy.    B. 1/3/2017: Endomyocardial biopsy - involved by AL amyloid   C. 2017: BMBx - 60% cellular marrow with 50% plasma cells, consistent with plasma cell myeloma.    D. 2/15/2017 - 2017: Completed 4 cycles bortezomib chemotherapy on [id number] trial.    E. 2017: Bortezomib every 2 weeks x 2 doses   F. 2017: Change bortezomib to [id number] x 2 doses   G. 10/9/2017: Bortezomib q6 weeks    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 Lai returns to clinic for follow-up on the HBKC930 trial. This is Month 13 of therapy. He has been tolerating it well. He has some minot peripheral neuropathy, but it does not limit his function. He is having improved exercise tolerance overall, with 2 days of activity x 30 minutes weekly. He continues to have some lower extremity edema.     Past Medical History, Past Social History and Past Family History have been reviewed and are unchanged except as noted in the interval history.    MEDICATIONS:     Current Outpatient Prescriptions on File Prior to Visit   Medication Sig Dispense Refill    acyclovir (ZOVIRAX) 400 MG tablet Take 1 tablet (400 mg total) by mouth 2 (two) times daily. 60 tablet 11    BORTEZOMIB (VELCADE INJ) Inject as directed every 6 weeks.       bumetanide (BUMEX) 2 MG tablet Take one tablet once daily and as directed for wt gain  (Patient taking differently: Take 2 mg by mouth as needed. Take one tablet once daily and as directed for wt gain) 120 tablet 3    FOLIC ACID/VIT BCOMP,C (B COMPLEX-VITAMIN C-FOLIC ACID) 400 mcg Tab Take 1 tablet by mouth once daily at 6am.      gabapentin (NEURONTIN) 100 MG capsule Take 3 capsules (300 mg total) by mouth every evening. 90 capsule 2    magnesium oxide (MAG-OX) 400 mg tablet Take 1 tablet (400 mg total) by mouth once daily. (Patient taking differently: Take 400 mg by mouth as needed. Take only on days that Bumex is taken (per Dr. Carmichael on 11/21/17).) 30 tablet 4    potassium chloride SA (K-DUR,KLOR-CON) 20 MEQ tablet Take 1 tablet (20 mEq total) by mouth 2 (two) times daily. (Patient taking differently: Take 20 mEq by mouth as needed. Take only on days Bumex is taken (per Dr. Carmichael 11/21/17).) 180 tablet 1    ramelteon (ROZEREM) 8 mg tablet Take 1 tablet (8 mg total) by mouth nightly as needed for Insomnia. 30 tablet 0    spironolactone (ALDACTONE) 25 MG tablet Take 1 tablet (25 mg total) by mouth once daily. 90 tablet 3    vitamin D 1000 units Tab Take 1,000 Units by mouth once daily.      vitamin E 400 UNIT capsule Take 400 Units by mouth once daily.       No current facility-administered medications on file prior to visit.        ALLERGIES: Review of patient's allergies indicates:  No Known Allergies     ROS:       Review of Systems   Constitutional: Positive for fatigue. Negative for diaphoresis, fever and unexpected weight change.   HENT:   Negative for lump/mass and sore throat.    Eyes: Negative for icterus.   Respiratory: Negative for cough and shortness of breath.    Cardiovascular: Positive for leg swelling. Negative for chest pain and palpitations.        Dyspnea on exertion   Gastrointestinal: Negative for abdominal distention, constipation, diarrhea, nausea and vomiting.   Genitourinary: Negative for dysuria and frequency.    Musculoskeletal: Negative for arthralgias, gait  "problem and myalgias.   Skin: Negative for rash.   Neurological: Positive for numbness. Negative for dizziness, gait problem and headaches.   Hematological: Negative for adenopathy. Does not bruise/bleed easily.   Psychiatric/Behavioral: The patient is not nervous/anxious.        PHYSICAL EXAM:  Vitals:    01/18/18 0852   BP: 119/78   Pulse: 82   Resp: 16   Temp: 98.2 °F (36.8 °C)   TempSrc: Oral   SpO2: 99%   Weight: 86.9 kg (191 lb 9.3 oz)   Height: 5' 11" (1.803 m)   PainSc: 0-No pain     ECOG 1    NYHA Class II      Physical Exam   Constitutional: He is oriented to person, place, and time. He appears well-developed and well-nourished. No distress.   HENT:   Head: Normocephalic and atraumatic.   Mouth/Throat: Mucous membranes are normal. No oral lesions.   No macroglossia   Eyes: Conjunctivae are normal.   Neck: No thyromegaly present.   Cardiovascular: Normal rate, regular rhythm and normal heart sounds.    No murmur heard.  Pulmonary/Chest: Breath sounds normal. He has no wheezes. He has no rales.   Abdominal: Soft. He exhibits no distension and no mass. There is no splenomegaly or hepatomegaly. There is no tenderness.   Musculoskeletal: He exhibits edema.   Lymphadenopathy:     He has no cervical adenopathy.        Right cervical: No deep cervical adenopathy present.       Left cervical: No deep cervical adenopathy present.     He has no axillary adenopathy.        Right: No inguinal adenopathy present.        Left: No inguinal adenopathy present.   Neurological: He is alert and oriented to person, place, and time. He has normal strength and normal reflexes. No cranial nerve deficit. Coordination normal.   Neurologic examination as per GQVH602 protocol and was normal.    Skin: No rash noted.       LAB:   Results for orders placed or performed in visit on 01/18/18   CBC W/ AUTO DIFFERENTIAL   Result Value Ref Range    WBC 6.26 3.90 - 12.70 K/uL    RBC 4.42 (L) 4.60 - 6.20 M/uL    Hemoglobin 14.2 14.0 - 18.0 " g/dL    Hematocrit 43.3 40.0 - 54.0 %    MCV 98 82 - 98 fL    MCH 32.1 (H) 27.0 - 31.0 pg    MCHC 32.8 32.0 - 36.0 g/dL    RDW 13.3 11.5 - 14.5 %    Platelets 180 150 - 350 K/uL    MPV 9.8 9.2 - 12.9 fL    Immature Granulocytes 0.5 0.0 - 0.5 %    Gran # 4.4 1.8 - 7.7 K/uL    Immature Grans (Abs) 0.03 0.00 - 0.04 K/uL    Lymph # 1.0 1.0 - 4.8 K/uL    Mono # 0.6 0.3 - 1.0 K/uL    Eos # 0.1 0.0 - 0.5 K/uL    Baso # 0.06 0.00 - 0.20 K/uL    nRBC 0 0 /100 WBC    Gran% 70.9 38.0 - 73.0 %    Lymph% 15.8 (L) 18.0 - 48.0 %    Mono% 10.2 4.0 - 15.0 %    Eosinophil% 1.6 0.0 - 8.0 %    Basophil% 1.0 0.0 - 1.9 %    Differential Method Automated    COMPREHENSIVE METABOLIC PANEL   Result Value Ref Range    Sodium 138 136 - 145 mmol/L    Potassium 4.7 3.5 - 5.1 mmol/L    Chloride 105 95 - 110 mmol/L    CO2 22 (L) 23 - 29 mmol/L    Glucose 121 (H) 70 - 110 mg/dL    BUN, Bld 19 6 - 20 mg/dL    Creatinine 1.3 0.5 - 1.4 mg/dL    Calcium 9.3 8.7 - 10.5 mg/dL    Total Protein 7.1 6.0 - 8.4 g/dL    Albumin 4.1 3.5 - 5.2 g/dL    Total Bilirubin 1.6 (H) 0.1 - 1.0 mg/dL    Alkaline Phosphatase 184 (H) 55 - 135 U/L    AST 33 10 - 40 U/L    ALT 29 10 - 44 U/L    Anion Gap 11 8 - 16 mmol/L    eGFR if African American >60 >60 mL/min/1.73 m^2    eGFR if non African American >60 >60 mL/min/1.73 m^2   MAGNESIUM   Result Value Ref Range    Magnesium 2.1 1.6 - 2.6 mg/dL   IMMUNOGLOBULIN FREE LT CHAINS BLOOD   Result Value Ref Range    Kappa Free Light Chains 1.51 0.33 - 1.94 mg/dL    Lambda Free Light Chains 1.07 0.57 - 2.63 mg/dL    Kappa/Lambda FLC Ratio 1.41 0.26 - 1.65   DRUG STUDY SENDOUT, MISC. Blood   Result Value Ref Range    Drug Study Test Name Drug Study     Drug Study Specimen Type BLOOD     Drug Study Test Result Result sent directly to physician        PROBLEMS ASSESSED THIS VISIT:    1. AL amyloidosis    2. Chronic diastolic congestive heart failure    3. 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 Other restrictive cardiomyopathy         PLAN:       AL amyloidosis  Mr. Lai has improving symptoms with respect to his AL amyloidosis, and his serum light chains remain normal. He has been maintained on bortezomib every six weeks, along with the study agent [id number].     Given his favorable response to therapy thus far, he is continuing participation in the clinical trial. We will continue to monitor monthly.    I will plan at least one more administration of bortezomib, but if his light chains remain normal, we may contemplate discontinuing this and monitoring for recurrence.     Chronic diastolic congestive heart failure  Symptoms are improving over the course of time. He still has limited exercise capacity compared to his previously very fit baseline. That said, he is much better off than before the start of therapy. His BNP was obtained centrally and reviewed and has improved with time as well. We will continue to monitor. He will continue follow-up with Dr. Lao.     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 Stable.       Chris Atkins MD  Hematology and Stem Cell Transplant

## 2018-01-23 NOTE — ASSESSMENT & PLAN NOTE
Symptoms are improving over the course of time. He still has limited exercise capacity compared to his previously very fit baseline. That said, he is much better off than before the start of therapy. His BNP was obtained centrally and reviewed and has improved with time as well. We will continue to monitor. He will continue follow-up with Dr. Lao.

## 2018-01-23 NOTE — ASSESSMENT & PLAN NOTE
Mr. Lai has improving symptoms with respect to his AL amyloidosis, and his serum light chains remain normal. He has been maintained on bortezomib every six weeks, along with the study agent [id number].     Given his favorable response to therapy thus far, he is continuing participation in the clinical trial. We will continue to monitor monthly.    I will plan at least one more administration of bortezomib, but if his light chains remain normal, we may contemplate discontinuing this and monitoring for recurrence.

## 2018-02-05 ENCOUNTER — HOSPITAL ENCOUNTER (OUTPATIENT)
Dept: CARDIOLOGY | Facility: CLINIC | Age: 53
Discharge: HOME | End: 2018-02-05
Payer: COMMERCIAL

## 2018-02-05 ENCOUNTER — OFFICE VISIT (OUTPATIENT)
Dept: TRANSPLANT | Facility: CLINIC | Age: 53
End: 2018-02-05
Attending: INTERNAL MEDICINE
Payer: COMMERCIAL

## 2018-02-05 ENCOUNTER — LAB VISIT (OUTPATIENT)
Dept: LAB | Facility: HOSPITAL | Age: 53
End: 2018-02-05
Payer: COMMERCIAL

## 2018-02-05 VITALS
HEART RATE: 82 BPM | HEIGHT: 71 IN | SYSTOLIC BLOOD PRESSURE: 130 MMHG | BODY MASS INDEX: 27 KG/M2 | WEIGHT: 192.88 LBS | DIASTOLIC BLOOD PRESSURE: 89 MMHG

## 2018-02-05 DIAGNOSIS — I42.5 OTHER RESTRICTIVE CARDIOMYOPATHY: ICD-10-CM

## 2018-02-05 DIAGNOSIS — E85.81 AL AMYLOIDOSIS: ICD-10-CM

## 2018-02-05 DIAGNOSIS — I51.7 CARDIOMEGALY: ICD-10-CM

## 2018-02-05 DIAGNOSIS — Q21.11 ASD (ATRIAL SEPTAL DEFECT), OSTIUM SECUNDUM: ICD-10-CM

## 2018-02-05 DIAGNOSIS — I48.3 TYPICAL ATRIAL FLUTTER: ICD-10-CM

## 2018-02-05 DIAGNOSIS — I50.32 CHRONIC DIASTOLIC CONGESTIVE HEART FAILURE: Primary | ICD-10-CM

## 2018-02-05 DIAGNOSIS — I50.32 CHRONIC DIASTOLIC CONGESTIVE HEART FAILURE: ICD-10-CM

## 2018-02-05 LAB
ALBUMIN SERPL BCP-MCNC: 3.7 G/DL
ALP SERPL-CCNC: 200 U/L
ALT SERPL W/O P-5'-P-CCNC: 25 U/L
ANION GAP SERPL CALC-SCNC: 8 MMOL/L
AST SERPL-CCNC: 32 U/L
BILIRUB SERPL-MCNC: 1.7 MG/DL
BNP SERPL-MCNC: 505 PG/ML
BUN SERPL-MCNC: 22 MG/DL
CALCIUM SERPL-MCNC: 9.5 MG/DL
CHLORIDE SERPL-SCNC: 103 MMOL/L
CO2 SERPL-SCNC: 28 MMOL/L
CREAT SERPL-MCNC: 1.2 MG/DL
EST. GFR  (AFRICAN AMERICAN): >60 ML/MIN/1.73 M^2
EST. GFR  (NON AFRICAN AMERICAN): >60 ML/MIN/1.73 M^2
ESTIMATED PA SYSTOLIC PRESSURE: 27.96
GLOBAL PERICARDIAL EFFUSION: ABNORMAL
GLUCOSE SERPL-MCNC: 62 MG/DL
MITRAL VALVE MOBILITY: NORMAL
POTASSIUM SERPL-SCNC: 4.5 MMOL/L
PROT SERPL-MCNC: 6.9 G/DL
RETIRED EF AND QEF - SEE NOTES: 60 (ref 55–65)
SODIUM SERPL-SCNC: 139 MMOL/L

## 2018-02-05 PROCEDURE — 3008F BODY MASS INDEX DOCD: CPT | Mod: S$GLB,,, | Performed by: INTERNAL MEDICINE

## 2018-02-05 PROCEDURE — 80053 COMPREHEN METABOLIC PANEL: CPT

## 2018-02-05 PROCEDURE — 93306 TTE W/DOPPLER COMPLETE: CPT | Mod: PBBFAC | Performed by: INTERNAL MEDICINE

## 2018-02-05 PROCEDURE — 99999 PR PBB SHADOW E&M-EST. PATIENT-LVL III: CPT | Mod: PBBFAC,,, | Performed by: INTERNAL MEDICINE

## 2018-02-05 PROCEDURE — 99214 OFFICE O/P EST MOD 30 MIN: CPT | Mod: S$GLB,,, | Performed by: INTERNAL MEDICINE

## 2018-02-05 PROCEDURE — 83880 ASSAY OF NATRIURETIC PEPTIDE: CPT

## 2018-02-05 PROCEDURE — 0399T 2D ECHO WITH COLOR FLOW DOPPLER: CPT | Mod: 26,S$PBB,, | Performed by: INTERNAL MEDICINE

## 2018-02-05 PROCEDURE — 36415 COLL VENOUS BLD VENIPUNCTURE: CPT

## 2018-02-05 RX ORDER — POTASSIUM CHLORIDE 20 MEQ/1
20 TABLET, EXTENDED RELEASE ORAL DAILY
Qty: 90 TABLET | Refills: 3 | Status: SHIPPED | OUTPATIENT
Start: 2018-02-05 | End: 2018-03-19 | Stop reason: SDUPTHER

## 2018-02-05 RX ORDER — LANOLIN ALCOHOL/MO/W.PET/CERES
400 CREAM (GRAM) TOPICAL DAILY
Qty: 90 TABLET | Refills: 3 | Status: SHIPPED | OUTPATIENT
Start: 2018-02-05 | End: 2018-12-18 | Stop reason: SDUPTHER

## 2018-02-05 RX ORDER — BUMETANIDE 2 MG/1
2 TABLET ORAL DAILY
Qty: 90 TABLET | Refills: 3 | Status: SHIPPED | OUTPATIENT
Start: 2018-02-05 | End: 2019-01-02 | Stop reason: SDUPTHER

## 2018-02-05 NOTE — PROGRESS NOTES
"Subjective:     Patient ID:  Roc Lai Jr. is a 52 y.o. male who presents for follow-up of Congestive Heart Failure    HPI:  53 yo WM with AL and myocardial amyloidosis has been under treatment with Dr. Carmichael but with his departure now under the care of Dr. Atkins.  He comes today for routine visit.  He reports that Oncology nurse told him to taper off loop diuretic so he has been taking bumex every 3 to 5 days.  He feels well and reports exercising with elliptical machine or walking 5 miles over 75 min 2x/week and performing 45 push-up 3x/week.  He feels well and working full time.  No orthopnea or PND.  Wt up 4# on his home log since last visit with me.    Review of Systems   Constitution: Positive for weight gain (4#). Negative for chills, fever, weakness, malaise/fatigue, night sweats and weight loss.   Cardiovascular: Positive for dyspnea on exertion (see HPI). Negative for chest pain, irregular heartbeat, leg swelling, near-syncope, orthopnea, palpitations, paroxysmal nocturnal dyspnea and syncope.   Respiratory: Negative for cough, sputum production and wheezing.    Endocrine: Positive for cold intolerance.   Hematologic/Lymphatic: Does not bruise/bleed easily.   Musculoskeletal: Negative for arthritis, joint pain and stiffness.   Gastrointestinal: Negative for hematochezia and melena.   Genitourinary: Positive for nocturia. Negative for hematuria.   Neurological: Positive for numbness (hands and feet). Negative for brief paralysis, focal weakness and seizures.     Objective:   Physical Exam   Constitutional: He is oriented to person, place, and time. He appears well-developed and well-nourished. No distress.   /89 (BP Location: Left arm, Patient Position: Sitting, BP Method: Medium (Automatic))   Pulse 82   Ht 5' 11" (1.803 m)   Wt 87.5 kg (192 lb 14.4 oz)   BMI 26.90 kg/m²   Last visit wt 84 kg (185 lb 3 oz)      HENT:   Head: Normocephalic and atraumatic.   Eyes: Conjunctivae are normal. " Right eye exhibits no discharge. Left eye exhibits no discharge. No scleral icterus.   Neck: JVD (Prominent V wave to jaw while sitting with HJR ) present. No thyromegaly present.   Cardiovascular: Normal rate and regular rhythm.  Exam reveals gallop (S3 easily heard today).    No murmur heard.  Pulmonary/Chest: Effort normal and breath sounds normal. No respiratory distress. He has no wheezes. He has no rales.   Abdominal: Soft. Bowel sounds are normal. He exhibits no distension (liver span 18 cm) and no mass. There is no tenderness. There is no rebound and no guarding.   Musculoskeletal: He exhibits edema (0.5+). He exhibits no tenderness.   Neurological: He is alert and oriented to person, place, and time.   Skin: Skin is warm and dry. He is not diaphoretic.   Psychiatric: He has a normal mood and affect. His behavior is normal. Judgment and thought content normal.      Lab Results   Component Value Date     (H) 02/05/2018     02/05/2018    K 4.5 02/05/2018     02/05/2018    CO2 28 02/05/2018    BUN 22 (H) 02/05/2018    CREATININE 1.2 02/05/2018    GLU 62 (L) 02/05/2018    AST 32 02/05/2018    ALT 25 02/05/2018    ALBUMIN 3.7 02/05/2018    PROT 6.9 02/05/2018    BILITOT 1.7 (H) 02/05/2018     ECHO 2/5/18 CONCLUSIONS     1 - Biatrial enlargement.     2 - Concentric hypertrophy.     3 - Normal left ventricular systolic function (EF 60-65%).     4 - Significantly reduced global longitudinal strain.     5 - Indeterminate LV diastolic function.     6 - Right ventricular enlargement with moderately depressed systolic function.     7 - The estimated PA systolic pressure is 21 mmHg.     8 - Small pericardial effusion.     9 - Increased central venous pressure.   Assessment:     1. Chronic diastolic congestive heart failure    2. AL amyloidosis    3. Restrictive cardiomyopathy due to AL amyloidosis    4. Typical atrial flutter s/p ablation and restoration sinus rhythm 2016    5. ASD (atrial septal  defect), ostium secundum s/p closure      Plan:   Go back to taking Bumex, potassium and Mg daily and repeat BMP in 10-14 days since even though BNP tracking right direction on exam significant right sided pressure elevation  He was to have echo for Dr. Mendieta but due to expense hopeful this can be deferred--I told him that I would forward this to Dr. Mendieta but he did not have bubble study today.  RTC 6 months

## 2018-02-20 ENCOUNTER — RESEARCH ENCOUNTER (OUTPATIENT)
Dept: RESEARCH | Facility: HOSPITAL | Age: 53
End: 2018-02-20

## 2018-02-20 ENCOUNTER — TELEPHONE (OUTPATIENT)
Dept: TRANSPLANT | Facility: CLINIC | Age: 53
End: 2018-02-20

## 2018-02-20 ENCOUNTER — PATIENT MESSAGE (OUTPATIENT)
Dept: CARDIOLOGY | Facility: CLINIC | Age: 53
End: 2018-02-20

## 2018-02-20 ENCOUNTER — INFUSION (OUTPATIENT)
Dept: INFUSION THERAPY | Facility: HOSPITAL | Age: 53
End: 2018-02-20
Attending: INTERNAL MEDICINE
Payer: COMMERCIAL

## 2018-02-20 ENCOUNTER — LAB VISIT (OUTPATIENT)
Dept: LAB | Facility: HOSPITAL | Age: 53
End: 2018-02-20
Attending: INTERNAL MEDICINE
Payer: COMMERCIAL

## 2018-02-20 ENCOUNTER — OFFICE VISIT (OUTPATIENT)
Dept: HEMATOLOGY/ONCOLOGY | Facility: CLINIC | Age: 53
End: 2018-02-20
Payer: COMMERCIAL

## 2018-02-20 VITALS
RESPIRATION RATE: 18 BRPM | HEART RATE: 78 BPM | DIASTOLIC BLOOD PRESSURE: 62 MMHG | TEMPERATURE: 98 F | SYSTOLIC BLOOD PRESSURE: 111 MMHG

## 2018-02-20 VITALS
TEMPERATURE: 99 F | OXYGEN SATURATION: 99 % | SYSTOLIC BLOOD PRESSURE: 118 MMHG | HEART RATE: 93 BPM | HEIGHT: 71 IN | BODY MASS INDEX: 25.52 KG/M2 | WEIGHT: 182.31 LBS | DIASTOLIC BLOOD PRESSURE: 64 MMHG | RESPIRATION RATE: 18 BRPM

## 2018-02-20 DIAGNOSIS — E85.81 AL AMYLOIDOSIS: Primary | ICD-10-CM

## 2018-02-20 DIAGNOSIS — R17 TOTAL BILIRUBIN, ELEVATED: ICD-10-CM

## 2018-02-20 DIAGNOSIS — I42.5 OTHER RESTRICTIVE CARDIOMYOPATHY: ICD-10-CM

## 2018-02-20 DIAGNOSIS — G62.2 NEUROPATHY DUE TO CHEMICAL SUBSTANCE: Primary | ICD-10-CM

## 2018-02-20 DIAGNOSIS — I50.32 CHRONIC DIASTOLIC CONGESTIVE HEART FAILURE: ICD-10-CM

## 2018-02-20 DIAGNOSIS — E85.81 AL AMYLOIDOSIS: ICD-10-CM

## 2018-02-20 DIAGNOSIS — Z00.6 EXAMINATION OF PARTICIPANT IN CLINICAL TRIAL: ICD-10-CM

## 2018-02-20 LAB
ALBUMIN SERPL BCP-MCNC: 3.9 G/DL
ALP SERPL-CCNC: 256 U/L
ALT SERPL W/O P-5'-P-CCNC: 33 U/L
ANION GAP SERPL CALC-SCNC: 10 MMOL/L
AST SERPL-CCNC: 40 U/L
BASOPHILS # BLD AUTO: 0.06 K/UL
BASOPHILS NFR BLD: 1.1 %
BILIRUB SERPL-MCNC: 1.7 MG/DL
BUN SERPL-MCNC: 23 MG/DL
CALCIUM SERPL-MCNC: 9.8 MG/DL
CHLORIDE SERPL-SCNC: 100 MMOL/L
CO2 SERPL-SCNC: 27 MMOL/L
CREAT SERPL-MCNC: 1.3 MG/DL
DIFFERENTIAL METHOD: ABNORMAL
DRUG STUDY SPECIMEN TYPE: NORMAL
EOSINOPHIL # BLD AUTO: 0.2 K/UL
EOSINOPHIL NFR BLD: 3.6 %
ERYTHROCYTE [DISTWIDTH] IN BLOOD BY AUTOMATED COUNT: 13.4 %
EST. GFR  (AFRICAN AMERICAN): >60 ML/MIN/1.73 M^2
EST. GFR  (NON AFRICAN AMERICAN): >60 ML/MIN/1.73 M^2
GLUCOSE SERPL-MCNC: 126 MG/DL
HCT VFR BLD AUTO: 43 %
HGB BLD-MCNC: 14.5 G/DL
IMM GRANULOCYTES # BLD AUTO: 0.02 K/UL
IMM GRANULOCYTES NFR BLD AUTO: 0.4 %
LYMPHOCYTES # BLD AUTO: 0.9 K/UL
LYMPHOCYTES NFR BLD: 16.9 %
MAGNESIUM SERPL-MCNC: 2.2 MG/DL
MCH RBC QN AUTO: 32.3 PG
MCHC RBC AUTO-ENTMCNC: 33.7 G/DL
MCV RBC AUTO: 96 FL
MONOCYTES # BLD AUTO: 0.6 K/UL
MONOCYTES NFR BLD: 10.2 %
NEUTROPHILS # BLD AUTO: 3.7 K/UL
NEUTROPHILS NFR BLD: 67.8 %
NRBC BLD-RTO: 0 /100 WBC
PLATELET # BLD AUTO: 159 K/UL
PMV BLD AUTO: 9.4 FL
POTASSIUM SERPL-SCNC: 4.4 MMOL/L
PROT SERPL-MCNC: 7.5 G/DL
RBC # BLD AUTO: 4.49 M/UL
SODIUM SERPL-SCNC: 137 MMOL/L
WBC # BLD AUTO: 5.51 K/UL

## 2018-02-20 PROCEDURE — 80053 COMPREHEN METABOLIC PANEL: CPT

## 2018-02-20 PROCEDURE — 63600175 PHARM REV CODE 636 W HCPCS: Mod: JW,JG | Performed by: INTERNAL MEDICINE

## 2018-02-20 PROCEDURE — 25000003 PHARM REV CODE 250: Performed by: INTERNAL MEDICINE

## 2018-02-20 PROCEDURE — 83520 IMMUNOASSAY QUANT NOS NONAB: CPT | Mod: 59

## 2018-02-20 PROCEDURE — 85025 COMPLETE CBC W/AUTO DIFF WBC: CPT

## 2018-02-20 PROCEDURE — 99214 OFFICE O/P EST MOD 30 MIN: CPT | Mod: S$PBB,,, | Performed by: INTERNAL MEDICINE

## 2018-02-20 PROCEDURE — 96413 CHEMO IV INFUSION 1 HR: CPT

## 2018-02-20 PROCEDURE — 83735 ASSAY OF MAGNESIUM: CPT

## 2018-02-20 PROCEDURE — 99999 PR PBB SHADOW E&M-EST. PATIENT-LVL III: CPT | Mod: PBBFAC,,, | Performed by: INTERNAL MEDICINE

## 2018-02-20 PROCEDURE — 96401 CHEMO ANTI-NEOPL SQ/IM: CPT

## 2018-02-20 PROCEDURE — 36415 COLL VENOUS BLD VENIPUNCTURE: CPT

## 2018-02-20 RX ORDER — HEPARIN 100 UNIT/ML
500 SYRINGE INTRAVENOUS
Status: DISCONTINUED | OUTPATIENT
Start: 2018-02-20 | End: 2018-02-20 | Stop reason: HOSPADM

## 2018-02-20 RX ORDER — DIPHENHYDRAMINE HCL 25 MG
25 CAPSULE ORAL ONCE
Status: COMPLETED | OUTPATIENT
Start: 2018-02-20 | End: 2018-02-20

## 2018-02-20 RX ORDER — HEPARIN 100 UNIT/ML
500 SYRINGE INTRAVENOUS
Status: CANCELLED | OUTPATIENT
Start: 2018-02-20

## 2018-02-20 RX ORDER — SODIUM CHLORIDE 0.9 % (FLUSH) 0.9 %
10 SYRINGE (ML) INJECTION
Status: CANCELLED | OUTPATIENT
Start: 2018-02-20

## 2018-02-20 RX ORDER — DIPHENHYDRAMINE HCL 25 MG
25 CAPSULE ORAL ONCE
Status: CANCELLED | OUTPATIENT
Start: 2018-02-20 | End: 2018-02-20

## 2018-02-20 RX ORDER — BORTEZOMIB 3.5 MG/1
1.3 INJECTION, POWDER, LYOPHILIZED, FOR SOLUTION INTRAVENOUS; SUBCUTANEOUS
Status: CANCELLED
Start: 2018-02-20

## 2018-02-20 RX ORDER — ACETAMINOPHEN 325 MG/1
650 TABLET ORAL
Status: COMPLETED | OUTPATIENT
Start: 2018-02-20 | End: 2018-02-20

## 2018-02-20 RX ORDER — SODIUM CHLORIDE 0.9 % (FLUSH) 0.9 %
10 SYRINGE (ML) INJECTION
Status: DISCONTINUED | OUTPATIENT
Start: 2018-02-20 | End: 2018-02-20 | Stop reason: HOSPADM

## 2018-02-20 RX ORDER — GABAPENTIN 100 MG/1
300 CAPSULE ORAL NIGHTLY
Qty: 90 CAPSULE | Refills: 2 | Status: SHIPPED | OUTPATIENT
Start: 2018-02-20 | End: 2018-05-17 | Stop reason: SDUPTHER

## 2018-02-20 RX ORDER — BORTEZOMIB 3.5 MG/1
1.3 INJECTION, POWDER, LYOPHILIZED, FOR SOLUTION INTRAVENOUS; SUBCUTANEOUS
Status: COMPLETED | OUTPATIENT
Start: 2018-02-20 | End: 2018-02-20

## 2018-02-20 RX ORDER — ACETAMINOPHEN 325 MG/1
650 TABLET ORAL
Status: CANCELLED | OUTPATIENT
Start: 2018-02-20 | End: 2018-02-20

## 2018-02-20 RX ADMIN — DIPHENHYDRAMINE HYDROCHLORIDE 25 MG: 25 CAPSULE ORAL at 09:02

## 2018-02-20 RX ADMIN — SODIUM CHLORIDE: 9 INJECTION, SOLUTION INTRAVENOUS at 09:02

## 2018-02-20 RX ADMIN — BORTEZOMIB 2.7 MG: 3.5 INJECTION, POWDER, LYOPHILIZED, FOR SOLUTION INTRAVENOUS; SUBCUTANEOUS at 01:02

## 2018-02-20 RX ADMIN — ACETAMINOPHEN 650 MG: 325 TABLET, FILM COATED ORAL at 09:02

## 2018-02-20 NOTE — PROGRESS NOTES
"Tuesday, February 20th, 2018     Study: "A Phase 3, randomized, multicenter, double-blind, placebo-controlled, 2-arm, efficacy and safety study of [id number] plus standard of care versus placebo plus standard of care in subjects with light chain (AL) amyloidosis"  Protocol ID# [id number], Study Id: [id number]  IRB# 2015.305.A  Sponsor: Eliseo  Investigator: Dr. Atkins    Month 14, Day 1   [id number]/Placebo Infusion & Velcade    Patient presents for Month 14, Day 1 MD visit ahead of Month 14 study drug infusion per above-mentioned protocol.  Patient is awake, alert, and oriented to person, place, and time.  He states his quality of life remains good and has little to no dyspnea issues.  After his visit with Dr. Lao, he has resumed daily Bumex. He now also takes 1 magnesium and 1 potassium supplement daily, in the morning. States that the frequent urination is "managable" and denies feelings of dehydration.  He has noticed a decrease in his weight because of this. He has been increasing his activity, and states that he is walking "up to five miles." BP's stable, has BP of 118/64 in clinic today.   Patient verbalizes continued willingness to participate in above-mentioned trial at this time.      Review of AE's:     Restless Leg Syndrome, grade 1:  No reports of this today. Taking nightly gabapentin, which helps him rest. Patient states he intermittently takes vitamin supplements as per med list.  AE ongoing.  Dry Skin, grade 1:  No complaints of worsening symptoms.  AE ongoing.   Peripheral sensory neuropathy, grade 1: States that he has noticed an improvement in his peripheral neuropathy. Still has numbness in fingers/toes; however, states the sharp/shooting pain in his feet occurs "much less frequently".  He doses continue to take gabapentin at night.  AE, grade 1 ongoing.  Insomnia, Grade 1: No current issues. Continues on gabapentin. Per patient has noticed less snoring. AE intermittent  Lymphocyte count " decreased, Grade 1: Lymphocyte count: 0.9 today, AE intermittent.   Alkaline phosphatase increased, Grade 1: Alk phos 256 today. Per Dr. Atkins, will continue to monitor, possibly due to INV agent; however unknown if he is getting agent/placebo. Further, has been consistently elevated since baseline.  AE ongoing  Blood bilirubin increased, Grade 1: Total bilirubin 1.7,  Per Dr. Atkins, will continue to monitor, possibly due to INV agent; however unknown if he is getting agent/placebo. Further, has been consistently elevated since baseline. NCS per Dr. Atkins and does not affect dosing of further therapy. AE ongoing    Physical performed per Dr. Atkins, see MD note for Complete PE, symptom-directed PE, ECOG PS and NYHA Classification.  24 hour urine jug given to patient for Cycle 15.  See flowsheets for height, weight, con meds and vital signs.  NIS-LL completed, see chart for source documentation. Patient completed all necessary QOLs (including VASPI) per Emiliana MD visit.  Patient is not WOCBP.  Local blood work performed and reviewed today per Dr. Atkins.  Central labs collected shipped to [facility] lab today. Concomitant medications reviewed.  Per Dr. Atkins, patient is approved to initiate M14,D1 treatment today.  Treatment plan updated with today's weight (82.7kg) and signed per Dr. Atkins. Dr. Atkins discussed pushing Velcade injections to Q8 weeks, depending on the results of the free light chains. Patient is in agreement with this plan. Today, patient will be given Velcade after NEOD/Placebo infusion.     6MWT not required at this timepoint.      Study-mandated procedures performed per protocol as follows:     -PREDOSE STUDY LABS TO BE DRAWN WITH OTHER BLOOD WORK:   Time: __0800____  -PREMEDS TO BE GIVEN W/IN 30-90 MINUTES PRIOR TO START OF INFUSION   Time:___1047____  - PREDOSE COMPLETE VITAL SIGNS AFTER PREMEDS BUT W/IN 30 MIN PRIOR TO INFUSION   Time:_________ Vitals:__128/63, 78, 18, 98.2__  -EKG TO BE PERFORMED  BY STUDY RN WITHIN 30 MINUTES OF INFUSION   Time:___1030___  -CHEMO ADMINISTERED OVER 60 +/- 10 MINUTES (275 ML/HR); please document the saline flush.   Start Time:__1048__  Stop Time: __1148___  -COMPLETE VITAL SIGNS AT END OF INFUSION (which includes completion of the 30 mL saline flush) (+10min window allowed)   Time:__1153_   Vitals: __110/71, 80, 18, 98.2___  -COMPLETE VITAL SIGNS AT 1 HOUR POST INFUSION (+/- 10min window allowed)   Time:_1254___  Vitals:_111/62, 781 18, 98.0  -OBSERVE PATIENT FOR 90 MINUTES (+/- 10 minutes) AFTER STUDY DRUG COMPLETION   Observation period end time: _1320__  -VELCADE Administration (complete after 90 minute observation period)   Time__1324___    Patient tolerated the above with no difficulties, see infusion RN documentation.       All of patient's questions were answered to his satisfaction.  Patient states understanding of plan of care and upcoming schedule.  He states having Research RN contact information as well as that of BMT clinic.

## 2018-02-20 NOTE — TELEPHONE ENCOUNTER
----- Message from Camelia Fernandez RN sent at 2/20/2018 11:24 AM CST -----  Regarding: Joelle Aldactone  Hi Dr. Lao,    One other thing on Mr. Lai.  He asked us today about whether he should continue taking aldactone, but it looks as though you were the one who has been ordering it.  Would you mind addressing whether the needs to continue this in light of the now daily Bumex he is getting?    Thanks,    Camelia Fernandez  [phone]

## 2018-02-20 NOTE — PLAN OF CARE
Problem: Patient Care Overview  Goal: Plan of Care Review  Outcome: Ongoing (interventions implemented as appropriate)  Pt tolerated investigational study well. Administered per protocol. Observed for 90 min post infusion.  No s/s of reaction. Vitals stable. NAD.

## 2018-02-20 NOTE — TELEPHONE ENCOUNTER
I reviewed his labs from today:  Lab Results   Component Value Date     02/20/2018    K 4.4 02/20/2018    MG 2.2 02/20/2018     02/20/2018    CO2 27 02/20/2018    BUN 23 (H) 02/20/2018    CREATININE 1.3 02/20/2018     (H) 02/20/2018    AST 40 02/20/2018    ALT 33 02/20/2018    ALBUMIN 3.9 02/20/2018    PROT 7.5 02/20/2018    BILITOT 1.7 (H) 02/20/2018    WBC 5.51 02/20/2018    HGB 14.5 02/20/2018    HCT 43.0 02/20/2018     02/20/2018     He lost 10# back diuretic regimen and feels fine.  He walked 5 miles yesterday and worked in yard.  Same treatment  RTC with mr 4-6 weeks--I will ask HF nurses to get him appt  He gets labs in 1 month with Oncology so I won't obtain  Labs  I told him that he should see his weight level off now and that if it continues to fall more than another 3-5# to let me know as I will get labs to assure not getting too dry.

## 2018-02-21 PROBLEM — R17 TOTAL BILIRUBIN, ELEVATED: Status: ACTIVE | Noted: 2018-02-21

## 2018-02-21 LAB
KAPPA LC SER QL IA: 2.77 MG/DL
KAPPA LC/LAMBDA SER IA: 1.51
LAMBDA LC SER QL IA: 1.83 MG/DL

## 2018-02-21 NOTE — ASSESSMENT & PLAN NOTE
Mr. Lai has continued normal K/L ratio and lambda light chains. There is no evidence of worsening amyloidosis at this time. His cardiac symptoms have improved, as has his BNP, which is markedly improved at the last visit.     This is Month 14 of the BFBE639 trial. Given the favorable response and reduction in neuropathy, we will decrease bortezomib to [id number]. He will continue on [id number] per protocol.

## 2018-02-21 NOTE — PROGRESS NOTES
HEMATOLOGIC MALIGNANCIES PROGRESS NOTE    IDENTIFYING STATEMENT   Roc Lai Jr. ([place]) is a 52 y.o. male with a  of 1965 from [place] with the diagnosis of AL amyloid.      ONCOLOGY HISTORY:    1. AL amyloid with cardiac involvement   A. 2016: Initial evaluation with Dr. Carmichael. Reported 18 months of progressive decline (previously ran 3 miles easily, now can only walk 10 minutes). M-protein 0.19 g/dl, kappa 1.37 mg/dl, lambda 50 mg/dl, ratio 0.03. Abdominal fat pad biopsy had been negative for presence of amyloid. Cardiac MRI suggestive of infiltrative cardiomyopathy.    B. 1/3/2017: Endomyocardial biopsy - involved by AL amyloid   C. 2017: BMBx - 60% cellular marrow with 50% plasma cells, consistent with plasma cell myeloma.    D. 2/15/2017 - 2017: Completed 4 cycles bortezomib chemotherapy on [id number] trial.    E. 2017: Bortezomib every 2 weeks x 2 doses   F. 2017: Change bortezomib to [id number] x 2 doses   G. 10/9/2017: Bortezomib q6 weeks   H. 2018: Bortezomib q8 weeks.     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 Lai returns to clinic for follow-up on the [id number] trial. This is Month 14 of therapy. He has been tolerating it well. He reports significant improvement in his neuropathy, which is only minimal to not present at this time. He is exercising 2-3x weekly for 30 minutes at a time. He is able to walk at a brisk pace. He saw Dr. Lao and was recommended to restart bumetanide. This helped him to lose nearly 10 pounds and reduce his peripheral edema. He is feeling better overall.     Past Medical History, Past Social History and Past Family History have been reviewed and are unchanged except as noted in the interval history.    MEDICATIONS:     Current Outpatient Prescriptions on File Prior to Visit   Medication Sig Dispense Refill    acyclovir (ZOVIRAX) 400 MG tablet Take 1 tablet (400 mg total) by mouth 2 (two) times  "daily. 60 tablet 11    BORTEZOMIB (VELCADE INJ) Inject as directed every 6 weeks.       bumetanide (BUMEX) 2 MG tablet Take 1 tablet (2 mg total) by mouth once daily. 90 tablet 3    magnesium oxide (MAG-OX) 400 mg tablet Take 1 tablet (400 mg total) by mouth once daily. 90 tablet 3    potassium chloride SA (K-DUR,KLOR-CON) 20 MEQ tablet Take 1 tablet (20 mEq total) by mouth once daily. 90 tablet 3    spironolactone (ALDACTONE) 25 MG tablet Take 1 tablet (25 mg total) by mouth once daily. 90 tablet 3     No current facility-administered medications on file prior to visit.        ALLERGIES: Review of patient's allergies indicates:  No Known Allergies     ROS:       Review of Systems   Constitutional: Positive for fatigue. Negative for diaphoresis, fever and unexpected weight change.   HENT:   Negative for lump/mass and sore throat.    Eyes: Negative for icterus.   Respiratory: Negative for cough and shortness of breath.    Cardiovascular: Positive for leg swelling. Negative for chest pain and palpitations.        Dyspnea on exertion   Gastrointestinal: Negative for abdominal distention, constipation, diarrhea, nausea and vomiting.   Genitourinary: Negative for dysuria and frequency.    Musculoskeletal: Negative for arthralgias, gait problem and myalgias.   Skin: Negative for rash.   Neurological: Positive for numbness. Negative for dizziness, gait problem and headaches.   Hematological: Negative for adenopathy. Does not bruise/bleed easily.   Psychiatric/Behavioral: The patient is not nervous/anxious.        PHYSICAL EXAM:  Vitals:    02/20/18 0822   BP: 118/64   Pulse: 93   Resp: 18   Temp: 98.6 °F (37 °C)   TempSrc: Oral   SpO2: 99%   Weight: 82.7 kg (182 lb 5.1 oz)   Height: 5' 11" (1.803 m)   PainSc: 0-No pain     ECOG 1    NYHA Class II      Physical Exam   Constitutional: He is oriented to person, place, and time. He appears well-developed and well-nourished. No distress.   HENT:   Head: Normocephalic and " atraumatic.   Mouth/Throat: Mucous membranes are normal. No oral lesions.   No macroglossia   Eyes: Conjunctivae are normal.   Neck: No thyromegaly present.   Cardiovascular: Normal rate, regular rhythm and normal heart sounds.    No murmur heard.  Pulmonary/Chest: Breath sounds normal. He has no wheezes. He has no rales.   Abdominal: Soft. He exhibits no distension and no mass. There is no splenomegaly or hepatomegaly. There is no tenderness.   Musculoskeletal: He exhibits edema.   Lymphadenopathy:     He has no cervical adenopathy.        Right cervical: No deep cervical adenopathy present.       Left cervical: No deep cervical adenopathy present.     He has no axillary adenopathy.        Right: No inguinal adenopathy present.        Left: No inguinal adenopathy present.   Neurological: He is alert and oriented to person, place, and time. He has normal strength and normal reflexes. No cranial nerve deficit. Coordination normal.   Neurologic examination as per [id number] protocol and was normal.    Skin: No rash noted.       LAB:   Results for orders placed or performed in visit on 02/20/18   CBC W/ AUTO DIFFERENTIAL   Result Value Ref Range    WBC 5.51 3.90 - 12.70 K/uL    RBC 4.49 (L) 4.60 - 6.20 M/uL    Hemoglobin 14.5 14.0 - 18.0 g/dL    Hematocrit 43.0 40.0 - 54.0 %    MCV 96 82 - 98 fL    MCH 32.3 (H) 27.0 - 31.0 pg    MCHC 33.7 32.0 - 36.0 g/dL    RDW 13.4 11.5 - 14.5 %    Platelets 159 150 - 350 K/uL    MPV 9.4 9.2 - 12.9 fL    Immature Granulocytes 0.4 0.0 - 0.5 %    Gran # (ANC) 3.7 1.8 - 7.7 K/uL    Immature Grans (Abs) 0.02 0.00 - 0.04 K/uL    Lymph # 0.9 (L) 1.0 - 4.8 K/uL    Mono # 0.6 0.3 - 1.0 K/uL    Eos # 0.2 0.0 - 0.5 K/uL    Baso # 0.06 0.00 - 0.20 K/uL    nRBC 0 0 /100 WBC    Gran% 67.8 38.0 - 73.0 %    Lymph% 16.9 (L) 18.0 - 48.0 %    Mono% 10.2 4.0 - 15.0 %    Eosinophil% 3.6 0.0 - 8.0 %    Basophil% 1.1 0.0 - 1.9 %    Differential Method Automated    COMPREHENSIVE METABOLIC PANEL   Result Value  Ref Range    Sodium 137 136 - 145 mmol/L    Potassium 4.4 3.5 - 5.1 mmol/L    Chloride 100 95 - 110 mmol/L    CO2 27 23 - 29 mmol/L    Glucose 126 (H) 70 - 110 mg/dL    BUN, Bld 23 (H) 6 - 20 mg/dL    Creatinine 1.3 0.5 - 1.4 mg/dL    Calcium 9.8 8.7 - 10.5 mg/dL    Total Protein 7.5 6.0 - 8.4 g/dL    Albumin 3.9 3.5 - 5.2 g/dL    Total Bilirubin 1.7 (H) 0.1 - 1.0 mg/dL    Alkaline Phosphatase 256 (H) 55 - 135 U/L    AST 40 10 - 40 U/L    ALT 33 10 - 44 U/L    Anion Gap 10 8 - 16 mmol/L    eGFR if African American >60.0 >60 mL/min/1.73 m^2    eGFR if non African American >60.0 >60 mL/min/1.73 m^2   MAGNESIUM   Result Value Ref Range    Magnesium 2.2 1.6 - 2.6 mg/dL   IMMUNOGLOBULIN FREE LT CHAINS BLOOD   Result Value Ref Range    Kappa Free Light Chains 2.77 (H) 0.33 - 1.94 mg/dL    Lambda Free Light Chains 1.83 0.57 - 2.63 mg/dL    Kappa/Lambda FLC Ratio 1.51 0.26 - 1.65   DRUG STUDY SENDOUT, MISC. Blood   Result Value Ref Range    Drug Study Specimen Type BLOOD        PROBLEMS ASSESSED THIS VISIT:    1. AL amyloidosis    2. Chronic diastolic congestive heart failure    3. Other restrictive cardiomyopathy    4. Total bilirubin, elevated        PLAN:       AL amyloidosis  Mr. Lai has continued normal K/L ratio and lambda light chains. There is no evidence of worsening amyloidosis at this time. His cardiac symptoms have improved, as has his BNP, which is markedly improved at the last visit.     This is Month 14 of the AWTP011 trial. Given the favorable response and reduction in neuropathy, we will decrease bortezomib to [id number]. He will continue on [id number] per protocol.     Chronic diastolic congestive heart failure  Improving. Continue follow-up with Dr. Lao. BNP much improved at last visit.     Total bilirubin, elevated  This has been chronically elevated. It is stable. We will continue to monitor.     Follow-up per protocol.     Chris Atkins MD  Hematology and Stem Cell Transplant

## 2018-03-03 ENCOUNTER — PATIENT MESSAGE (OUTPATIENT)
Dept: CARDIOLOGY | Facility: CLINIC | Age: 53
End: 2018-03-03

## 2018-03-12 ENCOUNTER — TELEPHONE (OUTPATIENT)
Dept: RESEARCH | Facility: HOSPITAL | Age: 53
End: 2018-03-12

## 2018-03-12 NOTE — TELEPHONE ENCOUNTER
"Monday March 12th, 2018     Study: "A Phase 3, randomized, multicenter, double-blind, placebo-controlled, 2-arm, efficacy and safety study of [id number] plus standard of care versus placebo plus standard of care in subjects with light chain (AL) amyloidosis"  Protocol ID# [id number], Study Id: 135-002  IRB# 2015.305.A  Sponsor: Eliseo  Investigator: Dr. Atkins    Patient called to clarify the rules on his 24-hour urine collection. Research RN called [facility] to discuss stability of urine for the necessary tests. Per [place], and the lab manual, as long as urine remains refrigerated the sample will be usable. RN spoke with local laboratory as well to confirm. Informed Mr. Lai that it was okay to collected urine the day before his visit as long as he keeps it refrigerated. Will need patient to provide urine sample the day of his visit for urinalysis. Patient verbalized understanding.     "

## 2018-03-19 ENCOUNTER — RESEARCH ENCOUNTER (OUTPATIENT)
Dept: RESEARCH | Facility: HOSPITAL | Age: 53
End: 2018-03-19

## 2018-03-19 ENCOUNTER — OFFICE VISIT (OUTPATIENT)
Dept: HEMATOLOGY/ONCOLOGY | Facility: CLINIC | Age: 53
End: 2018-03-19
Payer: COMMERCIAL

## 2018-03-19 ENCOUNTER — LAB VISIT (OUTPATIENT)
Dept: LAB | Facility: HOSPITAL | Age: 53
End: 2018-03-19
Attending: INTERNAL MEDICINE
Payer: COMMERCIAL

## 2018-03-19 VITALS
HEART RATE: 80 BPM | HEIGHT: 71 IN | SYSTOLIC BLOOD PRESSURE: 115 MMHG | BODY MASS INDEX: 26.57 KG/M2 | HEART RATE: 91 BPM | OXYGEN SATURATION: 99 % | SYSTOLIC BLOOD PRESSURE: 127 MMHG | DIASTOLIC BLOOD PRESSURE: 73 MMHG | DIASTOLIC BLOOD PRESSURE: 71 MMHG | WEIGHT: 189.81 LBS | TEMPERATURE: 99 F | RESPIRATION RATE: 18 BRPM

## 2018-03-19 DIAGNOSIS — Z00.6 EXAMINATION OF PARTICIPANT IN CLINICAL TRIAL: ICD-10-CM

## 2018-03-19 DIAGNOSIS — I50.32 CHRONIC DIASTOLIC CONGESTIVE HEART FAILURE: ICD-10-CM

## 2018-03-19 DIAGNOSIS — E85.81 AL AMYLOIDOSIS: ICD-10-CM

## 2018-03-19 DIAGNOSIS — R17 TOTAL BILIRUBIN, ELEVATED: ICD-10-CM

## 2018-03-19 DIAGNOSIS — E85.81 AL AMYLOIDOSIS: Primary | ICD-10-CM

## 2018-03-19 DIAGNOSIS — I42.5 OTHER RESTRICTIVE CARDIOMYOPATHY: ICD-10-CM

## 2018-03-19 LAB
ALBUMIN SERPL BCP-MCNC: 4.3 G/DL
ALP SERPL-CCNC: 207 U/L
ALT SERPL W/O P-5'-P-CCNC: 27 U/L
ANION GAP SERPL CALC-SCNC: 10 MMOL/L
AST SERPL-CCNC: 34 U/L
BASOPHILS # BLD AUTO: 0.05 K/UL
BASOPHILS NFR BLD: 0.9 %
BILIRUB SERPL-MCNC: 1.5 MG/DL
BUN SERPL-MCNC: 28 MG/DL
CALCIUM SERPL-MCNC: 10 MG/DL
CHLORIDE SERPL-SCNC: 102 MMOL/L
CO2 SERPL-SCNC: 25 MMOL/L
CREAT SERPL-MCNC: 1.4 MG/DL
DIFFERENTIAL METHOD: ABNORMAL
DRUG STUDY SPECIMEN TYPE: NORMAL
DRUG STUDY TEST NAME: NORMAL
DRUG STUDY TEST RESULT: NORMAL
EOSINOPHIL # BLD AUTO: 0.2 K/UL
EOSINOPHIL NFR BLD: 3.4 %
ERYTHROCYTE [DISTWIDTH] IN BLOOD BY AUTOMATED COUNT: 14.4 %
EST. GFR  (AFRICAN AMERICAN): >60 ML/MIN/1.73 M^2
EST. GFR  (NON AFRICAN AMERICAN): 57.8 ML/MIN/1.73 M^2
GLUCOSE SERPL-MCNC: 108 MG/DL
HCT VFR BLD AUTO: 42.2 %
HGB BLD-MCNC: 14.1 G/DL
IMM GRANULOCYTES # BLD AUTO: 0.02 K/UL
IMM GRANULOCYTES NFR BLD AUTO: 0.4 %
LYMPHOCYTES # BLD AUTO: 1.2 K/UL
LYMPHOCYTES NFR BLD: 22 %
MAGNESIUM SERPL-MCNC: 2.3 MG/DL
MCH RBC QN AUTO: 32.3 PG
MCHC RBC AUTO-ENTMCNC: 33.4 G/DL
MCV RBC AUTO: 97 FL
MONOCYTES # BLD AUTO: 0.6 K/UL
MONOCYTES NFR BLD: 10.4 %
NEUTROPHILS # BLD AUTO: 3.3 K/UL
NEUTROPHILS NFR BLD: 62.9 %
NRBC BLD-RTO: 0 /100 WBC
PLATELET # BLD AUTO: 141 K/UL
PMV BLD AUTO: 9.5 FL
POTASSIUM SERPL-SCNC: 4.1 MMOL/L
PROT SERPL-MCNC: 8 G/DL
RBC # BLD AUTO: 4.36 M/UL
SODIUM SERPL-SCNC: 137 MMOL/L
WBC # BLD AUTO: 5.31 K/UL

## 2018-03-19 PROCEDURE — 80053 COMPREHEN METABOLIC PANEL: CPT

## 2018-03-19 PROCEDURE — 85025 COMPLETE CBC W/AUTO DIFF WBC: CPT

## 2018-03-19 PROCEDURE — 99000 SPECIMEN HANDLING OFFICE-LAB: CPT

## 2018-03-19 PROCEDURE — 99999 PR PBB SHADOW E&M-EST. PATIENT-LVL III: CPT | Mod: PBBFAC,,, | Performed by: INTERNAL MEDICINE

## 2018-03-19 PROCEDURE — 83520 IMMUNOASSAY QUANT NOS NONAB: CPT | Mod: 59

## 2018-03-19 PROCEDURE — 99215 OFFICE O/P EST HI 40 MIN: CPT | Mod: S$PBB,,, | Performed by: INTERNAL MEDICINE

## 2018-03-19 PROCEDURE — 83735 ASSAY OF MAGNESIUM: CPT

## 2018-03-19 RX ORDER — ACETAMINOPHEN 325 MG/1
650 TABLET ORAL
Status: CANCELLED | OUTPATIENT
Start: 2018-03-19 | End: 2018-03-19

## 2018-03-19 RX ORDER — HEPARIN 100 UNIT/ML
500 SYRINGE INTRAVENOUS
Status: CANCELLED | OUTPATIENT
Start: 2018-03-19

## 2018-03-19 RX ORDER — SODIUM CHLORIDE 0.9 % (FLUSH) 0.9 %
10 SYRINGE (ML) INJECTION
Status: CANCELLED | OUTPATIENT
Start: 2018-03-19

## 2018-03-19 RX ORDER — DIPHENHYDRAMINE HCL 25 MG
25 CAPSULE ORAL ONCE
Status: CANCELLED | OUTPATIENT
Start: 2018-03-19 | End: 2018-03-19

## 2018-03-19 RX ORDER — POTASSIUM CHLORIDE 20 MEQ/1
20 TABLET, EXTENDED RELEASE ORAL DAILY
Qty: 90 TABLET | Refills: 3 | Status: SHIPPED | OUTPATIENT
Start: 2018-03-19 | End: 2018-11-19

## 2018-03-19 NOTE — TELEPHONE ENCOUNTER
----- Message from Socorro Randle sent at 3/16/2018 11:51 AM CDT -----  Contact: [facility]  Pharmacy calling regarding medication: Refills needed  3 month supply requested  potassium chloride  (K-DUR,KLOR-CON) 20 MEQ tablet    Callback: [phone]

## 2018-03-19 NOTE — PROGRESS NOTES
"Monday March 19th, 2018     Study: "A Phase 3, randomized, multicenter, double-blind, placebo-controlled, 2-arm, efficacy and safety study of [id number] plus standard of care versus placebo plus standard of care in subjects with light chain (AL) amyloidosis"  Protocol ID# [id number]  IRB# 2015.305.A  Sponsor: Eliseo  Investigator: Dr. Atkins    Month 15, Day 1   Office Visit/6MWT    Patient presents for Month 15, Day 1 MD visit per above-mentioned protocol.  Patient is awake, alert, and oriented to person, place, and time.  He performed 6MWT prior to any other study procedures being performed and per protocol, see details below.  He states he continues to experiences improvement in quality of life.  He reports engaging in numerous exercise regimens and activities regularly with little to no limitations. Continues to take one Bumex a day. Per patient, weight has remained stable. Patient verbalizes continued willingness to participate in above-mentioned trial at this time.      Review of Ae's:  *Please note this list is not all-inclusive, please see AE log for physician reviewed list of adverse events.      Restless Leg Syndrome, grade 1:  No reports of this today. Taking nightly gabapentin, which helps him rest. Continues intermittent vitamin supplements as per med list.  AE ongoing.  Dry Skin, grade 1:  No complaints of worsening symptoms.  AE ongoing.   Peripheral sensory neuropathy, grade 1: States that his peripheral neuropathy has remained stable since last month. Still has numbness in fingers/toes; however, states the sharp/shooting pain in his feet does not occur "on a regular basis." He doses continue to take gabapentin at night.  AE, grade 1 ongoing.  Insomnia, Grade 1: No current issues. Continues on gabapentin, AE intermittent  Lymphocyte count decreased, Grade 1: Lymphocyte count: 1.2 today, WNL AE intermittent.   Alkaline phosphatase increased, Grade 1: Alk phos 207 today. Per Dr. Atkins, will continue " to monitor. AE ongoing  Blood bilirubin increased, Grade 1: Total bilirubin 1.5,  Per Dr. Atkins, will continue to monitor. NCS per Dr. Atkins and does not affect dosing of further therapy. AE ongoing  Platelets decreased, Grade 1: 141 today. NCS per Dr. Atkins and does not affect dosing of therapy.     Physical performed per Dr. Atkins, see MD note for Complete PE, symptom-directed PE, ECOG PS and NYHA Classification.  24 hour urine completed at this timepoint.  See flowsheets for height, weight, con meds and vital signs.  NIS-LL and VASPI completed, see chart for source documentation. Patient completed all QOL's per [employer] prior to blood work, 6MWT, and MD visit.  Patient is not WOCBP.  Local blood work performed and reviewed today per Dr. Atkins. [facility] with exception of PK's that will be collected ahead of infusion tomorrow.   Concomitant medications reviewed.  Patient confirms that per Dr. Atkins, he continues on acyclovir.  Per Dr. Atkins, patient is approved to initiate M15,D1 treatment tomorrow.  Treatment plan updated with today's weight (86.1 kg) and BSA (2.08m2) and signed per Dr. Atkins.  Benadryl adjusted to PO due to previous experience with RLS.  Velcade injectiont q8 weeks per Dr. Atkins, will start at next month. No velcade at tomorrow's infusion visit. Patient agreed to plan.     Patient successfully completed 6MWT ahead of lab draw and clinic visit today.  6MWT 10 laps @ 160 ft/lap + 118 ft = 1718 ft.  6MWT performed according to manual and information transmitted as instructed.  See flowsheet for pre and post vitals.  Walk test completed per [employer] and transmitted; pending approved by [facility].     All of patient's questions were answered to his satisfaction.  Patient states understanding of plan of care and upcoming schedule.  He states having Research RN contact information as well as that of Dr. Atkins.  He will report tomorrow, 3./20 for study drug  infusion.

## 2018-03-20 ENCOUNTER — RESEARCH ENCOUNTER (OUTPATIENT)
Dept: RESEARCH | Facility: HOSPITAL | Age: 53
End: 2018-03-20

## 2018-03-20 ENCOUNTER — INFUSION (OUTPATIENT)
Dept: INFUSION THERAPY | Facility: HOSPITAL | Age: 53
End: 2018-03-20
Attending: INTERNAL MEDICINE
Payer: COMMERCIAL

## 2018-03-20 VITALS
SYSTOLIC BLOOD PRESSURE: 122 MMHG | HEART RATE: 77 BPM | DIASTOLIC BLOOD PRESSURE: 65 MMHG | TEMPERATURE: 99 F | RESPIRATION RATE: 18 BRPM

## 2018-03-20 DIAGNOSIS — E85.81 AL AMYLOIDOSIS: Primary | ICD-10-CM

## 2018-03-20 LAB
KAPPA LC SER QL IA: 2.19 MG/DL
KAPPA LC/LAMBDA SER IA: 1.11
LAMBDA LC SER QL IA: 1.98 MG/DL

## 2018-03-20 PROCEDURE — 25000003 PHARM REV CODE 250: Performed by: INTERNAL MEDICINE

## 2018-03-20 PROCEDURE — 96413 CHEMO IV INFUSION 1 HR: CPT

## 2018-03-20 RX ORDER — ACETAMINOPHEN 325 MG/1
650 TABLET ORAL
Status: COMPLETED | OUTPATIENT
Start: 2018-03-20 | End: 2018-03-20

## 2018-03-20 RX ORDER — HEPARIN 100 UNIT/ML
500 SYRINGE INTRAVENOUS
Status: DISCONTINUED | OUTPATIENT
Start: 2018-03-20 | End: 2018-03-20 | Stop reason: HOSPADM

## 2018-03-20 RX ORDER — DIPHENHYDRAMINE HCL 25 MG
25 CAPSULE ORAL ONCE
Status: COMPLETED | OUTPATIENT
Start: 2018-03-20 | End: 2018-03-20

## 2018-03-20 RX ORDER — SODIUM CHLORIDE 0.9 % (FLUSH) 0.9 %
10 SYRINGE (ML) INJECTION
Status: DISCONTINUED | OUTPATIENT
Start: 2018-03-20 | End: 2018-03-20 | Stop reason: HOSPADM

## 2018-03-20 RX ADMIN — ACETAMINOPHEN 650 MG: 325 TABLET, FILM COATED ORAL at 08:03

## 2018-03-20 RX ADMIN — DIPHENHYDRAMINE HYDROCHLORIDE 25 MG: 25 CAPSULE ORAL at 08:03

## 2018-03-20 RX ADMIN — SODIUM CHLORIDE: 9 INJECTION, SOLUTION INTRAVENOUS at 09:03

## 2018-03-20 NOTE — PROGRESS NOTES
HEMATOLOGIC MALIGNANCIES PROGRESS NOTE    IDENTIFYING STATEMENT   Roc Lai Jr. ([place]) is a 52 y.o. male with a  of 1965 from [place] with the diagnosis of AL amyloid.      ONCOLOGY HISTORY:    1. AL amyloid with cardiac involvement   A. 2016: Initial evaluation with Dr. Carmichael. Reported 18 months of progressive decline (previously ran 3 miles easily, now can only walk 10 minutes). M-protein 0.19 g/dl, kappa 1.37 mg/dl, lambda 50 mg/dl, ratio 0.03. Abdominal fat pad biopsy had been negative for presence of amyloid. Cardiac MRI suggestive of infiltrative cardiomyopathy.    B. 1/3/2017: Endomyocardial biopsy - involved by AL amyloid   C. 2017: BMBx - 60% cellular marrow with 50% plasma cells, consistent with plasma cell myeloma.    D. 2/15/2017 - 2017: Completed 4 cycles bortezomib chemotherapy on [id number] trial.    E. 2017: Bortezomib every 2 weeks x 2 doses   F. 2017: Change bortezomib to [id number] x 2 doses   G. 10/9/2017: Bortezomib q6 weeks   H. 2018: Bortezomib q8 weeks.     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 Lai returns to clinic for follow-up on the [id number] trial. This is Month 15 of therapy. He has been tolerating it well. He reports some neuropathy. No limb swelling at this time. He has not been exercising as much recently due to other work, though he is doing a lot of lifting of heavy objects. He does not feel any particular limitation at this time.       Past Medical History, Past Social History and Past Family History have been reviewed and are unchanged except as noted in the interval history.    MEDICATIONS:     Current Outpatient Prescriptions on File Prior to Visit   Medication Sig Dispense Refill    acyclovir (ZOVIRAX) 400 MG tablet Take 1 tablet (400 mg total) by mouth 2 (two) times daily. 60 tablet 11    BORTEZOMIB (VELCADE INJ) Inject as directed every 6 weeks.       bumetanide (BUMEX) 2 MG tablet Take 1  "tablet (2 mg total) by mouth once daily. 90 tablet 3    gabapentin (NEURONTIN) 100 MG capsule Take 3 capsules (300 mg total) by mouth every evening. 90 capsule 2    magnesium oxide (MAG-OX) 400 mg tablet Take 1 tablet (400 mg total) by mouth once daily. 90 tablet 3    spironolactone (ALDACTONE) 25 MG tablet Take 1 tablet (25 mg total) by mouth once daily. 90 tablet 3     No current facility-administered medications on file prior to visit.        ALLERGIES: Review of patient's allergies indicates:  No Known Allergies     ROS:       Review of Systems   Constitutional: Positive for fatigue. Negative for diaphoresis, fever and unexpected weight change.   HENT:   Negative for lump/mass and sore throat.    Eyes: Negative for icterus.   Respiratory: Negative for cough and shortness of breath.    Cardiovascular: Negative for chest pain, leg swelling and palpitations.        Dyspnea on exertion   Gastrointestinal: Negative for abdominal distention, constipation, diarrhea, nausea and vomiting.   Genitourinary: Negative for dysuria and frequency.    Musculoskeletal: Negative for arthralgias, gait problem and myalgias.   Skin: Negative for rash.   Neurological: Positive for numbness. Negative for dizziness, gait problem and headaches.   Hematological: Negative for adenopathy. Does not bruise/bleed easily.   Psychiatric/Behavioral: The patient is not nervous/anxious.        PHYSICAL EXAM:  Vitals:    03/19/18 0836   BP: 115/71   Pulse: 80   Resp: 18   Temp: 98.6 °F (37 °C)   TempSrc: Oral   SpO2: 99%   Weight: 86.1 kg (189 lb 13.1 oz)   Height: 5' 11" (1.803 m)   PainSc: 0-No pain     ECOG 1    NYHA Class II      Physical Exam   Constitutional: He is oriented to person, place, and time. He appears well-developed and well-nourished. No distress.   HENT:   Head: Normocephalic and atraumatic.   Mouth/Throat: Mucous membranes are normal. No oral lesions.   No macroglossia   Eyes: Conjunctivae are normal.   Neck: No thyromegaly " present.   Cardiovascular: Normal rate, regular rhythm and normal heart sounds.    No murmur heard.  Pulmonary/Chest: Breath sounds normal. He has no wheezes. He has no rales.   Abdominal: Soft. He exhibits no distension and no mass. There is no splenomegaly or hepatomegaly. There is no tenderness.   Musculoskeletal: He exhibits edema.   Lymphadenopathy:     He has no cervical adenopathy.        Right cervical: No deep cervical adenopathy present.       Left cervical: No deep cervical adenopathy present.     He has no axillary adenopathy.        Right: No inguinal adenopathy present.        Left: No inguinal adenopathy present.   Neurological: He is alert and oriented to person, place, and time. He has normal strength and normal reflexes. No cranial nerve deficit. Coordination normal.   Neurologic examination as per [id number] protocol and was normal.    Skin: No rash noted.   Skin on legs is somewhat hyperpigmented       LAB:   Results for orders placed or performed in visit on 03/19/18   CBC W/ AUTO DIFFERENTIAL   Result Value Ref Range    WBC 5.31 3.90 - 12.70 K/uL    RBC 4.36 (L) 4.60 - 6.20 M/uL    Hemoglobin 14.1 14.0 - 18.0 g/dL    Hematocrit 42.2 40.0 - 54.0 %    MCV 97 82 - 98 fL    MCH 32.3 (H) 27.0 - 31.0 pg    MCHC 33.4 32.0 - 36.0 g/dL    RDW 14.4 11.5 - 14.5 %    Platelets 141 (L) 150 - 350 K/uL    MPV 9.5 9.2 - 12.9 fL    Immature Granulocytes 0.4 0.0 - 0.5 %    Gran # (ANC) 3.3 1.8 - 7.7 K/uL    Immature Grans (Abs) 0.02 0.00 - 0.04 K/uL    Lymph # 1.2 1.0 - 4.8 K/uL    Mono # 0.6 0.3 - 1.0 K/uL    Eos # 0.2 0.0 - 0.5 K/uL    Baso # 0.05 0.00 - 0.20 K/uL    nRBC 0 0 /100 WBC    Gran% 62.9 38.0 - 73.0 %    Lymph% 22.0 18.0 - 48.0 %    Mono% 10.4 4.0 - 15.0 %    Eosinophil% 3.4 0.0 - 8.0 %    Basophil% 0.9 0.0 - 1.9 %    Differential Method Automated    COMPREHENSIVE METABOLIC PANEL   Result Value Ref Range    Sodium 137 136 - 145 mmol/L    Potassium 4.1 3.5 - 5.1 mmol/L    Chloride 102 95 - 110 mmol/L     CO2 25 23 - 29 mmol/L    Glucose 108 70 - 110 mg/dL    BUN, Bld 28 (H) 6 - 20 mg/dL    Creatinine 1.4 0.5 - 1.4 mg/dL    Calcium 10.0 8.7 - 10.5 mg/dL    Total Protein 8.0 6.0 - 8.4 g/dL    Albumin 4.3 3.5 - 5.2 g/dL    Total Bilirubin 1.5 (H) 0.1 - 1.0 mg/dL    Alkaline Phosphatase 207 (H) 55 - 135 U/L    AST 34 10 - 40 U/L    ALT 27 10 - 44 U/L    Anion Gap 10 8 - 16 mmol/L    eGFR if African American >60.0 >60 mL/min/1.73 m^2    eGFR if non  57.8 (A) >60 mL/min/1.73 m^2   MAGNESIUM   Result Value Ref Range    Magnesium 2.3 1.6 - 2.6 mg/dL   DRUG STUDY SENDOUT, MISC. Blood   Result Value Ref Range    Drug Study Test Name Drug Study     Drug Study Specimen Type BLOOD     Drug Study Test Result Result sent directly to physician        PROBLEMS ASSESSED THIS VISIT:    1. AL amyloidosis    2. Other restrictive cardiomyopathy    3. Total bilirubin, elevated    4. Examination of participant in clinical trial        PLAN:       AL amyloidosis  This is Month 15 of the [id number] trial for Mr. Lai. He has had normal K/L ratio with normal light chains. His kappa light chains were slightly elevated after the last visit, but this is not the involved light chain in his amyloid. The significance is uncertain at this time. There is no indication to change therapy.    His cardiac symptoms are stable to improved. His cardiac biomarkers (reference lab) are stable.    We will continue with monthly infusions per [id number] trial and bortezomib [id number].     Follow-up per protocol.     Chris Atkins MD  Hematology and Stem Cell Transplant

## 2018-03-20 NOTE — PROGRESS NOTES
"Tuesday, March 20th, 2017     Study: "A Phase 3, randomized, multicenter, double-blind, placebo-controlled, 2-arm, efficacy and safety study of [id number] plus standard of care versus placebo plus standard of care in subjects with light chain (AL) amyloidosis"  Protocol ID# [id number]  IRB# 2015.305.A  Sponsor: Eliseo  Investigator: Dr. Atkins    Month 15, Day 1  IV [id number]-/Placebo    Patient presents for month 15, day 1 treatment per above-mentioned protocol.  He is ambulatory, awake, alert, and oriented to person, place, and time, accompanied by self.  See 3/19 progress note.  No new symptoms to report from that of yesterday, 3/19. Patient states continued willingness to participate in above-mentioned trial.    See AE review from yesterday, 3/19.    Study-mandated procedures performed per protocol as follows:     -PREDOSE STUDY LABS TO BE DRAWN AT IV START:   Time: __0840____  -PREMEDS TO BE GIVEN W/IN 30-90 MINUTES PRIOR TO START OF INFUSION   Time:___0843____  - PREDOSE COMPLETE VITAL SIGNS AFTER PREMEDS BUT W/IN 30 MIN PRIOR TO INFUSION   Time:___0925______ Vitals:_119/65, 75 bpm, RR 18. 98.1_  -EKG TO BE PERFORMED BY STUDY RN WITHIN 30 MINUTES OF INFUSION   Time:__0920____  -CHEMO ADMINISTERED OVER 60 +/- 10 MINUTES (275 ML/HR); please document the saline flush.   Start Time:__0928__  Stop Time: __1028___  -COMPLETE VITAL SIGNS AT END OF INFUSION (which includes completion of the 30 mL saline flush) (+10min window allowed)   Time:__1018_   Vitals: __118/68, 69 bpm, 18 RR, 98.7___  -COMPLETE VITAL SIGNS 1 HOUR POST INFUSION   Time: __11:34__Vitals: ___122/65, 77 bpm , 98.8, 18 RR  -OBSERVE PATIENT FOR 90 MINUTES (+/- 10 minutes) AFTER STUDY DRUG COMPLETION   Observation period end time: __1202_  *No Velcade at this visit    Patient tolerated the above with no difficulties, see infusion RN documentation.       All of patient's questions were answered to his satisfaction.  Patient scheduled for Month 16 - " SQ Velcade and INV NEOD/PLACEBO for 4/17/2018. Patient states understanding of plan of care and upcoming schedule.  He states having Research RN contact information as well as that of BMT clinic.

## 2018-03-20 NOTE — ASSESSMENT & PLAN NOTE
This is Month 15 of the [id number] trial for Mr. Lai. He has had normal K/L ratio with normal light chains. His kappa light chains were slightly elevated after the last visit, but this is not the involved light chain in his amyloid. The significance is uncertain at this time. There is no indication to change therapy.    His cardiac symptoms are stable to improved. His cardiac biomarkers (reference lab) are stable.    We will continue with monthly infusions per [id number] trial and bortezomib [id number].

## 2018-03-26 ENCOUNTER — TELEPHONE (OUTPATIENT)
Dept: CARDIOLOGY | Facility: CLINIC | Age: 53
End: 2018-03-26

## 2018-03-27 ENCOUNTER — OFFICE VISIT (OUTPATIENT)
Dept: TRANSPLANT | Facility: CLINIC | Age: 53
End: 2018-03-27
Attending: INTERNAL MEDICINE
Payer: COMMERCIAL

## 2018-03-27 ENCOUNTER — OFFICE VISIT (OUTPATIENT)
Dept: CARDIOLOGY | Facility: CLINIC | Age: 53
End: 2018-03-27
Payer: COMMERCIAL

## 2018-03-27 VITALS
DIASTOLIC BLOOD PRESSURE: 74 MMHG | OXYGEN SATURATION: 97 % | BODY MASS INDEX: 26.21 KG/M2 | HEIGHT: 71 IN | WEIGHT: 187.19 LBS | SYSTOLIC BLOOD PRESSURE: 112 MMHG | HEART RATE: 78 BPM

## 2018-03-27 VITALS
DIASTOLIC BLOOD PRESSURE: 67 MMHG | SYSTOLIC BLOOD PRESSURE: 121 MMHG | BODY MASS INDEX: 26.39 KG/M2 | OXYGEN SATURATION: 99 % | HEART RATE: 89 BPM | WEIGHT: 188.5 LBS | HEIGHT: 71 IN

## 2018-03-27 DIAGNOSIS — I42.5 OTHER RESTRICTIVE CARDIOMYOPATHY: ICD-10-CM

## 2018-03-27 DIAGNOSIS — E85.81 AL AMYLOIDOSIS: ICD-10-CM

## 2018-03-27 DIAGNOSIS — I50.32 CHRONIC DIASTOLIC CONGESTIVE HEART FAILURE: Primary | ICD-10-CM

## 2018-03-27 DIAGNOSIS — Q21.11 ASD (ATRIAL SEPTAL DEFECT), OSTIUM SECUNDUM: ICD-10-CM

## 2018-03-27 PROCEDURE — 99213 OFFICE O/P EST LOW 20 MIN: CPT | Mod: S$GLB,,, | Performed by: INTERNAL MEDICINE

## 2018-03-27 PROCEDURE — 99215 OFFICE O/P EST HI 40 MIN: CPT | Mod: S$GLB,,, | Performed by: INTERNAL MEDICINE

## 2018-03-27 PROCEDURE — 99999 PR PBB SHADOW E&M-EST. PATIENT-LVL III: CPT | Mod: PBBFAC,,, | Performed by: INTERNAL MEDICINE

## 2018-03-27 NOTE — PROGRESS NOTES
Cardiology Clinic Note  Reason for Visit: Follow up PFO    HPI:   Mr. Lai is a 52-yo man with AL amyloid, AFL s/p CTI, ASD closed 9/26/16.  He presents for follow up.  Recent echo shows normal ejection fraction with reduced longitudinal strain, with moderately depressed RV systolic function.  He is followed by Dr. Lao and Dr. Atkins (from oncology) for his amyloidosis.  He was able to walk 5 miles yesterday, gets exercise regularly without too much difficulty, does pushups and elliptical, can walk brisk pace but does not run yet.  Feels that he has been steadily getting better over the past year from his amyloidosis.      ROS:    Constitution: Negative for fever or chills.   HENT: Negative for sore throat or headaches. Negative for rhinorrhea.  Eyes: Negative for blurred or double vision.   Cardiovascular: See above  Pulmonary: Negative for SOB. Negative for cough.   Gastrointestinal: Negative for abdominal pain. Negative for nausea/ vomiting.   : Negative for dysuria.   Neurological: Negative for focal weakness or sensory changes.  PMH:     Past Medical History:   Diagnosis Date    Anticoagulant long-term use     Aspirin therapy    Cardiomyopathy 12/1/2016    Chronic diastolic congestive heart failure 12/6/2016    Coronary artery disease     recent Artial Flutter diagnosed    Typical atrial flutter s/p ablation and restoration sinus rhythm 2016 7/28/2015     Past Surgical History:   Procedure Laterality Date    ASD REPAIR      BACK SURGERY  1996    Lower lunbar lamenectomy    KNEE SURGERY  2014    Rt knee, torn meniscus    MOUTH SURGERY  2014    implant dental    RADIOFREQUENCY ABLATION  9/8/15    Atrial flutter ablation     Allergies:   Review of patient's allergies indicates:  No Known Allergies  Medications:     Current Outpatient Prescriptions on File Prior to Visit   Medication Sig Dispense Refill    acyclovir (ZOVIRAX) 400 MG tablet Take 1 tablet (400 mg total) by mouth 2 (two) times  "daily. 60 tablet 11    BORTEZOMIB (VELCADE INJ) Inject as directed every 6 weeks.       bumetanide (BUMEX) 2 MG tablet Take 1 tablet (2 mg total) by mouth once daily. 90 tablet 3    gabapentin (NEURONTIN) 100 MG capsule Take 3 capsules (300 mg total) by mouth every evening. 90 capsule 2    magnesium oxide (MAG-OX) 400 mg tablet Take 1 tablet (400 mg total) by mouth once daily. 90 tablet 3    potassium chloride SA (K-DUR,KLOR-CON) 20 MEQ tablet Take 1 tablet (20 mEq total) by mouth once daily. 90 tablet 3    spironolactone (ALDACTONE) 25 MG tablet Take 1 tablet (25 mg total) by mouth once daily. 90 tablet 3     No current facility-administered medications on file prior to visit.      Social History:     Social History   Substance Use Topics    Smoking status: Former Smoker     Quit date: 7/27/1993    Smokeless tobacco: Never Used    Alcohol use 0.0 oz/week      Comment: occassional     Family History:     Family History   Problem Relation Age of Onset    Hypertension Mother     Alcohol abuse Father      Physical Exam:   /67 (BP Location: Right arm, Patient Position: Sitting, BP Method: Large (Automatic))   Pulse 89   Ht 5' 11" (1.803 m)   Wt 85.5 kg (188 lb 7.9 oz)   SpO2 99%   BMI 26.29 kg/m²      Constitutional: NAD, conversant  HEENT: Sclera anicteric, EOMI  Neck: +JVD  CV: RRR, no gallop  Pulm: CTAB, no wheezes, rales, or ronchi  GI: Abdomen soft, NTND  Extremities: No LE edema b/l  Skin: No ecchymosis, erythema, or ulcers  Psych: AOx3, appropriate affect  Neuro: No focal deficits    Labs:     Lab Results   Component Value Date     03/19/2018    K 4.1 03/19/2018     03/19/2018    CO2 25 03/19/2018    BUN 28 (H) 03/19/2018    CREATININE 1.4 03/19/2018    ANIONGAP 10 03/19/2018     Lab Results   Component Value Date    AST 34 03/19/2018    ALT 27 03/19/2018    ALKPHOS 207 (H) 03/19/2018    BILITOT 1.5 (H) 03/19/2018    BILIDIR 0.8 (H) 08/24/2017    ALBUMIN 4.3 03/19/2018     Lab " Results   Component Value Date    CALCIUM 10.0 03/19/2018    MG 2.3 03/19/2018    PHOS 3.1 07/05/2017     Lab Results   Component Value Date     (H) 02/05/2018     (H) 03/28/2017    BNP 1,004 (H) 01/03/2017    Lab Results   Component Value Date    WBC 5.31 03/19/2018    HGB 14.1 03/19/2018    HCT 42.2 03/19/2018     (L) 03/19/2018    GRAN 3.3 03/19/2018    GRAN 62.9 03/19/2018     Lab Results   Component Value Date    INR 1.1 01/03/2017     Lab Results   Component Value Date    CHOL 142 11/28/2016    HDL 43 11/28/2016    LDLCALC 88.4 11/28/2016    TRIG 53 11/28/2016     Lab Results   Component Value Date    HGBA1C 5.6 11/28/2016     No results found for: TSH, [id number]       Imaging:   TTE 2/2018  1 - Biatrial enlargement.     2 - Concentric hypertrophy.     3 - Normal left ventricular systolic function (EF 60-65%).     4 - Significantly reduced global longitudinal strain.     5 - Indeterminate LV diastolic function.     6 - Right ventricular enlargement with moderately depressed systolic function.     7 - The estimated PA systolic pressure is 21 mmHg.     8 - Small pericardial effusion.     9 - Increased central venous pressure.     EF   Date Value Ref Range Status   02/05/2018 60 55 - 65    07/05/2017 40 (A) 55 - 65    03/28/2017 40 (A) 55 - 65        Assessment and Plan:   ASD (atrial septal defect), ostium secundum s/p closure  - s/p closure with a 25mm AGA Cribiform device 9/2016  - Echo without evidence of residual    AL amyloidosis  - Followed by Dr. Atkins with heme/onc  - Continue chemo per Dr. Atkins    Infiltrative cardiomyopathy with elevated troponin but no CAD felt due to infiltrative CM;1/24/17 supplemented report from [place] + AL amyloid within heart  - Continue management per Dr. Lao    Signed:  Gilmar Landaverde MD  Cardiology Fellow, PGY-6  3/27/2018 8:28 AM    Follow-up:     Future Appointments  Date Time Provider Department Center   3/27/2018 3:30 PM James Lao Jr., MD  [facility] HEARTTX [address]   4/17/2018 7:20 AM LAB, HEMONC CANCER BLDG [facility] LAB HO [address]   4/17/2018 8:20 AM Chris Atkins MD [facility] BM CHANG [address]   4/17/2018 9:30 AM [facility], CHEMO [facility] CHEMO [address]       I have personally taken the history and examined this patient. I have discussed and agree with the resident's findings and plan as documented in the resident's note.  One yr post successful septal defect closure, subsequently diagnosed with amyloid cardiomyopathy on chemo followed by Howie Cruz. Recommend asa x life and follow up with me prn.   David Mendieta

## 2018-03-27 NOTE — PROGRESS NOTES
"Subjective:     Patient ID:  Roc Lai Jr. is a 52 y.o. male who presents for follow-up of Congestive Heart Failure    HPI:  51 yo WM with AL and myocardial amyloidosis being treated by Dr. Carmichael and now under the care of Dr. Atkins.  At last visit he reported that Oncology nurse told him to taper off loop diuretic so he had been taking bumex every 3 to 5 days.  On exam volume overloaded but lost 10# and arrangements made for this visit.  Home weight overall stable.  Daughter  last weekend.    Exercising with elliptical machine, yard work and walking 5 miles over 90 min 2x/week and performing 45 push-ups 3x/week.  He feels well, cuts grass and works full time.  Mild PAULSON but can walk without stopping the 5 miles and 20 min on elliptical.  No orthopnea or PND.  Wt here down 6# from last visit with diuresis.    ROS not repeated     Objective:   Physical Exam   Constitutional: He is oriented to person, place, and time. He appears well-developed and well-nourished. No distress.   /74 (BP Location: Right arm, Patient Position: Sitting, BP Method: Medium (Automatic))   Pulse 78   Ht 5' 11" (1.803 m)   Wt 84.9 kg (187 lb 2.7 oz)   SpO2 97%   BMI 26.11 kg/m²   Last visit wt 87.5 kg (192 lb 14.4 oz)   BMI 26.90 kg/m²    HENT:   Head: Normocephalic and atraumatic.   Eyes: Conjunctivae are normal. No scleral icterus.   Neck: JVD (Prominent V wave to mid-neck sitting with HJR ) present. No thyromegaly present.   Cardiovascular: Normal rate and regular rhythm.  Exam reveals gallop (S3 ).    No murmur heard.  Pulmonary/Chest: Effort normal and breath sounds normal. No respiratory distress. He has no wheezes. He has no rales.   Abdominal: Soft. Bowel sounds are normal. He exhibits no distension (liver span 14 cm ). There is no tenderness. There is no rebound.   Musculoskeletal: He exhibits no edema or tenderness.   Neurological: He is alert and oriented to person, place, and time.   Skin: Skin is warm and " dry. He is not diaphoretic.   Psychiatric: He has a normal mood and affect. His behavior is normal. Judgment and thought content normal.     Lab Results   Component Value Date     03/19/2018    K 4.1 03/19/2018    MG 2.3 03/19/2018     03/19/2018    CO2 25 03/19/2018    BUN 28 (H) 03/19/2018    CREATININE 1.4 03/19/2018     03/19/2018    AST 34 03/19/2018    ALT 27 03/19/2018    ALBUMIN 4.3 03/19/2018    PROT 8.0 03/19/2018    BILITOT 1.5 (H) 03/19/2018    WBC 5.31 03/19/2018    HGB 14.1 03/19/2018    HCT 42.2 03/19/2018     (L) 03/19/2018     Assessment:     1. Chronic diastolic congestive heart failure    2. Other restrictive cardiomyopathy    3. AL amyloidosis      Plan:   Volume status acceptable for him so same treatment and RTC 6 months  I will use Oncology labs  With his activity if hot summer may have to reduce diuretics but will observe Oncology lab

## 2018-04-17 ENCOUNTER — LAB VISIT (OUTPATIENT)
Dept: LAB | Facility: HOSPITAL | Age: 53
End: 2018-04-17
Attending: INTERNAL MEDICINE
Payer: COMMERCIAL

## 2018-04-17 ENCOUNTER — OFFICE VISIT (OUTPATIENT)
Dept: HEMATOLOGY/ONCOLOGY | Facility: CLINIC | Age: 53
End: 2018-04-17
Payer: COMMERCIAL

## 2018-04-17 ENCOUNTER — INFUSION (OUTPATIENT)
Dept: INFUSION THERAPY | Facility: HOSPITAL | Age: 53
End: 2018-04-17
Attending: INTERNAL MEDICINE
Payer: COMMERCIAL

## 2018-04-17 ENCOUNTER — RESEARCH ENCOUNTER (OUTPATIENT)
Dept: RESEARCH | Facility: HOSPITAL | Age: 53
End: 2018-04-17

## 2018-04-17 VITALS
WEIGHT: 189.38 LBS | HEART RATE: 79 BPM | RESPIRATION RATE: 18 BRPM | OXYGEN SATURATION: 100 % | BODY MASS INDEX: 26.51 KG/M2 | SYSTOLIC BLOOD PRESSURE: 120 MMHG | DIASTOLIC BLOOD PRESSURE: 64 MMHG | TEMPERATURE: 98 F | HEIGHT: 71 IN

## 2018-04-17 VITALS
TEMPERATURE: 99 F | SYSTOLIC BLOOD PRESSURE: 108 MMHG | OXYGEN SATURATION: 100 % | HEART RATE: 70 BPM | RESPIRATION RATE: 18 BRPM | DIASTOLIC BLOOD PRESSURE: 67 MMHG

## 2018-04-17 DIAGNOSIS — Z00.6 EXAMINATION OF PARTICIPANT IN CLINICAL TRIAL: ICD-10-CM

## 2018-04-17 DIAGNOSIS — E85.81 AL AMYLOIDOSIS: Primary | ICD-10-CM

## 2018-04-17 DIAGNOSIS — I42.5 OTHER RESTRICTIVE CARDIOMYOPATHY: ICD-10-CM

## 2018-04-17 DIAGNOSIS — E85.81 AL AMYLOIDOSIS: ICD-10-CM

## 2018-04-17 LAB
ALBUMIN SERPL BCP-MCNC: 4.2 G/DL
ALP SERPL-CCNC: 225 U/L
ALT SERPL W/O P-5'-P-CCNC: 31 U/L
ANION GAP SERPL CALC-SCNC: 12 MMOL/L
AST SERPL-CCNC: 35 U/L
BASOPHILS # BLD AUTO: 0.06 K/UL
BASOPHILS NFR BLD: 1.1 %
BILIRUB SERPL-MCNC: 2 MG/DL
BUN SERPL-MCNC: 22 MG/DL
CALCIUM SERPL-MCNC: 9.9 MG/DL
CHLORIDE SERPL-SCNC: 100 MMOL/L
CO2 SERPL-SCNC: 24 MMOL/L
CREAT SERPL-MCNC: 1.4 MG/DL
DIFFERENTIAL METHOD: ABNORMAL
DRUG STUDY SPECIMEN TYPE: NORMAL
DRUG STUDY TEST NAME: NORMAL
DRUG STUDY TEST RESULT: NORMAL
EOSINOPHIL # BLD AUTO: 0.2 K/UL
EOSINOPHIL NFR BLD: 2.9 %
ERYTHROCYTE [DISTWIDTH] IN BLOOD BY AUTOMATED COUNT: 14.2 %
EST. GFR  (AFRICAN AMERICAN): >60 ML/MIN/1.73 M^2
EST. GFR  (NON AFRICAN AMERICAN): 57.8 ML/MIN/1.73 M^2
GLUCOSE SERPL-MCNC: 153 MG/DL
HCT VFR BLD AUTO: 42.7 %
HGB BLD-MCNC: 13.7 G/DL
IMM GRANULOCYTES # BLD AUTO: 0.01 K/UL
IMM GRANULOCYTES NFR BLD AUTO: 0.2 %
LYMPHOCYTES # BLD AUTO: 1.1 K/UL
LYMPHOCYTES NFR BLD: 20.2 %
MAGNESIUM SERPL-MCNC: 2.2 MG/DL
MCH RBC QN AUTO: 31.9 PG
MCHC RBC AUTO-ENTMCNC: 32.1 G/DL
MCV RBC AUTO: 100 FL
MONOCYTES # BLD AUTO: 0.4 K/UL
MONOCYTES NFR BLD: 7.7 %
NEUTROPHILS # BLD AUTO: 3.7 K/UL
NEUTROPHILS NFR BLD: 67.9 %
NRBC BLD-RTO: 0 /100 WBC
PLATELET # BLD AUTO: 159 K/UL
PMV BLD AUTO: 9.4 FL
POTASSIUM SERPL-SCNC: 4.2 MMOL/L
PROT SERPL-MCNC: 7.9 G/DL
RBC # BLD AUTO: 4.29 M/UL
SODIUM SERPL-SCNC: 136 MMOL/L
WBC # BLD AUTO: 5.49 K/UL

## 2018-04-17 PROCEDURE — 85025 COMPLETE CBC W/AUTO DIFF WBC: CPT

## 2018-04-17 PROCEDURE — 63600175 PHARM REV CODE 636 W HCPCS: Mod: JG | Performed by: INTERNAL MEDICINE

## 2018-04-17 PROCEDURE — 99214 OFFICE O/P EST MOD 30 MIN: CPT | Mod: S$PBB,,, | Performed by: INTERNAL MEDICINE

## 2018-04-17 PROCEDURE — 96413 CHEMO IV INFUSION 1 HR: CPT

## 2018-04-17 PROCEDURE — 99999 PR PBB SHADOW E&M-EST. PATIENT-LVL III: CPT | Mod: PBBFAC,,, | Performed by: INTERNAL MEDICINE

## 2018-04-17 PROCEDURE — 25000003 PHARM REV CODE 250: Performed by: INTERNAL MEDICINE

## 2018-04-17 PROCEDURE — 96401 CHEMO ANTI-NEOPL SQ/IM: CPT

## 2018-04-17 PROCEDURE — A4216 STERILE WATER/SALINE, 10 ML: HCPCS | Performed by: INTERNAL MEDICINE

## 2018-04-17 PROCEDURE — 83520 IMMUNOASSAY QUANT NOS NONAB: CPT | Mod: 59

## 2018-04-17 PROCEDURE — 99000 SPECIMEN HANDLING OFFICE-LAB: CPT

## 2018-04-17 PROCEDURE — 36415 COLL VENOUS BLD VENIPUNCTURE: CPT

## 2018-04-17 PROCEDURE — 83735 ASSAY OF MAGNESIUM: CPT

## 2018-04-17 PROCEDURE — 80053 COMPREHEN METABOLIC PANEL: CPT

## 2018-04-17 RX ORDER — BORTEZOMIB 3.5 MG/1
1.3 INJECTION, POWDER, LYOPHILIZED, FOR SOLUTION INTRAVENOUS; SUBCUTANEOUS
Status: CANCELLED
Start: 2018-04-17

## 2018-04-17 RX ORDER — ACETAMINOPHEN 325 MG/1
650 TABLET ORAL
Status: CANCELLED | OUTPATIENT
Start: 2018-04-17 | End: 2018-04-17

## 2018-04-17 RX ORDER — ACETAMINOPHEN 325 MG/1
650 TABLET ORAL
Status: COMPLETED | OUTPATIENT
Start: 2018-04-17 | End: 2018-04-17

## 2018-04-17 RX ORDER — BORTEZOMIB 3.5 MG/1
1.3 INJECTION, POWDER, LYOPHILIZED, FOR SOLUTION INTRAVENOUS; SUBCUTANEOUS
Status: COMPLETED | OUTPATIENT
Start: 2018-04-17 | End: 2018-04-17

## 2018-04-17 RX ORDER — DIPHENHYDRAMINE HCL 25 MG
25 CAPSULE ORAL ONCE
Status: COMPLETED | OUTPATIENT
Start: 2018-04-17 | End: 2018-04-17

## 2018-04-17 RX ORDER — SODIUM CHLORIDE 0.9 % (FLUSH) 0.9 %
10 SYRINGE (ML) INJECTION
Status: DISCONTINUED | OUTPATIENT
Start: 2018-04-17 | End: 2018-04-17 | Stop reason: HOSPADM

## 2018-04-17 RX ORDER — HEPARIN 100 UNIT/ML
500 SYRINGE INTRAVENOUS
Status: CANCELLED | OUTPATIENT
Start: 2018-04-17

## 2018-04-17 RX ORDER — HEPARIN 100 UNIT/ML
500 SYRINGE INTRAVENOUS
Status: DISCONTINUED | OUTPATIENT
Start: 2018-04-17 | End: 2018-04-17 | Stop reason: HOSPADM

## 2018-04-17 RX ORDER — DIPHENHYDRAMINE HCL 25 MG
25 CAPSULE ORAL ONCE
Status: CANCELLED | OUTPATIENT
Start: 2018-04-17 | End: 2018-04-17

## 2018-04-17 RX ORDER — SODIUM CHLORIDE 0.9 % (FLUSH) 0.9 %
10 SYRINGE (ML) INJECTION
Status: CANCELLED | OUTPATIENT
Start: 2018-04-17

## 2018-04-17 RX ADMIN — DIPHENHYDRAMINE HYDROCHLORIDE 25 MG: 25 CAPSULE ORAL at 09:04

## 2018-04-17 RX ADMIN — BORTEZOMIB 2.7 MG: 3.5 INJECTION, POWDER, LYOPHILIZED, FOR SOLUTION INTRAVENOUS; SUBCUTANEOUS at 12:04

## 2018-04-17 RX ADMIN — ACETAMINOPHEN 650 MG: 325 TABLET, FILM COATED ORAL at 09:04

## 2018-04-17 RX ADMIN — SODIUM CHLORIDE, PRESERVATIVE FREE 10 ML: 5 INJECTION INTRAVENOUS at 11:04

## 2018-04-17 RX ADMIN — SODIUM CHLORIDE: 9 INJECTION, SOLUTION INTRAVENOUS at 09:04

## 2018-04-17 NOTE — PLAN OF CARE
Problem: Patient Care Overview  Goal: Discharge Needs Assessment  Outcome: Ongoing (interventions implemented as appropriate)  Pt tolerated INV drug well no infusion reaction noted velcade administered SQ to abdomen pt tolerated well, site covered with band aide no bleeding bruising noted, PIV d/c cath tip intact covered with dry dressing no redness swelling present. Leaves clinic ambulatory NAD noted. Instructed to contact MD office/ research nurse with any changes questions or concerns understanding verbalized no questions at this time.

## 2018-04-18 LAB
KAPPA LC SER QL IA: 2.07 MG/DL
KAPPA LC/LAMBDA SER IA: 1.1
LAMBDA LC SER QL IA: 1.88 MG/DL

## 2018-04-23 NOTE — ASSESSMENT & PLAN NOTE
Mr. Lai appears to have remission of his amyloid based on normal kappa/lambda ratio, resolution of peripheral edema, and overal improvement of cardiac function. He does not have normal exercise capacity, but he is markedly improved from diagnosis, and he continues to make gains with time.    He will remain on trial despite the move for his new job. He will come to clinic first hting on Mondays to be able to travel during the week.     We will continue bortezomib therapy q8 weeks.

## 2018-04-23 NOTE — PROGRESS NOTES
HEMATOLOGIC MALIGNANCIES PROGRESS NOTE    IDENTIFYING STATEMENT   Roc Lai Jr. ([place]) is a 52 y.o. male with a  of 1965 from [place] with the diagnosis of AL amyloid.      ONCOLOGY HISTORY:    1. AL amyloid with cardiac involvement   A. 2016: Initial evaluation with Dr. Carmichael. Reported 18 months of progressive decline (previously ran 3 miles easily, now can only walk 10 minutes). M-protein 0.19 g/dl, kappa 1.37 mg/dl, lambda 50 mg/dl, ratio 0.03. Abdominal fat pad biopsy had been negative for presence of amyloid. Cardiac MRI suggestive of infiltrative cardiomyopathy.    B. 1/3/2017: Endomyocardial biopsy - involved by AL amyloid   C. 2017: BMBx - 60% cellular marrow with 50% plasma cells, consistent with plasma cell myeloma.    D. 2/15/2017 - 2017: Completed 4 cycles bortezomib chemotherapy on [id number] trial.    E. 2017: Bortezomib every 2 weeks x 2 doses   F. 2017: Change bortezomib to [id number] x 2 doses   G. 10/9/2017: Bortezomib q6 weeks   H. 2018: Bortezomib q8 weeks.     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 Lai returns to clinic for follow-up on the [id number] trial. This is Month 16 of therapy. He has been tolerating it well. He reports some neuropathy. No limb swelling at this time. He is exercising a bit more at this time. He just accepted anew job and will be working remotely during the week. He intends to stay on trial and continue to receive therapy.     Past Medical History, Past Social History and Past Family History have been reviewed and are unchanged except as noted in the interval history.    MEDICATIONS:     Current Outpatient Prescriptions on File Prior to Visit   Medication Sig Dispense Refill    acyclovir (ZOVIRAX) 400 MG tablet Take 1 tablet (400 mg total) by mouth 2 (two) times daily. 60 tablet 11    BORTEZOMIB (VELCADE INJ) Inject as directed. Every 2 months      bumetanide (BUMEX) 2 MG tablet Take 1  "tablet (2 mg total) by mouth once daily. 90 tablet 3    gabapentin (NEURONTIN) 100 MG capsule Take 3 capsules (300 mg total) by mouth every evening. 90 capsule 2    magnesium oxide (MAG-OX) 400 mg tablet Take 1 tablet (400 mg total) by mouth once daily. 90 tablet 3    potassium chloride SA (K-DUR,KLOR-CON) 20 MEQ tablet Take 1 tablet (20 mEq total) by mouth once daily. 90 tablet 3    spironolactone (ALDACTONE) 25 MG tablet Take 1 tablet (25 mg total) by mouth once daily. 90 tablet 3     No current facility-administered medications on file prior to visit.        ALLERGIES: Review of patient's allergies indicates:  No Known Allergies     ROS:       Review of Systems   Constitutional: Positive for fatigue. Negative for diaphoresis, fever and unexpected weight change.   HENT:   Negative for lump/mass and sore throat.    Eyes: Negative for icterus.   Respiratory: Negative for cough and shortness of breath.    Cardiovascular: Negative for chest pain, leg swelling and palpitations.   Gastrointestinal: Negative for abdominal distention, constipation, diarrhea, nausea and vomiting.   Genitourinary: Negative for dysuria and frequency.    Musculoskeletal: Negative for arthralgias, gait problem and myalgias.   Skin: Negative for rash.   Neurological: Positive for numbness. Negative for dizziness, gait problem and headaches.   Hematological: Negative for adenopathy. Does not bruise/bleed easily.   Psychiatric/Behavioral: The patient is not nervous/anxious.        PHYSICAL EXAM:  Vitals:    04/17/18 0815   BP: 120/64   Pulse: 79   Resp: 18   Temp: 98.4 °F (36.9 °C)   TempSrc: Oral   SpO2: 100%   Weight: 85.9 kg (189 lb 6 oz)   Height: 5' 11" (1.803 m)   PainSc: 0-No pain     ECOG 1    NYHA Class II      Physical Exam   Constitutional: He is oriented to person, place, and time. He appears well-developed and well-nourished. No distress.   HENT:   Head: Normocephalic and atraumatic.   Mouth/Throat: Mucous membranes are normal. " No oral lesions.   No macroglossia   Eyes: Conjunctivae are normal.   Neck: No thyromegaly present.   Cardiovascular: Normal rate, regular rhythm and normal heart sounds.    No murmur heard.  Pulmonary/Chest: Breath sounds normal. He has no wheezes. He has no rales.   Abdominal: Soft. He exhibits no distension and no mass. There is no splenomegaly or hepatomegaly. There is no tenderness.   Musculoskeletal: He exhibits no edema.   Lymphadenopathy:     He has no cervical adenopathy.        Right cervical: No deep cervical adenopathy present.       Left cervical: No deep cervical adenopathy present.     He has no axillary adenopathy.        Right: No inguinal adenopathy present.        Left: No inguinal adenopathy present.   Neurological: He is alert and oriented to person, place, and time. He has normal strength and normal reflexes. No cranial nerve deficit. Coordination normal.   Neurologic examination as per [id number] protocol and was normal.    Skin: No rash noted.   Skin on legs is somewhat hyperpigmented       LAB:   Results for orders placed or performed in visit on 04/17/18   CBC W/ AUTO DIFFERENTIAL   Result Value Ref Range    WBC 5.49 3.90 - 12.70 K/uL    RBC 4.29 (L) 4.60 - 6.20 M/uL    Hemoglobin 13.7 (L) 14.0 - 18.0 g/dL    Hematocrit 42.7 40.0 - 54.0 %     (H) 82 - 98 fL    MCH 31.9 (H) 27.0 - 31.0 pg    MCHC 32.1 32.0 - 36.0 g/dL    RDW 14.2 11.5 - 14.5 %    Platelets 159 150 - 350 K/uL    MPV 9.4 9.2 - 12.9 fL    Immature Granulocytes 0.2 0.0 - 0.5 %    Gran # (ANC) 3.7 1.8 - 7.7 K/uL    Immature Grans (Abs) 0.01 0.00 - 0.04 K/uL    Lymph # 1.1 1.0 - 4.8 K/uL    Mono # 0.4 0.3 - 1.0 K/uL    Eos # 0.2 0.0 - 0.5 K/uL    Baso # 0.06 0.00 - 0.20 K/uL    nRBC 0 0 /100 WBC    Gran% 67.9 38.0 - 73.0 %    Lymph% 20.2 18.0 - 48.0 %    Mono% 7.7 4.0 - 15.0 %    Eosinophil% 2.9 0.0 - 8.0 %    Basophil% 1.1 0.0 - 1.9 %    Differential Method Automated    COMPREHENSIVE METABOLIC PANEL   Result Value Ref Range     Sodium 136 136 - 145 mmol/L    Potassium 4.2 3.5 - 5.1 mmol/L    Chloride 100 95 - 110 mmol/L    CO2 24 23 - 29 mmol/L    Glucose 153 (H) 70 - 110 mg/dL    BUN, Bld 22 (H) 6 - 20 mg/dL    Creatinine 1.4 0.5 - 1.4 mg/dL    Calcium 9.9 8.7 - 10.5 mg/dL    Total Protein 7.9 6.0 - 8.4 g/dL    Albumin 4.2 3.5 - 5.2 g/dL    Total Bilirubin 2.0 (H) 0.1 - 1.0 mg/dL    Alkaline Phosphatase 225 (H) 55 - 135 U/L    AST 35 10 - 40 U/L    ALT 31 10 - 44 U/L    Anion Gap 12 8 - 16 mmol/L    eGFR if African American >60.0 >60 mL/min/1.73 m^2    eGFR if non  57.8 (A) >60 mL/min/1.73 m^2   MAGNESIUM   Result Value Ref Range    Magnesium 2.2 1.6 - 2.6 mg/dL   IMMUNOGLOBULIN FREE LT CHAINS BLOOD   Result Value Ref Range    Kappa Free Light Chains 2.07 (H) 0.33 - 1.94 mg/dL    Lambda Free Light Chains 1.88 0.57 - 2.63 mg/dL    Kappa/Lambda FLC Ratio 1.10 0.26 - 1.65   DRUG STUDY SENDOUT, MISC. Blood   Result Value Ref Range    Drug Study Test Name Drug Study     Drug Study Specimen Type BLOOD     Drug Study Test Result Result sent directly to physician        PROBLEMS ASSESSED THIS VISIT:    1. AL amyloidosis    2. Examination of participant in clinical trial    3. Other restrictive cardiomyopathy        PLAN:       AL amyloidosis  Mr. Lai appears to have remission of his amyloid based on normal kappa/lambda ratio, resolution of peripheral edema, and overal improvement of cardiac function. He does not have normal exercise capacity, but he is markedly improved from diagnosis, and he continues to make gains with time.    He will remain on trial despite the move for his new job. He will come to clinic first hting on Mondays to be able to travel during the week.     We will continue bortezomib therapy q8 weeks.     Follow-up per protocol.     Chris Atkins MD  Hematology and Stem Cell Transplant

## 2018-05-01 ENCOUNTER — TELEPHONE (OUTPATIENT)
Dept: HEMATOLOGY/ONCOLOGY | Facility: CLINIC | Age: 53
End: 2018-05-01

## 2018-05-01 NOTE — TELEPHONE ENCOUNTER
Spoke with patient regarding follow up visit.  He states he is amenable to end of treatment visit and procedures per trial and that he would like to keep this visit scheduled for 5/14/18, which would have been his next infusion date.  Infusion appt on this date and all subsequent appt's cancelled.

## 2018-05-01 NOTE — TELEPHONE ENCOUNTER
Called Mr. Lai and notified him that the Vital study has been closed due to lack of effectiveness of the [id number]. [id number] is no longer being developed.     He will need to return for end of treatment visit 30 (+/- 5) days from last infusion.    He expressed understanding. Research nurses to coordinate EOT visit.     Chris Atkins MD

## 2018-05-10 DIAGNOSIS — Z00.6 EXAMINATION OF PARTICIPANT IN CLINICAL TRIAL: ICD-10-CM

## 2018-05-10 DIAGNOSIS — E85.81 AL AMYLOIDOSIS: Primary | ICD-10-CM

## 2018-05-14 ENCOUNTER — LAB VISIT (OUTPATIENT)
Dept: LAB | Facility: HOSPITAL | Age: 53
End: 2018-05-14
Attending: INTERNAL MEDICINE
Payer: COMMERCIAL

## 2018-05-14 ENCOUNTER — RESEARCH ENCOUNTER (OUTPATIENT)
Dept: RESEARCH | Facility: HOSPITAL | Age: 53
End: 2018-05-14

## 2018-05-14 ENCOUNTER — HOSPITAL ENCOUNTER (OUTPATIENT)
Dept: CARDIOLOGY | Facility: CLINIC | Age: 53
Discharge: HOME | End: 2018-05-14
Payer: COMMERCIAL

## 2018-05-14 ENCOUNTER — OFFICE VISIT (OUTPATIENT)
Dept: HEMATOLOGY/ONCOLOGY | Facility: CLINIC | Age: 53
End: 2018-05-14
Payer: COMMERCIAL

## 2018-05-14 VITALS
OXYGEN SATURATION: 99 % | RESPIRATION RATE: 18 BRPM | WEIGHT: 187.63 LBS | SYSTOLIC BLOOD PRESSURE: 119 MMHG | BODY MASS INDEX: 26.17 KG/M2 | TEMPERATURE: 98 F | HEART RATE: 82 BPM | DIASTOLIC BLOOD PRESSURE: 68 MMHG

## 2018-05-14 VITALS — HEART RATE: 94 BPM | DIASTOLIC BLOOD PRESSURE: 66 MMHG | SYSTOLIC BLOOD PRESSURE: 131 MMHG

## 2018-05-14 DIAGNOSIS — E85.81 AL AMYLOIDOSIS: ICD-10-CM

## 2018-05-14 DIAGNOSIS — Z00.6 EXAMINATION OF PARTICIPANT IN CLINICAL TRIAL: ICD-10-CM

## 2018-05-14 DIAGNOSIS — I42.9 CARDIOMYOPATHY: ICD-10-CM

## 2018-05-14 DIAGNOSIS — E85.81 AL AMYLOIDOSIS: Primary | ICD-10-CM

## 2018-05-14 DIAGNOSIS — G62.2 NEUROPATHY DUE TO CHEMICAL SUBSTANCE: ICD-10-CM

## 2018-05-14 LAB
ALBUMIN SERPL BCP-MCNC: 3.9 G/DL
ALP SERPL-CCNC: 184 U/L
ALT SERPL W/O P-5'-P-CCNC: 27 U/L
ANION GAP SERPL CALC-SCNC: 9 MMOL/L
AST SERPL-CCNC: 34 U/L
BASOPHILS # BLD AUTO: 0.05 K/UL
BASOPHILS NFR BLD: 1 %
BILIRUB SERPL-MCNC: 1.6 MG/DL
BUN SERPL-MCNC: 26 MG/DL
CALCIUM SERPL-MCNC: 10 MG/DL
CHLORIDE SERPL-SCNC: 103 MMOL/L
CO2 SERPL-SCNC: 27 MMOL/L
CREAT SERPL-MCNC: 1.4 MG/DL
DIFFERENTIAL METHOD: ABNORMAL
EOSINOPHIL # BLD AUTO: 0.1 K/UL
EOSINOPHIL NFR BLD: 2.3 %
ERYTHROCYTE [DISTWIDTH] IN BLOOD BY AUTOMATED COUNT: 13.2 %
EST. GFR  (AFRICAN AMERICAN): >60 ML/MIN/1.73 M^2
EST. GFR  (NON AFRICAN AMERICAN): 57.8 ML/MIN/1.73 M^2
ESTIMATED PA SYSTOLIC PRESSURE: 27.39
GLUCOSE SERPL-MCNC: 120 MG/DL
HCT VFR BLD AUTO: 40.6 %
HGB BLD-MCNC: 13 G/DL
IMM GRANULOCYTES # BLD AUTO: 0.02 K/UL
IMM GRANULOCYTES NFR BLD AUTO: 0.4 %
LYMPHOCYTES # BLD AUTO: 0.9 K/UL
LYMPHOCYTES NFR BLD: 17.7 %
MAGNESIUM SERPL-MCNC: 2 MG/DL
MCH RBC QN AUTO: 32.7 PG
MCHC RBC AUTO-ENTMCNC: 32 G/DL
MCV RBC AUTO: 102 FL
MITRAL VALVE MOBILITY: NORMAL
MONOCYTES # BLD AUTO: 0.6 K/UL
MONOCYTES NFR BLD: 11.7 %
NEUTROPHILS # BLD AUTO: 3.3 K/UL
NEUTROPHILS NFR BLD: 66.9 %
NRBC BLD-RTO: 0 /100 WBC
PLATELET # BLD AUTO: 141 K/UL
PMV BLD AUTO: 9.8 FL
POTASSIUM SERPL-SCNC: 4.8 MMOL/L
PROT SERPL-MCNC: 7.3 G/DL
RBC # BLD AUTO: 3.98 M/UL
RETIRED EF AND QEF - SEE NOTES: 45 (ref 55–65)
SODIUM SERPL-SCNC: 139 MMOL/L
TRICUSPID VALVE REGURGITATION: ABNORMAL
WBC # BLD AUTO: 4.87 K/UL

## 2018-05-14 PROCEDURE — 99214 OFFICE O/P EST MOD 30 MIN: CPT | Mod: S$PBB,,, | Performed by: INTERNAL MEDICINE

## 2018-05-14 PROCEDURE — 85025 COMPLETE CBC W/AUTO DIFF WBC: CPT

## 2018-05-14 PROCEDURE — 83735 ASSAY OF MAGNESIUM: CPT

## 2018-05-14 PROCEDURE — 93306 TTE W/DOPPLER COMPLETE: CPT | Mod: PBBFAC | Performed by: INTERNAL MEDICINE

## 2018-05-14 PROCEDURE — 99999 PR PBB SHADOW E&M-EST. PATIENT-LVL III: CPT | Mod: PBBFAC,,, | Performed by: INTERNAL MEDICINE

## 2018-05-14 PROCEDURE — 0399T 2D ECHO WITH COLOR FLOW DOPPLER: CPT | Mod: 26,S$PBB,, | Performed by: INTERNAL MEDICINE

## 2018-05-14 PROCEDURE — 83520 IMMUNOASSAY QUANT NOS NONAB: CPT | Mod: 59

## 2018-05-14 PROCEDURE — 80053 COMPREHEN METABOLIC PANEL: CPT

## 2018-05-14 NOTE — PROGRESS NOTES
HEMATOLOGIC MALIGNANCIES PROGRESS NOTE    IDENTIFYING STATEMENT   Roc Lai Jr. ([place]) is a 52 y.o. male with a  of 1965 from [place] with the diagnosis of AL amyloid.      ONCOLOGY HISTORY:    1. AL amyloid with cardiac involvement   A. 2016: Initial evaluation with Dr. Carmichael. Reported 18 months of progressive decline (previously ran 3 miles easily, now can only walk 10 minutes). M-protein 0.19 g/dl, kappa 1.37 mg/dl, lambda 50 mg/dl, ratio 0.03. Abdominal fat pad biopsy had been negative for presence of amyloid. Cardiac MRI suggestive of infiltrative cardiomyopathy.    B. 1/3/2017: Endomyocardial biopsy - involved by AL amyloid   C. 2017: BMBx - 60% cellular marrow with 50% plasma cells, consistent with plasma cell myeloma.    D. 2/15/2017 - 2017: Completed 4 cycles bortezomib chemotherapy on [id number] trial.    E. 2017: Bortezomib every 2 weeks x 2 doses   F. 2017: Change bortezomib to [id number] x 2 doses   G. 10/9/2017: Bortezomib q6 weeks   H. 2018: Bortezomib q8 weeks.     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 Lai returns to clinic for follow-up on the [id number] trial. This is Month 17 and the end of treatment visit as the study is now closed.     He has been feeling well. He reports some neuropathy. No limb swelling at this time. He has relocated for his new job. He denies any new shortness of breath.     Past Medical History, Past Social History and Past Family History have been reviewed and are unchanged except as noted in the interval history.    MEDICATIONS:     Current Outpatient Prescriptions on File Prior to Visit   Medication Sig Dispense Refill    acyclovir (ZOVIRAX) 400 MG tablet Take 1 tablet (400 mg total) by mouth 2 (two) times daily. 60 tablet 11    BORTEZOMIB (VELCADE INJ) Inject as directed. Every 2 months      bumetanide (BUMEX) 2 MG tablet Take 1 tablet (2 mg total) by mouth once daily. 90 tablet 3     gabapentin (NEURONTIN) 100 MG capsule Take 3 capsules (300 mg total) by mouth every evening. 90 capsule 2    magnesium oxide (MAG-OX) 400 mg tablet Take 1 tablet (400 mg total) by mouth once daily. 90 tablet 3    potassium chloride SA (K-DUR,KLOR-CON) 20 MEQ tablet Take 1 tablet (20 mEq total) by mouth once daily. 90 tablet 3    spironolactone (ALDACTONE) 25 MG tablet Take 1 tablet (25 mg total) by mouth once daily. 90 tablet 3     No current facility-administered medications on file prior to visit.        ALLERGIES: Review of patient's allergies indicates:  No Known Allergies     ROS:       Review of Systems   Constitutional: Positive for fatigue. Negative for diaphoresis, fever and unexpected weight change.   HENT:   Negative for lump/mass and sore throat.    Eyes: Negative for icterus.   Respiratory: Negative for cough and shortness of breath.    Cardiovascular: Negative for chest pain, leg swelling and palpitations.   Gastrointestinal: Negative for abdominal distention, constipation, diarrhea, nausea and vomiting.   Genitourinary: Negative for dysuria and frequency.    Musculoskeletal: Negative for arthralgias, gait problem and myalgias.   Skin: Negative for rash.   Neurological: Positive for numbness. Negative for dizziness, gait problem and headaches.   Hematological: Negative for adenopathy. Does not bruise/bleed easily.   Psychiatric/Behavioral: The patient is not nervous/anxious.        PHYSICAL EXAM:  Vitals:    05/14/18 0848   BP: 119/68   Pulse: 82   Resp: 18   Temp: 98.3 °F (36.8 °C)   TempSrc: Oral   SpO2: 99%   Weight: 85.1 kg (187 lb 9.8 oz)   PainSc: 0-No pain     ECOG 1    NYHA Class II    Physical Exam   Constitutional: He is oriented to person, place, and time. He appears well-developed and well-nourished. No distress.   HENT:   Head: Normocephalic and atraumatic.   Mouth/Throat: Mucous membranes are normal. No oral lesions.   No macroglossia   Eyes: Conjunctivae are normal.   Neck: No  thyromegaly present.   Cardiovascular: Normal rate, regular rhythm and normal heart sounds.    No murmur heard.  Pulmonary/Chest: Breath sounds normal. He has no wheezes. He has no rales.   Abdominal: Soft. He exhibits no distension and no mass. There is no splenomegaly or hepatomegaly. There is no tenderness.   Musculoskeletal: He exhibits no edema.   Lymphadenopathy:     He has no cervical adenopathy.        Right cervical: No deep cervical adenopathy present.       Left cervical: No deep cervical adenopathy present.     He has no axillary adenopathy.        Right: No inguinal adenopathy present.        Left: No inguinal adenopathy present.   Neurological: He is alert and oriented to person, place, and time. He has normal strength and normal reflexes. No cranial nerve deficit. Coordination normal.   Neurologic examination as per [id number] protocol and was normal.    Skin: No rash noted.   Skin on legs is somewhat hyperpigmented       LAB:   Results for orders placed or performed in visit on 05/14/18   CBC W/ AUTO DIFFERENTIAL   Result Value Ref Range    WBC 4.87 3.90 - 12.70 K/uL    RBC 3.98 (L) 4.60 - 6.20 M/uL    Hemoglobin 13.0 (L) 14.0 - 18.0 g/dL    Hematocrit 40.6 40.0 - 54.0 %     (H) 82 - 98 fL    MCH 32.7 (H) 27.0 - 31.0 pg    MCHC 32.0 32.0 - 36.0 g/dL    RDW 13.2 11.5 - 14.5 %    Platelets 141 (L) 150 - 350 K/uL    MPV 9.8 9.2 - 12.9 fL    Immature Granulocytes 0.4 0.0 - 0.5 %    Gran # (ANC) 3.3 1.8 - 7.7 K/uL    Immature Grans (Abs) 0.02 0.00 - 0.04 K/uL    Lymph # 0.9 (L) 1.0 - 4.8 K/uL    Mono # 0.6 0.3 - 1.0 K/uL    Eos # 0.1 0.0 - 0.5 K/uL    Baso # 0.05 0.00 - 0.20 K/uL    nRBC 0 0 /100 WBC    Gran% 66.9 38.0 - 73.0 %    Lymph% 17.7 (L) 18.0 - 48.0 %    Mono% 11.7 4.0 - 15.0 %    Eosinophil% 2.3 0.0 - 8.0 %    Basophil% 1.0 0.0 - 1.9 %    Differential Method Automated    COMPREHENSIVE METABOLIC PANEL   Result Value Ref Range    Sodium 139 136 - 145 mmol/L    Potassium 4.8 3.5 - 5.1 mmol/L     Chloride 103 95 - 110 mmol/L    CO2 27 23 - 29 mmol/L    Glucose 120 (H) 70 - 110 mg/dL    BUN, Bld 26 (H) 6 - 20 mg/dL    Creatinine 1.4 0.5 - 1.4 mg/dL    Calcium 10.0 8.7 - 10.5 mg/dL    Total Protein 7.3 6.0 - 8.4 g/dL    Albumin 3.9 3.5 - 5.2 g/dL    Total Bilirubin 1.6 (H) 0.1 - 1.0 mg/dL    Alkaline Phosphatase 184 (H) 55 - 135 U/L    AST 34 10 - 40 U/L    ALT 27 10 - 44 U/L    Anion Gap 9 8 - 16 mmol/L    eGFR if African American >60.0 >60 mL/min/1.73 m^2    eGFR if non  57.8 (A) >60 mL/min/1.73 m^2   MAGNESIUM   Result Value Ref Range    Magnesium 2.0 1.6 - 2.6 mg/dL   DRUG STUDY SENDOUT, MISC. Blood   Result Value Ref Range    Drug Study Specimen Type BLOOD        PROBLEMS ASSESSED THIS VISIT:    1. AL amyloidosis    2. 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 Lai has continued remission of his amyloid clinically, with markedly reduced symptoms. His free light chains are pending today. He will also have end of treatment echocardiogram today to assess any cardiac amyloid deposition.    As the clinical trial is closing, we discussed continued follow-up. We will continue with planned q8week bortezomib injections. He is due in 4 weeks. We will plan to see him then and then every 8 weeks as long as he is clinically stable.     Follow-up in 4 weeks as above    Chris Atkins MD  Hematology and Stem Cell Transplant

## 2018-05-14 NOTE — ASSESSMENT & PLAN NOTE
Mr. Lai has continued remission of his amyloid clinically, with markedly reduced symptoms. His free light chains are pending today. He will also have end of treatment echocardiogram today to assess any cardiac amyloid deposition.    As the clinical trial is closing, we discussed continued follow-up. We will continue with planned q8week bortezomib injections. He is due in 4 weeks. We will plan to see him then and then every 8 weeks as long as he is clinically stable.

## 2018-05-14 NOTE — Clinical Note
Follow-up on 6/15/18 with CBC, CMP, SPEP, BROOKS, free ligth chains, immunoglobulins.  Please schedule chemotherapy appt on 6/15 for bortezomib injection once approved.

## 2018-05-15 LAB
KAPPA LC SER QL IA: 1.84 MG/DL
KAPPA LC/LAMBDA SER IA: 1.05
LAMBDA LC SER QL IA: 1.76 MG/DL

## 2018-05-17 ENCOUNTER — PATIENT MESSAGE (OUTPATIENT)
Dept: HEMATOLOGY/ONCOLOGY | Facility: CLINIC | Age: 53
End: 2018-05-17

## 2018-05-17 DIAGNOSIS — G62.2 NEUROPATHY DUE TO CHEMICAL SUBSTANCE: ICD-10-CM

## 2018-05-17 RX ORDER — GABAPENTIN 100 MG/1
300 CAPSULE ORAL NIGHTLY
Qty: 90 CAPSULE | Refills: 2 | Status: SHIPPED | OUTPATIENT
Start: 2018-05-17 | End: 2018-05-21 | Stop reason: SDUPTHER

## 2018-05-21 ENCOUNTER — PATIENT MESSAGE (OUTPATIENT)
Dept: HEMATOLOGY/ONCOLOGY | Facility: CLINIC | Age: 53
End: 2018-05-21

## 2018-05-21 DIAGNOSIS — G62.2 NEUROPATHY DUE TO CHEMICAL SUBSTANCE: ICD-10-CM

## 2018-05-21 RX ORDER — GABAPENTIN 100 MG/1
300 CAPSULE ORAL NIGHTLY
Qty: 90 CAPSULE | Refills: 2 | Status: SHIPPED | OUTPATIENT
Start: 2018-05-21 | End: 2018-11-02 | Stop reason: SDUPTHER

## 2018-06-15 ENCOUNTER — INFUSION (OUTPATIENT)
Dept: INFUSION THERAPY | Facility: HOSPITAL | Age: 53
End: 2018-06-15
Attending: INTERNAL MEDICINE
Payer: OTHER GOVERNMENT

## 2018-06-15 ENCOUNTER — LAB VISIT (OUTPATIENT)
Dept: LAB | Facility: HOSPITAL | Age: 53
End: 2018-06-15
Attending: INTERNAL MEDICINE
Payer: OTHER GOVERNMENT

## 2018-06-15 ENCOUNTER — OFFICE VISIT (OUTPATIENT)
Dept: HEMATOLOGY/ONCOLOGY | Facility: CLINIC | Age: 53
End: 2018-06-15
Payer: OTHER GOVERNMENT

## 2018-06-15 VITALS
DIASTOLIC BLOOD PRESSURE: 59 MMHG | TEMPERATURE: 98 F | SYSTOLIC BLOOD PRESSURE: 110 MMHG | HEART RATE: 77 BPM | HEIGHT: 71 IN | OXYGEN SATURATION: 99 % | RESPIRATION RATE: 18 BRPM | WEIGHT: 187.63 LBS | BODY MASS INDEX: 26.27 KG/M2

## 2018-06-15 DIAGNOSIS — I50.22 CHRONIC SYSTOLIC HEART FAILURE: ICD-10-CM

## 2018-06-15 DIAGNOSIS — E85.81 AL AMYLOIDOSIS: Primary | ICD-10-CM

## 2018-06-15 DIAGNOSIS — Z09 CHEMOTHERAPY FOLLOW-UP EXAMINATION: ICD-10-CM

## 2018-06-15 DIAGNOSIS — E85.81 AL AMYLOIDOSIS: ICD-10-CM

## 2018-06-15 LAB
ALBUMIN SERPL BCP-MCNC: 4.2 G/DL
ALP SERPL-CCNC: 170 U/L
ALT SERPL W/O P-5'-P-CCNC: 27 U/L
ANION GAP SERPL CALC-SCNC: 11 MMOL/L
AST SERPL-CCNC: 33 U/L
BASOPHILS # BLD AUTO: 0.05 K/UL
BASOPHILS NFR BLD: 0.6 %
BILIRUB SERPL-MCNC: 1.7 MG/DL
BUN SERPL-MCNC: 26 MG/DL
CALCIUM SERPL-MCNC: 10.1 MG/DL
CHLORIDE SERPL-SCNC: 102 MMOL/L
CO2 SERPL-SCNC: 26 MMOL/L
CREAT SERPL-MCNC: 1.4 MG/DL
DIFFERENTIAL METHOD: ABNORMAL
EOSINOPHIL # BLD AUTO: 0.1 K/UL
EOSINOPHIL NFR BLD: 1.8 %
ERYTHROCYTE [DISTWIDTH] IN BLOOD BY AUTOMATED COUNT: 12.9 %
EST. GFR  (AFRICAN AMERICAN): >60 ML/MIN/1.73 M^2
EST. GFR  (NON AFRICAN AMERICAN): 57.8 ML/MIN/1.73 M^2
GLUCOSE SERPL-MCNC: 100 MG/DL
HCT VFR BLD AUTO: 41.7 %
HGB BLD-MCNC: 14.2 G/DL
IGA SERPL-MCNC: 91 MG/DL
IGG SERPL-MCNC: 1192 MG/DL
IGM SERPL-MCNC: 73 MG/DL
IMM GRANULOCYTES # BLD AUTO: 0.02 K/UL
IMM GRANULOCYTES NFR BLD AUTO: 0.3 %
LYMPHOCYTES # BLD AUTO: 1.1 K/UL
LYMPHOCYTES NFR BLD: 13.7 %
MCH RBC QN AUTO: 33.6 PG
MCHC RBC AUTO-ENTMCNC: 34.1 G/DL
MCV RBC AUTO: 99 FL
MONOCYTES # BLD AUTO: 0.7 K/UL
MONOCYTES NFR BLD: 8.4 %
NEUTROPHILS # BLD AUTO: 5.8 K/UL
NEUTROPHILS NFR BLD: 75.2 %
NRBC BLD-RTO: 0 /100 WBC
PLATELET # BLD AUTO: 158 K/UL
PMV BLD AUTO: 10.2 FL
POTASSIUM SERPL-SCNC: 4.3 MMOL/L
PROT SERPL-MCNC: 7.7 G/DL
RBC # BLD AUTO: 4.22 M/UL
SODIUM SERPL-SCNC: 139 MMOL/L
WBC # BLD AUTO: 7.74 K/UL

## 2018-06-15 PROCEDURE — 99999 PR PBB SHADOW E&M-EST. PATIENT-LVL III: CPT | Mod: PBBFAC,,, | Performed by: INTERNAL MEDICINE

## 2018-06-15 PROCEDURE — 85025 COMPLETE CBC W/AUTO DIFF WBC: CPT

## 2018-06-15 PROCEDURE — 86334 IMMUNOFIX E-PHORESIS SERUM: CPT

## 2018-06-15 PROCEDURE — 36415 COLL VENOUS BLD VENIPUNCTURE: CPT

## 2018-06-15 PROCEDURE — 99213 OFFICE O/P EST LOW 20 MIN: CPT | Mod: PBBFAC,25 | Performed by: INTERNAL MEDICINE

## 2018-06-15 PROCEDURE — 84165 PROTEIN E-PHORESIS SERUM: CPT

## 2018-06-15 PROCEDURE — 99214 OFFICE O/P EST MOD 30 MIN: CPT | Mod: S$PBB,,, | Performed by: INTERNAL MEDICINE

## 2018-06-15 PROCEDURE — 80053 COMPREHEN METABOLIC PANEL: CPT

## 2018-06-15 PROCEDURE — 84165 PROTEIN E-PHORESIS SERUM: CPT | Mod: 26,,, | Performed by: PATHOLOGY

## 2018-06-15 PROCEDURE — 82784 ASSAY IGA/IGD/IGG/IGM EACH: CPT | Mod: 59

## 2018-06-15 PROCEDURE — 86334 IMMUNOFIX E-PHORESIS SERUM: CPT | Mod: 26,,, | Performed by: PATHOLOGY

## 2018-06-15 PROCEDURE — 83520 IMMUNOASSAY QUANT NOS NONAB: CPT

## 2018-06-15 PROCEDURE — 63600175 PHARM REV CODE 636 W HCPCS: Mod: JG | Performed by: INTERNAL MEDICINE

## 2018-06-15 PROCEDURE — 96401 CHEMO ANTI-NEOPL SQ/IM: CPT

## 2018-06-15 RX ORDER — BORTEZOMIB 3.5 MG/1
1.3 INJECTION, POWDER, LYOPHILIZED, FOR SOLUTION INTRAVENOUS; SUBCUTANEOUS
Status: CANCELLED | OUTPATIENT
Start: 2018-06-15

## 2018-06-15 RX ORDER — BORTEZOMIB 3.5 MG/1
1.3 INJECTION, POWDER, LYOPHILIZED, FOR SOLUTION INTRAVENOUS; SUBCUTANEOUS
Status: DISCONTINUED | OUTPATIENT
Start: 2018-06-15 | End: 2018-06-15

## 2018-06-15 RX ORDER — BORTEZOMIB 3.5 MG/1
1.3 INJECTION, POWDER, LYOPHILIZED, FOR SOLUTION INTRAVENOUS; SUBCUTANEOUS
Status: CANCELLED
Start: 2018-06-15

## 2018-06-15 RX ORDER — BORTEZOMIB 3.5 MG/1
1.3 INJECTION, POWDER, LYOPHILIZED, FOR SOLUTION INTRAVENOUS; SUBCUTANEOUS
Status: COMPLETED | OUTPATIENT
Start: 2018-06-15 | End: 2018-06-15

## 2018-06-15 RX ORDER — SODIUM CHLORIDE 0.9 % (FLUSH) 0.9 %
10 SYRINGE (ML) INJECTION
Status: CANCELLED | OUTPATIENT
Start: 2018-06-15

## 2018-06-15 RX ORDER — HEPARIN 100 UNIT/ML
500 SYRINGE INTRAVENOUS
Status: CANCELLED | OUTPATIENT
Start: 2018-06-15

## 2018-06-15 RX ADMIN — BORTEZOMIB 2.7 MG: 3.5 INJECTION, POWDER, LYOPHILIZED, FOR SOLUTION INTRAVENOUS; SUBCUTANEOUS at 04:06

## 2018-06-15 NOTE — Clinical Note
Cancel 8/6 chemo appt.  Follow-up on 8/10 with CBC, CMP, SPEP, BROOKS, free light chains, immunoglobulins, NT-proBNP (miscellaneous test). Patient prefers afternoon appt as he must travel from out of town.   Chemo appt on 8/10 for bortezomib injection.

## 2018-06-18 LAB
ALBUMIN SERPL ELPH-MCNC: 4.6 G/DL
ALPHA1 GLOB SERPL ELPH-MCNC: 0.36 G/DL
ALPHA2 GLOB SERPL ELPH-MCNC: 0.58 G/DL
B-GLOBULIN SERPL ELPH-MCNC: 0.84 G/DL
GAMMA GLOB SERPL ELPH-MCNC: 1.02 G/DL
INTERPRETATION SERPL IFE-IMP: NORMAL
KAPPA LC SER QL IA: 2.02 MG/DL
KAPPA LC/LAMBDA SER IA: 1.07
LAMBDA LC SER QL IA: 1.88 MG/DL
PATHOLOGIST INTERPRETATION IFE: NORMAL
PATHOLOGIST INTERPRETATION SPE: NORMAL
PROT SERPL-MCNC: 7.4 G/DL

## 2018-06-19 PROBLEM — I50.22 CHRONIC SYSTOLIC HEART FAILURE: Status: ACTIVE | Noted: 2018-06-19

## 2018-06-19 NOTE — PROGRESS NOTES
HEMATOLOGIC MALIGNANCIES PROGRESS NOTE    IDENTIFYING STATEMENT   Roc Lai Jr. ([place]) is a 52 y.o. male with a  of 1965 from [place] with the diagnosis of AL amyloid.      ONCOLOGY HISTORY:    1. AL amyloid with cardiac involvement   A. 2016: Initial evaluation with Dr. Carmichael. Reported 18 months of progressive decline (previously ran 3 miles easily, now can only walk 10 minutes). M-protein 0.19 g/dl, kappa 1.37 mg/dl, lambda 50 mg/dl, ratio 0.03. Abdominal fat pad biopsy had been negative for presence of amyloid. Cardiac MRI suggestive of infiltrative cardiomyopathy.    B. 1/3/2017: Endomyocardial biopsy - involved by AL amyloid   C. 2017: BMBx - 60% cellular marrow with 50% plasma cells, consistent with plasma cell myeloma.    D. 2/15/2017 - 2017: Completed 4 cycles bortezomib chemotherapy on [id number] trial.    E. 2017: Bortezomib every 2 weeks x 2 doses   F. 2017: Change bortezomib to [id number] x 2 doses   G. 10/9/2017: Bortezomib q6 weeks   H. 2018: Bortezomib q8 weeks.    I. 2018: [id number] close out visit due to ineffectiveness.      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 Lai returns to clinic for follow-up of his cardiac AL amyloid. He has been feeling well. He reports some neuropathy. No limb swelling at this time. He is working hard at his new job. He is not able to exercise as much due to lack of time, but he denies any new shortness of breath.     Past Medical History, Past Social History and Past Family History have been reviewed and are unchanged except as noted in the interval history.    MEDICATIONS:     Current Outpatient Prescriptions on File Prior to Visit   Medication Sig Dispense Refill    acyclovir (ZOVIRAX) 400 MG tablet Take 1 tablet (400 mg total) by mouth 2 (two) times daily. 60 tablet 11    BORTEZOMIB (VELCADE INJ) Inject as directed. Every 2 months      bumetanide (BUMEX) 2 MG tablet Take 1 tablet (2  "mg total) by mouth once daily. 90 tablet 3    gabapentin (NEURONTIN) 100 MG capsule Take 3 capsules (300 mg total) by mouth every evening. 90 capsule 2    magnesium oxide (MAG-OX) 400 mg tablet Take 1 tablet (400 mg total) by mouth once daily. 90 tablet 3    potassium chloride SA (K-DUR,KLOR-CON) 20 MEQ tablet Take 1 tablet (20 mEq total) by mouth once daily. 90 tablet 3    spironolactone (ALDACTONE) 25 MG tablet Take 1 tablet (25 mg total) by mouth once daily. 90 tablet 3     No current facility-administered medications on file prior to visit.        ALLERGIES: Review of patient's allergies indicates:  No Known Allergies     ROS:       Review of Systems   Constitutional: Positive for fatigue. Negative for diaphoresis, fever and unexpected weight change.   HENT:   Negative for lump/mass and sore throat.    Eyes: Negative for icterus.   Respiratory: Negative for cough and shortness of breath.    Cardiovascular: Negative for chest pain, leg swelling and palpitations.   Gastrointestinal: Negative for abdominal distention, constipation, diarrhea, nausea and vomiting.   Genitourinary: Negative for dysuria and frequency.    Musculoskeletal: Negative for arthralgias, gait problem and myalgias.   Skin: Negative for rash.   Neurological: Positive for numbness. Negative for dizziness, gait problem and headaches.   Hematological: Negative for adenopathy. Does not bruise/bleed easily.   Psychiatric/Behavioral: The patient is not nervous/anxious.        PHYSICAL EXAM:  Vitals:    06/15/18 1415   BP: (!) 110/59   Pulse: 77   Resp: 18   Temp: 97.9 °F (36.6 °C)   TempSrc: Oral   SpO2: 99%   Weight: 85.1 kg (187 lb 9.8 oz)   Height: 5' 11" (1.803 m)   PainSc: 0-No pain     ECOG 1    NYHA Class II    Physical Exam   Constitutional: He is oriented to person, place, and time. He appears well-developed and well-nourished. No distress.   HENT:   Head: Normocephalic and atraumatic.   Mouth/Throat: Mucous membranes are normal. No oral " lesions.   No macroglossia   Eyes: Conjunctivae are normal.   Neck: No thyromegaly present.   Cardiovascular: Normal rate, regular rhythm and normal heart sounds.    No murmur heard.  Pulmonary/Chest: Breath sounds normal. He has no wheezes. He has no rales.   Abdominal: Soft. He exhibits no distension and no mass. There is no splenomegaly or hepatomegaly. There is no tenderness.   Musculoskeletal: He exhibits no edema.   Lymphadenopathy:     He has no cervical adenopathy.        Right cervical: No deep cervical adenopathy present.       Left cervical: No deep cervical adenopathy present.     He has no axillary adenopathy.        Right: No inguinal adenopathy present.        Left: No inguinal adenopathy present.   Neurological: He is alert and oriented to person, place, and time. He has normal strength and normal reflexes. No cranial nerve deficit. Coordination normal.   Skin: No rash noted.   Skin on legs is somewhat hyperpigmented       LAB:   Results for orders placed or performed in visit on 06/15/18   Rapid BMT CBC with Diff   Result Value Ref Range    WBC 7.74 3.90 - 12.70 K/uL    RBC 4.22 (L) 4.60 - 6.20 M/uL    Hemoglobin 14.2 14.0 - 18.0 g/dL    Hematocrit 41.7 40.0 - 54.0 %    MCV 99 (H) 82 - 98 fL    MCH 33.6 (H) 27.0 - 31.0 pg    MCHC 34.1 32.0 - 36.0 g/dL    RDW 12.9 11.5 - 14.5 %    Platelets 158 150 - 350 K/uL    MPV 10.2 9.2 - 12.9 fL    Immature Granulocytes 0.3 0.0 - 0.5 %    Gran # (ANC) 5.8 1.8 - 7.7 K/uL    Immature Grans (Abs) 0.02 0.00 - 0.04 K/uL    Lymph # 1.1 1.0 - 4.8 K/uL    Mono # 0.7 0.3 - 1.0 K/uL    Eos # 0.1 0.0 - 0.5 K/uL    Baso # 0.05 0.00 - 0.20 K/uL    nRBC 0 0 /100 WBC    Gran% 75.2 (H) 38.0 - 73.0 %    Lymph% 13.7 (L) 18.0 - 48.0 %    Mono% 8.4 4.0 - 15.0 %    Eosinophil% 1.8 0.0 - 8.0 %    Basophil% 0.6 0.0 - 1.9 %    Differential Method Automated    Comprehensive metabolic panel   Result Value Ref Range    Sodium 139 136 - 145 mmol/L    Potassium 4.3 3.5 - 5.1 mmol/L     Chloride 102 95 - 110 mmol/L    CO2 26 23 - 29 mmol/L    Glucose 100 70 - 110 mg/dL    BUN, Bld 26 (H) 6 - 20 mg/dL    Creatinine 1.4 0.5 - 1.4 mg/dL    Calcium 10.1 8.7 - 10.5 mg/dL    Total Protein 7.7 6.0 - 8.4 g/dL    Albumin 4.2 3.5 - 5.2 g/dL    Total Bilirubin 1.7 (H) 0.1 - 1.0 mg/dL    Alkaline Phosphatase 170 (H) 55 - 135 U/L    AST 33 10 - 40 U/L    ALT 27 10 - 44 U/L    Anion Gap 11 8 - 16 mmol/L    eGFR if African American >60.0 >60 mL/min/1.73 m^2    eGFR if non  57.8 (A) >60 mL/min/1.73 m^2   Immunoglobulin free LT chains blood   Result Value Ref Range    Kappa Free Light Chains 2.02 (H) 0.33 - 1.94 mg/dL    Lambda Free Light Chains 1.88 0.57 - 2.63 mg/dL    Kappa/Lambda FLC Ratio 1.07 0.26 - 1.65   Protein electrophoresis, serum   Result Value Ref Range    Protein, Serum 7.4 6.0 - 8.4 g/dL    Albumin grams/dl 4.60 3.35 - 5.55 g/dL    Alpha-1 grams/dl 0.36 0.17 - 0.41 g/dL    Alpha-2 grams/dl 0.58 0.43 - 0.99 g/dL    Beta grams/dl 0.84 0.50 - 1.10 g/dL    Gamma grams/dl 1.02 0.67 - 1.58 g/dL   Immunofixation electrophoresis   Result Value Ref Range    Immunofix Interp. SEE COMMENT    Immunoglobulins (IgG, IgA, IgM) Quantitative   Result Value Ref Range    IgG - Serum 1192 650 - 1600 mg/dL    IgA 91 40 - 350 mg/dL    IgM 73 50 - 300 mg/dL   Pathologist Interpretation SPE   Result Value Ref Range    Pathologist Interpretation SPE REVIEWED    Pathologist Interpretation BROOKS   Result Value Ref Range    Pathologist Interpretation BROOKS REVIEWED        PROBLEMS ASSESSED THIS VISIT:    1. AL amyloidosis    2. Chronic systolic heart failure    3. Chemotherapy follow-up examination        PLAN:       AL amyloidosis  Mr. Lai has stable AL amyloidosis clinically. He does not have any elevation of his serum free light chains.    We reviewed that his echo looked worse from the prior echo, but it is overall stable over time. Clinically, he is well with very few heart failure symptoms. He has  excellent endurance and exercise capacity.     We will continue clinically follow-up every 8 weeks with bortezomib injections. He knows to call if he has symptoms earlier.     Follow-up in 8 weeks    Chris Atkins MD  Hematology and Stem Cell Transplant

## 2018-06-19 NOTE — ASSESSMENT & PLAN NOTE
Mr. Lai has stable AL amyloidosis clinically. He does not have any elevation of his serum free light chains.    We reviewed that his echo looked worse from the prior echo, but it is overall stable over time. Clinically, he is well with very few heart failure symptoms. He has excellent endurance and exercise capacity.     We will continue clinically follow-up every 8 weeks with bortezomib injections. He knows to call if he has symptoms earlier.

## 2018-06-26 ENCOUNTER — PATIENT MESSAGE (OUTPATIENT)
Dept: HEMATOLOGY/ONCOLOGY | Facility: CLINIC | Age: 53
End: 2018-06-26

## 2018-08-06 ENCOUNTER — PATIENT MESSAGE (OUTPATIENT)
Dept: HEMATOLOGY/ONCOLOGY | Facility: CLINIC | Age: 53
End: 2018-08-06

## 2018-08-08 ENCOUNTER — OFFICE VISIT (OUTPATIENT)
Dept: HEMATOLOGY/ONCOLOGY | Facility: CLINIC | Age: 53
End: 2018-08-08
Payer: OTHER GOVERNMENT

## 2018-08-08 ENCOUNTER — LAB VISIT (OUTPATIENT)
Dept: LAB | Facility: HOSPITAL | Age: 53
End: 2018-08-08
Attending: INTERNAL MEDICINE
Payer: OTHER GOVERNMENT

## 2018-08-08 ENCOUNTER — INFUSION (OUTPATIENT)
Dept: INFUSION THERAPY | Facility: HOSPITAL | Age: 53
End: 2018-08-08
Attending: INTERNAL MEDICINE
Payer: OTHER GOVERNMENT

## 2018-08-08 ENCOUNTER — PATIENT MESSAGE (OUTPATIENT)
Dept: HEMATOLOGY/ONCOLOGY | Facility: CLINIC | Age: 53
End: 2018-08-08

## 2018-08-08 VITALS
OXYGEN SATURATION: 100 % | HEIGHT: 71 IN | HEART RATE: 86 BPM | BODY MASS INDEX: 26.61 KG/M2 | WEIGHT: 190.06 LBS | SYSTOLIC BLOOD PRESSURE: 116 MMHG | TEMPERATURE: 98 F | DIASTOLIC BLOOD PRESSURE: 64 MMHG

## 2018-08-08 DIAGNOSIS — I50.32 CHRONIC DIASTOLIC CONGESTIVE HEART FAILURE: ICD-10-CM

## 2018-08-08 DIAGNOSIS — E85.81 AL AMYLOIDOSIS: ICD-10-CM

## 2018-08-08 DIAGNOSIS — E85.81 AL AMYLOIDOSIS: Primary | ICD-10-CM

## 2018-08-08 DIAGNOSIS — G62.2 NEUROPATHY DUE TO CHEMICAL SUBSTANCE: ICD-10-CM

## 2018-08-08 LAB
ALBUMIN SERPL BCP-MCNC: 4 G/DL
ALP SERPL-CCNC: 163 U/L
ALT SERPL W/O P-5'-P-CCNC: 27 U/L
ANION GAP SERPL CALC-SCNC: 9 MMOL/L
AST SERPL-CCNC: 29 U/L
BASOPHILS # BLD AUTO: 0.04 K/UL
BASOPHILS NFR BLD: 0.7 %
BILIRUB SERPL-MCNC: 1.4 MG/DL
BUN SERPL-MCNC: 27 MG/DL
CALCIUM SERPL-MCNC: 9.6 MG/DL
CHLORIDE SERPL-SCNC: 101 MMOL/L
CO2 SERPL-SCNC: 26 MMOL/L
CREAT SERPL-MCNC: 1.3 MG/DL
DIFFERENTIAL METHOD: ABNORMAL
EOSINOPHIL # BLD AUTO: 0.2 K/UL
EOSINOPHIL NFR BLD: 2.8 %
ERYTHROCYTE [DISTWIDTH] IN BLOOD BY AUTOMATED COUNT: 13.2 %
EST. GFR  (AFRICAN AMERICAN): >60 ML/MIN/1.73 M^2
EST. GFR  (NON AFRICAN AMERICAN): >60 ML/MIN/1.73 M^2
GLUCOSE SERPL-MCNC: 150 MG/DL
HCT VFR BLD AUTO: 39.5 %
HGB BLD-MCNC: 13.1 G/DL
IGA SERPL-MCNC: 74 MG/DL
IGG SERPL-MCNC: 1031 MG/DL
IGM SERPL-MCNC: 58 MG/DL
IMM GRANULOCYTES # BLD AUTO: 0.03 K/UL
IMM GRANULOCYTES NFR BLD AUTO: 0.5 %
LYMPHOCYTES # BLD AUTO: 0.9 K/UL
LYMPHOCYTES NFR BLD: 15.8 %
MCH RBC QN AUTO: 33.6 PG
MCHC RBC AUTO-ENTMCNC: 33.2 G/DL
MCV RBC AUTO: 101 FL
MONOCYTES # BLD AUTO: 0.5 K/UL
MONOCYTES NFR BLD: 8.3 %
NEUTROPHILS # BLD AUTO: 4.1 K/UL
NEUTROPHILS NFR BLD: 71.9 %
NRBC BLD-RTO: 0 /100 WBC
PLATELET # BLD AUTO: 136 K/UL
PMV BLD AUTO: 9.7 FL
POTASSIUM SERPL-SCNC: 4.7 MMOL/L
PROT SERPL-MCNC: 7.1 G/DL
RBC # BLD AUTO: 3.9 M/UL
SODIUM SERPL-SCNC: 136 MMOL/L
WBC # BLD AUTO: 5.65 K/UL

## 2018-08-08 PROCEDURE — 63600175 PHARM REV CODE 636 W HCPCS: Mod: JG | Performed by: INTERNAL MEDICINE

## 2018-08-08 PROCEDURE — 86334 IMMUNOFIX E-PHORESIS SERUM: CPT | Mod: 26,,, | Performed by: PATHOLOGY

## 2018-08-08 PROCEDURE — 96401 CHEMO ANTI-NEOPL SQ/IM: CPT

## 2018-08-08 PROCEDURE — 99214 OFFICE O/P EST MOD 30 MIN: CPT | Mod: S$PBB,,, | Performed by: INTERNAL MEDICINE

## 2018-08-08 PROCEDURE — 83520 IMMUNOASSAY QUANT NOS NONAB: CPT | Mod: 59

## 2018-08-08 PROCEDURE — 99213 OFFICE O/P EST LOW 20 MIN: CPT | Mod: PBBFAC,25 | Performed by: INTERNAL MEDICINE

## 2018-08-08 PROCEDURE — 99999 PR PBB SHADOW E&M-EST. PATIENT-LVL III: CPT | Mod: PBBFAC,,, | Performed by: INTERNAL MEDICINE

## 2018-08-08 PROCEDURE — 83880 ASSAY OF NATRIURETIC PEPTIDE: CPT

## 2018-08-08 PROCEDURE — 82784 ASSAY IGA/IGD/IGG/IGM EACH: CPT | Mod: 59

## 2018-08-08 PROCEDURE — 86334 IMMUNOFIX E-PHORESIS SERUM: CPT

## 2018-08-08 PROCEDURE — 84165 PROTEIN E-PHORESIS SERUM: CPT

## 2018-08-08 PROCEDURE — 85025 COMPLETE CBC W/AUTO DIFF WBC: CPT

## 2018-08-08 PROCEDURE — 84165 PROTEIN E-PHORESIS SERUM: CPT | Mod: 26,,, | Performed by: PATHOLOGY

## 2018-08-08 PROCEDURE — 80053 COMPREHEN METABOLIC PANEL: CPT

## 2018-08-08 RX ORDER — SODIUM CHLORIDE 0.9 % (FLUSH) 0.9 %
10 SYRINGE (ML) INJECTION
Status: CANCELLED | OUTPATIENT
Start: 2018-08-10

## 2018-08-08 RX ORDER — SPIRONOLACTONE 25 MG/1
25 TABLET ORAL DAILY
Qty: 90 TABLET | Refills: 3 | Status: SHIPPED | OUTPATIENT
Start: 2018-08-08 | End: 2019-01-23 | Stop reason: SDUPTHER

## 2018-08-08 RX ORDER — POTASSIUM CHLORIDE 20 MEQ/1
20 TABLET, EXTENDED RELEASE ORAL DAILY
Qty: 90 TABLET | Refills: 3 | OUTPATIENT
Start: 2018-08-08

## 2018-08-08 RX ORDER — HEPARIN 100 UNIT/ML
500 SYRINGE INTRAVENOUS
Status: CANCELLED | OUTPATIENT
Start: 2018-08-10

## 2018-08-08 RX ORDER — BORTEZOMIB 3.5 MG/1
1.3 INJECTION, POWDER, LYOPHILIZED, FOR SOLUTION INTRAVENOUS; SUBCUTANEOUS
Status: CANCELLED
Start: 2018-08-10

## 2018-08-08 RX ORDER — BORTEZOMIB 3.5 MG/1
1.3 INJECTION, POWDER, LYOPHILIZED, FOR SOLUTION INTRAVENOUS; SUBCUTANEOUS
Status: COMPLETED | OUTPATIENT
Start: 2018-08-08 | End: 2018-08-08

## 2018-08-08 RX ADMIN — BORTEZOMIB 2.7 MG: 3.5 INJECTION, POWDER, LYOPHILIZED, FOR SOLUTION INTRAVENOUS; SUBCUTANEOUS at 09:08

## 2018-08-08 NOTE — NURSING
Patient here for Velcade injection.  Assessment complete and labs reviewed.  Administered injection in abdomen.  No questions or concerns.  Patient ambulated off unit unassisted.

## 2018-08-08 NOTE — Clinical Note
Follow-up in 8 weeks with CBC, CMP, mag, free light chains, miscellaneous lab (NT-pro BNP).  Schedule chemo same day as return for bortezomib injection (Velcade).

## 2018-08-09 LAB
ALBUMIN SERPL ELPH-MCNC: 4.28 G/DL
ALPHA1 GLOB SERPL ELPH-MCNC: 0.35 G/DL
ALPHA2 GLOB SERPL ELPH-MCNC: 0.57 G/DL
B-GLOBULIN SERPL ELPH-MCNC: 0.75 G/DL
GAMMA GLOB SERPL ELPH-MCNC: 0.94 G/DL
INTERPRETATION SERPL IFE-IMP: NORMAL
KAPPA LC SER QL IA: 1.69 MG/DL
KAPPA LC/LAMBDA SER IA: 0.8
LAMBDA LC SER QL IA: 2.11 MG/DL
PATHOLOGIST INTERPRETATION IFE: NORMAL
PATHOLOGIST INTERPRETATION SPE: NORMAL
PROT SERPL-MCNC: 6.9 G/DL

## 2018-08-12 NOTE — PROGRESS NOTES
HEMATOLOGIC MALIGNANCIES PROGRESS NOTE    IDENTIFYING STATEMENT   Roc Lai Jr. (Ovi) is a 53 y.o. male with a  of 1965 from [place] with the diagnosis of AL amyloid.      ONCOLOGY HISTORY:    1. AL amyloid with cardiac involvement   A. 2016: Initial evaluation with Dr. Carmichael. Reported 18 months of progressive decline (previously ran 3 miles easily, now can only walk 10 minutes). M-protein 0.19 g/dl, kappa 1.37 mg/dl, lambda 50 mg/dl, ratio 0.03. Abdominal fat pad biopsy had been negative for presence of amyloid. Cardiac MRI suggestive of infiltrative cardiomyopathy.    B. 1/3/2017: Endomyocardial biopsy - involved by AL amyloid   C. 2017: BMBx - 60% cellular marrow with 50% plasma cells, consistent with plasma cell myeloma.    D. 2/15/2017 - 2017: Completed 4 cycles bortezomib chemotherapy on [id number] trial.    E. 2017: Bortezomib every 2 weeks x 2 doses   F. 2017: Change bortezomib to [id number] x 2 doses   G. 10/9/2017: Bortezomib q6 weeks   H. 2018: Bortezomib q8 weeks.    I. 2018: [id number] close out visit due to ineffectiveness.      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 Lai returns to clinic for follow-up of his cardiac AL amyloid. He has been feeling well. He reports some neuropathy. No limb swelling at this time. He is working hard at his new job. He is not able to exercise as much due to lack of time, but he denies any new shortness of breath. He has recently moved closer to [facility] into the [facility] from [place]. This was uneventful.    Past Medical History, Past Social History and Past Family History have been reviewed and are unchanged except as noted in the interval history.    MEDICATIONS:     Current Outpatient Medications on File Prior to Visit   Medication Sig Dispense Refill    acyclovir (ZOVIRAX) 400 MG tablet Take 1 tablet (400 mg total) by mouth 2 (two) times daily. 60 tablet 11     "BORTEZOMIB (VELCADE INJ) Inject as directed. Every 2 months      bumetanide (BUMEX) 2 MG tablet Take 1 tablet (2 mg total) by mouth once daily. 90 tablet 3    gabapentin (NEURONTIN) 100 MG capsule Take 3 capsules (300 mg total) by mouth every evening. 90 capsule 2    magnesium oxide (MAG-OX) 400 mg tablet Take 1 tablet (400 mg total) by mouth once daily. 90 tablet 3    potassium chloride SA (K-DUR,KLOR-CON) 20 MEQ tablet Take 1 tablet (20 mEq total) by mouth once daily. 90 tablet 3     No current facility-administered medications on file prior to visit.        ALLERGIES: Review of patient's allergies indicates:  No Known Allergies     ROS:       Review of Systems   Constitutional: Positive for fatigue. Negative for diaphoresis, fever and unexpected weight change.   HENT:   Negative for lump/mass and sore throat.    Eyes: Negative for icterus.   Respiratory: Negative for cough and shortness of breath.    Cardiovascular: Negative for chest pain, leg swelling and palpitations.   Gastrointestinal: Negative for abdominal distention, constipation, diarrhea, nausea and vomiting.   Genitourinary: Negative for dysuria and frequency.    Musculoskeletal: Negative for arthralgias, gait problem and myalgias.   Skin: Negative for rash.   Neurological: Positive for numbness. Negative for dizziness, gait problem and headaches.   Hematological: Negative for adenopathy. Does not bruise/bleed easily.   Psychiatric/Behavioral: The patient is not nervous/anxious.        PHYSICAL EXAM:  Vitals:    08/08/18 0831   BP: 116/64   Pulse: 86   Temp: 98.4 °F (36.9 °C)   TempSrc: Oral   SpO2: 100%   Weight: 86.2 kg (190 lb 0.6 oz)   Height: 5' 11" (1.803 m)   PainSc: 0-No pain     ECOG 1    NYHA Class II    Physical Exam   Constitutional: He is oriented to person, place, and time. He appears well-developed and well-nourished. No distress.   HENT:   Head: Normocephalic and atraumatic.   Mouth/Throat: Mucous membranes are normal. No oral " lesions.   No macroglossia   Eyes: Conjunctivae are normal.   Neck: No thyromegaly present.   Cardiovascular: Normal rate, regular rhythm and normal heart sounds.   No murmur heard.  Pulmonary/Chest: Breath sounds normal. He has no wheezes. He has no rales.   Abdominal: Soft. He exhibits no distension and no mass. There is no splenomegaly or hepatomegaly. There is no tenderness.   Musculoskeletal: He exhibits no edema.   Lymphadenopathy:     He has no cervical adenopathy.        Right cervical: No deep cervical adenopathy present.       Left cervical: No deep cervical adenopathy present.     He has no axillary adenopathy.        Right: No inguinal adenopathy present.        Left: No inguinal adenopathy present.   Neurological: He is alert and oriented to person, place, and time. He has normal strength and normal reflexes. No cranial nerve deficit. Coordination normal.   Skin: No rash noted.   Skin on legs is somewhat hyperpigmented       LAB:   Results for orders placed or performed in visit on 08/08/18   Rapid BMT CBC with Diff   Result Value Ref Range    WBC 5.65 3.90 - 12.70 K/uL    RBC 3.90 (L) 4.60 - 6.20 M/uL    Hemoglobin 13.1 (L) 14.0 - 18.0 g/dL    Hematocrit 39.5 (L) 40.0 - 54.0 %     (H) 82 - 98 fL    MCH 33.6 (H) 27.0 - 31.0 pg    MCHC 33.2 32.0 - 36.0 g/dL    RDW 13.2 11.5 - 14.5 %    Platelets 136 (L) 150 - 350 K/uL    MPV 9.7 9.2 - 12.9 fL    Immature Granulocytes 0.5 0.0 - 0.5 %    Gran # (ANC) 4.1 1.8 - 7.7 K/uL    Immature Grans (Abs) 0.03 0.00 - 0.04 K/uL    Lymph # 0.9 (L) 1.0 - 4.8 K/uL    Mono # 0.5 0.3 - 1.0 K/uL    Eos # 0.2 0.0 - 0.5 K/uL    Baso # 0.04 0.00 - 0.20 K/uL    nRBC 0 0 /100 WBC    Gran% 71.9 38.0 - 73.0 %    Lymph% 15.8 (L) 18.0 - 48.0 %    Mono% 8.3 4.0 - 15.0 %    Eosinophil% 2.8 0.0 - 8.0 %    Basophil% 0.7 0.0 - 1.9 %    Differential Method Automated    Comprehensive metabolic panel   Result Value Ref Range    Sodium 136 136 - 145 mmol/L    Potassium 4.7 3.5 - 5.1  mmol/L    Chloride 101 95 - 110 mmol/L    CO2 26 23 - 29 mmol/L    Glucose 150 (H) 70 - 110 mg/dL    BUN, Bld 27 (H) 6 - 20 mg/dL    Creatinine 1.3 0.5 - 1.4 mg/dL    Calcium 9.6 8.7 - 10.5 mg/dL    Total Protein 7.1 6.0 - 8.4 g/dL    Albumin 4.0 3.5 - 5.2 g/dL    Total Bilirubin 1.4 (H) 0.1 - 1.0 mg/dL    Alkaline Phosphatase 163 (H) 55 - 135 U/L    AST 29 10 - 40 U/L    ALT 27 10 - 44 U/L    Anion Gap 9 8 - 16 mmol/L    eGFR if African American >60.0 >60 mL/min/1.73 m^2    eGFR if non African American >60.0 >60 mL/min/1.73 m^2   Protein electrophoresis, serum   Result Value Ref Range    Protein, Serum 6.9 6.0 - 8.4 g/dL    Albumin grams/dl 4.28 3.35 - 5.55 g/dL    Alpha-1 grams/dl 0.35 0.17 - 0.41 g/dL    Alpha-2 grams/dl 0.57 0.43 - 0.99 g/dL    Beta grams/dl 0.75 0.50 - 1.10 g/dL    Gamma grams/dl 0.94 0.67 - 1.58 g/dL   Immunofixation electrophoresis   Result Value Ref Range    Immunofix Interp. SEE COMMENT    Immunoglobulin free LT chains blood   Result Value Ref Range    Kappa Free Light Chains 1.69 0.33 - 1.94 mg/dL    Lambda Free Light Chains 2.11 0.57 - 2.63 mg/dL    Kappa/Lambda FLC Ratio 0.80 0.26 - 1.65   Immunoglobulins (IgG, IgA, IgM) Quantitative   Result Value Ref Range    IgG - Serum 1031 650 - 1600 mg/dL    IgA 74 40 - 350 mg/dL    IgM 58 50 - 300 mg/dL   Miscellaneous Sendout Test Blood   Result Value Ref Range    Miscellaneous Test Name NT-proBNP    Pathologist Interpretation BROOKS   Result Value Ref Range    Pathologist Interpretation BROOKS REVIEWED    Pathologist Interpretation SPE   Result Value Ref Range    Pathologist Interpretation SPE REVIEWED        PROBLEMS ASSESSED THIS VISIT:    1. AL amyloidosis    2. 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 Lai maintains his remission from AL amyloid affecting the heart. We will continue bortezomib chemotherapy every 8 weeks to continue to suppress amyloid  production.    He has some peripheral neuropathy, but it is minor and not intefering significantly with most things. There are times when he has more difficulty grasping objects such as pens, but he is otherwise doing okay.    Chronic diastolic congestive heart failure  He currently has well compensated heart failure without evidence of symptoms. No change in NYHA class status. He is not exercising as much.     Follow-up in 8 weeks    Chris Atkins MD  Hematology and Stem Cell Transplant

## 2018-08-12 NOTE — ASSESSMENT & PLAN NOTE
Mr. Lai maintains his remission from AL amyloid affecting the heart. We will continue bortezomib chemotherapy every 8 weeks to continue to suppress amyloid production.    He has some peripheral neuropathy, but it is minor and not intefering significantly with most things. There are times when he has more difficulty grasping objects such as pens, but he is otherwise doing okay.

## 2018-08-12 NOTE — ASSESSMENT & PLAN NOTE
He currently has well compensated heart failure without evidence of symptoms. No change in NYHA class status. He is not exercising as much.

## 2018-08-13 LAB
MISCELLANEOUS TEST NAME: NORMAL
REFERENCE LAB: NORMAL
SPECIMEN TYPE: NORMAL
TEST RESULT: NORMAL

## 2018-08-21 NOTE — PROGRESS NOTES
"  Tuesday, April 17th, 2018     Study: "A Phase 3, randomized, multicenter, double-blind, placebo-controlled, 2-arm, efficacy and safety study of OUTK252 plus standard of care versus placebo plus standard of care in subjects with light chain (AL) amyloidosis"  Protocol ID# [id number], Study Id: 135-002  IRB# 2015.305.A  Sponsor: [employer]  Investigator: Dr. Atkins    Month 16, Day 1   [id number]/Placebo Infusion & Velcade    Patient presents for Month 16 Day 1 MD visit ahead of Month 16 study drug infusion per above-mentioned protocol.  Patient is awake, alert, and oriented to person, place, and time.  He has no new issues to report today.  Continues with good quality of life and activity.  He saw both Dr. Lao and Dr. Mendieta recently, both of whom were pleased with ECHO results.  Dr. Mendieta has signed off his case.  Continues both Bumex and spironolactone per Dr. Lao.  Patient verbalizes continued willingness to participate in above-mentioned trial at this time.      Review of AE's:     Restless Leg Syndrome, grade 1:  No complaints of this in recent months, but continues with neurontin nightly, which pt believes is aiding in this symptom.  AE ongoing.   Peripheral sensory neuropathy, grade 1:  He states this remains stable.  Continues gabapentin at night.  AE, grade 1 ongoing.  Insomnia, Grade 1: No current issues. Continues on gabapentin. Per patient has noticed less snoring. AE intermittent  Lymphocyte count decreased, Grade 1: Lymphocyte count: 1.1 today, AE intermittent.   Alkaline phosphatase increased, Grade 1: Alk phos 225 today. Continues to fluctuate around this level.  Per Dr. Atkins, will continue to monitor, possibly due to INV agent; however unknown if he is getting agent/placebo. Further, has been consistently elevated since baseline.  AE ongoing  Blood bilirubin increased, Grade 1: Total bilirubin 2.0,  Per Dr. Atkins, will continue to monitor, possibly due to INV agent; however unknown if he " Immediate Brief Procedure Note for Pain management    Patient: Fabián Chaney  MRN: [id number]    Procedure(s):  Transforaminal Epidural Injection Lumbar/Sacral left L4-5 and L5-S1    Pre-Operative Diagnosis:  M51.36 DDD (degenerative disc disease), lumbar  (primary encounter diagnosis)  M48.061 Spinal stenosis, lumbar region, without neurogenic claudication  M54.16 Lumbar radiculopathy    Post-operative Diagnosis:  M51.36 DDD (degenerative disc disease), lumbar  (primary encounter diagnosis)  M48.061 Spinal stenosis, lumbar region, without neurogenic claudication  M54.16 Lumbar radiculopathy    Procedural Physician: Angel Singh MD    Assistant: none    Sedation: None    Findings: same    Specimen(s): none    Estimated Blood Loss :  None    Complications: None   is getting agent/placebo. Further, has been consistently elevated since baseline. NCS per Dr. Atkins and does not affect dosing of further therapy.  Pt states Dr. Lao noted this at his recent visit and did not state being concerned.  AE ongoing    Physical performed per Dr. Atkins, see MD note for Complete PE, symptom-directed PE, ECOG PS and NYHA Classification.  24 hour urine jug given to patient for Cycle 17.  See flowsheets for height, weight, con meds and vital signs.  NIS-LL completed, see chart for source documentation. Patient completed all necessary QOLs (including VASPI) per Emiliana MD visit.  Patient is not WOCBP.  Local blood work performed and reviewed today per Dr. Atkins.  Central labs collected shipped to [facility] lab today. Concomitant medications reviewed.  Per Dr. Atkins, patient is approved to initiate M16,D1 treatment today.  Treatment plan updated with today's weight (85.9 kg) and signed per Dr. Atkins. Velcade schedule remains at q8 weeks.  Dose due today. Patient is in agreement with this plan. Today, patient will be given Velcade after NEOD/Placebo infusion.     6MWT not required at this timepoint.      Study-mandated procedures performed per protocol as follows:     -PREDOSE STUDY LABS TO BE DRAWN WITH OTHER BLOOD WORK:   Time: __0800____  -PREMEDS TO BE GIVEN W/IN 30-90 MINUTES PRIOR TO START OF INFUSION   Time:___0942____  - PREDOSE COMPLETE VITAL SIGNS AFTER PREMEDS BUT W/IN 30 MIN PRIOR TO INFUSION   Time:___0945__ Vitals:__1108/64, 76, 8.1, 18__  -EKG TO BE PERFORMED BY STUDY RN WITHIN 30 MINUTES OF INFUSION   Time:___1007___  -CHEMO ADMINISTERED OVER 60 +/- 10 MINUTES (275 ML/HR); please document the saline flush.   Start Time:__1012__  Stop Time: __1116___  -COMPLETE VITAL SIGNS AT END OF INFUSION (which includes completion of the 30 mL saline flush) (+10min window allowed)   Time:__1116_   Vitals: __109/62, 68, 98.3, 18___  -COMPLETE VITAL SIGNS AT 1 HOUR POST INFUSION (+/- 10min  window allowed)   Time:_1216__  Vitals:_108/67, 68, 98.6, 18     -OBSERVE PATIENT FOR 90 MINUTES (+/- 10 minutes) AFTER STUDY DRUG COMPLETION   Observation period end time: _1250__  -VELCADE Administration (complete after 90 minute observation period)   Time__1251___    Patient tolerated the above with no difficulties, see infusion RN documentation.       All of patient's questions were answered to his satisfaction.   Upcoming appts discussed at today's MD visit in light of patient starting new job that will require him to live away during the week.  Request for next 3 months' appts sent to BERE Rodríguez, .   Patient states understanding of plan of care and upcoming schedule, next visit due 5/11/18.  He states having [facility] RN contact information (both Camelia Fernandez and Lora Kyle) as well as that of [facility] clinic.

## 2018-09-21 ENCOUNTER — TELEPHONE (OUTPATIENT)
Dept: TRANSPLANT | Facility: CLINIC | Age: 53
End: 2018-09-21

## 2018-09-21 DIAGNOSIS — I50.22 CHRONIC SYSTOLIC CONGESTIVE HEART FAILURE: Primary | ICD-10-CM

## 2018-10-05 ENCOUNTER — OFFICE VISIT (OUTPATIENT)
Dept: HEMATOLOGY/ONCOLOGY | Facility: CLINIC | Age: 53
End: 2018-10-05
Payer: OTHER GOVERNMENT

## 2018-10-05 ENCOUNTER — INFUSION (OUTPATIENT)
Dept: INFUSION THERAPY | Facility: HOSPITAL | Age: 53
End: 2018-10-05
Attending: INTERNAL MEDICINE
Payer: OTHER GOVERNMENT

## 2018-10-05 VITALS
RESPIRATION RATE: 18 BRPM | DIASTOLIC BLOOD PRESSURE: 61 MMHG | TEMPERATURE: 98 F | SYSTOLIC BLOOD PRESSURE: 118 MMHG | HEART RATE: 91 BPM

## 2018-10-05 VITALS
TEMPERATURE: 98 F | BODY MASS INDEX: 27.62 KG/M2 | DIASTOLIC BLOOD PRESSURE: 76 MMHG | SYSTOLIC BLOOD PRESSURE: 123 MMHG | WEIGHT: 197.31 LBS | HEIGHT: 71 IN | OXYGEN SATURATION: 100 % | RESPIRATION RATE: 18 BRPM

## 2018-10-05 DIAGNOSIS — I42.5 OTHER RESTRICTIVE CARDIOMYOPATHY: ICD-10-CM

## 2018-10-05 DIAGNOSIS — Z09 CHEMOTHERAPY FOLLOW-UP EXAMINATION: ICD-10-CM

## 2018-10-05 DIAGNOSIS — E85.81 AL AMYLOIDOSIS: Primary | ICD-10-CM

## 2018-10-05 PROCEDURE — 99999 PR PBB SHADOW E&M-EST. PATIENT-LVL III: CPT | Mod: PBBFAC,,, | Performed by: INTERNAL MEDICINE

## 2018-10-05 PROCEDURE — 99214 OFFICE O/P EST MOD 30 MIN: CPT | Mod: S$PBB,,, | Performed by: INTERNAL MEDICINE

## 2018-10-05 PROCEDURE — 63600175 PHARM REV CODE 636 W HCPCS: Mod: JG | Performed by: INTERNAL MEDICINE

## 2018-10-05 PROCEDURE — 99213 OFFICE O/P EST LOW 20 MIN: CPT | Mod: PBBFAC,25 | Performed by: INTERNAL MEDICINE

## 2018-10-05 PROCEDURE — 96401 CHEMO ANTI-NEOPL SQ/IM: CPT

## 2018-10-05 RX ORDER — SODIUM CHLORIDE 0.9 % (FLUSH) 0.9 %
10 SYRINGE (ML) INJECTION
Status: CANCELLED | OUTPATIENT
Start: 2018-10-05

## 2018-10-05 RX ORDER — HEPARIN 100 UNIT/ML
500 SYRINGE INTRAVENOUS
Status: CANCELLED | OUTPATIENT
Start: 2018-10-05

## 2018-10-05 RX ORDER — SILDENAFIL 50 MG/1
50 TABLET, FILM COATED ORAL DAILY PRN
Qty: 7 TABLET | Refills: 2 | Status: ON HOLD | OUTPATIENT
Start: 2018-10-05 | End: 2021-01-01

## 2018-10-05 RX ORDER — BORTEZOMIB 3.5 MG/1
1.3 INJECTION, POWDER, LYOPHILIZED, FOR SOLUTION INTRAVENOUS; SUBCUTANEOUS
Status: COMPLETED | OUTPATIENT
Start: 2018-10-05 | End: 2018-10-05

## 2018-10-05 RX ORDER — BORTEZOMIB 3.5 MG/1
1.3 INJECTION, POWDER, LYOPHILIZED, FOR SOLUTION INTRAVENOUS; SUBCUTANEOUS
Status: CANCELLED
Start: 2018-10-05

## 2018-10-05 RX ADMIN — BORTEZOMIB 2.8 MG: 3.5 INJECTION, POWDER, LYOPHILIZED, FOR SOLUTION INTRAVENOUS; SUBCUTANEOUS at 10:10

## 2018-10-10 NOTE — PROGRESS NOTES
HEMATOLOGIC MALIGNANCIES PROGRESS NOTE    IDENTIFYING STATEMENT   Roc Lai Jr. (Ovi) is a 53 y.o. male with a  of 1965 from [place] with the diagnosis of AL amyloid.      ONCOLOGY HISTORY:    1. AL amyloid with cardiac involvement   A. 2016: Initial evaluation with Dr. Carmichael. Reported 18 months of progressive decline (previously ran 3 miles easily, now can only walk 10 minutes). M-protein 0.19 g/dl, kappa 1.37 mg/dl, lambda 50 mg/dl, ratio 0.03. Abdominal fat pad biopsy had been negative for presence of amyloid. Cardiac MRI suggestive of infiltrative cardiomyopathy.    B. 1/3/2017: Endomyocardial biopsy - involved by AL amyloid   C. 2017: BMBx - 60% cellular marrow with 50% plasma cells, consistent with plasma cell myeloma.    D. 2/15/2017 - 2017: Completed 4 cycles bortezomib chemotherapy on [id number] trial.    E. 2017: Bortezomib every 2 weeks x 2 doses   F. 2017: Change bortezomib to [id number] x 2 doses   G. 10/9/2017: Bortezomib q6 weeks   H. 2018: Bortezomib q8 weeks.    I. 2018: [id number] close out visit due to ineffectiveness.      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 Lai returns to clinic for follow-up of his cardiac AL amyloid. He has been feeling well. He reports some neuropathy. No limb swelling at this time. He is working hard at his new job. He is not able to exercise as much due to lack of time, but he denies any new shortness of breath.     He is having difficulty with erectile dysfunction and is requesting a prescription for help.     Past Medical History, Past Social History and Past Family History have been reviewed and are unchanged except as noted in the interval history.    MEDICATIONS:     Current Outpatient Medications on File Prior to Visit   Medication Sig Dispense Refill    acyclovir (ZOVIRAX) 400 MG tablet Take 1 tablet (400 mg total) by mouth 2 (two) times daily. 60 tablet 11    BORTEZOMIB  "(VELCADE INJ) Inject as directed. Every 2 months      bumetanide (BUMEX) 2 MG tablet Take 1 tablet (2 mg total) by mouth once daily. 90 tablet 3    gabapentin (NEURONTIN) 100 MG capsule Take 3 capsules (300 mg total) by mouth every evening. 90 capsule 2    magnesium oxide (MAG-OX) 400 mg tablet Take 1 tablet (400 mg total) by mouth once daily. 90 tablet 3    potassium chloride SA (K-DUR,KLOR-CON) 20 MEQ tablet Take 1 tablet (20 mEq total) by mouth once daily. 90 tablet 3    spironolactone (ALDACTONE) 25 MG tablet Take 1 tablet (25 mg total) by mouth once daily. 90 tablet 3     No current facility-administered medications on file prior to visit.        ALLERGIES: Review of patient's allergies indicates:  No Known Allergies     ROS:       Review of Systems   Constitutional: Positive for fatigue. Negative for diaphoresis, fever and unexpected weight change.   HENT:   Negative for lump/mass and sore throat.    Eyes: Negative for icterus.   Respiratory: Negative for cough and shortness of breath.    Cardiovascular: Negative for chest pain, leg swelling and palpitations.   Gastrointestinal: Negative for abdominal distention, constipation, diarrhea, nausea and vomiting.   Genitourinary: Negative for dysuria and frequency.         Erectile dysfunction   Musculoskeletal: Negative for arthralgias, gait problem and myalgias.   Skin: Negative for rash.   Neurological: Positive for numbness. Negative for dizziness, gait problem and headaches.   Hematological: Negative for adenopathy. Does not bruise/bleed easily.   Psychiatric/Behavioral: The patient is not nervous/anxious.        PHYSICAL EXAM:  Vitals:    10/05/18 0901   BP: 123/76   Resp: 18   Temp: 98 °F (36.7 °C)   SpO2: 100%   Weight: 89.5 kg (197 lb 5 oz)   Height: 5' 11" (1.803 m)   PainSc: 0-No pain     ECOG 1    NYHA Class II    Physical Exam   Constitutional: He is oriented to person, place, and time. He appears well-developed and well-nourished. No distress. "   HENT:   Head: Normocephalic and atraumatic.   Mouth/Throat: Mucous membranes are normal. No oral lesions.   No macroglossia   Eyes: Conjunctivae are normal.   Neck: No thyromegaly present.   Cardiovascular: Normal rate, regular rhythm and normal heart sounds.   No murmur heard.  Pulmonary/Chest: Breath sounds normal. He has no wheezes. He has no rales.   Abdominal: Soft. He exhibits no distension and no mass. There is no splenomegaly or hepatomegaly. There is no tenderness.   Musculoskeletal: He exhibits no edema.   Lymphadenopathy:     He has no cervical adenopathy.        Right cervical: No deep cervical adenopathy present.       Left cervical: No deep cervical adenopathy present.     He has no axillary adenopathy.        Right: No inguinal adenopathy present.        Left: No inguinal adenopathy present.   Neurological: He is alert and oriented to person, place, and time. He has normal strength and normal reflexes. No cranial nerve deficit. Coordination normal.   Skin: No rash noted.   Skin on legs is somewhat hyperpigmented       LAB:   Results for orders placed or performed in visit on 10/05/18   Rapid BMT CBC with Diff   Result Value Ref Range    WBC 6.32 3.90 - 12.70 K/uL    RBC 4.27 (L) 4.60 - 6.20 M/uL    Hemoglobin 14.7 14.0 - 18.0 g/dL    Hematocrit 42.8 40.0 - 54.0 %     (H) 82 - 98 fL    MCH 34.4 (H) 27.0 - 31.0 pg    MCHC 34.3 32.0 - 36.0 g/dL    RDW 13.0 11.5 - 14.5 %    Platelets 161 150 - 350 K/uL    MPV 9.5 9.2 - 12.9 fL    Immature Granulocytes 0.5 0.0 - 0.5 %    Gran # (ANC) 4.5 1.8 - 7.7 K/uL    Immature Grans (Abs) 0.03 0.00 - 0.04 K/uL    Lymph # 1.1 1.0 - 4.8 K/uL    Mono # 0.5 0.3 - 1.0 K/uL    Eos # 0.2 0.0 - 0.5 K/uL    Baso # 0.05 0.00 - 0.20 K/uL    nRBC 0 0 /100 WBC    Gran% 70.6 38.0 - 73.0 %    Lymph% 17.6 (L) 18.0 - 48.0 %    Mono% 8.1 4.0 - 15.0 %    Eosinophil% 2.4 0.0 - 8.0 %    Basophil% 0.8 0.0 - 1.9 %    Differential Method Automated    Comprehensive metabolic panel    Result Value Ref Range    Sodium 138 136 - 145 mmol/L    Potassium 4.1 3.5 - 5.1 mmol/L    Chloride 102 95 - 110 mmol/L    CO2 25 23 - 29 mmol/L    Glucose 151 (H) 70 - 110 mg/dL    BUN, Bld 24 (H) 6 - 20 mg/dL    Creatinine 1.4 0.5 - 1.4 mg/dL    Calcium 9.7 8.7 - 10.5 mg/dL    Total Protein 6.9 6.0 - 8.4 g/dL    Albumin 3.8 3.5 - 5.2 g/dL    Total Bilirubin 1.2 (H) 0.1 - 1.0 mg/dL    Alkaline Phosphatase 151 (H) 55 - 135 U/L    AST 29 10 - 40 U/L    ALT 25 10 - 44 U/L    Anion Gap 11 8 - 16 mmol/L    eGFR if African American >60.0 >60 mL/min/1.73 m^2    eGFR if non  57.4 (A) >60 mL/min/1.73 m^2   Immunoglobulin free LT chains blood   Result Value Ref Range    Kappa Free Light Chains 2.11 (H) 0.33 - 1.94 mg/dL    Lambda Free Light Chains 2.74 (H) 0.57 - 2.63 mg/dL    Kappa/Lambda FLC Ratio 0.77 0.26 - 1.65   MAGNESIUM   Result Value Ref Range    Magnesium 2.1 1.6 - 2.6 mg/dL   Miscellaneous Sendout Test Blood   Result Value Ref Range    Miscellaneous Test Name NT-pro BNP        PROBLEMS ASSESSED THIS VISIT:    1. AL amyloidosis    2. Chemotherapy follow-up examination    3. Other restrictive cardiomyopathy        PLAN:       AL amyloidosis  Clinical response is maintained without evidence of rising lambda light chains. We will continue bortezomib every 8 weeks.     Infiltrative cardiomyopathy with elevated troponin but no CAD felt due to infiltrative CM;1/24/17 supplemented report from [place] + AL amyloid within heart  Exercise is not severely limited at this time. We will continue to monitor.     Follow-up in 8 weeks    Chris Atkins MD  Hematology and Stem Cell Transplant

## 2018-10-10 NOTE — ASSESSMENT & PLAN NOTE
Clinical response is maintained without evidence of rising lambda light chains. We will continue bortezomib every 8 weeks.

## 2018-11-02 ENCOUNTER — PATIENT MESSAGE (OUTPATIENT)
Dept: HEMATOLOGY/ONCOLOGY | Facility: CLINIC | Age: 53
End: 2018-11-02

## 2018-11-02 DIAGNOSIS — G62.2 NEUROPATHY DUE TO CHEMICAL SUBSTANCE: ICD-10-CM

## 2018-11-02 RX ORDER — GABAPENTIN 100 MG/1
300 CAPSULE ORAL NIGHTLY
Qty: 90 CAPSULE | Refills: 2 | Status: SHIPPED | OUTPATIENT
Start: 2018-11-02 | End: 2019-03-06 | Stop reason: SDUPTHER

## 2018-11-09 ENCOUNTER — PATIENT MESSAGE (OUTPATIENT)
Dept: HEMATOLOGY/ONCOLOGY | Facility: CLINIC | Age: 53
End: 2018-11-09

## 2018-11-09 DIAGNOSIS — E85.89 OTHER AMYLOIDOSIS: ICD-10-CM

## 2018-11-09 RX ORDER — ACYCLOVIR 400 MG/1
400 TABLET ORAL 2 TIMES DAILY
Qty: 60 TABLET | Refills: 11 | Status: SHIPPED | OUTPATIENT
Start: 2018-11-09 | End: 2018-12-18 | Stop reason: SDUPTHER

## 2018-11-19 ENCOUNTER — OFFICE VISIT (OUTPATIENT)
Dept: TRANSPLANT | Facility: CLINIC | Age: 53
End: 2018-11-19
Attending: INTERNAL MEDICINE
Payer: OTHER GOVERNMENT

## 2018-11-19 ENCOUNTER — LAB VISIT (OUTPATIENT)
Dept: LAB | Facility: HOSPITAL | Age: 53
End: 2018-11-19
Attending: INTERNAL MEDICINE
Payer: OTHER GOVERNMENT

## 2018-11-19 VITALS
SYSTOLIC BLOOD PRESSURE: 113 MMHG | WEIGHT: 200.19 LBS | BODY MASS INDEX: 28.02 KG/M2 | DIASTOLIC BLOOD PRESSURE: 61 MMHG | HEIGHT: 71 IN | HEART RATE: 85 BPM

## 2018-11-19 DIAGNOSIS — I50.32 CHRONIC DIASTOLIC CONGESTIVE HEART FAILURE: ICD-10-CM

## 2018-11-19 DIAGNOSIS — I48.3 TYPICAL ATRIAL FLUTTER: ICD-10-CM

## 2018-11-19 DIAGNOSIS — I50.22 CHRONIC SYSTOLIC CONGESTIVE HEART FAILURE: ICD-10-CM

## 2018-11-19 DIAGNOSIS — I42.5 OTHER RESTRICTIVE CARDIOMYOPATHY: Primary | ICD-10-CM

## 2018-11-19 DIAGNOSIS — Q21.11 ASD (ATRIAL SEPTAL DEFECT), OSTIUM SECUNDUM: ICD-10-CM

## 2018-11-19 DIAGNOSIS — E85.81 AL AMYLOIDOSIS: ICD-10-CM

## 2018-11-19 LAB
ANION GAP SERPL CALC-SCNC: 11 MMOL/L
BNP SERPL-MCNC: 359 PG/ML
BUN SERPL-MCNC: 33 MG/DL
CALCIUM SERPL-MCNC: 9.7 MG/DL
CHLORIDE SERPL-SCNC: 100 MMOL/L
CO2 SERPL-SCNC: 27 MMOL/L
CREAT SERPL-MCNC: 1.6 MG/DL
EST. GFR  (AFRICAN AMERICAN): 56.4 ML/MIN/1.73 M^2
EST. GFR  (NON AFRICAN AMERICAN): 48.8 ML/MIN/1.73 M^2
GLUCOSE SERPL-MCNC: 182 MG/DL
POTASSIUM SERPL-SCNC: 4 MMOL/L
SODIUM SERPL-SCNC: 138 MMOL/L

## 2018-11-19 PROCEDURE — 99214 OFFICE O/P EST MOD 30 MIN: CPT | Mod: S$PBB,,, | Performed by: INTERNAL MEDICINE

## 2018-11-19 PROCEDURE — 99999 PR PBB SHADOW E&M-EST. PATIENT-LVL III: CPT | Mod: PBBFAC,,, | Performed by: INTERNAL MEDICINE

## 2018-11-19 PROCEDURE — 80048 BASIC METABOLIC PNL TOTAL CA: CPT

## 2018-11-19 PROCEDURE — 36415 COLL VENOUS BLD VENIPUNCTURE: CPT

## 2018-11-19 PROCEDURE — 83880 ASSAY OF NATRIURETIC PEPTIDE: CPT

## 2018-11-19 PROCEDURE — 99213 OFFICE O/P EST LOW 20 MIN: CPT | Mod: PBBFAC | Performed by: INTERNAL MEDICINE

## 2018-11-19 NOTE — PROGRESS NOTES
"Subjective:     Patient ID:  Roc Lai Jr. is a 53 y.o. male who presents for follow-up of Congestive Heart Failure    HPI:  53 yo WM with AL and myocardial amyloidosis being treated by Dr. Atkins last seen March 2018 returns for routine visit.    Since last visit stopped exercising and got rid of elliptical machine as changed jobs, downsized, works now [employer] and commutes daily so little extra time.  Does some walking and yard work.  He has been feeling great without SOB as a rule, sleeping on 1 pillow without PND, no edema.  No presyncope or syncope.  Wt up 13# over past 6-7 months but lost 20# with his illness and this appears to be good wt.    The drug study on AL which was closed he was notified that he was on the placebo.    Review of Systems   Constitution: Positive for weight gain. Negative for chills, fever, weakness, malaise/fatigue, night sweats and weight loss.   Cardiovascular: Negative for chest pain, dyspnea on exertion, irregular heartbeat, leg swelling, near-syncope, orthopnea, palpitations, paroxysmal nocturnal dyspnea and syncope.   Respiratory: Negative for cough, sputum production and wheezing.    Hematologic/Lymphatic: Does not bruise/bleed easily.   Musculoskeletal: Negative for arthritis, joint pain and stiffness.   Gastrointestinal: Negative for change in bowel habit, constipation, diarrhea, hematochezia and melena.   Genitourinary: Positive for nocturia (1 p(er night). Negative for hematuria.        ED but viagra helps   Neurological: Positive for numbness (fingers) and paresthesias (feet tingling). Negative for brief paralysis, focal weakness and seizures.      Objective:   Physical Exam   Constitutional: He is oriented to person, place, and time. He appears well-developed and well-nourished. No distress.   /61 (BP Location: Left arm, Patient Position: Sitting, BP Method: Small (Automatic))   Pulse 85   Ht 5' 11" (1.803 m)   Wt 90.8 kg (200 lb 2.8 oz)   BMI 27.92 kg/m² "   Last visit wt 84.9 kg (187 lb 2.7 oz)  Visit before last visit wt 87.5 kg (192 lb 14.4 oz) when volume overloaded   HENT:   Head: Normocephalic and atraumatic.   Eyes: Conjunctivae are normal. Right eye exhibits no discharge. Left eye exhibits no discharge. No scleral icterus.   Neck: JVD (Prominent V wave to mid-neck sitting with HJR) present. No thyromegaly present.   Cardiovascular: Normal rate and regular rhythm. Exam reveals gallop (Soft S3--old finding  ).   No murmur heard.  Pulmonary/Chest: Effort normal and breath sounds normal. No respiratory distress. He has no wheezes. He has no rales.   Abdominal: Soft. Bowel sounds are normal. He exhibits distension (liver span 14-15 cm  ). He exhibits no mass. There is no tenderness. There is no rebound.   Musculoskeletal: He exhibits no edema or tenderness.   Neurological: He is alert and oriented to person, place, and time.   Skin: Skin is warm and dry. He is not diaphoretic.   Psychiatric: He has a normal mood and affect. His behavior is normal. Judgment and thought content normal.     Lab Results   Component Value Date     (H) 11/19/2018     (H) 02/05/2018     (H) 03/28/2017     Lab Results   Component Value Date     11/19/2018     10/05/2018    K 4.0 11/19/2018    K 4.1 10/05/2018     (H) 11/19/2018     (H) 10/05/2018    BUN 33 (H) 11/19/2018    BUN 24 (H) 10/05/2018    CREATININE 1.6 (H) 11/19/2018    CREATININE 1.4 10/05/2018     May 15, 2018 ECHO as part of close out on study:  CONCLUSIONS     1 - Concentric remodeling with an increased density to the myocardium concerning for amyloid cardiomyopathy.     2 - Mildly depressed left ventricular systolic function (EF 45-50%) per Dr. Hall.     3 - Right ventricular enlargement with moderately depressed systolic function.     4 - Biatrial enlargement.     5 - Moderate tricuspid regurgitation.     6 - The estimated PA systolic pressure is at least 27 mmHg, but this  could be a significant underestimation.     7 - Increased central venous pressure.     Assessment:     1. Other restrictive cardiomyopathy    2. Chronic diastolic congestive heart failure    3. Typical atrial flutter s/p ablation and restoration sinus rhythm 2016    4. ASD (atrial septal defect), ostium secundum s/p closure    5. AL amyloidosis      Plan:   Since study ended will still be seeing Dr. Atkins every 2 months with labs  Will repeat BMP with next lab when sees him  With drop in LVEF will repeat ECHO now as been 6 months  If all OK, RTC with me q 6 months

## 2018-12-06 RX ORDER — BORTEZOMIB 3.5 MG/1
1.3 INJECTION, POWDER, LYOPHILIZED, FOR SOLUTION INTRAVENOUS; SUBCUTANEOUS
Status: CANCELLED
Start: 2018-12-07

## 2018-12-06 RX ORDER — SODIUM CHLORIDE 0.9 % (FLUSH) 0.9 %
10 SYRINGE (ML) INJECTION
Status: CANCELLED | OUTPATIENT
Start: 2018-12-07

## 2018-12-06 RX ORDER — HEPARIN 100 UNIT/ML
500 SYRINGE INTRAVENOUS
Status: CANCELLED | OUTPATIENT
Start: 2018-12-07

## 2018-12-06 NOTE — PROGRESS NOTES
HEMATOLOGIC MALIGNANCIES PROGRESS NOTE    IDENTIFYING STATEMENT   Roc Lai Jr. ([place]) is a 53 y.o. male with a  of 1965 from [place] with the diagnosis of AL amyloid.      ONCOLOGY HISTORY:    1. AL amyloid with cardiac involvement   A. 2016: Initial evaluation with Dr. Carmichael. Reported 18 months of progressive decline (previously ran 3 miles easily, now can only walk 10 minutes). M-protein 0.19 g/dl, kappa 1.37 mg/dl, lambda 50 mg/dl, ratio 0.03. Abdominal fat pad biopsy had been negative for presence of amyloid. Cardiac MRI suggestive of infiltrative cardiomyopathy.    B. 1/3/2017: Endomyocardial biopsy - involved by AL amyloid   C. 2017: BMBx - 60% cellular marrow with 50% plasma cells, consistent with plasma cell myeloma.    D. 2/15/2017 - 2017: Completed 4 cycles bortezomib chemotherapy on [id number] trial.    E. 2017: Bortezomib every 2 weeks x 2 doses   F. 2017: Change bortezomib to [id number] x 2 doses   G. 10/9/2017: Bortezomib q6 weeks   H. 2018: Bortezomib q8 weeks.    I. 2018: [id number] close out visit due to ineffectiveness.      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 Lai returns to clinic for follow-up of his cardiac AL amyloid. He has been feeling well. He continues with neuropathy. No limb swelling at this time. Continues working every day. Denies any fevers, chills, chest pain, SOB, n/v/d/c. Has used sildenafil twice since receiving prescription, states intermittent headaches after use, no other issues.       Past Medical History, Past Social History and Past Family History have been reviewed and are unchanged except as noted in the interval history.    MEDICATIONS:     Current Outpatient Medications on File Prior to Visit   Medication Sig Dispense Refill    acyclovir (ZOVIRAX) 400 MG tablet Take 1 tablet (400 mg total) by mouth 2 (two) times daily. 60 tablet 11    BORTEZOMIB (VELCADE INJ) Inject as directed.  Every 2 months      bumetanide (BUMEX) 2 MG tablet Take 1 tablet (2 mg total) by mouth once daily. 90 tablet 3    gabapentin (NEURONTIN) 100 MG capsule Take 3 capsules (300 mg total) by mouth every evening. 90 capsule 2    magnesium oxide (MAG-OX) 400 mg tablet Take 1 tablet (400 mg total) by mouth once daily. 90 tablet 3    sildenafil (VIAGRA) 50 MG tablet Take 1 tablet (50 mg total) by mouth daily as needed for Erectile Dysfunction. 7 tablet 2    spironolactone (ALDACTONE) 25 MG tablet Take 1 tablet (25 mg total) by mouth once daily. 90 tablet 3     Current Facility-Administered Medications on File Prior to Visit   Medication Dose Route Frequency Provider Last Rate Last Dose    alteplase injection 2 mg  2 mg Intra-Catheter PRN Chris Atkins MD        bortezomib (VELCADE) injection 2.8 mg  1.3 mg/m2 Subcutaneous 1 time in Clinic/HOD Chris Atkins MD        heparin, porcine (PF) 100 unit/mL injection flush 500 Units  500 Units Intravenous PRN Chris Atkins MD        sodium chloride 0.9% flush 10 mL  10 mL Intravenous PRN Chris Atkins MD           ALLERGIES: Review of patient's allergies indicates:  No Known Allergies     ROS:       Review of Systems   Constitutional: Positive for fatigue. Negative for diaphoresis, fever and unexpected weight change.   HENT:   Negative for lump/mass and sore throat.    Eyes: Negative for icterus.   Respiratory: Negative for cough and shortness of breath.    Cardiovascular: Negative for chest pain, leg swelling and palpitations.   Gastrointestinal: Negative for abdominal distention, constipation, diarrhea, nausea and vomiting.   Genitourinary: Negative for dysuria and frequency.         Erectile dysfunction   Musculoskeletal: Negative for arthralgias, gait problem and myalgias.   Skin: Negative for rash.   Neurological: Positive for numbness. Negative for dizziness, gait problem and headaches.   Hematological: Negative for adenopathy. Does not bruise/bleed  "easily.   Psychiatric/Behavioral: The patient is not nervous/anxious.        PHYSICAL EXAM:  Vitals:    12/07/18 0855   BP: 122/70   Pulse: 88   Resp: 16   Temp: 98.2 °F (36.8 °C)   SpO2: 99%   Weight: 89.7 kg (197 lb 12 oz)   Height: 5' 11" (1.803 m)   PainSc: 0-No pain     ECOG 1    NYHA Class II    Physical Exam   Constitutional: He is oriented to person, place, and time. He appears well-developed and well-nourished. No distress.   HENT:   Head: Normocephalic and atraumatic.   Right Ear: External ear normal.   Left Ear: External ear normal.   Mouth/Throat: Oropharynx is clear and moist and mucous membranes are normal. No oral lesions.   Eyes: Conjunctivae and EOM are normal.   Neck: Normal range of motion. Neck supple. No thyromegaly present.   Cardiovascular: Normal rate, regular rhythm, normal heart sounds and intact distal pulses.   No murmur heard.  Pulmonary/Chest: Effort normal and breath sounds normal. He has no wheezes. He has no rales.   Abdominal: Soft. Bowel sounds are normal. He exhibits no distension and no mass. There is no splenomegaly or hepatomegaly. There is no tenderness.   Musculoskeletal: Normal range of motion. He exhibits no edema.   Lymphadenopathy:     He has no cervical adenopathy.        Right cervical: No deep cervical adenopathy present.       Left cervical: No deep cervical adenopathy present.     He has no axillary adenopathy.        Right: No inguinal adenopathy present.        Left: No inguinal adenopathy present.   Neurological: He is alert and oriented to person, place, and time. He has normal strength and normal reflexes. No cranial nerve deficit. Coordination normal.   Skin: Skin is warm and dry. No rash noted.   Skin on legs is somewhat hyperpigmented   Psychiatric: He has a normal mood and affect. His behavior is normal. Judgment and thought content normal.   Nursing note and vitals reviewed.      LAB:   Results for orders placed or performed in visit on 12/07/18   Rapid BMT " CBC with Diff   Result Value Ref Range    WBC 5.59 3.90 - 12.70 K/uL    RBC 4.27 (L) 4.60 - 6.20 M/uL    Hemoglobin 13.8 (L) 14.0 - 18.0 g/dL    Hematocrit 42.8 40.0 - 54.0 %     (H) 82 - 98 fL    MCH 32.3 (H) 27.0 - 31.0 pg    MCHC 32.2 32.0 - 36.0 g/dL    RDW 12.9 11.5 - 14.5 %    Platelets 161 150 - 350 K/uL    MPV 9.8 9.2 - 12.9 fL    Immature Granulocytes 0.4 0.0 - 0.5 %    Gran # (ANC) 3.6 1.8 - 7.7 K/uL    Immature Grans (Abs) 0.02 0.00 - 0.04 K/uL    Lymph # 1.1 1.0 - 4.8 K/uL    Mono # 0.5 0.3 - 1.0 K/uL    Eos # 0.2 0.0 - 0.5 K/uL    Baso # 0.03 0.00 - 0.20 K/uL    nRBC 0 0 /100 WBC    Gran% 65.1 38.0 - 73.0 %    Lymph% 20.2 18.0 - 48.0 %    Mono% 9.7 4.0 - 15.0 %    Eosinophil% 4.1 0.0 - 8.0 %    Basophil% 0.5 0.0 - 1.9 %    Differential Method Automated    Comprehensive metabolic panel   Result Value Ref Range    Sodium 139 136 - 145 mmol/L    Potassium 4.7 3.5 - 5.1 mmol/L    Chloride 103 95 - 110 mmol/L    CO2 24 23 - 29 mmol/L    Glucose 114 (H) 70 - 110 mg/dL    BUN, Bld 25 (H) 6 - 20 mg/dL    Creatinine 1.5 (H) 0.5 - 1.4 mg/dL    Calcium 9.5 8.7 - 10.5 mg/dL    Total Protein 7.5 6.0 - 8.4 g/dL    Albumin 4.0 3.5 - 5.2 g/dL    Total Bilirubin 1.3 (H) 0.1 - 1.0 mg/dL    Alkaline Phosphatase 174 (H) 55 - 135 U/L    AST 31 10 - 40 U/L    ALT 27 10 - 44 U/L    Anion Gap 12 8 - 16 mmol/L    eGFR if African American >60.0 >60 mL/min/1.73 m^2    eGFR if non  52.8 (A) >60 mL/min/1.73 m^2   Basic metabolic panel   Result Value Ref Range    Sodium 139 136 - 145 mmol/L    Potassium 4.7 3.5 - 5.1 mmol/L    Chloride 103 95 - 110 mmol/L    CO2 24 23 - 29 mmol/L    Glucose 114 (H) 70 - 110 mg/dL    BUN, Bld 25 (H) 6 - 20 mg/dL    Creatinine 1.5 (H) 0.5 - 1.4 mg/dL    Calcium 9.5 8.7 - 10.5 mg/dL    Anion Gap 12 8 - 16 mmol/L    eGFR if African American >60.0 >60 mL/min/1.73 m^2    eGFR if non  52.8 (A) >60 mL/min/1.73 m^2       PROBLEMS ASSESSED THIS VISIT:    1. AL  amyloidosis    2. Monoclonal gammopathy    3. Other restrictive cardiomyopathy    4. Erectile dysfunction, unspecified erectile dysfunction type    5. Neuropathy associated with malignant neoplasm        PLAN:       AL amyloidosis/monoclonal gammopathy   - Clinical response is maintained without evidence of rising lambda light chains  - Labs pending from today  - We will continue bortezomib every 8 weeks     Infiltrative cardiomyopathy with elevated troponin but no CAD felt due to infiltrative CM;1/24/17 supplemented report from [place] + AL amyloid within heart  - Exercise is not severely limited at this time  - Seen by cards 11/2018 and recommended repeat echo, to be completed today 12/7/18, results pending. Cards will see pt again in 6 months   - We will continue to monitor    ED  - Started on sildenafil last visit by Dr. Atkins, reports no issues with use other than mild headaches.    Neuropathy  - Pt using 300mg nightly, states it does help him sleep  - states persistent neuropathy, some restriction to normal daily activites  - discussed increasing dose in a variety of ways to help with symptoms, pt was reluctant at first. Agreeable to take 100mg in the AM and continue 300mg at night. Will assess for drowsiness in morning after dose since patient is working daily. Made aware he can contact staff if any issues arise or if he would like to try another dose option as there is much room for adjustment.    Follow-up:   In 8 weeks with CBC, CMP, light chains, visit with Dr. Atkins, and chemo appt for bortezomib.        Heaven Meraz NP  Hematology and Stem Cell Transplant    Distress Screening Results: Psychosocial Distress screening score of Distress Score: 0 noted and reviewed. No intervention indicated.

## 2018-12-07 ENCOUNTER — LAB VISIT (OUTPATIENT)
Dept: LAB | Facility: HOSPITAL | Age: 53
End: 2018-12-07
Attending: INTERNAL MEDICINE
Payer: OTHER GOVERNMENT

## 2018-12-07 ENCOUNTER — HOSPITAL ENCOUNTER (OUTPATIENT)
Dept: CARDIOLOGY | Facility: CLINIC | Age: 53
Discharge: HOME | End: 2018-12-07
Attending: INTERNAL MEDICINE
Payer: OTHER GOVERNMENT

## 2018-12-07 ENCOUNTER — INFUSION (OUTPATIENT)
Dept: INFUSION THERAPY | Facility: HOSPITAL | Age: 53
End: 2018-12-07
Attending: INTERNAL MEDICINE
Payer: OTHER GOVERNMENT

## 2018-12-07 ENCOUNTER — OFFICE VISIT (OUTPATIENT)
Dept: HEMATOLOGY/ONCOLOGY | Facility: CLINIC | Age: 53
End: 2018-12-07
Payer: OTHER GOVERNMENT

## 2018-12-07 VITALS
BODY MASS INDEX: 27.58 KG/M2 | WEIGHT: 197 LBS | SYSTOLIC BLOOD PRESSURE: 109 MMHG | HEART RATE: 88 BPM | TEMPERATURE: 98 F | RESPIRATION RATE: 17 BRPM | DIASTOLIC BLOOD PRESSURE: 59 MMHG | HEIGHT: 71 IN

## 2018-12-07 VITALS
HEIGHT: 71 IN | SYSTOLIC BLOOD PRESSURE: 122 MMHG | HEART RATE: 78 BPM | DIASTOLIC BLOOD PRESSURE: 70 MMHG | BODY MASS INDEX: 27.72 KG/M2 | WEIGHT: 198 LBS

## 2018-12-07 VITALS
OXYGEN SATURATION: 99 % | HEIGHT: 71 IN | HEART RATE: 88 BPM | WEIGHT: 197.75 LBS | BODY MASS INDEX: 27.69 KG/M2 | RESPIRATION RATE: 16 BRPM | TEMPERATURE: 98 F | DIASTOLIC BLOOD PRESSURE: 70 MMHG | SYSTOLIC BLOOD PRESSURE: 122 MMHG

## 2018-12-07 DIAGNOSIS — E85.81 AL AMYLOIDOSIS: Primary | ICD-10-CM

## 2018-12-07 DIAGNOSIS — E85.81 AL AMYLOIDOSIS: ICD-10-CM

## 2018-12-07 DIAGNOSIS — I42.5 OTHER RESTRICTIVE CARDIOMYOPATHY: ICD-10-CM

## 2018-12-07 DIAGNOSIS — I50.32 CHRONIC DIASTOLIC CONGESTIVE HEART FAILURE: ICD-10-CM

## 2018-12-07 DIAGNOSIS — Q21.11 ASD (ATRIAL SEPTAL DEFECT), OSTIUM SECUNDUM: ICD-10-CM

## 2018-12-07 DIAGNOSIS — N52.9 ERECTILE DYSFUNCTION, UNSPECIFIED ERECTILE DYSFUNCTION TYPE: ICD-10-CM

## 2018-12-07 DIAGNOSIS — G63 NEUROPATHY ASSOCIATED WITH MALIGNANT NEOPLASM: ICD-10-CM

## 2018-12-07 DIAGNOSIS — C80.1 NEUROPATHY ASSOCIATED WITH MALIGNANT NEOPLASM: ICD-10-CM

## 2018-12-07 DIAGNOSIS — D47.2 MONOCLONAL GAMMOPATHY: ICD-10-CM

## 2018-12-07 LAB
ALBUMIN SERPL BCP-MCNC: 4 G/DL
ALP SERPL-CCNC: 174 U/L
ALT SERPL W/O P-5'-P-CCNC: 27 U/L
ANION GAP SERPL CALC-SCNC: 12 MMOL/L
ANION GAP SERPL CALC-SCNC: 12 MMOL/L
ASCENDING AORTA: 2.82 CM
AST SERPL-CCNC: 31 U/L
AV INDEX (PROSTH): 1.05
AV MEAN GRADIENT: 3.53 MMHG
AV PEAK GRADIENT: 5.57 MMHG
AV VALVE AREA: 3.81 CM2
BASOPHILS # BLD AUTO: 0.03 K/UL
BASOPHILS NFR BLD: 0.5 %
BILIRUB SERPL-MCNC: 1.3 MG/DL
BSA FOR ECHO PROCEDURE: 2.12 M2
BUN SERPL-MCNC: 25 MG/DL
BUN SERPL-MCNC: 25 MG/DL
CALCIUM SERPL-MCNC: 9.5 MG/DL
CALCIUM SERPL-MCNC: 9.5 MG/DL
CHLORIDE SERPL-SCNC: 103 MMOL/L
CHLORIDE SERPL-SCNC: 103 MMOL/L
CO2 SERPL-SCNC: 24 MMOL/L
CO2 SERPL-SCNC: 24 MMOL/L
CREAT SERPL-MCNC: 1.5 MG/DL
CREAT SERPL-MCNC: 1.5 MG/DL
CV ECHO LV RWT: 0.4 CM
DIFFERENTIAL METHOD: ABNORMAL
DOP CALC AO PEAK VEL: 1.18 M/S
DOP CALC AO VTI: 19.57 CM
DOP CALC LVOT AREA: 3.63 CM2
DOP CALC LVOT DIAMETER: 2.15 CM
DOP CALC LVOT STROKE VOLUME: 74.57 CM3
DOP CALCLVOT PEAK VEL VTI: 20.55 CM
E WAVE DECELERATION TIME: 131.9 MSEC
E/A RATIO: 4.65
E/E' RATIO: 15.5
ECHO LV POSTERIOR WALL: 1 CM (ref 0.6–1.1)
EOSINOPHIL # BLD AUTO: 0.2 K/UL
EOSINOPHIL NFR BLD: 4.1 %
ERYTHROCYTE [DISTWIDTH] IN BLOOD BY AUTOMATED COUNT: 12.9 %
EST. GFR  (AFRICAN AMERICAN): >60 ML/MIN/1.73 M^2
EST. GFR  (AFRICAN AMERICAN): >60 ML/MIN/1.73 M^2
EST. GFR  (NON AFRICAN AMERICAN): 52.8 ML/MIN/1.73 M^2
EST. GFR  (NON AFRICAN AMERICAN): 52.8 ML/MIN/1.73 M^2
FRACTIONAL SHORTENING: 40 % (ref 28–44)
GLUCOSE SERPL-MCNC: 114 MG/DL
GLUCOSE SERPL-MCNC: 114 MG/DL
HCT VFR BLD AUTO: 42.8 %
HGB BLD-MCNC: 13.8 G/DL
IMM GRANULOCYTES # BLD AUTO: 0.02 K/UL
IMM GRANULOCYTES NFR BLD AUTO: 0.4 %
INTERVENTRICULAR SEPTUM: 1.1 CM (ref 0.6–1.1)
IVRT: 0.07 MSEC
LA MAJOR: 7.27 CM
LA MINOR: 7.05 CM
LA WIDTH: 4.41 CM
LEFT ATRIUM SIZE: 4.2 CM
LEFT ATRIUM VOLUME INDEX: 53.7 ML/M2
LEFT ATRIUM VOLUME: 112.7 CM3
LEFT INTERNAL DIMENSION IN SYSTOLE: 3 CM (ref 2.1–4)
LEFT VENTRICLE DIASTOLIC VOLUME INDEX: 46.53 ML/M2
LEFT VENTRICLE DIASTOLIC VOLUME: 97.7 ML
LEFT VENTRICLE MASS INDEX: 92.6 G/M2
LEFT VENTRICLE SYSTOLIC VOLUME INDEX: 16.7 ML/M2
LEFT VENTRICLE SYSTOLIC VOLUME: 35 ML
LEFT VENTRICULAR INTERNAL DIMENSION IN DIASTOLE: 5 CM (ref 3.5–6)
LEFT VENTRICULAR MASS: 194.38 G
LV LATERAL E/E' RATIO: 13.29
LV SEPTAL E/E' RATIO: 18.6
LYMPHOCYTES # BLD AUTO: 1.1 K/UL
LYMPHOCYTES NFR BLD: 20.2 %
MCH RBC QN AUTO: 32.3 PG
MCHC RBC AUTO-ENTMCNC: 32.2 G/DL
MCV RBC AUTO: 100 FL
MONOCYTES # BLD AUTO: 0.5 K/UL
MONOCYTES NFR BLD: 9.7 %
MV PEAK A VEL: 0.2 M/S
MV PEAK E VEL: 0.93 M/S
NEUTROPHILS # BLD AUTO: 3.6 K/UL
NEUTROPHILS NFR BLD: 65.1 %
NRBC BLD-RTO: 0 /100 WBC
PISA TR MAX VEL: 1.8 M/S
PLATELET # BLD AUTO: 161 K/UL
PMV BLD AUTO: 9.8 FL
POTASSIUM SERPL-SCNC: 4.7 MMOL/L
POTASSIUM SERPL-SCNC: 4.7 MMOL/L
PROT SERPL-MCNC: 7.5 G/DL
PULM VEIN S/D RATIO: 0.16
PV PEAK D VEL: 0.92 M/S
PV PEAK S VEL: 0.15 M/S
PV PEAK VELOCITY: 0.62 CM/S
RA MAJOR: 6.63 CM
RA PRESSURE: 8 MMHG
RA WIDTH: 5.67 CM
RBC # BLD AUTO: 4.27 M/UL
RIGHT VENTRICULAR END-DIASTOLIC DIMENSION: 4.4 CM
RV TISSUE DOPPLER FREE WALL SYSTOLIC VELOCITY 1 (APICAL 4 CHAMBER VIEW): 8.47 M/S
SINUS: 3.04 CM
SODIUM SERPL-SCNC: 139 MMOL/L
SODIUM SERPL-SCNC: 139 MMOL/L
STJ: 2.65 CM
TDI LATERAL: 0.07
TDI SEPTAL: 0.05
TDI: 0.06
TR MAX PG: 12.96 MMHG
TRICUSPID ANNULAR PLANE SYSTOLIC EXCURSION: 1.31 CM
TV REST PULMONARY ARTERY PRESSURE: 20.96 MMHG
WBC # BLD AUTO: 5.59 K/UL

## 2018-12-07 PROCEDURE — 83520 IMMUNOASSAY QUANT NOS NONAB: CPT

## 2018-12-07 PROCEDURE — 99213 OFFICE O/P EST LOW 20 MIN: CPT | Mod: PBBFAC | Performed by: NURSE PRACTITIONER

## 2018-12-07 PROCEDURE — 63600175 PHARM REV CODE 636 W HCPCS: Mod: JG | Performed by: INTERNAL MEDICINE

## 2018-12-07 PROCEDURE — 80053 COMPREHEN METABOLIC PANEL: CPT

## 2018-12-07 PROCEDURE — 99999 PR PBB SHADOW E&M-EST. PATIENT-LVL III: CPT | Mod: PBBFAC,,, | Performed by: NURSE PRACTITIONER

## 2018-12-07 PROCEDURE — 85025 COMPLETE CBC W/AUTO DIFF WBC: CPT

## 2018-12-07 PROCEDURE — 99214 OFFICE O/P EST MOD 30 MIN: CPT | Mod: S$PBB,,, | Performed by: NURSE PRACTITIONER

## 2018-12-07 PROCEDURE — 93306 TTE W/DOPPLER COMPLETE: CPT | Mod: PBBFAC | Performed by: INTERNAL MEDICINE

## 2018-12-07 PROCEDURE — 96401 CHEMO ANTI-NEOPL SQ/IM: CPT

## 2018-12-07 RX ORDER — BORTEZOMIB 3.5 MG/1
1.3 INJECTION, POWDER, LYOPHILIZED, FOR SOLUTION INTRAVENOUS; SUBCUTANEOUS
Status: COMPLETED | OUTPATIENT
Start: 2018-12-07 | End: 2018-12-07

## 2018-12-07 RX ORDER — SODIUM CHLORIDE 0.9 % (FLUSH) 0.9 %
10 SYRINGE (ML) INJECTION
Status: DISCONTINUED | OUTPATIENT
Start: 2018-12-07 | End: 2018-12-07 | Stop reason: HOSPADM

## 2018-12-07 RX ORDER — HEPARIN 100 UNIT/ML
500 SYRINGE INTRAVENOUS
Status: DISCONTINUED | OUTPATIENT
Start: 2018-12-07 | End: 2018-12-07 | Stop reason: HOSPADM

## 2018-12-07 RX ADMIN — BORTEZOMIB 2.8 MG: 3.5 INJECTION, POWDER, LYOPHILIZED, FOR SOLUTION INTRAVENOUS; SUBCUTANEOUS at 10:12

## 2018-12-07 NOTE — NURSING
1015 pt here for velcade injection, labs, hx, meds, allergies reviewed, pt with no complaints at this time, pt tolerated velcade injection to abdomen without issue, no distress noted upon d/c to home

## 2018-12-10 ENCOUNTER — PATIENT MESSAGE (OUTPATIENT)
Dept: HEMATOLOGY/ONCOLOGY | Facility: CLINIC | Age: 53
End: 2018-12-10

## 2018-12-10 LAB
KAPPA LC SER QL IA: 1.93 MG/DL
KAPPA LC/LAMBDA SER IA: 0.59
LAMBDA LC SER QL IA: 3.27 MG/DL

## 2018-12-18 ENCOUNTER — PATIENT MESSAGE (OUTPATIENT)
Dept: HEMATOLOGY/ONCOLOGY | Facility: CLINIC | Age: 53
End: 2018-12-18

## 2018-12-18 DIAGNOSIS — I50.32 CHRONIC DIASTOLIC CONGESTIVE HEART FAILURE: ICD-10-CM

## 2018-12-18 DIAGNOSIS — E85.89 OTHER AMYLOIDOSIS: ICD-10-CM

## 2018-12-18 RX ORDER — ACYCLOVIR 400 MG/1
400 TABLET ORAL 2 TIMES DAILY
Qty: 60 TABLET | Refills: 11 | Status: SHIPPED | OUTPATIENT
Start: 2018-12-18 | End: 2019-03-06 | Stop reason: SDUPTHER

## 2018-12-18 RX ORDER — LANOLIN ALCOHOL/MO/W.PET/CERES
400 CREAM (GRAM) TOPICAL DAILY
Qty: 90 TABLET | Refills: 3 | Status: SHIPPED | OUTPATIENT
Start: 2018-12-18 | End: 2019-12-18

## 2018-12-28 ENCOUNTER — PATIENT MESSAGE (OUTPATIENT)
Dept: HEMATOLOGY/ONCOLOGY | Facility: CLINIC | Age: 53
End: 2018-12-28

## 2019-01-02 DIAGNOSIS — I50.32 CHRONIC DIASTOLIC CONGESTIVE HEART FAILURE: ICD-10-CM

## 2019-01-02 DIAGNOSIS — E85.81 AL AMYLOIDOSIS: Primary | ICD-10-CM

## 2019-01-03 ENCOUNTER — OFFICE VISIT (OUTPATIENT)
Dept: URGENT CARE | Facility: CLINIC | Age: 54
End: 2019-01-03
Payer: OTHER GOVERNMENT

## 2019-01-03 VITALS
TEMPERATURE: 97 F | BODY MASS INDEX: 27.72 KG/M2 | HEART RATE: 89 BPM | OXYGEN SATURATION: 100 % | SYSTOLIC BLOOD PRESSURE: 131 MMHG | DIASTOLIC BLOOD PRESSURE: 84 MMHG | RESPIRATION RATE: 19 BRPM | WEIGHT: 198 LBS | HEIGHT: 71 IN

## 2019-01-03 DIAGNOSIS — R09.81 HEAD CONGESTION: ICD-10-CM

## 2019-01-03 DIAGNOSIS — Z76.89 ENCOUNTER TO ESTABLISH CARE: ICD-10-CM

## 2019-01-03 DIAGNOSIS — R09.81 NOSE CONGESTION: Primary | ICD-10-CM

## 2019-01-03 PROCEDURE — 99214 OFFICE O/P EST MOD 30 MIN: CPT | Mod: 25,S$GLB,, | Performed by: NURSE PRACTITIONER

## 2019-01-03 PROCEDURE — 96372 PR INJECTION,THERAP/PROPH/DIAG2ST, IM OR SUBCUT: ICD-10-PCS | Mod: S$GLB,,, | Performed by: NURSE PRACTITIONER

## 2019-01-03 PROCEDURE — 96372 THER/PROPH/DIAG INJ SC/IM: CPT | Mod: S$GLB,,, | Performed by: NURSE PRACTITIONER

## 2019-01-03 PROCEDURE — 99214 PR OFFICE/OUTPT VISIT, EST, LEVL IV, 30-39 MIN: ICD-10-PCS | Mod: 25,S$GLB,, | Performed by: NURSE PRACTITIONER

## 2019-01-03 RX ORDER — BETAMETHASONE SODIUM PHOSPHATE AND BETAMETHASONE ACETATE 3; 3 MG/ML; MG/ML
9 INJECTION, SUSPENSION INTRA-ARTICULAR; INTRALESIONAL; INTRAMUSCULAR; SOFT TISSUE
Status: COMPLETED | OUTPATIENT
Start: 2019-01-03 | End: 2019-01-03

## 2019-01-03 RX ADMIN — BETAMETHASONE SODIUM PHOSPHATE AND BETAMETHASONE ACETATE 9 MG: 3; 3 INJECTION, SUSPENSION INTRA-ARTICULAR; INTRALESIONAL; INTRAMUSCULAR; SOFT TISSUE at 07:01

## 2019-01-04 ENCOUNTER — OFFICE VISIT (OUTPATIENT)
Dept: URGENT CARE | Facility: CLINIC | Age: 54
End: 2019-01-04
Payer: OTHER GOVERNMENT

## 2019-01-04 VITALS
TEMPERATURE: 101 F | SYSTOLIC BLOOD PRESSURE: 122 MMHG | HEART RATE: 92 BPM | BODY MASS INDEX: 27.72 KG/M2 | DIASTOLIC BLOOD PRESSURE: 43 MMHG | OXYGEN SATURATION: 97 % | WEIGHT: 198 LBS | HEIGHT: 71 IN

## 2019-01-04 DIAGNOSIS — R09.81 SINUS CONGESTION: ICD-10-CM

## 2019-01-04 DIAGNOSIS — R50.9 FEVER, UNSPECIFIED FEVER CAUSE: Primary | ICD-10-CM

## 2019-01-04 DIAGNOSIS — B35.6 TINEA CRURIS: ICD-10-CM

## 2019-01-04 DIAGNOSIS — R05.9 COUGH: ICD-10-CM

## 2019-01-04 LAB
CTP QC/QA: YES
FLUAV AG NPH QL: NEGATIVE
FLUBV AG NPH QL: NEGATIVE

## 2019-01-04 PROCEDURE — 87804 INFLUENZA ASSAY W/OPTIC: CPT | Mod: QW,S$GLB,, | Performed by: FAMILY MEDICINE

## 2019-01-04 PROCEDURE — 87804 POCT INFLUENZA A/B: ICD-10-PCS | Mod: QW,S$GLB,, | Performed by: FAMILY MEDICINE

## 2019-01-04 PROCEDURE — 99213 OFFICE O/P EST LOW 20 MIN: CPT | Mod: S$GLB,,, | Performed by: FAMILY MEDICINE

## 2019-01-04 PROCEDURE — 99213 PR OFFICE/OUTPT VISIT, EST, LEVL III, 20-29 MIN: ICD-10-PCS | Mod: S$GLB,,, | Performed by: FAMILY MEDICINE

## 2019-01-04 RX ORDER — AMOXICILLIN AND CLAVULANATE POTASSIUM 875; 125 MG/1; MG/1
1 TABLET, FILM COATED ORAL 2 TIMES DAILY
Qty: 20 TABLET | Refills: 0 | Status: SHIPPED | OUTPATIENT
Start: 2019-01-04 | End: 2019-01-14

## 2019-01-04 RX ORDER — AMOXICILLIN AND CLAVULANATE POTASSIUM 875; 125 MG/1; MG/1
1 TABLET, FILM COATED ORAL 2 TIMES DAILY
Qty: 20 TABLET | Refills: 0 | Status: SHIPPED | OUTPATIENT
Start: 2019-01-04 | End: 2019-01-04

## 2019-01-04 RX ORDER — BUMETANIDE 2 MG/1
2 TABLET ORAL DAILY
Qty: 90 TABLET | Refills: 3 | Status: SHIPPED | OUTPATIENT
Start: 2019-01-04 | End: 2019-12-05 | Stop reason: SDUPTHER

## 2019-01-04 RX ORDER — IBUPROFEN 800 MG/1
800 TABLET ORAL 3 TIMES DAILY
Qty: 30 TABLET | Refills: 0 | Status: SHIPPED | OUTPATIENT
Start: 2019-01-04 | End: 2019-01-14

## 2019-01-04 RX ORDER — IBUPROFEN 800 MG/1
800 TABLET ORAL 3 TIMES DAILY
Qty: 30 TABLET | Refills: 0 | Status: SHIPPED | OUTPATIENT
Start: 2019-01-04 | End: 2019-01-04

## 2019-01-04 RX ORDER — KETOCONAZOLE 20 MG/G
CREAM TOPICAL DAILY
Qty: 60 G | Refills: 0 | Status: SHIPPED | OUTPATIENT
Start: 2019-01-04 | End: 2019-04-12

## 2019-01-04 NOTE — PROGRESS NOTES
"Subjective:       Patient ID: Roc Lai Jr. is a 53 y.o. male.    Vitals:  height is 5' 11" (1.803 m) and weight is 89.8 kg (198 lb). His oral temperature is 97.4 °F (36.3 °C). His blood pressure is 131/84 and his pulse is 89. His respiration is 19 and oxygen saturation is 100%.     Chief Complaint: URI    Patient under the care of Heme/Oncology for amyloidosis. He states he is coming in today because he has been congested and blowing out a lot of bloody drainage. He denies any fevers or sinus pain.  He does not have an established PCP.       URI    This is a new problem. The current episode started 1 to 4 weeks ago. The problem has been unchanged. There has been no fever. Associated symptoms include congestion. Pertinent negatives include no chest pain, coughing, diarrhea, dysuria, headaches, nausea, rash, sore throat or vomiting. Associated symptoms comments: Blood coming out when blowing nose. Treatments tried: Dayquil, nyquil. The treatment provided mild relief.       Constitution: Negative for chills, fatigue and fever.   HENT: Positive for congestion. Negative for sore throat.    Neck: Negative for painful lymph nodes.   Cardiovascular: Negative for chest pain and leg swelling.   Eyes: Negative for double vision and blurred vision.   Respiratory: Negative for cough and shortness of breath.    Gastrointestinal: Negative for nausea, vomiting and diarrhea.   Genitourinary: Negative for dysuria, frequency and urgency.   Musculoskeletal: Negative for joint pain, joint swelling, muscle cramps and muscle ache.   Skin: Negative for color change, pale and rash.   Allergic/Immunologic: Negative for seasonal allergies.   Neurological: Negative for dizziness, history of vertigo, light-headedness, passing out and headaches.   Hematologic/Lymphatic: Negative for swollen lymph nodes, easy bruising/bleeding and history of blood clots. Does not bruise/bleed easily.   Psychiatric/Behavioral: Negative for nervous/anxious, " sleep disturbance and depression. The patient is not nervous/anxious.        Objective:      Physical Exam   Constitutional: He is oriented to person, place, and time. He appears well-developed and well-nourished. He is cooperative.  Non-toxic appearance. He does not appear ill. No distress.   HENT:   Head: Normocephalic and atraumatic.   Right Ear: Hearing, tympanic membrane, external ear and ear canal normal.   Left Ear: Hearing, tympanic membrane, external ear and ear canal normal.   Nose: Nose normal. No mucosal edema, rhinorrhea or nasal deformity. No epistaxis. Right sinus exhibits no maxillary sinus tenderness and no frontal sinus tenderness. Left sinus exhibits no maxillary sinus tenderness and no frontal sinus tenderness.   Mouth/Throat: Uvula is midline, oropharynx is clear and moist and mucous membranes are normal. No trismus in the jaw. Normal dentition. No uvula swelling. No posterior oropharyngeal erythema.   Eyes: Conjunctivae and lids are normal. No scleral icterus.   Sclera clear bilat   Neck: Trachea normal, full passive range of motion without pain and phonation normal. Neck supple.   Cardiovascular: Normal rate, regular rhythm, normal heart sounds, intact distal pulses and normal pulses.   Pulmonary/Chest: Effort normal and breath sounds normal. No respiratory distress.   Abdominal: Soft. Normal appearance and bowel sounds are normal. He exhibits no distension. There is no tenderness.   Musculoskeletal: Normal range of motion. He exhibits no edema or deformity.   Neurological: He is alert and oriented to person, place, and time. He exhibits normal muscle tone. Coordination normal.   Skin: Skin is warm, dry and intact. He is not diaphoretic. No pallor.   Psychiatric: He has a normal mood and affect. His speech is normal and behavior is normal. Judgment and thought content normal. Cognition and memory are normal.   Nursing note and vitals reviewed.      Assessment:       1. Nose congestion    2.  Head congestion    3. Encounter to establish care        Plan:         Discussed case with Dr. Choudhury and agrees with plan to give patient steroid and avoid any NSAIDs as well as antihistamines. Patient verb an understanding to follow up if bleeding continues for evaluation of blood count/platelet levels.     Nose congestion  -     betamethasone acetate-betamethasone sodium phosphate injection 9 mg    Head congestion  -     betamethasone acetate-betamethasone sodium phosphate injection 9 mg    Encounter to establish care  -     Ambulatory Referral to Family Practice      Patient Instructions   CALL [phone] TO SCHEDULE YOUR APPOINTMENT WITH A PRIMARY CARE PROVIDER.     AVOID ANTIHISTAMINES AND NSAIDS AS DISCUSSED IN CLINIC.     You must understand that you've received an Urgent Care treatment only and that you may be released before all your medical problems are known or treated. You, the patient, will arrange for follow up care as instructed.  If your condition worsens we recommend that you receive another evaluation at the emergency room immediately or contact your primary medical clinics after hours call service to discuss your concerns.  Please return here or go to the Emergency Department for any concerns or worsening of condition.      Viral Upper Respiratory Illness (Adult)  You have a viral upper respiratory illness (URI), which is another term for the common cold. This illness is contagious during the first few days. It is spread through the air by coughing and sneezing. It may also be spread by direct contact (touching the sick person and then touching your own eyes, nose, or mouth). Frequent handwashing will decrease risk of spread. Most viral illnesses go away within 7 to 10 days with rest and simple home remedies. Sometimes the illness may last for several weeks. Antibiotics will not kill a virus, and they are generally not prescribed for this condition.    Home care  · If symptoms are severe, rest  at home for the first 2 to 3 days. When you resume activity, don't let yourself get too tired.  · Avoid being exposed to cigarette smoke (yours or others).  · You may use acetaminophen or ibuprofen to control pain and fever, unless another medicine was prescribed. (Note: If you have chronic liver or kidney disease, have ever had a stomach ulcer or gastrointestinal bleeding, or are taking blood-thinning medicines, talk with your healthcare provider before using these medicines.) Aspirin should never be given to anyone under 18 years of age who is ill with a viral infection or fever. It may cause severe liver or brain damage.  · Your appetite may be poor, so a light diet is fine. Avoid dehydration by drinking 6 to 8 glasses of fluids per day (water, soft drinks, juices, tea, or soup). Extra fluids will help loosen secretions in the nose and lungs.  · Over-the-counter cold medicines will not shorten the length of time youre sick, but they may be helpful for the following symptoms: cough, sore throat, and nasal and sinus congestion. (Note: Do not use decongestants if you have high blood pressure.)  Follow-up care  Follow up with your healthcare provider, or as advised.  When to seek medical advice  Call your healthcare provider right away if any of these occur:  · Cough with lots of colored sputum (mucus)  · Severe headache; face, neck, or ear pain  · Difficulty swallowing due to throat pain  · Fever of 100.4°F (38°C)  Call 911, or get immediate medical care  Call emergency services right away if any of these occur:  · Chest pain, shortness of breath, wheezing, or difficulty breathing  · Coughing up blood  · Inability to swallow due to throat pain  Date Last Reviewed: 9/13/2015  © 9867-2462 [employer]. [address] [place] [address], [place] [address]. All rights reserved. This information is not intended as a substitute for professional medical care. Always follow your healthcare professional's  instructions.

## 2019-01-04 NOTE — PATIENT INSTRUCTIONS
CALL [phone] TO SCHEDULE YOUR APPOINTMENT WITH A PRIMARY CARE PROVIDER.     AVOID ANTIHISTAMINES AND NSAIDS AS DISCUSSED IN CLINIC.     You must understand that you've received an Urgent Care treatment only and that you may be released before all your medical problems are known or treated. You, the patient, will arrange for follow up care as instructed.  If your condition worsens we recommend that you receive another evaluation at the emergency room immediately or contact your primary medical clinics after hours call service to discuss your concerns.  Please return here or go to the Emergency Department for any concerns or worsening of condition.      Viral Upper Respiratory Illness (Adult)  You have a viral upper respiratory illness (URI), which is another term for the common cold. This illness is contagious during the first few days. It is spread through the air by coughing and sneezing. It may also be spread by direct contact (touching the sick person and then touching your own eyes, nose, or mouth). Frequent handwashing will decrease risk of spread. Most viral illnesses go away within 7 to 10 days with rest and simple home remedies. Sometimes the illness may last for several weeks. Antibiotics will not kill a virus, and they are generally not prescribed for this condition.    Home care  · If symptoms are severe, rest at home for the first 2 to 3 days. When you resume activity, don't let yourself get too tired.  · Avoid being exposed to cigarette smoke (yours or others).  · You may use acetaminophen or ibuprofen to control pain and fever, unless another medicine was prescribed. (Note: If you have chronic liver or kidney disease, have ever had a stomach ulcer or gastrointestinal bleeding, or are taking blood-thinning medicines, talk with your healthcare provider before using these medicines.) Aspirin should never be given to anyone under 18 years of age who is ill with a viral infection or fever. It may  cause severe liver or brain damage.  · Your appetite may be poor, so a light diet is fine. Avoid dehydration by drinking 6 to 8 glasses of fluids per day (water, soft drinks, juices, tea, or soup). Extra fluids will help loosen secretions in the nose and lungs.  · Over-the-counter cold medicines will not shorten the length of time youre sick, but they may be helpful for the following symptoms: cough, sore throat, and nasal and sinus congestion. (Note: Do not use decongestants if you have high blood pressure.)  Follow-up care  Follow up with your healthcare provider, or as advised.  When to seek medical advice  Call your healthcare provider right away if any of these occur:  · Cough with lots of colored sputum (mucus)  · Severe headache; face, neck, or ear pain  · Difficulty swallowing due to throat pain  · Fever of 100.4°F (38°C)  Call 911, or get immediate medical care  Call emergency services right away if any of these occur:  · Chest pain, shortness of breath, wheezing, or difficulty breathing  · Coughing up blood  · Inability to swallow due to throat pain  Date Last Reviewed: 9/13/2015  © 5968-4449 [employer]. [address] [place] [address], [place] [address]. All rights reserved. This information is not intended as a substitute for professional medical care. Always follow your healthcare professional's instructions.

## 2019-01-05 NOTE — PROGRESS NOTES
"Subjective:       Patient ID: Roc Lai Jr. is a 53 y.o. male.    Vitals:  height is 5' 11" (1.803 m) and weight is 89.8 kg (198 lb). His temperature is 101.2 °F (38.4 °C) (abnormal). His blood pressure is 122/43 (abnormal) and his pulse is 92. His oxygen saturation is 97%.     Chief Complaint: Fever; Chills; and Rash (right groin area)    Fever    This is a new problem. The current episode started today. The problem occurs intermittently. The problem has been unchanged. The maximum temperature noted was 101 to 101.9 F. The temperature was taken using an oral thermometer. Associated symptoms include congestion and a rash. Pertinent negatives include no coughing or sore throat. He has tried acetaminophen for the symptoms. The treatment provided mild relief.   Rash   This is a new problem. The current episode started yesterday. The problem is unchanged. Location: right groin area. The rash is characterized by redness and pain. It is unknown if there was an exposure to a precipitant. Associated symptoms include congestion and a fever. Pertinent negatives include no cough or sore throat. Past treatments include nothing. The treatment provided no relief.       Constitution: Positive for chills and fever.   HENT: Positive for congestion. Negative for facial swelling and sore throat.    Neck: Negative for painful lymph nodes.   Eyes: Negative for eye itching and eyelid swelling.   Respiratory: Negative for cough.    Musculoskeletal: Negative for joint pain and joint swelling.   Skin: Positive for rash. Negative for color change, pale, wound, abrasion, laceration, lesion, skin thickening/induration, puncture wound, abscess, avulsion and hives.   Allergic/Immunologic: Negative for environmental allergies, immunocompromised state and hives.   Hematologic/Lymphatic: Negative for swollen lymph nodes.       Objective:      Physical Exam   Constitutional: He is oriented to person, place, and time. He appears well-developed " and well-nourished.   HENT:   Head: Normocephalic and atraumatic.   Eyes: EOM are normal. Pupils are equal, round, and reactive to light.   Neck: Normal range of motion. Neck supple. No JVD present. No tracheal deviation present. No thyromegaly present.   Cardiovascular: Normal rate, regular rhythm and normal heart sounds. Exam reveals no gallop and no friction rub.   No murmur heard.  Pulmonary/Chest: Breath sounds normal. No respiratory distress. He has no wheezes. He has no rales. He exhibits no tenderness.   Abdominal: Soft. Bowel sounds are normal. He exhibits no distension and no mass. There is no tenderness. There is no rebound and no guarding. No hernia.   Genitourinary:   Genitourinary Comments: Right groin has an erythematous, flat, macular area with excoriation.   Musculoskeletal: Normal range of motion. He exhibits no edema, tenderness or deformity.   Lymphadenopathy:     He has no cervical adenopathy.   Neurological: He is alert and oriented to person, place, and time. He displays normal reflexes. No cranial nerve deficit. He exhibits normal muscle tone. Coordination normal.   Skin: Capillary refill takes less than 2 seconds.   Psychiatric: He has a normal mood and affect. His behavior is normal. Judgment and thought content normal.   Vitals reviewed.      Assessment:       1. Fever, unspecified fever cause    2. Cough    3. Sinus congestion    4. Tinea cruris        Plan:         Fever, unspecified fever cause  -     POCT Influenza A/B  -     Discontinue: amoxicillin-clavulanate 875-125mg (AUGMENTIN) 875-125 mg per tablet; Take 1 tablet by mouth 2 (two) times daily. for 10 days  Dispense: 20 tablet; Refill: 0  -     Discontinue: ibuprofen (ADVIL,MOTRIN) 800 MG tablet; Take 1 tablet (800 mg total) by mouth 3 (three) times daily. for 10 days  Dispense: 30 tablet; Refill: 0  -     ibuprofen (ADVIL,MOTRIN) 800 MG tablet; Take 1 tablet (800 mg total) by mouth 3 (three) times daily. for 10 days  Dispense: 30  tablet; Refill: 0  -     amoxicillin-clavulanate 875-125mg (AUGMENTIN) 875-125 mg per tablet; Take 1 tablet by mouth 2 (two) times daily. for 10 days  Dispense: 20 tablet; Refill: 0    Cough  -     Discontinue: amoxicillin-clavulanate 875-125mg (AUGMENTIN) 875-125 mg per tablet; Take 1 tablet by mouth 2 (two) times daily. for 10 days  Dispense: 20 tablet; Refill: 0  -     amoxicillin-clavulanate 875-125mg (AUGMENTIN) 875-125 mg per tablet; Take 1 tablet by mouth 2 (two) times daily. for 10 days  Dispense: 20 tablet; Refill: 0    Sinus congestion    Tinea cruris  -     ketoconazole (NIZORAL) 2 % cream; Apply topically once daily. for 14 days  Dispense: 60 g; Refill: 0      Patient Instructions     Febrile Illness, Uncertain Cause (Adult)  You have a fever, but the cause is not certain. A fever is a natural reaction of the body to an illness such as infection due to a virus or bacteria. In most cases, the temperature itself is not harmful. It actually helps the body fight infections. A fever does not need to be treated unless you feel very uncomfortable.  Sometimes a fever can be an early sign of a more serious infection, so make sure to follow up if your condition worsens.  Home care  Unless given other instructions by your healthcare provider, follow these guidelines when caring for yourself at home.  General care  · If your symptoms are severe, rest at home for the first 2 to 3 days. When you resume activity, don't let yourself get too tired.  · Do not smoke. Also avoid being exposed to secondhand smoke.  · Your appetite may be poor, so a light diet is fine. Avoid dehydration by drinking 6 to 8 glasses of fluids per day (such as water, soft drinks, sports drinks, juices, tea, or soup). Extra fluids will help loosen secretions in the nose and lungs.  Medicines  · You can take acetaminophen or ibuprofen for pain, unless you were given a different fever-reducing/pain medicine to use. (Note: If you have chronic liver  or kidney disease or have ever had a stomach ulcer or gastrointestinal bleeding, talk with your healthcare provider before using these medicines. Also talk to your provider if you are taking medicine to prevent blood clots.) Aspirin should never be given to anyone younger than 18 years of age who is ill with a viral infection or fever. It may cause severe liver or brain damage.  · If you were given antibiotics, take them until they are used up, or your healthcare provider tells you to stop. It is important to finish the antibiotics even though you feel better. This is to make sure the infection has cleared. Be aware that antibiotics are not usually given for a fever with an unknown cause.  · Over-the-counter medicines will not shorten the duration of the illness. However, they may be helpful for the following symptoms: cough, sore throat, or nasal and sinus congestion. Ask your pharmacist for product suggestions. (Note: Do not use decongestants if you have high blood pressure.)  Follow-up care  Follow up with your healthcare provider, or as advised.  · If a culture was done, you will be notified if your treatment needs to be changed. You can call as directed for the results.  · If X-rays, a CT, or an ultrasound were done, a specialist will review them. You will be notified of any findings that may affect your care.  Call 911  Contact emergency services right away if any of these occur:  · Trouble breathing or swallowing, or wheezing  · Chest pain  · Confusion  · Extreme drowsiness or trouble awakening  · Fainting or loss of consciousness  · Rapid heart rate  · Low blood pressure  · Vomiting blood, or large amounts of blood in stool  · Seizure  When to seek medical advice  Call your healthcare provider right away if any of these occur:  · Cough with lots of colored sputum (mucus) or blood in your sputum  · Severe headache  · Face, neck, throat, or ear pain  · Feeling drowsy  · Abdominal pain  · Repeated vomiting or  diarrhea  · Joint pain or a new rash  · Burning when urinating  · Fever of 100.4°F (38°C) or higher, that does not get better after taking fever-reducing medicine  · Feeling weak or dizzy  Date Last Reviewed: 7/30/2015  © 2458-9576 [employer]. [address] [place] [id number]. All rights reserved. This information is not intended as a substitute for professional medical care. Always follow your healthcare professional's instructions.    Follow up with your doctor in a few days as needed.  Return to the urgent care or go to the ER if symptoms get worse.    Vidal Penn MD

## 2019-01-05 NOTE — PATIENT INSTRUCTIONS
Febrile Illness, Uncertain Cause (Adult)  You have a fever, but the cause is not certain. A fever is a natural reaction of the body to an illness such as infection due to a virus or bacteria. In most cases, the temperature itself is not harmful. It actually helps the body fight infections. A fever does not need to be treated unless you feel very uncomfortable.  Sometimes a fever can be an early sign of a more serious infection, so make sure to follow up if your condition worsens.  Home care  Unless given other instructions by your healthcare provider, follow these guidelines when caring for yourself at home.  General care  · If your symptoms are severe, rest at home for the first 2 to 3 days. When you resume activity, don't let yourself get too tired.  · Do not smoke. Also avoid being exposed to secondhand smoke.  · Your appetite may be poor, so a light diet is fine. Avoid dehydration by drinking 6 to 8 glasses of fluids per day (such as water, soft drinks, sports drinks, juices, tea, or soup). Extra fluids will help loosen secretions in the nose and lungs.  Medicines  · You can take acetaminophen or ibuprofen for pain, unless you were given a different fever-reducing/pain medicine to use. (Note: If you have chronic liver or kidney disease or have ever had a stomach ulcer or gastrointestinal bleeding, talk with your healthcare provider before using these medicines. Also talk to your provider if you are taking medicine to prevent blood clots.) Aspirin should never be given to anyone younger than 18 years of age who is ill with a viral infection or fever. It may cause severe liver or brain damage.  · If you were given antibiotics, take them until they are used up, or your healthcare provider tells you to stop. It is important to finish the antibiotics even though you feel better. This is to make sure the infection has cleared. Be aware that antibiotics are not usually given for a fever with an unknown  cause.  · Over-the-counter medicines will not shorten the duration of the illness. However, they may be helpful for the following symptoms: cough, sore throat, or nasal and sinus congestion. Ask your pharmacist for product suggestions. (Note: Do not use decongestants if you have high blood pressure.)  Follow-up care  Follow up with your healthcare provider, or as advised.  · If a culture was done, you will be notified if your treatment needs to be changed. You can call as directed for the results.  · If X-rays, a CT, or an ultrasound were done, a specialist will review them. You will be notified of any findings that may affect your care.  Call 911  Contact emergency services right away if any of these occur:  · Trouble breathing or swallowing, or wheezing  · Chest pain  · Confusion  · Extreme drowsiness or trouble awakening  · Fainting or loss of consciousness  · Rapid heart rate  · Low blood pressure  · Vomiting blood, or large amounts of blood in stool  · Seizure  When to seek medical advice  Call your healthcare provider right away if any of these occur:  · Cough with lots of colored sputum (mucus) or blood in your sputum  · Severe headache  · Face, neck, throat, or ear pain  · Feeling drowsy  · Abdominal pain  · Repeated vomiting or diarrhea  · Joint pain or a new rash  · Burning when urinating  · Fever of 100.4°F (38°C) or higher, that does not get better after taking fever-reducing medicine  · Feeling weak or dizzy  Date Last Reviewed: 7/30/2015 © 2000-2017 [employer]. [address] [place] [address]. All rights reserved. This information is not intended as a substitute for professional medical care. Always follow your healthcare professional's instructions.    Follow up with your doctor in a few days as needed.  Return to the urgent care or go to the ER if symptoms get worse.    Vidal Penn MD

## 2019-01-23 ENCOUNTER — PATIENT MESSAGE (OUTPATIENT)
Dept: HEMATOLOGY/ONCOLOGY | Facility: CLINIC | Age: 54
End: 2019-01-23

## 2019-01-23 DIAGNOSIS — I50.32 CHRONIC DIASTOLIC CONGESTIVE HEART FAILURE: ICD-10-CM

## 2019-01-23 RX ORDER — SPIRONOLACTONE 25 MG/1
25 TABLET ORAL DAILY
Qty: 90 TABLET | Refills: 3 | Status: SHIPPED | OUTPATIENT
Start: 2019-01-23 | End: 2019-04-29 | Stop reason: SDUPTHER

## 2019-02-07 ENCOUNTER — OFFICE VISIT (OUTPATIENT)
Dept: HEMATOLOGY/ONCOLOGY | Facility: CLINIC | Age: 54
End: 2019-02-07
Payer: OTHER GOVERNMENT

## 2019-02-07 ENCOUNTER — INFUSION (OUTPATIENT)
Dept: INFUSION THERAPY | Facility: HOSPITAL | Age: 54
End: 2019-02-07
Attending: INTERNAL MEDICINE
Payer: OTHER GOVERNMENT

## 2019-02-07 VITALS
HEART RATE: 84 BPM | BODY MASS INDEX: 27.59 KG/M2 | DIASTOLIC BLOOD PRESSURE: 62 MMHG | WEIGHT: 197.06 LBS | OXYGEN SATURATION: 98 % | HEIGHT: 71 IN | TEMPERATURE: 98 F | SYSTOLIC BLOOD PRESSURE: 111 MMHG

## 2019-02-07 DIAGNOSIS — I42.5 OTHER RESTRICTIVE CARDIOMYOPATHY: ICD-10-CM

## 2019-02-07 DIAGNOSIS — G62.2 NEUROPATHY DUE TO CHEMICAL SUBSTANCE: ICD-10-CM

## 2019-02-07 DIAGNOSIS — Z09 CHEMOTHERAPY FOLLOW-UP EXAMINATION: ICD-10-CM

## 2019-02-07 DIAGNOSIS — E85.81 AL AMYLOIDOSIS: Primary | ICD-10-CM

## 2019-02-07 PROCEDURE — 99999 PR PBB SHADOW E&M-EST. PATIENT-LVL III: CPT | Mod: PBBFAC,,, | Performed by: INTERNAL MEDICINE

## 2019-02-07 PROCEDURE — 99215 PR OFFICE/OUTPT VISIT, EST, LEVL V, 40-54 MIN: ICD-10-PCS | Mod: S$PBB,,, | Performed by: INTERNAL MEDICINE

## 2019-02-07 PROCEDURE — 63600175 PHARM REV CODE 636 W HCPCS: Mod: JG | Performed by: INTERNAL MEDICINE

## 2019-02-07 PROCEDURE — 99215 OFFICE O/P EST HI 40 MIN: CPT | Mod: S$PBB,,, | Performed by: INTERNAL MEDICINE

## 2019-02-07 PROCEDURE — 99999 PR PBB SHADOW E&M-EST. PATIENT-LVL III: ICD-10-PCS | Mod: PBBFAC,,, | Performed by: INTERNAL MEDICINE

## 2019-02-07 PROCEDURE — 96401 CHEMO ANTI-NEOPL SQ/IM: CPT

## 2019-02-07 PROCEDURE — 99213 OFFICE O/P EST LOW 20 MIN: CPT | Mod: PBBFAC,25 | Performed by: INTERNAL MEDICINE

## 2019-02-07 RX ORDER — HEPARIN 100 UNIT/ML
500 SYRINGE INTRAVENOUS
Status: CANCELLED | OUTPATIENT
Start: 2019-02-07

## 2019-02-07 RX ORDER — BORTEZOMIB 3.5 MG/1
1.3 INJECTION, POWDER, LYOPHILIZED, FOR SOLUTION INTRAVENOUS; SUBCUTANEOUS
Status: CANCELLED
Start: 2019-02-07

## 2019-02-07 RX ORDER — SODIUM CHLORIDE 0.9 % (FLUSH) 0.9 %
10 SYRINGE (ML) INJECTION
Status: CANCELLED | OUTPATIENT
Start: 2019-02-07

## 2019-02-07 RX ORDER — BORTEZOMIB 3.5 MG/1
1.3 INJECTION, POWDER, LYOPHILIZED, FOR SOLUTION INTRAVENOUS; SUBCUTANEOUS
Status: COMPLETED | OUTPATIENT
Start: 2019-02-07 | End: 2019-02-07

## 2019-02-07 RX ADMIN — BORTEZOMIB 2.8 MG: 3.5 INJECTION, POWDER, LYOPHILIZED, FOR SOLUTION INTRAVENOUS; SUBCUTANEOUS at 12:02

## 2019-02-07 NOTE — Clinical Note
Follow-up in 8 weeks with CBC, CMP, SPEP, BROOKS, free light chains, immunoglobulins and chemo appt for bortezomib. Thanks.

## 2019-02-07 NOTE — NURSING
Pt arrived for velcade following MD appt. Labs reviewed.  Pt tolerated injection SQ to upper right abd per pt's request.  Pt discharged to home.

## 2019-02-08 ENCOUNTER — PATIENT MESSAGE (OUTPATIENT)
Dept: HEMATOLOGY/ONCOLOGY | Facility: CLINIC | Age: 54
End: 2019-02-08

## 2019-02-13 NOTE — PROGRESS NOTES
HEMATOLOGIC MALIGNANCIES PROGRESS NOTE    IDENTIFYING STATEMENT   Roc Lai Jr. ([place]) is a 53 y.o. male with a  of 1965 from [place] with the diagnosis of AL amyloid.      ONCOLOGY HISTORY:    1. AL amyloid with cardiac involvement   A. 2016: Initial evaluation with Dr. Carmichael. Reported 18 months of progressive decline (previously ran 3 miles easily, now can only walk 10 minutes). M-protein 0.19 g/dl, kappa 1.37 mg/dl, lambda 50 mg/dl, ratio 0.03. Abdominal fat pad biopsy had been negative for presence of amyloid. Cardiac MRI suggestive of infiltrative cardiomyopathy.    B. 1/3/2017: Endomyocardial biopsy - involved by AL amyloid   C. 2017: BMBx - 60% cellular marrow with 50% plasma cells, consistent with plasma cell myeloma.    D. 2/15/2017 - 2017: Completed 4 cycles bortezomib chemotherapy on [id number] trial.    E. 2017: Bortezomib every 2 weeks x 2 doses   F. 2017: Change bortezomib to [id number] x 2 doses   G. 10/9/2017: Bortezomib q6 weeks   H. 2018: Bortezomib q8 weeks.    I. 2018: [id number] close out visit due to ineffectiveness.      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 Lai returns to clinic for follow-up of his cardiac AL amyloid. He has been feeling well. He continues with neuropathy. No limb swelling at this time. Continues working every day. Denies any fevers, chills, chest pain, SOB, n/v/d/c. He is no longer having difficulty with erectile dysfunction.    He was able to go to the [employer], and he saw Camelia Jim there while celebrating.     Past Medical History, Past Social History and Past Family History have been reviewed and are unchanged except as noted in the interval history.    MEDICATIONS:     Current Outpatient Medications on File Prior to Visit   Medication Sig Dispense Refill    acyclovir (ZOVIRAX) 400 MG tablet Take 1 tablet (400 mg total) by mouth 2 (two) times daily. 60 tablet 11    BORTEZOMIB  "(VELCADE INJ) Inject as directed. Every 2 months      bumetanide (BUMEX) 2 MG tablet Take 1 tablet (2 mg total) by mouth once daily. 90 tablet 3    gabapentin (NEURONTIN) 100 MG capsule Take 3 capsules (300 mg total) by mouth every evening. 90 capsule 2    ketoconazole (NIZORAL) 2 % cream Apply topically once daily. for 14 days 60 g 0    magnesium oxide (MAG-OX) 400 mg (241.3 mg magnesium) tablet Take 1 tablet (400 mg total) by mouth once daily. 90 tablet 3    sildenafil (VIAGRA) 50 MG tablet Take 1 tablet (50 mg total) by mouth daily as needed for Erectile Dysfunction. 7 tablet 2    spironolactone (ALDACTONE) 25 MG tablet Take 1 tablet (25 mg total) by mouth once daily. 90 tablet 3     No current facility-administered medications on file prior to visit.        ALLERGIES: Review of patient's allergies indicates:  No Known Allergies     ROS:       Review of Systems   Constitutional: Positive for fatigue. Negative for diaphoresis, fever and unexpected weight change.   HENT:   Negative for lump/mass and sore throat.    Eyes: Negative for icterus.   Respiratory: Negative for cough and shortness of breath.    Cardiovascular: Negative for chest pain, leg swelling and palpitations.   Gastrointestinal: Negative for abdominal distention, constipation, diarrhea, nausea and vomiting.   Genitourinary: Negative for dysuria and frequency.         Erectile dysfunction   Musculoskeletal: Negative for arthralgias, gait problem and myalgias.   Skin: Negative for rash.   Neurological: Positive for numbness. Negative for dizziness, gait problem and headaches.   Hematological: Negative for adenopathy. Does not bruise/bleed easily.   Psychiatric/Behavioral: The patient is not nervous/anxious.        PHYSICAL EXAM:  Vitals:    02/07/19 1050   BP: 111/62   Pulse: 84   Temp: 98.2 °F (36.8 °C)   SpO2: 98%   Weight: 89.4 kg (197 lb 1.5 oz)   Height: 5' 11" (1.803 m)   PainSc: 0-No pain     ECOG 1    NYHA Class II    Physical Exam "   Constitutional: He is oriented to person, place, and time. He appears well-developed and well-nourished. No distress.   HENT:   Head: Normocephalic and atraumatic.   Right Ear: External ear normal.   Left Ear: External ear normal.   Mouth/Throat: Oropharynx is clear and moist and mucous membranes are normal. No oral lesions.   Eyes: Conjunctivae and EOM are normal.   Neck: Normal range of motion. Neck supple. No thyromegaly present.   Cardiovascular: Normal rate, regular rhythm, normal heart sounds and intact distal pulses.   No murmur heard.  Pulmonary/Chest: Effort normal and breath sounds normal. He has no wheezes. He has no rales.   Abdominal: Soft. Bowel sounds are normal. He exhibits no distension and no mass. There is no splenomegaly or hepatomegaly. There is no tenderness.   Musculoskeletal: Normal range of motion. He exhibits no edema.   Lymphadenopathy:     He has no cervical adenopathy.        Right cervical: No deep cervical adenopathy present.       Left cervical: No deep cervical adenopathy present.     He has no axillary adenopathy.        Right: No inguinal adenopathy present.        Left: No inguinal adenopathy present.   Neurological: He is alert and oriented to person, place, and time. He has normal strength and normal reflexes. No cranial nerve deficit. Coordination normal.   Skin: Skin is warm and dry. No rash noted.   Skin on legs is somewhat hyperpigmented   Psychiatric: He has a normal mood and affect. His behavior is normal. Judgment and thought content normal.   Nursing note and vitals reviewed.      LAB:   Results for orders placed or performed in visit on 02/07/19   Rapid BMT CBC with Diff   Result Value Ref Range    WBC 6.14 3.90 - 12.70 K/uL    RBC 3.83 (L) 4.60 - 6.20 M/uL    Hemoglobin 12.5 (L) 14.0 - 18.0 g/dL    Hematocrit 37.7 (L) 40.0 - 54.0 %    MCV 98 82 - 98 fL    MCH 32.6 (H) 27.0 - 31.0 pg    MCHC 33.2 32.0 - 36.0 g/dL    RDW 14.0 11.5 - 14.5 %    Platelets 142 (L) 150 -  350 K/uL    MPV 9.7 9.2 - 12.9 fL    Immature Granulocytes 0.7 (H) 0.0 - 0.5 %    Gran # (ANC) 4.2 1.8 - 7.7 K/uL    Immature Grans (Abs) 0.04 0.00 - 0.04 K/uL    Lymph # 1.0 1.0 - 4.8 K/uL    Mono # 0.8 0.3 - 1.0 K/uL    Eos # 0.1 0.0 - 0.5 K/uL    Baso # 0.05 0.00 - 0.20 K/uL    nRBC 0 0 /100 WBC    Gran% 67.6 38.0 - 73.0 %    Lymph% 15.8 (L) 18.0 - 48.0 %    Mono% 13.0 4.0 - 15.0 %    Eosinophil% 2.1 0.0 - 8.0 %    Basophil% 0.8 0.0 - 1.9 %    Differential Method Automated    Comprehensive metabolic panel   Result Value Ref Range    Sodium 135 (L) 136 - 145 mmol/L    Potassium 4.2 3.5 - 5.1 mmol/L    Chloride 99 95 - 110 mmol/L    CO2 26 23 - 29 mmol/L    Glucose 83 70 - 110 mg/dL    BUN, Bld 25 (H) 6 - 20 mg/dL    Creatinine 1.5 (H) 0.5 - 1.4 mg/dL    Calcium 9.7 8.7 - 10.5 mg/dL    Total Protein 7.9 6.0 - 8.4 g/dL    Albumin 3.9 3.5 - 5.2 g/dL    Total Bilirubin 1.9 (H) 0.1 - 1.0 mg/dL    Alkaline Phosphatase 167 (H) 55 - 135 U/L    AST 33 10 - 40 U/L    ALT 23 10 - 44 U/L    Anion Gap 10 8 - 16 mmol/L    eGFR if African American >60.0 >60 mL/min/1.73 m^2    eGFR if non  52.8 (A) >60 mL/min/1.73 m^2   Immunoglobulin free LT chains blood   Result Value Ref Range    Kappa Free Light Chains 4.49 (H) 0.33 - 1.94 mg/dL    Lambda Free Light Chains 6.20 (H) 0.57 - 2.63 mg/dL    Kappa/Lambda FLC Ratio 0.72 0.26 - 1.65       PROBLEMS ASSESSED THIS VISIT:    1. AL amyloidosis    2. 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 Chemotherapy follow-up examination        PLAN:       AL amyloidosis/monoclonal gammopathy   - Clinical response is maintained without evidence of rising lambda light chains  - Labs from today show balanced elevation in both kappa and lambda free light chains.  - We will continue bortezomib every 8 weeks     Infiltrative cardiomyopathy with elevated troponin but no CAD felt due to infiltrative CM;1/24/17 supplemented report from [place] + AL amyloid  within heart  - Exercise is not severely limited at this time  - Seen by cards 11/2018 and recommended repeat echo, to be completed today 12/7/18, results pending. Alexandra will see pt again in 6 months   - We will continue to monitor    ED  - Started on sildenafil last visit by Dr. Atkins, reports no issues with use other than mild headaches.    Neuropathy  - Pt using 300mg nightly, states it does help him sleep  - states persistent neuropathy, some restriction to normal daily activites  - discussed increasing dose in a variety of ways to help with symptoms, pt was reluctant at first. Agreeable to take 100mg in the AM and continue 300mg at night. Will assess for drowsiness in morning after dose since patient is working daily. Made aware he can contact staff if any issues arise or if he would like to try another dose option as there is much room for adjustment.    Follow-up:   In 8 weeks with CBC, CMP, light chains, visit with Dr. Atkins, and chemo appt for bortezomib.        Chris Atkins MD  Hematology and Stem Cell Transplant    Distress Screening Results: Psychosocial Distress screening score of Distress Score: 0 noted and reviewed. No intervention indicated.

## 2019-02-25 ENCOUNTER — PATIENT MESSAGE (OUTPATIENT)
Dept: HEMATOLOGY/ONCOLOGY | Facility: CLINIC | Age: 54
End: 2019-02-25

## 2019-02-26 ENCOUNTER — PATIENT MESSAGE (OUTPATIENT)
Dept: HEMATOLOGY/ONCOLOGY | Facility: CLINIC | Age: 54
End: 2019-02-26

## 2019-03-04 DIAGNOSIS — G62.2 NEUROPATHY DUE TO CHEMICAL SUBSTANCE: ICD-10-CM

## 2019-03-06 ENCOUNTER — PATIENT MESSAGE (OUTPATIENT)
Dept: HEMATOLOGY/ONCOLOGY | Facility: CLINIC | Age: 54
End: 2019-03-06

## 2019-03-06 DIAGNOSIS — G62.2 NEUROPATHY DUE TO CHEMICAL SUBSTANCE: ICD-10-CM

## 2019-03-06 DIAGNOSIS — E85.89 OTHER AMYLOIDOSIS: ICD-10-CM

## 2019-03-06 RX ORDER — ACYCLOVIR 400 MG/1
400 TABLET ORAL 2 TIMES DAILY
Qty: 60 TABLET | Refills: 11 | Status: SHIPPED | OUTPATIENT
Start: 2019-03-06 | End: 2019-04-29 | Stop reason: SDUPTHER

## 2019-03-06 RX ORDER — GABAPENTIN 100 MG/1
300 CAPSULE ORAL NIGHTLY
Qty: 90 CAPSULE | Refills: 2 | Status: SHIPPED | OUTPATIENT
Start: 2019-03-06 | End: 2019-03-11 | Stop reason: SDUPTHER

## 2019-03-11 RX ORDER — GABAPENTIN 100 MG/1
CAPSULE ORAL
Qty: 90 CAPSULE | Refills: 0 | Status: SHIPPED | OUTPATIENT
Start: 2019-03-11 | End: 2019-05-03 | Stop reason: SDUPTHER

## 2019-03-11 RX ORDER — GABAPENTIN 100 MG/1
CAPSULE ORAL
Qty: 90 CAPSULE | Refills: 0 | OUTPATIENT
Start: 2019-03-11

## 2019-03-11 RX ORDER — GABAPENTIN 100 MG/1
CAPSULE ORAL
Qty: 90 CAPSULE | Refills: 2 | OUTPATIENT
Start: 2019-03-11

## 2019-03-20 ENCOUNTER — LAB VISIT (OUTPATIENT)
Dept: LAB | Facility: HOSPITAL | Age: 54
End: 2019-03-20
Attending: INTERNAL MEDICINE
Payer: OTHER GOVERNMENT

## 2019-03-20 ENCOUNTER — HOSPITAL ENCOUNTER (OUTPATIENT)
Dept: CARDIOLOGY | Facility: CLINIC | Age: 54
Discharge: HOME | End: 2019-03-20
Attending: INTERNAL MEDICINE
Payer: OTHER GOVERNMENT

## 2019-03-20 ENCOUNTER — PATIENT MESSAGE (OUTPATIENT)
Dept: HEMATOLOGY/ONCOLOGY | Facility: CLINIC | Age: 54
End: 2019-03-20

## 2019-03-20 VITALS
HEART RATE: 76 BPM | WEIGHT: 195 LBS | BODY MASS INDEX: 27.3 KG/M2 | SYSTOLIC BLOOD PRESSURE: 120 MMHG | HEIGHT: 71 IN | DIASTOLIC BLOOD PRESSURE: 78 MMHG

## 2019-03-20 DIAGNOSIS — E85.81 AL AMYLOIDOSIS: Primary | ICD-10-CM

## 2019-03-20 DIAGNOSIS — E85.81 AL AMYLOIDOSIS: ICD-10-CM

## 2019-03-20 DIAGNOSIS — R42 DIZZINESS: ICD-10-CM

## 2019-03-20 LAB
ABO + RH BLD: NORMAL
ALBUMIN SERPL BCP-MCNC: 3.8 G/DL
ALP SERPL-CCNC: 188 U/L
ALT SERPL W/O P-5'-P-CCNC: 28 U/L
ANION GAP SERPL CALC-SCNC: 10 MMOL/L
ASCENDING AORTA: 2.84 CM
AST SERPL-CCNC: 35 U/L
AV INDEX (PROSTH): 0.95
AV MEAN GRADIENT: 2.82 MMHG
AV PEAK GRADIENT: 5.2 MMHG
AV VALVE AREA: 3.32 CM2
AV VELOCITY RATIO: 0.92
BASOPHILS # BLD AUTO: 0.05 K/UL
BASOPHILS NFR BLD: 0.7 %
BILIRUB SERPL-MCNC: 1.5 MG/DL
BLD GP AB SCN CELLS X3 SERPL QL: NORMAL
BSA FOR ECHO PROCEDURE: 2.1 M2
BUN SERPL-MCNC: 28 MG/DL
CALCIUM SERPL-MCNC: 9.3 MG/DL
CHLORIDE SERPL-SCNC: 99 MMOL/L
CO2 SERPL-SCNC: 28 MMOL/L
CREAT SERPL-MCNC: 1.4 MG/DL
CV ECHO LV RWT: 0.42 CM
DIFFERENTIAL METHOD: ABNORMAL
DOP CALC AO PEAK VEL: 1.14 M/S
DOP CALC AO VTI: 19.71 CM
DOP CALC LVOT AREA: 3.49 CM2
DOP CALC LVOT DIAMETER: 2.11 CM
DOP CALC LVOT PEAK VEL: 1.05 M/S
DOP CALC LVOT STROKE VOLUME: 65.35 CM3
DOP CALCLVOT PEAK VEL VTI: 18.7 CM
E WAVE DECELERATION TIME: 221.98 MSEC
E/A RATIO: 1.26
E/E' RATIO: 11.17
ECHO LV POSTERIOR WALL: 1.1 CM (ref 0.6–1.1)
EOSINOPHIL # BLD AUTO: 0.2 K/UL
EOSINOPHIL NFR BLD: 2.9 %
ERYTHROCYTE [DISTWIDTH] IN BLOOD BY AUTOMATED COUNT: 13.6 %
EST. GFR  (AFRICAN AMERICAN): >60 ML/MIN/1.73 M^2
EST. GFR  (NON AFRICAN AMERICAN): 57.4 ML/MIN/1.73 M^2
FRACTIONAL SHORTENING: 33 % (ref 28–44)
GLUCOSE SERPL-MCNC: 95 MG/DL
HCT VFR BLD AUTO: 41.8 %
HGB BLD-MCNC: 13.8 G/DL
IMM GRANULOCYTES # BLD AUTO: 0.02 K/UL
IMM GRANULOCYTES NFR BLD AUTO: 0.3 %
INTERVENTRICULAR SEPTUM: 1.12 CM (ref 0.6–1.1)
LA MAJOR: 6.2 CM
LA MINOR: 6.4 CM
LA WIDTH: 4.03 CM
LEFT ATRIUM SIZE: 4.3 CM
LEFT ATRIUM VOLUME INDEX: 44.5 ML/M2
LEFT ATRIUM VOLUME: 92.77 CM3
LEFT INTERNAL DIMENSION IN SYSTOLE: 3.48 CM (ref 2.1–4)
LEFT VENTRICLE DIASTOLIC VOLUME INDEX: 61.8 ML/M2
LEFT VENTRICLE DIASTOLIC VOLUME: 128.93 ML
LEFT VENTRICLE MASS INDEX: 106.8 G/M2
LEFT VENTRICLE SYSTOLIC VOLUME INDEX: 24 ML/M2
LEFT VENTRICLE SYSTOLIC VOLUME: 50.12 ML
LEFT VENTRICULAR INTERNAL DIMENSION IN DIASTOLE: 5.19 CM (ref 3.5–6)
LEFT VENTRICULAR MASS: 222.8 G
LV LATERAL E/E' RATIO: 9.57
LV SEPTAL E/E' RATIO: 13.4
LYMPHOCYTES # BLD AUTO: 1.2 K/UL
LYMPHOCYTES NFR BLD: 17.4 %
MCH RBC QN AUTO: 32.9 PG
MCHC RBC AUTO-ENTMCNC: 33 G/DL
MCV RBC AUTO: 100 FL
MONOCYTES # BLD AUTO: 0.7 K/UL
MONOCYTES NFR BLD: 9.5 %
MV PEAK A VEL: 0.53 M/S
MV PEAK E VEL: 0.67 M/S
NEUTROPHILS # BLD AUTO: 4.7 K/UL
NEUTROPHILS NFR BLD: 69.2 %
NRBC BLD-RTO: 0 /100 WBC
PISA TR MAX VEL: 1.48 M/S
PLATELET # BLD AUTO: 166 K/UL
PMV BLD AUTO: 10.1 FL
POTASSIUM SERPL-SCNC: 3.9 MMOL/L
PROT SERPL-MCNC: 7.8 G/DL
RA MAJOR: 6.03 CM
RA PRESSURE: 15 MMHG
RA WIDTH: 4.32 CM
RBC # BLD AUTO: 4.2 M/UL
RIGHT VENTRICULAR END-DIASTOLIC DIMENSION: 3.82 CM
SINUS: 3.06 CM
SODIUM SERPL-SCNC: 137 MMOL/L
STJ: 2.35 CM
TDI LATERAL: 0.07
TDI SEPTAL: 0.05
TDI: 0.06
TR MAX PG: 8.76 MMHG
TRICUSPID ANNULAR PLANE SYSTOLIC EXCURSION: 1.28 CM
TV REST PULMONARY ARTERY PRESSURE: 24 MMHG
WBC # BLD AUTO: 6.85 K/UL

## 2019-03-20 PROCEDURE — 83520 IMMUNOASSAY QUANT NOS NONAB: CPT | Mod: 59

## 2019-03-20 PROCEDURE — 85025 COMPLETE CBC W/AUTO DIFF WBC: CPT

## 2019-03-20 PROCEDURE — 93306 TTE W/DOPPLER COMPLETE: CPT | Mod: PBBFAC | Performed by: INTERNAL MEDICINE

## 2019-03-20 PROCEDURE — 93306 TRANSTHORACIC ECHO (TTE) COMPLETE (CUPID ONLY): ICD-10-PCS | Mod: 26,S$PBB,, | Performed by: INTERNAL MEDICINE

## 2019-03-20 PROCEDURE — 86850 RBC ANTIBODY SCREEN: CPT

## 2019-03-20 PROCEDURE — 80053 COMPREHEN METABOLIC PANEL: CPT

## 2019-03-21 ENCOUNTER — PATIENT MESSAGE (OUTPATIENT)
Dept: HEMATOLOGY/ONCOLOGY | Facility: CLINIC | Age: 54
End: 2019-03-21

## 2019-03-21 LAB
KAPPA LC SER QL IA: 3.4 MG/DL
KAPPA LC/LAMBDA SER IA: 0.48
LAMBDA LC SER QL IA: 7.08 MG/DL

## 2019-03-22 ENCOUNTER — TELEPHONE (OUTPATIENT)
Dept: TRANSPLANT | Facility: CLINIC | Age: 54
End: 2019-03-22

## 2019-03-22 NOTE — TELEPHONE ENCOUNTER
Received a message from Dr. Atkins indicating that Mr. Lai arrived at his clinic on on complaining of fatique and feeling worst, Lab work was completed which did not indicate any significant changes. Echo performed indicating increases CVP, therefore asked for recommendation regarding his Bumex. Message sent to Dr. Acosta . Contacted Mr. Lai, he stated that he felt much better. His actual complaint was for headache and dizzyness which was very unusual for him. . He took motrin when he got home and felt much better. Just finished cutting grass and did fine. Instructed him to not to increase bumex at this time unless directed by Dr. Denney and to call if headache and dizzyness returns. He is due for a 6 month follow up in May therefore an appt was made on May 9th at 0800. Will notify him if any labs or other test will added and follow up with Dr. Lao.

## 2019-04-11 ENCOUNTER — LAB VISIT (OUTPATIENT)
Dept: LAB | Facility: HOSPITAL | Age: 54
End: 2019-04-11
Attending: INTERNAL MEDICINE
Payer: OTHER GOVERNMENT

## 2019-04-11 DIAGNOSIS — E85.81 AL AMYLOIDOSIS: ICD-10-CM

## 2019-04-11 LAB
ALBUMIN SERPL BCP-MCNC: 3.7 G/DL (ref 3.5–5.2)
ALP SERPL-CCNC: 191 U/L (ref 55–135)
ALT SERPL W/O P-5'-P-CCNC: 28 U/L (ref 10–44)
ANION GAP SERPL CALC-SCNC: 6 MMOL/L (ref 8–16)
AST SERPL-CCNC: 35 U/L (ref 10–40)
BASOPHILS # BLD AUTO: 0.04 K/UL (ref 0–0.2)
BASOPHILS NFR BLD: 0.8 % (ref 0–1.9)
BILIRUB SERPL-MCNC: 1.1 MG/DL (ref 0.1–1)
BUN SERPL-MCNC: 25 MG/DL (ref 6–20)
CALCIUM SERPL-MCNC: 10 MG/DL (ref 8.7–10.5)
CHLORIDE SERPL-SCNC: 102 MMOL/L (ref 95–110)
CO2 SERPL-SCNC: 28 MMOL/L (ref 23–29)
CREAT SERPL-MCNC: 1.5 MG/DL (ref 0.5–1.4)
DIFFERENTIAL METHOD: ABNORMAL
EOSINOPHIL # BLD AUTO: 0.2 K/UL (ref 0–0.5)
EOSINOPHIL NFR BLD: 4.1 % (ref 0–8)
ERYTHROCYTE [DISTWIDTH] IN BLOOD BY AUTOMATED COUNT: 13.2 % (ref 11.5–14.5)
EST. GFR  (AFRICAN AMERICAN): >60 ML/MIN/1.73 M^2
EST. GFR  (NON AFRICAN AMERICAN): 52.8 ML/MIN/1.73 M^2
GLUCOSE SERPL-MCNC: 100 MG/DL (ref 70–110)
HCT VFR BLD AUTO: 40.7 % (ref 40–54)
HGB BLD-MCNC: 13.4 G/DL (ref 14–18)
IGA SERPL-MCNC: 106 MG/DL (ref 40–350)
IGG SERPL-MCNC: 1296 MG/DL (ref 650–1600)
IGM SERPL-MCNC: 57 MG/DL (ref 50–300)
IMM GRANULOCYTES # BLD AUTO: 0.01 K/UL (ref 0–0.04)
IMM GRANULOCYTES NFR BLD AUTO: 0.2 % (ref 0–0.5)
LYMPHOCYTES # BLD AUTO: 1 K/UL (ref 1–4.8)
LYMPHOCYTES NFR BLD: 21.1 % (ref 18–48)
MCH RBC QN AUTO: 33.1 PG (ref 27–31)
MCHC RBC AUTO-ENTMCNC: 32.9 G/DL (ref 32–36)
MCV RBC AUTO: 101 FL (ref 82–98)
MONOCYTES # BLD AUTO: 0.5 K/UL (ref 0.3–1)
MONOCYTES NFR BLD: 10.4 % (ref 4–15)
NEUTROPHILS # BLD AUTO: 3.1 K/UL (ref 1.8–7.7)
NEUTROPHILS NFR BLD: 63.4 % (ref 38–73)
NRBC BLD-RTO: 0 /100 WBC
PLATELET # BLD AUTO: 158 K/UL (ref 150–350)
PMV BLD AUTO: 9.4 FL (ref 9.2–12.9)
POTASSIUM SERPL-SCNC: 4.8 MMOL/L (ref 3.5–5.1)
PROT SERPL-MCNC: 7.3 G/DL (ref 6–8.4)
RBC # BLD AUTO: 4.05 M/UL (ref 4.6–6.2)
SODIUM SERPL-SCNC: 136 MMOL/L (ref 136–145)
WBC # BLD AUTO: 4.83 K/UL (ref 3.9–12.7)

## 2019-04-11 PROCEDURE — 86334 IMMUNOFIX E-PHORESIS SERUM: CPT | Mod: 26,,, | Performed by: PATHOLOGY

## 2019-04-11 PROCEDURE — 86334 PATHOLOGIST INTERPRETATION IFE: ICD-10-PCS | Mod: 26,,, | Performed by: PATHOLOGY

## 2019-04-11 PROCEDURE — 36415 COLL VENOUS BLD VENIPUNCTURE: CPT

## 2019-04-11 PROCEDURE — 83520 IMMUNOASSAY QUANT NOS NONAB: CPT

## 2019-04-11 PROCEDURE — 85025 COMPLETE CBC W/AUTO DIFF WBC: CPT

## 2019-04-11 PROCEDURE — 84165 PROTEIN E-PHORESIS SERUM: CPT

## 2019-04-11 PROCEDURE — 86334 IMMUNOFIX E-PHORESIS SERUM: CPT

## 2019-04-11 PROCEDURE — 82784 ASSAY IGA/IGD/IGG/IGM EACH: CPT | Mod: 59

## 2019-04-11 PROCEDURE — 84165 PATHOLOGIST INTERPRETATION SPE: ICD-10-PCS | Mod: 26,,, | Performed by: PATHOLOGY

## 2019-04-11 PROCEDURE — 84165 PROTEIN E-PHORESIS SERUM: CPT | Mod: 26,,, | Performed by: PATHOLOGY

## 2019-04-11 PROCEDURE — 80053 COMPREHEN METABOLIC PANEL: CPT

## 2019-04-12 ENCOUNTER — INFUSION (OUTPATIENT)
Dept: INFUSION THERAPY | Facility: HOSPITAL | Age: 54
End: 2019-04-12
Attending: INTERNAL MEDICINE
Payer: OTHER GOVERNMENT

## 2019-04-12 ENCOUNTER — OFFICE VISIT (OUTPATIENT)
Dept: HEMATOLOGY/ONCOLOGY | Facility: CLINIC | Age: 54
End: 2019-04-12
Payer: OTHER GOVERNMENT

## 2019-04-12 VITALS
SYSTOLIC BLOOD PRESSURE: 118 MMHG | DIASTOLIC BLOOD PRESSURE: 65 MMHG | WEIGHT: 195.56 LBS | HEART RATE: 73 BPM | TEMPERATURE: 98 F | BODY MASS INDEX: 27.38 KG/M2 | OXYGEN SATURATION: 100 % | HEIGHT: 71 IN

## 2019-04-12 DIAGNOSIS — Z09 CHEMOTHERAPY FOLLOW-UP EXAMINATION: ICD-10-CM

## 2019-04-12 DIAGNOSIS — E85.81 AL AMYLOIDOSIS: Primary | ICD-10-CM

## 2019-04-12 DIAGNOSIS — I42.5 OTHER RESTRICTIVE CARDIOMYOPATHY: ICD-10-CM

## 2019-04-12 DIAGNOSIS — G62.2 NEUROPATHY DUE TO CHEMICAL SUBSTANCE: ICD-10-CM

## 2019-04-12 LAB
ALBUMIN SERPL ELPH-MCNC: 3.93 G/DL (ref 3.35–5.55)
ALPHA1 GLOB SERPL ELPH-MCNC: 0.36 G/DL (ref 0.17–0.41)
ALPHA2 GLOB SERPL ELPH-MCNC: 0.55 G/DL (ref 0.43–0.99)
B-GLOBULIN SERPL ELPH-MCNC: 0.75 G/DL (ref 0.5–1.1)
GAMMA GLOB SERPL ELPH-MCNC: 1.2 G/DL (ref 0.67–1.58)
INTERPRETATION SERPL IFE-IMP: NORMAL
PATHOLOGIST INTERPRETATION IFE: NORMAL
PATHOLOGIST INTERPRETATION SPE: NORMAL
PROT SERPL-MCNC: 6.8 G/DL (ref 6–8.4)

## 2019-04-12 PROCEDURE — 96401 CHEMO ANTI-NEOPL SQ/IM: CPT

## 2019-04-12 PROCEDURE — 99215 PR OFFICE/OUTPT VISIT, EST, LEVL V, 40-54 MIN: ICD-10-PCS | Mod: S$PBB,,, | Performed by: INTERNAL MEDICINE

## 2019-04-12 PROCEDURE — 99215 OFFICE O/P EST HI 40 MIN: CPT | Mod: S$PBB,,, | Performed by: INTERNAL MEDICINE

## 2019-04-12 PROCEDURE — 99213 OFFICE O/P EST LOW 20 MIN: CPT | Mod: PBBFAC,25 | Performed by: INTERNAL MEDICINE

## 2019-04-12 PROCEDURE — 63600175 PHARM REV CODE 636 W HCPCS: Mod: JG | Performed by: INTERNAL MEDICINE

## 2019-04-12 PROCEDURE — 99999 PR PBB SHADOW E&M-EST. PATIENT-LVL III: CPT | Mod: PBBFAC,,, | Performed by: INTERNAL MEDICINE

## 2019-04-12 PROCEDURE — 99999 PR PBB SHADOW E&M-EST. PATIENT-LVL III: ICD-10-PCS | Mod: PBBFAC,,, | Performed by: INTERNAL MEDICINE

## 2019-04-12 RX ORDER — BORTEZOMIB 3.5 MG/1
1.3 INJECTION, POWDER, LYOPHILIZED, FOR SOLUTION INTRAVENOUS; SUBCUTANEOUS
Status: CANCELLED
Start: 2019-04-12

## 2019-04-12 RX ORDER — SODIUM CHLORIDE 0.9 % (FLUSH) 0.9 %
10 SYRINGE (ML) INJECTION
Status: DISCONTINUED | OUTPATIENT
Start: 2019-04-12 | End: 2019-04-12 | Stop reason: HOSPADM

## 2019-04-12 RX ORDER — HEPARIN 100 UNIT/ML
500 SYRINGE INTRAVENOUS
Status: CANCELLED | OUTPATIENT
Start: 2019-04-12

## 2019-04-12 RX ORDER — BORTEZOMIB 3.5 MG/1
1.3 INJECTION, POWDER, LYOPHILIZED, FOR SOLUTION INTRAVENOUS; SUBCUTANEOUS
Status: COMPLETED | OUTPATIENT
Start: 2019-04-12 | End: 2019-04-12

## 2019-04-12 RX ORDER — SODIUM CHLORIDE 0.9 % (FLUSH) 0.9 %
10 SYRINGE (ML) INJECTION
Status: CANCELLED | OUTPATIENT
Start: 2019-04-12

## 2019-04-12 RX ORDER — HEPARIN 100 UNIT/ML
500 SYRINGE INTRAVENOUS
Status: DISCONTINUED | OUTPATIENT
Start: 2019-04-12 | End: 2019-04-12 | Stop reason: HOSPADM

## 2019-04-12 RX ADMIN — BORTEZOMIB 2.7 MG: 3.5 INJECTION, POWDER, LYOPHILIZED, FOR SOLUTION INTRAVENOUS; SUBCUTANEOUS at 01:04

## 2019-04-12 NOTE — NURSING
VS stable. Velcade injection administered SQ to abdomen, pt tolerated well. Band-aid applied to site. Pt discharged with no distress noted, ambulating independently. RTC 5/9/19, pt verbalized understanding.

## 2019-04-12 NOTE — Clinical Note
Follow-up in 8 weeks with CBC< CMP, SPEP, BROOKS, free light chains, immunoglobulins, NT-proBNP and chemo appt for bortezomib

## 2019-04-12 NOTE — PROGRESS NOTES
HEMATOLOGIC MALIGNANCIES PROGRESS NOTE    IDENTIFYING STATEMENT   Roc Lai Jr. ([place]) is a 53 y.o. male with a  of 1965 from [place] with the diagnosis of AL amyloid.      ONCOLOGY HISTORY:    1. AL amyloid with cardiac involvement   A. 2016: Initial evaluation with Dr. Carmichael. Reported 18 months of progressive decline (previously ran 3 miles easily, now can only walk 10 minutes). M-protein 0.19 g/dl, kappa 1.37 mg/dl, lambda 50 mg/dl, ratio 0.03. Abdominal fat pad biopsy had been negative for presence of amyloid. Cardiac MRI suggestive of infiltrative cardiomyopathy.    B. 1/3/2017: Endomyocardial biopsy - involved by AL amyloid   C. 2017: BMBx - 60% cellular marrow with 50% plasma cells, consistent with plasma cell myeloma.    D. 2/15/2017 - 2017: Completed 4 cycles bortezomib chemotherapy on [id number] trial.    E. 2017: Bortezomib every 2 weeks x 2 doses   F. 2017: Change bortezomib to [id number] x 2 doses   G. 10/9/2017: Bortezomib q6 weeks   H. 2018: Bortezomib q8 weeks.    I. 2018: [id number] close out visit due to ineffectiveness.      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 Lai returns to clinic for follow-up of his cardiac AL amyloid.  Pt had headache and dizziness.  Since the last clinic visit the patient had an episode of dizziness after standing up from a long drive.  He underwe repeat Echo on 3/20/19 showing normal left systolic function with EF 55%, 12% global longitudinal strain, moderalte left atrial enlargement, mild to moderately reduced right ventricular function, right ventricular hypertrophy, mild right atrial enlargement, moderate tricuspid regurgitation.  The patient denies fever, chills, night sweats, weight loss, new lumps or bumps, easy bruising or  bleeding.  PT complains of neuropathy in his feet and some numbness in his hands.  He has occasional issues picking things up.  He states the neuropathy is chronic and has not increased in severity. He also has occasional pain in his feet which responds to Gabapentin.  The patient denies fever, chills, night sweats, weight loss, new lumps or bumps, easy bruising or bleeding.  He denies CP, SOB, N/V, constipation, diarrhea.    Past Medical History, Past Social History and Past Family History have been reviewed and are unchanged except as noted in the interval history.    MEDICATIONS:     Current Outpatient Medications on File Prior to Visit   Medication Sig Dispense Refill    acyclovir (ZOVIRAX) 400 MG tablet Take 1 tablet (400 mg total) by mouth 2 (two) times daily. 60 tablet 11    BORTEZOMIB (VELCADE INJ) Inject as directed. Every 2 months      bumetanide (BUMEX) 2 MG tablet Take 1 tablet (2 mg total) by mouth once daily. 90 tablet 3    gabapentin (NEURONTIN) 100 MG capsule TAKE 3 CAPSULES BY MOUTH EVERY EVENING 90 capsule 0    ketoconazole (NIZORAL) 2 % cream Apply topically once daily. for 14 days 60 g 0    magnesium oxide (MAG-OX) 400 mg (241.3 mg magnesium) tablet Take 1 tablet (400 mg total) by mouth once daily. 90 tablet 3    sildenafil (VIAGRA) 50 MG tablet Take 1 tablet (50 mg total) by mouth daily as needed for Erectile Dysfunction. 7 tablet 2    spironolactone (ALDACTONE) 25 MG tablet Take 1 tablet (25 mg total) by mouth once daily. 90 tablet 3     No current facility-administered medications on file prior to visit.        ALLERGIES: Review of patient's allergies indicates:  No Known Allergies     ROS:       Review of Systems   Constitutional: Positive for fatigue. Negative for diaphoresis, fever and unexpected weight change.   HENT:   Negative for lump/mass and sore throat.    Eyes: Negative for icterus.   Respiratory: Negative for cough and shortness of breath.    Cardiovascular: Negative for  chest pain, leg swelling and palpitations.   Gastrointestinal: Negative for abdominal distention, constipation, diarrhea, nausea and vomiting.   Genitourinary: Negative for dysuria and frequency.    Musculoskeletal: Negative for arthralgias, gait problem and myalgias.   Skin: Negative for rash.   Neurological: Positive for numbness. Negative for dizziness, gait problem and headaches.   Hematological: Negative for adenopathy. Does not bruise/bleed easily.   Psychiatric/Behavioral: The patient is not nervous/anxious.        PHYSICAL EXAM:  There were no vitals filed for this visit.  ECOG 1    NYHA Class II    Physical Exam   Constitutional: He is oriented to person, place, and time. He appears well-developed and well-nourished. No distress.   HENT:   Head: Normocephalic and atraumatic.   Right Ear: External ear normal.   Left Ear: External ear normal.   Mouth/Throat: Oropharynx is clear and moist and mucous membranes are normal. No oral lesions.   Eyes: Conjunctivae and EOM are normal.   Neck: Normal range of motion. Neck supple. No thyromegaly present.   Cardiovascular: Normal rate, regular rhythm, normal heart sounds and intact distal pulses.   No murmur heard.  Pulmonary/Chest: Effort normal and breath sounds normal. He has no wheezes. He has no rales.   Abdominal: Soft. Bowel sounds are normal. He exhibits no distension and no mass. There is no splenomegaly or hepatomegaly. There is no tenderness.   Musculoskeletal: Normal range of motion. He exhibits no edema.   Lymphadenopathy:        Head (right side): No submental and no submandibular adenopathy present.        Head (left side): No submental and no submandibular adenopathy present.     He has no cervical adenopathy.        Right cervical: No deep cervical adenopathy present.       Left cervical: No deep cervical adenopathy present.     He has no axillary adenopathy.        Right: No supraclavicular adenopathy present.        Left: No supraclavicular adenopathy  present.   Neurological: He is alert and oriented to person, place, and time. He has normal strength and normal reflexes. No cranial nerve deficit. Coordination normal.   Skin: Skin is warm and dry. No rash noted.   Skin on legs is somewhat hyperpigmented   Psychiatric: He has a normal mood and affect. His behavior is normal. Judgment and thought content normal.   Nursing note and vitals reviewed.      LAB:   Results for orders placed or performed in visit on 04/11/19   Rapid BMT CBC with Diff   Result Value Ref Range    WBC 4.83 3.90 - 12.70 K/uL    RBC 4.05 (L) 4.60 - 6.20 M/uL    Hemoglobin 13.4 (L) 14.0 - 18.0 g/dL    Hematocrit 40.7 40.0 - 54.0 %     (H) 82 - 98 fL    MCH 33.1 (H) 27.0 - 31.0 pg    MCHC 32.9 32.0 - 36.0 g/dL    RDW 13.2 11.5 - 14.5 %    Platelets 158 150 - 350 K/uL    MPV 9.4 9.2 - 12.9 fL    Immature Granulocytes 0.2 0.0 - 0.5 %    Gran # (ANC) 3.1 1.8 - 7.7 K/uL    Immature Grans (Abs) 0.01 0.00 - 0.04 K/uL    Lymph # 1.0 1.0 - 4.8 K/uL    Mono # 0.5 0.3 - 1.0 K/uL    Eos # 0.2 0.0 - 0.5 K/uL    Baso # 0.04 0.00 - 0.20 K/uL    nRBC 0 0 /100 WBC    Gran% 63.4 38.0 - 73.0 %    Lymph% 21.1 18.0 - 48.0 %    Mono% 10.4 4.0 - 15.0 %    Eosinophil% 4.1 0.0 - 8.0 %    Basophil% 0.8 0.0 - 1.9 %    Differential Method Automated    Comprehensive metabolic panel   Result Value Ref Range    Sodium 136 136 - 145 mmol/L    Potassium 4.8 3.5 - 5.1 mmol/L    Chloride 102 95 - 110 mmol/L    CO2 28 23 - 29 mmol/L    Glucose 100 70 - 110 mg/dL    BUN, Bld 25 (H) 6 - 20 mg/dL    Creatinine 1.5 (H) 0.5 - 1.4 mg/dL    Calcium 10.0 8.7 - 10.5 mg/dL    Total Protein 7.3 6.0 - 8.4 g/dL    Albumin 3.7 3.5 - 5.2 g/dL    Total Bilirubin 1.1 (H) 0.1 - 1.0 mg/dL    Alkaline Phosphatase 191 (H) 55 - 135 U/L    AST 35 10 - 40 U/L    ALT 28 10 - 44 U/L    Anion Gap 6 (L) 8 - 16 mmol/L    eGFR if African American >60.0 >60 mL/min/1.73 m^2    eGFR if non  52.8 (A) >60 mL/min/1.73 m^2   Immunoglobulins  (IgG, IgA, IgM) Quantitative   Result Value Ref Range    IgG - Serum 1296 650 - 1600 mg/dL    IgA 106 40 - 350 mg/dL    IgM 57 50 - 300 mg/dL   Protein electrophoresis, serum   Result Value Ref Range    Protein, Serum 6.8 6.0 - 8.4 g/dL    Albumin grams/dl 3.93 3.35 - 5.55 g/dL    Alpha-1 grams/dl 0.36 0.17 - 0.41 g/dL    Alpha-2 grams/dl 0.55 0.43 - 0.99 g/dL    Beta grams/dl 0.75 0.50 - 1.10 g/dL    Gamma grams/dl 1.20 0.67 - 1.58 g/dL   Immunofixation electrophoresis   Result Value Ref Range    Immunofix Interp. SEE COMMENT      Echo 3/20/19    · Normal left ventricular systolic function. The estimated ejection fraction is 55%  · Strain imaging performed in left ventricle. Global longitudinal strain is -12%. Apical sparing  · Moderate left atrial enlargement.  · Mildly to moderately reduced right ventricular systolic function. There is right ventricular hypertrophy. Mild right ventricular enlargement.  · ASD or PFO closure device in interatrial septum.  · Moderate right atrial enlargement.  · Moderate tricuspid regurgitation.  · Indeterminate left ventricular diastolic function.  · Elevated central venous pressure (15 mm Hg).  · The estimated PA systolic pressure is 24 mm Hg    PROBLEMS ASSESSED THIS VISIT:    1. AL amyloidosis    2. 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    Neuropathy  - Pt using 300mg nightly,  states it does help him sleep  - states persistent neuropathy, some restriction to normal daily activites    Follow-up: Follow-up in 8 weeks with CBC, CMP, SPEP, BROOKS, free light chains, immunoglobulins, NT-proBNP and chemo appt for bortezomib     -Discussed with Dr Wilbert Nash MD PGY-VI  Hematology and Oncology Fellow  Pager:[phone]    Krunal Nash MD  Hematology and Stem Cell Transplant      Distress Screening Results: Psychosocial Distress screening score of Distress Score: 0 noted and reviewed. No intervention indicated.

## 2019-04-16 LAB
KAPPA LC SER QL IA: 2.74 MG/DL (ref 0.33–1.94)
KAPPA LC/LAMBDA SER IA: 0.38 (ref 0.26–1.65)
LAMBDA LC SER QL IA: 7.13 MG/DL (ref 0.57–2.63)

## 2019-04-29 ENCOUNTER — PATIENT MESSAGE (OUTPATIENT)
Dept: HEMATOLOGY/ONCOLOGY | Facility: CLINIC | Age: 54
End: 2019-04-29

## 2019-04-29 DIAGNOSIS — E85.89 OTHER AMYLOIDOSIS: ICD-10-CM

## 2019-04-29 DIAGNOSIS — I50.32 CHRONIC DIASTOLIC CONGESTIVE HEART FAILURE: ICD-10-CM

## 2019-04-29 RX ORDER — SPIRONOLACTONE 25 MG/1
25 TABLET ORAL DAILY
Qty: 90 TABLET | Refills: 3 | Status: SHIPPED | OUTPATIENT
Start: 2019-04-29 | End: 2019-05-02 | Stop reason: SDUPTHER

## 2019-04-30 RX ORDER — ACYCLOVIR 400 MG/1
400 TABLET ORAL 2 TIMES DAILY
Qty: 60 TABLET | Refills: 11 | Status: SHIPPED | OUTPATIENT
Start: 2019-04-30 | End: 2019-05-02 | Stop reason: SDUPTHER

## 2019-05-02 ENCOUNTER — PATIENT MESSAGE (OUTPATIENT)
Dept: HEMATOLOGY/ONCOLOGY | Facility: CLINIC | Age: 54
End: 2019-05-02

## 2019-05-02 DIAGNOSIS — E85.89 OTHER AMYLOIDOSIS: ICD-10-CM

## 2019-05-02 DIAGNOSIS — I50.32 CHRONIC DIASTOLIC CONGESTIVE HEART FAILURE: ICD-10-CM

## 2019-05-02 RX ORDER — ACYCLOVIR 400 MG/1
400 TABLET ORAL 2 TIMES DAILY
Qty: 180 TABLET | Refills: 3 | Status: SHIPPED | OUTPATIENT
Start: 2019-05-02 | End: 2020-05-13

## 2019-05-02 RX ORDER — SPIRONOLACTONE 25 MG/1
25 TABLET ORAL DAILY
Qty: 90 TABLET | Refills: 3 | Status: SHIPPED | OUTPATIENT
Start: 2019-05-02 | End: 2020-07-19

## 2019-05-03 DIAGNOSIS — G62.2 NEUROPATHY DUE TO CHEMICAL SUBSTANCE: ICD-10-CM

## 2019-05-03 RX ORDER — GABAPENTIN 100 MG/1
CAPSULE ORAL
Qty: 90 CAPSULE | Refills: 0 | Status: SHIPPED | OUTPATIENT
Start: 2019-05-03 | End: 2019-05-27 | Stop reason: SDUPTHER

## 2019-05-27 DIAGNOSIS — G62.2 NEUROPATHY DUE TO CHEMICAL SUBSTANCE: ICD-10-CM

## 2019-05-27 RX ORDER — GABAPENTIN 100 MG/1
CAPSULE ORAL
Qty: 90 CAPSULE | Refills: 0 | Status: SHIPPED | OUTPATIENT
Start: 2019-05-27 | End: 2019-10-01 | Stop reason: SDUPTHER

## 2019-06-06 ENCOUNTER — LAB VISIT (OUTPATIENT)
Dept: LAB | Facility: HOSPITAL | Age: 54
End: 2019-06-06
Attending: INTERNAL MEDICINE
Payer: OTHER GOVERNMENT

## 2019-06-06 DIAGNOSIS — E85.81 AL AMYLOIDOSIS: ICD-10-CM

## 2019-06-06 LAB
ALBUMIN SERPL BCP-MCNC: 3.6 G/DL (ref 3.5–5.2)
ALP SERPL-CCNC: 159 U/L (ref 55–135)
ALT SERPL W/O P-5'-P-CCNC: 24 U/L (ref 10–44)
ANION GAP SERPL CALC-SCNC: 8 MMOL/L (ref 8–16)
AST SERPL-CCNC: 29 U/L (ref 10–40)
BASOPHILS # BLD AUTO: 0.05 K/UL (ref 0–0.2)
BASOPHILS NFR BLD: 0.9 % (ref 0–1.9)
BILIRUB SERPL-MCNC: 1 MG/DL (ref 0.1–1)
BUN SERPL-MCNC: 30 MG/DL (ref 6–20)
CALCIUM SERPL-MCNC: 9.6 MG/DL (ref 8.7–10.5)
CHLORIDE SERPL-SCNC: 98 MMOL/L (ref 95–110)
CO2 SERPL-SCNC: 28 MMOL/L (ref 23–29)
CREAT SERPL-MCNC: 1.5 MG/DL (ref 0.5–1.4)
DIFFERENTIAL METHOD: ABNORMAL
EOSINOPHIL # BLD AUTO: 0.2 K/UL (ref 0–0.5)
EOSINOPHIL NFR BLD: 4.3 % (ref 0–8)
ERYTHROCYTE [DISTWIDTH] IN BLOOD BY AUTOMATED COUNT: 13 % (ref 11.5–14.5)
EST. GFR  (AFRICAN AMERICAN): >60 ML/MIN/1.73 M^2
EST. GFR  (NON AFRICAN AMERICAN): 52.8 ML/MIN/1.73 M^2
GLUCOSE SERPL-MCNC: 98 MG/DL (ref 70–110)
HCT VFR BLD AUTO: 40.6 % (ref 40–54)
HGB BLD-MCNC: 13.7 G/DL (ref 14–18)
IGA SERPL-MCNC: 103 MG/DL (ref 40–350)
IGG SERPL-MCNC: 1127 MG/DL (ref 650–1600)
IGM SERPL-MCNC: 56 MG/DL (ref 50–300)
IMM GRANULOCYTES # BLD AUTO: 0.02 K/UL (ref 0–0.04)
IMM GRANULOCYTES NFR BLD AUTO: 0.4 % (ref 0–0.5)
LYMPHOCYTES # BLD AUTO: 1.1 K/UL (ref 1–4.8)
LYMPHOCYTES NFR BLD: 21 % (ref 18–48)
MCH RBC QN AUTO: 33.5 PG (ref 27–31)
MCHC RBC AUTO-ENTMCNC: 33.7 G/DL (ref 32–36)
MCV RBC AUTO: 99 FL (ref 82–98)
MONOCYTES # BLD AUTO: 0.6 K/UL (ref 0.3–1)
MONOCYTES NFR BLD: 11.2 % (ref 4–15)
NEUTROPHILS # BLD AUTO: 3.3 K/UL (ref 1.8–7.7)
NEUTROPHILS NFR BLD: 62.2 % (ref 38–73)
NRBC BLD-RTO: 0 /100 WBC
PLATELET # BLD AUTO: 141 K/UL (ref 150–350)
PMV BLD AUTO: 9.5 FL (ref 9.2–12.9)
POTASSIUM SERPL-SCNC: 4.5 MMOL/L (ref 3.5–5.1)
PROT SERPL-MCNC: 7.1 G/DL (ref 6–8.4)
RBC # BLD AUTO: 4.09 M/UL (ref 4.6–6.2)
SODIUM SERPL-SCNC: 134 MMOL/L (ref 136–145)
WBC # BLD AUTO: 5.29 K/UL (ref 3.9–12.7)

## 2019-06-06 PROCEDURE — 86334 IMMUNOFIX E-PHORESIS SERUM: CPT

## 2019-06-06 PROCEDURE — 82784 ASSAY IGA/IGD/IGG/IGM EACH: CPT | Mod: 59

## 2019-06-06 PROCEDURE — 85025 COMPLETE CBC W/AUTO DIFF WBC: CPT

## 2019-06-06 PROCEDURE — 83520 IMMUNOASSAY QUANT NOS NONAB: CPT

## 2019-06-06 PROCEDURE — 84165 PROTEIN E-PHORESIS SERUM: CPT

## 2019-06-06 PROCEDURE — 83880 ASSAY OF NATRIURETIC PEPTIDE: CPT

## 2019-06-06 PROCEDURE — 86334 IMMUNOFIX E-PHORESIS SERUM: CPT | Mod: 26,,, | Performed by: PATHOLOGY

## 2019-06-06 PROCEDURE — 80053 COMPREHEN METABOLIC PANEL: CPT

## 2019-06-06 PROCEDURE — 84165 PATHOLOGIST INTERPRETATION SPE: ICD-10-PCS | Mod: 26,,, | Performed by: PATHOLOGY

## 2019-06-06 PROCEDURE — 86334 PATHOLOGIST INTERPRETATION IFE: ICD-10-PCS | Mod: 26,,, | Performed by: PATHOLOGY

## 2019-06-06 PROCEDURE — 84165 PROTEIN E-PHORESIS SERUM: CPT | Mod: 26,,, | Performed by: PATHOLOGY

## 2019-06-07 ENCOUNTER — PATIENT MESSAGE (OUTPATIENT)
Dept: HEMATOLOGY/ONCOLOGY | Facility: CLINIC | Age: 54
End: 2019-06-07

## 2019-06-07 ENCOUNTER — OFFICE VISIT (OUTPATIENT)
Dept: HEMATOLOGY/ONCOLOGY | Facility: CLINIC | Age: 54
End: 2019-06-07
Payer: OTHER GOVERNMENT

## 2019-06-07 ENCOUNTER — INFUSION (OUTPATIENT)
Dept: INFUSION THERAPY | Facility: HOSPITAL | Age: 54
End: 2019-06-07
Attending: INTERNAL MEDICINE
Payer: OTHER GOVERNMENT

## 2019-06-07 ENCOUNTER — TELEPHONE (OUTPATIENT)
Dept: TRANSPLANT | Facility: CLINIC | Age: 54
End: 2019-06-07

## 2019-06-07 VITALS
DIASTOLIC BLOOD PRESSURE: 55 MMHG | OXYGEN SATURATION: 100 % | HEART RATE: 78 BPM | TEMPERATURE: 98 F | BODY MASS INDEX: 27.5 KG/M2 | HEIGHT: 71 IN | WEIGHT: 196.44 LBS | SYSTOLIC BLOOD PRESSURE: 114 MMHG

## 2019-06-07 VITALS
DIASTOLIC BLOOD PRESSURE: 68 MMHG | SYSTOLIC BLOOD PRESSURE: 108 MMHG | TEMPERATURE: 98 F | RESPIRATION RATE: 18 BRPM | HEART RATE: 73 BPM

## 2019-06-07 DIAGNOSIS — E85.81 AL AMYLOIDOSIS: Primary | ICD-10-CM

## 2019-06-07 DIAGNOSIS — D69.6 THROMBOCYTOPENIA: ICD-10-CM

## 2019-06-07 DIAGNOSIS — T45.1X5A ANEMIA ASSOCIATED WITH CHEMOTHERAPY: ICD-10-CM

## 2019-06-07 DIAGNOSIS — D64.81 ANEMIA ASSOCIATED WITH CHEMOTHERAPY: ICD-10-CM

## 2019-06-07 DIAGNOSIS — I50.22 CHRONIC SYSTOLIC CONGESTIVE HEART FAILURE: Primary | ICD-10-CM

## 2019-06-07 DIAGNOSIS — I50.32 CHRONIC DIASTOLIC CONGESTIVE HEART FAILURE: ICD-10-CM

## 2019-06-07 LAB
ALBUMIN SERPL ELPH-MCNC: 3.98 G/DL (ref 3.35–5.55)
ALPHA1 GLOB SERPL ELPH-MCNC: 0.36 G/DL (ref 0.17–0.41)
ALPHA2 GLOB SERPL ELPH-MCNC: 0.58 G/DL (ref 0.43–0.99)
B-GLOBULIN SERPL ELPH-MCNC: 0.75 G/DL (ref 0.5–1.1)
GAMMA GLOB SERPL ELPH-MCNC: 1.03 G/DL (ref 0.67–1.58)
INTERPRETATION SERPL IFE-IMP: NORMAL
KAPPA LC SER QL IA: 2.84 MG/DL (ref 0.33–1.94)
KAPPA LC/LAMBDA SER IA: 0.34 (ref 0.26–1.65)
LAMBDA LC SER QL IA: 8.34 MG/DL (ref 0.57–2.63)
NT-PROBNP SERPL-MCNC: <25 PG/ML
PATHOLOGIST INTERPRETATION IFE: NORMAL
PATHOLOGIST INTERPRETATION SPE: NORMAL
PROT SERPL-MCNC: 6.7 G/DL (ref 6–8.4)

## 2019-06-07 PROCEDURE — 99215 OFFICE O/P EST HI 40 MIN: CPT | Mod: S$PBB,,, | Performed by: INTERNAL MEDICINE

## 2019-06-07 PROCEDURE — 63600175 PHARM REV CODE 636 W HCPCS: Mod: JG | Performed by: INTERNAL MEDICINE

## 2019-06-07 PROCEDURE — 99215 PR OFFICE/OUTPT VISIT, EST, LEVL V, 40-54 MIN: ICD-10-PCS | Mod: S$PBB,,, | Performed by: INTERNAL MEDICINE

## 2019-06-07 PROCEDURE — 96401 CHEMO ANTI-NEOPL SQ/IM: CPT

## 2019-06-07 PROCEDURE — 99999 PR PBB SHADOW E&M-EST. PATIENT-LVL III: ICD-10-PCS | Mod: PBBFAC,,, | Performed by: INTERNAL MEDICINE

## 2019-06-07 PROCEDURE — 99999 PR PBB SHADOW E&M-EST. PATIENT-LVL III: CPT | Mod: PBBFAC,,, | Performed by: INTERNAL MEDICINE

## 2019-06-07 PROCEDURE — 99213 OFFICE O/P EST LOW 20 MIN: CPT | Mod: PBBFAC,25 | Performed by: INTERNAL MEDICINE

## 2019-06-07 RX ORDER — SODIUM CHLORIDE 0.9 % (FLUSH) 0.9 %
10 SYRINGE (ML) INJECTION
Status: CANCELLED | OUTPATIENT
Start: 2019-06-07

## 2019-06-07 RX ORDER — HEPARIN 100 UNIT/ML
500 SYRINGE INTRAVENOUS
Status: CANCELLED | OUTPATIENT
Start: 2019-06-07

## 2019-06-07 RX ORDER — BORTEZOMIB 3.5 MG/1
1.3 INJECTION, POWDER, LYOPHILIZED, FOR SOLUTION INTRAVENOUS; SUBCUTANEOUS
Status: CANCELLED
Start: 2019-06-07

## 2019-06-07 RX ORDER — BORTEZOMIB 3.5 MG/1
1.3 INJECTION, POWDER, LYOPHILIZED, FOR SOLUTION INTRAVENOUS; SUBCUTANEOUS
Status: COMPLETED | OUTPATIENT
Start: 2019-06-07 | End: 2019-06-07

## 2019-06-07 RX ORDER — HEPARIN 100 UNIT/ML
500 SYRINGE INTRAVENOUS
Status: DISCONTINUED | OUTPATIENT
Start: 2019-06-07 | End: 2019-06-07 | Stop reason: HOSPADM

## 2019-06-07 RX ORDER — SODIUM CHLORIDE 0.9 % (FLUSH) 0.9 %
10 SYRINGE (ML) INJECTION
Status: DISCONTINUED | OUTPATIENT
Start: 2019-06-07 | End: 2019-06-07 | Stop reason: HOSPADM

## 2019-06-07 RX ADMIN — BORTEZOMIB 2.7 MG: 3.5 INJECTION, POWDER, LYOPHILIZED, FOR SOLUTION INTRAVENOUS; SUBCUTANEOUS at 10:06

## 2019-06-07 NOTE — Clinical Note
Follow-up in 8 weeks with CBC, CMP, SPEP, BROOKS, free light chains, immunoglobulins the day before visit. Please schedule chemo appt for velcade on 8/2.

## 2019-06-07 NOTE — NURSING
Tolerated Velcade injection to abdomen with no complications. VS stable. Labs reviewed. Consent in chart. Patient has no complaints at this time. Appointments reviewed. Discharged home.

## 2019-06-09 PROBLEM — D64.81 ANEMIA ASSOCIATED WITH CHEMOTHERAPY: Status: ACTIVE | Noted: 2019-06-09

## 2019-06-09 PROBLEM — T45.1X5A ANEMIA ASSOCIATED WITH CHEMOTHERAPY: Status: ACTIVE | Noted: 2019-06-09

## 2019-06-09 PROBLEM — D69.6 THROMBOCYTOPENIA: Status: ACTIVE | Noted: 2019-06-09

## 2019-06-09 NOTE — PROGRESS NOTES
HEMATOLOGIC MALIGNANCIES PROGRESS NOTE    IDENTIFYING STATEMENT   Roc Lai Jr. (Ovi) is a 53 y.o. male with a  of 1965 from [place] with the diagnosis of AL amyloid.      ONCOLOGY HISTORY:    1. AL amyloid with cardiac involvement   A. 2016: Initial evaluation with Dr. Carmichael. Reported 18 months of progressive decline (previously ran 3 miles easily, now can only walk 10 minutes). M-protein 0.19 g/dl, kappa 1.37 mg/dl, lambda 50 mg/dl, ratio 0.03. Abdominal fat pad biopsy had been negative for presence of amyloid. Cardiac MRI suggestive of infiltrative cardiomyopathy.    B. 1/3/2017: Endomyocardial biopsy - involved by AL amyloid   C. 2017: BMBx - 60% cellular marrow with 50% plasma cells, consistent with plasma cell myeloma.    D. 2/15/2017 - 2017: Completed 4 cycles bortezomib chemotherapy on [id number] trial.    E. 2017: Bortezomib every 2 weeks x 2 doses   F. 2017: Change bortezomib to [id number] x 2 doses   G. 10/9/2017: Bortezomib q6 weeks   H. 2018: Bortezomib q8 weeks.    I. 2018: [id number] close out visit due to ineffectiveness.      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 Lai returns to clinic for follow-up of his cardiac AL amyloid.  He has been doing well in the interim since his last visit. He is continuing to control his edema with oral diuretics. He is still working in [place].     He has several trips planned with his family, including a trip to [place], and a trip to the [place]. He feels able to participate in both fully.    He denies any current dyspnea on exertion, palpiations, chest pain. No bone pain. His neuropathy persists but is improved with new shoe inserts.     Past Medical History, Past Social History and Past Family History have been reviewed  and are unchanged except as noted in the interval history.    MEDICATIONS:     Current Outpatient Medications on File Prior to Visit   Medication Sig Dispense Refill    acyclovir (ZOVIRAX) 400 MG tablet Take 1 tablet (400 mg total) by mouth 2 (two) times daily. 180 tablet 3    BORTEZOMIB (VELCADE INJ) Inject as directed. Every 2 months      bumetanide (BUMEX) 2 MG tablet Take 1 tablet (2 mg total) by mouth once daily. 90 tablet 3    gabapentin (NEURONTIN) 100 MG capsule TAKE 3 CAPSULES BY MOUTH EVERY EVENING 90 capsule 0    magnesium oxide (MAG-OX) 400 mg (241.3 mg magnesium) tablet Take 1 tablet (400 mg total) by mouth once daily. 90 tablet 3    sildenafil (VIAGRA) 50 MG tablet Take 1 tablet (50 mg total) by mouth daily as needed for Erectile Dysfunction. 7 tablet 2    spironolactone (ALDACTONE) 25 MG tablet Take 1 tablet (25 mg total) by mouth once daily. 90 tablet 3     No current facility-administered medications on file prior to visit.        ALLERGIES: Review of patient's allergies indicates:  No Known Allergies     ROS:       Review of Systems   Constitutional: Positive for fatigue. Negative for diaphoresis, fever and unexpected weight change.   HENT:   Negative for lump/mass and sore throat.    Eyes: Negative for icterus.   Respiratory: Negative for cough and shortness of breath.    Cardiovascular: Negative for chest pain, leg swelling and palpitations.   Gastrointestinal: Negative for abdominal distention, constipation, diarrhea, nausea and vomiting.   Genitourinary: Negative for dysuria and frequency.    Musculoskeletal: Negative for arthralgias, gait problem and myalgias.   Skin: Negative for rash.   Neurological: Positive for numbness. Negative for dizziness, gait problem and headaches.   Hematological: Negative for adenopathy. Does not bruise/bleed easily.   Psychiatric/Behavioral: The patient is not nervous/anxious.        PHYSICAL EXAM:  Vitals:    06/07/19 0930   BP: (!) 114/55   Pulse: 78  "  Temp: 98.4 °F (36.9 °C)   SpO2: 100%   Weight: 89.1 kg (196 lb 6.9 oz)   Height: 5' 11" (1.803 m)   PainSc: 0-No pain     ECOG 1    NYHA Class II    Physical Exam   Constitutional: He is oriented to person, place, and time. He appears well-developed and well-nourished. No distress.   HENT:   Head: Normocephalic and atraumatic.   Right Ear: External ear normal.   Left Ear: External ear normal.   Mouth/Throat: Oropharynx is clear and moist and mucous membranes are normal. No oral lesions.   Eyes: Conjunctivae and EOM are normal.   Neck: Normal range of motion. Neck supple. No thyromegaly present.   Cardiovascular: Normal rate, regular rhythm, normal heart sounds and intact distal pulses.   No murmur heard.  Pulmonary/Chest: Effort normal and breath sounds normal. He has no wheezes. He has no rales.   Abdominal: Soft. Bowel sounds are normal. He exhibits no distension and no mass. There is no splenomegaly or hepatomegaly. There is no tenderness.   Musculoskeletal: Normal range of motion. He exhibits no edema.   Lymphadenopathy:        Head (right side): No submental and no submandibular adenopathy present.        Head (left side): No submental and no submandibular adenopathy present.     He has no cervical adenopathy.        Right cervical: No deep cervical adenopathy present.       Left cervical: No deep cervical adenopathy present.     He has no axillary adenopathy.        Right: No supraclavicular adenopathy present.        Left: No supraclavicular adenopathy present.   Neurological: He is alert and oriented to person, place, and time. He has normal strength and normal reflexes. No cranial nerve deficit. Coordination normal.   Skin: Skin is warm and dry. No rash noted.   Psychiatric: He has a normal mood and affect. His behavior is normal. Judgment and thought content normal.   Nursing note and vitals reviewed.      LAB:   Results for orders placed or performed in visit on 06/06/19   Rapid BMT CBC with Diff "   Result Value Ref Range    WBC 5.29 3.90 - 12.70 K/uL    RBC 4.09 (L) 4.60 - 6.20 M/uL    Hemoglobin 13.7 (L) 14.0 - 18.0 g/dL    Hematocrit 40.6 40.0 - 54.0 %    Mean Corpuscular Volume 99 (H) 82 - 98 fL    Mean Corpuscular Hemoglobin 33.5 (H) 27.0 - 31.0 pg    Mean Corpuscular Hemoglobin Conc 33.7 32.0 - 36.0 g/dL    RDW 13.0 11.5 - 14.5 %    Platelets 141 (L) 150 - 350 K/uL    MPV 9.5 9.2 - 12.9 fL    Immature Granulocytes 0.4 0.0 - 0.5 %    Gran # (ANC) 3.3 1.8 - 7.7 K/uL    Immature Grans (Abs) 0.02 0.00 - 0.04 K/uL    Lymph # 1.1 1.0 - 4.8 K/uL    Mono # 0.6 0.3 - 1.0 K/uL    Eos # 0.2 0.0 - 0.5 K/uL    Baso # 0.05 0.00 - 0.20 K/uL    nRBC 0 0 /100 WBC    Gran% 62.2 38.0 - 73.0 %    Lymph% 21.0 18.0 - 48.0 %    Mono% 11.2 4.0 - 15.0 %    Eosinophil% 4.3 0.0 - 8.0 %    Basophil% 0.9 0.0 - 1.9 %    Differential Method Automated    Comprehensive metabolic panel   Result Value Ref Range    Sodium 134 (L) 136 - 145 mmol/L    Potassium 4.5 3.5 - 5.1 mmol/L    Chloride 98 95 - 110 mmol/L    CO2 28 23 - 29 mmol/L    Glucose 98 70 - 110 mg/dL    BUN, Bld 30 (H) 6 - 20 mg/dL    Creatinine 1.5 (H) 0.5 - 1.4 mg/dL    Calcium 9.6 8.7 - 10.5 mg/dL    Total Protein 7.1 6.0 - 8.4 g/dL    Albumin 3.6 3.5 - 5.2 g/dL    Total Bilirubin 1.0 0.1 - 1.0 mg/dL    Alkaline Phosphatase 159 (H) 55 - 135 U/L    AST 29 10 - 40 U/L    ALT 24 10 - 44 U/L    Anion Gap 8 8 - 16 mmol/L    eGFR if African American >60.0 >60 mL/min/1.73 m^2    eGFR if non  52.8 (A) >60 mL/min/1.73 m^2   Protein electrophoresis, serum   Result Value Ref Range    Protein, Serum 6.7 6.0 - 8.4 g/dL    Albumin grams/dl 3.98 3.35 - 5.55 g/dL    Alpha-1 grams/dl 0.36 0.17 - 0.41 g/dL    Alpha-2 grams/dl 0.58 0.43 - 0.99 g/dL    Beta grams/dl 0.75 0.50 - 1.10 g/dL    Gamma grams/dl 1.03 0.67 - 1.58 g/dL   Immunofixation electrophoresis   Result Value Ref Range    Immunofix Interp. SEE COMMENT    Immunoglobulin free LT chains blood   Result Value Ref  Range    Kappa Free Light Chains 2.84 (H) 0.33 - 1.94 mg/dL    Lambda Free Light Chains 8.34 (H) 0.57 - 2.63 mg/dL    Kappa/Lambda FLC Ratio 0.34 0.26 - 1.65   Immunoglobulins (IgG, IgA, IgM) Quantitative   Result Value Ref Range    IgG - Serum 1127 650 - 1600 mg/dL    IgA 103 40 - 350 mg/dL    IgM 56 50 - 300 mg/dL   NT-Pro Natriuretic Peptide   Result Value Ref Range    NT-proBNP <25 <=62 pg/mL   Pathologist Interpretation SPE   Result Value Ref Range    Pathologist Interpretation SPE REVIEWED    Pathologist Interpretation BROOKS   Result Value Ref Range    Pathologist Interpretation BROOKS REVIEWED        PROBLEMS ASSESSED THIS VISIT:    1. AL amyloidosis    2. Chronic diastolic congestive heart failure    3. Anemia associated with chemotherapy    4. Thrombocytopenia        PLAN:       AL amyloidosis  No clinical evidence of worsening amyloid today. Lambda light chains have increased slightly, but the difference between kappa and lambda is still small. M-protein has actually declined compared with last visit.    We will continue close monitoring and administer bortezomib as a maintenance therapy. He desires to pursue this every 8 weeks. We have previously acknowledged the uncertainty surrounding this frequency and regimen of therapy.     Chronic diastolic congestive heart failure  Compensated today. Continiue bumetanide and follow-up with Dr. Lao.     Anemia associated with chemotherapy  Mild. We will monitor.     Thrombocytopenia  Very mild. This is only intermittently low. We will monitor.       Follow-up: Follow-up in 8 weeks with CBC, CMP, SPEP, BROOKS, free light chains, immunoglobulins, NT-proBNP and chemo appt for bortezomib     Chris Atkins MD  Hematology and Stem Cell Transplant      Distress Screening Results: Psychosocial Distress screening score of Distress Score: 0 noted and reviewed. No intervention indicated.

## 2019-06-09 NOTE — ASSESSMENT & PLAN NOTE
No clinical evidence of worsening amyloid today. Lambda light chains have increased slightly, but the difference between kappa and lambda is still small. M-protein has actually declined compared with last visit.    We will continue close monitoring and administer bortezomib as a maintenance therapy. He desires to pursue this every 8 weeks. We have previously acknowledged the uncertainty surrounding this frequency and regimen of therapy.

## 2019-06-10 ENCOUNTER — PATIENT MESSAGE (OUTPATIENT)
Dept: HEMATOLOGY/ONCOLOGY | Facility: CLINIC | Age: 54
End: 2019-06-10

## 2019-06-12 ENCOUNTER — PATIENT MESSAGE (OUTPATIENT)
Dept: HEMATOLOGY/ONCOLOGY | Facility: CLINIC | Age: 54
End: 2019-06-12

## 2019-07-04 ENCOUNTER — PATIENT MESSAGE (OUTPATIENT)
Dept: HEMATOLOGY/ONCOLOGY | Facility: CLINIC | Age: 54
End: 2019-07-04

## 2019-07-04 DIAGNOSIS — G62.2 NEUROPATHY DUE TO CHEMICAL SUBSTANCE: ICD-10-CM

## 2019-07-05 RX ORDER — GABAPENTIN 100 MG/1
CAPSULE ORAL
Qty: 270 CAPSULE | Refills: 0 | Status: SHIPPED | OUTPATIENT
Start: 2019-07-05 | End: 2019-08-15 | Stop reason: SDUPTHER

## 2019-07-08 ENCOUNTER — PATIENT MESSAGE (OUTPATIENT)
Dept: HEMATOLOGY/ONCOLOGY | Facility: CLINIC | Age: 54
End: 2019-07-08

## 2019-07-25 ENCOUNTER — LAB VISIT (OUTPATIENT)
Dept: LAB | Facility: HOSPITAL | Age: 54
End: 2019-07-25
Attending: INTERNAL MEDICINE
Payer: OTHER GOVERNMENT

## 2019-07-25 DIAGNOSIS — E85.81 AL AMYLOIDOSIS: ICD-10-CM

## 2019-07-25 LAB
ABO + RH BLD: NORMAL
ALBUMIN SERPL BCP-MCNC: 3.6 G/DL (ref 3.5–5.2)
ALP SERPL-CCNC: 136 U/L (ref 55–135)
ALT SERPL W/O P-5'-P-CCNC: 19 U/L (ref 10–44)
ANION GAP SERPL CALC-SCNC: 10 MMOL/L (ref 8–16)
AST SERPL-CCNC: 27 U/L (ref 10–40)
BASOPHILS # BLD AUTO: 0.05 K/UL (ref 0–0.2)
BASOPHILS NFR BLD: 0.8 % (ref 0–1.9)
BILIRUB SERPL-MCNC: 1 MG/DL (ref 0.1–1)
BLD GP AB SCN CELLS X3 SERPL QL: NORMAL
BUN SERPL-MCNC: 26 MG/DL (ref 6–20)
CALCIUM SERPL-MCNC: 9.3 MG/DL (ref 8.7–10.5)
CHLORIDE SERPL-SCNC: 101 MMOL/L (ref 95–110)
CO2 SERPL-SCNC: 26 MMOL/L (ref 23–29)
CREAT SERPL-MCNC: 1.4 MG/DL (ref 0.5–1.4)
DIFFERENTIAL METHOD: ABNORMAL
EOSINOPHIL # BLD AUTO: 0.2 K/UL (ref 0–0.5)
EOSINOPHIL NFR BLD: 3.1 % (ref 0–8)
ERYTHROCYTE [DISTWIDTH] IN BLOOD BY AUTOMATED COUNT: 13.4 % (ref 11.5–14.5)
EST. GFR  (AFRICAN AMERICAN): >60 ML/MIN/1.73 M^2
EST. GFR  (NON AFRICAN AMERICAN): 57 ML/MIN/1.73 M^2
GLUCOSE SERPL-MCNC: 121 MG/DL (ref 70–110)
HCT VFR BLD AUTO: 40.4 % (ref 40–54)
HGB BLD-MCNC: 13 G/DL (ref 14–18)
IGA SERPL-MCNC: 88 MG/DL (ref 40–350)
IGG SERPL-MCNC: 953 MG/DL (ref 650–1600)
IGM SERPL-MCNC: 46 MG/DL (ref 50–300)
IMM GRANULOCYTES # BLD AUTO: 0.03 K/UL (ref 0–0.04)
IMM GRANULOCYTES NFR BLD AUTO: 0.5 % (ref 0–0.5)
LYMPHOCYTES # BLD AUTO: 1.3 K/UL (ref 1–4.8)
LYMPHOCYTES NFR BLD: 21.6 % (ref 18–48)
MCH RBC QN AUTO: 33.6 PG (ref 27–31)
MCHC RBC AUTO-ENTMCNC: 32.2 G/DL (ref 32–36)
MCV RBC AUTO: 104 FL (ref 82–98)
MONOCYTES # BLD AUTO: 0.6 K/UL (ref 0.3–1)
MONOCYTES NFR BLD: 9.4 % (ref 4–15)
NEUTROPHILS # BLD AUTO: 3.9 K/UL (ref 1.8–7.7)
NEUTROPHILS NFR BLD: 64.6 % (ref 38–73)
NRBC BLD-RTO: 0 /100 WBC
PLATELET # BLD AUTO: 132 K/UL (ref 150–350)
PMV BLD AUTO: 9.7 FL (ref 9.2–12.9)
POTASSIUM SERPL-SCNC: 4.3 MMOL/L (ref 3.5–5.1)
PROT SERPL-MCNC: 6.7 G/DL (ref 6–8.4)
RBC # BLD AUTO: 3.87 M/UL (ref 4.6–6.2)
SODIUM SERPL-SCNC: 137 MMOL/L (ref 136–145)
WBC # BLD AUTO: 6.06 K/UL (ref 3.9–12.7)

## 2019-07-25 PROCEDURE — 86334 PATHOLOGIST INTERPRETATION IFE: ICD-10-PCS | Mod: 26,,, | Performed by: PATHOLOGY

## 2019-07-25 PROCEDURE — 83520 IMMUNOASSAY QUANT NOS NONAB: CPT | Mod: 59

## 2019-07-25 PROCEDURE — 86850 RBC ANTIBODY SCREEN: CPT

## 2019-07-25 PROCEDURE — 86334 IMMUNOFIX E-PHORESIS SERUM: CPT | Mod: 26,,, | Performed by: PATHOLOGY

## 2019-07-25 PROCEDURE — 84165 PROTEIN E-PHORESIS SERUM: CPT | Mod: 26,,, | Performed by: PATHOLOGY

## 2019-07-25 PROCEDURE — 85025 COMPLETE CBC W/AUTO DIFF WBC: CPT

## 2019-07-25 PROCEDURE — 84165 PROTEIN E-PHORESIS SERUM: CPT

## 2019-07-25 PROCEDURE — 36415 COLL VENOUS BLD VENIPUNCTURE: CPT

## 2019-07-25 PROCEDURE — 80053 COMPREHEN METABOLIC PANEL: CPT

## 2019-07-25 PROCEDURE — 86334 IMMUNOFIX E-PHORESIS SERUM: CPT

## 2019-07-25 PROCEDURE — 82784 ASSAY IGA/IGD/IGG/IGM EACH: CPT | Mod: 59

## 2019-07-25 PROCEDURE — 84165 PATHOLOGIST INTERPRETATION SPE: ICD-10-PCS | Mod: 26,,, | Performed by: PATHOLOGY

## 2019-07-26 ENCOUNTER — INFUSION (OUTPATIENT)
Dept: INFUSION THERAPY | Facility: HOSPITAL | Age: 54
End: 2019-07-26
Attending: INTERNAL MEDICINE
Payer: OTHER GOVERNMENT

## 2019-07-26 ENCOUNTER — PATIENT MESSAGE (OUTPATIENT)
Dept: HEMATOLOGY/ONCOLOGY | Facility: CLINIC | Age: 54
End: 2019-07-26

## 2019-07-26 ENCOUNTER — OFFICE VISIT (OUTPATIENT)
Dept: HEMATOLOGY/ONCOLOGY | Facility: CLINIC | Age: 54
End: 2019-07-26
Payer: OTHER GOVERNMENT

## 2019-07-26 VITALS
SYSTOLIC BLOOD PRESSURE: 110 MMHG | HEART RATE: 84 BPM | RESPIRATION RATE: 17 BRPM | WEIGHT: 203.06 LBS | HEIGHT: 71 IN | TEMPERATURE: 98 F | OXYGEN SATURATION: 98 % | DIASTOLIC BLOOD PRESSURE: 63 MMHG | BODY MASS INDEX: 28.43 KG/M2

## 2019-07-26 VITALS
RESPIRATION RATE: 18 BRPM | DIASTOLIC BLOOD PRESSURE: 61 MMHG | TEMPERATURE: 98 F | HEART RATE: 78 BPM | SYSTOLIC BLOOD PRESSURE: 116 MMHG

## 2019-07-26 DIAGNOSIS — I50.32 CHRONIC DIASTOLIC CONGESTIVE HEART FAILURE: ICD-10-CM

## 2019-07-26 DIAGNOSIS — D47.2 SMOLDERING MULTIPLE MYELOMA: ICD-10-CM

## 2019-07-26 DIAGNOSIS — E85.81 AL AMYLOIDOSIS: Primary | ICD-10-CM

## 2019-07-26 LAB
ALBUMIN SERPL ELPH-MCNC: 3.74 G/DL (ref 3.35–5.55)
ALPHA1 GLOB SERPL ELPH-MCNC: 0.33 G/DL (ref 0.17–0.41)
ALPHA2 GLOB SERPL ELPH-MCNC: 0.5 G/DL (ref 0.43–0.99)
B-GLOBULIN SERPL ELPH-MCNC: 0.7 G/DL (ref 0.5–1.1)
GAMMA GLOB SERPL ELPH-MCNC: 0.83 G/DL (ref 0.67–1.58)
INTERPRETATION SERPL IFE-IMP: NORMAL
KAPPA LC SER QL IA: 2.78 MG/DL (ref 0.33–1.94)
KAPPA LC/LAMBDA SER IA: 0.2 (ref 0.26–1.65)
LAMBDA LC SER QL IA: 13.75 MG/DL (ref 0.57–2.63)
PATHOLOGIST INTERPRETATION IFE: NORMAL
PATHOLOGIST INTERPRETATION SPE: NORMAL
PROT SERPL-MCNC: 6.1 G/DL (ref 6–8.4)

## 2019-07-26 PROCEDURE — 63600175 PHARM REV CODE 636 W HCPCS: Mod: JG | Performed by: INTERNAL MEDICINE

## 2019-07-26 PROCEDURE — 96401 CHEMO ANTI-NEOPL SQ/IM: CPT

## 2019-07-26 PROCEDURE — 99999 PR PBB SHADOW E&M-EST. PATIENT-LVL III: ICD-10-PCS | Mod: PBBFAC,,, | Performed by: INTERNAL MEDICINE

## 2019-07-26 PROCEDURE — 99213 OFFICE O/P EST LOW 20 MIN: CPT | Mod: PBBFAC,25 | Performed by: INTERNAL MEDICINE

## 2019-07-26 PROCEDURE — 99214 PR OFFICE/OUTPT VISIT, EST, LEVL IV, 30-39 MIN: ICD-10-PCS | Mod: S$PBB,,, | Performed by: INTERNAL MEDICINE

## 2019-07-26 PROCEDURE — 99999 PR PBB SHADOW E&M-EST. PATIENT-LVL III: CPT | Mod: PBBFAC,,, | Performed by: INTERNAL MEDICINE

## 2019-07-26 PROCEDURE — 99214 OFFICE O/P EST MOD 30 MIN: CPT | Mod: S$PBB,,, | Performed by: INTERNAL MEDICINE

## 2019-07-26 RX ORDER — HEPARIN 100 UNIT/ML
500 SYRINGE INTRAVENOUS
Status: DISCONTINUED | OUTPATIENT
Start: 2019-07-26 | End: 2019-07-26 | Stop reason: HOSPADM

## 2019-07-26 RX ORDER — SODIUM CHLORIDE 0.9 % (FLUSH) 0.9 %
10 SYRINGE (ML) INJECTION
Status: CANCELLED | OUTPATIENT
Start: 2019-07-26

## 2019-07-26 RX ORDER — SODIUM CHLORIDE 0.9 % (FLUSH) 0.9 %
10 SYRINGE (ML) INJECTION
Status: DISCONTINUED | OUTPATIENT
Start: 2019-07-26 | End: 2019-07-26 | Stop reason: HOSPADM

## 2019-07-26 RX ORDER — HEPARIN 100 UNIT/ML
500 SYRINGE INTRAVENOUS
Status: CANCELLED | OUTPATIENT
Start: 2019-07-26

## 2019-07-26 RX ORDER — BORTEZOMIB 3.5 MG/1
1.3 INJECTION, POWDER, LYOPHILIZED, FOR SOLUTION INTRAVENOUS; SUBCUTANEOUS
Status: CANCELLED
Start: 2019-07-26

## 2019-07-26 RX ORDER — BORTEZOMIB 3.5 MG/1
1.3 INJECTION, POWDER, LYOPHILIZED, FOR SOLUTION INTRAVENOUS; SUBCUTANEOUS
Status: COMPLETED | OUTPATIENT
Start: 2019-07-26 | End: 2019-07-26

## 2019-07-26 RX ADMIN — BORTEZOMIB 2.8 MG: 3.5 INJECTION, POWDER, LYOPHILIZED, FOR SOLUTION INTRAVENOUS; SUBCUTANEOUS at 09:07

## 2019-07-26 NOTE — PROGRESS NOTES
HEMATOLOGIC MALIGNANCIES PROGRESS NOTE    IDENTIFYING STATEMENT   Roc Lai Jr. (Ovi) is a 54 y.o. male with a  of 1965 from [place] with the diagnosis of AL amyloid.      ONCOLOGY HISTORY:    1. AL amyloid with cardiac involvement   A. 2016: Initial evaluation with Dr. Carmichael. Reported 18 months of progressive decline (previously ran 3 miles easily, now can only walk 10 minutes). M-protein 0.19 g/dl, kappa 1.37 mg/dl, lambda 50 mg/dl, ratio 0.03. Abdominal fat pad biopsy had been negative for presence of amyloid. Cardiac MRI suggestive of infiltrative cardiomyopathy.    B. 1/3/2017: Endomyocardial biopsy - involved by AL amyloid   C. 2017: BMBx - 60% cellular marrow with 50% plasma cells, consistent with plasma cell myeloma.    D. 2/15/2017 - 2017: Completed 4 cycles bortezomib chemotherapy on [id number] trial.    E. 2017: Bortezomib every 2 weeks x 2 doses   F. 2017: Change bortezomib to [id number] x 2 doses   G. 10/9/2017: Bortezomib q6 weeks   H. 2018: Bortezomib q8 weeks.    I. 2018: [id number] close out visit due to ineffectiveness.      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 Lai returns to clinic for follow-up of his cardiac AL amyloid.      He continues to do well since his last visit.  Denies chest pain, palpitations, shortness of breath, or leg swelling.  Neuropathy is unchanged since his last treatment.  He plans to go to the [facility] tomorrow near [place], [place].    Past Medical History, Past Social History and Past Family History have been reviewed and are unchanged except as noted in the interval history.    MEDICATIONS:     Current Outpatient Medications on File Prior to Visit   Medication Sig Dispense Refill    acyclovir (ZOVIRAX) 400 MG tablet Take 1 tablet (400 mg  "total) by mouth 2 (two) times daily. 180 tablet 3    BORTEZOMIB (VELCADE INJ) Inject as directed. Every 2 months      bumetanide (BUMEX) 2 MG tablet Take 1 tablet (2 mg total) by mouth once daily. 90 tablet 3    gabapentin (NEURONTIN) 100 MG capsule TAKE 3 CAPSULES BY MOUTH EVERY EVENING 90 capsule 0    gabapentin (NEURONTIN) 100 MG capsule TAKE 3 CAPSULES EVERY EVENING 270 capsule 0    magnesium oxide (MAG-OX) 400 mg (241.3 mg magnesium) tablet Take 1 tablet (400 mg total) by mouth once daily. 90 tablet 3    sildenafil (VIAGRA) 50 MG tablet Take 1 tablet (50 mg total) by mouth daily as needed for Erectile Dysfunction. 7 tablet 2    spironolactone (ALDACTONE) 25 MG tablet Take 1 tablet (25 mg total) by mouth once daily. 90 tablet 3     No current facility-administered medications on file prior to visit.        ALLERGIES: Review of patient's allergies indicates:  No Known Allergies     ROS:       Review of Systems   Constitutional: Positive for fatigue. Negative for diaphoresis, fever and unexpected weight change.   HENT:   Negative for lump/mass and sore throat.    Eyes: Negative for icterus.   Respiratory: Negative for cough and shortness of breath.    Cardiovascular: Negative for chest pain, leg swelling and palpitations.   Gastrointestinal: Negative for abdominal distention, constipation, diarrhea, nausea and vomiting.   Genitourinary: Negative for dysuria and frequency.    Musculoskeletal: Negative for arthralgias, gait problem and myalgias.   Skin: Negative for rash.   Neurological: Positive for numbness. Negative for dizziness, gait problem and headaches.   Hematological: Negative for adenopathy. Does not bruise/bleed easily.   Psychiatric/Behavioral: The patient is not nervous/anxious.        PHYSICAL EXAM:  Vitals:    07/26/19 0856   BP: 110/63   Pulse: 84   Resp: 17   Temp: 98.4 °F (36.9 °C)   SpO2: 98%   Weight: 92.1 kg (203 lb 0.7 oz)   Height: 5' 11" (1.803 m)   PainSc: 0-No pain     ECOG " 1    NYHA Class II    Physical Exam   Constitutional: He is oriented to person, place, and time. He appears well-developed and well-nourished. No distress.   HENT:   Head: Normocephalic and atraumatic.   Right Ear: External ear normal.   Left Ear: External ear normal.   Mouth/Throat: Oropharynx is clear and moist and mucous membranes are normal. No oral lesions.   Eyes: Conjunctivae and EOM are normal.   Neck: Normal range of motion. Neck supple. No thyromegaly present.   Cardiovascular: Normal rate, regular rhythm, normal heart sounds and intact distal pulses.   No murmur heard.  Pulmonary/Chest: Effort normal and breath sounds normal. He has no wheezes. He has no rales.   Abdominal: Soft. Bowel sounds are normal. He exhibits no distension and no mass. There is no splenomegaly or hepatomegaly. There is no tenderness.   Musculoskeletal: Normal range of motion. He exhibits no edema.   Lymphadenopathy:        Head (right side): No submental and no submandibular adenopathy present.        Head (left side): No submental and no submandibular adenopathy present.     He has no cervical adenopathy.        Right cervical: No deep cervical adenopathy present.       Left cervical: No deep cervical adenopathy present.     He has no axillary adenopathy.        Right: No supraclavicular adenopathy present.        Left: No supraclavicular adenopathy present.   Neurological: He is alert and oriented to person, place, and time. He has normal strength and normal reflexes. No cranial nerve deficit. Coordination normal.   Skin: Skin is warm and dry. No rash noted.   Psychiatric: He has a normal mood and affect. His behavior is normal. Judgment and thought content normal.   Nursing note and vitals reviewed.      LAB:   Results for orders placed or performed in visit on 07/25/19   CBC auto differential   Result Value Ref Range    WBC 6.06 3.90 - 12.70 K/uL    RBC 3.87 (L) 4.60 - 6.20 M/uL    Hemoglobin 13.0 (L) 14.0 - 18.0 g/dL     Hematocrit 40.4 40.0 - 54.0 %    Mean Corpuscular Volume 104 (H) 82 - 98 fL    Mean Corpuscular Hemoglobin 33.6 (H) 27.0 - 31.0 pg    Mean Corpuscular Hemoglobin Conc 32.2 32.0 - 36.0 g/dL    RDW 13.4 11.5 - 14.5 %    Platelets 132 (L) 150 - 350 K/uL    MPV 9.7 9.2 - 12.9 fL    Immature Granulocytes 0.5 0.0 - 0.5 %    Gran # (ANC) 3.9 1.8 - 7.7 K/uL    Immature Grans (Abs) 0.03 0.00 - 0.04 K/uL    Lymph # 1.3 1.0 - 4.8 K/uL    Mono # 0.6 0.3 - 1.0 K/uL    Eos # 0.2 0.0 - 0.5 K/uL    Baso # 0.05 0.00 - 0.20 K/uL    nRBC 0 0 /100 WBC    Gran% 64.6 38.0 - 73.0 %    Lymph% 21.6 18.0 - 48.0 %    Mono% 9.4 4.0 - 15.0 %    Eosinophil% 3.1 0.0 - 8.0 %    Basophil% 0.8 0.0 - 1.9 %    Differential Method Automated    Comprehensive metabolic panel   Result Value Ref Range    Sodium 137 136 - 145 mmol/L    Potassium 4.3 3.5 - 5.1 mmol/L    Chloride 101 95 - 110 mmol/L    CO2 26 23 - 29 mmol/L    Glucose 121 (H) 70 - 110 mg/dL    BUN, Bld 26 (H) 6 - 20 mg/dL    Creatinine 1.4 0.5 - 1.4 mg/dL    Calcium 9.3 8.7 - 10.5 mg/dL    Total Protein 6.7 6.0 - 8.4 g/dL    Albumin 3.6 3.5 - 5.2 g/dL    Total Bilirubin 1.0 0.1 - 1.0 mg/dL    Alkaline Phosphatase 136 (H) 55 - 135 U/L    AST 27 10 - 40 U/L    ALT 19 10 - 44 U/L    Anion Gap 10 8 - 16 mmol/L    eGFR if African American >60.0 >60 mL/min/1.73 m^2    eGFR if non African American 57.0 (A) >60 mL/min/1.73 m^2   Protein electrophoresis, serum   Result Value Ref Range    Protein, Serum 6.1 6.0 - 8.4 g/dL   Immunoglobulins (IgG, IgA, IgM) Quantitative   Result Value Ref Range    IgG - Serum 953 650 - 1600 mg/dL    IgA 88 40 - 350 mg/dL    IgM 46 (L) 50 - 300 mg/dL   Type & Screen   Result Value Ref Range    Group & Rh A POS     Indirect Mariangel NEG        PROBLEMS ASSESSED THIS VISIT:    1. AL amyloidosis        PLAN:       AL amyloidosis  Continues to do well with bortezomib without clinical evidence of worsening amyloid.  Lambda light chains slowly rising.  SPEP from yesterday pending  but the previous SPEP 6/2019 had shown a decrease in his monoclonal protein.    We will have him return in 8 weeks to continue his bortezomib maintenance therapy.      Follow-up: Follow-up in 8 weeks with CBC, CMP, SPEP, BROOKS, free light chains, immunoglobulins, and chemo appt for bortezomib     Jeffry Juarez MD  Hematology and Stem Cell Transplant  Distress Screening Results: Psychosocial Distress screening score of Distress Score: 0 noted and reviewed. No intervention indicated.

## 2019-07-26 NOTE — ASSESSMENT & PLAN NOTE
Continues to do well with bortezomib without clinical evidence of worsening amyloid.  Lambda light chains slowly rising.  SPEP from yesterday pending but the previous SPEP 6/2019 had shown a decrease in his monoclonal protein.    We will have him return in 8 weeks to continue his bortezomib maintenance therapy.

## 2019-07-26 NOTE — Clinical Note
Follow-up in 8 weeks with CBC, CMP, SPEP, BROOKS, free light chains, immunoglobulins and chemo appt for bortezomib

## 2019-07-30 PROBLEM — D47.2 SMOLDERING MULTIPLE MYELOMA: Status: ACTIVE | Noted: 2019-07-30

## 2019-08-15 ENCOUNTER — OFFICE VISIT (OUTPATIENT)
Dept: TRANSPLANT | Facility: CLINIC | Age: 54
End: 2019-08-15
Attending: INTERNAL MEDICINE
Payer: OTHER GOVERNMENT

## 2019-08-15 ENCOUNTER — LAB VISIT (OUTPATIENT)
Dept: LAB | Facility: HOSPITAL | Age: 54
End: 2019-08-15
Attending: INTERNAL MEDICINE
Payer: OTHER GOVERNMENT

## 2019-08-15 VITALS
HEART RATE: 70 BPM | BODY MASS INDEX: 28.86 KG/M2 | WEIGHT: 206.13 LBS | HEIGHT: 71 IN | SYSTOLIC BLOOD PRESSURE: 115 MMHG | DIASTOLIC BLOOD PRESSURE: 69 MMHG

## 2019-08-15 DIAGNOSIS — E85.81 AL AMYLOIDOSIS: ICD-10-CM

## 2019-08-15 DIAGNOSIS — I50.22 CHRONIC SYSTOLIC CONGESTIVE HEART FAILURE: ICD-10-CM

## 2019-08-15 DIAGNOSIS — I42.5 OTHER RESTRICTIVE CARDIOMYOPATHY: ICD-10-CM

## 2019-08-15 DIAGNOSIS — I50.32 CHRONIC DIASTOLIC CONGESTIVE HEART FAILURE: Primary | ICD-10-CM

## 2019-08-15 DIAGNOSIS — Q21.11 ASD (ATRIAL SEPTAL DEFECT), OSTIUM SECUNDUM: ICD-10-CM

## 2019-08-15 DIAGNOSIS — I48.3 TYPICAL ATRIAL FLUTTER: ICD-10-CM

## 2019-08-15 DIAGNOSIS — R79.89 ELEVATED TROPONIN: ICD-10-CM

## 2019-08-15 LAB
ANION GAP SERPL CALC-SCNC: 10 MMOL/L (ref 8–16)
BNP SERPL-MCNC: 329 PG/ML (ref 0–99)
BUN SERPL-MCNC: 30 MG/DL (ref 6–20)
CALCIUM SERPL-MCNC: 9.8 MG/DL (ref 8.7–10.5)
CHLORIDE SERPL-SCNC: 102 MMOL/L (ref 95–110)
CO2 SERPL-SCNC: 27 MMOL/L (ref 23–29)
CREAT SERPL-MCNC: 1.5 MG/DL (ref 0.5–1.4)
EST. GFR  (AFRICAN AMERICAN): >60 ML/MIN/1.73 M^2
EST. GFR  (NON AFRICAN AMERICAN): 52.4 ML/MIN/1.73 M^2
GLUCOSE SERPL-MCNC: 77 MG/DL (ref 70–110)
POTASSIUM SERPL-SCNC: 4.8 MMOL/L (ref 3.5–5.1)
SODIUM SERPL-SCNC: 139 MMOL/L (ref 136–145)

## 2019-08-15 PROCEDURE — 99999 PR PBB SHADOW E&M-EST. PATIENT-LVL III: CPT | Mod: PBBFAC,,, | Performed by: INTERNAL MEDICINE

## 2019-08-15 PROCEDURE — 80048 BASIC METABOLIC PNL TOTAL CA: CPT

## 2019-08-15 PROCEDURE — 99214 OFFICE O/P EST MOD 30 MIN: CPT | Mod: S$PBB,,, | Performed by: INTERNAL MEDICINE

## 2019-08-15 PROCEDURE — 99214 PR OFFICE/OUTPT VISIT, EST, LEVL IV, 30-39 MIN: ICD-10-PCS | Mod: S$PBB,,, | Performed by: INTERNAL MEDICINE

## 2019-08-15 PROCEDURE — 99999 PR PBB SHADOW E&M-EST. PATIENT-LVL III: ICD-10-PCS | Mod: PBBFAC,,, | Performed by: INTERNAL MEDICINE

## 2019-08-15 PROCEDURE — 83880 ASSAY OF NATRIURETIC PEPTIDE: CPT

## 2019-08-15 PROCEDURE — 36415 COLL VENOUS BLD VENIPUNCTURE: CPT

## 2019-08-15 PROCEDURE — 99213 OFFICE O/P EST LOW 20 MIN: CPT | Mod: PBBFAC | Performed by: INTERNAL MEDICINE

## 2019-08-22 NOTE — PROGRESS NOTES
"Subjective:     Patient ID:  Roc Lai Jr. is a 54 y.o. male who presents for follow-up of Congestive Heart Failure (Amyloidosis)    HPI:  53 yo WM with AL and myocardial amyloidosis being treated by Dr. Atkins returns for routine visit.    With change in jobs and long commute not able to exercise as before.  He does walk on weekends couple of miles each day.  He also rides bike.  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  No tightness, pressure or heaviness in chest, neck, arms, throat, jaw or back with or without exertion.  No orthopnea, PND, palpitations, pre-syncope or syncope.    Review of Systems   Constitution: Positive for weight gain. Negative for chills, fever, malaise/fatigue, night sweats and weight loss.   Cardiovascular: Negative for chest pain, dyspnea on exertion, irregular heartbeat, leg swelling, near-syncope, orthopnea, palpitations, paroxysmal nocturnal dyspnea and syncope.   Respiratory: Negative for cough, sputum production and wheezing.    Hematologic/Lymphatic: Does not bruise/bleed easily.   Musculoskeletal: Negative for arthritis, joint pain and stiffness.   Gastrointestinal: Negative for change in bowel habit, constipation, diarrhea, hematochezia and melena.   Genitourinary: Positive for nocturia (1-2 times per night ). Negative for hematuria.        ED but viagra helps   Neurological: Positive for numbness (fingers) and paresthesias (feet tingling). Negative for brief paralysis, focal weakness, seizures and weakness.      Objective:   Physical Exam   Constitutional: He is oriented to person, place, and time. He appears well-developed and well-nourished. No distress.   /61 (BP Location: Left arm, Patient Position: Sitting, BP Method: Small (Automatic))   Pulse 85   Ht 5' 11" (1.803 m)   Wt 90.8 kg (200 lb 2.8 oz)   BMI 27.92 kg/m²   Last visit wt 84.9 kg (187 lb 2.7 oz)  Visit before last visit wt 87.5 kg (192 " lb 14.4 oz) when volume overloaded   HENT:   Head: Normocephalic and atraumatic.   Eyes: Conjunctivae are normal. Right eye exhibits no discharge. Left eye exhibits no discharge. No scleral icterus.   Neck: JVD (Prominent V wave sitting near jaw) present. No thyromegaly present.   Cardiovascular: Normal rate and regular rhythm. Exam reveals gallop (Soft S3--old finding  ) and friction rub (loud S3).   No murmur heard.  Pulmonary/Chest: Effort normal and breath sounds normal. No respiratory distress. He has no wheezes. He has no rales.   Abdominal: Soft. Bowel sounds are normal. He exhibits distension (liver span 12-14 cm   ). He exhibits no mass. There is no tenderness. There is no rebound.   Musculoskeletal: He exhibits edema (1+ edema feet and ankles). He exhibits no tenderness.   Neurological: He is alert and oriented to person, place, and time.   Skin: Skin is warm and dry. He is not diaphoretic.   Psychiatric: He has a normal mood and affect. His behavior is normal. Judgment and thought content normal.     Lab Results   Component Value Date     (H) 08/15/2019     (H) 11/19/2018     (H) 02/05/2018     Lab Results   Component Value Date     08/15/2019     07/25/2019    K 4.8 08/15/2019    K 4.3 07/25/2019    GLU 77 08/15/2019     (H) 07/25/2019    BUN 30 (H) 08/15/2019    BUN 26 (H) 07/25/2019    CREATININE 1.5 (H) 08/15/2019    CREATININE 1.4 07/25/2019     3/20/2019 ECHO Conclusion   · Normal left ventricular systolic function. The estimated ejection fraction is 55%  · Strain imaging performed in left ventricle. Global longitudinal strain is -12%. Apical sparing  · Moderate left atrial enlargement.  · Mildly to moderately reduced right ventricular systolic function. There is right ventricular hypertrophy. Mild right ventricular enlargement.  · ASD or PFO closure device in interatrial septum.  · Moderate right atrial enlargement.  · Moderate tricuspid  regurgitation.  · Indeterminate left ventricular diastolic function.  · Elevated central venous pressure (15 mm Hg).  · The estimated PA systolic pressure is 24 mm Hg     Assessment:     1. Chronic diastolic congestive heart failure    2. Other restrictive cardiomyopathy    3. AL amyloidosis    4. Typical atrial flutter s/p ablation and restoration sinus rhythm 2016    5. ASD (atrial septal defect), ostium secundum s/p closure    6. Elevated troponin with no CAD felt due to infiltrative CM 1/24/17 supplemented report from [facility] + AL amyloid within heart      Plan:   ECHO and BNP stable though on exam JVP higher and more edema.  Weight up so may be fluid. Discussed diuretics/prn/etc  RTC 6 months with troponin, BNP and echo--will use Dr. Atkins' labs otherwise

## 2019-09-05 ENCOUNTER — PATIENT MESSAGE (OUTPATIENT)
Dept: TRANSPLANT | Facility: CLINIC | Age: 54
End: 2019-09-05

## 2019-09-05 ENCOUNTER — PATIENT MESSAGE (OUTPATIENT)
Dept: HEMATOLOGY/ONCOLOGY | Facility: CLINIC | Age: 54
End: 2019-09-05

## 2019-09-06 ENCOUNTER — OFFICE VISIT (OUTPATIENT)
Dept: DERMATOLOGY | Facility: CLINIC | Age: 54
End: 2019-09-06
Payer: OTHER GOVERNMENT

## 2019-09-06 DIAGNOSIS — Z12.83 SCREENING EXAM FOR SKIN CANCER: ICD-10-CM

## 2019-09-06 DIAGNOSIS — D48.5 NEOPLASM OF UNCERTAIN BEHAVIOR OF SKIN: Primary | ICD-10-CM

## 2019-09-06 PROCEDURE — 99203 OFFICE O/P NEW LOW 30 MIN: CPT | Mod: 25,S$PBB,, | Performed by: DERMATOLOGY

## 2019-09-06 PROCEDURE — 11102 TANGNTL BX SKIN SINGLE LES: CPT | Mod: S$PBB,,, | Performed by: DERMATOLOGY

## 2019-09-06 PROCEDURE — 99212 OFFICE O/P EST SF 10 MIN: CPT | Mod: PBBFAC,25 | Performed by: DERMATOLOGY

## 2019-09-06 PROCEDURE — 11102 TANGNTL BX SKIN SINGLE LES: CPT | Mod: PBBFAC | Performed by: DERMATOLOGY

## 2019-09-06 PROCEDURE — 99999 PR PBB SHADOW E&M-EST. PATIENT-LVL II: CPT | Mod: PBBFAC,,, | Performed by: DERMATOLOGY

## 2019-09-06 PROCEDURE — 99999 PR PBB SHADOW E&M-EST. PATIENT-LVL II: ICD-10-PCS | Mod: PBBFAC,,, | Performed by: DERMATOLOGY

## 2019-09-06 PROCEDURE — 11102 PR TANGENTIAL BIOPSY, SKIN, SINGLE LESION: ICD-10-PCS | Mod: S$PBB,,, | Performed by: DERMATOLOGY

## 2019-09-06 PROCEDURE — 99203 PR OFFICE/OUTPT VISIT, NEW, LEVL III, 30-44 MIN: ICD-10-PCS | Mod: 25,S$PBB,, | Performed by: DERMATOLOGY

## 2019-09-06 PROCEDURE — 88305 TISSUE EXAM BY PATHOLOGIST: CPT | Performed by: PATHOLOGY

## 2019-09-06 PROCEDURE — 88305 TISSUE SPECIMEN TO PATHOLOGY, DERMATOLOGY: ICD-10-PCS | Mod: 26,,, | Performed by: PATHOLOGY

## 2019-09-06 NOTE — PROGRESS NOTES
Subjective:       Patient ID:  Roc Lai Jr. is a 54 y.o. male who presents for   Chief Complaint   Patient presents with    Skin Check     TBSE    Lesion     right cheek     PMH of amyloidosis, s/p chemotherapy. Initial visit with primary complaint of lesion on right malar cheek, present for several months, stable; frequently disturbs vision and irritated by daily activites; no bleeding or ulceration; no improvement with topical tretinoin. Denies any personal or family history of skin cancer.      Lesion  - Initial  Affected locations: face  Duration: 4 months  Signs / symptoms: asymptomatic  Aggravated by: nothing  Relieving factors/Treatments tried: nothing  Improvement on treatment: no relief        Review of Systems   Skin: Negative for daily sunscreen use and recent sunburn.   Hematologic/Lymphatic: Bruises/bleeds easily.        Objective:    Physical Exam   Constitutional: He appears well-developed and well-nourished. No distress.   Neurological: He is alert and oriented to person, place, and time. He is not disoriented.   Psychiatric: He has a normal mood and affect.   Skin:   Areas Examined (abnormalities noted in diagram):   Scalp / Hair Palpated and Inspected  Head / Face Inspection Performed  Neck Inspection Performed  Chest / Axilla Inspection Performed  Abdomen Inspection Performed  Genitals / Buttocks / Groin Inspection Performed  Back Inspection Performed  RUE Inspected  LUE Inspection Performed  RLE Inspected  LLE Inspection Performed  Nails and Digits Inspection Performed                   Diagram Legend     Erythematous scaling macule/papule c/w actinic keratosis       Vascular papule c/w angioma      Pigmented verrucoid papule/plaque c/w seborrheic keratosis      Yellow umbilicated papule c/w sebaceous hyperplasia      Irregularly shaped tan macule c/w lentigo     1-2 mm smooth white papules consistent with Milia      Movable subcutaneous cyst with punctum c/w epidermal inclusion cyst       Subcutaneous movable cyst c/w pilar cyst      Firm pink to brown papule c/w dermatofibroma      Pedunculated fleshy papule(s) c/w skin tag(s)      Evenly pigmented macule c/w junctional nevus     Mildly variegated pigmented, slightly irregular-bordered macule c/w mildly atypical nevus      Flesh colored to evenly pigmented papule c/w intradermal nevus       Pink pearly papule/plaque c/w basal cell carcinoma      Erythematous hyperkeratotic cursted plaque c/w SCC      Surgical scar with no sign of skin cancer recurrence      Open and closed comedones      Inflammatory papules and pustules      Verrucoid papule consistent consistent with wart     Erythematous eczematous patches and plaques     Dystrophic onycholytic nail with subungual debris c/w onychomycosis     Umbilicated papule    Erythematous-base heme-crusted tan verrucoid plaque consistent with inflamed seborrheic keratosis     Erythematous Silvery Scaling Plaque c/w Psoriasis     See annotation          Assessment / Plan:      Pathology Orders:     Normal Orders This Visit    Tissue Specimen To Pathology, Dermatology     Questions:    Directional Terms:  Other(comment)    Clinical Information:  shiny pink pigmented papule 2 mm r/o pigmented BCC    Specific Site:  right frontal scalp        Neoplasm of uncertain behavior of skin  -     Tissue Specimen To Pathology, Dermatology - c/f BCC    Shave biopsy procedure note:    Shave biopsy performed after verbal consent including risk of infection, scar, recurrence, need for additional treatment of site. Area prepped with alcohol, anesthetized with approximately 1.0cc of 1% lidocaine with epinephrine. Lesional tissue shaved with razor blade. Hemostasis achieved with application of aluminum chloride. No complications. Dressing applied. Wound care explained.    If biopsy positive for malignancy, refer to Dr. Abad for Mohs surgery consultation.    Screening exam for skin cancer    Total body skin examination  performed today including at least 12 points as noted in physical examination. Suspicious lesions noted.           Follow up in about 1 year (around 9/6/2020) for for TBSE.

## 2019-09-10 ENCOUNTER — PATIENT MESSAGE (OUTPATIENT)
Dept: DERMATOLOGY | Facility: CLINIC | Age: 54
End: 2019-09-10

## 2019-09-11 ENCOUNTER — PATIENT MESSAGE (OUTPATIENT)
Dept: TRANSPLANT | Facility: CLINIC | Age: 54
End: 2019-09-11

## 2019-09-11 ENCOUNTER — PATIENT MESSAGE (OUTPATIENT)
Dept: HEMATOLOGY/ONCOLOGY | Facility: CLINIC | Age: 54
End: 2019-09-11

## 2019-09-19 ENCOUNTER — LAB VISIT (OUTPATIENT)
Dept: LAB | Facility: HOSPITAL | Age: 54
End: 2019-09-19
Attending: INTERNAL MEDICINE
Payer: OTHER GOVERNMENT

## 2019-09-19 DIAGNOSIS — E85.81 AL AMYLOIDOSIS: ICD-10-CM

## 2019-09-19 LAB
ALBUMIN SERPL BCP-MCNC: 4 G/DL (ref 3.5–5.2)
ALP SERPL-CCNC: 142 U/L (ref 55–135)
ALT SERPL W/O P-5'-P-CCNC: 29 U/L (ref 10–44)
ANION GAP SERPL CALC-SCNC: 6 MMOL/L (ref 8–16)
AST SERPL-CCNC: 24 U/L (ref 10–40)
BASOPHILS # BLD AUTO: 0.05 K/UL (ref 0–0.2)
BASOPHILS NFR BLD: 0.7 % (ref 0–1.9)
BILIRUB SERPL-MCNC: 1.1 MG/DL (ref 0.1–1)
BUN SERPL-MCNC: 39 MG/DL (ref 6–20)
CALCIUM SERPL-MCNC: 9.5 MG/DL (ref 8.7–10.5)
CHLORIDE SERPL-SCNC: 100 MMOL/L (ref 95–110)
CO2 SERPL-SCNC: 31 MMOL/L (ref 23–29)
CREAT SERPL-MCNC: 1.7 MG/DL (ref 0.5–1.4)
DIFFERENTIAL METHOD: ABNORMAL
EOSINOPHIL # BLD AUTO: 0.5 K/UL (ref 0–0.5)
EOSINOPHIL NFR BLD: 6.5 % (ref 0–8)
ERYTHROCYTE [DISTWIDTH] IN BLOOD BY AUTOMATED COUNT: 13.2 % (ref 11.5–14.5)
EST. GFR  (AFRICAN AMERICAN): 52.1 ML/MIN/1.73 M^2
EST. GFR  (NON AFRICAN AMERICAN): 45 ML/MIN/1.73 M^2
GLUCOSE SERPL-MCNC: 165 MG/DL (ref 70–110)
HCT VFR BLD AUTO: 39.7 % (ref 40–54)
HGB BLD-MCNC: 12.8 G/DL (ref 14–18)
IGA SERPL-MCNC: 73 MG/DL (ref 40–350)
IGG SERPL-MCNC: 840 MG/DL (ref 650–1600)
IGM SERPL-MCNC: 63 MG/DL (ref 50–300)
IMM GRANULOCYTES # BLD AUTO: 0.03 K/UL (ref 0–0.04)
IMM GRANULOCYTES NFR BLD AUTO: 0.4 % (ref 0–0.5)
LYMPHOCYTES # BLD AUTO: 1.1 K/UL (ref 1–4.8)
LYMPHOCYTES NFR BLD: 14.2 % (ref 18–48)
MCH RBC QN AUTO: 33.6 PG (ref 27–31)
MCHC RBC AUTO-ENTMCNC: 32.2 G/DL (ref 32–36)
MCV RBC AUTO: 104 FL (ref 82–98)
MONOCYTES # BLD AUTO: 0.6 K/UL (ref 0.3–1)
MONOCYTES NFR BLD: 7.6 % (ref 4–15)
NEUTROPHILS # BLD AUTO: 5.2 K/UL (ref 1.8–7.7)
NEUTROPHILS NFR BLD: 70.6 % (ref 38–73)
NRBC BLD-RTO: 0 /100 WBC
PLATELET # BLD AUTO: 160 K/UL (ref 150–350)
PMV BLD AUTO: 10 FL (ref 9.2–12.9)
POTASSIUM SERPL-SCNC: 4.4 MMOL/L (ref 3.5–5.1)
PROT SERPL-MCNC: 6.9 G/DL (ref 6–8.4)
RBC # BLD AUTO: 3.81 M/UL (ref 4.6–6.2)
SODIUM SERPL-SCNC: 137 MMOL/L (ref 136–145)
WBC # BLD AUTO: 7.37 K/UL (ref 3.9–12.7)

## 2019-09-19 PROCEDURE — 86334 PATHOLOGIST INTERPRETATION IFE: ICD-10-PCS | Mod: 26,,, | Performed by: PATHOLOGY

## 2019-09-19 PROCEDURE — 84165 PATHOLOGIST INTERPRETATION SPE: ICD-10-PCS | Mod: 26,,, | Performed by: PATHOLOGY

## 2019-09-19 PROCEDURE — 82784 ASSAY IGA/IGD/IGG/IGM EACH: CPT | Mod: 59

## 2019-09-19 PROCEDURE — 85025 COMPLETE CBC W/AUTO DIFF WBC: CPT

## 2019-09-19 PROCEDURE — 80053 COMPREHEN METABOLIC PANEL: CPT

## 2019-09-19 PROCEDURE — 83520 IMMUNOASSAY QUANT NOS NONAB: CPT

## 2019-09-19 PROCEDURE — 84165 PROTEIN E-PHORESIS SERUM: CPT | Mod: 26,,, | Performed by: PATHOLOGY

## 2019-09-19 PROCEDURE — 86334 IMMUNOFIX E-PHORESIS SERUM: CPT

## 2019-09-19 PROCEDURE — 84165 PROTEIN E-PHORESIS SERUM: CPT

## 2019-09-19 PROCEDURE — 86334 IMMUNOFIX E-PHORESIS SERUM: CPT | Mod: 26,,, | Performed by: PATHOLOGY

## 2019-09-20 ENCOUNTER — OFFICE VISIT (OUTPATIENT)
Dept: HEMATOLOGY/ONCOLOGY | Facility: CLINIC | Age: 54
End: 2019-09-20
Payer: OTHER GOVERNMENT

## 2019-09-20 ENCOUNTER — INFUSION (OUTPATIENT)
Dept: INFUSION THERAPY | Facility: HOSPITAL | Age: 54
End: 2019-09-20
Attending: INTERNAL MEDICINE
Payer: OTHER GOVERNMENT

## 2019-09-20 VITALS — HEART RATE: 65 BPM | SYSTOLIC BLOOD PRESSURE: 113 MMHG | DIASTOLIC BLOOD PRESSURE: 57 MMHG | RESPIRATION RATE: 18 BRPM

## 2019-09-20 VITALS
OXYGEN SATURATION: 100 % | SYSTOLIC BLOOD PRESSURE: 120 MMHG | BODY MASS INDEX: 27.59 KG/M2 | HEART RATE: 74 BPM | HEIGHT: 71 IN | WEIGHT: 197.06 LBS | TEMPERATURE: 98 F | DIASTOLIC BLOOD PRESSURE: 59 MMHG

## 2019-09-20 DIAGNOSIS — G62.2 NEUROPATHY DUE TO CHEMICAL SUBSTANCE: ICD-10-CM

## 2019-09-20 DIAGNOSIS — E85.81 AL AMYLOIDOSIS: Primary | ICD-10-CM

## 2019-09-20 DIAGNOSIS — T45.1X5A ANEMIA ASSOCIATED WITH CHEMOTHERAPY: ICD-10-CM

## 2019-09-20 DIAGNOSIS — D64.81 ANEMIA ASSOCIATED WITH CHEMOTHERAPY: ICD-10-CM

## 2019-09-20 LAB
ALBUMIN SERPL ELPH-MCNC: 4.16 G/DL (ref 3.35–5.55)
ALPHA1 GLOB SERPL ELPH-MCNC: 0.37 G/DL (ref 0.17–0.41)
ALPHA2 GLOB SERPL ELPH-MCNC: 0.57 G/DL (ref 0.43–0.99)
B-GLOBULIN SERPL ELPH-MCNC: 0.76 G/DL (ref 0.5–1.1)
GAMMA GLOB SERPL ELPH-MCNC: 0.74 G/DL (ref 0.67–1.58)
INTERPRETATION SERPL IFE-IMP: NORMAL
KAPPA LC SER QL IA: 1.83 MG/DL (ref 0.33–1.94)
KAPPA LC/LAMBDA SER IA: 0.14 (ref 0.26–1.65)
LAMBDA LC SER QL IA: 13.26 MG/DL (ref 0.57–2.63)
PATHOLOGIST INTERPRETATION IFE: NORMAL
PATHOLOGIST INTERPRETATION SPE: NORMAL
PROT SERPL-MCNC: 6.6 G/DL (ref 6–8.4)

## 2019-09-20 PROCEDURE — 99214 OFFICE O/P EST MOD 30 MIN: CPT | Mod: PBBFAC,25 | Performed by: NURSE PRACTITIONER

## 2019-09-20 PROCEDURE — 99999 PR PBB SHADOW E&M-EST. PATIENT-LVL IV: CPT | Mod: PBBFAC,,, | Performed by: NURSE PRACTITIONER

## 2019-09-20 PROCEDURE — 63600175 PHARM REV CODE 636 W HCPCS: Mod: JG | Performed by: INTERNAL MEDICINE

## 2019-09-20 PROCEDURE — 99999 PR PBB SHADOW E&M-EST. PATIENT-LVL IV: ICD-10-PCS | Mod: PBBFAC,,, | Performed by: NURSE PRACTITIONER

## 2019-09-20 PROCEDURE — 99214 PR OFFICE/OUTPT VISIT, EST, LEVL IV, 30-39 MIN: ICD-10-PCS | Mod: S$PBB,,, | Performed by: NURSE PRACTITIONER

## 2019-09-20 PROCEDURE — 96401 CHEMO ANTI-NEOPL SQ/IM: CPT

## 2019-09-20 PROCEDURE — 99214 OFFICE O/P EST MOD 30 MIN: CPT | Mod: S$PBB,,, | Performed by: NURSE PRACTITIONER

## 2019-09-20 RX ORDER — HEPARIN 100 UNIT/ML
500 SYRINGE INTRAVENOUS
Status: CANCELLED | OUTPATIENT
Start: 2019-09-20

## 2019-09-20 RX ORDER — BORTEZOMIB 3.5 MG/1
1.3 INJECTION, POWDER, LYOPHILIZED, FOR SOLUTION INTRAVENOUS; SUBCUTANEOUS
Status: CANCELLED
Start: 2019-09-20

## 2019-09-20 RX ORDER — BORTEZOMIB 3.5 MG/1
1.3 INJECTION, POWDER, LYOPHILIZED, FOR SOLUTION INTRAVENOUS; SUBCUTANEOUS
Status: COMPLETED | OUTPATIENT
Start: 2019-09-20 | End: 2019-09-20

## 2019-09-20 RX ORDER — SODIUM CHLORIDE 0.9 % (FLUSH) 0.9 %
10 SYRINGE (ML) INJECTION
Status: CANCELLED | OUTPATIENT
Start: 2019-09-20

## 2019-09-20 RX ADMIN — BORTEZOMIB 2.8 MG: 3.5 INJECTION, POWDER, LYOPHILIZED, FOR SOLUTION INTRAVENOUS; SUBCUTANEOUS at 10:09

## 2019-09-20 NOTE — Clinical Note
Follow-up with CBC, CMP, SPEP, BROOKS, free light chains, immunoglobulins on 11/11 and follow-up with Dr. Atkins and appt for bortezomib on 11/15 (likes early appts)

## 2019-09-20 NOTE — PROGRESS NOTES
HEMATOLOGIC MALIGNANCIES PROGRESS NOTE    IDENTIFYING STATEMENT   Roc Lai Jr. (Ovi) is a 54 y.o. male with a  of 1965 from [place] with the diagnosis of AL amyloid.      ONCOLOGY HISTORY:    1. AL amyloid with cardiac involvement   A. 2016: Initial evaluation with Dr. Carmichael. Reported 18 months of progressive decline (previously ran 3 miles easily, now can only walk 10 minutes). M-protein 0.19 g/dl, kappa 1.37 mg/dl, lambda 50 mg/dl, ratio 0.03. Abdominal fat pad biopsy had been negative for presence of amyloid. Cardiac MRI suggestive of infiltrative cardiomyopathy.    B. 1/3/2017: Endomyocardial biopsy - involved by AL amyloid   C. 2017: BMBx - 60% cellular marrow with 50% plasma cells, consistent with plasma cell myeloma.    D. 2/15/2017 - 2017: Completed 4 cycles bortezomib chemotherapy on [id number] trial.    E. 2017: Bortezomib every 2 weeks x 2 doses   F. 2017: Change bortezomib to [id number] x 2 doses   G. 10/9/2017: Bortezomib q6 weeks   H. 2018: Bortezomib q8 weeks.    I. 2018: [id number] close out visit due to ineffectiveness.      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 Lai returns to clinic for follow-up of his cardiac AL amyloid.      He continues to do well since his last visit.  Denies chest pain, palpitations, shortness of breath, or leg swelling.  Neuropathy is unchanged since his last treatment.      Since last visit he will be getting a dental implant, skin tag removed from cheek for  basal carcinoma. He is also having shoulder surgery over  week. He reports hurting his shoulder moving daughter into college. We have given clearance and so has cardiology for surgery. He denies sob and CP. Denies n/v/d/rashes.    Past Medical History, Past Social  History and Past Family History have been reviewed and are unchanged except as noted in the interval history.    MEDICATIONS:     Current Outpatient Medications on File Prior to Visit   Medication Sig Dispense Refill    acyclovir (ZOVIRAX) 400 MG tablet Take 1 tablet (400 mg total) by mouth 2 (two) times daily. 180 tablet 3    BORTEZOMIB (VELCADE INJ) Inject as directed. Every 2 months      bumetanide (BUMEX) 2 MG tablet Take 1 tablet (2 mg total) by mouth once daily. 90 tablet 3    gabapentin (NEURONTIN) 100 MG capsule TAKE 3 CAPSULES BY MOUTH EVERY EVENING 90 capsule 0    magnesium oxide (MAG-OX) 400 mg (241.3 mg magnesium) tablet Take 1 tablet (400 mg total) by mouth once daily. 90 tablet 3    sildenafil (VIAGRA) 50 MG tablet Take 1 tablet (50 mg total) by mouth daily as needed for Erectile Dysfunction. 7 tablet 2    spironolactone (ALDACTONE) 25 MG tablet Take 1 tablet (25 mg total) by mouth once daily. 90 tablet 3     No current facility-administered medications on file prior to visit.        ALLERGIES: Review of patient's allergies indicates:  No Known Allergies     ROS:       Review of Systems   Constitutional: Positive for fatigue. Negative for diaphoresis, fever and unexpected weight change.   HENT:   Negative for lump/mass and sore throat.    Eyes: Negative for icterus.   Respiratory: Negative for cough and shortness of breath.    Cardiovascular: Negative for chest pain, leg swelling and palpitations.   Gastrointestinal: Negative for abdominal distention, constipation, diarrhea, nausea and vomiting.   Genitourinary: Negative for dysuria and frequency.    Musculoskeletal: Negative for arthralgias, gait problem and myalgias.   Skin: Negative for rash.   Neurological: Positive for numbness. Negative for dizziness, gait problem and headaches.   Hematological: Negative for adenopathy. Does not bruise/bleed easily.   Psychiatric/Behavioral: The patient is not nervous/anxious.        PHYSICAL  "EXAM:  Vitals:    09/20/19 0904   BP: (Abnormal) 120/59   Pulse: 74   Temp: 98.2 °F (36.8 °C)   SpO2: 100%   Weight: 89.4 kg (197 lb 1.5 oz)   Height: 5' 11" (1.803 m)   PainSc: 0-No pain     ECOG 1    NYHA Class II    Physical Exam   Constitutional: He is oriented to person, place, and time. He appears well-developed and well-nourished. No distress.   HENT:   Head: Normocephalic and atraumatic.   Mouth/Throat: Oropharynx is clear and moist and mucous membranes are normal. No oral lesions.   Eyes: Conjunctivae and EOM are normal.   Neck: Normal range of motion. Neck supple. No thyromegaly present.   Cardiovascular: Normal rate, regular rhythm and intact distal pulses.   Pulmonary/Chest: Effort normal and breath sounds normal. He has no wheezes. He has no rales.   Abdominal: Soft. Bowel sounds are normal. He exhibits no distension and no mass. There is no splenomegaly or hepatomegaly. There is no tenderness.   Musculoskeletal: Normal range of motion. He exhibits no edema.   Neurological: He is alert and oriented to person, place, and time. He has normal strength and normal reflexes. No cranial nerve deficit. Coordination normal.   Skin: Skin is warm and dry. No rash noted.   Psychiatric: He has a normal mood and affect. His behavior is normal. Judgment and thought content normal.   Nursing note and vitals reviewed.      LAB:   Results for orders placed or performed in visit on 09/19/19   CBC auto differential   Result Value Ref Range    WBC 7.37 3.90 - 12.70 K/uL    RBC 3.81 (L) 4.60 - 6.20 M/uL    Hemoglobin 12.8 (L) 14.0 - 18.0 g/dL    Hematocrit 39.7 (L) 40.0 - 54.0 %    Mean Corpuscular Volume 104 (H) 82 - 98 fL    Mean Corpuscular Hemoglobin 33.6 (H) 27.0 - 31.0 pg    Mean Corpuscular Hemoglobin Conc 32.2 32.0 - 36.0 g/dL    RDW 13.2 11.5 - 14.5 %    Platelets 160 150 - 350 K/uL    MPV 10.0 9.2 - 12.9 fL    Immature Granulocytes 0.4 0.0 - 0.5 %    Gran # (ANC) 5.2 1.8 - 7.7 K/uL    Immature Grans (Abs) 0.03 0.00 " - 0.04 K/uL    Lymph # 1.1 1.0 - 4.8 K/uL    Mono # 0.6 0.3 - 1.0 K/uL    Eos # 0.5 0.0 - 0.5 K/uL    Baso # 0.05 0.00 - 0.20 K/uL    nRBC 0 0 /100 WBC    Gran% 70.6 38.0 - 73.0 %    Lymph% 14.2 (L) 18.0 - 48.0 %    Mono% 7.6 4.0 - 15.0 %    Eosinophil% 6.5 0.0 - 8.0 %    Basophil% 0.7 0.0 - 1.9 %    Differential Method Automated    Comprehensive metabolic panel   Result Value Ref Range    Sodium 137 136 - 145 mmol/L    Potassium 4.4 3.5 - 5.1 mmol/L    Chloride 100 95 - 110 mmol/L    CO2 31 (H) 23 - 29 mmol/L    Glucose 165 (H) 70 - 110 mg/dL    BUN, Bld 39 (H) 6 - 20 mg/dL    Creatinine 1.7 (H) 0.5 - 1.4 mg/dL    Calcium 9.5 8.7 - 10.5 mg/dL    Total Protein 6.9 6.0 - 8.4 g/dL    Albumin 4.0 3.5 - 5.2 g/dL    Total Bilirubin 1.1 (H) 0.1 - 1.0 mg/dL    Alkaline Phosphatase 142 (H) 55 - 135 U/L    AST 24 10 - 40 U/L    ALT 29 10 - 44 U/L    Anion Gap 6 (L) 8 - 16 mmol/L    eGFR if African American 52.1 (A) >60 mL/min/1.73 m^2    eGFR if non African American 45.0 (A) >60 mL/min/1.73 m^2   Protein electrophoresis, serum   Result Value Ref Range    Protein, Serum 6.6 6.0 - 8.4 g/dL    Albumin grams/dl 4.16 3.35 - 5.55 g/dL    Alpha-1 grams/dl 0.37 0.17 - 0.41 g/dL    Alpha-2 grams/dl 0.57 0.43 - 0.99 g/dL    Beta grams/dl 0.76 0.50 - 1.10 g/dL    Gamma grams/dl 0.74 0.67 - 1.58 g/dL   Immunofixation electrophoresis   Result Value Ref Range    Immunofix Interp. SEE COMMENT    Immunoglobulin free LT chains blood   Result Value Ref Range    Kappa Free Light Chains 1.83 0.33 - 1.94 mg/dL    Lambda Free Light Chains 13.26 (H) 0.57 - 2.63 mg/dL    Kappa/Lambda FLC Ratio 0.14 (L) 0.26 - 1.65   Immunoglobulins (IgG, IgA, IgM) Quantitative   Result Value Ref Range    IgG - Serum 840 650 - 1600 mg/dL    IgA 73 40 - 350 mg/dL    IgM 63 50 - 300 mg/dL   Pathologist Interpretation BROOKS   Result Value Ref Range    Pathologist Interpretation BROOKS REVIEWED    Pathologist Interpretation SPE   Result Value Ref Range    Pathologist  Interpretation SPE REVIEWED        PROBLEMS ASSESSED THIS VISIT:    1. 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 Anemia associated with chemotherapy        PLAN:       AL amyloidosis  Continues to do well with bortezomib without clinical evidence of worsening amyloid. Lambda light chains stable. SPEP from today with no monoclonal protein.  Creatinine up to 1.7 today, generally ranges 1.4-1.5, has been taking Ibuprofen twice a day for shoulder pain, encouraged to use sparingly  Continue bortezomib maintenance therapy every 8 weeks.    Anemia due to chemo  - hgb stable at 12.8    Neuropathy due to chemo  - Gabapentin nightly     Follow-up: Follow-up with CBC, CMP, SPEP, BROOKS, free light chains, immunoglobulins on 11/11 and follow-up with Dr. Atkins and appt for bortezomib on 11/15 (likes early appts)    Tarsha Tan NP  Hematology and Stem Cell Transplant    Distress Screening Results: Psychosocial Distress screening score of Distress Score: 0 noted and reviewed. No intervention indicated.

## 2019-09-20 NOTE — PLAN OF CARE
Problem: Nausea and Vomiting (Chemotherapy Effects)  Goal: Fluid and Electrolyte Balance  Outcome: Ongoing (interventions implemented as appropriate)  velcade given.  brittany well.  D/c'd in NAD

## 2019-09-23 ENCOUNTER — PATIENT MESSAGE (OUTPATIENT)
Dept: HEMATOLOGY/ONCOLOGY | Facility: CLINIC | Age: 54
End: 2019-09-23

## 2019-09-26 ENCOUNTER — PATIENT MESSAGE (OUTPATIENT)
Dept: TRANSPLANT | Facility: CLINIC | Age: 54
End: 2019-09-26

## 2019-10-01 DIAGNOSIS — G62.2 NEUROPATHY DUE TO CHEMICAL SUBSTANCE: ICD-10-CM

## 2019-10-01 RX ORDER — GABAPENTIN 100 MG/1
CAPSULE ORAL
Qty: 270 CAPSULE | Refills: 4 | Status: SHIPPED | OUTPATIENT
Start: 2019-10-01 | End: 2020-01-01

## 2019-10-14 ENCOUNTER — PROCEDURE VISIT (OUTPATIENT)
Dept: DERMATOLOGY | Facility: CLINIC | Age: 54
End: 2019-10-14
Payer: OTHER GOVERNMENT

## 2019-10-14 VITALS
SYSTOLIC BLOOD PRESSURE: 112 MMHG | HEIGHT: 71 IN | DIASTOLIC BLOOD PRESSURE: 72 MMHG | BODY MASS INDEX: 27.58 KG/M2 | WEIGHT: 197 LBS | HEART RATE: 73 BPM

## 2019-10-14 DIAGNOSIS — C44.319 BASAL CELL CARCINOMA OF RIGHT FOREHEAD: Primary | ICD-10-CM

## 2019-10-14 PROCEDURE — 13131 PR RECMPL WND HEAD,FAC,HAND 1.1-2.5 CM: ICD-10-PCS | Mod: S$PBB,59,, | Performed by: DERMATOLOGY

## 2019-10-14 PROCEDURE — 17311 MOHS 1 STAGE H/N/HF/G: CPT | Mod: S$PBB,,, | Performed by: DERMATOLOGY

## 2019-10-14 PROCEDURE — 99499 UNLISTED E&M SERVICE: CPT | Mod: S$PBB,,, | Performed by: DERMATOLOGY

## 2019-10-14 PROCEDURE — 13131 CMPLX RPR F/C/C/M/N/AX/G/H/F: CPT | Mod: PBBFAC | Performed by: DERMATOLOGY

## 2019-10-14 PROCEDURE — 99499 NO LOS: ICD-10-PCS | Mod: S$PBB,,, | Performed by: DERMATOLOGY

## 2019-10-14 PROCEDURE — 17311 MOHS 1 STAGE H/N/HF/G: CPT | Mod: PBBFAC | Performed by: DERMATOLOGY

## 2019-10-14 PROCEDURE — 13131 CMPLX RPR F/C/C/M/N/AX/G/H/F: CPT | Mod: S$PBB,59,, | Performed by: DERMATOLOGY

## 2019-10-14 PROCEDURE — 17311: ICD-10-PCS | Mod: S$PBB,,, | Performed by: DERMATOLOGY

## 2019-10-14 NOTE — PROGRESS NOTES
PROCEDURE: Mohs' Micrographic Surgery    INDICATION: Biopsy-proven skin cancer of cosmetically and functionally important areas, including head, neck, genital, hand, foot, or areas known for having difficulty in healing, such as the lower anterior legs. Tumor with ill-defined borders.    REFERRING MD: Shital Cantu MD    CASE NUMBER:     ANESTHETIC: 2 cc 1% Lidocaine with Epinephrine 1:100,000    SURGICAL PREP: Hibiclens    SURGEON: Toña Abad MD    ASSISTANTS: Roxy Penn PA-C and Sarah Mock MA    PREOPERATIVE DIAGNOSIS: basal cell carcinoma    POSTOPERATIVE DIAGNOSIS: basal cell carcinoma    PATHOLOGIC DIAGNOSIS: basal cell carcinoma- nodular    HISTOLOGY OF SPECIMENS IN FIRST STAGE:   Tumor Type: No tumor seen.    STAGES OF MOHS' SURGERY PERFORMED: 1    TUMOR-FREE PLANE ACHIEVED: Yes    HEMOSTASIS: electrocoagulation     SPECIMENS: 2    LOCATION: right frontal scalp (superolateral forehead) . Patient verified location with hand held mirror.    INITIAL LESION SIZE: 0.4 x 0.4 cm    FINAL DEFECT SIZE: 0.6 x 0.7 cm    WOUND REPAIR/DISPOSITION: The patient tolerated Mohs' Micrographic Surgery for a basal cell carcinoma very well. When the tumor was completely removed, a repair of the surgical defect was undertaken.      PROCEDURE: Complex Linear Repair    INDICATION: Status post Mohs' Micrographic Surgery for basal cell carcinoma.    CASE NUMBER:     SURGEON: Toña Abad MD    ASSISTANTS: Roxy Penn PA-C and Asya Claudio Surg [place]    ANESTHETIC: 1.5 cc 1% Lidocaine with Epinephrine 1:100,000    SURGICAL PREP: Hibiclens, prepped by Asya Claudio, Surg Tech    LOCATION: right frontal scalp (superolateral forehead)     DEFECT SIZE: 0.6 x 0.7 cm    WOUND REPAIR/DISPOSITION:  After the patient's carcinoma had been completely removed with Mohs' Micrographic Surgery, a repair of the surgical defect was undertaken. The patient was returned to the operating suite where the area of right frontal scalp  "was prepped, draped, and anesthetized in the usual sterile fashion. The wound was widely undermined in all directions. Then, electrocoagulation was used to obtain meticulous hemostasis. 4-0 Vicryl buried vertical mattress sutures were placed into the subcutaneous and dermal plane to close the wound and kartik the cutaneous wound edge. Bilateral dog ears were identified and were removed by a standard Burow's triangle technique. The cutaneous wound edges were closed using interrupted 4-0 Prolene suture.    The patient tolerated the procedure well.    The area was cleaned and dressed appropriately and the patient was given wound care instructions, as well as appointment for follow-up evaluation. Patient declined pain medication.    LENGTH OF REPAIR: 1.7 cm    Vitals:    10/14/19 1234 10/14/19 1406   BP: 119/72 112/72   BP Location: Right arm Right arm   Patient Position: Sitting Sitting   BP Method: Small (Automatic) Small (Automatic)   Pulse: 78 73   Weight: 89.4 kg (197 lb)    Height: 5' 11" (1.803 m)          "

## 2019-10-24 ENCOUNTER — OFFICE VISIT (OUTPATIENT)
Dept: DERMATOLOGY | Facility: CLINIC | Age: 54
End: 2019-10-24
Payer: OTHER GOVERNMENT

## 2019-10-24 DIAGNOSIS — Z09 POSTOP CHECK: Primary | ICD-10-CM

## 2019-10-24 PROCEDURE — 99024 PR POST-OP FOLLOW-UP VISIT: ICD-10-PCS | Mod: ,,, | Performed by: DERMATOLOGY

## 2019-10-24 PROCEDURE — 99024 POSTOP FOLLOW-UP VISIT: CPT | Mod: ,,, | Performed by: DERMATOLOGY

## 2019-10-24 NOTE — PROGRESS NOTES
54 y.o. male patient is here for suture removal following Mohs' surgery.    Patient reports no problems.    WOUND PE:  The Right frontal scalp sutures intact. Wound healing well. Good skin edges. No signs or symptoms of infection.      IMPRESSION:  Healing operative site from Mohs' surgery BCC,right frontal scalp s/p Mohs' with CLC, postop day# 7.    PLAN:  Sutures removed today. Steri-strips applied.  Continue wound care.  Keep moist with Aquaphor.    RTC:  In 3-6 months with Dr. Shital Cantu M.D. for skin check or sooner if new concern arises.

## 2019-11-11 ENCOUNTER — LAB VISIT (OUTPATIENT)
Dept: LAB | Facility: HOSPITAL | Age: 54
End: 2019-11-11
Attending: INTERNAL MEDICINE
Payer: OTHER GOVERNMENT

## 2019-11-11 DIAGNOSIS — E85.81 AL AMYLOIDOSIS: ICD-10-CM

## 2019-11-11 LAB
ABO + RH BLD: NORMAL
ALBUMIN SERPL BCP-MCNC: 3.8 G/DL (ref 3.5–5.2)
ALP SERPL-CCNC: 128 U/L (ref 55–135)
ALT SERPL W/O P-5'-P-CCNC: 23 U/L (ref 10–44)
ANION GAP SERPL CALC-SCNC: 11 MMOL/L (ref 8–16)
AST SERPL-CCNC: 31 U/L (ref 10–40)
BASOPHILS # BLD AUTO: 0.04 K/UL (ref 0–0.2)
BASOPHILS NFR BLD: 0.7 % (ref 0–1.9)
BILIRUB SERPL-MCNC: 0.8 MG/DL (ref 0.1–1)
BLD GP AB SCN CELLS X3 SERPL QL: NORMAL
BUN SERPL-MCNC: 37 MG/DL (ref 6–20)
CALCIUM SERPL-MCNC: 9.5 MG/DL (ref 8.7–10.5)
CHLORIDE SERPL-SCNC: 101 MMOL/L (ref 95–110)
CO2 SERPL-SCNC: 24 MMOL/L (ref 23–29)
CREAT SERPL-MCNC: 1.7 MG/DL (ref 0.5–1.4)
DIFFERENTIAL METHOD: ABNORMAL
EOSINOPHIL # BLD AUTO: 0.3 K/UL (ref 0–0.5)
EOSINOPHIL NFR BLD: 4.4 % (ref 0–8)
ERYTHROCYTE [DISTWIDTH] IN BLOOD BY AUTOMATED COUNT: 13.1 % (ref 11.5–14.5)
EST. GFR  (AFRICAN AMERICAN): 52.1 ML/MIN/1.73 M^2
EST. GFR  (NON AFRICAN AMERICAN): 45 ML/MIN/1.73 M^2
GLUCOSE SERPL-MCNC: 104 MG/DL (ref 70–110)
HCT VFR BLD AUTO: 38.3 % (ref 40–54)
HGB BLD-MCNC: 12.5 G/DL (ref 14–18)
IMM GRANULOCYTES # BLD AUTO: 0.02 K/UL (ref 0–0.04)
IMM GRANULOCYTES NFR BLD AUTO: 0.4 % (ref 0–0.5)
LYMPHOCYTES # BLD AUTO: 1.4 K/UL (ref 1–4.8)
LYMPHOCYTES NFR BLD: 24.8 % (ref 18–48)
MCH RBC QN AUTO: 34.3 PG (ref 27–31)
MCHC RBC AUTO-ENTMCNC: 32.6 G/DL (ref 32–36)
MCV RBC AUTO: 105 FL (ref 82–98)
MONOCYTES # BLD AUTO: 0.6 K/UL (ref 0.3–1)
MONOCYTES NFR BLD: 10.9 % (ref 4–15)
NEUTROPHILS # BLD AUTO: 3.3 K/UL (ref 1.8–7.7)
NEUTROPHILS NFR BLD: 58.8 % (ref 38–73)
NRBC BLD-RTO: 0 /100 WBC
PLATELET # BLD AUTO: 143 K/UL (ref 150–350)
PMV BLD AUTO: 10 FL (ref 9.2–12.9)
POTASSIUM SERPL-SCNC: 4.6 MMOL/L (ref 3.5–5.1)
PROT SERPL-MCNC: 6.6 G/DL (ref 6–8.4)
RBC # BLD AUTO: 3.64 M/UL (ref 4.6–6.2)
SODIUM SERPL-SCNC: 136 MMOL/L (ref 136–145)
WBC # BLD AUTO: 5.68 K/UL (ref 3.9–12.7)

## 2019-11-11 PROCEDURE — 83520 IMMUNOASSAY QUANT NOS NONAB: CPT

## 2019-11-11 PROCEDURE — 86850 RBC ANTIBODY SCREEN: CPT

## 2019-11-11 PROCEDURE — 80053 COMPREHEN METABOLIC PANEL: CPT

## 2019-11-11 PROCEDURE — 85025 COMPLETE CBC W/AUTO DIFF WBC: CPT

## 2019-11-11 PROCEDURE — 36415 COLL VENOUS BLD VENIPUNCTURE: CPT

## 2019-11-12 LAB
KAPPA LC SER QL IA: 1.97 MG/DL (ref 0.33–1.94)
KAPPA LC/LAMBDA SER IA: 0.12 (ref 0.26–1.65)
LAMBDA LC SER QL IA: 16.41 MG/DL (ref 0.57–2.63)

## 2019-11-13 ENCOUNTER — INFUSION (OUTPATIENT)
Dept: INFUSION THERAPY | Facility: HOSPITAL | Age: 54
End: 2019-11-13
Attending: INTERNAL MEDICINE
Payer: OTHER GOVERNMENT

## 2019-11-13 ENCOUNTER — OFFICE VISIT (OUTPATIENT)
Dept: HEMATOLOGY/ONCOLOGY | Facility: CLINIC | Age: 54
End: 2019-11-13
Payer: OTHER GOVERNMENT

## 2019-11-13 VITALS
SYSTOLIC BLOOD PRESSURE: 112 MMHG | OXYGEN SATURATION: 100 % | BODY MASS INDEX: 28.61 KG/M2 | WEIGHT: 204.38 LBS | TEMPERATURE: 98 F | HEIGHT: 71 IN | DIASTOLIC BLOOD PRESSURE: 65 MMHG | RESPIRATION RATE: 16 BRPM | HEART RATE: 83 BPM

## 2019-11-13 DIAGNOSIS — N17.9 AKI (ACUTE KIDNEY INJURY): ICD-10-CM

## 2019-11-13 DIAGNOSIS — T45.1X5A ANEMIA ASSOCIATED WITH CHEMOTHERAPY: ICD-10-CM

## 2019-11-13 DIAGNOSIS — E85.81 AL AMYLOIDOSIS: Primary | ICD-10-CM

## 2019-11-13 DIAGNOSIS — D64.81 ANEMIA ASSOCIATED WITH CHEMOTHERAPY: ICD-10-CM

## 2019-11-13 DIAGNOSIS — G62.2 NEUROPATHY DUE TO CHEMICAL SUBSTANCE: ICD-10-CM

## 2019-11-13 PROCEDURE — 63600175 PHARM REV CODE 636 W HCPCS: Mod: JG | Performed by: INTERNAL MEDICINE

## 2019-11-13 PROCEDURE — 96401 CHEMO ANTI-NEOPL SQ/IM: CPT

## 2019-11-13 PROCEDURE — 99999 PR PBB SHADOW E&M-EST. PATIENT-LVL IV: ICD-10-PCS | Mod: PBBFAC,,, | Performed by: NURSE PRACTITIONER

## 2019-11-13 PROCEDURE — 99214 OFFICE O/P EST MOD 30 MIN: CPT | Mod: PBBFAC,25 | Performed by: NURSE PRACTITIONER

## 2019-11-13 PROCEDURE — 99999 PR PBB SHADOW E&M-EST. PATIENT-LVL IV: CPT | Mod: PBBFAC,,, | Performed by: NURSE PRACTITIONER

## 2019-11-13 PROCEDURE — 99214 PR OFFICE/OUTPT VISIT, EST, LEVL IV, 30-39 MIN: ICD-10-PCS | Mod: S$PBB,,, | Performed by: NURSE PRACTITIONER

## 2019-11-13 PROCEDURE — 99214 OFFICE O/P EST MOD 30 MIN: CPT | Mod: S$PBB,,, | Performed by: NURSE PRACTITIONER

## 2019-11-13 RX ORDER — BORTEZOMIB 3.5 MG/1
1.3 INJECTION, POWDER, LYOPHILIZED, FOR SOLUTION INTRAVENOUS; SUBCUTANEOUS
Status: CANCELLED
Start: 2019-11-13

## 2019-11-13 RX ORDER — SODIUM CHLORIDE 0.9 % (FLUSH) 0.9 %
10 SYRINGE (ML) INJECTION
Status: CANCELLED | OUTPATIENT
Start: 2019-11-13

## 2019-11-13 RX ORDER — CELECOXIB 200 MG/1
CAPSULE ORAL
COMMUNITY
End: 2020-01-13 | Stop reason: SINTOL

## 2019-11-13 RX ORDER — HEPARIN 100 UNIT/ML
500 SYRINGE INTRAVENOUS
Status: CANCELLED | OUTPATIENT
Start: 2019-11-13

## 2019-11-13 RX ORDER — BORTEZOMIB 3.5 MG/1
1.3 INJECTION, POWDER, LYOPHILIZED, FOR SOLUTION INTRAVENOUS; SUBCUTANEOUS
Status: COMPLETED | OUTPATIENT
Start: 2019-11-13 | End: 2019-11-13

## 2019-11-13 RX ADMIN — BORTEZOMIB 2.8 MG: 3.5 INJECTION, POWDER, LYOPHILIZED, FOR SOLUTION INTRAVENOUS; SUBCUTANEOUS at 09:11

## 2019-11-13 NOTE — Clinical Note
CBC, CMP, SPEP, BROOKS, free light chains, immunoglobulins on 1/8/19 at 7am and follow-up with Dr. Atkins and appt for bortezomib on 1/10 (likes early appts)

## 2019-11-13 NOTE — LETTER
November 19, 2019      Chon Harmon MD  [address] [facility] [address]  [address]  [place] [address]           [facility]  [facility]  [place] [address]  Phone: [phone]          Patient: Roc Lai Jr.   MR Number: [id number]   YOB: 1965   Date of Visit: 11/13/2019       Dear Dr. Chon Harmon:    Thank you for referring Roc Lai to me for evaluation. Attached you will find relevant portions of my assessment and plan of care.    If you have questions, please do not hesitate to call me. I look forward to following Roc Lai along with you.    Sincerely,    Tarsha Tan, NP    Enclosure  CC:  No Recipients    If you would like to receive this communication electronically, please contact [email or web address] or [phone] to request more information on [facility] access.    For providers and/or their staff who would like to refer a patient to [facility], please contact us through our one-stop-shop provider referral line, [facility] , at [phone].    If you feel you have received this communication in error or would no longer like to receive these types of communications, please e-mail [email or web address]

## 2019-11-13 NOTE — PROGRESS NOTES
HEMATOLOGIC MALIGNANCIES PROGRESS NOTE    IDENTIFYING STATEMENT   Roc Lai Jr. ([place]) is a 54 y.o. male with a  of 1965 from [place] with the diagnosis of AL amyloid.      ONCOLOGY HISTORY:    1. AL amyloid with cardiac involvement   A. 2016: Initial evaluation with Dr. Carmichael. Reported 18 months of progressive decline (previously ran 3 miles easily, now can only walk 10 minutes). M-protein 0.19 g/dl, kappa 1.37 mg/dl, lambda 50 mg/dl, ratio 0.03. Abdominal fat pad biopsy had been negative for presence of amyloid. Cardiac MRI suggestive of infiltrative cardiomyopathy.    B. 1/3/2017: Endomyocardial biopsy - involved by AL amyloid   C. 2017: BMBx - 60% cellular marrow with 50% plasma cells, consistent with plasma cell myeloma.    D. 2/15/2017 - 2017: Completed 4 cycles bortezomib chemotherapy on [id number] trial.    E. 2017: Bortezomib every 2 weeks x 2 doses   F. 2017: Change bortezomib to [id number] x 2 doses   G. 10/9/2017: Bortezomib q6 weeks   H. 2018: Bortezomib q8 weeks.    I. 2018: [id number] close out visit due to ineffectiveness.      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 Lai returns to clinic for follow-up of his cardiac AL amyloid and for Velcade injection today.      He is having shoulder surgery over Thanksgiving week. He reports hurting his shoulder moving daughter into college. We have given clearance and so has cardiology for surgery. He is now taking Celebrex instead of Ibuprofen for pain. He states this is helping with the pain. His creatinine remains elevated with a creatinine of 1.7.     He continues to do well since his last visit.  Denies chest pain, palpitations, shortness of breath, leg swelling, nausea or diarrhea.  Neuropathy is stable    Past Medical  History, Past Social History and Past Family History have been reviewed and are unchanged except as noted in the interval history.    MEDICATIONS:     Current Outpatient Medications on File Prior to Visit   Medication Sig Dispense Refill    acyclovir (ZOVIRAX) 400 MG tablet Take 1 tablet (400 mg total) by mouth 2 (two) times daily. 180 tablet 3    BORTEZOMIB (VELCADE INJ) Inject as directed. Every 2 months      bumetanide (BUMEX) 2 MG tablet Take 1 tablet (2 mg total) by mouth once daily. 90 tablet 3    celecoxib (CELEBREX) 200 MG capsule       gabapentin (NEURONTIN) 100 MG capsule TAKE 3 CAPSULES EVERY EVENING 270 capsule 4    magnesium oxide (MAG-OX) 400 mg (241.3 mg magnesium) tablet Take 1 tablet (400 mg total) by mouth once daily. 90 tablet 3    spironolactone (ALDACTONE) 25 MG tablet Take 1 tablet (25 mg total) by mouth once daily. 90 tablet 3    sildenafil (VIAGRA) 50 MG tablet Take 1 tablet (50 mg total) by mouth daily as needed for Erectile Dysfunction. 7 tablet 2     No current facility-administered medications on file prior to visit.        ALLERGIES: Review of patient's allergies indicates:  No Known Allergies     ROS:       Review of Systems   Constitutional: Negative for diaphoresis, fatigue, fever and unexpected weight change.   HENT:   Negative for lump/mass and sore throat.    Eyes: Negative for icterus.   Respiratory: Negative for cough and shortness of breath.    Cardiovascular: Negative for chest pain, leg swelling and palpitations.   Gastrointestinal: Negative for abdominal distention, constipation, diarrhea, nausea and vomiting.   Genitourinary: Negative for dysuria and frequency.    Musculoskeletal: Negative for arthralgias, gait problem and myalgias.   Skin: Negative for rash.   Neurological: Positive for numbness. Negative for dizziness, gait problem and headaches.   Hematological: Negative for adenopathy. Does not bruise/bleed easily.   Psychiatric/Behavioral: The patient is not  "nervous/anxious.        PHYSICAL EXAM:  Vitals:    11/13/19 0835   BP: 112/65   Pulse: 83   Resp: 16   Temp: 98 °F (36.7 °C)   TempSrc: Oral   SpO2: 100%   Weight: 92.7 kg (204 lb 5.9 oz)   Height: 5' 11" (1.803 m)   PainSc: 0-No pain     ECOG 1    NYHA Class II    Physical Exam   Constitutional: He is oriented to person, place, and time. He appears well-developed and well-nourished. No distress.   HENT:   Head: Normocephalic and atraumatic.   Mouth/Throat: Oropharynx is clear and moist and mucous membranes are normal. No oral lesions.   Eyes: Conjunctivae and EOM are normal.   Neck: Normal range of motion. Neck supple. No thyromegaly present.   Cardiovascular: Normal rate, regular rhythm and intact distal pulses.   Pulmonary/Chest: Effort normal and breath sounds normal. He has no wheezes. He has no rales.   Abdominal: Soft. Bowel sounds are normal. He exhibits no distension and no mass. There is no splenomegaly or hepatomegaly. There is no tenderness.   Musculoskeletal: Normal range of motion. He exhibits no edema.   Neurological: He is alert and oriented to person, place, and time. He has normal strength and normal reflexes. No cranial nerve deficit. Coordination normal.   Skin: Skin is warm and dry. No rash noted.   Psychiatric: He has a normal mood and affect. His behavior is normal. Judgment and thought content normal.   Nursing note and vitals reviewed.      LAB:   Results for orders placed or performed in visit on 11/11/19   Rapid BMT CBC with Diff   Result Value Ref Range    WBC 5.68 3.90 - 12.70 K/uL    RBC 3.64 (L) 4.60 - 6.20 M/uL    Hemoglobin 12.5 (L) 14.0 - 18.0 g/dL    Hematocrit 38.3 (L) 40.0 - 54.0 %    Mean Corpuscular Volume 105 (H) 82 - 98 fL    Mean Corpuscular Hemoglobin 34.3 (H) 27.0 - 31.0 pg    Mean Corpuscular Hemoglobin Conc 32.6 32.0 - 36.0 g/dL    RDW 13.1 11.5 - 14.5 %    Platelets 143 (L) 150 - 350 K/uL    MPV 10.0 9.2 - 12.9 fL    Immature Granulocytes 0.4 0.0 - 0.5 %    Gran # (ANC) " 3.3 1.8 - 7.7 K/uL    Immature Grans (Abs) 0.02 0.00 - 0.04 K/uL    Lymph # 1.4 1.0 - 4.8 K/uL    Mono # 0.6 0.3 - 1.0 K/uL    Eos # 0.3 0.0 - 0.5 K/uL    Baso # 0.04 0.00 - 0.20 K/uL    nRBC 0 0 /100 WBC    Gran% 58.8 38.0 - 73.0 %    Lymph% 24.8 18.0 - 48.0 %    Mono% 10.9 4.0 - 15.0 %    Eosinophil% 4.4 0.0 - 8.0 %    Basophil% 0.7 0.0 - 1.9 %    Differential Method Automated    Comprehensive metabolic panel   Result Value Ref Range    Sodium 136 136 - 145 mmol/L    Potassium 4.6 3.5 - 5.1 mmol/L    Chloride 101 95 - 110 mmol/L    CO2 24 23 - 29 mmol/L    Glucose 104 70 - 110 mg/dL    BUN, Bld 37 (H) 6 - 20 mg/dL    Creatinine 1.7 (H) 0.5 - 1.4 mg/dL    Calcium 9.5 8.7 - 10.5 mg/dL    Total Protein 6.6 6.0 - 8.4 g/dL    Albumin 3.8 3.5 - 5.2 g/dL    Total Bilirubin 0.8 0.1 - 1.0 mg/dL    Alkaline Phosphatase 128 55 - 135 U/L    AST 31 10 - 40 U/L    ALT 23 10 - 44 U/L    Anion Gap 11 8 - 16 mmol/L    eGFR if African American 52.1 (A) >60 mL/min/1.73 m^2    eGFR if non African American 45.0 (A) >60 mL/min/1.73 m^2   Immunoglobulin free LT chains blood   Result Value Ref Range    Kappa Free Light Chains 1.97 (H) 0.33 - 1.94 mg/dL    Lambda Free Light Chains 16.41 (H) 0.57 - 2.63 mg/dL    Kappa/Lambda FLC Ratio 0.12 (L) 0.26 - 1.65   Type & Screen   Result Value Ref Range    Group & Rh A POS     Indirect Mariangel NEG        PROBLEMS ASSESSED THIS VISIT:    1. AL amyloidosis    2. 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 MALISSA (acute kidney injury)        PLAN:       AL amyloidosis  Continues to do well with bortezomib without clinical evidence of worsening amyloid. Lambda light chain up to 16.41 mg/dl (previously 13.26) but FLC wnl, not indicative of progressive disease. SPEP from 9/19/19 with no monoclonal protein.    Creatinine remains elevated at 1.7 today, generally ranges 1.4-1.5, now taking Celebrex for shoulder pain instead of Ibuprofen.    Continue bortezomib  maintenance therapy every 8 weeks.    Anemia due to chemo  - hgb stable at 12.5    Neuropathy due to chemo  - Gabapentin nightly     Follow-up: Follow-up with CBC, CMP, SPEP, BROOKS, free light chains, immunoglobulins on 1/8/19 at 7am and follow-up with Dr. Atkins and appt for bortezomib on 1/10 (likes early appts)    Tarsha Tan NP  Hematology and Stem Cell Transplant    Distress Screening Results: Psychosocial Distress screening score of Distress Score: 0 noted and reviewed. No intervention indicated.

## 2019-11-14 ENCOUNTER — IMMUNIZATION (OUTPATIENT)
Dept: PHARMACY | Facility: CLINIC | Age: 54
End: 2019-11-14
Payer: OTHER GOVERNMENT

## 2019-11-22 ENCOUNTER — TELEPHONE (OUTPATIENT)
Dept: TRANSPLANT | Facility: CLINIC | Age: 54
End: 2019-11-22

## 2019-11-22 NOTE — TELEPHONE ENCOUNTER
Patient called today if that that his surgical clearance for his shoulder was not accepted by anesthesiologist at [facility].  I reviewed the information sent to Dr. he had add September 12, 2019.  At that time he was cleared for his shoulder surgery and a copy of the office note from August 15 2019 sent as well.  As long as this is sufficient for the current operation since he reports he remains stable by the anesthesiologist that he does not need to come next week for the work in, urgent visit for surgical clearance medical appointment made by my office today.  This appointment was canceled at his request this afternoon as he is confident the earlier clearance will suffice.

## 2019-12-05 DIAGNOSIS — I50.32 CHRONIC DIASTOLIC CONGESTIVE HEART FAILURE: ICD-10-CM

## 2019-12-10 ENCOUNTER — PATIENT MESSAGE (OUTPATIENT)
Dept: HEMATOLOGY/ONCOLOGY | Facility: CLINIC | Age: 54
End: 2019-12-10

## 2019-12-10 ENCOUNTER — PATIENT MESSAGE (OUTPATIENT)
Dept: TRANSPLANT | Facility: CLINIC | Age: 54
End: 2019-12-10

## 2019-12-17 ENCOUNTER — PATIENT MESSAGE (OUTPATIENT)
Dept: TRANSPLANT | Facility: CLINIC | Age: 54
End: 2019-12-17

## 2019-12-17 DIAGNOSIS — I50.32 CHRONIC DIASTOLIC CONGESTIVE HEART FAILURE: ICD-10-CM

## 2019-12-18 RX ORDER — BUMETANIDE 2 MG/1
TABLET ORAL
Qty: 90 TABLET | Refills: 4 | Status: SHIPPED | OUTPATIENT
Start: 2019-12-18 | End: 2020-05-05 | Stop reason: SDUPTHER

## 2019-12-18 RX ORDER — BUMETANIDE 2 MG/1
2 TABLET ORAL DAILY
Qty: 90 TABLET | Refills: 3 | OUTPATIENT
Start: 2019-12-18

## 2019-12-19 ENCOUNTER — PATIENT MESSAGE (OUTPATIENT)
Dept: HEMATOLOGY/ONCOLOGY | Facility: CLINIC | Age: 54
End: 2019-12-19

## 2020-01-01 ENCOUNTER — TELEPHONE (OUTPATIENT)
Dept: CARDIOLOGY | Facility: HOSPITAL | Age: 55
End: 2020-01-01

## 2020-01-01 ENCOUNTER — PATIENT MESSAGE (OUTPATIENT)
Dept: TRANSPLANT | Facility: CLINIC | Age: 55
End: 2020-01-01

## 2020-01-01 ENCOUNTER — LAB VISIT (OUTPATIENT)
Dept: LAB | Facility: HOSPITAL | Age: 55
End: 2020-01-01
Payer: OTHER GOVERNMENT

## 2020-01-01 ENCOUNTER — PATIENT MESSAGE (OUTPATIENT)
Dept: HEMATOLOGY/ONCOLOGY | Facility: CLINIC | Age: 55
End: 2020-01-01

## 2020-01-01 ENCOUNTER — OFFICE VISIT (OUTPATIENT)
Dept: TRANSPLANT | Facility: CLINIC | Age: 55
End: 2020-01-01
Attending: INTERNAL MEDICINE
Payer: OTHER GOVERNMENT

## 2020-01-01 ENCOUNTER — LAB VISIT (OUTPATIENT)
Dept: LAB | Facility: HOSPITAL | Age: 55
End: 2020-01-01
Attending: INTERNAL MEDICINE
Payer: OTHER GOVERNMENT

## 2020-01-01 ENCOUNTER — CLINICAL SUPPORT (OUTPATIENT)
Dept: URGENT CARE | Facility: CLINIC | Age: 55
End: 2020-01-01
Payer: OTHER GOVERNMENT

## 2020-01-01 VITALS
DIASTOLIC BLOOD PRESSURE: 64 MMHG | BODY MASS INDEX: 26.94 KG/M2 | HEART RATE: 81 BPM | WEIGHT: 209.88 LBS | SYSTOLIC BLOOD PRESSURE: 109 MMHG | HEIGHT: 74 IN

## 2020-01-01 DIAGNOSIS — Z20.822 EXPOSURE TO COVID-19 VIRUS: Primary | ICD-10-CM

## 2020-01-01 DIAGNOSIS — E85.81 AL AMYLOIDOSIS: ICD-10-CM

## 2020-01-01 DIAGNOSIS — I50.32 CHRONIC DIASTOLIC CONGESTIVE HEART FAILURE: Primary | ICD-10-CM

## 2020-01-01 DIAGNOSIS — I50.32 CHRONIC DIASTOLIC CONGESTIVE HEART FAILURE: ICD-10-CM

## 2020-01-01 DIAGNOSIS — E85.81 AL AMYLOIDOSIS: Primary | ICD-10-CM

## 2020-01-01 DIAGNOSIS — I50.22 CHRONIC SYSTOLIC CONGESTIVE HEART FAILURE: ICD-10-CM

## 2020-01-01 DIAGNOSIS — I42.8 INFILTRATIVE CARDIOMYOPATHY: ICD-10-CM

## 2020-01-01 DIAGNOSIS — D47.2 SMOLDERING MULTIPLE MYELOMA: ICD-10-CM

## 2020-01-01 LAB
ALBUMIN SERPL BCP-MCNC: 2.8 G/DL (ref 3.5–5.2)
ALBUMIN SERPL ELPH-MCNC: 2.99 G/DL (ref 3.35–5.55)
ALP SERPL-CCNC: 162 U/L (ref 55–135)
ALPHA1 GLOB SERPL ELPH-MCNC: 0.49 G/DL (ref 0.17–0.41)
ALPHA2 GLOB SERPL ELPH-MCNC: 0.68 G/DL (ref 0.43–0.99)
ALT SERPL W/O P-5'-P-CCNC: 16 U/L (ref 10–44)
ANION GAP SERPL CALC-SCNC: 8 MMOL/L (ref 8–16)
ANION GAP SERPL CALC-SCNC: 9 MMOL/L (ref 8–16)
AST SERPL-CCNC: 25 U/L (ref 10–40)
B-GLOBULIN SERPL ELPH-MCNC: 0.7 G/DL (ref 0.5–1.1)
BASOPHILS # BLD AUTO: 0.03 K/UL (ref 0–0.2)
BASOPHILS NFR BLD: 0.5 % (ref 0–1.9)
BILIRUB SERPL-MCNC: 0.7 MG/DL (ref 0.1–1)
BNP SERPL-MCNC: 323 PG/ML (ref 0–99)
BUN SERPL-MCNC: 33 MG/DL (ref 6–20)
BUN SERPL-MCNC: 34 MG/DL (ref 6–20)
CALCIUM SERPL-MCNC: 8.3 MG/DL (ref 8.7–10.5)
CALCIUM SERPL-MCNC: 8.5 MG/DL (ref 8.7–10.5)
CHLORIDE SERPL-SCNC: 101 MMOL/L (ref 95–110)
CHLORIDE SERPL-SCNC: 102 MMOL/L (ref 95–110)
CO2 SERPL-SCNC: 26 MMOL/L (ref 23–29)
CO2 SERPL-SCNC: 26 MMOL/L (ref 23–29)
CREAT SERPL-MCNC: 1.7 MG/DL (ref 0.5–1.4)
CREAT SERPL-MCNC: 1.8 MG/DL (ref 0.5–1.4)
CTP QC/QA: YES
DIFFERENTIAL METHOD: ABNORMAL
EOSINOPHIL # BLD AUTO: 0.2 K/UL (ref 0–0.5)
EOSINOPHIL NFR BLD: 3.5 % (ref 0–8)
ERYTHROCYTE [DISTWIDTH] IN BLOOD BY AUTOMATED COUNT: 17.2 % (ref 11.5–14.5)
EST. GFR  (AFRICAN AMERICAN): 48.3 ML/MIN/1.73 M^2
EST. GFR  (AFRICAN AMERICAN): 51.7 ML/MIN/1.73 M^2
EST. GFR  (NON AFRICAN AMERICAN): 41.7 ML/MIN/1.73 M^2
EST. GFR  (NON AFRICAN AMERICAN): 44.7 ML/MIN/1.73 M^2
GAMMA GLOB SERPL ELPH-MCNC: 0.73 G/DL (ref 0.67–1.58)
GLUCOSE SERPL-MCNC: 104 MG/DL (ref 70–110)
GLUCOSE SERPL-MCNC: 93 MG/DL (ref 70–110)
HCT VFR BLD AUTO: 30.7 % (ref 40–54)
HGB BLD-MCNC: 9 G/DL (ref 14–18)
IGA SERPL-MCNC: 34 MG/DL (ref 40–350)
IGG SERPL-MCNC: 858 MG/DL (ref 650–1600)
IGM SERPL-MCNC: 35 MG/DL (ref 50–300)
IMM GRANULOCYTES # BLD AUTO: 0.07 K/UL (ref 0–0.04)
IMM GRANULOCYTES NFR BLD AUTO: 1.3 % (ref 0–0.5)
INTERPRETATION SERPL IFE-IMP: NORMAL
KAPPA LC SER QL IA: 3.8 MG/DL (ref 0.33–1.94)
KAPPA LC/LAMBDA SER IA: 0.56 (ref 0.26–1.65)
LAMBDA LC SER QL IA: 6.84 MG/DL (ref 0.57–2.63)
LYMPHOCYTES # BLD AUTO: 0.6 K/UL (ref 1–4.8)
LYMPHOCYTES NFR BLD: 11.7 % (ref 18–48)
MCH RBC QN AUTO: 25.1 PG (ref 27–31)
MCHC RBC AUTO-ENTMCNC: 29.3 G/DL (ref 32–36)
MCV RBC AUTO: 86 FL (ref 82–98)
MONOCYTES # BLD AUTO: 0.6 K/UL (ref 0.3–1)
MONOCYTES NFR BLD: 11 % (ref 4–15)
NEUTROPHILS # BLD AUTO: 3.9 K/UL (ref 1.8–7.7)
NEUTROPHILS NFR BLD: 72 % (ref 38–73)
NRBC BLD-RTO: 0 /100 WBC
PATHOLOGIST INTERPRETATION IFE: NORMAL
PATHOLOGIST INTERPRETATION SPE: NORMAL
PLATELET # BLD AUTO: 186 K/UL (ref 150–350)
PMV BLD AUTO: 10 FL (ref 9.2–12.9)
POTASSIUM SERPL-SCNC: 4.2 MMOL/L (ref 3.5–5.1)
POTASSIUM SERPL-SCNC: 4.3 MMOL/L (ref 3.5–5.1)
PROT SERPL-MCNC: 5.6 G/DL (ref 6–8.4)
PROT SERPL-MCNC: 6 G/DL (ref 6–8.4)
RBC # BLD AUTO: 3.59 M/UL (ref 4.6–6.2)
SARS-COV-2 RDRP RESP QL NAA+PROBE: NEGATIVE
SODIUM SERPL-SCNC: 135 MMOL/L (ref 136–145)
SODIUM SERPL-SCNC: 137 MMOL/L (ref 136–145)
TROPONIN I SERPL DL<=0.01 NG/ML-MCNC: 0.05 NG/ML (ref 0–0.03)
WBC # BLD AUTO: 5.47 K/UL (ref 3.9–12.7)

## 2020-01-01 PROCEDURE — 86334 IMMUNOFIX E-PHORESIS SERUM: CPT

## 2020-01-01 PROCEDURE — 99214 OFFICE O/P EST MOD 30 MIN: CPT | Mod: S$PBB,,, | Performed by: INTERNAL MEDICINE

## 2020-01-01 PROCEDURE — 99999 PR PBB SHADOW E&M-EST. PATIENT-LVL IV: CPT | Mod: PBBFAC,,, | Performed by: INTERNAL MEDICINE

## 2020-01-01 PROCEDURE — 36415 COLL VENOUS BLD VENIPUNCTURE: CPT

## 2020-01-01 PROCEDURE — 83520 IMMUNOASSAY QUANT NOS NONAB: CPT

## 2020-01-01 PROCEDURE — U0002: ICD-10-PCS | Mod: QW,S$GLB,, | Performed by: NURSE PRACTITIONER

## 2020-01-01 PROCEDURE — 80053 COMPREHEN METABOLIC PANEL: CPT

## 2020-01-01 PROCEDURE — 86334 PATHOLOGIST INTERPRETATION IFE: ICD-10-PCS | Mod: 26,,, | Performed by: PATHOLOGY

## 2020-01-01 PROCEDURE — 86334 IMMUNOFIX E-PHORESIS SERUM: CPT | Mod: 26,,, | Performed by: PATHOLOGY

## 2020-01-01 PROCEDURE — 80048 BASIC METABOLIC PNL TOTAL CA: CPT

## 2020-01-01 PROCEDURE — 99214 PR OFFICE/OUTPT VISIT, EST, LEVL IV, 30-39 MIN: ICD-10-PCS | Mod: S$PBB,,, | Performed by: INTERNAL MEDICINE

## 2020-01-01 PROCEDURE — 85025 COMPLETE CBC W/AUTO DIFF WBC: CPT

## 2020-01-01 PROCEDURE — U0002 COVID-19 LAB TEST NON-CDC: HCPCS | Mod: QW,S$GLB,, | Performed by: NURSE PRACTITIONER

## 2020-01-01 PROCEDURE — 99211 OFF/OP EST MAY X REQ PHY/QHP: CPT | Mod: S$GLB,,, | Performed by: NURSE PRACTITIONER

## 2020-01-01 PROCEDURE — 99211 PR OFFICE/OUTPT VISIT, EST, LEVL I: ICD-10-PCS | Mod: S$GLB,,, | Performed by: NURSE PRACTITIONER

## 2020-01-01 PROCEDURE — 84484 ASSAY OF TROPONIN QUANT: CPT

## 2020-01-01 PROCEDURE — 82784 ASSAY IGA/IGD/IGG/IGM EACH: CPT | Mod: 59

## 2020-01-01 PROCEDURE — 84165 PROTEIN E-PHORESIS SERUM: CPT | Mod: 26,,, | Performed by: PATHOLOGY

## 2020-01-01 PROCEDURE — 84165 PROTEIN E-PHORESIS SERUM: CPT

## 2020-01-01 PROCEDURE — 83880 ASSAY OF NATRIURETIC PEPTIDE: CPT | Mod: 91

## 2020-01-01 PROCEDURE — 99999 PR PBB SHADOW E&M-EST. PATIENT-LVL IV: ICD-10-PCS | Mod: PBBFAC,,, | Performed by: INTERNAL MEDICINE

## 2020-01-01 PROCEDURE — 84165 PATHOLOGIST INTERPRETATION SPE: ICD-10-PCS | Mod: 26,,, | Performed by: PATHOLOGY

## 2020-01-01 PROCEDURE — 99214 OFFICE O/P EST MOD 30 MIN: CPT | Mod: PBBFAC | Performed by: INTERNAL MEDICINE

## 2020-01-01 RX ORDER — BUMETANIDE 2 MG/1
4 TABLET ORAL 2 TIMES DAILY
Qty: 90 TABLET | Refills: 11
Start: 2020-01-01 | End: 2020-01-01

## 2020-01-07 NOTE — NURSING
Pt arrived for Velcade.  Pt tolerated injection to abd.  Discharged to home.   · History of chronic back pain due to disc disease  · Takes oxycodone 10 mg p r n   At home  · Will use Adult Pain Management set

## 2020-01-08 ENCOUNTER — INFUSION (OUTPATIENT)
Dept: INFUSION THERAPY | Facility: HOSPITAL | Age: 55
End: 2020-01-08
Attending: INTERNAL MEDICINE
Payer: OTHER GOVERNMENT

## 2020-01-08 ENCOUNTER — LAB VISIT (OUTPATIENT)
Dept: LAB | Facility: HOSPITAL | Age: 55
End: 2020-01-08
Payer: OTHER GOVERNMENT

## 2020-01-08 ENCOUNTER — OFFICE VISIT (OUTPATIENT)
Dept: HEMATOLOGY/ONCOLOGY | Facility: CLINIC | Age: 55
End: 2020-01-08
Payer: OTHER GOVERNMENT

## 2020-01-08 ENCOUNTER — PATIENT MESSAGE (OUTPATIENT)
Dept: HEMATOLOGY/ONCOLOGY | Facility: CLINIC | Age: 55
End: 2020-01-08

## 2020-01-08 VITALS
BODY MASS INDEX: 28.49 KG/M2 | SYSTOLIC BLOOD PRESSURE: 104 MMHG | HEART RATE: 79 BPM | RESPIRATION RATE: 16 BRPM | TEMPERATURE: 98 F | DIASTOLIC BLOOD PRESSURE: 56 MMHG | HEIGHT: 71 IN | OXYGEN SATURATION: 100 % | WEIGHT: 203.5 LBS

## 2020-01-08 DIAGNOSIS — I42.5 OTHER RESTRICTIVE CARDIOMYOPATHY: ICD-10-CM

## 2020-01-08 DIAGNOSIS — E85.81 AL AMYLOIDOSIS: ICD-10-CM

## 2020-01-08 DIAGNOSIS — E85.81 AL AMYLOIDOSIS: Primary | ICD-10-CM

## 2020-01-08 DIAGNOSIS — D47.2 SMOLDERING MULTIPLE MYELOMA: Primary | ICD-10-CM

## 2020-01-08 LAB
ABO + RH BLD: NORMAL
ALBUMIN SERPL BCP-MCNC: 2.8 G/DL (ref 3.5–5.2)
ALP SERPL-CCNC: 152 U/L (ref 55–135)
ALT SERPL W/O P-5'-P-CCNC: 19 U/L (ref 10–44)
ANION GAP SERPL CALC-SCNC: 9 MMOL/L (ref 8–16)
AST SERPL-CCNC: 31 U/L (ref 10–40)
BASOPHILS # BLD AUTO: 0.05 K/UL (ref 0–0.2)
BASOPHILS NFR BLD: 0.9 % (ref 0–1.9)
BILIRUB SERPL-MCNC: 0.6 MG/DL (ref 0.1–1)
BLD GP AB SCN CELLS X3 SERPL QL: NORMAL
BUN SERPL-MCNC: 35 MG/DL (ref 6–20)
CALCIUM SERPL-MCNC: 9.1 MG/DL (ref 8.7–10.5)
CHLORIDE SERPL-SCNC: 105 MMOL/L (ref 95–110)
CO2 SERPL-SCNC: 25 MMOL/L (ref 23–29)
CREAT SERPL-MCNC: 1.7 MG/DL (ref 0.5–1.4)
DIFFERENTIAL METHOD: ABNORMAL
EOSINOPHIL # BLD AUTO: 0.3 K/UL (ref 0–0.5)
EOSINOPHIL NFR BLD: 4.9 % (ref 0–8)
ERYTHROCYTE [DISTWIDTH] IN BLOOD BY AUTOMATED COUNT: 13.1 % (ref 11.5–14.5)
EST. GFR  (AFRICAN AMERICAN): 52.1 ML/MIN/1.73 M^2
EST. GFR  (NON AFRICAN AMERICAN): 45 ML/MIN/1.73 M^2
GLUCOSE SERPL-MCNC: 157 MG/DL (ref 70–110)
HCT VFR BLD AUTO: 38.8 % (ref 40–54)
HGB BLD-MCNC: 12.3 G/DL (ref 14–18)
IGA SERPL-MCNC: 57 MG/DL (ref 40–350)
IGG SERPL-MCNC: 479 MG/DL (ref 650–1600)
IGM SERPL-MCNC: 40 MG/DL (ref 50–300)
IMM GRANULOCYTES # BLD AUTO: 0.01 K/UL (ref 0–0.04)
IMM GRANULOCYTES NFR BLD AUTO: 0.2 % (ref 0–0.5)
LYMPHOCYTES # BLD AUTO: 1 K/UL (ref 1–4.8)
LYMPHOCYTES NFR BLD: 17.8 % (ref 18–48)
MCH RBC QN AUTO: 34.1 PG (ref 27–31)
MCHC RBC AUTO-ENTMCNC: 31.7 G/DL (ref 32–36)
MCV RBC AUTO: 108 FL (ref 82–98)
MONOCYTES # BLD AUTO: 0.4 K/UL (ref 0.3–1)
MONOCYTES NFR BLD: 7.9 % (ref 4–15)
NEUTROPHILS # BLD AUTO: 3.8 K/UL (ref 1.8–7.7)
NEUTROPHILS NFR BLD: 68.3 % (ref 38–73)
NRBC BLD-RTO: 0 /100 WBC
PLATELET # BLD AUTO: 163 K/UL (ref 150–350)
PMV BLD AUTO: 9.7 FL (ref 9.2–12.9)
POTASSIUM SERPL-SCNC: 4.7 MMOL/L (ref 3.5–5.1)
PROT SERPL-MCNC: 5.8 G/DL (ref 6–8.4)
RBC # BLD AUTO: 3.61 M/UL (ref 4.6–6.2)
SODIUM SERPL-SCNC: 139 MMOL/L (ref 136–145)
WBC # BLD AUTO: 5.55 K/UL (ref 3.9–12.7)

## 2020-01-08 PROCEDURE — 99213 OFFICE O/P EST LOW 20 MIN: CPT | Mod: PBBFAC,25 | Performed by: INTERNAL MEDICINE

## 2020-01-08 PROCEDURE — 99214 PR OFFICE/OUTPT VISIT, EST, LEVL IV, 30-39 MIN: ICD-10-PCS | Mod: S$PBB,,, | Performed by: INTERNAL MEDICINE

## 2020-01-08 PROCEDURE — 86334 PATHOLOGIST INTERPRETATION IFE: ICD-10-PCS | Mod: 26,,, | Performed by: PATHOLOGY

## 2020-01-08 PROCEDURE — 99999 PR PBB SHADOW E&M-EST. PATIENT-LVL III: CPT | Mod: PBBFAC,,, | Performed by: INTERNAL MEDICINE

## 2020-01-08 PROCEDURE — 99214 OFFICE O/P EST MOD 30 MIN: CPT | Mod: S$PBB,,, | Performed by: INTERNAL MEDICINE

## 2020-01-08 PROCEDURE — 36415 COLL VENOUS BLD VENIPUNCTURE: CPT

## 2020-01-08 PROCEDURE — 82784 ASSAY IGA/IGD/IGG/IGM EACH: CPT | Mod: 59

## 2020-01-08 PROCEDURE — 86334 IMMUNOFIX E-PHORESIS SERUM: CPT | Mod: 26,,, | Performed by: PATHOLOGY

## 2020-01-08 PROCEDURE — 86901 BLOOD TYPING SEROLOGIC RH(D): CPT

## 2020-01-08 PROCEDURE — 84165 PATHOLOGIST INTERPRETATION SPE: ICD-10-PCS | Mod: 26,,, | Performed by: PATHOLOGY

## 2020-01-08 PROCEDURE — 84165 PROTEIN E-PHORESIS SERUM: CPT

## 2020-01-08 PROCEDURE — 84165 PROTEIN E-PHORESIS SERUM: CPT | Mod: 26,,, | Performed by: PATHOLOGY

## 2020-01-08 PROCEDURE — 63600175 PHARM REV CODE 636 W HCPCS: Mod: JW,JG | Performed by: INTERNAL MEDICINE

## 2020-01-08 PROCEDURE — 86334 IMMUNOFIX E-PHORESIS SERUM: CPT

## 2020-01-08 PROCEDURE — 80053 COMPREHEN METABOLIC PANEL: CPT

## 2020-01-08 PROCEDURE — 85025 COMPLETE CBC W/AUTO DIFF WBC: CPT

## 2020-01-08 PROCEDURE — 99999 PR PBB SHADOW E&M-EST. PATIENT-LVL III: ICD-10-PCS | Mod: PBBFAC,,, | Performed by: INTERNAL MEDICINE

## 2020-01-08 PROCEDURE — 96401 CHEMO ANTI-NEOPL SQ/IM: CPT

## 2020-01-08 PROCEDURE — 83520 IMMUNOASSAY QUANT NOS NONAB: CPT

## 2020-01-08 RX ORDER — HEPARIN 100 UNIT/ML
500 SYRINGE INTRAVENOUS
Status: CANCELLED | OUTPATIENT
Start: 2020-01-08

## 2020-01-08 RX ORDER — BORTEZOMIB 3.5 MG/1
1.3 INJECTION, POWDER, LYOPHILIZED, FOR SOLUTION INTRAVENOUS; SUBCUTANEOUS
Status: CANCELLED
Start: 2020-01-08

## 2020-01-08 RX ORDER — SODIUM CHLORIDE 0.9 % (FLUSH) 0.9 %
10 SYRINGE (ML) INJECTION
Status: CANCELLED | OUTPATIENT
Start: 2020-01-08

## 2020-01-08 RX ORDER — BORTEZOMIB 3.5 MG/1
1.3 INJECTION, POWDER, LYOPHILIZED, FOR SOLUTION INTRAVENOUS; SUBCUTANEOUS
Status: COMPLETED | OUTPATIENT
Start: 2020-01-08 | End: 2020-01-08

## 2020-01-08 RX ADMIN — BORTEZOMIB 2.8 MG: 3.5 INJECTION, POWDER, LYOPHILIZED, FOR SOLUTION INTRAVENOUS; SUBCUTANEOUS at 10:01

## 2020-01-08 NOTE — Clinical Note
Please schedule bone marrow biopsy in OR on next available date. Please also schedule PET/CT scan.Please seek authorization for new chemotherapy treatment plan.Please schedule return visit, labs (CBC, CMP, SPEP, BROOKS< free light chains, immunoglobulins, Troponin T, miscellaneous lab) and chemo appt for bortezomib at least one week after bone marrow biopsy and PET/CT.

## 2020-01-09 DIAGNOSIS — D47.2 SMOLDERING MULTIPLE MYELOMA: Primary | ICD-10-CM

## 2020-01-09 LAB
ALBUMIN SERPL ELPH-MCNC: 3.05 G/DL (ref 3.35–5.55)
ALPHA1 GLOB SERPL ELPH-MCNC: 0.45 G/DL (ref 0.17–0.41)
ALPHA2 GLOB SERPL ELPH-MCNC: 0.6 G/DL (ref 0.43–0.99)
B-GLOBULIN SERPL ELPH-MCNC: 0.72 G/DL (ref 0.5–1.1)
GAMMA GLOB SERPL ELPH-MCNC: 0.48 G/DL (ref 0.67–1.58)
INTERPRETATION SERPL IFE-IMP: NORMAL
KAPPA LC SER QL IA: 2.73 MG/DL (ref 0.33–1.94)
KAPPA LC/LAMBDA SER IA: 0.1 (ref 0.26–1.65)
LAMBDA LC SER QL IA: 28.33 MG/DL (ref 0.57–2.63)
PATHOLOGIST INTERPRETATION IFE: NORMAL
PATHOLOGIST INTERPRETATION SPE: NORMAL
PROT SERPL-MCNC: 5.3 G/DL (ref 6–8.4)

## 2020-01-09 RX ORDER — DEXAMETHASONE 4 MG/1
20 TABLET ORAL
Qty: 40 TABLET | Refills: 5 | Status: SHIPPED | OUTPATIENT
Start: 2020-01-09 | End: 2020-05-29

## 2020-01-09 RX ORDER — DEXAMETHASONE 4 MG/1
20 TABLET ORAL
Qty: 40 TABLET | Refills: 5 | Status: CANCELLED | OUTPATIENT
Start: 2020-01-22

## 2020-01-09 RX ORDER — CYCLOPHOSPHAMIDE 50 MG/1
300 CAPSULE ORAL
Qty: 52 CAPSULE | Refills: 5 | Status: CANCELLED | OUTPATIENT
Start: 2020-01-22

## 2020-01-09 RX ORDER — CYCLOPHOSPHAMIDE 50 MG/1
300 CAPSULE ORAL
Qty: 52 CAPSULE | Refills: 5 | Status: SHIPPED | OUTPATIENT
Start: 2020-01-09 | End: 2020-03-20 | Stop reason: SDUPTHER

## 2020-01-09 NOTE — PLAN OF CARE
START OFF PATHWAY REGIMEN - Multiple Myeloma and Other Plasma Cell Dyscrasias            Bortezomib (Velcade(R))       Cyclophosphamide (Cytoxan(R))       Dexamethasone (Decadron(R))           Additional Orders: Herpes zoster prophylaxis recommended.    **Always confirm dose/schedule in your pharmacy ordering system**    Patient Characteristics:  Relapsed / Refractory, Second through Fourth Lines of Therapy  R-ISS Staging: Not Applicable  Disease Classification: Relapsed  Line of Therapy: Second Line  Intent of Therapy:  Non-Curative / Palliative Intent, Discussed with Patient

## 2020-01-09 NOTE — PROGRESS NOTES
Placed case request for BMBx in OR for 1/23/20.    Olinda Ward, FNP  Hematology/Oncology/Bone Marrow Transplant

## 2020-01-13 ENCOUNTER — TELEPHONE (OUTPATIENT)
Dept: HEMATOLOGY/ONCOLOGY | Facility: CLINIC | Age: 55
End: 2020-01-13

## 2020-01-13 ENCOUNTER — TELEPHONE (OUTPATIENT)
Dept: PHARMACY | Facility: CLINIC | Age: 55
End: 2020-01-13

## 2020-01-13 NOTE — TELEPHONE ENCOUNTER
Informed Patient  that [facility] received prescription for Cyclophosphamide and benefits investigation is required.  [facility] will be back in touch once insurance determination is received.

## 2020-01-13 NOTE — ASSESSMENT & PLAN NOTE
Lambda light chains have risen from carrie of less than 1 mg/dl to now 28 mg/dl. 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 We discussed that he needs restaging with bone marrow biopsy, PET/CT, and we should consider reinduction therapy with CyBorD. He has not had any agent besides bortezomib in the past, and this has only been administered very infrequently as of late, so I doubt he is truly refractory to bortezomib.     We will also present him for discussion at transplant conference to see if he might be considered for autologous stem cell transplant consolidation given cardiac function recovery (though not normal and certainly higher risk).

## 2020-01-13 NOTE — TELEPHONE ENCOUNTER
Called and rescheduled pet scan for same day as bmbx so he only has to fast one morning and to recover from flu

## 2020-01-13 NOTE — PROGRESS NOTES
HEMATOLOGIC MALIGNANCIES PROGRESS NOTE    IDENTIFYING STATEMENT   Roc Lai Jr. ([place]) is a 54 y.o. male with a  of 1965 from [place] with the diagnosis of AL amyloid.      ONCOLOGY HISTORY:    1. AL amyloid with cardiac involvement   A. 2016: Initial evaluation with Dr. Carmichael. Reported 18 months of progressive decline (previously ran 3 miles easily, now can only walk 10 minutes). M-protein 0.19 g/dl, kappa 1.37 mg/dl, lambda 50 mg/dl, ratio 0.03. Abdominal fat pad biopsy had been negative for presence of amyloid. Cardiac MRI suggestive of infiltrative cardiomyopathy.    B. 1/3/2017: Endomyocardial biopsy - involved by AL amyloid   C. 2017: BMBx - 60% cellular marrow with 50% plasma cells, consistent with plasma cell myeloma.    D. 2/15/2017 - 2017: Completed 4 cycles bortezomib chemotherapy on [id number] trial.    E. 2017: Bortezomib every 2 weeks x 2 doses   F. 2017: Change bortezomib to [id number] x 2 doses   G. 10/9/2017: Bortezomib q6 weeks; kappa 1.6 mg/dl, lambda 0.93 mg/dl, ratio 1.72   H. 2018: Bortezomib q8 weeks.    I. 2018: [id number] close out visit due to ineffectiveness.     J. 2020: kappa 2.73 mg/dl, lambda 28.33 mg/dl, ratio (lambda:kappa) 10.38, concerning for progressive disease    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 Lai returns to clinic for follow-up of his cardiac AL amyloid.  He has been doing well in the interim since his last visit. He is continuing to control his edema with oral diuretics. He is still working in [place]. He states that he noticed he had much more edema when he was taking celecoxib; once he stopped, these symptoms no longer occurred.     He denies any current dyspnea on exertion, palpiations, chest pain. No bone pain. His  neuropathy persists but is improved with new shoe inserts.     Past Medical History, Past Social History and Past Family History have been reviewed and are unchanged except as noted in the interval history.    MEDICATIONS:     Current Outpatient Medications on File Prior to Visit   Medication Sig Dispense Refill    acyclovir (ZOVIRAX) 400 MG tablet Take 1 tablet (400 mg total) by mouth 2 (two) times daily. 180 tablet 3    BORTEZOMIB (VELCADE INJ) Inject as directed. Every 2 months      bumetanide (BUMEX) 2 MG tablet TAKE 1 TABLET DAILY 90 tablet 4    gabapentin (NEURONTIN) 100 MG capsule TAKE 3 CAPSULES EVERY EVENING 270 capsule 4    spironolactone (ALDACTONE) 25 MG tablet Take 1 tablet (25 mg total) by mouth once daily. 90 tablet 3    [DISCONTINUED] celecoxib (CELEBREX) 200 MG capsule       sildenafil (VIAGRA) 50 MG tablet Take 1 tablet (50 mg total) by mouth daily as needed for Erectile Dysfunction. 7 tablet 2     No current facility-administered medications on file prior to visit.        ALLERGIES: Review of patient's allergies indicates:  No Known Allergies     ROS:       Review of Systems   Constitutional: Positive for fatigue. Negative for diaphoresis, fever and unexpected weight change.   HENT:   Negative for lump/mass and sore throat.    Eyes: Negative for icterus.   Respiratory: Negative for cough and shortness of breath.    Cardiovascular: Negative for chest pain, leg swelling and palpitations.   Gastrointestinal: Negative for abdominal distention, constipation, diarrhea, nausea and vomiting.   Genitourinary: Negative for dysuria and frequency.    Musculoskeletal: Negative for arthralgias, gait problem and myalgias.   Skin: Negative for rash.   Neurological: Positive for numbness. Negative for dizziness, gait problem and headaches.   Hematological: Negative for adenopathy. Does not bruise/bleed easily.   Psychiatric/Behavioral: The patient is not nervous/anxious.        PHYSICAL EXAM:  Vitals:     "01/08/20 0758   BP: (!) 104/56   Pulse: 79   Resp: 16   Temp: 98 °F (36.7 °C)   TempSrc: Oral   SpO2: 100%   Weight: 92.3 kg (203 lb 7.8 oz)   Height: 5' 11" (1.803 m)     ECOG 1    NYHA Class II    Physical Exam   Constitutional: He is oriented to person, place, and time. He appears well-developed and well-nourished. No distress.   HENT:   Head: Normocephalic and atraumatic.   Right Ear: External ear normal.   Left Ear: External ear normal.   Mouth/Throat: Oropharynx is clear and moist and mucous membranes are normal. No oral lesions.   Eyes: Conjunctivae and EOM are normal.   Neck: Normal range of motion. Neck supple. No thyromegaly present.   Cardiovascular: Normal rate, regular rhythm, normal heart sounds and intact distal pulses.   No murmur heard.  Pulmonary/Chest: Effort normal and breath sounds normal. He has no wheezes. He has no rales.   Abdominal: Soft. Bowel sounds are normal. He exhibits no distension and no mass. There is no splenomegaly or hepatomegaly. There is no tenderness.   Musculoskeletal: Normal range of motion. He exhibits no edema.   Lymphadenopathy:        Head (right side): No submental and no submandibular adenopathy present.        Head (left side): No submental and no submandibular adenopathy present.     He has no cervical adenopathy.        Right cervical: No deep cervical adenopathy present.       Left cervical: No deep cervical adenopathy present.     He has no axillary adenopathy.        Right: No supraclavicular adenopathy present.        Left: No supraclavicular adenopathy present.   Neurological: He is alert and oriented to person, place, and time. He has normal strength and normal reflexes. No cranial nerve deficit. Coordination normal.   Skin: Skin is warm and dry. No rash noted.   Psychiatric: He has a normal mood and affect. His behavior is normal. Judgment and thought content normal.   Nursing note and vitals reviewed.      LAB:   Results for orders placed or performed in " visit on 01/08/20   Rapid BMT CBC with Diff   Result Value Ref Range    WBC 5.55 3.90 - 12.70 K/uL    RBC 3.61 (L) 4.60 - 6.20 M/uL    Hemoglobin 12.3 (L) 14.0 - 18.0 g/dL    Hematocrit 38.8 (L) 40.0 - 54.0 %    Mean Corpuscular Volume 108 (H) 82 - 98 fL    Mean Corpuscular Hemoglobin 34.1 (H) 27.0 - 31.0 pg    Mean Corpuscular Hemoglobin Conc 31.7 (L) 32.0 - 36.0 g/dL    RDW 13.1 11.5 - 14.5 %    Platelets 163 150 - 350 K/uL    MPV 9.7 9.2 - 12.9 fL    Immature Granulocytes 0.2 0.0 - 0.5 %    Gran # (ANC) 3.8 1.8 - 7.7 K/uL    Immature Grans (Abs) 0.01 0.00 - 0.04 K/uL    Lymph # 1.0 1.0 - 4.8 K/uL    Mono # 0.4 0.3 - 1.0 K/uL    Eos # 0.3 0.0 - 0.5 K/uL    Baso # 0.05 0.00 - 0.20 K/uL    nRBC 0 0 /100 WBC    Gran% 68.3 38.0 - 73.0 %    Lymph% 17.8 (L) 18.0 - 48.0 %    Mono% 7.9 4.0 - 15.0 %    Eosinophil% 4.9 0.0 - 8.0 %    Basophil% 0.9 0.0 - 1.9 %    Differential Method Automated    Comprehensive metabolic panel   Result Value Ref Range    Sodium 139 136 - 145 mmol/L    Potassium 4.7 3.5 - 5.1 mmol/L    Chloride 105 95 - 110 mmol/L    CO2 25 23 - 29 mmol/L    Glucose 157 (H) 70 - 110 mg/dL    BUN, Bld 35 (H) 6 - 20 mg/dL    Creatinine 1.7 (H) 0.5 - 1.4 mg/dL    Calcium 9.1 8.7 - 10.5 mg/dL    Total Protein 5.8 (L) 6.0 - 8.4 g/dL    Albumin 2.8 (L) 3.5 - 5.2 g/dL    Total Bilirubin 0.6 0.1 - 1.0 mg/dL    Alkaline Phosphatase 152 (H) 55 - 135 U/L    AST 31 10 - 40 U/L    ALT 19 10 - 44 U/L    Anion Gap 9 8 - 16 mmol/L    eGFR if African American 52.1 (A) >60 mL/min/1.73 m^2    eGFR if non African American 45.0 (A) >60 mL/min/1.73 m^2   Immunoglobulin free LT chains blood   Result Value Ref Range    Kappa Free Light Chains 2.73 (H) 0.33 - 1.94 mg/dL    Lambda Free Light Chains 28.33 (H) 0.57 - 2.63 mg/dL    Kappa/Lambda FLC Ratio 0.10 (L) 0.26 - 1.65   Protein electrophoresis, serum   Result Value Ref Range    Protein, Serum 5.3 (L) 6.0 - 8.4 g/dL    Albumin grams/dl 3.05 (L) 3.35 - 5.55 g/dL    Alpha-1 grams/dl  0.45 (H) 0.17 - 0.41 g/dL    Alpha-2 grams/dl 0.60 0.43 - 0.99 g/dL    Beta grams/dl 0.72 0.50 - 1.10 g/dL    Gamma grams/dl 0.48 (L) 0.67 - 1.58 g/dL   Immunofixation electrophoresis   Result Value Ref Range    Immunofix Interp. SEE COMMENT    Immunoglobulins (IgG, IgA, IgM) Quantitative   Result Value Ref Range    IgG - Serum 479 (L) 650 - 1600 mg/dL    IgA 57 40 - 350 mg/dL    IgM 40 (L) 50 - 300 mg/dL   Pathologist Interpretation SPE   Result Value Ref Range    Pathologist Interpretation SPE REVIEWED    Pathologist Interpretation BROOKS   Result Value Ref Range    Pathologist Interpretation BROOKS REVIEWED    Type & Screen   Result Value Ref Range    Group & Rh A POS     Indirect Mariangel NEG        PROBLEMS ASSESSED THIS VISIT:    1. Smoldering multiple myeloma    2. AL amyloidosis    3. Other restrictive cardiomyopathy        PLAN:       AL amyloidosis  Lambda light chains have risen from carrie of less than 1 mg/dl to now 28 mg/dl. 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 We discussed that he needs restaging with bone marrow biopsy, PET/CT, and we should consider reinduction therapy with CyBorD. He has not had any agent besides bortezomib in the past, and this has only been administered very infrequently as of late, so I doubt he is truly refractory to bortezomib.     We will also present him for discussion at transplant conference to see if he might be considered for autologous stem cell transplant consolidation given cardiac function recovery (though not normal and certainly higher risk).       Follow-up:   - PET/CT  - bone marrow biopsy  - return to clinic to start weekly CyBorD chemotherapy    Chris Atkins MD  Hematology and Stem Cell Transplant      Distress Screening Results: Psychosocial Distress screening score of Distress Score: 0 noted and reviewed. No intervention indicated.

## 2020-01-13 NOTE — H&P (VIEW-ONLY)
HEMATOLOGIC MALIGNANCIES PROGRESS NOTE    IDENTIFYING STATEMENT   Roc Lai Jr. ([place]) is a 54 y.o. male with a  of 1965 from [place] with the diagnosis of AL amyloid.      ONCOLOGY HISTORY:    1. AL amyloid with cardiac involvement   A. 2016: Initial evaluation with Dr. Carmichael. Reported 18 months of progressive decline (previously ran 3 miles easily, now can only walk 10 minutes). M-protein 0.19 g/dl, kappa 1.37 mg/dl, lambda 50 mg/dl, ratio 0.03. Abdominal fat pad biopsy had been negative for presence of amyloid. Cardiac MRI suggestive of infiltrative cardiomyopathy.    B. 1/3/2017: Endomyocardial biopsy - involved by AL amyloid   C. 2017: BMBx - 60% cellular marrow with 50% plasma cells, consistent with plasma cell myeloma.    D. 2/15/2017 - 2017: Completed 4 cycles bortezomib chemotherapy on [id number] trial.    E. 2017: Bortezomib every 2 weeks x 2 doses   F. 2017: Change bortezomib to w6awncm x 2 doses   G. 10/9/2017: Bortezomib q6 weeks; kappa 1.6 mg/dl, lambda 0.93 mg/dl, ratio 1.72   H. 2018: Bortezomib q8 weeks.    I. 2018: [id number] close out visit due to ineffectiveness.     J. 2020: kappa 2.73 mg/dl, lambda 28.33 mg/dl, ratio (lambda:kappa) 10.38, concerning for progressive disease    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 Lai returns to clinic for follow-up of his cardiac AL amyloid.  He has been doing well in the interim since his last visit. He is continuing to control his edema with oral diuretics. He is still working in [place]. He states that he noticed he had much more edema when he was taking celecoxib; once he stopped, these symptoms no longer occurred.     He denies any current dyspnea on exertion, palpiations, chest pain. No bone pain. His  neuropathy persists but is improved with new shoe inserts.     Past Medical History, Past Social History and Past Family History have been reviewed and are unchanged except as noted in the interval history.    MEDICATIONS:     Current Outpatient Medications on File Prior to Visit   Medication Sig Dispense Refill    acyclovir (ZOVIRAX) 400 MG tablet Take 1 tablet (400 mg total) by mouth 2 (two) times daily. 180 tablet 3    BORTEZOMIB (VELCADE INJ) Inject as directed. Every 2 months      bumetanide (BUMEX) 2 MG tablet TAKE 1 TABLET DAILY 90 tablet 4    gabapentin (NEURONTIN) 100 MG capsule TAKE 3 CAPSULES EVERY EVENING 270 capsule 4    spironolactone (ALDACTONE) 25 MG tablet Take 1 tablet (25 mg total) by mouth once daily. 90 tablet 3    [DISCONTINUED] celecoxib (CELEBREX) 200 MG capsule       sildenafil (VIAGRA) 50 MG tablet Take 1 tablet (50 mg total) by mouth daily as needed for Erectile Dysfunction. 7 tablet 2     No current facility-administered medications on file prior to visit.        ALLERGIES: Review of patient's allergies indicates:  No Known Allergies     ROS:       Review of Systems   Constitutional: Positive for fatigue. Negative for diaphoresis, fever and unexpected weight change.   HENT:   Negative for lump/mass and sore throat.    Eyes: Negative for icterus.   Respiratory: Negative for cough and shortness of breath.    Cardiovascular: Negative for chest pain, leg swelling and palpitations.   Gastrointestinal: Negative for abdominal distention, constipation, diarrhea, nausea and vomiting.   Genitourinary: Negative for dysuria and frequency.    Musculoskeletal: Negative for arthralgias, gait problem and myalgias.   Skin: Negative for rash.   Neurological: Positive for numbness. Negative for dizziness, gait problem and headaches.   Hematological: Negative for adenopathy. Does not bruise/bleed easily.   Psychiatric/Behavioral: The patient is not nervous/anxious.        PHYSICAL EXAM:  Vitals:     "01/08/20 0758   BP: (!) 104/56   Pulse: 79   Resp: 16   Temp: 98 °F (36.7 °C)   TempSrc: Oral   SpO2: 100%   Weight: 92.3 kg (203 lb 7.8 oz)   Height: 5' 11" (1.803 m)     ECOG 1    NYHA Class II    Physical Exam   Constitutional: He is oriented to person, place, and time. He appears well-developed and well-nourished. No distress.   HENT:   Head: Normocephalic and atraumatic.   Right Ear: External ear normal.   Left Ear: External ear normal.   Mouth/Throat: Oropharynx is clear and moist and mucous membranes are normal. No oral lesions.   Eyes: Conjunctivae and EOM are normal.   Neck: Normal range of motion. Neck supple. No thyromegaly present.   Cardiovascular: Normal rate, regular rhythm, normal heart sounds and intact distal pulses.   No murmur heard.  Pulmonary/Chest: Effort normal and breath sounds normal. He has no wheezes. He has no rales.   Abdominal: Soft. Bowel sounds are normal. He exhibits no distension and no mass. There is no splenomegaly or hepatomegaly. There is no tenderness.   Musculoskeletal: Normal range of motion. He exhibits no edema.   Lymphadenopathy:        Head (right side): No submental and no submandibular adenopathy present.        Head (left side): No submental and no submandibular adenopathy present.     He has no cervical adenopathy.        Right cervical: No deep cervical adenopathy present.       Left cervical: No deep cervical adenopathy present.     He has no axillary adenopathy.        Right: No supraclavicular adenopathy present.        Left: No supraclavicular adenopathy present.   Neurological: He is alert and oriented to person, place, and time. He has normal strength and normal reflexes. No cranial nerve deficit. Coordination normal.   Skin: Skin is warm and dry. No rash noted.   Psychiatric: He has a normal mood and affect. His behavior is normal. Judgment and thought content normal.   Nursing note and vitals reviewed.      LAB:   Results for orders placed or performed in " visit on 01/08/20   Rapid BMT CBC with Diff   Result Value Ref Range    WBC 5.55 3.90 - 12.70 K/uL    RBC 3.61 (L) 4.60 - 6.20 M/uL    Hemoglobin 12.3 (L) 14.0 - 18.0 g/dL    Hematocrit 38.8 (L) 40.0 - 54.0 %    Mean Corpuscular Volume 108 (H) 82 - 98 fL    Mean Corpuscular Hemoglobin 34.1 (H) 27.0 - 31.0 pg    Mean Corpuscular Hemoglobin Conc 31.7 (L) 32.0 - 36.0 g/dL    RDW 13.1 11.5 - 14.5 %    Platelets 163 150 - 350 K/uL    MPV 9.7 9.2 - 12.9 fL    Immature Granulocytes 0.2 0.0 - 0.5 %    Gran # (ANC) 3.8 1.8 - 7.7 K/uL    Immature Grans (Abs) 0.01 0.00 - 0.04 K/uL    Lymph # 1.0 1.0 - 4.8 K/uL    Mono # 0.4 0.3 - 1.0 K/uL    Eos # 0.3 0.0 - 0.5 K/uL    Baso # 0.05 0.00 - 0.20 K/uL    nRBC 0 0 /100 WBC    Gran% 68.3 38.0 - 73.0 %    Lymph% 17.8 (L) 18.0 - 48.0 %    Mono% 7.9 4.0 - 15.0 %    Eosinophil% 4.9 0.0 - 8.0 %    Basophil% 0.9 0.0 - 1.9 %    Differential Method Automated    Comprehensive metabolic panel   Result Value Ref Range    Sodium 139 136 - 145 mmol/L    Potassium 4.7 3.5 - 5.1 mmol/L    Chloride 105 95 - 110 mmol/L    CO2 25 23 - 29 mmol/L    Glucose 157 (H) 70 - 110 mg/dL    BUN, Bld 35 (H) 6 - 20 mg/dL    Creatinine 1.7 (H) 0.5 - 1.4 mg/dL    Calcium 9.1 8.7 - 10.5 mg/dL    Total Protein 5.8 (L) 6.0 - 8.4 g/dL    Albumin 2.8 (L) 3.5 - 5.2 g/dL    Total Bilirubin 0.6 0.1 - 1.0 mg/dL    Alkaline Phosphatase 152 (H) 55 - 135 U/L    AST 31 10 - 40 U/L    ALT 19 10 - 44 U/L    Anion Gap 9 8 - 16 mmol/L    eGFR if African American 52.1 (A) >60 mL/min/1.73 m^2    eGFR if non African American 45.0 (A) >60 mL/min/1.73 m^2   Immunoglobulin free LT chains blood   Result Value Ref Range    Kappa Free Light Chains 2.73 (H) 0.33 - 1.94 mg/dL    Lambda Free Light Chains 28.33 (H) 0.57 - 2.63 mg/dL    Kappa/Lambda FLC Ratio 0.10 (L) 0.26 - 1.65   Protein electrophoresis, serum   Result Value Ref Range    Protein, Serum 5.3 (L) 6.0 - 8.4 g/dL    Albumin grams/dl 3.05 (L) 3.35 - 5.55 g/dL    Alpha-1 grams/dl  0.45 (H) 0.17 - 0.41 g/dL    Alpha-2 grams/dl 0.60 0.43 - 0.99 g/dL    Beta grams/dl 0.72 0.50 - 1.10 g/dL    Gamma grams/dl 0.48 (L) 0.67 - 1.58 g/dL   Immunofixation electrophoresis   Result Value Ref Range    Immunofix Interp. SEE COMMENT    Immunoglobulins (IgG, IgA, IgM) Quantitative   Result Value Ref Range    IgG - Serum 479 (L) 650 - 1600 mg/dL    IgA 57 40 - 350 mg/dL    IgM 40 (L) 50 - 300 mg/dL   Pathologist Interpretation SPE   Result Value Ref Range    Pathologist Interpretation SPE REVIEWED    Pathologist Interpretation BROOKS   Result Value Ref Range    Pathologist Interpretation BROOKS REVIEWED    Type & Screen   Result Value Ref Range    Group & Rh A POS     Indirect Mariangel NEG        PROBLEMS ASSESSED THIS VISIT:    1. Smoldering multiple myeloma    2. AL amyloidosis    3. Other restrictive cardiomyopathy        PLAN:       AL amyloidosis  Lambda light chains have risen from carrie of less than 1 mg/dl to now 28 mg/dl. 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 We discussed that he needs restaging with bone marrow biopsy, PET/CT, and we should consider reinduction therapy with CyBorD. He has not had any agent besides bortezomib in the past, and this has only been administered very infrequently as of late, so I doubt he is truly refractory to bortezomib.     We will also present him for discussion at transplant conference to see if he might be considered for autologous stem cell transplant consolidation given cardiac function recovery (though not normal and certainly higher risk).       Follow-up:   - PET/CT  - bone marrow biopsy  - return to clinic to start weekly CyBorD chemotherapy    Chris Atkins MD  Hematology and Stem Cell Transplant      Distress Screening Results: Psychosocial Distress screening score of Distress Score: 0 noted and reviewed. No intervention indicated.

## 2020-01-14 NOTE — TELEPHONE ENCOUNTER
DOCUMENTATION ONLY:  Cyclophosphamide 50 mg Capsule #52/28 does not require a prior authorization through the patient's insurance.     Co-pay: $74.61    Patient Assistance IS required. Sending to the financial assistance team to investigate assistance options. - CAZ    DOCUMENTATION ONLY:  Dexamethasone 4 mg Tablet # 20/28 does not require a prior authorization through the patient's insurance.     Co-pay: $19.44    Patient Assistance IS required. Sending to the financial assistance team to investigate assistance options. - CAZ

## 2020-01-20 ENCOUNTER — PATIENT MESSAGE (OUTPATIENT)
Dept: SURGERY | Facility: HOSPITAL | Age: 55
End: 2020-01-20

## 2020-01-21 ENCOUNTER — PATIENT MESSAGE (OUTPATIENT)
Dept: SURGERY | Facility: HOSPITAL | Age: 55
End: 2020-01-21

## 2020-01-23 ENCOUNTER — ANESTHESIA (OUTPATIENT)
Dept: SURGERY | Facility: HOSPITAL | Age: 55
End: 2020-01-23
Payer: OTHER GOVERNMENT

## 2020-01-23 ENCOUNTER — ANESTHESIA EVENT (OUTPATIENT)
Dept: SURGERY | Facility: HOSPITAL | Age: 55
End: 2020-01-23
Payer: OTHER GOVERNMENT

## 2020-01-23 ENCOUNTER — HOSPITAL ENCOUNTER (OUTPATIENT)
Facility: HOSPITAL | Age: 55
Discharge: HOME | End: 2020-01-23
Attending: INTERNAL MEDICINE | Admitting: INTERNAL MEDICINE
Payer: OTHER GOVERNMENT

## 2020-01-23 ENCOUNTER — HOSPITAL ENCOUNTER (OUTPATIENT)
Dept: RADIOLOGY | Facility: HOSPITAL | Age: 55
Discharge: HOME | End: 2020-01-23
Attending: INTERNAL MEDICINE
Payer: OTHER GOVERNMENT

## 2020-01-23 VITALS
TEMPERATURE: 98 F | OXYGEN SATURATION: 100 % | SYSTOLIC BLOOD PRESSURE: 115 MMHG | RESPIRATION RATE: 20 BRPM | HEART RATE: 76 BPM | WEIGHT: 198 LBS | HEIGHT: 71 IN | BODY MASS INDEX: 27.72 KG/M2 | DIASTOLIC BLOOD PRESSURE: 55 MMHG

## 2020-01-23 DIAGNOSIS — E85.81 AL AMYLOIDOSIS: Primary | ICD-10-CM

## 2020-01-23 DIAGNOSIS — D47.2 SMOLDERING MULTIPLE MYELOMA: ICD-10-CM

## 2020-01-23 LAB — POCT GLUCOSE: 87 MG/DL (ref 70–110)

## 2020-01-23 PROCEDURE — 88305 TISSUE EXAM BY PATHOLOGIST: CPT | Mod: 26,,, | Performed by: PATHOLOGY

## 2020-01-23 PROCEDURE — 88313 PR  SPECIAL STAINS,GROUP II: ICD-10-PCS | Mod: 26,,, | Performed by: PATHOLOGY

## 2020-01-23 PROCEDURE — 88189 FLOWCYTOMETRY/READ 16 & >: CPT | Mod: ,,, | Performed by: PATHOLOGY

## 2020-01-23 PROCEDURE — 85097 BONE MARROW INTERPRETATION: CPT | Mod: 59,,, | Performed by: PATHOLOGY

## 2020-01-23 PROCEDURE — 88305 TISSUE EXAM BY PATHOLOGIST: CPT | Mod: 59 | Performed by: PATHOLOGY

## 2020-01-23 PROCEDURE — 88271 CYTOGENETICS DNA PROBE: CPT | Mod: 59

## 2020-01-23 PROCEDURE — 88305 TISSUE EXAM BY PATHOLOGIST: ICD-10-PCS | Mod: 26,,, | Performed by: PATHOLOGY

## 2020-01-23 PROCEDURE — 88313 SPECIAL STAINS GROUP 2: CPT | Mod: 59 | Performed by: PATHOLOGY

## 2020-01-23 PROCEDURE — 01112 ANES BONE MARROW ASPIR&/BX: CPT | Performed by: INTERNAL MEDICINE

## 2020-01-23 PROCEDURE — 25000003 PHARM REV CODE 250: Performed by: INTERNAL MEDICINE

## 2020-01-23 PROCEDURE — 71000015 HC POSTOP RECOV 1ST HR: Performed by: INTERNAL MEDICINE

## 2020-01-23 PROCEDURE — 37000008 HC ANESTHESIA 1ST 15 MINUTES: Performed by: INTERNAL MEDICINE

## 2020-01-23 PROCEDURE — 38222 DX BONE MARROW BX & ASPIR: CPT | Mod: RT,,, | Performed by: NURSE PRACTITIONER

## 2020-01-23 PROCEDURE — D9220A PRA ANESTHESIA: Mod: CRNA,,, | Performed by: NURSE ANESTHETIST, CERTIFIED REGISTERED

## 2020-01-23 PROCEDURE — 88342 IMHCHEM/IMCYTCHM 1ST ANTB: CPT | Mod: 26,,, | Performed by: PATHOLOGY

## 2020-01-23 PROCEDURE — D9220A PRA ANESTHESIA: ICD-10-PCS | Mod: CRNA,,, | Performed by: NURSE ANESTHETIST, CERTIFIED REGISTERED

## 2020-01-23 PROCEDURE — 78816 PET IMAGE W/CT FULL BODY: CPT | Mod: TC

## 2020-01-23 PROCEDURE — D9220A PRA ANESTHESIA: Mod: ANES,,, | Performed by: ANESTHESIOLOGY

## 2020-01-23 PROCEDURE — 63600175 PHARM REV CODE 636 W HCPCS: Performed by: NURSE ANESTHETIST, CERTIFIED REGISTERED

## 2020-01-23 PROCEDURE — 36000704 HC OR TIME LEV I 1ST 15 MIN: Performed by: INTERNAL MEDICINE

## 2020-01-23 PROCEDURE — 88237 TISSUE CULTURE BONE MARROW: CPT

## 2020-01-23 PROCEDURE — 38222 PR BONE MARROW BIOPSY(IES) W/ASPIRATION(S); DIAGNOSTIC: ICD-10-PCS | Mod: RT,,, | Performed by: NURSE PRACTITIONER

## 2020-01-23 PROCEDURE — 88342 IMHCHEM/IMCYTCHM 1ST ANTB: CPT | Mod: 59 | Performed by: PATHOLOGY

## 2020-01-23 PROCEDURE — 88185 FLOWCYTOMETRY/TC ADD-ON: CPT | Performed by: PATHOLOGY

## 2020-01-23 PROCEDURE — 88184 FLOWCYTOMETRY/ TC 1 MARKER: CPT | Performed by: PATHOLOGY

## 2020-01-23 PROCEDURE — 78816 NM PET CT WHOLE BODY: ICD-10-PCS | Mod: 26,PI,, | Performed by: RADIOLOGY

## 2020-01-23 PROCEDURE — D9220A PRA ANESTHESIA: ICD-10-PCS | Mod: ANES,,, | Performed by: ANESTHESIOLOGY

## 2020-01-23 PROCEDURE — 88313 SPECIAL STAINS GROUP 2: CPT | Mod: 26,,, | Performed by: PATHOLOGY

## 2020-01-23 PROCEDURE — 78816 PET IMAGE W/CT FULL BODY: CPT | Mod: 26,PI,, | Performed by: RADIOLOGY

## 2020-01-23 PROCEDURE — 85097 PR  BONE MARROW,SMEAR INTERPRETATION: ICD-10-PCS | Mod: 59,,, | Performed by: PATHOLOGY

## 2020-01-23 PROCEDURE — 88342 CHG IMMUNOCYTOCHEMISTRY: ICD-10-PCS | Mod: 26,,, | Performed by: PATHOLOGY

## 2020-01-23 PROCEDURE — 88189 PR  FLOWCYTOMETRY/READ, 16 & > MARKERS: ICD-10-PCS | Mod: ,,, | Performed by: PATHOLOGY

## 2020-01-23 RX ORDER — PROPOFOL 10 MG/ML
VIAL (ML) INTRAVENOUS
Status: DISCONTINUED | OUTPATIENT
Start: 2020-01-23 | End: 2020-01-23

## 2020-01-23 RX ORDER — LIDOCAINE HYDROCHLORIDE 10 MG/ML
INJECTION, SOLUTION EPIDURAL; INFILTRATION; INTRACAUDAL; PERINEURAL
Status: DISCONTINUED | OUTPATIENT
Start: 2020-01-23 | End: 2020-01-23 | Stop reason: HOSPADM

## 2020-01-23 RX ORDER — LANOLIN ALCOHOL/MO/W.PET/CERES
400 CREAM (GRAM) TOPICAL DAILY
Status: ON HOLD | COMMUNITY
End: 2021-01-01 | Stop reason: HOSPADM

## 2020-01-23 RX ORDER — SODIUM CHLORIDE 9 MG/ML
INJECTION, SOLUTION INTRAVENOUS CONTINUOUS PRN
Status: DISCONTINUED | OUTPATIENT
Start: 2020-01-23 | End: 2020-01-23

## 2020-01-23 RX ADMIN — PROPOFOL 20 MG: 10 INJECTION, EMULSION INTRAVENOUS at 12:01

## 2020-01-23 RX ADMIN — PROPOFOL 50 MG: 10 INJECTION, EMULSION INTRAVENOUS at 12:01

## 2020-01-23 RX ADMIN — SODIUM CHLORIDE: 0.9 INJECTION, SOLUTION INTRAVENOUS at 12:01

## 2020-01-23 NOTE — ANESTHESIA PREPROCEDURE EVALUATION
01/23/2020  Roc Lai Jr. is a 54 y.o., male.    Anesthesia Evaluation         Review of Systems  Anesthesia Hx:  No problems with previous Anesthesia   Social:  Former Smoker, Alcohol Use    Hematology/Oncology:        Hematology Comments: Anticoagulant long-term use Oncology Comments: Smoldering multiple myeloma    Cardiovascular:   CAD   CHF ASD (atrial septal defect), ostium secundum s/p closure,   infiltrative CM,  ejection fraction is 55%   Neurological:   Neuromuscular Disease,        Physical Exam  General:  Well nourished    Airway/Jaw/Neck:  Airway Findings: Mouth Opening: Small, but > 3cm Tongue: Large  General Airway Assessment: Adult  Mallampati: II  Improves to II with phonation.  TM Distance: 4 - 6 cm            Mental Status:  Mental Status Findings:  Cooperative, Alert and Oriented         Anesthesia Plan  Type of Anesthesia, risks & benefits discussed:  Anesthesia Type:  general  Patient's Preference:   Intra-op Monitoring Plan: standard ASA monitors  Intra-op Monitoring Plan Comments:   Post Op Pain Control Plan:   Post Op Pain Control Plan Comments:   Induction:   IV  Beta Blocker:  Patient is not currently on a Beta-Blocker (No further documentation required).       Informed Consent: Patient understands risks and agrees with Anesthesia plan.  Questions answered. Anesthesia consent signed with patient.  ASA Score: 3     Day of Surgery Review of History & Physical:    H&P update referred to the provider.         Ready For Surgery From Anesthesia Perspective.

## 2020-01-23 NOTE — TRANSFER OF CARE
"Anesthesia Transfer of Care Note    Patient: Roc Lai Jr.    Procedure(s) Performed: Procedure(s) (LRB):  Biopsy-bone marrow (Right)    Patient location: PACU    Anesthesia Type: general    Transport from OR: Transported from OR on 6-10 L/min O2 by face mask with adequate spontaneous ventilation    Post pain: adequate analgesia    Post assessment: no apparent anesthetic complications and tolerated procedure well    Post vital signs: stable    Level of consciousness: awake and alert    Nausea/Vomiting: no nausea/vomiting    Complications: none    Transfer of care protocol was followed      Last vitals:   Visit Vitals  /62 (BP Location: Right arm, Patient Position: Lying)   Pulse 80   Temp 36.8 °C (98.3 °F) (Oral)   Resp 18   Ht 5' 11" (1.803 m)   Wt 89.8 kg (198 lb)   SpO2 100%   BMI 27.62 kg/m²     "

## 2020-01-23 NOTE — DISCHARGE SUMMARY
[facility]  Hematology  Bone Marrow Transplant  Discharge Summary      Patient Name: Roc Lai Jr.  MRN: [id number]  Admission Date: 1/23/2020  Hospital Length of Stay: 0 days  Discharge Date and Time:  01/23/2020 12:38 PM  Attending Physician: Chris Atkins MD   Discharging Provider: Heaven Meraz NP  Primary Care Provider: Chon Harmon MD    Procedure(s) (LRB):  Biopsy-bone marrow (N/A)     Hospital Course: Patient admitted to pre op today for a bone marrow aspiration and biopsy. Pt was consented for a bone marrow biopsy. Patient was sedated per anesthesia and a bone marrow biopsy and aspiration was performed in the OR (see procedure note). Patient was then transferred to post op and discharged home when appropriate per anesthesia.       Pending Diagnostic Studies:     None        Final Active Diagnoses:    Diagnosis Date Noted POA    Smoldering multiple myeloma [C90.00] 07/30/2019 Yes      Problems Resolved During this Admission:      Discharged Condition: good    Disposition: Home or Self Care    Follow Up: With Dr. Atkins in BMT clinic    Patient Instructions:      Diet Adult Regular     Notify your health care provider if you experience any of the following:  temperature >100.4     Notify your health care provider if you experience any of the following:  persistent nausea and vomiting or diarrhea     Notify your health care provider if you experience any of the following:  severe uncontrolled pain     Notify your health care provider if you experience any of the following:  redness, tenderness, or signs of infection (pain, swelling, redness, odor or green/yellow discharge around incision site)     Notify your health care provider if you experience any of the following:  difficulty breathing or increased cough     Remove dressing in 24 hours     Activity as tolerated     Medications:  Reconciled Home Medications:      Medication List      ASK your doctor about these  medications    acyclovir 400 MG tablet  Commonly known as:  ZOVIRAX  Take 1 tablet (400 mg total) by mouth 2 (two) times daily.     bumetanide 2 MG tablet  Commonly known as:  BUMEX  TAKE 1 TABLET DAILY     cyclophosphamide 50 mg capsule  Commonly known as:  CYTOXAN  Take 13 capsules (650 mg total) by mouth every 7 days on days 1, 8, 15 and 22 of each 28 day cycle. Take with lots of water..     dexAMETHasone 4 MG Tab  Commonly known as:  DECADRON  Take 5 tablets (20 mg total) by mouth every 7 days. On days 1, 8, 15, and 22 of each chemotherapy cycle. Take with food.     gabapentin 100 MG capsule  Commonly known as:  NEURONTIN  TAKE 3 CAPSULES EVERY EVENING     sildenafil 50 MG tablet  Commonly known as:  VIAGRA  Take 1 tablet (50 mg total) by mouth daily as needed for Erectile Dysfunction.     spironolactone 25 MG tablet  Commonly known as:  Aldactone  Take 1 tablet (25 mg total) by mouth once daily.     VELCADE INJ  Inject as directed. Every 2 months            Heaven Meraz NP  Bone Marrow Transplant  [facility]

## 2020-01-23 NOTE — DISCHARGE INSTRUCTIONS
Discharge instructions for having a Bone Marrow Aspiration / Biopsy:    Keep Bandage in place for 24 hours.  - Do not shower or take a tube bath during this time (you may sponge bathe).  - Call the nurse or physician for excessive bleeding or pain at biopsy site.  - You may take Tylenol as needed for pain.    You have received medication to sedate you.  - Do not drive a car or operate heavy machinery for the rest of the day.  - You may resume other activities as tolerated.    You can call [phone] for any problems during the hours of 8:00 AM-5:00PM.    For an emergency after 5:00 PM you can call [phone] and have the  page the Hematologist / Oncologist on call.

## 2020-01-23 NOTE — BRIEF OP NOTE
PROCEDURE NOTE:  Date of Procedure: 01/23/2020  Bone Marrow Biopsy and Aspiration  Indication: smoldering myeloma/ AL amylodosis  Consent: Informed consent was obtained from patient.  Timeout: Done and documented.  Position: left lateral  Site: right posterior illiac crest.  Prep: Betadine.  Needle used: 11 gauge Jamshidi needle.  Anesthetic: 1% lidocaine 5 cc.  Biopsy: The biopsy needle was introduced into the marrow cavity and an aspirate was obtained without complications and sent for flow cytometry, pcpd fish, and cytogenetics. Core biopsy obtained without difficulty and sent for routine histologic examination.  Complications: None.  Disposition: The patient was discharged home per anesthesia protocol.  Blood loss: Minimal.     Heaven Meraz NP  Hematology/Oncology/BMT

## 2020-01-23 NOTE — TELEPHONE ENCOUNTER
Cyclophosphamide is approved by the patients insurance with high copay. Patient may be eligible for a CAGNO assistance for Cyclophosphamide. We will be assisting the patient in the application process. CAGNO application requires prescriber consent and signature. Sending a staff message to Dr. Atkins regarding CAGNO assistance for Cyclophosphamide and faxing the application for his review and signature.

## 2020-01-23 NOTE — PROGRESS NOTES
Patient stable. VSS. Patient given discharge instructions. All questions answered. Patient verbalized understanding of instructions. Patient getting dressed and ready for discharge.

## 2020-01-23 NOTE — PLAN OF CARE
Prepared patient for surgery.    His wife is not here. She may need to be called for pickup.    Patient recently had R rotator cuff surgery and has limited movement to R arm.  Caution to R arm with positioning.

## 2020-01-23 NOTE — ANESTHESIA POSTPROCEDURE EVALUATION
Anesthesia Post Evaluation    Patient: Roc Lai Jr.    Procedure(s) Performed: Procedure(s) (LRB):  Biopsy-bone marrow (Right)    Final Anesthesia Type: general    Patient location during evaluation: PACU  Patient participation: Yes- Able to Participate  Level of consciousness: awake and alert  Post-procedure vital signs: reviewed and stable  Pain management: adequate  Airway patency: patent    PONV status at discharge: No PONV  Anesthetic complications: no      Cardiovascular status: blood pressure returned to baseline  Respiratory status: unassisted  Hydration status: euvolemic  Follow-up not needed.          Vitals Value Taken Time   /55 1/23/2020  1:00 PM   Temp 36.8 °C (98.2 °F) 1/23/2020 12:42 PM   Pulse 76 1/23/2020  1:00 PM   Resp 20 1/23/2020  1:00 PM   SpO2 100 % 1/23/2020 12:45 PM         No case tracking events are documented in the log.      Pain/Mami Score: No data recorded

## 2020-01-25 ENCOUNTER — PATIENT MESSAGE (OUTPATIENT)
Dept: HEMATOLOGY/ONCOLOGY | Facility: CLINIC | Age: 55
End: 2020-01-25

## 2020-01-25 LAB
BODY SITE - BONE MARROW: NORMAL
CLINICAL DIAGNOSIS - BONE MARROW: NORMAL
FLOW CYTOMETRY ANTIBODIES ANALYZED - BONE MARROW: NORMAL
FLOW CYTOMETRY COMMENT - BONE MARROW: NORMAL
FLOW CYTOMETRY INTERPRETATION - BONE MARROW: NORMAL

## 2020-01-27 ENCOUNTER — TELEPHONE (OUTPATIENT)
Dept: TRANSPLANT | Facility: HOSPITAL | Age: 55
End: 2020-01-27

## 2020-01-27 NOTE — TELEPHONE ENCOUNTER
"At patient's request, I called him regarding his PET/CT results.    He reminded he recently had right shoulder surgery, so those findings are likely reactive/inflammatory.    He inquired about "physiologic tracer activity" in the abdomen and pelvis, which I explained was another way of writing normal.     We will follow-up at scheduled appt on 1/31 to discuss bone marrow results and therapy.     Chris Atkins MD    "

## 2020-01-30 LAB
CHROM BANDING METHOD: NORMAL
CHROMOSOME ANALYSIS BM ADDITIONAL INFORMATION: NORMAL
CHROMOSOME ANALYSIS BM RELEASED BY: NORMAL
CHROMOSOME ANALYSIS BM RESULT SUMMARY: NORMAL
CLINICAL CYTOGENETICIST REVIEW: NORMAL
GENETICIST REVIEW: NORMAL
KARYOTYP MAR: NORMAL
PLASMA CELL PROLIF RELEASED BY: NORMAL
PLASMA CELL PROLIF RESULT SUMMARY: NORMAL
PLASMA CELL PROLIF RESULT TABLE: NORMAL
REASON FOR REFERRAL (NARRATIVE): NORMAL
REASON FOR REFERRAL, PLASMA CELL PROLIF (PCPD), FISH: NORMAL
REF LAB TEST METHOD: NORMAL
REF LAB TEST METHOD: NORMAL
RESULTS, PLASMA CELL PROLIF (PCPD), FISH: NORMAL
SERVICE CMNT-IMP: NORMAL
SERVICE CMNT-IMP: NORMAL
SPECIMEN SOURCE: NORMAL
SPECIMEN SOURCE: NORMAL
SPECIMEN, PLASMA CELL PROLIF (PCPD), FISH: NORMAL
SPECIMEN: NORMAL

## 2020-01-31 ENCOUNTER — PATIENT MESSAGE (OUTPATIENT)
Dept: HEMATOLOGY/ONCOLOGY | Facility: CLINIC | Age: 55
End: 2020-01-31

## 2020-01-31 ENCOUNTER — INFUSION (OUTPATIENT)
Dept: INFUSION THERAPY | Facility: HOSPITAL | Age: 55
End: 2020-01-31
Attending: INTERNAL MEDICINE
Payer: OTHER GOVERNMENT

## 2020-01-31 ENCOUNTER — OFFICE VISIT (OUTPATIENT)
Dept: HEMATOLOGY/ONCOLOGY | Facility: CLINIC | Age: 55
End: 2020-01-31
Payer: OTHER GOVERNMENT

## 2020-01-31 VITALS
RESPIRATION RATE: 17 BRPM | DIASTOLIC BLOOD PRESSURE: 58 MMHG | HEART RATE: 74 BPM | BODY MASS INDEX: 28.89 KG/M2 | TEMPERATURE: 98 F | SYSTOLIC BLOOD PRESSURE: 103 MMHG | WEIGHT: 206.38 LBS | OXYGEN SATURATION: 99 % | HEIGHT: 71 IN

## 2020-01-31 DIAGNOSIS — T45.1X5A CINV (CHEMOTHERAPY-INDUCED NAUSEA AND VOMITING): ICD-10-CM

## 2020-01-31 DIAGNOSIS — E85.81 AL AMYLOIDOSIS: Primary | ICD-10-CM

## 2020-01-31 DIAGNOSIS — I50.22 CHRONIC SYSTOLIC HEART FAILURE: ICD-10-CM

## 2020-01-31 DIAGNOSIS — D47.2 SMOLDERING MULTIPLE MYELOMA: ICD-10-CM

## 2020-01-31 DIAGNOSIS — R11.2 CINV (CHEMOTHERAPY-INDUCED NAUSEA AND VOMITING): ICD-10-CM

## 2020-01-31 DIAGNOSIS — N18.30 CKD (CHRONIC KIDNEY DISEASE), STAGE III: ICD-10-CM

## 2020-01-31 DIAGNOSIS — I42.5 OTHER RESTRICTIVE CARDIOMYOPATHY: ICD-10-CM

## 2020-01-31 LAB
COMMENT: NORMAL
FINAL PATHOLOGIC DIAGNOSIS: NORMAL
GROSS: NORMAL
MICROSCOPIC EXAM: NORMAL
SUPPLEMENTAL DIAGNOSIS: NORMAL

## 2020-01-31 PROCEDURE — 99999 PR PBB SHADOW E&M-EST. PATIENT-LVL III: CPT | Mod: PBBFAC,,, | Performed by: INTERNAL MEDICINE

## 2020-01-31 PROCEDURE — 99215 PR OFFICE/OUTPT VISIT, EST, LEVL V, 40-54 MIN: ICD-10-PCS | Mod: S$PBB,,, | Performed by: INTERNAL MEDICINE

## 2020-01-31 PROCEDURE — 63600175 PHARM REV CODE 636 W HCPCS: Mod: JG | Performed by: INTERNAL MEDICINE

## 2020-01-31 PROCEDURE — 96401 CHEMO ANTI-NEOPL SQ/IM: CPT

## 2020-01-31 PROCEDURE — 99215 OFFICE O/P EST HI 40 MIN: CPT | Mod: S$PBB,,, | Performed by: INTERNAL MEDICINE

## 2020-01-31 PROCEDURE — 99999 PR PBB SHADOW E&M-EST. PATIENT-LVL III: ICD-10-PCS | Mod: PBBFAC,,, | Performed by: INTERNAL MEDICINE

## 2020-01-31 PROCEDURE — 99213 OFFICE O/P EST LOW 20 MIN: CPT | Mod: PBBFAC,25 | Performed by: INTERNAL MEDICINE

## 2020-01-31 RX ORDER — BORTEZOMIB 3.5 MG/1
1.3 INJECTION, POWDER, LYOPHILIZED, FOR SOLUTION INTRAVENOUS; SUBCUTANEOUS
Status: CANCELLED | OUTPATIENT
Start: 2020-02-21

## 2020-01-31 RX ORDER — ONDANSETRON 8 MG/1
8 TABLET, ORALLY DISINTEGRATING ORAL EVERY 12 HOURS PRN
Qty: 30 TABLET | Refills: 2 | Status: SHIPPED | OUTPATIENT
Start: 2020-01-31 | End: 2020-06-02

## 2020-01-31 RX ORDER — SODIUM CHLORIDE 0.9 % (FLUSH) 0.9 %
10 SYRINGE (ML) INJECTION
Status: CANCELLED | OUTPATIENT
Start: 2020-01-31

## 2020-01-31 RX ORDER — SODIUM CHLORIDE 0.9 % (FLUSH) 0.9 %
10 SYRINGE (ML) INJECTION
Status: CANCELLED | OUTPATIENT
Start: 2020-02-07

## 2020-01-31 RX ORDER — ASPIRIN 81 MG/1
81 TABLET ORAL DAILY
Qty: 150 TABLET | Refills: 2
Start: 2020-01-31 | End: 2020-10-16

## 2020-01-31 RX ORDER — BORTEZOMIB 3.5 MG/1
1.3 INJECTION, POWDER, LYOPHILIZED, FOR SOLUTION INTRAVENOUS; SUBCUTANEOUS
Status: CANCELLED | OUTPATIENT
Start: 2020-01-31

## 2020-01-31 RX ORDER — PALONOSETRON 0.05 MG/ML
0.25 INJECTION, SOLUTION INTRAVENOUS
Status: CANCELLED | OUTPATIENT
Start: 2020-02-07

## 2020-01-31 RX ORDER — PALONOSETRON 0.05 MG/ML
0.25 INJECTION, SOLUTION INTRAVENOUS
Status: CANCELLED | OUTPATIENT
Start: 2020-01-31

## 2020-01-31 RX ORDER — SODIUM CHLORIDE 0.9 % (FLUSH) 0.9 %
10 SYRINGE (ML) INJECTION
Status: CANCELLED | OUTPATIENT
Start: 2020-02-14

## 2020-01-31 RX ORDER — PALONOSETRON 0.05 MG/ML
0.25 INJECTION, SOLUTION INTRAVENOUS
Status: CANCELLED | OUTPATIENT
Start: 2020-02-14

## 2020-01-31 RX ORDER — HEPARIN 100 UNIT/ML
500 SYRINGE INTRAVENOUS
Status: CANCELLED | OUTPATIENT
Start: 2020-01-31

## 2020-01-31 RX ORDER — HEPARIN 100 UNIT/ML
500 SYRINGE INTRAVENOUS
Status: CANCELLED | OUTPATIENT
Start: 2020-02-07

## 2020-01-31 RX ORDER — HEPARIN 100 UNIT/ML
500 SYRINGE INTRAVENOUS
Status: CANCELLED | OUTPATIENT
Start: 2020-02-21

## 2020-01-31 RX ORDER — BORTEZOMIB 3.5 MG/1
1.3 INJECTION, POWDER, LYOPHILIZED, FOR SOLUTION INTRAVENOUS; SUBCUTANEOUS
Status: CANCELLED | OUTPATIENT
Start: 2020-02-14

## 2020-01-31 RX ORDER — SODIUM CHLORIDE 0.9 % (FLUSH) 0.9 %
10 SYRINGE (ML) INJECTION
Status: CANCELLED | OUTPATIENT
Start: 2020-02-21

## 2020-01-31 RX ORDER — HEPARIN 100 UNIT/ML
500 SYRINGE INTRAVENOUS
Status: CANCELLED | OUTPATIENT
Start: 2020-02-14

## 2020-01-31 RX ORDER — BORTEZOMIB 3.5 MG/1
1.3 INJECTION, POWDER, LYOPHILIZED, FOR SOLUTION INTRAVENOUS; SUBCUTANEOUS
Status: COMPLETED | OUTPATIENT
Start: 2020-01-31 | End: 2020-01-31

## 2020-01-31 RX ORDER — PROMETHAZINE HYDROCHLORIDE 25 MG/1
25 TABLET ORAL EVERY 4 HOURS PRN
Qty: 60 TABLET | Refills: 2 | Status: SHIPPED | OUTPATIENT
Start: 2020-01-31 | End: 2020-05-29

## 2020-01-31 RX ORDER — BORTEZOMIB 3.5 MG/1
1.3 INJECTION, POWDER, LYOPHILIZED, FOR SOLUTION INTRAVENOUS; SUBCUTANEOUS
Status: CANCELLED | OUTPATIENT
Start: 2020-02-07

## 2020-01-31 RX ORDER — PALONOSETRON 0.05 MG/ML
0.25 INJECTION, SOLUTION INTRAVENOUS
Status: CANCELLED | OUTPATIENT
Start: 2020-02-21

## 2020-01-31 RX ADMIN — BORTEZOMIB 2.8 MG: 3.5 INJECTION, POWDER, LYOPHILIZED, FOR SOLUTION INTRAVENOUS; SUBCUTANEOUS at 04:01

## 2020-01-31 NOTE — Clinical Note
Please schedule labs (CBC< CMP) and chemo (bortezomib) for 2/7, 2/14, 2/21. Follow-up on 2/28 with me (okay to overbook on ruonds in the afternoon) with CBC, CMP, SPEP, BROOKS, free light chains, immunoglobulins.

## 2020-01-31 NOTE — NURSING
Patient received Velcade injection SQ to ABD.  Tolerated well.  Seen By MD prior to visit.  VSS.

## 2020-01-31 NOTE — TELEPHONE ENCOUNTER
Initial cyclophosphamide consult completed on  . Cyclophosphamide [facility] on . $ 13.00 copay. Patient plans to start cyclophosphamide . Confirmed 2 patient identifiers - name and . Therapy Appropriate.     Patient was counseled on the administration directions:  - Cyclophosphamide 50mg -Take 13 capsules (650 mg) by mouth once a week  - Do NOT crush, chew or break capsule in any way.     Patient declined initial consultation for Cytoxan as he stated that the doctor went over his medication thoroughly and he feels comfortable with the information provided. We did discuss the importance of staying hydrated while on the Cytoxan.     Patient was given 2 patient education handouts on how to handle oral chemotherapy and specific recommendations- do's and don'ts. Instructed the patient that if they have any remaining oral chemotherapy, not to flush down the toilet or throw away in the trash; the patient or caregiver should return the unused oral chemotherapy to either the clinic or to myself in the Pharmacy where the oral chemotherapy can be disposed of properly.     Patient confirmed understanding. Patient did not have additional questions.   I will f/u with patient in 1 week from start, [facility] to contact patient in 3 weeks for refills.

## 2020-02-02 ENCOUNTER — PATIENT MESSAGE (OUTPATIENT)
Dept: HEMATOLOGY/ONCOLOGY | Facility: CLINIC | Age: 55
End: 2020-02-02

## 2020-02-06 PROBLEM — N18.30 CKD (CHRONIC KIDNEY DISEASE), STAGE III: Status: ACTIVE | Noted: 2020-02-06

## 2020-02-06 NOTE — PROGRESS NOTES
HEMATOLOGIC MALIGNANCIES PROGRESS NOTE    IDENTIFYING STATEMENT   Roc Lai Jr. ([place]) is a 54 y.o. male with a  of 1965 from [place] with the diagnosis of AL amyloid.      ONCOLOGY HISTORY:    1. AL amyloid with cardiac involvement   A. 2016: Initial evaluation with Dr. Carmichael. Reported 18 months of progressive decline (previously ran 3 miles easily, now can only walk 10 minutes). M-protein 0.19 g/dl, kappa 1.37 mg/dl, lambda 50 mg/dl, ratio 0.03. Abdominal fat pad biopsy had been negative for presence of amyloid. Cardiac MRI suggestive of infiltrative cardiomyopathy.    B. 1/3/2017: Endomyocardial biopsy - involved by AL amyloid   C. 2017: BMBx - 60% cellular marrow with 50% plasma cells, consistent with plasma cell myeloma.    D. 2/15/2017 - 2017: Completed 4 cycles bortezomib chemotherapy on [id number] trial.    E. 2017: Bortezomib every 2 weeks x 2 doses   F. 2017: Change bortezomib to [id number] x 2 doses   G. 10/9/2017: Bortezomib q6 weeks; kappa 1.6 mg/dl, lambda 0.93 mg/dl, ratio 1.72   H. 2018: Bortezomib q8 weeks.    I. 2018: [id number] close out visit due to ineffectiveness.     J. 2020: kappa 2.73 mg/dl, lambda 28.33 mg/dl, ratio (lambda:kappa) 10.38, concerning for progressive disease    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 Lai returns to clinic for follow-up of his cardiac AL amyloid.  He has now completed restaging procedures and presents to discuss and begin induction chemotherapy for relapsed/progressive myeloma with AL amyloidosis. His wife accompanies him to this visit today.     Past Medical History, Past Social History and Past Family History have been reviewed and are unchanged except as noted in the interval history.    MEDICATIONS:     Current  Outpatient Medications on File Prior to Visit   Medication Sig Dispense Refill    acyclovir (ZOVIRAX) 400 MG tablet Take 1 tablet (400 mg total) by mouth 2 (two) times daily. 180 tablet 3    BORTEZOMIB (VELCADE INJ) Inject as directed. Every 2 months      bumetanide (BUMEX) 2 MG tablet TAKE 1 TABLET DAILY 90 tablet 4    cyclophosphamide (CYTOXAN) 50 mg capsule Take 13 capsules (650 mg total) by mouth every 7 days on days 1, 8, 15 and 22 of each 28 day cycle. Take with lots of water.. 52 capsule 5    dexAMETHasone (DECADRON) 4 MG Tab Take 5 tablets (20 mg total) by mouth every 7 days. On days 1, 8, 15, and 22 of each chemotherapy cycle. Take with food. 40 tablet 5    gabapentin (NEURONTIN) 100 MG capsule TAKE 3 CAPSULES EVERY EVENING 270 capsule 4    magnesium oxide (MAG-OX) 400 mg (241.3 mg magnesium) tablet Take 400 mg by mouth once daily.      spironolactone (ALDACTONE) 25 MG tablet Take 1 tablet (25 mg total) by mouth once daily. 90 tablet 3    sildenafil (VIAGRA) 50 MG tablet Take 1 tablet (50 mg total) by mouth daily as needed for Erectile Dysfunction. 7 tablet 2     No current facility-administered medications on file prior to visit.        ALLERGIES: Review of patient's allergies indicates:  No Known Allergies     ROS:       Review of Systems   Constitutional: Positive for fatigue. Negative for diaphoresis, fever and unexpected weight change.   HENT:   Negative for lump/mass and sore throat.    Eyes: Negative for icterus.   Respiratory: Negative for cough and shortness of breath.    Cardiovascular: Negative for chest pain, leg swelling and palpitations.   Gastrointestinal: Negative for abdominal distention, constipation, diarrhea, nausea and vomiting.   Genitourinary: Negative for dysuria and frequency.    Musculoskeletal: Negative for arthralgias, gait problem and myalgias.   Skin: Negative for rash.   Neurological: Positive for numbness. Negative for dizziness, gait problem and headaches.  "  Hematological: Negative for adenopathy. Does not bruise/bleed easily.   Psychiatric/Behavioral: The patient is not nervous/anxious.        PHYSICAL EXAM:  Vitals:    01/31/20 1502   BP: (!) 103/58   Pulse: 74   Resp: 17   Temp: 98.2 °F (36.8 °C)   SpO2: 99%   Weight: 93.6 kg (206 lb 5.6 oz)   Height: 5' 11" (1.803 m)   PainSc: 0-No pain     ECOG 1    NYHA Class II    Physical Exam   Constitutional: He is oriented to person, place, and time. He appears well-developed and well-nourished. No distress.   HENT:   Head: Normocephalic and atraumatic.   Right Ear: External ear normal.   Left Ear: External ear normal.   Mouth/Throat: Oropharynx is clear and moist and mucous membranes are normal. No oral lesions.   Eyes: Conjunctivae and EOM are normal.   Neck: Normal range of motion. Neck supple. No thyromegaly present.   Cardiovascular: Normal rate, regular rhythm, normal heart sounds and intact distal pulses.   No murmur heard.  Pulmonary/Chest: Effort normal and breath sounds normal. He has no wheezes. He has no rales.   Abdominal: Soft. Bowel sounds are normal. He exhibits no distension and no mass. There is no splenomegaly or hepatomegaly. There is no tenderness.   Musculoskeletal: Normal range of motion. He exhibits no edema.   Lymphadenopathy:        Head (right side): No submental and no submandibular adenopathy present.        Head (left side): No submental and no submandibular adenopathy present.     He has no cervical adenopathy.        Right cervical: No deep cervical adenopathy present.       Left cervical: No deep cervical adenopathy present.     He has no axillary adenopathy.        Right: No supraclavicular adenopathy present.        Left: No supraclavicular adenopathy present.   Neurological: He is alert and oriented to person, place, and time. He has normal strength and normal reflexes. No cranial nerve deficit. Coordination normal.   Skin: Skin is warm and dry. No rash noted.   Psychiatric: He has a " normal mood and affect. His behavior is normal. Judgment and thought content normal.   Nursing note and vitals reviewed.      LAB:   Results for orders placed or performed in visit on 01/31/20   Rapid BMT CBC with Diff   Result Value Ref Range    WBC 6.76 3.90 - 12.70 K/uL    RBC 3.81 (L) 4.60 - 6.20 M/uL    Hemoglobin 12.7 (L) 14.0 - 18.0 g/dL    Hematocrit 39.9 (L) 40.0 - 54.0 %    Mean Corpuscular Volume 105 (H) 82 - 98 fL    Mean Corpuscular Hemoglobin 33.3 (H) 27.0 - 31.0 pg    Mean Corpuscular Hemoglobin Conc 31.8 (L) 32.0 - 36.0 g/dL    RDW 13.1 11.5 - 14.5 %    Platelets 176 150 - 350 K/uL    MPV 9.4 9.2 - 12.9 fL    Immature Granulocytes 0.4 0.0 - 0.5 %    Gran # (ANC) 4.5 1.8 - 7.7 K/uL    Immature Grans (Abs) 0.03 0.00 - 0.04 K/uL    Lymph # 1.3 1.0 - 4.8 K/uL    Mono # 0.7 0.3 - 1.0 K/uL    Eos # 0.2 0.0 - 0.5 K/uL    Baso # 0.06 0.00 - 0.20 K/uL    nRBC 0 0 /100 WBC    Gran% 67.1 38.0 - 73.0 %    Lymph% 18.5 18.0 - 48.0 %    Mono% 9.8 4.0 - 15.0 %    Eosinophil% 3.3 0.0 - 8.0 %    Basophil% 0.9 0.0 - 1.9 %    Differential Method Automated    Comprehensive metabolic panel   Result Value Ref Range    Sodium 140 136 - 145 mmol/L    Potassium 4.4 3.5 - 5.1 mmol/L    Chloride 105 95 - 110 mmol/L    CO2 24 23 - 29 mmol/L    Glucose 120 (H) 70 - 110 mg/dL    BUN, Bld 39 (H) 6 - 20 mg/dL    Creatinine 1.7 (H) 0.5 - 1.4 mg/dL    Calcium 9.0 8.7 - 10.5 mg/dL    Total Protein 6.0 6.0 - 8.4 g/dL    Albumin 3.0 (L) 3.5 - 5.2 g/dL    Total Bilirubin 0.8 0.1 - 1.0 mg/dL    Alkaline Phosphatase 145 (H) 55 - 135 U/L    AST 26 10 - 40 U/L    ALT 15 10 - 44 U/L    Anion Gap 11 8 - 16 mmol/L    eGFR if African American 52.1 (A) >60 mL/min/1.73 m^2    eGFR if non African American 45.0 (A) >60 mL/min/1.73 m^2   Protein electrophoresis, serum   Result Value Ref Range    Protein, Serum 5.6 (L) 6.0 - 8.4 g/dL    Albumin grams/dl 3.21 (L) 3.35 - 5.55 g/dL    Alpha-1 grams/dl 0.43 (H) 0.17 - 0.41 g/dL    Alpha-2 grams/dl 0.63  0.43 - 0.99 g/dL    Beta grams/dl 0.77 0.50 - 1.10 g/dL    Gamma grams/dl 0.56 (L) 0.67 - 1.58 g/dL   Immunofixation electrophoresis   Result Value Ref Range    Immunofix Interp. SEE COMMENT    Immunoglobulin free LT chains blood   Result Value Ref Range    Kappa Free Light Chains 2.54 (H) 0.33 - 1.94 mg/dL    Lambda Free Light Chains 26.34 (H) 0.57 - 2.63 mg/dL    Kappa/Lambda FLC Ratio 0.10 (L) 0.26 - 1.65   Immunoglobulins (IgG, IgA, IgM) Quantitative   Result Value Ref Range    IgG - Serum 570 (L) 650 - 1600 mg/dL    IgA 56 40 - 350 mg/dL    IgM 41 (L) 50 - 300 mg/dL   Troponin T   Result Value Ref Range    Troponin T 80 (H) <=15 ng/L   NT-Pro Natriuretic Peptide   Result Value Ref Range    NT-proBNP 1857 (H) <=64 pg/mL   Pathologist Interpretation BROOKS   Result Value Ref Range    Pathologist Interpretation BROOKS REVIEWED    Pathologist Interpretation SPE   Result Value Ref Range    Pathologist Interpretation SPE REVIEWED        PROBLEMS ASSESSED THIS VISIT:    1. AL amyloidosis    2. CINV (chemotherapy-induced nausea and vomiting)    3. Chronic systolic heart failure    4. Smoldering multiple myeloma    5. CKD (chronic kidney disease), stage III    6. Other restrictive cardiomyopathy        PLAN:       Smoldering multiple myeloma  Bone marrow biopsy shows recurrent plasma cell neoplasm in the bone marrow. We discussed this as the driving factor behind his amyloidosis. While Congo Red staining was negative, I highly recommended treating for amyloidosis with smoldering multiple myeloma.    We will plan 4 cycles of weekly CyBorD chemotherapy followed by evaluation for autologous stem cell transplant. We confirmed that he was able to get cyclophosphamide today.     CKD (chronic kidney disease), stage III  Will obtain 24 hour urine, as I suspect this may be related to underlying amyloidosis.     Infiltrative cardiomyopathy with elevated troponin but no CAD felt due to infiltrative CM;1/24/17 supplemented report from  [place] + AL amyloid within heart  Will need cardiology follow-up. Baseline cardiac markers obtained today.       Follow-up:   Please schedule labs (CBC< CMP) and chemo (bortezomib) for 2/7, 2/14, 2/21. Follow-up on 2/28 with me (okay to overbook on ruonds in the afternoon) with CBC, CMP, SPEP, BROOKS, free light chains, immunoglobulins.     Chris Atkins MD  Hematology and Stem Cell Transplant      Distress Screening Results: Psychosocial Distress screening score of Distress Score: 0 noted and reviewed. No intervention indicated.

## 2020-02-06 NOTE — ASSESSMENT & PLAN NOTE
Bone marrow biopsy shows recurrent plasma cell neoplasm in the bone marrow. We discussed this as the driving factor behind his amyloidosis. While Congo Red staining was negative, I highly recommended treating for amyloidosis with smoldering multiple myeloma.    We will plan 4 cycles of weekly CyBorD chemotherapy followed by evaluation for autologous stem cell transplant. We confirmed that he was able to get cyclophosphamide today.

## 2020-02-07 ENCOUNTER — TELEPHONE (OUTPATIENT)
Dept: PHARMACY | Facility: CLINIC | Age: 55
End: 2020-02-07

## 2020-02-07 ENCOUNTER — INFUSION (OUTPATIENT)
Dept: INFUSION THERAPY | Facility: HOSPITAL | Age: 55
End: 2020-02-07
Attending: INTERNAL MEDICINE
Payer: OTHER GOVERNMENT

## 2020-02-07 ENCOUNTER — LAB VISIT (OUTPATIENT)
Dept: LAB | Facility: HOSPITAL | Age: 55
End: 2020-02-07
Payer: OTHER GOVERNMENT

## 2020-02-07 DIAGNOSIS — E85.81 AL AMYLOIDOSIS: Primary | ICD-10-CM

## 2020-02-07 DIAGNOSIS — E85.81 AL AMYLOIDOSIS: ICD-10-CM

## 2020-02-07 LAB
ALBUMIN SERPL BCP-MCNC: 3.1 G/DL (ref 3.5–5.2)
ALP SERPL-CCNC: 129 U/L (ref 55–135)
ALT SERPL W/O P-5'-P-CCNC: 32 U/L (ref 10–44)
ANION GAP SERPL CALC-SCNC: 10 MMOL/L (ref 8–16)
AST SERPL-CCNC: 34 U/L (ref 10–40)
BASOPHILS # BLD AUTO: 0.03 K/UL (ref 0–0.2)
BASOPHILS NFR BLD: 0.3 % (ref 0–1.9)
BILIRUB SERPL-MCNC: 0.7 MG/DL (ref 0.1–1)
BUN SERPL-MCNC: 36 MG/DL (ref 6–20)
CALCIUM SERPL-MCNC: 8.6 MG/DL (ref 8.7–10.5)
CHLORIDE SERPL-SCNC: 99 MMOL/L (ref 95–110)
CO2 SERPL-SCNC: 25 MMOL/L (ref 23–29)
CREAT SERPL-MCNC: 1.7 MG/DL (ref 0.5–1.4)
DIFFERENTIAL METHOD: ABNORMAL
EOSINOPHIL # BLD AUTO: 0.1 K/UL (ref 0–0.5)
EOSINOPHIL NFR BLD: 0.5 % (ref 0–8)
ERYTHROCYTE [DISTWIDTH] IN BLOOD BY AUTOMATED COUNT: 12.9 % (ref 11.5–14.5)
EST. GFR  (AFRICAN AMERICAN): 52.1 ML/MIN/1.73 M^2
EST. GFR  (NON AFRICAN AMERICAN): 45 ML/MIN/1.73 M^2
GLUCOSE SERPL-MCNC: 170 MG/DL (ref 70–110)
HCT VFR BLD AUTO: 41.5 % (ref 40–54)
HGB BLD-MCNC: 13.3 G/DL (ref 14–18)
IMM GRANULOCYTES # BLD AUTO: 0.13 K/UL (ref 0–0.04)
IMM GRANULOCYTES NFR BLD AUTO: 1.3 % (ref 0–0.5)
LYMPHOCYTES # BLD AUTO: 0.6 K/UL (ref 1–4.8)
LYMPHOCYTES NFR BLD: 6.1 % (ref 18–48)
MCH RBC QN AUTO: 33.2 PG (ref 27–31)
MCHC RBC AUTO-ENTMCNC: 32 G/DL (ref 32–36)
MCV RBC AUTO: 104 FL (ref 82–98)
MONOCYTES # BLD AUTO: 0.1 K/UL (ref 0.3–1)
MONOCYTES NFR BLD: 1.3 % (ref 4–15)
NEUTROPHILS # BLD AUTO: 9.4 K/UL (ref 1.8–7.7)
NEUTROPHILS NFR BLD: 90.5 % (ref 38–73)
NRBC BLD-RTO: 0 /100 WBC
PLATELET # BLD AUTO: 168 K/UL (ref 150–350)
PMV BLD AUTO: 9.9 FL (ref 9.2–12.9)
POTASSIUM SERPL-SCNC: 4.4 MMOL/L (ref 3.5–5.1)
PROT 24H UR-MRATE: NORMAL MG/SPEC (ref 0–100)
PROT SERPL-MCNC: 6.1 G/DL (ref 6–8.4)
PROT UR-MCNC: <7 MG/DL (ref 0–15)
RBC # BLD AUTO: 4.01 M/UL (ref 4.6–6.2)
SODIUM SERPL-SCNC: 134 MMOL/L (ref 136–145)
URINE COLLECTION DURATION: 24 HR
URINE VOLUME: 3550 ML
WBC # BLD AUTO: 10.36 K/UL (ref 3.9–12.7)

## 2020-02-07 PROCEDURE — 80053 COMPREHEN METABOLIC PANEL: CPT

## 2020-02-07 PROCEDURE — 86335 PATHOLOGIST INTERPRETATION UIFE: ICD-10-PCS | Mod: 26,,, | Performed by: PATHOLOGY

## 2020-02-07 PROCEDURE — 36415 COLL VENOUS BLD VENIPUNCTURE: CPT

## 2020-02-07 PROCEDURE — 86335 IMMUNFIX E-PHORSIS/URINE/CSF: CPT

## 2020-02-07 PROCEDURE — 86335 IMMUNFIX E-PHORSIS/URINE/CSF: CPT | Mod: 26,,, | Performed by: PATHOLOGY

## 2020-02-07 PROCEDURE — 84166 PATHOLOGIST INTERPRETATION UPE: ICD-10-PCS | Mod: 26,,, | Performed by: PATHOLOGY

## 2020-02-07 PROCEDURE — 63600175 PHARM REV CODE 636 W HCPCS: Mod: JG | Performed by: INTERNAL MEDICINE

## 2020-02-07 PROCEDURE — 84166 PROTEIN E-PHORESIS/URINE/CSF: CPT

## 2020-02-07 PROCEDURE — 85025 COMPLETE CBC W/AUTO DIFF WBC: CPT

## 2020-02-07 PROCEDURE — 84156 ASSAY OF PROTEIN URINE: CPT

## 2020-02-07 PROCEDURE — 96401 CHEMO ANTI-NEOPL SQ/IM: CPT

## 2020-02-07 PROCEDURE — 84166 PROTEIN E-PHORESIS/URINE/CSF: CPT | Mod: 26,,, | Performed by: PATHOLOGY

## 2020-02-07 RX ORDER — BORTEZOMIB 3.5 MG/1
1.3 INJECTION, POWDER, LYOPHILIZED, FOR SOLUTION INTRAVENOUS; SUBCUTANEOUS
Status: COMPLETED | OUTPATIENT
Start: 2020-02-07 | End: 2020-02-07

## 2020-02-07 RX ORDER — SODIUM CHLORIDE 0.9 % (FLUSH) 0.9 %
10 SYRINGE (ML) INJECTION
Status: DISCONTINUED | OUTPATIENT
Start: 2020-02-07 | End: 2020-02-07 | Stop reason: HOSPADM

## 2020-02-07 RX ORDER — HEPARIN 100 UNIT/ML
500 SYRINGE INTRAVENOUS
Status: DISCONTINUED | OUTPATIENT
Start: 2020-02-07 | End: 2020-02-07 | Stop reason: HOSPADM

## 2020-02-07 RX ADMIN — BORTEZOMIB 2.8 MG: 3.5 INJECTION, POWDER, LYOPHILIZED, FOR SOLUTION INTRAVENOUS; SUBCUTANEOUS at 01:02

## 2020-02-07 NOTE — TELEPHONE ENCOUNTER
Dosing, how taking Cytoxan: Takes 13 capsules (650mg) every 7 days. Started last Friday on 1/31 and completed his second dose today. No missed doses.   Storage: room temperature  Handling: reviewed over the handling precautions again at follow up today  Side effects: Overall, seems to be tolerating medication very well with no major side effects. He does note some fatigue, which he said he expected. It has not stopped him from his normal daily activities.    Aware to reach out to [facility] if he has any questions or concerns.

## 2020-02-10 LAB
INTERPRETATION UR IFE-IMP: NORMAL
PATHOLOGIST INTERPRETATION UIFE: NORMAL
PATHOLOGIST INTERPRETATION UPE: NORMAL
PROT PATTERN UR ELPH-IMP: NORMAL

## 2020-02-13 ENCOUNTER — PATIENT MESSAGE (OUTPATIENT)
Dept: HEMATOLOGY/ONCOLOGY | Facility: CLINIC | Age: 55
End: 2020-02-13

## 2020-02-14 ENCOUNTER — INFUSION (OUTPATIENT)
Dept: INFUSION THERAPY | Facility: HOSPITAL | Age: 55
End: 2020-02-14
Attending: INTERNAL MEDICINE
Payer: OTHER GOVERNMENT

## 2020-02-14 VITALS
TEMPERATURE: 98 F | RESPIRATION RATE: 18 BRPM | HEART RATE: 89 BPM | DIASTOLIC BLOOD PRESSURE: 58 MMHG | SYSTOLIC BLOOD PRESSURE: 104 MMHG

## 2020-02-14 DIAGNOSIS — E85.81 AL AMYLOIDOSIS: Primary | ICD-10-CM

## 2020-02-14 PROCEDURE — 96401 CHEMO ANTI-NEOPL SQ/IM: CPT

## 2020-02-14 PROCEDURE — 63600175 PHARM REV CODE 636 W HCPCS: Mod: JG | Performed by: INTERNAL MEDICINE

## 2020-02-14 RX ORDER — BORTEZOMIB 3.5 MG/1
1.3 INJECTION, POWDER, LYOPHILIZED, FOR SOLUTION INTRAVENOUS; SUBCUTANEOUS
Status: COMPLETED | OUTPATIENT
Start: 2020-02-14 | End: 2020-02-14

## 2020-02-14 RX ADMIN — BORTEZOMIB 2.8 MG: 3.5 INJECTION, POWDER, LYOPHILIZED, FOR SOLUTION INTRAVENOUS; SUBCUTANEOUS at 11:02

## 2020-02-20 ENCOUNTER — PATIENT MESSAGE (OUTPATIENT)
Dept: HEMATOLOGY/ONCOLOGY | Facility: CLINIC | Age: 55
End: 2020-02-20

## 2020-02-21 ENCOUNTER — INFUSION (OUTPATIENT)
Dept: INFUSION THERAPY | Facility: HOSPITAL | Age: 55
End: 2020-02-21
Attending: INTERNAL MEDICINE
Payer: OTHER GOVERNMENT

## 2020-02-21 ENCOUNTER — LAB VISIT (OUTPATIENT)
Dept: LAB | Facility: HOSPITAL | Age: 55
End: 2020-02-21
Attending: INTERNAL MEDICINE
Payer: OTHER GOVERNMENT

## 2020-02-21 ENCOUNTER — TELEPHONE (OUTPATIENT)
Dept: PHARMACY | Facility: CLINIC | Age: 55
End: 2020-02-21

## 2020-02-21 VITALS — SYSTOLIC BLOOD PRESSURE: 97 MMHG | HEART RATE: 69 BPM | DIASTOLIC BLOOD PRESSURE: 55 MMHG

## 2020-02-21 DIAGNOSIS — E85.81 AL AMYLOIDOSIS: Primary | ICD-10-CM

## 2020-02-21 DIAGNOSIS — E85.81 AL AMYLOIDOSIS: ICD-10-CM

## 2020-02-21 LAB
ALBUMIN SERPL BCP-MCNC: 2.9 G/DL (ref 3.5–5.2)
ALP SERPL-CCNC: 123 U/L (ref 55–135)
ALT SERPL W/O P-5'-P-CCNC: 29 U/L (ref 10–44)
ANION GAP SERPL CALC-SCNC: 6 MMOL/L (ref 8–16)
AST SERPL-CCNC: 29 U/L (ref 10–40)
BASOPHILS # BLD AUTO: 0.02 K/UL (ref 0–0.2)
BASOPHILS NFR BLD: 0.3 % (ref 0–1.9)
BILIRUB SERPL-MCNC: 1 MG/DL (ref 0.1–1)
BUN SERPL-MCNC: 31 MG/DL (ref 6–20)
CALCIUM SERPL-MCNC: 8.2 MG/DL (ref 8.7–10.5)
CHLORIDE SERPL-SCNC: 103 MMOL/L (ref 95–110)
CO2 SERPL-SCNC: 25 MMOL/L (ref 23–29)
CREAT SERPL-MCNC: 1.4 MG/DL (ref 0.5–1.4)
DIFFERENTIAL METHOD: ABNORMAL
EOSINOPHIL # BLD AUTO: 0 K/UL (ref 0–0.5)
EOSINOPHIL NFR BLD: 0.1 % (ref 0–8)
ERYTHROCYTE [DISTWIDTH] IN BLOOD BY AUTOMATED COUNT: 14.1 % (ref 11.5–14.5)
EST. GFR  (AFRICAN AMERICAN): >60 ML/MIN/1.73 M^2
EST. GFR  (NON AFRICAN AMERICAN): 57 ML/MIN/1.73 M^2
GLUCOSE SERPL-MCNC: 153 MG/DL (ref 70–110)
HCT VFR BLD AUTO: 38.7 % (ref 40–54)
HGB BLD-MCNC: 12.4 G/DL (ref 14–18)
IMM GRANULOCYTES # BLD AUTO: 0.11 K/UL (ref 0–0.04)
IMM GRANULOCYTES NFR BLD AUTO: 1.4 % (ref 0–0.5)
LYMPHOCYTES # BLD AUTO: 0.4 K/UL (ref 1–4.8)
LYMPHOCYTES NFR BLD: 5.4 % (ref 18–48)
MCH RBC QN AUTO: 33.4 PG (ref 27–31)
MCHC RBC AUTO-ENTMCNC: 32 G/DL (ref 32–36)
MCV RBC AUTO: 104 FL (ref 82–98)
MONOCYTES # BLD AUTO: 0.2 K/UL (ref 0.3–1)
MONOCYTES NFR BLD: 1.9 % (ref 4–15)
NEUTROPHILS # BLD AUTO: 7.2 K/UL (ref 1.8–7.7)
NEUTROPHILS NFR BLD: 90.9 % (ref 38–73)
NRBC BLD-RTO: 0 /100 WBC
PLATELET # BLD AUTO: 120 K/UL (ref 150–350)
PMV BLD AUTO: 10.1 FL (ref 9.2–12.9)
POTASSIUM SERPL-SCNC: 4.9 MMOL/L (ref 3.5–5.1)
PROT SERPL-MCNC: 5.6 G/DL (ref 6–8.4)
RBC # BLD AUTO: 3.71 M/UL (ref 4.6–6.2)
SODIUM SERPL-SCNC: 134 MMOL/L (ref 136–145)
WBC # BLD AUTO: 7.94 K/UL (ref 3.9–12.7)

## 2020-02-21 PROCEDURE — 80053 COMPREHEN METABOLIC PANEL: CPT

## 2020-02-21 PROCEDURE — 63600175 PHARM REV CODE 636 W HCPCS: Mod: JG | Performed by: INTERNAL MEDICINE

## 2020-02-21 PROCEDURE — 85025 COMPLETE CBC W/AUTO DIFF WBC: CPT

## 2020-02-21 PROCEDURE — 96401 CHEMO ANTI-NEOPL SQ/IM: CPT

## 2020-02-21 PROCEDURE — 36415 COLL VENOUS BLD VENIPUNCTURE: CPT

## 2020-02-21 RX ORDER — BORTEZOMIB 3.5 MG/1
1.3 INJECTION, POWDER, LYOPHILIZED, FOR SOLUTION INTRAVENOUS; SUBCUTANEOUS
Status: COMPLETED | OUTPATIENT
Start: 2020-02-21 | End: 2020-02-21

## 2020-02-21 RX ADMIN — BORTEZOMIB 2.8 MG: 3.5 INJECTION, POWDER, LYOPHILIZED, FOR SOLUTION INTRAVENOUS; SUBCUTANEOUS at 01:02

## 2020-02-21 NOTE — TELEPHONE ENCOUNTER
Returned pt phone call regarding same day  at 001. Informed him that medicaitons for that pharmacy was filled for the day. Checked with my supervisors whom reached out to [facility] at  001 to see if they had it in stock, but they didnt. [facility] will prepare for  on  with his consent. Copay 13.00 at the time of . Patient has not started any new medications including OTC drugs. Patient has not had any medication/ dose or instruction changes. No new allergies or side effects reported with this shipment. Medication is being taken as prescribed by physician and properly stored. Two patient identifiers:  and Address verified. Next cycle begins on . (Dex is already ready at 001)

## 2020-02-28 ENCOUNTER — OFFICE VISIT (OUTPATIENT)
Dept: HEMATOLOGY/ONCOLOGY | Facility: CLINIC | Age: 55
End: 2020-02-28
Payer: OTHER GOVERNMENT

## 2020-02-28 ENCOUNTER — LAB VISIT (OUTPATIENT)
Dept: LAB | Facility: HOSPITAL | Age: 55
End: 2020-02-28
Payer: OTHER GOVERNMENT

## 2020-02-28 ENCOUNTER — INFUSION (OUTPATIENT)
Dept: INFUSION THERAPY | Facility: HOSPITAL | Age: 55
End: 2020-02-28
Attending: INTERNAL MEDICINE
Payer: OTHER GOVERNMENT

## 2020-02-28 VITALS
SYSTOLIC BLOOD PRESSURE: 99 MMHG | TEMPERATURE: 98 F | DIASTOLIC BLOOD PRESSURE: 56 MMHG | HEIGHT: 71 IN | OXYGEN SATURATION: 96 % | BODY MASS INDEX: 28.86 KG/M2 | HEART RATE: 88 BPM | WEIGHT: 206.13 LBS | RESPIRATION RATE: 17 BRPM

## 2020-02-28 VITALS
HEART RATE: 90 BPM | TEMPERATURE: 99 F | SYSTOLIC BLOOD PRESSURE: 108 MMHG | OXYGEN SATURATION: 99 % | DIASTOLIC BLOOD PRESSURE: 54 MMHG | RESPIRATION RATE: 16 BRPM

## 2020-02-28 DIAGNOSIS — T45.1X5A ANEMIA ASSOCIATED WITH CHEMOTHERAPY: ICD-10-CM

## 2020-02-28 DIAGNOSIS — E85.81 AL AMYLOIDOSIS: Primary | ICD-10-CM

## 2020-02-28 DIAGNOSIS — D69.6 THROMBOCYTOPENIA: ICD-10-CM

## 2020-02-28 DIAGNOSIS — E85.81 AL AMYLOIDOSIS: ICD-10-CM

## 2020-02-28 DIAGNOSIS — I42.5 OTHER RESTRICTIVE CARDIOMYOPATHY: ICD-10-CM

## 2020-02-28 DIAGNOSIS — D64.81 ANEMIA ASSOCIATED WITH CHEMOTHERAPY: ICD-10-CM

## 2020-02-28 LAB
ALBUMIN SERPL BCP-MCNC: 2.9 G/DL (ref 3.5–5.2)
ALP SERPL-CCNC: 141 U/L (ref 55–135)
ALT SERPL W/O P-5'-P-CCNC: 25 U/L (ref 10–44)
ANION GAP SERPL CALC-SCNC: 10 MMOL/L (ref 8–16)
AST SERPL-CCNC: 26 U/L (ref 10–40)
BASOPHILS # BLD AUTO: 0.02 K/UL (ref 0–0.2)
BASOPHILS NFR BLD: 0.3 % (ref 0–1.9)
BILIRUB SERPL-MCNC: 0.8 MG/DL (ref 0.1–1)
BUN SERPL-MCNC: 32 MG/DL (ref 6–20)
CALCIUM SERPL-MCNC: 8.4 MG/DL (ref 8.7–10.5)
CHLORIDE SERPL-SCNC: 101 MMOL/L (ref 95–110)
CO2 SERPL-SCNC: 26 MMOL/L (ref 23–29)
CREAT SERPL-MCNC: 1.6 MG/DL (ref 0.5–1.4)
DIFFERENTIAL METHOD: ABNORMAL
EOSINOPHIL # BLD AUTO: 0 K/UL (ref 0–0.5)
EOSINOPHIL NFR BLD: 0.2 % (ref 0–8)
ERYTHROCYTE [DISTWIDTH] IN BLOOD BY AUTOMATED COUNT: 14.3 % (ref 11.5–14.5)
EST. GFR  (AFRICAN AMERICAN): 56 ML/MIN/1.73 M^2
EST. GFR  (NON AFRICAN AMERICAN): 48.5 ML/MIN/1.73 M^2
GLUCOSE SERPL-MCNC: 113 MG/DL (ref 70–110)
HCT VFR BLD AUTO: 36 % (ref 40–54)
HGB BLD-MCNC: 11.5 G/DL (ref 14–18)
IGA SERPL-MCNC: 31 MG/DL (ref 40–350)
IGG SERPL-MCNC: 345 MG/DL (ref 650–1600)
IGM SERPL-MCNC: 34 MG/DL (ref 50–300)
IMM GRANULOCYTES # BLD AUTO: 0.3 K/UL (ref 0–0.04)
IMM GRANULOCYTES NFR BLD AUTO: 4.8 % (ref 0–0.5)
LYMPHOCYTES # BLD AUTO: 0.4 K/UL (ref 1–4.8)
LYMPHOCYTES NFR BLD: 6.2 % (ref 18–48)
MCH RBC QN AUTO: 33.3 PG (ref 27–31)
MCHC RBC AUTO-ENTMCNC: 31.9 G/DL (ref 32–36)
MCV RBC AUTO: 104 FL (ref 82–98)
MONOCYTES # BLD AUTO: 0.2 K/UL (ref 0.3–1)
MONOCYTES NFR BLD: 3.2 % (ref 4–15)
NEUTROPHILS # BLD AUTO: 5.3 K/UL (ref 1.8–7.7)
NEUTROPHILS NFR BLD: 85.3 % (ref 38–73)
NRBC BLD-RTO: 0 /100 WBC
PLATELET # BLD AUTO: 144 K/UL (ref 150–350)
PMV BLD AUTO: 10.3 FL (ref 9.2–12.9)
POTASSIUM SERPL-SCNC: 4.4 MMOL/L (ref 3.5–5.1)
PROT SERPL-MCNC: 5.7 G/DL (ref 6–8.4)
RBC # BLD AUTO: 3.45 M/UL (ref 4.6–6.2)
SODIUM SERPL-SCNC: 137 MMOL/L (ref 136–145)
WBC # BLD AUTO: 6.25 K/UL (ref 3.9–12.7)

## 2020-02-28 PROCEDURE — 80053 COMPREHEN METABOLIC PANEL: CPT

## 2020-02-28 PROCEDURE — 99999 PR PBB SHADOW E&M-EST. PATIENT-LVL III: CPT | Mod: PBBFAC,,, | Performed by: INTERNAL MEDICINE

## 2020-02-28 PROCEDURE — 84165 PROTEIN E-PHORESIS SERUM: CPT

## 2020-02-28 PROCEDURE — 82784 ASSAY IGA/IGD/IGG/IGM EACH: CPT | Mod: 59

## 2020-02-28 PROCEDURE — 99215 OFFICE O/P EST HI 40 MIN: CPT | Mod: S$PBB,,, | Performed by: INTERNAL MEDICINE

## 2020-02-28 PROCEDURE — 86334 PATHOLOGIST INTERPRETATION IFE: ICD-10-PCS | Mod: 26,,, | Performed by: PATHOLOGY

## 2020-02-28 PROCEDURE — 99215 PR OFFICE/OUTPT VISIT, EST, LEVL V, 40-54 MIN: ICD-10-PCS | Mod: S$PBB,,, | Performed by: INTERNAL MEDICINE

## 2020-02-28 PROCEDURE — 96401 CHEMO ANTI-NEOPL SQ/IM: CPT

## 2020-02-28 PROCEDURE — 86334 IMMUNOFIX E-PHORESIS SERUM: CPT | Mod: 26,,, | Performed by: PATHOLOGY

## 2020-02-28 PROCEDURE — 84165 PATHOLOGIST INTERPRETATION SPE: ICD-10-PCS | Mod: 26,,, | Performed by: PATHOLOGY

## 2020-02-28 PROCEDURE — 36415 COLL VENOUS BLD VENIPUNCTURE: CPT

## 2020-02-28 PROCEDURE — 83520 IMMUNOASSAY QUANT NOS NONAB: CPT

## 2020-02-28 PROCEDURE — 99999 PR PBB SHADOW E&M-EST. PATIENT-LVL III: ICD-10-PCS | Mod: PBBFAC,,, | Performed by: INTERNAL MEDICINE

## 2020-02-28 PROCEDURE — 63600175 PHARM REV CODE 636 W HCPCS: Mod: JG | Performed by: INTERNAL MEDICINE

## 2020-02-28 PROCEDURE — 84165 PROTEIN E-PHORESIS SERUM: CPT | Mod: 26,,, | Performed by: PATHOLOGY

## 2020-02-28 PROCEDURE — 86334 IMMUNOFIX E-PHORESIS SERUM: CPT

## 2020-02-28 PROCEDURE — 99213 OFFICE O/P EST LOW 20 MIN: CPT | Mod: PBBFAC,25 | Performed by: INTERNAL MEDICINE

## 2020-02-28 PROCEDURE — 85025 COMPLETE CBC W/AUTO DIFF WBC: CPT

## 2020-02-28 RX ORDER — SODIUM CHLORIDE 0.9 % (FLUSH) 0.9 %
10 SYRINGE (ML) INJECTION
Status: CANCELLED | OUTPATIENT
Start: 2020-03-06

## 2020-02-28 RX ORDER — BORTEZOMIB 3.5 MG/1
1.3 INJECTION, POWDER, LYOPHILIZED, FOR SOLUTION INTRAVENOUS; SUBCUTANEOUS
Status: CANCELLED | OUTPATIENT
Start: 2020-03-20

## 2020-02-28 RX ORDER — BORTEZOMIB 3.5 MG/1
1.3 INJECTION, POWDER, LYOPHILIZED, FOR SOLUTION INTRAVENOUS; SUBCUTANEOUS
Status: CANCELLED | OUTPATIENT
Start: 2020-03-06

## 2020-02-28 RX ORDER — SODIUM CHLORIDE 0.9 % (FLUSH) 0.9 %
10 SYRINGE (ML) INJECTION
Status: CANCELLED | OUTPATIENT
Start: 2020-03-20

## 2020-02-28 RX ORDER — BORTEZOMIB 3.5 MG/1
1.3 INJECTION, POWDER, LYOPHILIZED, FOR SOLUTION INTRAVENOUS; SUBCUTANEOUS
Status: COMPLETED | OUTPATIENT
Start: 2020-02-28 | End: 2020-02-28

## 2020-02-28 RX ORDER — SODIUM CHLORIDE 0.9 % (FLUSH) 0.9 %
10 SYRINGE (ML) INJECTION
Status: CANCELLED | OUTPATIENT
Start: 2020-03-13

## 2020-02-28 RX ORDER — HEPARIN 100 UNIT/ML
500 SYRINGE INTRAVENOUS
Status: CANCELLED | OUTPATIENT
Start: 2020-03-06

## 2020-02-28 RX ORDER — HEPARIN 100 UNIT/ML
500 SYRINGE INTRAVENOUS
Status: CANCELLED | OUTPATIENT
Start: 2020-02-28

## 2020-02-28 RX ORDER — BORTEZOMIB 3.5 MG/1
1.3 INJECTION, POWDER, LYOPHILIZED, FOR SOLUTION INTRAVENOUS; SUBCUTANEOUS
Status: CANCELLED | OUTPATIENT
Start: 2020-02-28

## 2020-02-28 RX ORDER — HEPARIN 100 UNIT/ML
500 SYRINGE INTRAVENOUS
Status: CANCELLED | OUTPATIENT
Start: 2020-03-13

## 2020-02-28 RX ORDER — SODIUM CHLORIDE 0.9 % (FLUSH) 0.9 %
10 SYRINGE (ML) INJECTION
Status: CANCELLED | OUTPATIENT
Start: 2020-02-28

## 2020-02-28 RX ORDER — BORTEZOMIB 3.5 MG/1
1.3 INJECTION, POWDER, LYOPHILIZED, FOR SOLUTION INTRAVENOUS; SUBCUTANEOUS
Status: CANCELLED | OUTPATIENT
Start: 2020-03-13

## 2020-02-28 RX ORDER — HEPARIN 100 UNIT/ML
500 SYRINGE INTRAVENOUS
Status: CANCELLED | OUTPATIENT
Start: 2020-03-20

## 2020-02-28 RX ADMIN — BORTEZOMIB 2.8 MG: 3.5 INJECTION, POWDER, LYOPHILIZED, FOR SOLUTION INTRAVENOUS; SUBCUTANEOUS at 02:02

## 2020-02-28 NOTE — Clinical Note
Please schedule weekly labs (CBC, CMP) and chemo for bortezomib on 3/6, 3/13, 3/20.Follow-up on 3/27 with CBC, CMP, SPEP, BROOKS, free light chains, immmunoglobulins, and chemo appt for bortezomib injection

## 2020-02-28 NOTE — PROGRESS NOTES
HEMATOLOGIC MALIGNANCIES PROGRESS NOTE    IDENTIFYING STATEMENT   Roc Lai Jr. ([place]) is a 54 y.o. male with a  of 1965 from [place] with the diagnosis of AL amyloid.      ONCOLOGY HISTORY:    1. AL amyloid with cardiac involvement   A. 2016: Initial evaluation with Dr. Carmichael. Reported 18 months of progressive decline (previously ran 3 miles easily, now can only walk 10 minutes). M-protein 0.19 g/dl, kappa 1.37 mg/dl, lambda 50 mg/dl, ratio 0.03. Abdominal fat pad biopsy had been negative for presence of amyloid. Cardiac MRI suggestive of infiltrative cardiomyopathy.    B. 1/3/2017: Endomyocardial biopsy - involved by AL amyloid   C. 2017: BMBx - 60% cellular marrow with 50% plasma cells, consistent with plasma cell myeloma.    D. 2/15/2017 - 2017: Completed 4 cycles bortezomib chemotherapy on [id number] trial.    E. 2017: Bortezomib every 2 weeks x 2 doses   F. 2017: Change bortezomib to [id number] x 2 doses   G. 10/9/2017: Bortezomib q6 weeks; kappa 1.6 mg/dl, lambda 0.93 mg/dl, ratio 1.72   H. 2018: Bortezomib q8 weeks.    I. 2018: [id number] close out visit due to ineffectiveness.     J. 2020: kappa 2.73 mg/dl, lambda 28.33 mg/dl, ratio (lambda:kappa) 10.38, concerning for progressive disease    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 Lai returns to clinic for follow-up of his smoldering myeloma with AL amyloidosis. He states he has been tolerating therapy well. He has not noticed any progression of his neuropathy. He continues to have lower extremity swelling. Denies any interval fever, chills. Nausea has been controlled.     Past Medical History, Past Social History and Past Family History have been reviewed and are unchanged except as noted in the interval  history.    MEDICATIONS:     Current Outpatient Medications on File Prior to Visit   Medication Sig Dispense Refill    acyclovir (ZOVIRAX) 400 MG tablet Take 1 tablet (400 mg total) by mouth 2 (two) times daily. 180 tablet 3    aspirin (ECOTRIN) 81 MG EC tablet Take 1 tablet (81 mg total) by mouth once daily. 150 tablet 2    BORTEZOMIB (VELCADE INJ) Inject as directed. Every 2 months      bumetanide (BUMEX) 2 MG tablet TAKE 1 TABLET DAILY 90 tablet 4    cyclophosphamide (CYTOXAN) 50 mg capsule Take 13 capsules (650 mg total) by mouth every 7 days on days 1, 8, 15 and 22 of each 28 day cycle. Take with lots of water.. 52 capsule 5    dexAMETHasone (DECADRON) 4 MG Tab Take 5 tablets (20 mg total) by mouth every 7 days. On days 1, 8, 15, and 22 of each chemotherapy cycle. Take with food. 40 tablet 5    gabapentin (NEURONTIN) 100 MG capsule TAKE 3 CAPSULES EVERY EVENING 270 capsule 4    magnesium oxide (MAG-OX) 400 mg (241.3 mg magnesium) tablet Take 400 mg by mouth once daily.      ondansetron (ZOFRAN-ODT) 8 MG TbDL Dissolve 1 tablet (8 mg total) by mouth every 12 (twelve) hours as needed. 30 tablet 2    promethazine (PHENERGAN) 25 MG tablet Take 1 tablet (25 mg total) by mouth every 4 (four) hours as needed for Nausea. 60 tablet 2    spironolactone (ALDACTONE) 25 MG tablet Take 1 tablet (25 mg total) by mouth once daily. 90 tablet 3    sildenafil (VIAGRA) 50 MG tablet Take 1 tablet (50 mg total) by mouth daily as needed for Erectile Dysfunction. 7 tablet 2     No current facility-administered medications on file prior to visit.        ALLERGIES: Review of patient's allergies indicates:  No Known Allergies     ROS:       Review of Systems   Constitutional: Positive for fatigue. Negative for diaphoresis, fever and unexpected weight change.   HENT:   Negative for lump/mass and sore throat.    Eyes: Negative for icterus.   Respiratory: Negative for cough and shortness of breath.    Cardiovascular: Positive for  "leg swelling. Negative for chest pain and palpitations.   Gastrointestinal: Negative for abdominal distention, constipation, diarrhea, nausea and vomiting.   Genitourinary: Negative for dysuria and frequency.    Musculoskeletal: Negative for arthralgias, gait problem and myalgias.   Skin: Negative for rash.   Neurological: Positive for numbness. Negative for dizziness, gait problem and headaches.   Hematological: Negative for adenopathy. Does not bruise/bleed easily.   Psychiatric/Behavioral: The patient is not nervous/anxious.        PHYSICAL EXAM:  Vitals:    02/28/20 1308 02/28/20 1336   BP: (!) 99/56    Pulse: 88    Resp: 17    Temp: 97.8 °F (36.6 °C)    SpO2: 96%    Weight: 93.5 kg (206 lb 2.1 oz)    Height:  5' 11" (1.803 m)   PainSc: 0-No pain      ECOG 1    NYHA Class II    Physical Exam   Constitutional: He is oriented to person, place, and time. He appears well-developed and well-nourished. No distress.   HENT:   Head: Normocephalic and atraumatic.   Right Ear: External ear normal.   Left Ear: External ear normal.   Mouth/Throat: Oropharynx is clear and moist and mucous membranes are normal. No oral lesions.   Eyes: Conjunctivae and EOM are normal.   Neck: Normal range of motion. Neck supple. No thyromegaly present.   Cardiovascular: Normal rate, regular rhythm, normal heart sounds and intact distal pulses.   No murmur heard.  Pulmonary/Chest: Effort normal and breath sounds normal. He has no wheezes. He has no rales.   Abdominal: Soft. Bowel sounds are normal. He exhibits no distension and no mass. There is no splenomegaly or hepatomegaly. There is no tenderness.   Musculoskeletal: Normal range of motion. He exhibits edema.   Lymphadenopathy:        Head (right side): No submental and no submandibular adenopathy present.        Head (left side): No submental and no submandibular adenopathy present.     He has no cervical adenopathy.        Right cervical: No deep cervical adenopathy present.       Left " cervical: No deep cervical adenopathy present.     He has no axillary adenopathy.        Right: No supraclavicular adenopathy present.        Left: No supraclavicular adenopathy present.   Neurological: He is alert and oriented to person, place, and time. He has normal strength and normal reflexes. No cranial nerve deficit. Coordination normal.   Skin: Skin is warm and dry. No rash noted.   Psychiatric: He has a normal mood and affect. His behavior is normal. Judgment and thought content normal.   Nursing note and vitals reviewed.      LAB:   Results for orders placed or performed in visit on 02/28/20   Rapid BMT CBC with Diff   Result Value Ref Range    WBC 6.25 3.90 - 12.70 K/uL    RBC 3.45 (L) 4.60 - 6.20 M/uL    Hemoglobin 11.5 (L) 14.0 - 18.0 g/dL    Hematocrit 36.0 (L) 40.0 - 54.0 %    Mean Corpuscular Volume 104 (H) 82 - 98 fL    Mean Corpuscular Hemoglobin 33.3 (H) 27.0 - 31.0 pg    Mean Corpuscular Hemoglobin Conc 31.9 (L) 32.0 - 36.0 g/dL    RDW 14.3 11.5 - 14.5 %    Platelets 144 (L) 150 - 350 K/uL    MPV 10.3 9.2 - 12.9 fL    Immature Granulocytes 4.8 (H) 0.0 - 0.5 %    Gran # (ANC) 5.3 1.8 - 7.7 K/uL    Immature Grans (Abs) 0.30 (H) 0.00 - 0.04 K/uL    Lymph # 0.4 (L) 1.0 - 4.8 K/uL    Mono # 0.2 (L) 0.3 - 1.0 K/uL    Eos # 0.0 0.0 - 0.5 K/uL    Baso # 0.02 0.00 - 0.20 K/uL    nRBC 0 0 /100 WBC    Gran% 85.3 (H) 38.0 - 73.0 %    Lymph% 6.2 (L) 18.0 - 48.0 %    Mono% 3.2 (L) 4.0 - 15.0 %    Eosinophil% 0.2 0.0 - 8.0 %    Basophil% 0.3 0.0 - 1.9 %    Differential Method Automated    Comprehensive metabolic panel   Result Value Ref Range    Sodium 137 136 - 145 mmol/L    Potassium 4.4 3.5 - 5.1 mmol/L    Chloride 101 95 - 110 mmol/L    CO2 26 23 - 29 mmol/L    Glucose 113 (H) 70 - 110 mg/dL    BUN, Bld 32 (H) 6 - 20 mg/dL    Creatinine 1.6 (H) 0.5 - 1.4 mg/dL    Calcium 8.4 (L) 8.7 - 10.5 mg/dL    Total Protein 5.7 (L) 6.0 - 8.4 g/dL    Albumin 2.9 (L) 3.5 - 5.2 g/dL    Total Bilirubin 0.8 0.1 - 1.0 mg/dL     Alkaline Phosphatase 141 (H) 55 - 135 U/L    AST 26 10 - 40 U/L    ALT 25 10 - 44 U/L    Anion Gap 10 8 - 16 mmol/L    eGFR if African American 56.0 (A) >60 mL/min/1.73 m^2    eGFR if non  48.5 (A) >60 mL/min/1.73 m^2   Immunoglobulin free LT chains blood   Result Value Ref Range    Kappa Free Light Chains 2.27 (H) 0.33 - 1.94 mg/dL    Lambda Free Light Chains 7.82 (H) 0.57 - 2.63 mg/dL    Kappa/Lambda FLC Ratio 0.29 0.26 - 1.65   Protein electrophoresis, serum   Result Value Ref Range    Protein, Serum 5.2 (L) 6.0 - 8.4 g/dL    Albumin grams/dl 3.08 (L) 3.35 - 5.55 g/dL    Alpha-1 grams/dl 0.44 (H) 0.17 - 0.41 g/dL    Alpha-2 grams/dl 0.66 0.43 - 0.99 g/dL    Beta grams/dl 0.67 0.50 - 1.10 g/dL    Gamma grams/dl 0.35 (L) 0.67 - 1.58 g/dL   Immunofixation electrophoresis   Result Value Ref Range    Immunofix Interp. SEE COMMENT    Immunoglobulins (IgG, IgA, IgM) Quantitative   Result Value Ref Range    IgG - Serum 345 (L) 650 - 1600 mg/dL    IgA 31 (L) 40 - 350 mg/dL    IgM 34 (L) 50 - 300 mg/dL   Pathologist Interpretation BROOKS   Result Value Ref Range    Pathologist Interpretation BROOKS REVIEWED    Pathologist Interpretation SPE   Result Value Ref Range    Pathologist Interpretation SPE REVIEWED        PROBLEMS ASSESSED THIS VISIT:    1. AL amyloidosis    2. Thrombocytopenia    3. Anemia associated with chemotherapy    4. Other restrictive cardiomyopathy        PLAN:       AL amyloidosis  Mr. Lai has favorable response to cycle 2 with reduction in lambda light chains to 7.82 mg/dl, suggestive of possible partial response (pending confirmation with repeat labs. We will administer Cycle 2 of CyBorD today. He has tolerated this well.    He was introduced to one of our transplant coordinators today regarding possible autologous stem cell transplant after 4 induction cycles.     Thrombocytopenia  Mild. Monitor during therapy.     Anemia associated with chemotherapy  Mild. Continue to monitor with  therapy.     Infiltrative cardiomyopathy with elevated troponin but no CAD felt due to infiltrative CM;1/24/17 supplemented report from [place] + AL amyloid within heart  Continue bumetanide. Continue to follow-up with Dr. Lao.       Follow-up:   Please schedule weekly labs (CBC, CMP) and chemo for bortezomib on 3/6, 3/13, 3/20.Follow-up on 3/27 with CBC, CMP, SPEP, BROOKS, free light chains, immmunoglobulins, and chemo appt for bortezomib injection     Chris Atkins MD  Hematology and Stem Cell Transplant      Distress Screening Results: Psychosocial Distress screening score of Distress Score: 0 noted and reviewed. No intervention indicated.

## 2020-03-02 LAB
ALBUMIN SERPL ELPH-MCNC: 3.08 G/DL (ref 3.35–5.55)
ALPHA1 GLOB SERPL ELPH-MCNC: 0.44 G/DL (ref 0.17–0.41)
ALPHA2 GLOB SERPL ELPH-MCNC: 0.66 G/DL (ref 0.43–0.99)
B-GLOBULIN SERPL ELPH-MCNC: 0.67 G/DL (ref 0.5–1.1)
GAMMA GLOB SERPL ELPH-MCNC: 0.35 G/DL (ref 0.67–1.58)
INTERPRETATION SERPL IFE-IMP: NORMAL
KAPPA LC SER QL IA: 2.27 MG/DL (ref 0.33–1.94)
KAPPA LC/LAMBDA SER IA: 0.29 (ref 0.26–1.65)
LAMBDA LC SER QL IA: 7.82 MG/DL (ref 0.57–2.63)
PATHOLOGIST INTERPRETATION IFE: NORMAL
PATHOLOGIST INTERPRETATION SPE: NORMAL
PROT SERPL-MCNC: 5.2 G/DL (ref 6–8.4)

## 2020-03-03 ENCOUNTER — PATIENT MESSAGE (OUTPATIENT)
Dept: HEMATOLOGY/ONCOLOGY | Facility: CLINIC | Age: 55
End: 2020-03-03

## 2020-03-04 ENCOUNTER — PATIENT MESSAGE (OUTPATIENT)
Dept: HEMATOLOGY/ONCOLOGY | Facility: CLINIC | Age: 55
End: 2020-03-04

## 2020-03-04 NOTE — ASSESSMENT & PLAN NOTE
Mr. Lai has favorable response to cycle 2 with reduction in lambda light chains to 7.82 mg/dl, suggestive of possible partial response (pending confirmation with repeat labs. We will administer Cycle 2 of CyBorD today. He has tolerated this well.    He was introduced to one of our transplant coordinators today regarding possible autologous stem cell transplant after 4 induction cycles.

## 2020-03-06 ENCOUNTER — LAB VISIT (OUTPATIENT)
Dept: LAB | Facility: HOSPITAL | Age: 55
End: 2020-03-06
Attending: INTERNAL MEDICINE
Payer: OTHER GOVERNMENT

## 2020-03-06 ENCOUNTER — CLINICAL SUPPORT (OUTPATIENT)
Dept: HEMATOLOGY/ONCOLOGY | Facility: CLINIC | Age: 55
End: 2020-03-06
Payer: OTHER GOVERNMENT

## 2020-03-06 ENCOUNTER — INFUSION (OUTPATIENT)
Dept: INFUSION THERAPY | Facility: HOSPITAL | Age: 55
End: 2020-03-06
Attending: INTERNAL MEDICINE
Payer: OTHER GOVERNMENT

## 2020-03-06 VITALS
TEMPERATURE: 98 F | RESPIRATION RATE: 16 BRPM | DIASTOLIC BLOOD PRESSURE: 54 MMHG | SYSTOLIC BLOOD PRESSURE: 117 MMHG | HEART RATE: 90 BPM

## 2020-03-06 DIAGNOSIS — E85.81 AL AMYLOIDOSIS: ICD-10-CM

## 2020-03-06 DIAGNOSIS — E85.81 AL AMYLOIDOSIS: Primary | ICD-10-CM

## 2020-03-06 DIAGNOSIS — Z76.82 STEM CELL TRANSPLANT CANDIDATE: Primary | ICD-10-CM

## 2020-03-06 LAB
ALBUMIN SERPL BCP-MCNC: 2.8 G/DL (ref 3.5–5.2)
ALP SERPL-CCNC: 131 U/L (ref 55–135)
ALT SERPL W/O P-5'-P-CCNC: 23 U/L (ref 10–44)
ANION GAP SERPL CALC-SCNC: 8 MMOL/L (ref 8–16)
AST SERPL-CCNC: 25 U/L (ref 10–40)
BASOPHILS # BLD AUTO: 0.03 K/UL (ref 0–0.2)
BASOPHILS NFR BLD: 0.5 % (ref 0–1.9)
BILIRUB SERPL-MCNC: 1 MG/DL (ref 0.1–1)
BUN SERPL-MCNC: 30 MG/DL (ref 6–20)
CALCIUM SERPL-MCNC: 8.6 MG/DL (ref 8.7–10.5)
CHLORIDE SERPL-SCNC: 101 MMOL/L (ref 95–110)
CO2 SERPL-SCNC: 26 MMOL/L (ref 23–29)
CREAT SERPL-MCNC: 1.6 MG/DL (ref 0.5–1.4)
DIFFERENTIAL METHOD: ABNORMAL
EOSINOPHIL # BLD AUTO: 0 K/UL (ref 0–0.5)
EOSINOPHIL NFR BLD: 0 % (ref 0–8)
ERYTHROCYTE [DISTWIDTH] IN BLOOD BY AUTOMATED COUNT: 14.7 % (ref 11.5–14.5)
EST. GFR  (AFRICAN AMERICAN): 56 ML/MIN/1.73 M^2
EST. GFR  (NON AFRICAN AMERICAN): 48.5 ML/MIN/1.73 M^2
GLUCOSE SERPL-MCNC: 158 MG/DL (ref 70–110)
HCT VFR BLD AUTO: 35.7 % (ref 40–54)
HGB BLD-MCNC: 11.5 G/DL (ref 14–18)
IMM GRANULOCYTES # BLD AUTO: 0.25 K/UL (ref 0–0.04)
IMM GRANULOCYTES NFR BLD AUTO: 3.8 % (ref 0–0.5)
LYMPHOCYTES # BLD AUTO: 0.3 K/UL (ref 1–4.8)
LYMPHOCYTES NFR BLD: 5.1 % (ref 18–48)
MCH RBC QN AUTO: 33.5 PG (ref 27–31)
MCHC RBC AUTO-ENTMCNC: 32.2 G/DL (ref 32–36)
MCV RBC AUTO: 104 FL (ref 82–98)
MONOCYTES # BLD AUTO: 0.2 K/UL (ref 0.3–1)
MONOCYTES NFR BLD: 2.6 % (ref 4–15)
NEUTROPHILS # BLD AUTO: 5.9 K/UL (ref 1.8–7.7)
NEUTROPHILS NFR BLD: 88 % (ref 38–73)
NRBC BLD-RTO: 0 /100 WBC
PLATELET # BLD AUTO: 135 K/UL (ref 150–350)
PMV BLD AUTO: 10.4 FL (ref 9.2–12.9)
POTASSIUM SERPL-SCNC: 4.6 MMOL/L (ref 3.5–5.1)
PROT SERPL-MCNC: 5.7 G/DL (ref 6–8.4)
RBC # BLD AUTO: 3.43 M/UL (ref 4.6–6.2)
SODIUM SERPL-SCNC: 135 MMOL/L (ref 136–145)
WBC # BLD AUTO: 6.66 K/UL (ref 3.9–12.7)

## 2020-03-06 PROCEDURE — 63600175 PHARM REV CODE 636 W HCPCS: Mod: JG | Performed by: INTERNAL MEDICINE

## 2020-03-06 PROCEDURE — 99999 PR PBB SHADOW E&M-EST. PATIENT-LVL I: ICD-10-PCS | Mod: PBBFAC,,,

## 2020-03-06 PROCEDURE — 96401 CHEMO ANTI-NEOPL SQ/IM: CPT

## 2020-03-06 PROCEDURE — 36415 COLL VENOUS BLD VENIPUNCTURE: CPT

## 2020-03-06 PROCEDURE — 85025 COMPLETE CBC W/AUTO DIFF WBC: CPT

## 2020-03-06 PROCEDURE — 99211 OFF/OP EST MAY X REQ PHY/QHP: CPT | Mod: PBBFAC,25

## 2020-03-06 PROCEDURE — 99999 PR PBB SHADOW E&M-EST. PATIENT-LVL I: CPT | Mod: PBBFAC,,,

## 2020-03-06 PROCEDURE — 80053 COMPREHEN METABOLIC PANEL: CPT

## 2020-03-06 RX ORDER — BORTEZOMIB 3.5 MG/1
1.3 INJECTION, POWDER, LYOPHILIZED, FOR SOLUTION INTRAVENOUS; SUBCUTANEOUS
Status: COMPLETED | OUTPATIENT
Start: 2020-03-06 | End: 2020-03-06

## 2020-03-06 RX ORDER — HEPARIN 100 UNIT/ML
500 SYRINGE INTRAVENOUS
Status: DISCONTINUED | OUTPATIENT
Start: 2020-03-06 | End: 2020-03-06 | Stop reason: HOSPADM

## 2020-03-06 RX ORDER — SODIUM CHLORIDE 0.9 % (FLUSH) 0.9 %
10 SYRINGE (ML) INJECTION
Status: DISCONTINUED | OUTPATIENT
Start: 2020-03-06 | End: 2020-03-06 | Stop reason: HOSPADM

## 2020-03-06 RX ADMIN — BORTEZOMIB 2.8 MG: 3.5 INJECTION, POWDER, LYOPHILIZED, FOR SOLUTION INTRAVENOUS; SUBCUTANEOUS at 01:03

## 2020-03-09 ENCOUNTER — TELEPHONE (OUTPATIENT)
Dept: TRANSPLANT | Facility: HOSPITAL | Age: 55
End: 2020-03-09

## 2020-03-09 NOTE — TELEPHONE ENCOUNTER
Attempted to call Mr. Lai regarding questions about autologous stem cell transplant. No answer. Left voicemail stating I would call back.    Chris Atkins MD

## 2020-03-12 DIAGNOSIS — Z12.11 SPECIAL SCREENING FOR MALIGNANT NEOPLASMS, COLON: Primary | ICD-10-CM

## 2020-03-12 RX ORDER — SODIUM, POTASSIUM,MAG SULFATES 17.5-3.13G
1 SOLUTION, RECONSTITUTED, ORAL ORAL DAILY
Qty: 1 KIT | Refills: 0 | Status: SHIPPED | OUTPATIENT
Start: 2020-03-12 | End: 2020-03-14

## 2020-03-13 ENCOUNTER — INFUSION (OUTPATIENT)
Dept: INFUSION THERAPY | Facility: HOSPITAL | Age: 55
End: 2020-03-13
Attending: INTERNAL MEDICINE
Payer: OTHER GOVERNMENT

## 2020-03-13 ENCOUNTER — LAB VISIT (OUTPATIENT)
Dept: LAB | Facility: HOSPITAL | Age: 55
End: 2020-03-13
Attending: INTERNAL MEDICINE
Payer: OTHER GOVERNMENT

## 2020-03-13 VITALS
DIASTOLIC BLOOD PRESSURE: 63 MMHG | RESPIRATION RATE: 18 BRPM | SYSTOLIC BLOOD PRESSURE: 113 MMHG | HEART RATE: 85 BPM | TEMPERATURE: 98 F

## 2020-03-13 DIAGNOSIS — Z76.82 STEM CELL TRANSPLANT CANDIDATE: ICD-10-CM

## 2020-03-13 DIAGNOSIS — E85.81 AL AMYLOIDOSIS: ICD-10-CM

## 2020-03-13 DIAGNOSIS — E85.81 AL AMYLOIDOSIS: Primary | ICD-10-CM

## 2020-03-13 LAB
ALBUMIN SERPL BCP-MCNC: 2.6 G/DL (ref 3.5–5.2)
ALP SERPL-CCNC: 146 U/L (ref 55–135)
ALT SERPL W/O P-5'-P-CCNC: 21 U/L (ref 10–44)
ANION GAP SERPL CALC-SCNC: 10 MMOL/L (ref 8–16)
AST SERPL-CCNC: 26 U/L (ref 10–40)
BASOPHILS # BLD AUTO: 0.03 K/UL (ref 0–0.2)
BASOPHILS NFR BLD: 0.6 % (ref 0–1.9)
BILIRUB SERPL-MCNC: 1.1 MG/DL (ref 0.1–1)
BUN SERPL-MCNC: 34 MG/DL (ref 6–20)
CALCIUM SERPL-MCNC: 8.4 MG/DL (ref 8.7–10.5)
CHLORIDE SERPL-SCNC: 98 MMOL/L (ref 95–110)
CO2 SERPL-SCNC: 24 MMOL/L (ref 23–29)
COMPLEXED PSA SERPL-MCNC: 0.17 NG/ML (ref 0–4)
CREAT SERPL-MCNC: 1.6 MG/DL (ref 0.5–1.4)
DIFFERENTIAL METHOD: ABNORMAL
EOSINOPHIL # BLD AUTO: 0 K/UL (ref 0–0.5)
EOSINOPHIL NFR BLD: 0 % (ref 0–8)
ERYTHROCYTE [DISTWIDTH] IN BLOOD BY AUTOMATED COUNT: 15 % (ref 11.5–14.5)
EST. GFR  (AFRICAN AMERICAN): 56 ML/MIN/1.73 M^2
EST. GFR  (NON AFRICAN AMERICAN): 48.5 ML/MIN/1.73 M^2
GLUCOSE SERPL-MCNC: 136 MG/DL (ref 70–110)
HCT VFR BLD AUTO: 32.7 % (ref 40–54)
HGB BLD-MCNC: 10.8 G/DL (ref 14–18)
IGA SERPL-MCNC: 28 MG/DL (ref 40–350)
IGG SERPL-MCNC: 309 MG/DL (ref 650–1600)
IGM SERPL-MCNC: 25 MG/DL (ref 50–300)
IMM GRANULOCYTES # BLD AUTO: 0.14 K/UL (ref 0–0.04)
IMM GRANULOCYTES NFR BLD AUTO: 2.6 % (ref 0–0.5)
LYMPHOCYTES # BLD AUTO: 0.3 K/UL (ref 1–4.8)
LYMPHOCYTES NFR BLD: 5.8 % (ref 18–48)
MCH RBC QN AUTO: 34.2 PG (ref 27–31)
MCHC RBC AUTO-ENTMCNC: 33 G/DL (ref 32–36)
MCV RBC AUTO: 104 FL (ref 82–98)
MONOCYTES # BLD AUTO: 0.2 K/UL (ref 0.3–1)
MONOCYTES NFR BLD: 3 % (ref 4–15)
NEUTROPHILS # BLD AUTO: 4.7 K/UL (ref 1.8–7.7)
NEUTROPHILS NFR BLD: 88 % (ref 38–73)
NRBC BLD-RTO: 0 /100 WBC
PLATELET # BLD AUTO: 121 K/UL (ref 150–350)
PMV BLD AUTO: 10.1 FL (ref 9.2–12.9)
POTASSIUM SERPL-SCNC: 4.4 MMOL/L (ref 3.5–5.1)
PROT SERPL-MCNC: 5.7 G/DL (ref 6–8.4)
RBC # BLD AUTO: 3.16 M/UL (ref 4.6–6.2)
SODIUM SERPL-SCNC: 132 MMOL/L (ref 136–145)
WBC # BLD AUTO: 5.3 K/UL (ref 3.9–12.7)

## 2020-03-13 PROCEDURE — 85025 COMPLETE CBC W/AUTO DIFF WBC: CPT

## 2020-03-13 PROCEDURE — 84165 PROTEIN E-PHORESIS SERUM: CPT

## 2020-03-13 PROCEDURE — 84153 ASSAY OF PSA TOTAL: CPT

## 2020-03-13 PROCEDURE — 84165 PROTEIN E-PHORESIS SERUM: CPT | Mod: 26,,, | Performed by: PATHOLOGY

## 2020-03-13 PROCEDURE — 86334 IMMUNOFIX E-PHORESIS SERUM: CPT

## 2020-03-13 PROCEDURE — 82784 ASSAY IGA/IGD/IGG/IGM EACH: CPT | Mod: 59

## 2020-03-13 PROCEDURE — 63600175 PHARM REV CODE 636 W HCPCS: Mod: JG | Performed by: INTERNAL MEDICINE

## 2020-03-13 PROCEDURE — 96401 CHEMO ANTI-NEOPL SQ/IM: CPT

## 2020-03-13 PROCEDURE — 86334 PATHOLOGIST INTERPRETATION IFE: ICD-10-PCS | Mod: 26,,, | Performed by: PATHOLOGY

## 2020-03-13 PROCEDURE — 86334 IMMUNOFIX E-PHORESIS SERUM: CPT | Mod: 26,,, | Performed by: PATHOLOGY

## 2020-03-13 PROCEDURE — 84165 PATHOLOGIST INTERPRETATION SPE: ICD-10-PCS | Mod: 26,,, | Performed by: PATHOLOGY

## 2020-03-13 PROCEDURE — 80053 COMPREHEN METABOLIC PANEL: CPT

## 2020-03-13 PROCEDURE — 83520 IMMUNOASSAY QUANT NOS NONAB: CPT | Mod: 59

## 2020-03-13 PROCEDURE — 36415 COLL VENOUS BLD VENIPUNCTURE: CPT

## 2020-03-13 RX ORDER — BORTEZOMIB 3.5 MG/1
1.3 INJECTION, POWDER, LYOPHILIZED, FOR SOLUTION INTRAVENOUS; SUBCUTANEOUS
Status: COMPLETED | OUTPATIENT
Start: 2020-03-13 | End: 2020-03-13

## 2020-03-13 RX ADMIN — BORTEZOMIB 2.8 MG: 3.5 INJECTION, POWDER, LYOPHILIZED, FOR SOLUTION INTRAVENOUS; SUBCUTANEOUS at 02:03

## 2020-03-16 LAB
ALBUMIN SERPL ELPH-MCNC: 3.02 G/DL (ref 3.35–5.55)
ALPHA1 GLOB SERPL ELPH-MCNC: 0.51 G/DL (ref 0.17–0.41)
ALPHA2 GLOB SERPL ELPH-MCNC: 0.67 G/DL (ref 0.43–0.99)
B-GLOBULIN SERPL ELPH-MCNC: 0.67 G/DL (ref 0.5–1.1)
GAMMA GLOB SERPL ELPH-MCNC: 0.33 G/DL (ref 0.67–1.58)
INTERPRETATION SERPL IFE-IMP: NORMAL
KAPPA LC SER QL IA: 1.96 MG/DL (ref 0.33–1.94)
KAPPA LC/LAMBDA SER IA: 0.45 (ref 0.26–1.65)
LAMBDA LC SER QL IA: 4.37 MG/DL (ref 0.57–2.63)
PATHOLOGIST INTERPRETATION IFE: NORMAL
PATHOLOGIST INTERPRETATION SPE: NORMAL
PROT SERPL-MCNC: 5.2 G/DL (ref 6–8.4)

## 2020-03-16 NOTE — PROGRESS NOTES
Spoke with patient and wife.  Educated them on the autologous stem cell transplant process.  Answered questions.  Provided information regarding transplant.

## 2020-03-16 NOTE — TELEPHONE ENCOUNTER
Reached patient - discussed need to postpone transplant due to SARS-CoV-2 (COVID-19). He demonstrated understanding. For now, we will continue with CyBorD until VGPR/CR and then go back to maintenance bortezomib [id number].     Chris Atkins MD

## 2020-03-20 ENCOUNTER — INFUSION (OUTPATIENT)
Dept: INFUSION THERAPY | Facility: HOSPITAL | Age: 55
End: 2020-03-20
Attending: INTERNAL MEDICINE
Payer: OTHER GOVERNMENT

## 2020-03-20 ENCOUNTER — TELEPHONE (OUTPATIENT)
Dept: PHARMACY | Facility: CLINIC | Age: 55
End: 2020-03-20

## 2020-03-20 ENCOUNTER — LAB VISIT (OUTPATIENT)
Dept: LAB | Facility: HOSPITAL | Age: 55
End: 2020-03-20
Attending: INTERNAL MEDICINE
Payer: OTHER GOVERNMENT

## 2020-03-20 ENCOUNTER — PATIENT MESSAGE (OUTPATIENT)
Dept: HEMATOLOGY/ONCOLOGY | Facility: CLINIC | Age: 55
End: 2020-03-20

## 2020-03-20 VITALS
WEIGHT: 207 LBS | HEIGHT: 71 IN | TEMPERATURE: 98 F | HEART RATE: 86 BPM | RESPIRATION RATE: 18 BRPM | DIASTOLIC BLOOD PRESSURE: 55 MMHG | BODY MASS INDEX: 28.98 KG/M2 | SYSTOLIC BLOOD PRESSURE: 98 MMHG

## 2020-03-20 DIAGNOSIS — E85.81 AL AMYLOIDOSIS: ICD-10-CM

## 2020-03-20 DIAGNOSIS — E85.81 AL AMYLOIDOSIS: Primary | ICD-10-CM

## 2020-03-20 LAB
ALBUMIN SERPL BCP-MCNC: 2.7 G/DL (ref 3.5–5.2)
ALP SERPL-CCNC: 156 U/L (ref 55–135)
ALT SERPL W/O P-5'-P-CCNC: 18 U/L (ref 10–44)
ANION GAP SERPL CALC-SCNC: 10 MMOL/L (ref 8–16)
AST SERPL-CCNC: 24 U/L (ref 10–40)
BASOPHILS # BLD AUTO: 0.02 K/UL (ref 0–0.2)
BASOPHILS NFR BLD: 0.3 % (ref 0–1.9)
BILIRUB SERPL-MCNC: 1.1 MG/DL (ref 0.1–1)
BUN SERPL-MCNC: 35 MG/DL (ref 6–20)
CALCIUM SERPL-MCNC: 8.5 MG/DL (ref 8.7–10.5)
CHLORIDE SERPL-SCNC: 100 MMOL/L (ref 95–110)
CO2 SERPL-SCNC: 23 MMOL/L (ref 23–29)
CREAT SERPL-MCNC: 1.7 MG/DL (ref 0.5–1.4)
DIFFERENTIAL METHOD: ABNORMAL
EOSINOPHIL # BLD AUTO: 0 K/UL (ref 0–0.5)
EOSINOPHIL NFR BLD: 0 % (ref 0–8)
ERYTHROCYTE [DISTWIDTH] IN BLOOD BY AUTOMATED COUNT: 15.2 % (ref 11.5–14.5)
EST. GFR  (AFRICAN AMERICAN): 52.1 ML/MIN/1.73 M^2
EST. GFR  (NON AFRICAN AMERICAN): 45 ML/MIN/1.73 M^2
GLUCOSE SERPL-MCNC: 150 MG/DL (ref 70–110)
HCT VFR BLD AUTO: 32.2 % (ref 40–54)
HGB BLD-MCNC: 10.4 G/DL (ref 14–18)
IMM GRANULOCYTES # BLD AUTO: 0.22 K/UL (ref 0–0.04)
IMM GRANULOCYTES NFR BLD AUTO: 3.3 % (ref 0–0.5)
LYMPHOCYTES # BLD AUTO: 0.4 K/UL (ref 1–4.8)
LYMPHOCYTES NFR BLD: 6.2 % (ref 18–48)
MCH RBC QN AUTO: 33.2 PG (ref 27–31)
MCHC RBC AUTO-ENTMCNC: 32.3 G/DL (ref 32–36)
MCV RBC AUTO: 103 FL (ref 82–98)
MONOCYTES # BLD AUTO: 0.2 K/UL (ref 0.3–1)
MONOCYTES NFR BLD: 2.4 % (ref 4–15)
NEUTROPHILS # BLD AUTO: 5.8 K/UL (ref 1.8–7.7)
NEUTROPHILS NFR BLD: 87.8 % (ref 38–73)
NRBC BLD-RTO: 0 /100 WBC
PLATELET # BLD AUTO: 141 K/UL (ref 150–350)
PMV BLD AUTO: 10.6 FL (ref 9.2–12.9)
POTASSIUM SERPL-SCNC: 4 MMOL/L (ref 3.5–5.1)
PROT SERPL-MCNC: 5.9 G/DL (ref 6–8.4)
RBC # BLD AUTO: 3.13 M/UL (ref 4.6–6.2)
SODIUM SERPL-SCNC: 133 MMOL/L (ref 136–145)
WBC # BLD AUTO: 6.61 K/UL (ref 3.9–12.7)

## 2020-03-20 PROCEDURE — 96401 CHEMO ANTI-NEOPL SQ/IM: CPT

## 2020-03-20 PROCEDURE — 63600175 PHARM REV CODE 636 W HCPCS: Mod: JW,JG | Performed by: INTERNAL MEDICINE

## 2020-03-20 PROCEDURE — 80053 COMPREHEN METABOLIC PANEL: CPT

## 2020-03-20 PROCEDURE — 85025 COMPLETE CBC W/AUTO DIFF WBC: CPT

## 2020-03-20 RX ORDER — BORTEZOMIB 3.5 MG/1
1.3 INJECTION, POWDER, LYOPHILIZED, FOR SOLUTION INTRAVENOUS; SUBCUTANEOUS
Status: COMPLETED | OUTPATIENT
Start: 2020-03-20 | End: 2020-03-20

## 2020-03-20 RX ORDER — SODIUM CHLORIDE 0.9 % (FLUSH) 0.9 %
10 SYRINGE (ML) INJECTION
Status: DISCONTINUED | OUTPATIENT
Start: 2020-03-20 | End: 2020-03-20 | Stop reason: HOSPADM

## 2020-03-20 RX ORDER — CYCLOPHOSPHAMIDE 50 MG/1
300 CAPSULE ORAL
Qty: 54 CAPSULE | Refills: 0 | Status: SHIPPED | OUTPATIENT
Start: 2020-03-20 | End: 2020-05-29

## 2020-03-20 RX ORDER — HEPARIN 100 UNIT/ML
500 SYRINGE INTRAVENOUS
Status: DISCONTINUED | OUTPATIENT
Start: 2020-03-20 | End: 2020-03-20 | Stop reason: HOSPADM

## 2020-03-20 RX ADMIN — BORTEZOMIB 2.8 MG: 3.5 INJECTION, POWDER, LYOPHILIZED, FOR SOLUTION INTRAVENOUS; SUBCUTANEOUS at 01:03

## 2020-03-20 NOTE — TELEPHONE ENCOUNTER
Called patient and explained that we are working on having insurance company override so we are able to  the two pills to take today to complete his day 22 regimen

## 2020-03-20 NOTE — TELEPHONE ENCOUNTER
Refill: Was able to reach patient and confirm  of Cytoxan for today (3/20) at 001. $13.00. Patient denies any missed doses. Denies any significant side effects with Cytoxan. Medication list reviewed. No new allergies or health conditions.

## 2020-03-26 ENCOUNTER — LAB VISIT (OUTPATIENT)
Dept: LAB | Facility: HOSPITAL | Age: 55
End: 2020-03-26
Attending: INTERNAL MEDICINE
Payer: OTHER GOVERNMENT

## 2020-03-26 DIAGNOSIS — E85.81 AL AMYLOIDOSIS: ICD-10-CM

## 2020-03-26 LAB
ALBUMIN SERPL BCP-MCNC: 2.5 G/DL (ref 3.5–5.2)
ALP SERPL-CCNC: 141 U/L (ref 55–135)
ALT SERPL W/O P-5'-P-CCNC: 20 U/L (ref 10–44)
ANION GAP SERPL CALC-SCNC: 9 MMOL/L (ref 8–16)
AST SERPL-CCNC: 25 U/L (ref 10–40)
BASOPHILS # BLD AUTO: 0.04 K/UL (ref 0–0.2)
BASOPHILS NFR BLD: 0.7 % (ref 0–1.9)
BILIRUB SERPL-MCNC: 1 MG/DL (ref 0.1–1)
BUN SERPL-MCNC: 38 MG/DL (ref 6–20)
CALCIUM SERPL-MCNC: 8.5 MG/DL (ref 8.7–10.5)
CHLORIDE SERPL-SCNC: 104 MMOL/L (ref 95–110)
CO2 SERPL-SCNC: 25 MMOL/L (ref 23–29)
CREAT SERPL-MCNC: 1.6 MG/DL (ref 0.5–1.4)
DIFFERENTIAL METHOD: ABNORMAL
EOSINOPHIL # BLD AUTO: 0 K/UL (ref 0–0.5)
EOSINOPHIL NFR BLD: 0.2 % (ref 0–8)
ERYTHROCYTE [DISTWIDTH] IN BLOOD BY AUTOMATED COUNT: 15.5 % (ref 11.5–14.5)
EST. GFR  (AFRICAN AMERICAN): 56 ML/MIN/1.73 M^2
EST. GFR  (NON AFRICAN AMERICAN): 48.5 ML/MIN/1.73 M^2
GLUCOSE SERPL-MCNC: 107 MG/DL (ref 70–110)
HCT VFR BLD AUTO: 32.1 % (ref 40–54)
HGB BLD-MCNC: 10.3 G/DL (ref 14–18)
IMM GRANULOCYTES # BLD AUTO: 0.25 K/UL (ref 0–0.04)
IMM GRANULOCYTES NFR BLD AUTO: 4.6 % (ref 0–0.5)
LYMPHOCYTES # BLD AUTO: 0.4 K/UL (ref 1–4.8)
LYMPHOCYTES NFR BLD: 6.7 % (ref 18–48)
MCH RBC QN AUTO: 33.2 PG (ref 27–31)
MCHC RBC AUTO-ENTMCNC: 32.1 G/DL (ref 32–36)
MCV RBC AUTO: 104 FL (ref 82–98)
MONOCYTES # BLD AUTO: 0.6 K/UL (ref 0.3–1)
MONOCYTES NFR BLD: 10.9 % (ref 4–15)
NEUTROPHILS # BLD AUTO: 4.2 K/UL (ref 1.8–7.7)
NEUTROPHILS NFR BLD: 76.9 % (ref 38–73)
NRBC BLD-RTO: 0 /100 WBC
PLATELET # BLD AUTO: 119 K/UL (ref 150–350)
PMV BLD AUTO: 10.3 FL (ref 9.2–12.9)
POTASSIUM SERPL-SCNC: 4.5 MMOL/L (ref 3.5–5.1)
PROT SERPL-MCNC: 5.4 G/DL (ref 6–8.4)
RBC # BLD AUTO: 3.1 M/UL (ref 4.6–6.2)
SODIUM SERPL-SCNC: 138 MMOL/L (ref 136–145)
WBC # BLD AUTO: 5.41 K/UL (ref 3.9–12.7)

## 2020-03-26 PROCEDURE — 80053 COMPREHEN METABOLIC PANEL: CPT

## 2020-03-26 PROCEDURE — 85025 COMPLETE CBC W/AUTO DIFF WBC: CPT

## 2020-03-27 ENCOUNTER — OFFICE VISIT (OUTPATIENT)
Dept: HEMATOLOGY/ONCOLOGY | Facility: CLINIC | Age: 55
End: 2020-03-27
Payer: OTHER GOVERNMENT

## 2020-03-27 ENCOUNTER — INFUSION (OUTPATIENT)
Dept: INFUSION THERAPY | Facility: HOSPITAL | Age: 55
End: 2020-03-27
Attending: INTERNAL MEDICINE
Payer: OTHER GOVERNMENT

## 2020-03-27 VITALS — BODY MASS INDEX: 27.58 KG/M2 | HEIGHT: 71 IN | WEIGHT: 197 LBS

## 2020-03-27 VITALS
HEART RATE: 88 BPM | DIASTOLIC BLOOD PRESSURE: 52 MMHG | SYSTOLIC BLOOD PRESSURE: 93 MMHG | RESPIRATION RATE: 18 BRPM | TEMPERATURE: 98 F

## 2020-03-27 DIAGNOSIS — E85.81 AL AMYLOIDOSIS: Primary | ICD-10-CM

## 2020-03-27 DIAGNOSIS — D69.6 THROMBOCYTOPENIA: ICD-10-CM

## 2020-03-27 DIAGNOSIS — T45.1X5A ANEMIA ASSOCIATED WITH CHEMOTHERAPY: ICD-10-CM

## 2020-03-27 DIAGNOSIS — D64.81 ANEMIA ASSOCIATED WITH CHEMOTHERAPY: ICD-10-CM

## 2020-03-27 DIAGNOSIS — I50.32 CHRONIC DIASTOLIC CONGESTIVE HEART FAILURE: ICD-10-CM

## 2020-03-27 PROCEDURE — 96401 CHEMO ANTI-NEOPL SQ/IM: CPT

## 2020-03-27 PROCEDURE — 99215 PR OFFICE/OUTPT VISIT, EST, LEVL V, 40-54 MIN: ICD-10-PCS | Mod: 95,,, | Performed by: INTERNAL MEDICINE

## 2020-03-27 PROCEDURE — 99215 OFFICE O/P EST HI 40 MIN: CPT | Mod: 95,,, | Performed by: INTERNAL MEDICINE

## 2020-03-27 PROCEDURE — 63600175 PHARM REV CODE 636 W HCPCS: Mod: JG | Performed by: INTERNAL MEDICINE

## 2020-03-27 RX ORDER — BORTEZOMIB 3.5 MG/1
1.3 INJECTION, POWDER, LYOPHILIZED, FOR SOLUTION INTRAVENOUS; SUBCUTANEOUS
Status: CANCELLED | OUTPATIENT
Start: 2020-04-03

## 2020-03-27 RX ORDER — HEPARIN 100 UNIT/ML
500 SYRINGE INTRAVENOUS
Status: CANCELLED | OUTPATIENT
Start: 2020-04-09

## 2020-03-27 RX ORDER — SODIUM CHLORIDE 0.9 % (FLUSH) 0.9 %
10 SYRINGE (ML) INJECTION
Status: CANCELLED | OUTPATIENT
Start: 2020-04-17

## 2020-03-27 RX ORDER — BORTEZOMIB 3.5 MG/1
1.3 INJECTION, POWDER, LYOPHILIZED, FOR SOLUTION INTRAVENOUS; SUBCUTANEOUS
Status: CANCELLED | OUTPATIENT
Start: 2020-04-17

## 2020-03-27 RX ORDER — HEPARIN 100 UNIT/ML
500 SYRINGE INTRAVENOUS
Status: CANCELLED | OUTPATIENT
Start: 2020-04-17

## 2020-03-27 RX ORDER — HEPARIN 100 UNIT/ML
500 SYRINGE INTRAVENOUS
Status: CANCELLED | OUTPATIENT
Start: 2020-03-27

## 2020-03-27 RX ORDER — SODIUM CHLORIDE 0.9 % (FLUSH) 0.9 %
10 SYRINGE (ML) INJECTION
Status: CANCELLED | OUTPATIENT
Start: 2020-04-03

## 2020-03-27 RX ORDER — SODIUM CHLORIDE 0.9 % (FLUSH) 0.9 %
10 SYRINGE (ML) INJECTION
Status: DISCONTINUED | OUTPATIENT
Start: 2020-03-27 | End: 2020-03-27 | Stop reason: HOSPADM

## 2020-03-27 RX ORDER — SODIUM CHLORIDE 0.9 % (FLUSH) 0.9 %
10 SYRINGE (ML) INJECTION
Status: CANCELLED | OUTPATIENT
Start: 2020-04-09

## 2020-03-27 RX ORDER — BORTEZOMIB 3.5 MG/1
1.3 INJECTION, POWDER, LYOPHILIZED, FOR SOLUTION INTRAVENOUS; SUBCUTANEOUS
Status: CANCELLED | OUTPATIENT
Start: 2020-04-09

## 2020-03-27 RX ORDER — HEPARIN 100 UNIT/ML
500 SYRINGE INTRAVENOUS
Status: DISCONTINUED | OUTPATIENT
Start: 2020-03-27 | End: 2020-03-27 | Stop reason: HOSPADM

## 2020-03-27 RX ORDER — BORTEZOMIB 3.5 MG/1
1.3 INJECTION, POWDER, LYOPHILIZED, FOR SOLUTION INTRAVENOUS; SUBCUTANEOUS
Status: COMPLETED | OUTPATIENT
Start: 2020-03-27 | End: 2020-03-27

## 2020-03-27 RX ORDER — BORTEZOMIB 3.5 MG/1
1.3 INJECTION, POWDER, LYOPHILIZED, FOR SOLUTION INTRAVENOUS; SUBCUTANEOUS
Status: CANCELLED | OUTPATIENT
Start: 2020-03-27

## 2020-03-27 RX ORDER — SODIUM CHLORIDE 0.9 % (FLUSH) 0.9 %
10 SYRINGE (ML) INJECTION
Status: CANCELLED | OUTPATIENT
Start: 2020-03-27

## 2020-03-27 RX ORDER — HEPARIN 100 UNIT/ML
500 SYRINGE INTRAVENOUS
Status: CANCELLED | OUTPATIENT
Start: 2020-04-03

## 2020-03-27 RX ADMIN — BORTEZOMIB 2.8 MG: 3.5 INJECTION, POWDER, LYOPHILIZED, FOR SOLUTION INTRAVENOUS; SUBCUTANEOUS at 01:03

## 2020-03-27 NOTE — NURSING
1346-VS stable. Velcade injection administered SQ to abdomen, pt tolerated well. Band-aid applied to site. Pt discharged with no distress noted, ambulating independently. RTC 4/1/20 for labs, pt verbalized understanding.

## 2020-03-27 NOTE — PROGRESS NOTES
HEMATOLOGIC MALIGNANCIES PROGRESS NOTE    IDENTIFYING STATEMENT   Roc Lai Jr. (Ovi) is a 54 y.o. male with a  of 1965 from [place] with the diagnosis of AL amyloid.      ONCOLOGY HISTORY:    1. AL amyloid with cardiac involvement   A. 2016: Initial evaluation with Dr. Carmichael. Reported 18 months of progressive decline (previously ran 3 miles easily, now can only walk 10 minutes). M-protein 0.19 g/dl, kappa 1.37 mg/dl, lambda 50 mg/dl, ratio 0.03. Abdominal fat pad biopsy had been negative for presence of amyloid. Cardiac MRI suggestive of infiltrative cardiomyopathy.    B. 1/3/2017: Endomyocardial biopsy - involved by AL amyloid   C. 2017: BMBx - 60% cellular marrow with 50% plasma cells, consistent with plasma cell myeloma.    D. 2/15/2017 - 2017: Completed 4 cycles bortezomib chemotherapy on [id number] trial.    E. 2017: Bortezomib every 2 weeks x 2 doses   F. 2017: Change bortezomib to [id number] x 2 doses   G. 10/9/2017: Bortezomib q6 weeks; kappa 1.6 mg/dl, lambda 0.93 mg/dl, ratio 1.72   H. 2018: Bortezomib q8 weeks.    I. 2018: [id number] close out visit due to ineffectiveness.     J. 2020: kappa 2.73 mg/dl, lambda 28.33 mg/dl, ratio (lambda:kappa) 10.38, concerning for progressive disease    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 He states he has been tolerating therapy well.     Swelling is biggest issue  Neuropathy is chronic - stable    Past Medical History, Past Social History and Past Family History have been reviewed and are unchanged except as noted in the interval history.    MEDICATIONS:     Current Outpatient Medications on File Prior to Visit   Medication  Sig Dispense Refill    acyclovir (ZOVIRAX) 400 MG tablet Take 1 tablet (400 mg total) by mouth 2 (two) times daily. 180 tablet 3    aspirin (ECOTRIN) 81 MG EC tablet Take 1 tablet (81 mg total) by mouth once daily. 150 tablet 2    BORTEZOMIB (VELCADE INJ) Inject as directed. Every 2 months      bumetanide (BUMEX) 2 MG tablet TAKE 1 TABLET DAILY 90 tablet 4    cyclophosphamide (CYTOXAN) 50 mg capsule Take 13 capsules (650 mg total) by mouth every 7 days on days 1, 8, 15 and 22 of each 28 day cycle. Take with lots of water.. 54 capsule 0    dexAMETHasone (DECADRON) 4 MG Tab Take 5 tablets (20 mg total) by mouth every 7 days. On days 1, 8, 15, and 22 of each chemotherapy cycle. Take with food. 40 tablet 5    gabapentin (NEURONTIN) 100 MG capsule TAKE 3 CAPSULES EVERY EVENING 270 capsule 4    magnesium oxide (MAG-OX) 400 mg (241.3 mg magnesium) tablet Take 400 mg by mouth once daily.      ondansetron (ZOFRAN-ODT) 8 MG TbDL Dissolve 1 tablet (8 mg total) by mouth every 12 (twelve) hours as needed. 30 tablet 2    promethazine (PHENERGAN) 25 MG tablet Take 1 tablet (25 mg total) by mouth every 4 (four) hours as needed for Nausea. 60 tablet 2    sildenafil (VIAGRA) 50 MG tablet Take 1 tablet (50 mg total) by mouth daily as needed for Erectile Dysfunction. 7 tablet 2    spironolactone (ALDACTONE) 25 MG tablet Take 1 tablet (25 mg total) by mouth once daily. 90 tablet 3     No current facility-administered medications on file prior to visit.        ALLERGIES: Review of patient's allergies indicates:  No Known Allergies     ROS:       Review of Systems   Constitutional: Positive for fatigue. Negative for diaphoresis, fever and unexpected weight change.   HENT:   Negative for lump/mass and sore throat.    Eyes: Negative for icterus.   Respiratory: Negative for cough and shortness of breath.    Cardiovascular: Positive for leg swelling. Negative for chest pain and palpitations.   Gastrointestinal: Negative for  "abdominal distention, constipation, diarrhea, nausea and vomiting.   Genitourinary: Negative for dysuria and frequency.    Musculoskeletal: Negative for arthralgias, gait problem and myalgias.   Skin: Negative for rash.   Neurological: Positive for numbness. Negative for dizziness, gait problem and headaches.   Hematological: Negative for adenopathy. Does not bruise/bleed easily.   Psychiatric/Behavioral: The patient is not nervous/anxious.        PHYSICAL EXAM:  Vitals:    03/27/20 1102   Weight: 89.4 kg (197 lb)   Height: 5' 11" (1.803 m)     ECOG 1    NYHA Class II    Physical Exam   Head and neck visualized and without abnormality.     LAB:   Results for orders placed or performed in visit on 03/26/20   CBC auto differential   Result Value Ref Range    WBC 5.41 3.90 - 12.70 K/uL    RBC 3.10 (L) 4.60 - 6.20 M/uL    Hemoglobin 10.3 (L) 14.0 - 18.0 g/dL    Hematocrit 32.1 (L) 40.0 - 54.0 %    Mean Corpuscular Volume 104 (H) 82 - 98 fL    Mean Corpuscular Hemoglobin 33.2 (H) 27.0 - 31.0 pg    Mean Corpuscular Hemoglobin Conc 32.1 32.0 - 36.0 g/dL    RDW 15.5 (H) 11.5 - 14.5 %    Platelets 119 (L) 150 - 350 K/uL    MPV 10.3 9.2 - 12.9 fL    Immature Granulocytes 4.6 (H) 0.0 - 0.5 %    Gran # (ANC) 4.2 1.8 - 7.7 K/uL    Immature Grans (Abs) 0.25 (H) 0.00 - 0.04 K/uL    Lymph # 0.4 (L) 1.0 - 4.8 K/uL    Mono # 0.6 0.3 - 1.0 K/uL    Eos # 0.0 0.0 - 0.5 K/uL    Baso # 0.04 0.00 - 0.20 K/uL    nRBC 0 0 /100 WBC    Gran% 76.9 (H) 38.0 - 73.0 %    Lymph% 6.7 (L) 18.0 - 48.0 %    Mono% 10.9 4.0 - 15.0 %    Eosinophil% 0.2 0.0 - 8.0 %    Basophil% 0.7 0.0 - 1.9 %    Differential Method Automated    Comprehensive metabolic panel   Result Value Ref Range    Sodium 138 136 - 145 mmol/L    Potassium 4.5 3.5 - 5.1 mmol/L    Chloride 104 95 - 110 mmol/L    CO2 25 23 - 29 mmol/L    Glucose 107 70 - 110 mg/dL    BUN, Bld 38 (H) 6 - 20 mg/dL    Creatinine 1.6 (H) 0.5 - 1.4 mg/dL    Calcium 8.5 (L) 8.7 - 10.5 mg/dL    Total Protein 5.4 " (L) 6.0 - 8.4 g/dL    Albumin 2.5 (L) 3.5 - 5.2 g/dL    Total Bilirubin 1.0 0.1 - 1.0 mg/dL    Alkaline Phosphatase 141 (H) 55 - 135 U/L    AST 25 10 - 40 U/L    ALT 20 10 - 44 U/L    Anion Gap 9 8 - 16 mmol/L    eGFR if African American 56.0 (A) >60 mL/min/1.73 m^2    eGFR if non  48.5 (A) >60 mL/min/1.73 m^2       PROBLEMS ASSESSED THIS VISIT:    1. AL amyloidosis    2. Thrombocytopenia    3. Anemia associated with chemotherapy    4. Chronic diastolic congestive heart failure        PLAN:       AL amyloidosis  He is having excellent response to therapy thus far. We will begin Cycle 3 of CyBorD therapy today. Plan is for 4 cycles followed by maintenance bortezomib [id number] until we can advance to autologous stem cell transplant.     Thrombocytopenia  Mild. Monitor.     Anemia associated with chemotherapy  Mild. Monitor.     Chronic diastolic congestive heart failure  Continue bumetanide. He is compensated at this time.       Follow-up:   Please schedule labs on 4/1 (CBC, CMP) and chemo on 4/3. Labs on 4/7 (CBC, CMP, SPEP, BROOKS, free light chains, immunoglobulins). MD visit (virtual) and chemo on 4/9.     Chris Atkins MD  Hematology and Stem Cell Transplant      Distress Screening Results: Psychosocial Distress screening score of   noted and reviewed. No intervention indicated.     TELEMEDICINE DOCUMENTATION    The patient location is: home  The chief complaint leading to consultation is: AL amyloidosis   Visit type: Virtual visit with synchronous audio and video  Total time spent with patient: 40 minutes  Each patient to whom he or she provides medical services by telemedicine is:  (1) informed of the relationship between the physician and patient and the respective role of any other health care provider with respect to management of the patient; and (2) notified that he or she may decline to receive medical services by telemedicine and may withdraw from such care at any time.

## 2020-03-27 NOTE — Clinical Note
Please schedule labs on 4/1 (CBC, CMP) and chemo on 4/3. Labs on 4/7 (CBC, CMP, SPEP, BROOKS, free light chains, immunoglobulins). MD visit (virtual) and chemo on 4/9.

## 2020-03-30 ENCOUNTER — PATIENT MESSAGE (OUTPATIENT)
Dept: HEMATOLOGY/ONCOLOGY | Facility: CLINIC | Age: 55
End: 2020-03-30

## 2020-03-31 ENCOUNTER — PATIENT MESSAGE (OUTPATIENT)
Dept: HEMATOLOGY/ONCOLOGY | Facility: CLINIC | Age: 55
End: 2020-03-31

## 2020-03-31 NOTE — ASSESSMENT & PLAN NOTE
He is having excellent response to therapy thus far. We will begin Cycle 3 of CyBorD therapy today. Plan is for 4 cycles followed by maintenance bortezomib [id number] until we can advance to autologous stem cell transplant.

## 2020-04-01 ENCOUNTER — LAB VISIT (OUTPATIENT)
Dept: LAB | Facility: HOSPITAL | Age: 55
End: 2020-04-01
Payer: OTHER GOVERNMENT

## 2020-04-01 DIAGNOSIS — E85.81 AL AMYLOIDOSIS: ICD-10-CM

## 2020-04-01 LAB
ALBUMIN SERPL BCP-MCNC: 2.7 G/DL (ref 3.5–5.2)
ALP SERPL-CCNC: 142 U/L (ref 55–135)
ALT SERPL W/O P-5'-P-CCNC: 26 U/L (ref 10–44)
ANION GAP SERPL CALC-SCNC: 10 MMOL/L (ref 8–16)
ANISOCYTOSIS BLD QL SMEAR: SLIGHT
AST SERPL-CCNC: 24 U/L (ref 10–40)
BASOPHILS # BLD AUTO: ABNORMAL K/UL (ref 0–0.2)
BASOPHILS NFR BLD: 0 % (ref 0–1.9)
BILIRUB SERPL-MCNC: 1.1 MG/DL (ref 0.1–1)
BUN SERPL-MCNC: 43 MG/DL (ref 6–20)
CALCIUM SERPL-MCNC: 8.2 MG/DL (ref 8.7–10.5)
CHLORIDE SERPL-SCNC: 104 MMOL/L (ref 95–110)
CO2 SERPL-SCNC: 24 MMOL/L (ref 23–29)
CREAT SERPL-MCNC: 1.6 MG/DL (ref 0.5–1.4)
DACRYOCYTES BLD QL SMEAR: ABNORMAL
DIFFERENTIAL METHOD: ABNORMAL
EOSINOPHIL # BLD AUTO: ABNORMAL K/UL (ref 0–0.5)
EOSINOPHIL NFR BLD: 0 % (ref 0–8)
ERYTHROCYTE [DISTWIDTH] IN BLOOD BY AUTOMATED COUNT: 15.9 % (ref 11.5–14.5)
EST. GFR  (AFRICAN AMERICAN): 56 ML/MIN/1.73 M^2
EST. GFR  (NON AFRICAN AMERICAN): 48.5 ML/MIN/1.73 M^2
GLUCOSE SERPL-MCNC: 107 MG/DL (ref 70–110)
HCT VFR BLD AUTO: 31.9 % (ref 40–54)
HGB BLD-MCNC: 10.2 G/DL (ref 14–18)
HYPOCHROMIA BLD QL SMEAR: ABNORMAL
IMM GRANULOCYTES # BLD AUTO: ABNORMAL K/UL (ref 0–0.04)
IMM GRANULOCYTES NFR BLD AUTO: ABNORMAL % (ref 0–0.5)
LYMPHOCYTES # BLD AUTO: ABNORMAL K/UL (ref 1–4.8)
LYMPHOCYTES NFR BLD: 13 % (ref 18–48)
MCH RBC QN AUTO: 32.9 PG (ref 27–31)
MCHC RBC AUTO-ENTMCNC: 32 G/DL (ref 32–36)
MCV RBC AUTO: 103 FL (ref 82–98)
MONOCYTES # BLD AUTO: ABNORMAL K/UL (ref 0.3–1)
MONOCYTES NFR BLD: 11 % (ref 4–15)
NEUTROPHILS NFR BLD: 75 % (ref 38–73)
NEUTS BAND NFR BLD MANUAL: 1 %
NRBC BLD-RTO: 1 /100 WBC
OVALOCYTES BLD QL SMEAR: ABNORMAL
PLATELET # BLD AUTO: 95 K/UL (ref 150–350)
PLATELET BLD QL SMEAR: ABNORMAL
PMV BLD AUTO: 10.1 FL (ref 9.2–12.9)
POIKILOCYTOSIS BLD QL SMEAR: SLIGHT
POLYCHROMASIA BLD QL SMEAR: ABNORMAL
POTASSIUM SERPL-SCNC: 4.2 MMOL/L (ref 3.5–5.1)
PROT SERPL-MCNC: 5.7 G/DL (ref 6–8.4)
RBC # BLD AUTO: 3.1 M/UL (ref 4.6–6.2)
SODIUM SERPL-SCNC: 138 MMOL/L (ref 136–145)
WBC # BLD AUTO: 3.55 K/UL (ref 3.9–12.7)

## 2020-04-01 PROCEDURE — 85007 BL SMEAR W/DIFF WBC COUNT: CPT

## 2020-04-01 PROCEDURE — 85027 COMPLETE CBC AUTOMATED: CPT

## 2020-04-01 PROCEDURE — 80053 COMPREHEN METABOLIC PANEL: CPT

## 2020-04-02 ENCOUNTER — PATIENT MESSAGE (OUTPATIENT)
Dept: HEMATOLOGY/ONCOLOGY | Facility: CLINIC | Age: 55
End: 2020-04-02

## 2020-04-03 ENCOUNTER — INFUSION (OUTPATIENT)
Dept: INFUSION THERAPY | Facility: HOSPITAL | Age: 55
End: 2020-04-03
Payer: OTHER GOVERNMENT

## 2020-04-03 VITALS
DIASTOLIC BLOOD PRESSURE: 51 MMHG | RESPIRATION RATE: 18 BRPM | TEMPERATURE: 98 F | HEART RATE: 81 BPM | SYSTOLIC BLOOD PRESSURE: 95 MMHG

## 2020-04-03 DIAGNOSIS — E85.81 AL AMYLOIDOSIS: Primary | ICD-10-CM

## 2020-04-03 PROCEDURE — 63600175 PHARM REV CODE 636 W HCPCS: Mod: JW,JG | Performed by: INTERNAL MEDICINE

## 2020-04-03 PROCEDURE — 96401 CHEMO ANTI-NEOPL SQ/IM: CPT

## 2020-04-03 RX ORDER — SODIUM CHLORIDE 0.9 % (FLUSH) 0.9 %
10 SYRINGE (ML) INJECTION
Status: DISCONTINUED | OUTPATIENT
Start: 2020-04-03 | End: 2020-04-03 | Stop reason: HOSPADM

## 2020-04-03 RX ORDER — HEPARIN 100 UNIT/ML
500 SYRINGE INTRAVENOUS
Status: DISCONTINUED | OUTPATIENT
Start: 2020-04-03 | End: 2020-04-03 | Stop reason: HOSPADM

## 2020-04-03 RX ORDER — BORTEZOMIB 3.5 MG/1
1.3 INJECTION, POWDER, LYOPHILIZED, FOR SOLUTION INTRAVENOUS; SUBCUTANEOUS
Status: COMPLETED | OUTPATIENT
Start: 2020-04-03 | End: 2020-04-03

## 2020-04-03 RX ADMIN — BORTEZOMIB 2.8 MG: 3.5 INJECTION, POWDER, LYOPHILIZED, FOR SOLUTION INTRAVENOUS; SUBCUTANEOUS at 11:04

## 2020-04-07 ENCOUNTER — LAB VISIT (OUTPATIENT)
Dept: LAB | Facility: HOSPITAL | Age: 55
End: 2020-04-07
Payer: OTHER GOVERNMENT

## 2020-04-07 DIAGNOSIS — E85.81 AL AMYLOIDOSIS: ICD-10-CM

## 2020-04-07 LAB
ALBUMIN SERPL BCP-MCNC: 2.7 G/DL (ref 3.5–5.2)
ALP SERPL-CCNC: 131 U/L (ref 55–135)
ALT SERPL W/O P-5'-P-CCNC: 26 U/L (ref 10–44)
ANION GAP SERPL CALC-SCNC: 11 MMOL/L (ref 8–16)
ANISOCYTOSIS BLD QL SMEAR: SLIGHT
AST SERPL-CCNC: 25 U/L (ref 10–40)
BASO STIPL BLD QL SMEAR: ABNORMAL
BASOPHILS NFR BLD: 0 % (ref 0–1.9)
BILIRUB SERPL-MCNC: 0.8 MG/DL (ref 0.1–1)
BUN SERPL-MCNC: 50 MG/DL (ref 6–20)
CALCIUM SERPL-MCNC: 8.2 MG/DL (ref 8.7–10.5)
CHLORIDE SERPL-SCNC: 101 MMOL/L (ref 95–110)
CO2 SERPL-SCNC: 24 MMOL/L (ref 23–29)
CREAT SERPL-MCNC: 1.6 MG/DL (ref 0.5–1.4)
DIFFERENTIAL METHOD: ABNORMAL
EOSINOPHIL NFR BLD: 0 % (ref 0–8)
ERYTHROCYTE [DISTWIDTH] IN BLOOD BY AUTOMATED COUNT: 16.5 % (ref 11.5–14.5)
EST. GFR  (AFRICAN AMERICAN): 56 ML/MIN/1.73 M^2
EST. GFR  (NON AFRICAN AMERICAN): 48.5 ML/MIN/1.73 M^2
GLUCOSE SERPL-MCNC: 83 MG/DL (ref 70–110)
HCT VFR BLD AUTO: 30 % (ref 40–54)
HGB BLD-MCNC: 9.6 G/DL (ref 14–18)
IGA SERPL-MCNC: 19 MG/DL (ref 40–350)
IGG SERPL-MCNC: 247 MG/DL (ref 650–1600)
IGM SERPL-MCNC: 15 MG/DL (ref 50–300)
IMM GRANULOCYTES # BLD AUTO: ABNORMAL K/UL (ref 0–0.04)
IMM GRANULOCYTES NFR BLD AUTO: ABNORMAL % (ref 0–0.5)
LYMPHOCYTES NFR BLD: 5 % (ref 18–48)
MCH RBC QN AUTO: 33.3 PG (ref 27–31)
MCHC RBC AUTO-ENTMCNC: 32 G/DL (ref 32–36)
MCV RBC AUTO: 104 FL (ref 82–98)
METAMYELOCYTES NFR BLD MANUAL: 1 %
MONOCYTES NFR BLD: 14 % (ref 4–15)
MYELOCYTES NFR BLD MANUAL: 1 %
NEUTROPHILS NFR BLD: 79 % (ref 38–73)
NRBC BLD-RTO: 1 /100 WBC
PLATELET # BLD AUTO: 91 K/UL (ref 150–350)
PLATELET BLD QL SMEAR: ABNORMAL
PMV BLD AUTO: 10.5 FL (ref 9.2–12.9)
POLYCHROMASIA BLD QL SMEAR: ABNORMAL
POTASSIUM SERPL-SCNC: 4.3 MMOL/L (ref 3.5–5.1)
PROT SERPL-MCNC: 5.2 G/DL (ref 6–8.4)
RBC # BLD AUTO: 2.88 M/UL (ref 4.6–6.2)
SODIUM SERPL-SCNC: 136 MMOL/L (ref 136–145)
WBC # BLD AUTO: 3.95 K/UL (ref 3.9–12.7)

## 2020-04-07 PROCEDURE — 86334 IMMUNOFIX E-PHORESIS SERUM: CPT

## 2020-04-07 PROCEDURE — 85007 BL SMEAR W/DIFF WBC COUNT: CPT

## 2020-04-07 PROCEDURE — 84165 PROTEIN E-PHORESIS SERUM: CPT

## 2020-04-07 PROCEDURE — 82784 ASSAY IGA/IGD/IGG/IGM EACH: CPT | Mod: 59

## 2020-04-07 PROCEDURE — 86334 IMMUNOFIX E-PHORESIS SERUM: CPT | Mod: 26,,, | Performed by: PATHOLOGY

## 2020-04-07 PROCEDURE — 84165 PROTEIN E-PHORESIS SERUM: CPT | Mod: 26,,, | Performed by: PATHOLOGY

## 2020-04-07 PROCEDURE — 86334 PATHOLOGIST INTERPRETATION IFE: ICD-10-PCS | Mod: 26,,, | Performed by: PATHOLOGY

## 2020-04-07 PROCEDURE — 80053 COMPREHEN METABOLIC PANEL: CPT

## 2020-04-07 PROCEDURE — 83520 IMMUNOASSAY QUANT NOS NONAB: CPT | Mod: 59

## 2020-04-07 PROCEDURE — 85027 COMPLETE CBC AUTOMATED: CPT

## 2020-04-07 PROCEDURE — 84165 PATHOLOGIST INTERPRETATION SPE: ICD-10-PCS | Mod: 26,,, | Performed by: PATHOLOGY

## 2020-04-08 LAB
ALBUMIN SERPL ELPH-MCNC: 2.95 G/DL (ref 3.35–5.55)
ALPHA1 GLOB SERPL ELPH-MCNC: 0.43 G/DL (ref 0.17–0.41)
ALPHA2 GLOB SERPL ELPH-MCNC: 0.55 G/DL (ref 0.43–0.99)
B-GLOBULIN SERPL ELPH-MCNC: 0.62 G/DL (ref 0.5–1.1)
GAMMA GLOB SERPL ELPH-MCNC: 0.25 G/DL (ref 0.67–1.58)
INTERPRETATION SERPL IFE-IMP: NORMAL
KAPPA LC SER QL IA: 1.41 MG/DL (ref 0.33–1.94)
KAPPA LC/LAMBDA SER IA: 0.5 (ref 0.26–1.65)
LAMBDA LC SER QL IA: 2.82 MG/DL (ref 0.57–2.63)
PATHOLOGIST INTERPRETATION IFE: NORMAL
PATHOLOGIST INTERPRETATION SPE: NORMAL
PROT SERPL-MCNC: 4.8 G/DL (ref 6–8.4)

## 2020-04-09 ENCOUNTER — OFFICE VISIT (OUTPATIENT)
Dept: HEMATOLOGY/ONCOLOGY | Facility: CLINIC | Age: 55
End: 2020-04-09
Payer: OTHER GOVERNMENT

## 2020-04-09 ENCOUNTER — INFUSION (OUTPATIENT)
Dept: INFUSION THERAPY | Facility: HOSPITAL | Age: 55
End: 2020-04-09
Payer: OTHER GOVERNMENT

## 2020-04-09 VITALS
RESPIRATION RATE: 18 BRPM | TEMPERATURE: 98 F | HEART RATE: 86 BPM | SYSTOLIC BLOOD PRESSURE: 94 MMHG | DIASTOLIC BLOOD PRESSURE: 55 MMHG

## 2020-04-09 DIAGNOSIS — D64.81 ANEMIA ASSOCIATED WITH CHEMOTHERAPY: ICD-10-CM

## 2020-04-09 DIAGNOSIS — E85.81 AL AMYLOIDOSIS: Primary | ICD-10-CM

## 2020-04-09 DIAGNOSIS — T45.1X5A ANEMIA ASSOCIATED WITH CHEMOTHERAPY: ICD-10-CM

## 2020-04-09 PROCEDURE — 63600175 PHARM REV CODE 636 W HCPCS: Mod: JG | Performed by: INTERNAL MEDICINE

## 2020-04-09 PROCEDURE — 99213 OFFICE O/P EST LOW 20 MIN: CPT | Mod: 95,,, | Performed by: INTERNAL MEDICINE

## 2020-04-09 PROCEDURE — 96401 CHEMO ANTI-NEOPL SQ/IM: CPT

## 2020-04-09 PROCEDURE — 99213 PR OFFICE/OUTPT VISIT, EST, LEVL III, 20-29 MIN: ICD-10-PCS | Mod: 95,,, | Performed by: INTERNAL MEDICINE

## 2020-04-09 RX ORDER — HEPARIN 100 UNIT/ML
500 SYRINGE INTRAVENOUS
Status: DISCONTINUED | OUTPATIENT
Start: 2020-04-09 | End: 2020-04-09 | Stop reason: HOSPADM

## 2020-04-09 RX ORDER — SODIUM CHLORIDE 0.9 % (FLUSH) 0.9 %
10 SYRINGE (ML) INJECTION
Status: DISCONTINUED | OUTPATIENT
Start: 2020-04-09 | End: 2020-04-09 | Stop reason: HOSPADM

## 2020-04-09 RX ORDER — BORTEZOMIB 3.5 MG/1
1.3 INJECTION, POWDER, LYOPHILIZED, FOR SOLUTION INTRAVENOUS; SUBCUTANEOUS
Status: COMPLETED | OUTPATIENT
Start: 2020-04-09 | End: 2020-04-09

## 2020-04-09 RX ADMIN — BORTEZOMIB 2.8 MG: 3.5 INJECTION, POWDER, LYOPHILIZED, FOR SOLUTION INTRAVENOUS; SUBCUTANEOUS at 11:04

## 2020-04-09 NOTE — PROGRESS NOTES
HEMATOLOGIC MALIGNANCIES PROGRESS NOTE    IDENTIFYING STATEMENT   Roc Lai Jr. (Ovi) is a 54 y.o. male with a  of 1965 from [place] with the diagnosis of AL amyloid.      ONCOLOGY HISTORY:    1. AL amyloid with cardiac involvement   A. 2016: Initial evaluation with Dr. Carmichael. Reported 18 months of progressive decline (previously ran 3 miles easily, now can only walk 10 minutes). M-protein 0.19 g/dl, kappa 1.37 mg/dl, lambda 50 mg/dl, ratio 0.03. Abdominal fat pad biopsy had been negative for presence of amyloid. Cardiac MRI suggestive of infiltrative cardiomyopathy.    B. 1/3/2017: Endomyocardial biopsy - involved by AL amyloid   C. 2017: BMBx - 60% cellular marrow with 50% plasma cells, consistent with plasma cell myeloma.    D. 2/15/2017 - 2017: Completed 4 cycles bortezomib chemotherapy on [id number] trial.    E. 2017: Bortezomib every 2 weeks x 2 doses   F. 2017: Change bortezomib to [id number] x 2 doses   G. 10/9/2017: Bortezomib q6 weeks; kappa 1.6 mg/dl, lambda 0.93 mg/dl, ratio 1.72   H. 2018: Bortezomib q8 weeks.    I. 2018: [id number] close out visit due to ineffectiveness.     J. 2020: kappa 2.73 mg/dl, lambda 28.33 mg/dl, ratio (lambda:kappa) 10.38, concerning for progressive disease    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 Lai is seen in this telemedicine visit for follow-up of his AL amyloidosis. He reports the following:    Having restless leg on Friday night after chemo.  1.5 mile walk yesterday  Thinking about returning to work next week    Past Medical History, Past Social History and Past Family History have been reviewed and are unchanged except as noted in the interval history.    MEDICATIONS:     Current Outpatient Medications on File Prior to Visit    Medication Sig Dispense Refill    acyclovir (ZOVIRAX) 400 MG tablet Take 1 tablet (400 mg total) by mouth 2 (two) times daily. 180 tablet 3    aspirin (ECOTRIN) 81 MG EC tablet Take 1 tablet (81 mg total) by mouth once daily. 150 tablet 2    BORTEZOMIB (VELCADE INJ) Inject as directed. Every 2 months      bumetanide (BUMEX) 2 MG tablet TAKE 1 TABLET DAILY 90 tablet 4    cyclophosphamide (CYTOXAN) 50 mg capsule Take 13 capsules (650 mg total) by mouth every 7 days on days 1, 8, 15 and 22 of each 28 day cycle. Take with lots of water.. 54 capsule 0    dexAMETHasone (DECADRON) 4 MG Tab Take 5 tablets (20 mg total) by mouth every 7 days. On days 1, 8, 15, and 22 of each chemotherapy cycle. Take with food. 40 tablet 5    gabapentin (NEURONTIN) 100 MG capsule TAKE 3 CAPSULES EVERY EVENING 270 capsule 4    magnesium oxide (MAG-OX) 400 mg (241.3 mg magnesium) tablet Take 400 mg by mouth once daily.      ondansetron (ZOFRAN-ODT) 8 MG TbDL Dissolve 1 tablet (8 mg total) by mouth every 12 (twelve) hours as needed. 30 tablet 2    promethazine (PHENERGAN) 25 MG tablet Take 1 tablet (25 mg total) by mouth every 4 (four) hours as needed for Nausea. 60 tablet 2    sildenafil (VIAGRA) 50 MG tablet Take 1 tablet (50 mg total) by mouth daily as needed for Erectile Dysfunction. 7 tablet 2    spironolactone (ALDACTONE) 25 MG tablet Take 1 tablet (25 mg total) by mouth once daily. 90 tablet 3     No current facility-administered medications on file prior to visit.        ALLERGIES: Review of patient's allergies indicates:  No Known Allergies     ROS:       Review of Systems   Constitutional: Positive for fatigue. Negative for diaphoresis, fever and unexpected weight change.   HENT:   Negative for lump/mass and sore throat.    Eyes: Negative for icterus.   Respiratory: Negative for cough and shortness of breath.    Cardiovascular: Positive for leg swelling. Negative for chest pain and palpitations.   Gastrointestinal:  Negative for abdominal distention, constipation, diarrhea, nausea and vomiting.   Genitourinary: Negative for dysuria and frequency.    Musculoskeletal: Negative for arthralgias, gait problem and myalgias.   Skin: Negative for rash.   Neurological: Positive for numbness. Negative for dizziness, gait problem and headaches.   Hematological: Negative for adenopathy. Does not bruise/bleed easily.   Psychiatric/Behavioral: The patient is not nervous/anxious.        PHYSICAL EXAM:  There were no vitals filed for this visit.  ECOG 1    NYHA Class II    Physical Exam   Head and neck visualized and without abnormality.     LAB:   Results for orders placed or performed in visit on 04/07/20   Protein electrophoresis, serum   Result Value Ref Range    Protein, Serum 4.8 (L) 6.0 - 8.4 g/dL    Albumin grams/dl 2.95 (L) 3.35 - 5.55 g/dL    Alpha-1 grams/dl 0.43 (H) 0.17 - 0.41 g/dL    Alpha-2 grams/dl 0.55 0.43 - 0.99 g/dL    Beta grams/dl 0.62 0.50 - 1.10 g/dL    Gamma grams/dl 0.25 (L) 0.67 - 1.58 g/dL   Immunofixation electrophoresis   Result Value Ref Range    Immunofix Interp. SEE COMMENT    Immunoglobulins (IgG, IgA, IgM) Quantitative   Result Value Ref Range    IgG - Serum 247 (L) 650 - 1600 mg/dL    IgA 19 (L) 40 - 350 mg/dL    IgM 15 (L) 50 - 300 mg/dL   CBC auto differential   Result Value Ref Range    WBC 3.95 3.90 - 12.70 K/uL    RBC 2.88 (L) 4.60 - 6.20 M/uL    Hemoglobin 9.6 (L) 14.0 - 18.0 g/dL    Hematocrit 30.0 (L) 40.0 - 54.0 %    Mean Corpuscular Volume 104 (H) 82 - 98 fL    Mean Corpuscular Hemoglobin 33.3 (H) 27.0 - 31.0 pg    Mean Corpuscular Hemoglobin Conc 32.0 32.0 - 36.0 g/dL    RDW 16.5 (H) 11.5 - 14.5 %    Platelets 91 (L) 150 - 350 K/uL    MPV 10.5 9.2 - 12.9 fL    Immature Granulocytes Test Not Performed 0.0 - 0.5 %    Immature Grans (Abs) Test Not Performed 0.00 - 0.04 K/uL    nRBC 1 (A) 0 /100 WBC    Gran% 79.0 (H) 38.0 - 73.0 %    Lymph% 5.0 (L) 18.0 - 48.0 %    Mono% 14.0 4.0 - 15.0 %     Eosinophil% 0.0 0.0 - 8.0 %    Basophil% 0.0 0.0 - 1.9 %    Metamyelocytes 1.0 %    Myelocytes 1.0 %    Platelet Estimate Decreased (A)     Aniso Slight     Poly Occasional     Basophilic Stippling Occasional     Differential Method Manual    Comprehensive metabolic panel   Result Value Ref Range    Sodium 136 136 - 145 mmol/L    Potassium 4.3 3.5 - 5.1 mmol/L    Chloride 101 95 - 110 mmol/L    CO2 24 23 - 29 mmol/L    Glucose 83 70 - 110 mg/dL    BUN, Bld 50 (H) 6 - 20 mg/dL    Creatinine 1.6 (H) 0.5 - 1.4 mg/dL    Calcium 8.2 (L) 8.7 - 10.5 mg/dL    Total Protein 5.2 (L) 6.0 - 8.4 g/dL    Albumin 2.7 (L) 3.5 - 5.2 g/dL    Total Bilirubin 0.8 0.1 - 1.0 mg/dL    Alkaline Phosphatase 131 55 - 135 U/L    AST 25 10 - 40 U/L    ALT 26 10 - 44 U/L    Anion Gap 11 8 - 16 mmol/L    eGFR if African American 56.0 (A) >60 mL/min/1.73 m^2    eGFR if non  48.5 (A) >60 mL/min/1.73 m^2   Immunoglobulin free LT chains blood   Result Value Ref Range    Kappa Free Light Chains 1.41 0.33 - 1.94 mg/dL    Lambda Free Light Chains 2.82 (H) 0.57 - 2.63 mg/dL    Kappa/Lambda FLC Ratio 0.50 0.26 - 1.65   Pathologist Interpretation BROOKS   Result Value Ref Range    Pathologist Interpretation BROOKS REVIEWED    Pathologist Interpretation SPE   Result Value Ref Range    Pathologist Interpretation SPE REVIEWED        PROBLEMS ASSESSED THIS VISIT:    1. AL amyloidosis    2. Anemia associated with chemotherapy        PLAN:       AL amyloidosis  He is approaching complete remission with near normalization of lambda light chains. We will complete cycle 3 of CyBorD induction therapy and then provide maintenance bortezomib (to minimize toxicity) until we can consider autologous stem cell transplant.     Anemia associated with chemotherapy  Moderate. Monitor while on therapy. Likely related to cyclophosphamide.       Follow-up:   Please schedule labs (CBC, CMP) at 7AM on the following dates: 4/15, 4/29, 5/13. Please schedule labs (CBC,  CMP, SPEP, BROOKS, free light chains, immunoglobulins) on 5/27. Please schedule chemo injections on 4/17, 5/1, 5/15, 5/29. Please schedule follow-up visit on 5/29.     Chris Atkins MD  Hematology and Stem Cell Transplant      Distress Screening Results: Psychosocial Distress screening score of   noted and reviewed. No intervention indicated.     TELEMEDICINE DOCUMENTATION    The patient location is: home  The chief complaint leading to consultation is: AL amyloidosis   Visit type: Virtual visit with synchronous audio and video  Total time spent with patient: 15 minutes  Each patient to whom he or she provides medical services by telemedicine is:  (1) informed of the relationship between the physician and patient and the respective role of any other health care provider with respect to management of the patient; and (2) notified that he or she may decline to receive medical services by telemedicine and may withdraw from such care at any time.

## 2020-04-09 NOTE — Clinical Note
Please schedule labs (CBC, CMP) at 7AM on the following dates: 4/15, 4/29, 5/13. Please schedule labs (CBC, CMP, SPEP, BROOKS, free light chains, immunoglobulins) on 5/27. Please schedule chemo injections on 4/17, 5/1, 5/15, 5/29. Please schedule follow-up visit on 5/29.

## 2020-04-14 NOTE — ASSESSMENT & PLAN NOTE
He is approaching complete remission with near normalization of lambda light chains. We will complete cycle 3 of CyBorD induction therapy and then provide maintenance bortezomib (to minimize toxicity) until we can consider autologous stem cell transplant.

## 2020-04-15 ENCOUNTER — LAB VISIT (OUTPATIENT)
Dept: LAB | Facility: HOSPITAL | Age: 55
End: 2020-04-15
Payer: OTHER GOVERNMENT

## 2020-04-15 ENCOUNTER — LAB VISIT (OUTPATIENT)
Dept: INTERNAL MEDICINE | Facility: CLINIC | Age: 55
End: 2020-04-15
Payer: OTHER GOVERNMENT

## 2020-04-15 DIAGNOSIS — E85.81 AL AMYLOIDOSIS: ICD-10-CM

## 2020-04-15 DIAGNOSIS — Z13.9 SCREENING FOR CONDITION: Primary | ICD-10-CM

## 2020-04-15 DIAGNOSIS — Z13.9 SCREENING FOR CONDITION: ICD-10-CM

## 2020-04-15 LAB
ALBUMIN SERPL BCP-MCNC: 2.4 G/DL (ref 3.5–5.2)
ALP SERPL-CCNC: 122 U/L (ref 55–135)
ALT SERPL W/O P-5'-P-CCNC: 23 U/L (ref 10–44)
ANION GAP SERPL CALC-SCNC: 10 MMOL/L (ref 8–16)
ANISOCYTOSIS BLD QL SMEAR: SLIGHT
AST SERPL-CCNC: 24 U/L (ref 10–40)
BASOPHILS NFR BLD: 0 % (ref 0–1.9)
BILIRUB SERPL-MCNC: 0.6 MG/DL (ref 0.1–1)
BUN SERPL-MCNC: 38 MG/DL (ref 6–20)
CALCIUM SERPL-MCNC: 7.9 MG/DL (ref 8.7–10.5)
CHLORIDE SERPL-SCNC: 102 MMOL/L (ref 95–110)
CO2 SERPL-SCNC: 23 MMOL/L (ref 23–29)
CREAT SERPL-MCNC: 1.6 MG/DL (ref 0.5–1.4)
DIFFERENTIAL METHOD: ABNORMAL
EOSINOPHIL NFR BLD: 0 % (ref 0–8)
ERYTHROCYTE [DISTWIDTH] IN BLOOD BY AUTOMATED COUNT: 17.8 % (ref 11.5–14.5)
EST. GFR  (AFRICAN AMERICAN): 56 ML/MIN/1.73 M^2
EST. GFR  (NON AFRICAN AMERICAN): 48.5 ML/MIN/1.73 M^2
GLUCOSE SERPL-MCNC: 125 MG/DL (ref 70–110)
HCT VFR BLD AUTO: 28.6 % (ref 40–54)
HGB BLD-MCNC: 9.2 G/DL (ref 14–18)
HYPOCHROMIA BLD QL SMEAR: ABNORMAL
IMM GRANULOCYTES # BLD AUTO: ABNORMAL K/UL (ref 0–0.04)
IMM GRANULOCYTES NFR BLD AUTO: ABNORMAL % (ref 0–0.5)
LYMPHOCYTES NFR BLD: 8 % (ref 18–48)
MCH RBC QN AUTO: 34.3 PG (ref 27–31)
MCHC RBC AUTO-ENTMCNC: 32.2 G/DL (ref 32–36)
MCV RBC AUTO: 107 FL (ref 82–98)
METAMYELOCYTES NFR BLD MANUAL: 1 %
MONOCYTES NFR BLD: 6 % (ref 4–15)
MYELOCYTES NFR BLD MANUAL: 1 %
NEUTROPHILS NFR BLD: 83 % (ref 38–73)
NEUTS BAND NFR BLD MANUAL: 1 %
NRBC BLD-RTO: 1 /100 WBC
OVALOCYTES BLD QL SMEAR: ABNORMAL
PLATELET # BLD AUTO: 104 K/UL (ref 150–350)
PLATELET BLD QL SMEAR: ABNORMAL
PMV BLD AUTO: 10.7 FL (ref 9.2–12.9)
POIKILOCYTOSIS BLD QL SMEAR: SLIGHT
POLYCHROMASIA BLD QL SMEAR: ABNORMAL
POTASSIUM SERPL-SCNC: 5 MMOL/L (ref 3.5–5.1)
PROT SERPL-MCNC: 5 G/DL (ref 6–8.4)
RBC # BLD AUTO: 2.68 M/UL (ref 4.6–6.2)
SARS-COV-2 RNA RESP QL NAA+PROBE: NOT DETECTED
SODIUM SERPL-SCNC: 135 MMOL/L (ref 136–145)
WBC # BLD AUTO: 3.78 K/UL (ref 3.9–12.7)

## 2020-04-15 PROCEDURE — U0002 COVID-19 LAB TEST NON-CDC: HCPCS

## 2020-04-15 PROCEDURE — 85027 COMPLETE CBC AUTOMATED: CPT

## 2020-04-15 PROCEDURE — 85007 BL SMEAR W/DIFF WBC COUNT: CPT

## 2020-04-15 PROCEDURE — 80053 COMPREHEN METABOLIC PANEL: CPT

## 2020-04-15 NOTE — PROGRESS NOTES
04/15/2020      In an effort to protect our immunocompromised patients from potential exposure to COVID-19, [facility] will now require all patients receiving an infusion, an injection, and/or radiation therapy to be tested for COVID-19 prior to their appointment.  All patients currently under treatment will be tested immediately, and patients initiating new treatment cycles or with one-time appointments (injections, transfusions, etc.) must be tested within 72 hours of their appointment.     Placed COVID-19 test order for patient.  A member of our team is to contact the patient in the near future to explain this process and the rationale behind it, to ask the COVID-19 screening questions, and to get the patient scheduled for their COVID-19 test.     The above was completed in accordance with instructions and guidelines set forth by [facility].     Signed,    Amari Garner, TANA     Date:  04/15/2020

## 2020-04-16 ENCOUNTER — TELEPHONE (OUTPATIENT)
Dept: ADMINISTRATIVE | Facility: CLINIC | Age: 55
End: 2020-04-16

## 2020-04-16 NOTE — PROGRESS NOTES
Laura-  Please phone this patient with his negative COVID-19 test results following the scripting and protocol we have reviewed.  If my assistance is needed, don't hesitate to reach out.  Thanks!  -Amari Garner, DNP, APRN, FNP-BC, AOCNP  [facility], 3rd Floor  [facility] [address], [place], [place]  [id number] [phone]

## 2020-04-16 NOTE — TELEPHONE ENCOUNTER
04/16/2020    - COVID-19 testing Collection Date: 4/15/2020 Collection Time:   2:36 PM  - This result was communicated to the patient by Louise Mccann on 04/16/2020 as below.     The following was discussed with the patient via telephone call:  The test was NEGATIVE for COVID-19 (coronavirus), and based on those negative results, treatment at the cancer center can proceed as planned.  Questions were encouraged and answered to patient's satisfaction, and patient verbalized understanding of information and agreement with the plan.     Signed,    Louise Mccann   Symptom Patient's Response   Fever YES/NO: no   Cough YES/NO: no   Shortness of breath  YES/NO: no   Difficulty breathing YES/NO: no   GI symptoms such as diarrhea or nausea YES/NO: no   Loss of taste (treatment related) YES/NO: no   Loss of smell YES/NO: no     Other Screening Patient's Response   Has the patient previously undergone COVID-19 testing? YES/NO: no     If yes to the question directly above, what was the result? not applicable   Has the patient been in close contact with someone who has undergone COVID-19 testing? YES/NO: no     If yes to the question directly above, what was the result? not applicable

## 2020-04-17 ENCOUNTER — INFUSION (OUTPATIENT)
Dept: INFUSION THERAPY | Facility: HOSPITAL | Age: 55
End: 2020-04-17
Payer: OTHER GOVERNMENT

## 2020-04-17 VITALS
HEIGHT: 71 IN | TEMPERATURE: 97 F | WEIGHT: 210.31 LBS | SYSTOLIC BLOOD PRESSURE: 95 MMHG | RESPIRATION RATE: 17 BRPM | BODY MASS INDEX: 29.44 KG/M2 | DIASTOLIC BLOOD PRESSURE: 55 MMHG | HEART RATE: 87 BPM

## 2020-04-17 DIAGNOSIS — E85.81 AL AMYLOIDOSIS: Primary | ICD-10-CM

## 2020-04-17 PROCEDURE — 63600175 PHARM REV CODE 636 W HCPCS: Mod: JG | Performed by: INTERNAL MEDICINE

## 2020-04-17 PROCEDURE — 96401 CHEMO ANTI-NEOPL SQ/IM: CPT

## 2020-04-17 RX ORDER — BORTEZOMIB 3.5 MG/1
1.3 INJECTION, POWDER, LYOPHILIZED, FOR SOLUTION INTRAVENOUS; SUBCUTANEOUS
Status: COMPLETED | OUTPATIENT
Start: 2020-04-17 | End: 2020-04-17

## 2020-04-17 RX ADMIN — BORTEZOMIB 2.8 MG: 3.5 INJECTION, POWDER, LYOPHILIZED, FOR SOLUTION INTRAVENOUS; SUBCUTANEOUS at 08:04

## 2020-04-21 ENCOUNTER — PATIENT MESSAGE (OUTPATIENT)
Dept: TRANSPLANT | Facility: CLINIC | Age: 55
End: 2020-04-21

## 2020-04-21 DIAGNOSIS — E85.81 AL AMYLOIDOSIS: ICD-10-CM

## 2020-04-21 RX ORDER — CYCLOPHOSPHAMIDE 50 MG/1
300 CAPSULE ORAL
Qty: 54 CAPSULE | Refills: 0 | Status: CANCELLED | OUTPATIENT
Start: 2020-04-21

## 2020-04-23 ENCOUNTER — TELEPHONE (OUTPATIENT)
Dept: ENDOSCOPY | Facility: HOSPITAL | Age: 55
End: 2020-04-23

## 2020-04-23 DIAGNOSIS — E85.81 AL AMYLOIDOSIS: ICD-10-CM

## 2020-04-23 DIAGNOSIS — Z76.82 STEM CELL TRANSPLANT CANDIDATE: Primary | ICD-10-CM

## 2020-04-25 ENCOUNTER — LAB VISIT (OUTPATIENT)
Dept: INTERNAL MEDICINE | Facility: CLINIC | Age: 55
End: 2020-04-25
Payer: OTHER GOVERNMENT

## 2020-04-25 DIAGNOSIS — E85.81 AL AMYLOIDOSIS: ICD-10-CM

## 2020-04-25 DIAGNOSIS — Z76.82 STEM CELL TRANSPLANT CANDIDATE: ICD-10-CM

## 2020-04-25 LAB — SARS-COV-2 RNA RESP QL NAA+PROBE: NOT DETECTED

## 2020-04-25 PROCEDURE — U0002 COVID-19 LAB TEST NON-CDC: HCPCS

## 2020-04-26 ENCOUNTER — ANESTHESIA EVENT (OUTPATIENT)
Dept: ENDOSCOPY | Facility: HOSPITAL | Age: 55
End: 2020-04-26
Payer: OTHER GOVERNMENT

## 2020-04-27 ENCOUNTER — ANESTHESIA (OUTPATIENT)
Dept: ENDOSCOPY | Facility: HOSPITAL | Age: 55
End: 2020-04-27
Payer: OTHER GOVERNMENT

## 2020-04-27 ENCOUNTER — TELEPHONE (OUTPATIENT)
Dept: PHARMACY | Facility: CLINIC | Age: 55
End: 2020-04-27

## 2020-04-27 ENCOUNTER — HOSPITAL ENCOUNTER (OUTPATIENT)
Facility: HOSPITAL | Age: 55
Discharge: HOME | End: 2020-04-27
Attending: INTERNAL MEDICINE | Admitting: INTERNAL MEDICINE
Payer: OTHER GOVERNMENT

## 2020-04-27 VITALS
WEIGHT: 205 LBS | OXYGEN SATURATION: 97 % | BODY MASS INDEX: 28.7 KG/M2 | TEMPERATURE: 98 F | HEART RATE: 76 BPM | RESPIRATION RATE: 16 BRPM | HEIGHT: 71 IN | SYSTOLIC BLOOD PRESSURE: 96 MMHG | DIASTOLIC BLOOD PRESSURE: 58 MMHG

## 2020-04-27 DIAGNOSIS — Z12.11 SCREENING FOR COLON CANCER: ICD-10-CM

## 2020-04-27 DIAGNOSIS — Z12.11 COLON CANCER SCREENING: Primary | ICD-10-CM

## 2020-04-27 PROCEDURE — 27201089 HC SNARE, DISP (ANY): Performed by: INTERNAL MEDICINE

## 2020-04-27 PROCEDURE — D9220A PRA ANESTHESIA: ICD-10-PCS | Mod: 33,ANES,, | Performed by: ANESTHESIOLOGY

## 2020-04-27 PROCEDURE — 37000008 HC ANESTHESIA 1ST 15 MINUTES: Performed by: INTERNAL MEDICINE

## 2020-04-27 PROCEDURE — D9220A PRA ANESTHESIA: Mod: 33,CRNA,, | Performed by: NURSE ANESTHETIST, CERTIFIED REGISTERED

## 2020-04-27 PROCEDURE — 63600175 PHARM REV CODE 636 W HCPCS: Performed by: NURSE ANESTHETIST, CERTIFIED REGISTERED

## 2020-04-27 PROCEDURE — 45385 PR COLONOSCOPY,REMV LESN,SNARE: ICD-10-PCS | Mod: 33,,, | Performed by: INTERNAL MEDICINE

## 2020-04-27 PROCEDURE — 00811 ANES LWR INTST NDSC NOS: CPT | Performed by: INTERNAL MEDICINE

## 2020-04-27 PROCEDURE — D9220A PRA ANESTHESIA: Mod: 33,ANES,, | Performed by: ANESTHESIOLOGY

## 2020-04-27 PROCEDURE — 45385 COLONOSCOPY W/LESION REMOVAL: CPT | Mod: 33,,, | Performed by: INTERNAL MEDICINE

## 2020-04-27 PROCEDURE — 25000003 PHARM REV CODE 250: Performed by: NURSE ANESTHETIST, CERTIFIED REGISTERED

## 2020-04-27 PROCEDURE — 88305 TISSUE EXAM BY PATHOLOGIST: CPT | Performed by: PATHOLOGY

## 2020-04-27 PROCEDURE — 45385 COLONOSCOPY W/LESION REMOVAL: CPT | Performed by: INTERNAL MEDICINE

## 2020-04-27 PROCEDURE — 37000009 HC ANESTHESIA EA ADD 15 MINS: Performed by: INTERNAL MEDICINE

## 2020-04-27 PROCEDURE — D9220A PRA ANESTHESIA: ICD-10-PCS | Mod: 33,CRNA,, | Performed by: NURSE ANESTHETIST, CERTIFIED REGISTERED

## 2020-04-27 PROCEDURE — 88305 TISSUE EXAM BY PATHOLOGIST: ICD-10-PCS | Mod: 26,,, | Performed by: PATHOLOGY

## 2020-04-27 PROCEDURE — 88305 TISSUE EXAM BY PATHOLOGIST: CPT | Mod: 26,,, | Performed by: PATHOLOGY

## 2020-04-27 RX ORDER — PROPOFOL 10 MG/ML
VIAL (ML) INTRAVENOUS CONTINUOUS PRN
Status: DISCONTINUED | OUTPATIENT
Start: 2020-04-27 | End: 2020-04-27

## 2020-04-27 RX ORDER — PROPOFOL 10 MG/ML
VIAL (ML) INTRAVENOUS
Status: DISCONTINUED | OUTPATIENT
Start: 2020-04-27 | End: 2020-04-27

## 2020-04-27 RX ORDER — LIDOCAINE HYDROCHLORIDE 20 MG/ML
INJECTION INTRAVENOUS
Status: DISCONTINUED | OUTPATIENT
Start: 2020-04-27 | End: 2020-04-27

## 2020-04-27 RX ORDER — SODIUM CHLORIDE 9 MG/ML
INJECTION, SOLUTION INTRAVENOUS CONTINUOUS PRN
Status: DISCONTINUED | OUTPATIENT
Start: 2020-04-27 | End: 2020-04-27

## 2020-04-27 RX ADMIN — SODIUM CHLORIDE: 0.9 INJECTION, SOLUTION INTRAVENOUS at 07:04

## 2020-04-27 RX ADMIN — LIDOCAINE HYDROCHLORIDE 100 MG: 20 INJECTION, SOLUTION INTRAVENOUS at 07:04

## 2020-04-27 RX ADMIN — PROPOFOL 150 MCG/KG/MIN: 10 INJECTION, EMULSION INTRAVENOUS at 07:04

## 2020-04-27 RX ADMIN — PROPOFOL 80 MG: 10 INJECTION, EMULSION INTRAVENOUS at 07:04

## 2020-04-27 NOTE — [employer] PATIENT INSTRUCTIONS
Discharge Summary/Instructions after an Endoscopic Procedure  Patient Name: Roc Lai  Patient MRN: [id number]  Patient YOB: 1965 Monday, April 27, 2020  Sal Darby MD  RESTRICTIONS:  During your procedure today, you received medications for sedation.  These   medications may affect your judgment, balance and coordination.  Therefore,   for 24 hours, you have the following restrictions:   - DO NOT drive a car, operate machinery, make legal/financial decisions,   sign important papers or drink alcohol.    ACTIVITY:  Today: no heavy lifting, straining or running due to procedural   sedation/anesthesia.  The following day: return to full activity including work.  DIET:  Eat and drink normally unless instructed otherwise.     TREATMENT FOR COMMON SIDE EFFECTS:  - Mild abdominal pain, nausea, belching, bloating or excessive gas:  rest,   eat lightly and use a heating pad.  - Sore Throat: treat with throat lozenges and/or gargle with warm salt   water.  - Because air was used during the procedure, expelling large amounts of air   from your rectum or belching is normal.  - If a bowel prep was taken, you may not have a bowel movement for 1-3 days.    This is normal.  SYMPTOMS TO WATCH FOR AND REPORT TO YOUR PHYSICIAN:  1. Abdominal pain or bloating, other than gas cramps.  2. Chest pain.  3. Back pain.  4. Signs of infection such as: chills or fever occurring within 24 hours   after the procedure.  5. Rectal bleeding, which would show as bright red, maroon, or black stools.   (A tablespoon of blood from the rectum is not serious, especially if   hemorrhoids are present.)  6. Vomiting.  7. Weakness or dizziness.  GO DIRECTLY TO THE NEAREST EMERGENCY ROOM IF YOU HAVE ANY OF THE FOLLOWING:      Difficulty breathing              Chills and/or fever over 101 F   Persistent vomiting and/or vomiting blood   Severe abdominal pain   Severe chest pain   Black, tarry stools   Bleeding- more than one  tablespoon   Any other symptom or condition that you feel may need urgent attention  Your doctor recommends these additional instructions:  If any biopsies were taken, your doctors clinic will contact you in 1 to 2   weeks with any results.  - Discharge patient to home.   - Resume previous diet today.   - Continue present medications.   - Await pathology results.   - Repeat colonoscopy in 10 years for screening purposes.   - Return to referring physician as previously scheduled.   - Further evaluation as needed based on the results of the collected   material.  - Patient has a contact number available for emergencies.  The signs and   symptoms of potential delayed complications were discussed with the   patient.  Return to normal activities tomorrow.  Written discharge   instructions were provided to the patient.  For questions, problems or results please call your physician - Sal Darby MD at Work:  [phone].  [facility] [place], EMERGENCY ROOM PHONE NUMBER: [phone]  IF A COMPLICATION OR EMERGENCY SITUATION ARISES AND YOU ARE UNABLE TO REACH   YOUR PHYSICIAN - GO DIRECTLY TO THE EMERGENCY ROOM.  Sal Darby MD  4/27/2020 8:16:53 AM  This report has been verified and signed electronically.  [employer]

## 2020-04-27 NOTE — H&P
Short Stay Endoscopy History and Physical    PCP - Chon Harmon MD    Procedure - Colonoscopy  ASA - per anesthesia  Mallampati - per anesthesia  History of Anesthesia problems - no  Family history Anesthesia problems - no   Plan of anesthesia - General    HPI:  54 year old male with AL amyloidosis with cardiac involvement who presents for screening colonoscopy.    Complete entire prep without any issues.  No prior colonoscopy or abdominal surgery.  No family history of colon cancer or advance polyps.  Not on AC, takes a baby ASA a day.      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    Surgical History:  has a past surgical history that includes Knee surgery (2014); Back surgery (1996); Mouth surgery (2014); Radiofrequency ablation (9/8/15); ASD repair; Shoulder surgery; and Bone marrow biopsy (Right, 1/23/2020).    Family History: family history includes Alcohol abuse in his father; Hypertension in his mother.. Otherwise no colon cancer, inflammatory bowel disease, or GI malignancies.    Social History:  reports that he quit smoking about 26 years ago. He has never used smokeless tobacco. He reports that he drinks alcohol. He reports that he does not use drugs.    Review of patient's allergies indicates:  No Known Allergies    Medications:   Medications Prior to Admission   Medication Sig Dispense Refill Last Dose    acyclovir (ZOVIRAX) 400 MG tablet Take 1 tablet (400 mg total) by mouth 2 (two) times daily. 180 tablet 3 Past Month at Unknown time    aspirin (ECOTRIN) 81 MG  EC tablet Take 1 tablet (81 mg total) by mouth once daily. 150 tablet 2 4/26/2020 at Unknown time    BORTEZOMIB (VELCADE INJ) Inject as directed. Every 2 months   Past Month at Unknown time    bumetanide (BUMEX) 2 MG tablet TAKE 1 TABLET DAILY 90 tablet 4 4/27/2020 at Unknown time    gabapentin (NEURONTIN) 100 MG capsule TAKE 3 CAPSULES EVERY EVENING 270 capsule 4 4/26/2020 at Unknown time    magnesium oxide (MAG-OX) 400 mg (241.3 mg magnesium) tablet Take 400 mg by mouth once daily.   4/26/2020 at Unknown time    spironolactone (ALDACTONE) 25 MG tablet Take 1 tablet (25 mg total) by mouth once daily. 90 tablet 3 4/27/2020 at Unknown time    cyclophosphamide (CYTOXAN) 50 mg capsule Take 13 capsules (650 mg total) by mouth every 7 days on days 1, 8, 15 and 22 of each 28 day cycle. Take with lots of water.. 54 capsule 0 Taking    dexAMETHasone (DECADRON) 4 MG Tab Take 5 tablets (20 mg total) by mouth every 7 days. On days 1, 8, 15, and 22 of each chemotherapy cycle. Take with food. 40 tablet 5 Taking    ondansetron (ZOFRAN-ODT) 8 MG TbDL Dissolve 1 tablet (8 mg total) by mouth every 12 (twelve) hours as needed. 30 tablet 2 More than a month at Unknown time    promethazine (PHENERGAN) 25 MG tablet Take 1 tablet (25 mg total) by mouth every 4 (four) hours as needed for Nausea. 60 tablet 2 More than a month at Unknown time    sildenafil (VIAGRA) 50 MG tablet Take 1 tablet (50 mg total) by mouth daily as needed for Erectile Dysfunction. 7 tablet 2 Taking         Physical Exam:    Vital Signs:   Vitals:    04/27/20 0710   BP: 102/67   Pulse: 91   Resp: 16   Temp: 97.9 °F (36.6 °C)       General Appearance: Well appearing in no acute distress  Eyes:    No scleral icterus  ENT: Neck supple, Lips, mucosa, and tongue normal; teeth and gums normal  Lungs: CTA bilaterally  Heart:  S1, S2 normal, no murmurs heard  Abdomen: Soft, non tender, non distended with positive bowel sounds. No hepatosplenomegaly, ascites, or  mass.  Extremities: 2+ pulses, no clubbing, cyanosis or edema  Skin: No rash      Labs:  Lab Results   Component Value Date    WBC 3.78 (L) 04/15/2020    HGB 9.2 (L) 04/15/2020    HCT 28.6 (L) 04/15/2020     (L) 04/15/2020    CHOL 142 11/28/2016    TRIG 53 11/28/2016    HDL 43 11/28/2016    ALT 23 04/15/2020    AST 24 04/15/2020     (L) 04/15/2020    K 5.0 04/15/2020     04/15/2020    CREATININE 1.6 (H) 04/15/2020    BUN 38 (H) 04/15/2020    CO2 23 04/15/2020    PSA 0.17 03/13/2020    INR 1.1 01/03/2017    HGBA1C 5.6 11/28/2016       I have explained the risks and benefits of endoscopy procedures to the patient including but not limited to bleeding, perforation, infection, and death.      Sushma Clayton M.D.  Gastroenterology Fellow, PGY-VI  Pager: [phone]  [facility]

## 2020-04-27 NOTE — ANESTHESIA POSTPROCEDURE EVALUATION
Anesthesia Post Evaluation    Patient: Roc Lai Jr.    Procedure(s) Performed: Procedure(s) (LRB):  COLONOSCOPY (N/A)    Final Anesthesia Type: general    Patient location during evaluation: PACU  Patient participation: Yes- Able to Participate  Level of consciousness: awake and alert  Post-procedure vital signs: reviewed and stable  Pain management: adequate  Airway patency: patent    PONV status at discharge: No PONV  Anesthetic complications: no      Cardiovascular status: blood pressure returned to baseline  Respiratory status: unassisted  Hydration status: euvolemic  Follow-up not needed.          Vitals Value Taken Time   BP 96/58 4/27/2020  9:00 AM   Temp 36.7 °C (98 °F) 4/27/2020  8:24 AM   Pulse 76 4/27/2020  9:00 AM   Resp 16 4/27/2020  9:00 AM   SpO2 97 % 4/27/2020  9:00 AM         Event Time     Out of Recovery 08:53:08          Pain/Mami Score: Mami Score: 10 (4/27/2020  8:35 AM)

## 2020-04-27 NOTE — ANESTHESIA PREPROCEDURE EVALUATION
04/27/2020  Roc Lai Jr. is a 54 y.o., male.  Pre-operative evaluation for Procedure(s) (LRB):  COLONOSCOPY (N/A)    Roc Lai Jr. is a 54 y.o. male with amyloidosis preop testing for possible transplant. Adequate exercise tolerance. No anesthetic complications in past.     LDA:     Prev airway:     Drips:     Patient Active Problem List   Diagnosis    Typical atrial flutter s/p ablation and restoration sinus rhythm 2016    Abnormal stress test    ASD (atrial septal defect), ostium secundum s/p closure    Infiltrative cardiomyopathy with elevated troponin but no CAD felt due to infiltrative CM;1/24/17 supplemented report from [facility] + AL amyloid within heart    Elevated troponin with no CAD felt due to infiltrative CM 1/24/17 supplemented report from [facility] + AL amyloid within heart    Pleural effusion, right    Chronic diastolic congestive heart failure    Monoclonal gammopathy    AL amyloidosis    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      Current Outpatient Medications on File Prior to Encounter   Medication Sig Dispense Refill    acyclovir (ZOVIRAX) 400 MG tablet Take 1 tablet (400 mg total) by mouth 2 (two) times daily. 180 tablet 3    aspirin (ECOTRIN) 81 MG EC tablet Take 1 tablet (81 mg total) by mouth once daily. 150 tablet 2    BORTEZOMIB (VELCADE INJ) Inject as directed. Every 2 months      bumetanide (BUMEX) 2 MG tablet TAKE 1 TABLET DAILY 90 tablet 4    dexAMETHasone (DECADRON) 4 MG Tab Take 5 tablets (20 mg total) by mouth every 7 days. On days 1, 8, 15, and 22 of each chemotherapy cycle. Take with food. 40 tablet 5     gabapentin (NEURONTIN) 100 MG capsule TAKE 3 CAPSULES EVERY EVENING 270 capsule 4    magnesium oxide (MAG-OX) 400 mg (241.3 mg magnesium) tablet Take 400 mg by mouth once daily.      ondansetron (ZOFRAN-ODT) 8 MG TbDL Dissolve 1 tablet (8 mg total) by mouth every 12 (twelve) hours as needed. 30 tablet 2    promethazine (PHENERGAN) 25 MG tablet Take 1 tablet (25 mg total) by mouth every 4 (four) hours as needed for Nausea. 60 tablet 2    sildenafil (VIAGRA) 50 MG tablet Take 1 tablet (50 mg total) by mouth daily as needed for Erectile Dysfunction. 7 tablet 2    spironolactone (ALDACTONE) 25 MG tablet Take 1 tablet (25 mg total) by mouth once daily. 90 tablet 3       Past Surgical History:   Procedure Laterality Date    ASD REPAIR      BACK SURGERY  1996    Lower lunbar lamenectomy    BONE MARROW BIOPSY Right 1/23/2020    Procedure: Biopsy-bone marrow;  Surgeon: Chris Atkins MD;  Location: [facility] OR [address] [place] [address];  Service: Oncology;  Laterality: Right;    KNEE SURGERY  2014    Rt knee, torn meniscus    MOUTH SURGERY  2014    implant dental    RADIOFREQUENCY ABLATION  9/8/15    Atrial flutter ablation    SHOULDER SURGERY               Anesthesia Evaluation    I have reviewed the Patient Summary Reports.     I have reviewed the Nursing Notes.   I have reviewed the Medications.     Review of Systems  Anesthesia Hx:  No problems with previous Anesthesia    Social:  Non-Smoker    Hematology/Oncology:  Hematology Normal   Oncology Normal     EENT/Dental:EENT/Dental Normal   Pulmonary:  Pulmonary Normal    Hepatic/GI:  Hepatic/GI Normal    Musculoskeletal:  Musculoskeletal Normal    Endocrine:  Endocrine Normal    Dermatological:  Skin Normal    Psych:  Psychiatric Normal           Physical Exam  General:  Obesity, Well nourished    Airway/Jaw/Neck:  Airway Findings: Mouth Opening: Normal Tongue: Normal  General Airway Assessment: Adult  Mallampati: II  Improves to II with phonation.  TM Distance: Normal, at least 6  cm      Dental:  Dental Findings: In tact        Mental Status:  Mental Status Findings:  Cooperative, Alert and Oriented         Anesthesia Plan  Type of Anesthesia, risks & benefits discussed:  Anesthesia Type:  general  Patient's Preference:   Intra-op Monitoring Plan: standard ASA monitors  Intra-op Monitoring Plan Comments:   Post Op Pain Control Plan:   Post Op Pain Control Plan Comments:   Induction:   IV  Beta Blocker:         Informed Consent: Patient understands risks and agrees with Anesthesia plan.  Questions answered. Anesthesia consent signed with patient.  ASA Score: 3     Day of Surgery Review of History & Physical:            Ready For Surgery From Anesthesia Perspective.

## 2020-04-27 NOTE — DISCHARGE INSTRUCTIONS
Colonoscopy     A camera attached to a flexible tube with a viewing lens is used to take video pictures.     Colonoscopy is a test to view the inside of your lower digestive tract (colon and rectum). Sometimes it can show the last part of the small intestine (ileum). During the test, small pieces of tissue may be removed for testing. This is called a biopsy. Small growths, such as polyps, may also be removed.   Why is colonoscopy done?  The test is done to help look for colon cancer. And it can help find the source of abdominal pain, bleeding, and changes in bowel habits. It may be needed once a year, depending on factors such as your:  · Age  · Health history  · Family health history  · Symptoms  · Results from any prior colonoscopy  Risks and possible complications  These include:  · Bleeding               · A puncture or tear in the colon   · Risks of anesthesia  · A cancer lesion not being seen  Getting ready   To prepare for the test:  · Talk with your healthcare provider about the risks of the test (see below). Also ask your healthcare provider about alternatives to the test.  · Tell your healthcare provider about any medicines you take. Also tell him or her about any health conditions you may have.  · Make sure your rectum and colon are empty for the test. Follow the diet and bowel prep instructions exactly. If you dont, the test may need to be rescheduled.  · Plan for a friend or family member to drive you home after the test.     Colonoscopy provides an inside view of the entire colon.     You may discuss the results with your doctor right away or at a future visit.  During the test   The test is usually done in the hospital on an outpatient basis. This means you go home the same day. The procedure takes about 30 minutes. During that time:  · You are given relaxing (sedating) medicine through an IV line. You may be drowsy, or fully asleep.  · The healthcare provider will first give you a physical exam to  check for anal and rectal problems.  · Then the anus is lubricated and the scope inserted.  · If you are awake, you may have a feeling similar to needing to have a bowel movement. You may also feel pressure as air is pumped into the colon. Its OK to pass gas during the procedure.  · Biopsy, polyp removal, or other treatments may be done during the test.  After the test   You may have gas right after the test. It can help to try to pass it to help prevent later bloating. Your healthcare provider may discuss the results with you right away. Or you may need to schedule a follow-up visit to talk about the results. After the test, you can go back to your normal eating and other activities. You may be tired from the sedation and need to rest for a few hours.  Date Last Reviewed: 11/1/2016 © 2000-2017 [employer] "[employer]. [address] [place] [address], [place] [address]. All rights reserved. This information is not intended as a substitute for professional medical care. Always follow your healthcare professional's instructions.

## 2020-04-27 NOTE — TELEPHONE ENCOUNTER
Spoke with Mr. Lai regarding discontinuation of cyclophosphamide.  Patient confirmed he does not have any tablets remaining at this time.  Will close out of OSP.

## 2020-04-27 NOTE — TRANSFER OF CARE
"Anesthesia Transfer of Care Note    Patient: Roc Lai Jr.    Procedure(s) Performed: Procedure(s) (LRB):  COLONOSCOPY (N/A)    Patient location: PACU    Anesthesia Type: general    Transport from OR: Transported from OR on 6-10 L/min O2 by face mask with adequate spontaneous ventilation    Post pain: adequate analgesia    Post assessment: no apparent anesthetic complications and tolerated procedure well    Post vital signs: stable    Level of consciousness: responds to stimulation and sedated    Nausea/Vomiting: no nausea/vomiting    Complications: none    Transfer of care protocol was followed      Last vitals:   Visit Vitals  /67 (BP Location: Left arm, Patient Position: Lying)   Pulse 91   Temp 36.6 °C (97.9 °F) (Temporal)   Resp 16   Ht 5' 11" (1.803 m)   Wt 93 kg (205 lb)   SpO2 98%   BMI 28.59 kg/m²     "

## 2020-04-28 LAB
FINAL PATHOLOGIC DIAGNOSIS: NORMAL
GROSS: NORMAL

## 2020-04-29 ENCOUNTER — PATIENT MESSAGE (OUTPATIENT)
Dept: GASTROENTEROLOGY | Facility: CLINIC | Age: 55
End: 2020-04-29

## 2020-05-01 ENCOUNTER — INFUSION (OUTPATIENT)
Dept: INFUSION THERAPY | Facility: HOSPITAL | Age: 55
End: 2020-05-01
Payer: OTHER GOVERNMENT

## 2020-05-01 VITALS
DIASTOLIC BLOOD PRESSURE: 63 MMHG | HEART RATE: 87 BPM | TEMPERATURE: 98 F | SYSTOLIC BLOOD PRESSURE: 102 MMHG | BODY MASS INDEX: 28.67 KG/M2 | HEIGHT: 71 IN | WEIGHT: 204.81 LBS | RESPIRATION RATE: 18 BRPM

## 2020-05-01 DIAGNOSIS — E85.81 AL AMYLOIDOSIS: Primary | ICD-10-CM

## 2020-05-01 PROCEDURE — 96401 CHEMO ANTI-NEOPL SQ/IM: CPT

## 2020-05-01 PROCEDURE — 63600175 PHARM REV CODE 636 W HCPCS: Mod: JG | Performed by: INTERNAL MEDICINE

## 2020-05-01 RX ORDER — BORTEZOMIB 3.5 MG/1
1.3 INJECTION, POWDER, LYOPHILIZED, FOR SOLUTION INTRAVENOUS; SUBCUTANEOUS
Status: CANCELLED | OUTPATIENT
Start: 2020-05-08

## 2020-05-01 RX ORDER — BORTEZOMIB 3.5 MG/1
1.3 INJECTION, POWDER, LYOPHILIZED, FOR SOLUTION INTRAVENOUS; SUBCUTANEOUS
Status: COMPLETED | OUTPATIENT
Start: 2020-05-01 | End: 2020-05-01

## 2020-05-01 RX ORDER — HEPARIN 100 UNIT/ML
500 SYRINGE INTRAVENOUS
Status: CANCELLED | OUTPATIENT
Start: 2020-05-01

## 2020-05-01 RX ORDER — HEPARIN 100 UNIT/ML
500 SYRINGE INTRAVENOUS
Status: CANCELLED | OUTPATIENT
Start: 2020-05-08

## 2020-05-01 RX ORDER — SODIUM CHLORIDE 0.9 % (FLUSH) 0.9 %
10 SYRINGE (ML) INJECTION
Status: CANCELLED | OUTPATIENT
Start: 2020-05-01

## 2020-05-01 RX ORDER — SODIUM CHLORIDE 0.9 % (FLUSH) 0.9 %
10 SYRINGE (ML) INJECTION
Status: CANCELLED | OUTPATIENT
Start: 2020-05-29

## 2020-05-01 RX ORDER — SODIUM CHLORIDE 0.9 % (FLUSH) 0.9 %
10 SYRINGE (ML) INJECTION
Status: CANCELLED | OUTPATIENT
Start: 2020-05-23

## 2020-05-01 RX ORDER — HEPARIN 100 UNIT/ML
500 SYRINGE INTRAVENOUS
Status: CANCELLED | OUTPATIENT
Start: 2020-05-29

## 2020-05-01 RX ORDER — BORTEZOMIB 3.5 MG/1
1.3 INJECTION, POWDER, LYOPHILIZED, FOR SOLUTION INTRAVENOUS; SUBCUTANEOUS
Status: CANCELLED | OUTPATIENT
Start: 2020-05-29

## 2020-05-01 RX ORDER — HEPARIN 100 UNIT/ML
500 SYRINGE INTRAVENOUS
Status: CANCELLED | OUTPATIENT
Start: 2020-05-23

## 2020-05-01 RX ORDER — SODIUM CHLORIDE 0.9 % (FLUSH) 0.9 %
10 SYRINGE (ML) INJECTION
Status: CANCELLED | OUTPATIENT
Start: 2020-05-08

## 2020-05-01 RX ORDER — BORTEZOMIB 3.5 MG/1
1.3 INJECTION, POWDER, LYOPHILIZED, FOR SOLUTION INTRAVENOUS; SUBCUTANEOUS
Status: CANCELLED | OUTPATIENT
Start: 2020-05-23

## 2020-05-01 RX ADMIN — BORTEZOMIB 2.8 MG: 3.5 INJECTION, POWDER, LYOPHILIZED, FOR SOLUTION INTRAVENOUS; SUBCUTANEOUS at 08:05

## 2020-05-01 NOTE — NURSING
Pt tolerated Velcade injection in the abdomen today. VSS. NAD. declined AVS. Uses my [facility]. Discharged home. Ambulated independently.

## 2020-05-04 ENCOUNTER — PATIENT MESSAGE (OUTPATIENT)
Dept: HEMATOLOGY/ONCOLOGY | Facility: CLINIC | Age: 55
End: 2020-05-04

## 2020-05-05 ENCOUNTER — PATIENT MESSAGE (OUTPATIENT)
Dept: TRANSPLANT | Facility: CLINIC | Age: 55
End: 2020-05-05

## 2020-05-05 ENCOUNTER — OFFICE VISIT (OUTPATIENT)
Dept: TRANSPLANT | Facility: CLINIC | Age: 55
End: 2020-05-05
Payer: OTHER GOVERNMENT

## 2020-05-05 DIAGNOSIS — I50.32 CHRONIC DIASTOLIC CONGESTIVE HEART FAILURE: Primary | ICD-10-CM

## 2020-05-05 DIAGNOSIS — E85.81 AL AMYLOIDOSIS: ICD-10-CM

## 2020-05-05 DIAGNOSIS — N18.30 CKD (CHRONIC KIDNEY DISEASE), STAGE III: ICD-10-CM

## 2020-05-05 PROCEDURE — 99214 PR OFFICE/OUTPT VISIT, EST, LEVL IV, 30-39 MIN: ICD-10-PCS | Mod: 95,,, | Performed by: INTERNAL MEDICINE

## 2020-05-05 PROCEDURE — 99214 OFFICE O/P EST MOD 30 MIN: CPT | Mod: 95,,, | Performed by: INTERNAL MEDICINE

## 2020-05-05 RX ORDER — BUMETANIDE 2 MG/1
2 TABLET ORAL 2 TIMES DAILY
Qty: 90 TABLET | Refills: 4
Start: 2020-05-05 | End: 2020-06-09

## 2020-05-05 NOTE — PROGRESS NOTES
Subjective:       The patient location is: home  The chief complaint leading to consultation is: follow-up for cardiac amyloid  Visit type: audiovisual  Total time spent with patient: 14 min  Each patient to whom he or she provides medical services by telemedicine is:  (1) informed of the relationship between the physician and patient and the respective role of any other health care provider with respect to management of the patient; and (2) notified that he or she may decline to receive medical services by telemedicine and may withdraw from such care at any time.    HPI:  Mr. Lai is a very pleasant 53 yo WM with AL and myocardial amyloidosis who usually follows with my partner Dr. Lao. This is an urgent visit for worsening lower extremity swelling. He is followed by Dr. Atkins. His treatment includes; velcade injection every 2 months and also recently completed a course of cyclophosphamide and steroids.  Reports legs swelling and swelling in his abdomen. NYHA class II-III symptoms. No PND or orthopnea.  No recent HF admissions. Current regimen inludes Bumex 2 mg daily and aldactone 25 mg daily. Last Echo done in 3/2019 showed preserved LV function with an EF=55% with elevated CVP.      Past Medical History:   Diagnosis Date    Anticoagulant long-term use     Aspirin therapy    Basal cell carcinoma     Cardiomyopathy 12/1/2016    Chronic diastolic congestive heart failure 12/6/2016    Coronary artery disease     recent Artial Flutter diagnosed    Smoldering multiple myeloma 7/30/2019    Typical atrial flutter s/p ablation and restoration sinus rhythm 2016 7/28/2015     Past Surgical History:   Procedure Laterality Date    ASD REPAIR      BACK SURGERY  1996    Lower lunbar lamenectomy    BONE MARROW BIOPSY Right 1/23/2020    Procedure: Biopsy-bone marrow;  Surgeon: Chris Atkins MD;  Location: [facility] OR [address] [place] [address];  Service: Oncology;  Laterality: Right;    COLONOSCOPY N/A 4/27/2020    Procedure:  "COLONOSCOPY;  Surgeon: Sal Darby MD;  Location: [facility] ([address] [place] [address]);  Service: Endoscopy;  Laterality: N/A;  chf/cardiomyopathy-tb  Per Dr. Darby, "high priority" r/s - ERW  Covid screening test scheduled on 4/25/20-  instructions emailed to patient-BB    KNEE SURGERY  2014    Rt knee, torn meniscus    MOUTH SURGERY  2014    implant dental    RADIOFREQUENCY ABLATION  9/8/15    Atrial flutter ablation    SHOULDER SURGERY         Review of Systems   Constitution: Negative. Negative for chills, decreased appetite, diaphoresis, fever, malaise/fatigue, night sweats, weight gain and weight loss.   Eyes: Negative.    Cardiovascular: Positive for dyspnea on exertion and leg swelling. Negative for chest pain, claudication, cyanosis, irregular heartbeat, near-syncope, orthopnea, palpitations, paroxysmal nocturnal dyspnea and syncope.   Respiratory: Negative for cough, hemoptysis and shortness of breath.    Endocrine: Negative.    Hematologic/Lymphatic: Negative.    Skin: Negative for color change, dry skin and nail changes.   Musculoskeletal: Negative.    Gastrointestinal: Negative.    Genitourinary: Negative.    Neurological: Negative for weakness.       Objective:   There were no vitals taken for this visit.body mass index is unknown because there is no height or weight on file.  Physical Exam   Constitutional: He appears well-developed and well-nourished.   Neck: No JVD present.   Musculoskeletal: He exhibits edema.       Labs:    Chemistry        Component Value Date/Time     (L) 04/29/2020 0758    K 4.3 04/29/2020 0758     04/29/2020 0758    CO2 24 04/29/2020 0758    BUN 27 (H) 04/29/2020 0758    CREATININE 1.6 (H) 04/29/2020 0758    GLU 95 04/29/2020 0758        Component Value Date/Time    CALCIUM 8.2 (L) 04/29/2020 0758    ALKPHOS 142 (H) 04/29/2020 0758    AST 28 04/29/2020 0758    ALT 18 04/29/2020 0758    BILITOT 0.9 04/29/2020 0758    ESTGFRAFRICA 56.0 (A) 04/29/2020 0758    EGFRNONAA " 48.5 (A) 04/29/2020 0758          Magnesium   Date Value Ref Range Status   10/05/2018 2.1 1.6 - 2.6 mg/dL Final     Lab Results   Component Value Date    WBC 3.63 (L) 04/29/2020    HGB 9.5 (L) 04/29/2020    HCT 29.7 (L) 04/29/2020     04/29/2020     Lab Results   Component Value Date    INR 1.1 01/03/2017    INR 1.2 11/27/2016    INR 1.1 09/26/2016     BNP   Date Value Ref Range Status   08/15/2019 329 (H) 0 - 99 pg/mL Final     Comment:     Values of less than 100 pg/ml are consistent with non-CHF populations.   11/19/2018 359 (H) 0 - 99 pg/mL Final     Comment:     Values of less than 100 pg/ml are consistent with non-CHF populations.   02/05/2018 505 (H) 0 - 99 pg/mL Final     Comment:     Values of less than 100 pg/ml are consistent with non-CHF populations.     LD   Date Value Ref Range Status   06/05/2017 299 (H) 110 - 260 U/L Final     Comment:     Results are increased in hemolyzed samples.   05/10/2017 263 (H) 110 - 260 U/L Final     Comment:     Results are increased in hemolyzed samples.   04/10/2017 274 (H) 110 - 260 U/L Final     Comment:     Results are increased in hemolyzed samples.       Assessment:      1. Chronic diastolic congestive heart failure    2. AL amyloidosis    3. CKD (chronic kidney disease), stage III        Plan:   Al amyloid. worsening leg edema. Likely secondary to progression of AL amyloidosis vs. cardio toxicity from cyclophosphamide and steroids.   Recommend the following;  Increase Bumex to 2 mg BID  Use compression stockings.  In view of the potential cardiotoxicity of Chemo would repeat Echo to reassess LV function (last Echo was done in 3/2019)   Recommend 2 gram sodium restriction and 1500cc fluid restriction.  Encourage physical activity with graded exercise program.  Requested patient to weigh themselves daily, and to notify us if their weight increases by more than 3 lbs in 1 day or 5 lbs in 1 week.   Phone review in 3 days to assess for improvement of his  symptoms.   RTC in 1 month with Dr. Shilo Schneider MD

## 2020-05-08 ENCOUNTER — HOSPITAL ENCOUNTER (OUTPATIENT)
Dept: CARDIOLOGY | Facility: CLINIC | Age: 55
Discharge: HOME | End: 2020-05-08
Attending: INTERNAL MEDICINE
Payer: OTHER GOVERNMENT

## 2020-05-08 ENCOUNTER — TELEPHONE (OUTPATIENT)
Dept: HEMATOLOGY/ONCOLOGY | Facility: CLINIC | Age: 55
End: 2020-05-08

## 2020-05-08 VITALS
BODY MASS INDEX: 28.56 KG/M2 | WEIGHT: 204 LBS | DIASTOLIC BLOOD PRESSURE: 63 MMHG | HEART RATE: 80 BPM | SYSTOLIC BLOOD PRESSURE: 102 MMHG | HEIGHT: 71 IN

## 2020-05-08 DIAGNOSIS — I50.32 CHRONIC DIASTOLIC CONGESTIVE HEART FAILURE: ICD-10-CM

## 2020-05-08 LAB
ASCENDING AORTA: 3.18 CM
AV INDEX (PROSTH): 1.03
AV MEAN GRADIENT: 2 MMHG
AV PEAK GRADIENT: 4 MMHG
AV VALVE AREA: 4.13 CM2
AV VELOCITY RATIO: 0.93
BSA FOR ECHO PROCEDURE: 2.15 M2
CV ECHO LV RWT: 0.55 CM
DOP CALC AO PEAK VEL: 1.02 M/S
DOP CALC AO VTI: 15.67 CM
DOP CALC LVOT AREA: 4 CM2
DOP CALC LVOT DIAMETER: 2.26 CM
DOP CALC LVOT PEAK VEL: 0.95 M/S
DOP CALC LVOT STROKE VOLUME: 64.71 CM3
DOP CALCLVOT PEAK VEL VTI: 16.14 CM
E/E' RATIO: 17 M/S
ECHO LV POSTERIOR WALL: 1.3 CM (ref 0.6–1.1)
FRACTIONAL SHORTENING: 19 % (ref 28–44)
INTERVENTRICULAR SEPTUM: 1.2 CM (ref 0.6–1.1)
IVRT: 71.36 MSEC
LA MAJOR: 6.31 CM
LA MINOR: 6.24 CM
LA WIDTH: 5.27 CM
LEFT ATRIUM SIZE: 4.4 CM
LEFT ATRIUM VOLUME INDEX: 58.2 ML/M2
LEFT ATRIUM VOLUME: 123.68 CM3
LEFT INTERNAL DIMENSION IN SYSTOLE: 3.8 CM (ref 2.1–4)
LEFT VENTRICLE DIASTOLIC VOLUME INDEX: 39.69 ML/M2
LEFT VENTRICLE DIASTOLIC VOLUME: 84.4 ML
LEFT VENTRICLE MASS INDEX: 106 G/M2
LEFT VENTRICLE SYSTOLIC VOLUME INDEX: 18.6 ML/M2
LEFT VENTRICLE SYSTOLIC VOLUME: 39.64 ML
LEFT VENTRICULAR INTERNAL DIMENSION IN DIASTOLE: 4.7 CM (ref 3.5–6)
LEFT VENTRICULAR MASS: 224.76 G
LV LATERAL E/E' RATIO: 17 M/S
LV SEPTAL E/E' RATIO: 17 M/S
MV PEAK E VEL: 0.85 M/S
PISA TR MAX VEL: 1.05 M/S
RA MAJOR: 5.91 CM
RA PRESSURE: 15 MMHG
RA WIDTH: 4.39 CM
RIGHT VENTRICULAR END-DIASTOLIC DIMENSION: 4.8 CM
RV TISSUE DOPPLER FREE WALL SYSTOLIC VELOCITY 1 (APICAL 4 CHAMBER VIEW): 6.64 CM/S
SINUS: 3.45 CM
STJ: 3 CM
TDI LATERAL: 0.05 M/S
TDI SEPTAL: 0.05 M/S
TDI: 0.05 M/S
TR MAX PG: 4 MMHG
TRICUSPID ANNULAR PLANE SYSTOLIC EXCURSION: 1.33 CM
TV REST PULMONARY ARTERY PRESSURE: 19 MMHG

## 2020-05-08 PROCEDURE — 93306 TTE W/DOPPLER COMPLETE: CPT | Mod: 26,,, | Performed by: INTERNAL MEDICINE

## 2020-05-08 PROCEDURE — 93306 TTE W/DOPPLER COMPLETE: CPT

## 2020-05-08 PROCEDURE — 93306 ECHO (CUPID ONLY): ICD-10-PCS | Mod: 26,,, | Performed by: INTERNAL MEDICINE

## 2020-05-08 NOTE — TELEPHONE ENCOUNTER
05/08/2020      Phoned the patient.      Discussed the following with the patient:  In an effort to protect our immunocompromised patients from potential exposure to COVID-19, [facility] will now require all patients receiving an infusion, an injection, and/or radiation therapy to be tested for COVID-19 prior to their appointment.  All patients currently under treatment will be tested immediately, and patients initiating new treatment cycles or with one-time appointments (injections, transfusions, etc.) must be tested within 72 hours of their appointment.      Symptom Patient's Response   Fever YES/NO: no   Cough YES/NO: no   Shortness of breath  YES/NO: no   Difficulty breathing YES/NO: no   GI symptoms such as diarrhea or nausea YES/NO: no   Loss of taste YES/NO: no   Loss of smell YES/NO: no     Other Screening Patient's Response   Has the patient previously undergone COVID-19 testing? YES/NO: yes     If yes to the question directly above, what was the result? negative   Has the patient been in close contact with someone who has undergone COVID-19 testing? YES/NO: no     If yes to the question directly above, what was the result? not applicable      The patient's 24-hour COVID-19 test was ordered and scheduled.  Reviewed the appointment date, time, and location with the patient.      Advised the patient that he can expect the results to take approximately 24 hours.  Advised the patient that someone will reach out to him regarding his results if he tests positive, as his treatment appointment will be rescheduled if he tests positive for COVID-19.  Also advised the patient that if he does not hear back from our office or if he is told by our office he has tested negative for COVID-19, he can proceed with treatment as originally planned.     Questions were answered to the patient's satisfaction, and the patient verbalized understanding of information and agreement with the plan.       The above was completed in  accordance with instructions and guidelines set forth by [facility].     Signed,        Cristy Messer     Date:  05/08/2020

## 2020-05-10 ENCOUNTER — NURSE TRIAGE (OUTPATIENT)
Dept: ADMINISTRATIVE | Facility: CLINIC | Age: 55
End: 2020-05-10

## 2020-05-11 DIAGNOSIS — Z13.9 SCREENING FOR CONDITION: Primary | ICD-10-CM

## 2020-05-11 DIAGNOSIS — E85.81 AL AMYLOIDOSIS: ICD-10-CM

## 2020-05-11 DIAGNOSIS — D64.81 ANEMIA ASSOCIATED WITH CHEMOTHERAPY: ICD-10-CM

## 2020-05-11 DIAGNOSIS — T45.1X5A ANEMIA ASSOCIATED WITH CHEMOTHERAPY: ICD-10-CM

## 2020-05-12 DIAGNOSIS — E85.89 OTHER AMYLOIDOSIS: ICD-10-CM

## 2020-05-13 ENCOUNTER — CLINICAL SUPPORT (OUTPATIENT)
Dept: HEMATOLOGY/ONCOLOGY | Facility: CLINIC | Age: 55
End: 2020-05-13
Payer: OTHER GOVERNMENT

## 2020-05-13 ENCOUNTER — LAB VISIT (OUTPATIENT)
Dept: LAB | Facility: HOSPITAL | Age: 55
End: 2020-05-13
Payer: OTHER GOVERNMENT

## 2020-05-13 DIAGNOSIS — E85.81 AL AMYLOIDOSIS: ICD-10-CM

## 2020-05-13 DIAGNOSIS — Z13.9 SCREENING FOR CONDITION: ICD-10-CM

## 2020-05-13 DIAGNOSIS — T45.1X5A ANEMIA ASSOCIATED WITH CHEMOTHERAPY: ICD-10-CM

## 2020-05-13 DIAGNOSIS — D64.81 ANEMIA ASSOCIATED WITH CHEMOTHERAPY: ICD-10-CM

## 2020-05-13 LAB
ALBUMIN SERPL BCP-MCNC: 2.8 G/DL (ref 3.5–5.2)
ALP SERPL-CCNC: 157 U/L (ref 55–135)
ALT SERPL W/O P-5'-P-CCNC: 14 U/L (ref 10–44)
ANION GAP SERPL CALC-SCNC: 11 MMOL/L (ref 8–16)
AST SERPL-CCNC: 23 U/L (ref 10–40)
BASOPHILS # BLD AUTO: 0.03 K/UL (ref 0–0.2)
BASOPHILS NFR BLD: 0.6 % (ref 0–1.9)
BILIRUB SERPL-MCNC: 0.7 MG/DL (ref 0.1–1)
BUN SERPL-MCNC: 30 MG/DL (ref 6–20)
CALCIUM SERPL-MCNC: 8.8 MG/DL (ref 8.7–10.5)
CHLORIDE SERPL-SCNC: 105 MMOL/L (ref 95–110)
CO2 SERPL-SCNC: 23 MMOL/L (ref 23–29)
CREAT SERPL-MCNC: 1.7 MG/DL (ref 0.5–1.4)
DIFFERENTIAL METHOD: ABNORMAL
EOSINOPHIL # BLD AUTO: 0.1 K/UL (ref 0–0.5)
EOSINOPHIL NFR BLD: 2 % (ref 0–8)
ERYTHROCYTE [DISTWIDTH] IN BLOOD BY AUTOMATED COUNT: 15.3 % (ref 11.5–14.5)
EST. GFR  (AFRICAN AMERICAN): 52.1 ML/MIN/1.73 M^2
EST. GFR  (NON AFRICAN AMERICAN): 45 ML/MIN/1.73 M^2
GLUCOSE SERPL-MCNC: 141 MG/DL (ref 70–110)
HCT VFR BLD AUTO: 31.4 % (ref 40–54)
HGB BLD-MCNC: 9.8 G/DL (ref 14–18)
IMM GRANULOCYTES # BLD AUTO: 0.05 K/UL (ref 0–0.04)
IMM GRANULOCYTES NFR BLD AUTO: 1 % (ref 0–0.5)
LYMPHOCYTES # BLD AUTO: 0.9 K/UL (ref 1–4.8)
LYMPHOCYTES NFR BLD: 18.8 % (ref 18–48)
MCH RBC QN AUTO: 34.4 PG (ref 27–31)
MCHC RBC AUTO-ENTMCNC: 31.2 G/DL (ref 32–36)
MCV RBC AUTO: 110 FL (ref 82–98)
MONOCYTES # BLD AUTO: 0.9 K/UL (ref 0.3–1)
MONOCYTES NFR BLD: 17.8 % (ref 4–15)
NEUTROPHILS # BLD AUTO: 3 K/UL (ref 1.8–7.7)
NEUTROPHILS NFR BLD: 59.8 % (ref 38–73)
NRBC BLD-RTO: 0 /100 WBC
PLATELET # BLD AUTO: 171 K/UL (ref 150–350)
PMV BLD AUTO: 9.6 FL (ref 9.2–12.9)
POTASSIUM SERPL-SCNC: 4.6 MMOL/L (ref 3.5–5.1)
PROT SERPL-MCNC: 5.6 G/DL (ref 6–8.4)
RBC # BLD AUTO: 2.85 M/UL (ref 4.6–6.2)
SARS-COV-2 RNA RESP QL NAA+PROBE: NOT DETECTED
SODIUM SERPL-SCNC: 139 MMOL/L (ref 136–145)
WBC # BLD AUTO: 4.95 K/UL (ref 3.9–12.7)

## 2020-05-13 PROCEDURE — 80053 COMPREHEN METABOLIC PANEL: CPT

## 2020-05-13 PROCEDURE — 36415 COLL VENOUS BLD VENIPUNCTURE: CPT

## 2020-05-13 PROCEDURE — 85025 COMPLETE CBC W/AUTO DIFF WBC: CPT

## 2020-05-13 PROCEDURE — U0002 COVID-19 LAB TEST NON-CDC: HCPCS

## 2020-05-13 RX ORDER — ACYCLOVIR 400 MG/1
TABLET ORAL
Qty: 180 TABLET | Refills: 3 | Status: SHIPPED | OUTPATIENT
Start: 2020-05-13 | End: 2021-01-01 | Stop reason: SDUPTHER

## 2020-05-15 ENCOUNTER — INFUSION (OUTPATIENT)
Dept: INFUSION THERAPY | Facility: HOSPITAL | Age: 55
End: 2020-05-15
Payer: OTHER GOVERNMENT

## 2020-05-15 ENCOUNTER — PATIENT MESSAGE (OUTPATIENT)
Dept: TRANSPLANT | Facility: CLINIC | Age: 55
End: 2020-05-15

## 2020-05-15 VITALS
RESPIRATION RATE: 18 BRPM | SYSTOLIC BLOOD PRESSURE: 95 MMHG | WEIGHT: 205.25 LBS | DIASTOLIC BLOOD PRESSURE: 50 MMHG | BODY MASS INDEX: 28.73 KG/M2 | HEIGHT: 71 IN | TEMPERATURE: 99 F | HEART RATE: 67 BPM

## 2020-05-15 DIAGNOSIS — E85.81 AL AMYLOIDOSIS: Primary | ICD-10-CM

## 2020-05-15 PROCEDURE — 96401 CHEMO ANTI-NEOPL SQ/IM: CPT

## 2020-05-15 PROCEDURE — 63600175 PHARM REV CODE 636 W HCPCS: Mod: JG | Performed by: INTERNAL MEDICINE

## 2020-05-15 RX ORDER — BORTEZOMIB 3.5 MG/1
1.3 INJECTION, POWDER, LYOPHILIZED, FOR SOLUTION INTRAVENOUS; SUBCUTANEOUS
Status: COMPLETED | OUTPATIENT
Start: 2020-05-15 | End: 2020-05-15

## 2020-05-15 RX ADMIN — BORTEZOMIB 2.8 MG: 3.5 INJECTION, POWDER, LYOPHILIZED, FOR SOLUTION INTRAVENOUS; SUBCUTANEOUS at 09:05

## 2020-05-15 NOTE — NURSING
Patient received Velcade injection SQ to ABD.  Tolerated well. BP 83/48.  Notified Dr. Atkins, who advised for patient to hold diuretic today and contact cardiologist. Patient verbalized understanding.

## 2020-05-20 ENCOUNTER — PATIENT MESSAGE (OUTPATIENT)
Dept: TRANSPLANT | Facility: CLINIC | Age: 55
End: 2020-05-20

## 2020-05-20 DIAGNOSIS — E85.81 AL AMYLOIDOSIS: Primary | ICD-10-CM

## 2020-05-26 ENCOUNTER — PATIENT MESSAGE (OUTPATIENT)
Dept: HEMATOLOGY/ONCOLOGY | Facility: CLINIC | Age: 55
End: 2020-05-26

## 2020-05-27 ENCOUNTER — CLINICAL SUPPORT (OUTPATIENT)
Dept: HEMATOLOGY/ONCOLOGY | Facility: CLINIC | Age: 55
End: 2020-05-27
Payer: OTHER GOVERNMENT

## 2020-05-27 ENCOUNTER — LAB VISIT (OUTPATIENT)
Dept: LAB | Facility: HOSPITAL | Age: 55
End: 2020-05-27
Payer: OTHER GOVERNMENT

## 2020-05-27 DIAGNOSIS — T45.1X5A ANEMIA ASSOCIATED WITH CHEMOTHERAPY: ICD-10-CM

## 2020-05-27 DIAGNOSIS — E85.81 AL AMYLOIDOSIS: ICD-10-CM

## 2020-05-27 DIAGNOSIS — Z13.9 SCREENING FOR CONDITION: ICD-10-CM

## 2020-05-27 DIAGNOSIS — D64.81 ANEMIA ASSOCIATED WITH CHEMOTHERAPY: ICD-10-CM

## 2020-05-27 LAB
ALBUMIN SERPL BCP-MCNC: 3 G/DL (ref 3.5–5.2)
ALP SERPL-CCNC: 146 U/L (ref 55–135)
ALT SERPL W/O P-5'-P-CCNC: 12 U/L (ref 10–44)
ANION GAP SERPL CALC-SCNC: 11 MMOL/L (ref 8–16)
AST SERPL-CCNC: 21 U/L (ref 10–40)
BASOPHILS # BLD AUTO: 0.03 K/UL (ref 0–0.2)
BASOPHILS NFR BLD: 0.6 % (ref 0–1.9)
BILIRUB SERPL-MCNC: 0.7 MG/DL (ref 0.1–1)
BNP SERPL-MCNC: 259 PG/ML (ref 0–99)
BUN SERPL-MCNC: 34 MG/DL (ref 6–20)
CALCIUM SERPL-MCNC: 8.5 MG/DL (ref 8.7–10.5)
CHLORIDE SERPL-SCNC: 103 MMOL/L (ref 95–110)
CO2 SERPL-SCNC: 23 MMOL/L (ref 23–29)
CREAT SERPL-MCNC: 1.5 MG/DL (ref 0.5–1.4)
DIFFERENTIAL METHOD: ABNORMAL
EOSINOPHIL # BLD AUTO: 0.1 K/UL (ref 0–0.5)
EOSINOPHIL NFR BLD: 2.7 % (ref 0–8)
ERYTHROCYTE [DISTWIDTH] IN BLOOD BY AUTOMATED COUNT: 14.1 % (ref 11.5–14.5)
EST. GFR  (AFRICAN AMERICAN): >60 ML/MIN/1.73 M^2
EST. GFR  (NON AFRICAN AMERICAN): 52.4 ML/MIN/1.73 M^2
GLUCOSE SERPL-MCNC: 101 MG/DL (ref 70–110)
HCT VFR BLD AUTO: 30.3 % (ref 40–54)
HGB BLD-MCNC: 9.5 G/DL (ref 14–18)
IMM GRANULOCYTES # BLD AUTO: 0.04 K/UL (ref 0–0.04)
IMM GRANULOCYTES NFR BLD AUTO: 0.8 % (ref 0–0.5)
LYMPHOCYTES # BLD AUTO: 0.5 K/UL (ref 1–4.8)
LYMPHOCYTES NFR BLD: 10.6 % (ref 18–48)
MCH RBC QN AUTO: 33.2 PG (ref 27–31)
MCHC RBC AUTO-ENTMCNC: 31.4 G/DL (ref 32–36)
MCV RBC AUTO: 106 FL (ref 82–98)
MONOCYTES # BLD AUTO: 0.9 K/UL (ref 0.3–1)
MONOCYTES NFR BLD: 16.6 % (ref 4–15)
NEUTROPHILS # BLD AUTO: 3.5 K/UL (ref 1.8–7.7)
NEUTROPHILS NFR BLD: 68.7 % (ref 38–73)
NRBC BLD-RTO: 0 /100 WBC
PLATELET # BLD AUTO: 188 K/UL (ref 150–350)
PMV BLD AUTO: 9.6 FL (ref 9.2–12.9)
POTASSIUM SERPL-SCNC: 4 MMOL/L (ref 3.5–5.1)
PROT SERPL-MCNC: 5.7 G/DL (ref 6–8.4)
RBC # BLD AUTO: 2.86 M/UL (ref 4.6–6.2)
SARS-COV-2 RNA RESP QL NAA+PROBE: NOT DETECTED
SODIUM SERPL-SCNC: 137 MMOL/L (ref 136–145)
TROPONIN I SERPL DL<=0.01 NG/ML-MCNC: 0.08 NG/ML (ref 0–0.03)
WBC # BLD AUTO: 5.11 K/UL (ref 3.9–12.7)

## 2020-05-27 PROCEDURE — 84484 ASSAY OF TROPONIN QUANT: CPT

## 2020-05-27 PROCEDURE — 83880 ASSAY OF NATRIURETIC PEPTIDE: CPT

## 2020-05-27 PROCEDURE — U0003 INFECTIOUS AGENT DETECTION BY NUCLEIC ACID (DNA OR RNA); SEVERE ACUTE RESPIRATORY SYNDROME CORONAVIRUS 2 (SARS-COV-2) (CORONAVIRUS DISEASE [COVID-19]), AMPLIFIED PROBE TECHNIQUE, MAKING USE OF HIGH THROUGHPUT TECHNOLOGIES AS DESCRIBED BY CMS-2020-01-R: HCPCS

## 2020-05-27 PROCEDURE — 85025 COMPLETE CBC W/AUTO DIFF WBC: CPT

## 2020-05-27 PROCEDURE — 36415 COLL VENOUS BLD VENIPUNCTURE: CPT

## 2020-05-27 PROCEDURE — 80053 COMPREHEN METABOLIC PANEL: CPT

## 2020-05-29 ENCOUNTER — TELEPHONE (OUTPATIENT)
Dept: TRANSPLANT | Facility: CLINIC | Age: 55
End: 2020-05-29

## 2020-05-29 ENCOUNTER — INFUSION (OUTPATIENT)
Dept: INFUSION THERAPY | Facility: HOSPITAL | Age: 55
End: 2020-05-29
Payer: OTHER GOVERNMENT

## 2020-05-29 ENCOUNTER — OFFICE VISIT (OUTPATIENT)
Dept: HEMATOLOGY/ONCOLOGY | Facility: CLINIC | Age: 55
End: 2020-05-29
Payer: OTHER GOVERNMENT

## 2020-05-29 VITALS
HEIGHT: 71 IN | OXYGEN SATURATION: 99 % | TEMPERATURE: 98 F | WEIGHT: 207.69 LBS | SYSTOLIC BLOOD PRESSURE: 111 MMHG | RESPIRATION RATE: 18 BRPM | HEART RATE: 86 BPM | DIASTOLIC BLOOD PRESSURE: 62 MMHG | BODY MASS INDEX: 29.08 KG/M2

## 2020-05-29 DIAGNOSIS — E85.81 AL AMYLOIDOSIS: Primary | ICD-10-CM

## 2020-05-29 DIAGNOSIS — I50.32 CHRONIC DIASTOLIC CONGESTIVE HEART FAILURE: ICD-10-CM

## 2020-05-29 DIAGNOSIS — D64.81 ANEMIA ASSOCIATED WITH CHEMOTHERAPY: ICD-10-CM

## 2020-05-29 DIAGNOSIS — T45.1X5A ANEMIA ASSOCIATED WITH CHEMOTHERAPY: ICD-10-CM

## 2020-05-29 PROCEDURE — 99999 PR PBB SHADOW E&M-EST. PATIENT-LVL III: ICD-10-PCS | Mod: PBBFAC,,, | Performed by: INTERNAL MEDICINE

## 2020-05-29 PROCEDURE — 99999 PR PBB SHADOW E&M-EST. PATIENT-LVL III: CPT | Mod: PBBFAC,,, | Performed by: INTERNAL MEDICINE

## 2020-05-29 PROCEDURE — 99214 PR OFFICE/OUTPT VISIT, EST, LEVL IV, 30-39 MIN: ICD-10-PCS | Mod: S$PBB,,, | Performed by: INTERNAL MEDICINE

## 2020-05-29 PROCEDURE — 96401 CHEMO ANTI-NEOPL SQ/IM: CPT

## 2020-05-29 PROCEDURE — 99213 OFFICE O/P EST LOW 20 MIN: CPT | Mod: PBBFAC,25 | Performed by: INTERNAL MEDICINE

## 2020-05-29 PROCEDURE — 63600175 PHARM REV CODE 636 W HCPCS: Mod: JG | Performed by: INTERNAL MEDICINE

## 2020-05-29 PROCEDURE — 99214 OFFICE O/P EST MOD 30 MIN: CPT | Mod: S$PBB,,, | Performed by: INTERNAL MEDICINE

## 2020-05-29 RX ORDER — HEPARIN 100 UNIT/ML
500 SYRINGE INTRAVENOUS
Status: DISCONTINUED | OUTPATIENT
Start: 2020-05-29 | End: 2020-05-29 | Stop reason: HOSPADM

## 2020-05-29 RX ORDER — BORTEZOMIB 3.5 MG/1
1.3 INJECTION, POWDER, LYOPHILIZED, FOR SOLUTION INTRAVENOUS; SUBCUTANEOUS
Status: COMPLETED | OUTPATIENT
Start: 2020-05-29 | End: 2020-05-29

## 2020-05-29 RX ORDER — SODIUM CHLORIDE 0.9 % (FLUSH) 0.9 %
10 SYRINGE (ML) INJECTION
Status: DISCONTINUED | OUTPATIENT
Start: 2020-05-29 | End: 2020-05-29 | Stop reason: HOSPADM

## 2020-05-29 RX ADMIN — BORTEZOMIB 2.8 MG: 3.5 INJECTION, POWDER, LYOPHILIZED, FOR SOLUTION INTRAVENOUS; SUBCUTANEOUS at 11:05

## 2020-05-29 NOTE — Clinical Note
Follow-up in 2 weeks with CBC, CMP, mag, SPEP, BROOKS, free light chains, immunoglobulins, NT-proBNP, Troponin T and chemo appt for bortezomib

## 2020-05-29 NOTE — PROGRESS NOTES
HEMATOLOGIC MALIGNANCIES PROGRESS NOTE    IDENTIFYING STATEMENT   Roc Winston Jr. (Ovi) is a 54 y.o. male with a  of 1965 from [place] with the diagnosis of AL amyloidosis.      ONCOLOGY HISTORY:    1. AL amyloid with cardiac involvement              A. 2016: Initial evaluation with Dr. Carmichael. Reported 18 months of progressive decline (previously ran 3 miles easily, now can only walk 10 minutes). M-protein 0.19 g/dl, kappa 1.37 mg/dl, lambda 50 mg/dl, ratio 0.03. Abdominal fat pad biopsy had been negative for presence of amyloid. Cardiac MRI suggestive of infiltrative cardiomyopathy.               B. 1/3/2017: Endomyocardial biopsy - involved by AL amyloid              C. 2017: BMBx - 60% cellular marrow with 50% plasma cells, consistent with plasma cell myeloma.               D. 2/15/2017 - 2017: Completed 4 cycles bortezomib chemotherapy on [id number] trial.               E. 2017: Bortezomib every 2 weeks x 2 doses              F. 2017: Change bortezomib to [id number] x 2 doses              G. 10/9/2017: Bortezomib q6 weeks; kappa 1.6 mg/dl, lambda 0.93 mg/dl, ratio 1.72              H. 2018: Bortezomib q8 weeks.               I. 2018: [id number] close out visit due to ineffectiveness.                J. 2020: kappa 2.73 mg/dl, lambda 28.33 mg/dl, ratio (lambda:kappa) 10.38, concerning for progressive disease     2. 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 winston returns to clinic for follow-up of his AL amyloidosis. He reports that his leg swelling is much worse. He is feeling a bit more fatigued. He notices that he gets restless legs when his lower extremity edema is particularly worse. He saw Dr. Schneider as a virtual visit, and he has had repeat echocardiogram.  He has not received instructions on what to do regarding the leg edema at this time.     He is tolerating bortezomib well. Denies any other symptoms of neuropathy. No fever or chills.       Past Medical History, Past Social History and Past Family History have been reviewed and are unchanged except as noted in the interval history.    MEDICATIONS:     Current Outpatient Medications on File Prior to Visit   Medication Sig Dispense Refill    acyclovir (ZOVIRAX) 400 MG tablet TAKE 1 TABLET TWICE A  tablet 3    aspirin (ECOTRIN) 81 MG EC tablet Take 1 tablet (81 mg total) by mouth once daily. 150 tablet 2    BORTEZOMIB (VELCADE INJ) Inject as directed. Every 2 months      bumetanide (BUMEX) 2 MG tablet Take 1 tablet (2 mg total) by mouth 2 (two) times daily. 90 tablet 4    gabapentin (NEURONTIN) 100 MG capsule TAKE 3 CAPSULES EVERY EVENING 270 capsule 4    magnesium oxide (MAG-OX) 400 mg (241.3 mg magnesium) tablet Take 400 mg by mouth once daily.      spironolactone (ALDACTONE) 25 MG tablet Take 1 tablet (25 mg total) by mouth once daily. 90 tablet 3    sildenafil (VIAGRA) 50 MG tablet Take 1 tablet (50 mg total) by mouth daily as needed for Erectile Dysfunction. 7 tablet 2     No current facility-administered medications on file prior to visit.        ALLERGIES: Review of patient's allergies indicates:  No Known Allergies     ROS:       Review of Systems   Constitutional: Positive for fatigue. Negative for diaphoresis, fever and unexpected weight change.   HENT:   Negative for lump/mass and sore throat.    Eyes: Negative for icterus.   Respiratory: Negative for cough and shortness of breath.    Cardiovascular: Positive for leg swelling. Negative for chest pain and palpitations.   Gastrointestinal: Negative for abdominal distention, constipation, diarrhea, nausea and vomiting.   Genitourinary: Negative for dysuria and frequency.    Musculoskeletal: Negative for arthralgias, gait problem and myalgias.  "  Skin: Negative for rash.   Neurological: Positive for numbness. Negative for dizziness, gait problem and headaches.        Reporting restless legs when lower extremity edema is present   Hematological: Negative for adenopathy. Does not bruise/bleed easily.   Psychiatric/Behavioral: The patient is not nervous/anxious.         PHYSICAL EXAM:  Vitals:    05/29/20 0945   BP: 111/62   Pulse: 86   Resp: 18   Temp: 97.6 °F (36.4 °C)   SpO2: 99%   Weight: 94.2 kg (207 lb 10.8 oz)   Height: 5' 11" (1.803 m)   PainSc: 0-No pain       KARNOFSKY PERFORMANCE STATUS 80%  ECOG 1    Physical Exam   Constitutional: He is oriented to person, place, and time. He appears well-developed and well-nourished. No distress.   HENT:   Head: Normocephalic and atraumatic.   Mouth/Throat: Mucous membranes are normal. No oral lesions.   Eyes: Conjunctivae are normal.   Neck: JVD present. No thyromegaly present.   Cardiovascular: Normal rate, regular rhythm and normal heart sounds.   No murmur heard.  Pulmonary/Chest: Breath sounds normal. He has no wheezes. He has no rales.   Abdominal: Soft. He exhibits no distension and no mass. There is no splenomegaly or hepatomegaly. There is no tenderness.   Musculoskeletal: He exhibits edema.   Lymphadenopathy:     He has no cervical adenopathy.        Right cervical: No deep cervical adenopathy present.       Left cervical: No deep cervical adenopathy present.     He has no axillary adenopathy.        Right: No inguinal adenopathy present.        Left: No inguinal adenopathy present.   Neurological: He is alert and oriented to person, place, and time. He has normal strength and normal reflexes. No cranial nerve deficit. Coordination normal.   Skin: No rash noted.       LAB:   Results for orders placed or performed in visit on 05/27/20   COVID-19 Routine Screening   Result Value Ref Range    SARS-CoV2 (COVID-19) Qualitative PCR Not Detected Not Detected       PROBLEMS ASSESSED THIS VISIT:    1. AL " amyloidosis    2. Anemia associated with chemotherapy    3. Chronic diastolic congestive heart failure        PLAN:       AL amyloidosis  Mr. Lai has had marked reduction in free lambda light chains with VCD chemotherapy and is now on bortezomib maintenance. His light chain reduction has been excellent, but his heart failure symtpoms are much more bothersome at this time. I am concerned about his ability to tolerate autologous stem cell transplant with his sensitive volume status.    I have communicated directly with Dr. Lao, who will call him this weekend and see him early next week to address heart failure. For now we will continue bortezomib maintenance every two weeks.     We also discussed that we will not advance towards autologous stem cell transplant as quickly at this time given his more prominent heart failure symptoms.    Anemia associated with chemotherapy  Moderate. Continue to monitor. We may need to re-evaluate anemia if it does not improve substantially over the next 1-2 months, as it is out of porportion to what is expected for bortezomib.     Follow-up  2 weeks    Chris Atkins MD  Hematology and Stem Cell Transplant

## 2020-05-29 NOTE — TELEPHONE ENCOUNTER
Called pt and did HF assessment pt isn't having any SOB but have swelling in both leg some weight (205). Pt isn't on any potassium medicine. Joelle is taking 2 mg of Bumex one in the morning one in the evening. I spoke with Dr. Harden he wants me schedule pt to come in next week and he will be give pt a call today

## 2020-06-02 ENCOUNTER — OFFICE VISIT (OUTPATIENT)
Dept: TRANSPLANT | Facility: CLINIC | Age: 55
End: 2020-06-02
Attending: INTERNAL MEDICINE
Payer: OTHER GOVERNMENT

## 2020-06-02 VITALS
SYSTOLIC BLOOD PRESSURE: 113 MMHG | DIASTOLIC BLOOD PRESSURE: 64 MMHG | HEART RATE: 87 BPM | HEIGHT: 71 IN | WEIGHT: 201.06 LBS | BODY MASS INDEX: 28.15 KG/M2

## 2020-06-02 DIAGNOSIS — R60.0 EDEMA OF BOTH LOWER LEGS DUE TO PERIPHERAL VENOUS INSUFFICIENCY: ICD-10-CM

## 2020-06-02 DIAGNOSIS — N18.30 CKD (CHRONIC KIDNEY DISEASE), STAGE III: ICD-10-CM

## 2020-06-02 DIAGNOSIS — I50.32 CHRONIC DIASTOLIC CONGESTIVE HEART FAILURE: ICD-10-CM

## 2020-06-02 DIAGNOSIS — I87.2 EDEMA OF BOTH LOWER LEGS DUE TO PERIPHERAL VENOUS INSUFFICIENCY: ICD-10-CM

## 2020-06-02 DIAGNOSIS — E85.81 AL AMYLOIDOSIS: ICD-10-CM

## 2020-06-02 DIAGNOSIS — I42.5 OTHER RESTRICTIVE CARDIOMYOPATHY: Primary | ICD-10-CM

## 2020-06-02 PROCEDURE — 99214 OFFICE O/P EST MOD 30 MIN: CPT | Mod: S$PBB,,, | Performed by: INTERNAL MEDICINE

## 2020-06-02 PROCEDURE — 99999 PR PBB SHADOW E&M-EST. PATIENT-LVL III: ICD-10-PCS | Mod: PBBFAC,,, | Performed by: INTERNAL MEDICINE

## 2020-06-02 PROCEDURE — 99214 PR OFFICE/OUTPT VISIT, EST, LEVL IV, 30-39 MIN: ICD-10-PCS | Mod: S$PBB,,, | Performed by: INTERNAL MEDICINE

## 2020-06-02 PROCEDURE — 99213 OFFICE O/P EST LOW 20 MIN: CPT | Mod: PBBFAC | Performed by: INTERNAL MEDICINE

## 2020-06-02 PROCEDURE — 99999 PR PBB SHADOW E&M-EST. PATIENT-LVL III: CPT | Mod: PBBFAC,,, | Performed by: INTERNAL MEDICINE

## 2020-06-02 NOTE — PROGRESS NOTES
"Subjective:     Patient ID:  Roc Lai Jr. is a 54 y.o. male who presents for follow-up of Congestive Heart Failure and Cardiomyopathy (AL amyloidosis)    HPI:  55 yo WM with AL and myocardial amyloidosis being treated by Dr. Atkins was added on for this visit at request of Dr. Atkins due to marked edema.  He reported home wt 205# last week and was noted to have hypoalbuminemia on labs.  I considered altering diuretics over the phone but since BNP only 259 and Cr 1.5 elected to not do so.  He is on bumex 2 mg BID (not 2/1) and aldactone 25 mg qd.  No new meds except chemotherapy increased around Dec 2019 with weekly velcade, cyclophosphamide, steroids for 3 months.  Wt stella up to 210# and gradually fell to 204# where it basically stayed until today down 197#.  Only change was 3 day weekend kept legs up and reclined more.  This AM diuretic working much better as went several times already with same dose.  Edema has improved along with feeling of "ugh" last week.  Reports, "I feel pretty good today."  He never did have much SOB more edema and generally not feeling well last week.    Review of Systems   Constitution: Positive for malaise/fatigue and weight loss (see HPI). Negative for chills, fever, night sweats and weight gain.   Cardiovascular: Positive for dyspnea on exertion (mild as a rule) and leg swelling. Negative for chest pain, irregular heartbeat, near-syncope, orthopnea, palpitations, paroxysmal nocturnal dyspnea and syncope.   Respiratory: Negative for cough, sputum production and wheezing.    Hematologic/Lymphatic: Does not bruise/bleed easily.   Musculoskeletal: Negative for arthritis, joint pain and stiffness.   Gastrointestinal: Negative for change in bowel habit, constipation, diarrhea, hematochezia and melena.   Genitourinary: Positive for nocturia (1-2 times per night ). Negative for hematuria.        ED but viagra helps   Neurological: Positive for numbness (fingers) and paresthesias (feet " "tingling). Negative for brief paralysis, focal weakness, seizures and weakness.        Restless legs; started gabapentin 3 yrs ago for this and helped though does seem worse now after initial improvement      Objective:   Physical Exam   Constitutional: He is oriented to person, place, and time. He appears well-developed and well-nourished.   /64 (BP Location: Right arm, Patient Position: Sitting, BP Method: Medium (Automatic))   Pulse 87   Ht 5' 11" (1.803 m)   Wt 91.2 kg (201 lb 1 oz)   BMI 28.04 kg/m²   Last visit here 8/15/19 wt 200#   HENT:   Head: Normocephalic and atraumatic.   Eyes: Conjunctivae are normal. Right eye exhibits no discharge. Left eye exhibits no discharge. No scleral icterus.   Neck: JVD (V wave approaching jaw) present. No thyromegaly present.   Cardiovascular: Normal rate and regular rhythm. Exam reveals gallop.   No murmur heard.  Pulmonary/Chest: Effort normal and breath sounds normal.   Abdominal: Soft. Bowel sounds are normal. He exhibits distension (liver span 14-15 cm ). He exhibits no mass. There is no tenderness. There is no rebound and no guarding.   Musculoskeletal: He exhibits edema (tight edema below knees bilaterally with stasis changes but minimal pitting today). He exhibits no tenderness.   Neurological: He is alert and oriented to person, place, and time.   Skin: Skin is warm and dry.   Psychiatric: He has a normal mood and affect. His behavior is normal. Judgment and thought content normal.      5/8/2020 ECHO Conclusion   · Normal left ventricular systolic function. The estimated ejection fraction is 60%.  · Septal wall has abnormal motion. Systolic and diastolic flattening of the interventricular septum consistent with right ventricle pressure and volume overload.  · Concentric left ventricular remodeling.  · Left ventricular diastolic dysfunction.  · Mildly reduced right ventricular systolic function.  · Severe left atrial enlargement.  · Severe right atrial " enlargement.  · Severe tricuspid regurgitation. PA pressure cannot be estinated accurately suspect PHTN.  · Elevated central venous pressure (15 mmHg).  · Small circumferential pericardial effusion.     Lab Results   Component Value Date     (H) 06/02/2020     (H) 05/27/2020     (H) 08/15/2019       06/02/2020    K 4.2 06/02/2020     06/02/2020    CO2 27 06/02/2020    BUN 30 (H) 06/02/2020    CREATININE 1.6 (H) 06/02/2020    CALCIUM 8.9 06/02/2020    ANIONGAP 8 06/02/2020    ESTGFRAFRICA 56.0 (A) 06/02/2020    EGFRNONAA 48.5 (A) 06/02/2020       (H) 06/02/2020    AST 21 05/27/2020    ALT 12 05/27/2020    ALBUMIN 3.0 (L) 05/27/2020    PROT 5.7 (L) 05/27/2020    BILITOT 0.7 05/27/2020    WBC 5.11 05/27/2020    HGB 9.5 (L) 05/27/2020    HCT 30.3 (L) 05/27/2020     05/27/2020     Assessment:     1. Other restrictive cardiomyopathy AL amyloidosis    2. Chronic diastolic congestive heart failure    3. CKD (chronic kidney disease), stage III    4. AL amyloidosis    5. Edema of both lower legs due to peripheral venous insufficiency      Plan:   Jobst support stockings below knee bilaterally to place on when gets up in the AM and remove at end of day when able to keep legs up and be more sedentary  I am very hesitant to increase bumex solely for edema for fear of aggravating renal function and provoking orthostatic symptoms.  This is briana true now since he feels that for whatever reason since last week things are spontaneously improving significantly.  Gabapentin may contribute to edema but timing does not suggest and he feels he needs this treatment.  Albumin improving will help if this continues.  Watch salt and fluid intake, keep legs elevated all of which he has already been doing.  RTC 6 months/prn    CC: Dr. Atkins

## 2020-06-03 PROBLEM — D69.6 THROMBOCYTOPENIA: Status: RESOLVED | Noted: 2019-06-09 | Resolved: 2020-06-03

## 2020-06-03 NOTE — ASSESSMENT & PLAN NOTE
Moderate. Continue to monitor. We may need to re-evaluate anemia if it does not improve substantially over the next 1-2 months, as it is out of porportion to what is expected for bortezomib.

## 2020-06-03 NOTE — ASSESSMENT & PLAN NOTE
Mr. Lai has had marked reduction in free lambda light chains with VCD chemotherapy and is now on bortezomib maintenance. His light chain reduction has been excellent, but his heart failure symtpoms are much more bothersome at this time. I am concerned about his ability to tolerate autologous stem cell transplant with his sensitive volume status.    I have communicated directly with Dr. Lao, who will call him this weekend and see him early next week to address heart failure. For now we will continue bortezomib maintenance every two weeks.     We also discussed that we will not advance towards autologous stem cell transplant as quickly at this time given his more prominent heart failure symptoms.

## 2020-06-09 DIAGNOSIS — I50.32 CHRONIC DIASTOLIC CONGESTIVE HEART FAILURE: Primary | ICD-10-CM

## 2020-06-09 RX ORDER — BUMETANIDE 2 MG/1
2 TABLET ORAL 2 TIMES DAILY
Qty: 180 TABLET | Refills: 3 | Status: SHIPPED | OUTPATIENT
Start: 2020-06-09 | End: 2020-06-15 | Stop reason: SDUPTHER

## 2020-06-12 ENCOUNTER — INFUSION (OUTPATIENT)
Dept: INFUSION THERAPY | Facility: HOSPITAL | Age: 55
End: 2020-06-12
Payer: OTHER GOVERNMENT

## 2020-06-12 ENCOUNTER — OFFICE VISIT (OUTPATIENT)
Dept: HEMATOLOGY/ONCOLOGY | Facility: CLINIC | Age: 55
End: 2020-06-12
Payer: OTHER GOVERNMENT

## 2020-06-12 VITALS
RESPIRATION RATE: 18 BRPM | DIASTOLIC BLOOD PRESSURE: 55 MMHG | WEIGHT: 196 LBS | HEIGHT: 71 IN | BODY MASS INDEX: 27.44 KG/M2 | HEART RATE: 67 BPM | TEMPERATURE: 98 F | SYSTOLIC BLOOD PRESSURE: 101 MMHG | OXYGEN SATURATION: 99 %

## 2020-06-12 DIAGNOSIS — E85.81 AL AMYLOIDOSIS: Primary | ICD-10-CM

## 2020-06-12 DIAGNOSIS — D47.2 SMOLDERING MULTIPLE MYELOMA: ICD-10-CM

## 2020-06-12 DIAGNOSIS — N18.9 ACUTE KIDNEY INJURY SUPERIMPOSED ON CHRONIC KIDNEY DISEASE: ICD-10-CM

## 2020-06-12 DIAGNOSIS — D64.81 ANEMIA ASSOCIATED WITH CHEMOTHERAPY: ICD-10-CM

## 2020-06-12 DIAGNOSIS — T45.1X5A ANEMIA ASSOCIATED WITH CHEMOTHERAPY: ICD-10-CM

## 2020-06-12 DIAGNOSIS — N17.9 ACUTE KIDNEY INJURY SUPERIMPOSED ON CHRONIC KIDNEY DISEASE: ICD-10-CM

## 2020-06-12 PROCEDURE — 99215 PR OFFICE/OUTPT VISIT, EST, LEVL V, 40-54 MIN: ICD-10-PCS | Mod: S$PBB,,, | Performed by: INTERNAL MEDICINE

## 2020-06-12 PROCEDURE — 99215 OFFICE O/P EST HI 40 MIN: CPT | Mod: S$PBB,,, | Performed by: INTERNAL MEDICINE

## 2020-06-12 PROCEDURE — 99214 OFFICE O/P EST MOD 30 MIN: CPT | Mod: PBBFAC,25 | Performed by: INTERNAL MEDICINE

## 2020-06-12 PROCEDURE — 99999 PR PBB SHADOW E&M-EST. PATIENT-LVL IV: ICD-10-PCS | Mod: PBBFAC,,, | Performed by: INTERNAL MEDICINE

## 2020-06-12 PROCEDURE — 99999 PR PBB SHADOW E&M-EST. PATIENT-LVL IV: CPT | Mod: PBBFAC,,, | Performed by: INTERNAL MEDICINE

## 2020-06-12 PROCEDURE — 96401 CHEMO ANTI-NEOPL SQ/IM: CPT

## 2020-06-12 PROCEDURE — 63600175 PHARM REV CODE 636 W HCPCS: Mod: JG | Performed by: INTERNAL MEDICINE

## 2020-06-12 RX ORDER — HEPARIN 100 UNIT/ML
500 SYRINGE INTRAVENOUS
Status: CANCELLED | OUTPATIENT
Start: 2020-06-12

## 2020-06-12 RX ORDER — BORTEZOMIB 3.5 MG/1
1.3 INJECTION, POWDER, LYOPHILIZED, FOR SOLUTION INTRAVENOUS; SUBCUTANEOUS
Status: COMPLETED | OUTPATIENT
Start: 2020-06-12 | End: 2020-06-12

## 2020-06-12 RX ORDER — SODIUM CHLORIDE 0.9 % (FLUSH) 0.9 %
10 SYRINGE (ML) INJECTION
Status: CANCELLED | OUTPATIENT
Start: 2020-06-12

## 2020-06-12 RX ORDER — SODIUM CHLORIDE 0.9 % (FLUSH) 0.9 %
10 SYRINGE (ML) INJECTION
Status: CANCELLED | OUTPATIENT
Start: 2020-06-26

## 2020-06-12 RX ORDER — BORTEZOMIB 3.5 MG/1
1.3 INJECTION, POWDER, LYOPHILIZED, FOR SOLUTION INTRAVENOUS; SUBCUTANEOUS
Status: CANCELLED | OUTPATIENT
Start: 2020-06-12

## 2020-06-12 RX ORDER — HEPARIN 100 UNIT/ML
500 SYRINGE INTRAVENOUS
Status: CANCELLED | OUTPATIENT
Start: 2020-06-26

## 2020-06-12 RX ORDER — BORTEZOMIB 3.5 MG/1
1.3 INJECTION, POWDER, LYOPHILIZED, FOR SOLUTION INTRAVENOUS; SUBCUTANEOUS
Status: CANCELLED | OUTPATIENT
Start: 2020-06-26

## 2020-06-12 RX ADMIN — BORTEZOMIB 2.8 MG: 3.5 INJECTION, POWDER, LYOPHILIZED, FOR SOLUTION INTRAVENOUS; SUBCUTANEOUS at 04:06

## 2020-06-12 NOTE — Clinical Note
Please schedule labs (CBC, CMP) and chemo on 6/26. Please schedule labs (CBC, CMP, SPEP, BROOKS, free light chains, immunoglobulins), MD/NP appt, and chemo for 7/10

## 2020-06-12 NOTE — NURSING
1620  Pt here for Velcade injection, no new complaints or concerns at present, reports tolerating treament; SQ injection to [facility], tolerated well; discussed when to contact MD, when to report to ER; AVS declined, pt verbalized understanding of all discussed and when to report next

## 2020-06-15 ENCOUNTER — TELEPHONE (OUTPATIENT)
Dept: TRANSPLANT | Facility: CLINIC | Age: 55
End: 2020-06-15

## 2020-06-15 DIAGNOSIS — I50.32 CHRONIC DIASTOLIC CONGESTIVE HEART FAILURE: ICD-10-CM

## 2020-06-15 RX ORDER — BUMETANIDE 2 MG/1
2 TABLET ORAL 2 TIMES DAILY
Qty: 60 TABLET | Refills: 3 | Status: SHIPPED | OUTPATIENT
Start: 2020-06-15 | End: 2020-11-03 | Stop reason: SDUPTHER

## 2020-06-18 NOTE — PROGRESS NOTES
HEMATOLOGIC MALIGNANCIES PROGRESS NOTE    IDENTIFYING STATEMENT   Roc Lai Jr. ([place]) is a 54 y.o. male with a  of 1965 from [place] with the diagnosis of AL amyloidosis.      ONCOLOGY HISTORY:    1. AL amyloid with cardiac involvement              A. 2016: Initial evaluation with Dr. Carmichael. Reported 18 months of progressive decline (previously ran 3 miles easily, now can only walk 10 minutes). M-protein 0.19 g/dl, kappa 1.37 mg/dl, lambda 50 mg/dl, ratio 0.03. Abdominal fat pad biopsy had been negative for presence of amyloid. Cardiac MRI suggestive of infiltrative cardiomyopathy.               B. 1/3/2017: Endomyocardial biopsy - involved by AL amyloid              C. 2017: BMBx - 60% cellular marrow with 50% plasma cells, consistent with plasma cell myeloma.               D. 2/15/2017 - 2017: Completed 4 cycles bortezomib chemotherapy on [id number] trial.               E. 2017: Bortezomib every 2 weeks x 2 doses              F. 2017: Change bortezomib to [id number] x 2 doses              G. 10/9/2017: Bortezomib q6 weeks; kappa 1.6 mg/dl, lambda 0.93 mg/dl, ratio 1.72              H. 2018: Bortezomib q8 weeks.               I. 2018: [id number] close out visit due to ineffectiveness.                J. 2020: kappa 2.73 mg/dl, lambda 28.33 mg/dl, ratio (lambda:kappa) 10.38, concerning for progressive disease     2. 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 Lai returns to clinic for follow-up of his AL amyloidosis. Since his last visit, he has had marked reduction in his lower extremity edema and feels much better. He reports that he is no longer as fatigued with activity. Still has restless legs, but he only started pramipexole yesterday. He is not sure what  drove the change in his weight.     Past Medical History, Past Social History and Past Family History have been reviewed and are unchanged except as noted in the interval history.    MEDICATIONS:     Current Outpatient Medications on File Prior to Visit   Medication Sig Dispense Refill    acyclovir (ZOVIRAX) 400 MG tablet TAKE 1 TABLET TWICE A  tablet 3    aspirin (ECOTRIN) 81 MG EC tablet Take 1 tablet (81 mg total) by mouth once daily. 150 tablet 2    BORTEZOMIB (VELCADE INJ) Inject as directed. Every 2 months      compress.stocking,knee,reg,med Misc 1 Pair by Misc.(Non-Drug; Combo Route) route once daily. 2 each 5    gabapentin (NEURONTIN) 100 MG capsule TAKE 3 CAPSULES EVERY EVENING 270 capsule 4    magnesium oxide (MAG-OX) 400 mg (241.3 mg magnesium) tablet Take 400 mg by mouth once daily.      pramipexole (MIRAPEX) 0.125 MG tablet Take 1 tablet (0.125 mg total) by mouth nightly. 30 tablet 2    spironolactone (ALDACTONE) 25 MG tablet Take 1 tablet (25 mg total) by mouth once daily. 90 tablet 3    sildenafil (VIAGRA) 50 MG tablet Take 1 tablet (50 mg total) by mouth daily as needed for Erectile Dysfunction. 7 tablet 2     No current facility-administered medications on file prior to visit.        ALLERGIES: Review of patient's allergies indicates:  No Known Allergies     ROS:       Review of Systems   Constitutional: Positive for fatigue. Negative for diaphoresis, fever and unexpected weight change.   HENT:   Negative for lump/mass and sore throat.    Eyes: Negative for icterus.   Respiratory: Negative for cough and shortness of breath.    Cardiovascular: Positive for leg swelling. Negative for chest pain and palpitations.   Gastrointestinal: Negative for abdominal distention, constipation, diarrhea, nausea and vomiting.   Genitourinary: Negative for dysuria and frequency.    Musculoskeletal: Negative for arthralgias, gait problem and myalgias.   Skin: Negative for rash.   Neurological: Positive  "for numbness. Negative for dizziness, gait problem and headaches.        Reporting restless legs when lower extremity edema is present   Hematological: Negative for adenopathy. Does not bruise/bleed easily.   Psychiatric/Behavioral: The patient is not nervous/anxious.         PHYSICAL EXAM:  Vitals:    06/12/20 1542   BP: (!) 101/55   Pulse: 67   Resp: 18   Temp: 98.3 °F (36.8 °C)   SpO2: 99%   Weight: 88.9 kg (195 lb 15.8 oz)   Height: 5' 11" (1.803 m)   PainSc: 0-No pain       KARNOFSKY PERFORMANCE STATUS 80%  ECOG 1    Physical Exam  Constitutional:       General: He is not in acute distress.     Appearance: He is well-developed.   HENT:      Head: Normocephalic and atraumatic.      Mouth/Throat:      Mouth: No oral lesions.   Eyes:      Conjunctiva/sclera: Conjunctivae normal.   Neck:      Thyroid: No thyromegaly.      Vascular: JVD present.   Cardiovascular:      Rate and Rhythm: Normal rate and regular rhythm.      Heart sounds: Normal heart sounds. No murmur.   Pulmonary:      Breath sounds: Normal breath sounds. No wheezing or rales.   Abdominal:      General: There is no distension.      Palpations: Abdomen is soft. There is no hepatomegaly, splenomegaly or mass.      Tenderness: There is no abdominal tenderness.   Musculoskeletal:      Right lower leg: Edema present.      Left lower leg: Edema present.   Lymphadenopathy:      Cervical: No cervical adenopathy.      Right cervical: No deep cervical adenopathy.     Left cervical: No deep cervical adenopathy.      Lower Body: No right inguinal adenopathy. No left inguinal adenopathy.   Skin:     Findings: No rash.   Neurological:      Mental Status: He is alert and oriented to person, place, and time.      Cranial Nerves: No cranial nerve deficit.      Coordination: Coordination normal.      Deep Tendon Reflexes: Reflexes are normal and symmetric.         LAB:   Results for orders placed or performed in visit on 06/12/20   CBC auto differential   Result Value " Ref Range    WBC 5.07 3.90 - 12.70 K/uL    RBC 3.14 (L) 4.60 - 6.20 M/uL    Hemoglobin 9.9 (L) 14.0 - 18.0 g/dL    Hematocrit 32.0 (L) 40.0 - 54.0 %    Mean Corpuscular Volume 102 (H) 82 - 98 fL    Mean Corpuscular Hemoglobin 31.5 (H) 27.0 - 31.0 pg    Mean Corpuscular Hemoglobin Conc 30.9 (L) 32.0 - 36.0 g/dL    RDW 14.0 11.5 - 14.5 %    Platelets 202 150 - 350 K/uL    MPV 9.8 9.2 - 12.9 fL    Immature Granulocytes 0.6 (H) 0.0 - 0.5 %    Gran # (ANC) 3.6 1.8 - 7.7 K/uL    Immature Grans (Abs) 0.03 0.00 - 0.04 K/uL    Lymph # 0.5 (L) 1.0 - 4.8 K/uL    Mono # 0.7 0.3 - 1.0 K/uL    Eos # 0.2 0.0 - 0.5 K/uL    Baso # 0.04 0.00 - 0.20 K/uL    nRBC 0 0 /100 WBC    Gran% 71.3 38.0 - 73.0 %    Lymph% 10.3 (L) 18.0 - 48.0 %    Mono% 14.0 4.0 - 15.0 %    Eosinophil% 3.0 0.0 - 8.0 %    Basophil% 0.8 0.0 - 1.9 %    Differential Method Automated    Comprehensive metabolic panel   Result Value Ref Range    Sodium 138 136 - 145 mmol/L    Potassium 4.2 3.5 - 5.1 mmol/L    Chloride 102 95 - 110 mmol/L    CO2 26 23 - 29 mmol/L    Glucose 96 70 - 110 mg/dL    BUN, Bld 36 (H) 6 - 20 mg/dL    Creatinine 2.0 (H) 0.5 - 1.4 mg/dL    Calcium 8.6 (L) 8.7 - 10.5 mg/dL    Total Protein 6.1 6.0 - 8.4 g/dL    Albumin 3.1 (L) 3.5 - 5.2 g/dL    Total Bilirubin 0.6 0.1 - 1.0 mg/dL    Alkaline Phosphatase 156 (H) 55 - 135 U/L    AST 22 10 - 40 U/L    ALT 14 10 - 44 U/L    Anion Gap 10 8 - 16 mmol/L    eGFR if African American 42.8 (A) >60 mL/min/1.73 m^2    eGFR if non  37.0 (A) >60 mL/min/1.73 m^2   Protein electrophoresis, serum   Result Value Ref Range    Protein, Serum 5.9 (L) 6.0 - 8.4 g/dL    Albumin grams/dl 3.49 3.35 - 5.55 g/dL    Alpha-1 grams/dl 0.48 (H) 0.17 - 0.41 g/dL    Alpha-2 grams/dl 0.68 0.43 - 0.99 g/dL    Beta grams/dl 0.75 0.50 - 1.10 g/dL    Gamma grams/dl 0.50 (L) 0.67 - 1.58 g/dL   Immunofixation electrophoresis   Result Value Ref Range    Immunofix Interp. SEE COMMENT    Immunoglobulin Free LT Chains Blood    Result Value Ref Range    Kappa Free Light Chains 2.50 (H) 0.33 - 1.94 mg/dL    Lambda Free Light Chains 4.26 (H) 0.57 - 2.63 mg/dL    Kappa/Lambda FLC Ratio 0.59 0.26 - 1.65   Immunoglobulins (IgG, IgA, IgM) Quantitative   Result Value Ref Range    IgG - Serum 498 (L) 650 - 1600 mg/dL    IgA 37 (L) 40 - 350 mg/dL    IgM 35 (L) 50 - 300 mg/dL   NT-Pro Natriuretic Peptide   Result Value Ref Range    NT-proBNP 1095 (H) <=64 pg/mL   Troponin T   Result Value Ref Range    Troponin T 68 (H) <=15 ng/L   Magnesium   Result Value Ref Range    Magnesium 2.2 1.6 - 2.6 mg/dL   Pathologist Interpretation BROOKS   Result Value Ref Range    Pathologist Interpretation BROOKS REVIEWED    Pathologist Interpretation SPE   Result Value Ref Range    Pathologist Interpretation SPE REVIEWED        PROBLEMS ASSESSED THIS VISIT:    1. AL amyloidosis    2. Anemia associated with chemotherapy    3. Smoldering multiple myeloma    4. Acute kidney injury superimposed on chronic kidney disease        PLAN:       AL amyloidosis  Smoldering multiple myeloma  Serologic studies show that he has ongoing VGPR to therapy. Mr. Lai continues to tolerate bortezomib well. We discussed at last visit that we will delay any further move toward transplant given the worsening heart failure, though he seems to be improving in this regard now. Cardiac biomarkers (NT-proBNP and troponin T) are improved.     Anemia due to antineoplastic chemotherapy  Moderate. Monitor.     Acute kidney injury on chronic kidney disease  Monitor closely with diuretic use.     Follow-up  Please schedule labs (CBC, CMP) and chemo on 6/26. Please schedule labs (CBC, CMP, SPEP, BROOKS, free light chains, immunoglobulins), MD/NP appt, and chemo for 7/10    Chris Atkins MD  Hematology and Stem Cell Transplant

## 2020-06-26 ENCOUNTER — INFUSION (OUTPATIENT)
Dept: INFUSION THERAPY | Facility: HOSPITAL | Age: 55
End: 2020-06-26
Payer: OTHER GOVERNMENT

## 2020-06-26 VITALS
RESPIRATION RATE: 18 BRPM | DIASTOLIC BLOOD PRESSURE: 58 MMHG | TEMPERATURE: 98 F | HEART RATE: 85 BPM | SYSTOLIC BLOOD PRESSURE: 115 MMHG

## 2020-06-26 DIAGNOSIS — E85.81 AL AMYLOIDOSIS: Primary | ICD-10-CM

## 2020-06-26 PROCEDURE — 63600175 PHARM REV CODE 636 W HCPCS: Mod: JG | Performed by: INTERNAL MEDICINE

## 2020-06-26 PROCEDURE — 96401 CHEMO ANTI-NEOPL SQ/IM: CPT

## 2020-06-26 RX ORDER — HEPARIN 100 UNIT/ML
500 SYRINGE INTRAVENOUS
Status: DISCONTINUED | OUTPATIENT
Start: 2020-06-26 | End: 2020-06-26 | Stop reason: HOSPADM

## 2020-06-26 RX ORDER — BORTEZOMIB 3.5 MG/1
1.3 INJECTION, POWDER, LYOPHILIZED, FOR SOLUTION INTRAVENOUS; SUBCUTANEOUS
Status: COMPLETED | OUTPATIENT
Start: 2020-06-26 | End: 2020-06-26

## 2020-06-26 RX ORDER — SODIUM CHLORIDE 0.9 % (FLUSH) 0.9 %
10 SYRINGE (ML) INJECTION
Status: DISCONTINUED | OUTPATIENT
Start: 2020-06-26 | End: 2020-06-26 | Stop reason: HOSPADM

## 2020-06-26 RX ADMIN — BORTEZOMIB 2.8 MG: 3.5 INJECTION, POWDER, LYOPHILIZED, FOR SOLUTION INTRAVENOUS; SUBCUTANEOUS at 04:06

## 2020-06-26 NOTE — NURSING
Pt tolerated Velcade injection to the abdomen today. NAD. declined AVS. Uses my Ochnser. Discharged home. Ambulated independently.

## 2020-07-07 ENCOUNTER — TELEPHONE (OUTPATIENT)
Dept: HEMATOLOGY/ONCOLOGY | Facility: CLINIC | Age: 55
End: 2020-07-07

## 2020-07-07 NOTE — TELEPHONE ENCOUNTER
----- Message from Dariana Linda sent at 7/7/2020  4:02 PM CDT -----  Contact: Mr. Lai tel: [phone]  Caller says he is touching base with you .  Has an upcoming appt. W/ you on Friday.    Wants you to be aware that he may have been around some people who were confirmed to have the COVID.   Pls call to discuss this further.    And will send a msg. Via the portal.   Pls call today.

## 2020-07-09 NOTE — PROGRESS NOTES
HEMATOLOGIC MALIGNANCIES PROGRESS NOTE    IDENTIFYING STATEMENT   Roc Lai Jr. ([place]) is a 54 y.o. male with a  of 1965 from [place] with the diagnosis of AL amyloidosis.      ONCOLOGY HISTORY:    1. AL amyloid with cardiac involvement              A. 2016: Initial evaluation with Dr. Carmichael. Reported 18 months of progressive decline (previously ran 3 miles easily, now can only walk 10 minutes). M-protein 0.19 g/dl, kappa 1.37 mg/dl, lambda 50 mg/dl, ratio 0.03. Abdominal fat pad biopsy had been negative for presence of amyloid. Cardiac MRI suggestive of infiltrative cardiomyopathy.               B. 1/3/2017: Endomyocardial biopsy - involved by AL amyloid              C. 2017: BMBx - 60% cellular marrow with 50% plasma cells, consistent with plasma cell myeloma.               D. 2/15/2017 - 2017: Completed 4 cycles bortezomib chemotherapy on [id number] trial.               E. 2017: Bortezomib every 2weeks x 2 doses              F. 2017: Change bortezomib to [id number] x 2 doses              G. 10/9/2017: Bortezomib q6 weeks; kappa 1.6 mg/dl, lambda 0.93 mg/dl, ratio 1.72              H. 2018: Bortezomib q8 weeks.               I. 2018: [id number] close out visit due to ineffectiveness.                J. 2020: kappa 2.73 mg/dl, lambda 28.33 mg/dl, ratio (lambda:kappa) 10.38, concerning for progressive disease             K. 2020: CyBorD started             L. 2020: kappa 2.50, lambda 4.25 gm/dl, ratio (lambda:kappa) 0.59, response to therapy            M. 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 Lai returns to clinic for follow-up of his AL amyloidosis and prior to  his C6 velcade inection. Tolerating therapy well. Since his last visit, he has had marked reduction in his lower extremity edema and feels much better. Using Compression stockings, with good result.  He reports that he is no longer as fatigued with activity. Denies Fever, SOB, chest pain,N/V/D/C.     Past Medical History, Past Social History and Past Family History have been reviewed and are unchanged except as noted in the interval history.    MEDICATIONS:     Current Outpatient Medications on File Prior to Visit   Medication Sig Dispense Refill    acyclovir (ZOVIRAX) 400 MG tablet TAKE 1 TABLET TWICE A  tablet 3    aspirin (ECOTRIN) 81 MG EC tablet Take 1 tablet (81 mg total) by mouth once daily. 150 tablet 2    BORTEZOMIB (VELCADE INJ) Inject as directed. Every 2 months      bumetanide (BUMEX) 2 MG tablet Take 1 tablet (2 mg total) by mouth 2 (two) times daily. 60 tablet 3    compress.stocking,knee,reg,med Misc 1 Pair by Misc.(Non-Drug; Combo Route) route once daily. 2 each 5    gabapentin (NEURONTIN) 100 MG capsule TAKE 3 CAPSULES EVERY EVENING 270 capsule 4    magnesium oxide (MAG-OX) 400 mg (241.3 mg magnesium) tablet Take 400 mg by mouth once daily.      pramipexole (MIRAPEX) 0.125 MG tablet Take 1 tablet (0.125 mg total) by mouth nightly. 30 tablet 2    spironolactone (ALDACTONE) 25 MG tablet Take 1 tablet (25 mg total) by mouth once daily. 90 tablet 3    sildenafil (VIAGRA) 50 MG tablet Take 1 tablet (50 mg total) by mouth daily as needed for Erectile Dysfunction. 7 tablet 2     Current Facility-Administered Medications on File Prior to Visit   Medication Dose Route Frequency Provider Last Rate Last Dose    [COMPLETED] bortezomib (VELCADE) injection 2.8 mg  1.3 mg/m2 (Order-Specific) Subcutaneous 1 time in Clinic/HOD Chris Atkins MD   2.8 mg at 07/10/20 1607       ALLERGIES: Review of patient's allergies indicates:  No Known Allergies     ROS:       Review of Systems   Constitutional:  "Positive for fatigue. Negative for diaphoresis, fever and unexpected weight change.   HENT:   Negative for lump/mass and sore throat.    Eyes: Negative for icterus.   Respiratory: Negative for cough and shortness of breath.    Cardiovascular: Positive for leg swelling. Negative for chest pain and palpitations.   Gastrointestinal: Negative for abdominal distention, constipation, diarrhea, nausea and vomiting.   Genitourinary: Negative for dysuria and frequency.    Musculoskeletal: Negative for arthralgias, gait problem and myalgias.   Skin: Negative for rash.   Neurological: Positive for numbness. Negative for dizziness, gait problem and headaches.   Hematological: Negative for adenopathy. Does not bruise/bleed easily.   Psychiatric/Behavioral: The patient is not nervous/anxious.         PHYSICAL EXAM:  Vitals:    07/10/20 1617   BP: 114/69   Pulse: 75   Resp: 16   SpO2: 100%   Weight: 86.5 kg (190 lb 11.2 oz)   Height: 5' 11" (1.803 m)   PainSc: 0-No pain       KARNOFSKY PERFORMANCE STATUS 80%  ECOG 1    Physical Exam  Constitutional:       General: He is not in acute distress.     Appearance: He is well-developed.   HENT:      Head: Normocephalic and atraumatic.      Mouth/Throat:      Mouth: No oral lesions.   Eyes:      Conjunctiva/sclera: Conjunctivae normal.   Neck:      Thyroid: No thyromegaly.      Vascular: JVD present.   Cardiovascular:      Rate and Rhythm: Normal rate and regular rhythm.      Heart sounds: Normal heart sounds. No murmur.   Pulmonary:      Breath sounds: Normal breath sounds. No wheezing or rales.   Abdominal:      General: There is no distension.      Palpations: Abdomen is soft. There is no hepatomegaly, splenomegaly or mass.      Tenderness: There is no abdominal tenderness.   Musculoskeletal:      Right lower leg: Edema present.      Left lower leg: Edema present.   Lymphadenopathy:      Cervical: No cervical adenopathy.      Right cervical: No deep cervical adenopathy.     Left " cervical: No deep cervical adenopathy.      Lower Body: No right inguinal adenopathy. No left inguinal adenopathy.   Skin:     Findings: No rash.   Neurological:      Mental Status: He is alert and oriented to person, place, and time.      Cranial Nerves: No cranial nerve deficit.      Coordination: Coordination normal.      Deep Tendon Reflexes: Reflexes are normal and symmetric.         LAB:   Results for orders placed or performed in visit on 07/10/20   CBC auto differential   Result Value Ref Range    WBC 4.86 3.90 - 12.70 K/uL    RBC 3.41 (L) 4.60 - 6.20 M/uL    Hemoglobin 10.0 (L) 14.0 - 18.0 g/dL    Hematocrit 32.8 (L) 40.0 - 54.0 %    Mean Corpuscular Volume 96 82 - 98 fL    Mean Corpuscular Hemoglobin 29.3 27.0 - 31.0 pg    Mean Corpuscular Hemoglobin Conc 30.5 (L) 32.0 - 36.0 g/dL    RDW 14.7 (H) 11.5 - 14.5 %    Platelets 195 150 - 350 K/uL    MPV 9.4 9.2 - 12.9 fL    Immature Granulocytes 0.6 (H) 0.0 - 0.5 %    Gran # (ANC) 3.2 1.8 - 7.7 K/uL    Immature Grans (Abs) 0.03 0.00 - 0.04 K/uL    Lymph # 0.6 (L) 1.0 - 4.8 K/uL    Mono # 0.9 0.3 - 1.0 K/uL    Eos # 0.1 0.0 - 0.5 K/uL    Baso # 0.05 0.00 - 0.20 K/uL    nRBC 0 0 /100 WBC    Gran% 65.6 38.0 - 73.0 %    Lymph% 12.6 (L) 18.0 - 48.0 %    Mono% 17.5 (H) 4.0 - 15.0 %    Eosinophil% 2.7 0.0 - 8.0 %    Basophil% 1.0 0.0 - 1.9 %    Differential Method Automated    Comprehensive metabolic panel   Result Value Ref Range    Sodium 139 136 - 145 mmol/L    Potassium 4.4 3.5 - 5.1 mmol/L    Chloride 103 95 - 110 mmol/L    CO2 29 23 - 29 mmol/L    Glucose 101 70 - 110 mg/dL    BUN, Bld 29 (H) 6 - 20 mg/dL    Creatinine 1.5 (H) 0.5 - 1.4 mg/dL    Calcium 9.3 8.7 - 10.5 mg/dL    Total Protein 6.3 6.0 - 8.4 g/dL    Albumin 3.1 (L) 3.5 - 5.2 g/dL    Total Bilirubin 0.7 0.1 - 1.0 mg/dL    Alkaline Phosphatase 142 (H) 55 - 135 U/L    AST 23 10 - 40 U/L    ALT 17 10 - 44 U/L    Anion Gap 7 (L) 8 - 16 mmol/L    eGFR if African American >60.0 >60 mL/min/1.73 m^2    eGFR  if non  52.4 (A) >60 mL/min/1.73 m^2   Protein electrophoresis, serum   Result Value Ref Range    Protein, Serum 5.7 (L) 6.0 - 8.4 g/dL   Immunoglobulins (IgG, IgA, IgM) Quantitative   Result Value Ref Range    IgG - Serum 544 (L) 650 - 1600 mg/dL    IgA 31 (L) 40 - 350 mg/dL    IgM 41 (L) 50 - 300 mg/dL       PROBLEMS ASSESSED THIS VISIT:    1. AL amyloidosis    2. Smoldering multiple myeloma    3. Anemia associated with chemotherapy    4. Acute kidney injury superimposed on chronic kidney disease    5. Restless leg syndrome, uncontrolled    6. Leg swelling        PLAN:       AL amyloidosis  Smoldering multiple myeloma  Serologic studies show that he has ongoing VGPR to therapy. Mr. Lai continues to tolerate bortezomib well. We discussed at last visit that we will delay any further move toward transplant given the worsening heart failure, though he seems to be improving in this regard now. Cardiac biomarkers (NT-proBNP and troponin T) are improved. Completed 4 cycles of CyBorD, now on maintenance Velcade, started C2 of maintenance Velcade today.    Anemia due to antineoplastic chemotherapy  Moderate. Monitor.     Acute kidney injury on chronic kidney disease  Monitor closely with diuretic use.     BLE Swelling/Restless leg syndrome  Using compression stockings per cardiology, swelling improved. Request for thigh high compression stocking today.   Order for thigh high compression stockings  Continue on Bumex 2 mg BID  Cardiology follow up  No c/o restless leg today    Follow-up  Please schedule labs (CBC, CMP) and Velcade on 7/24. Please schedule labs (CBC, CMP, SPEP, BROOKS, free light chains, immunoglobulins), appt with Dr. Atkins and Velcade on 8/7    Tarsha Tan NP  Hematology and Stem Cell Transplant

## 2020-07-10 ENCOUNTER — OFFICE VISIT (OUTPATIENT)
Dept: HEMATOLOGY/ONCOLOGY | Facility: CLINIC | Age: 55
End: 2020-07-10
Payer: OTHER GOVERNMENT

## 2020-07-10 ENCOUNTER — INFUSION (OUTPATIENT)
Dept: INFUSION THERAPY | Facility: HOSPITAL | Age: 55
End: 2020-07-10
Attending: INTERNAL MEDICINE
Payer: OTHER GOVERNMENT

## 2020-07-10 VITALS
HEART RATE: 75 BPM | SYSTOLIC BLOOD PRESSURE: 111 MMHG | DIASTOLIC BLOOD PRESSURE: 61 MMHG | TEMPERATURE: 99 F | HEIGHT: 71 IN | BODY MASS INDEX: 26.76 KG/M2 | HEIGHT: 71 IN | OXYGEN SATURATION: 100 % | SYSTOLIC BLOOD PRESSURE: 114 MMHG | RESPIRATION RATE: 16 BRPM | HEART RATE: 85 BPM | RESPIRATION RATE: 18 BRPM | WEIGHT: 190.69 LBS | BODY MASS INDEX: 26.7 KG/M2 | DIASTOLIC BLOOD PRESSURE: 69 MMHG | WEIGHT: 191.13 LBS

## 2020-07-10 DIAGNOSIS — N17.9 ACUTE KIDNEY INJURY SUPERIMPOSED ON CHRONIC KIDNEY DISEASE: ICD-10-CM

## 2020-07-10 DIAGNOSIS — E85.81 AL AMYLOIDOSIS: Primary | ICD-10-CM

## 2020-07-10 DIAGNOSIS — D64.81 ANEMIA ASSOCIATED WITH CHEMOTHERAPY: ICD-10-CM

## 2020-07-10 DIAGNOSIS — M79.89 LEG SWELLING: ICD-10-CM

## 2020-07-10 DIAGNOSIS — G25.81 RESTLESS LEG SYNDROME, UNCONTROLLED: ICD-10-CM

## 2020-07-10 DIAGNOSIS — N18.9 ACUTE KIDNEY INJURY SUPERIMPOSED ON CHRONIC KIDNEY DISEASE: ICD-10-CM

## 2020-07-10 DIAGNOSIS — T45.1X5A ANEMIA ASSOCIATED WITH CHEMOTHERAPY: ICD-10-CM

## 2020-07-10 DIAGNOSIS — D47.2 SMOLDERING MULTIPLE MYELOMA: ICD-10-CM

## 2020-07-10 PROCEDURE — 63600175 PHARM REV CODE 636 W HCPCS: Mod: JG | Performed by: INTERNAL MEDICINE

## 2020-07-10 PROCEDURE — 99999 PR PBB SHADOW E&M-EST. PATIENT-LVL IV: CPT | Mod: PBBFAC,,, | Performed by: NURSE PRACTITIONER

## 2020-07-10 PROCEDURE — 99999 PR PBB SHADOW E&M-EST. PATIENT-LVL IV: ICD-10-PCS | Mod: PBBFAC,,, | Performed by: NURSE PRACTITIONER

## 2020-07-10 PROCEDURE — 96401 CHEMO ANTI-NEOPL SQ/IM: CPT

## 2020-07-10 PROCEDURE — 99214 OFFICE O/P EST MOD 30 MIN: CPT | Mod: PBBFAC,25 | Performed by: NURSE PRACTITIONER

## 2020-07-10 PROCEDURE — 99214 PR OFFICE/OUTPT VISIT, EST, LEVL IV, 30-39 MIN: ICD-10-PCS | Mod: S$PBB,,, | Performed by: NURSE PRACTITIONER

## 2020-07-10 PROCEDURE — 99214 OFFICE O/P EST MOD 30 MIN: CPT | Mod: S$PBB,,, | Performed by: NURSE PRACTITIONER

## 2020-07-10 RX ORDER — SODIUM CHLORIDE 0.9 % (FLUSH) 0.9 %
10 SYRINGE (ML) INJECTION
Status: CANCELLED | OUTPATIENT
Start: 2020-07-24

## 2020-07-10 RX ORDER — SODIUM CHLORIDE 0.9 % (FLUSH) 0.9 %
10 SYRINGE (ML) INJECTION
Status: CANCELLED | OUTPATIENT
Start: 2020-07-10

## 2020-07-10 RX ORDER — BORTEZOMIB 3.5 MG/1
1.3 INJECTION, POWDER, LYOPHILIZED, FOR SOLUTION INTRAVENOUS; SUBCUTANEOUS
Status: CANCELLED | OUTPATIENT
Start: 2020-07-10

## 2020-07-10 RX ORDER — BORTEZOMIB 3.5 MG/1
1.3 INJECTION, POWDER, LYOPHILIZED, FOR SOLUTION INTRAVENOUS; SUBCUTANEOUS
Status: COMPLETED | OUTPATIENT
Start: 2020-07-10 | End: 2020-07-10

## 2020-07-10 RX ORDER — HEPARIN 100 UNIT/ML
500 SYRINGE INTRAVENOUS
Status: CANCELLED | OUTPATIENT
Start: 2020-07-24

## 2020-07-10 RX ORDER — HEPARIN 100 UNIT/ML
500 SYRINGE INTRAVENOUS
Status: CANCELLED | OUTPATIENT
Start: 2020-07-10

## 2020-07-10 RX ORDER — BORTEZOMIB 3.5 MG/1
1.3 INJECTION, POWDER, LYOPHILIZED, FOR SOLUTION INTRAVENOUS; SUBCUTANEOUS
Status: CANCELLED | OUTPATIENT
Start: 2020-07-24

## 2020-07-10 RX ADMIN — BORTEZOMIB 2.8 MG: 3.5 INJECTION, POWDER, LYOPHILIZED, FOR SOLUTION INTRAVENOUS; SUBCUTANEOUS at 04:07

## 2020-07-10 NOTE — NURSING
Patient received Velcade injection SQ to ABD.  Tolerated well.  Vitals stable.  No apparent distress noted.  Patient ambulated off unit with steady gait.

## 2020-07-10 NOTE — Clinical Note
Please schedule labs (CBC, CMP) and Velcade on 7/24. Please schedule labs (CBC, CMP, SPEP, BROOKS, free light chains, immunoglobulins), appt with Dr. Atkins and Velcade on 8/7

## 2020-07-24 ENCOUNTER — INFUSION (OUTPATIENT)
Dept: INFUSION THERAPY | Facility: HOSPITAL | Age: 55
End: 2020-07-24
Attending: INTERNAL MEDICINE
Payer: OTHER GOVERNMENT

## 2020-07-24 ENCOUNTER — LAB VISIT (OUTPATIENT)
Dept: LAB | Facility: HOSPITAL | Age: 55
End: 2020-07-24
Payer: OTHER GOVERNMENT

## 2020-07-24 DIAGNOSIS — E85.81 AL AMYLOIDOSIS: Primary | ICD-10-CM

## 2020-07-24 DIAGNOSIS — E85.81 AL AMYLOIDOSIS: ICD-10-CM

## 2020-07-24 LAB
ALBUMIN SERPL BCP-MCNC: 3.2 G/DL (ref 3.5–5.2)
ALP SERPL-CCNC: 156 U/L (ref 55–135)
ALT SERPL W/O P-5'-P-CCNC: 19 U/L (ref 10–44)
ANION GAP SERPL CALC-SCNC: 8 MMOL/L (ref 8–16)
AST SERPL-CCNC: 25 U/L (ref 10–40)
BASOPHILS # BLD AUTO: 0.03 K/UL (ref 0–0.2)
BASOPHILS NFR BLD: 0.5 % (ref 0–1.9)
BILIRUB SERPL-MCNC: 0.6 MG/DL (ref 0.1–1)
BUN SERPL-MCNC: 30 MG/DL (ref 6–20)
CALCIUM SERPL-MCNC: 9.3 MG/DL (ref 8.7–10.5)
CHLORIDE SERPL-SCNC: 100 MMOL/L (ref 95–110)
CO2 SERPL-SCNC: 28 MMOL/L (ref 23–29)
CREAT SERPL-MCNC: 1.6 MG/DL (ref 0.5–1.4)
DIFFERENTIAL METHOD: ABNORMAL
EOSINOPHIL # BLD AUTO: 0.2 K/UL (ref 0–0.5)
EOSINOPHIL NFR BLD: 2.5 % (ref 0–8)
ERYTHROCYTE [DISTWIDTH] IN BLOOD BY AUTOMATED COUNT: 15 % (ref 11.5–14.5)
EST. GFR  (AFRICAN AMERICAN): 55.6 ML/MIN/1.73 M^2
EST. GFR  (NON AFRICAN AMERICAN): 48.1 ML/MIN/1.73 M^2
GLUCOSE SERPL-MCNC: 93 MG/DL (ref 70–110)
HCT VFR BLD AUTO: 33.5 % (ref 40–54)
HGB BLD-MCNC: 10.6 G/DL (ref 14–18)
IMM GRANULOCYTES # BLD AUTO: 0.02 K/UL (ref 0–0.04)
IMM GRANULOCYTES NFR BLD AUTO: 0.3 % (ref 0–0.5)
LYMPHOCYTES # BLD AUTO: 0.6 K/UL (ref 1–4.8)
LYMPHOCYTES NFR BLD: 10.2 % (ref 18–48)
MCH RBC QN AUTO: 29.4 PG (ref 27–31)
MCHC RBC AUTO-ENTMCNC: 31.6 G/DL (ref 32–36)
MCV RBC AUTO: 93 FL (ref 82–98)
MONOCYTES # BLD AUTO: 0.8 K/UL (ref 0.3–1)
MONOCYTES NFR BLD: 13.6 % (ref 4–15)
NEUTROPHILS # BLD AUTO: 4.4 K/UL (ref 1.8–7.7)
NEUTROPHILS NFR BLD: 72.9 % (ref 38–73)
NRBC BLD-RTO: 0 /100 WBC
PLATELET # BLD AUTO: 194 K/UL (ref 150–350)
PMV BLD AUTO: 8.8 FL (ref 9.2–12.9)
POTASSIUM SERPL-SCNC: 4.4 MMOL/L (ref 3.5–5.1)
PROT SERPL-MCNC: 6.2 G/DL (ref 6–8.4)
RBC # BLD AUTO: 3.6 M/UL (ref 4.6–6.2)
SODIUM SERPL-SCNC: 136 MMOL/L (ref 136–145)
WBC # BLD AUTO: 6.09 K/UL (ref 3.9–12.7)

## 2020-07-24 PROCEDURE — 80053 COMPREHEN METABOLIC PANEL: CPT

## 2020-07-24 PROCEDURE — 63600175 PHARM REV CODE 636 W HCPCS: Mod: JG | Performed by: INTERNAL MEDICINE

## 2020-07-24 PROCEDURE — 85025 COMPLETE CBC W/AUTO DIFF WBC: CPT

## 2020-07-24 PROCEDURE — 96401 CHEMO ANTI-NEOPL SQ/IM: CPT

## 2020-07-24 PROCEDURE — 36415 COLL VENOUS BLD VENIPUNCTURE: CPT

## 2020-07-24 RX ORDER — BORTEZOMIB 3.5 MG/1
1.3 INJECTION, POWDER, LYOPHILIZED, FOR SOLUTION INTRAVENOUS; SUBCUTANEOUS
Status: COMPLETED | OUTPATIENT
Start: 2020-07-24 | End: 2020-07-24

## 2020-07-24 RX ADMIN — BORTEZOMIB 2.8 MG: 3.5 INJECTION, POWDER, LYOPHILIZED, FOR SOLUTION INTRAVENOUS; SUBCUTANEOUS at 03:07

## 2020-07-27 ENCOUNTER — OFFICE VISIT (OUTPATIENT)
Dept: URGENT CARE | Facility: CLINIC | Age: 55
End: 2020-07-27
Payer: OTHER GOVERNMENT

## 2020-07-27 VITALS
HEIGHT: 71 IN | DIASTOLIC BLOOD PRESSURE: 76 MMHG | WEIGHT: 190 LBS | RESPIRATION RATE: 10 BRPM | SYSTOLIC BLOOD PRESSURE: 120 MMHG | HEART RATE: 84 BPM | BODY MASS INDEX: 26.6 KG/M2 | OXYGEN SATURATION: 96 % | TEMPERATURE: 98 F

## 2020-07-27 DIAGNOSIS — J32.9 BACTERIAL SINUSITIS: Primary | ICD-10-CM

## 2020-07-27 DIAGNOSIS — R05.9 COUGH: ICD-10-CM

## 2020-07-27 DIAGNOSIS — R09.81 NASAL CONGESTION: ICD-10-CM

## 2020-07-27 DIAGNOSIS — B96.89 BACTERIAL SINUSITIS: Primary | ICD-10-CM

## 2020-07-27 PROCEDURE — 99214 PR OFFICE/OUTPT VISIT, EST, LEVL IV, 30-39 MIN: ICD-10-PCS | Mod: S$GLB,,, | Performed by: NURSE PRACTITIONER

## 2020-07-27 PROCEDURE — 99214 OFFICE O/P EST MOD 30 MIN: CPT | Mod: S$GLB,,, | Performed by: NURSE PRACTITIONER

## 2020-07-27 RX ORDER — AMOXICILLIN AND CLAVULANATE POTASSIUM 875; 125 MG/1; MG/1
1 TABLET, FILM COATED ORAL EVERY 12 HOURS
Qty: 14 TABLET | Refills: 0 | Status: SHIPPED | OUTPATIENT
Start: 2020-07-27 | End: 2020-07-27

## 2020-07-27 RX ORDER — AMOXICILLIN AND CLAVULANATE POTASSIUM 875; 125 MG/1; MG/1
1 TABLET, FILM COATED ORAL EVERY 12 HOURS
Qty: 14 TABLET | Refills: 0 | Status: SHIPPED | OUTPATIENT
Start: 2020-07-27 | End: 2020-08-03

## 2020-07-27 NOTE — PROGRESS NOTES
"Subjective:       Patient ID: Roc Lai Jr. is a 55 y.o. male.    Vitals:  height is 5' 11" (1.803 m) and weight is 86.2 kg (190 lb). His skin temperature is 97.6 °F (36.4 °C). His blood pressure is 120/76 and his pulse is 84. His respiration is 10 and oxygen saturation is 96%.     Chief Complaint: Cough    Pt c/o nasal congestion and  productive cough; x 12 days     Cough  This is a new problem. The current episode started 1 to 4 weeks ago. The problem has been unchanged. The cough is productive of sputum. Pertinent negatives include no chills, ear pain, eye redness, fever, hemoptysis, myalgias, rash, sore throat, shortness of breath or wheezing. Nothing aggravates the symptoms. Treatments tried: mucinex  The treatment provided moderate relief.       Constitution: Negative for chills, sweating, fatigue and fever.   HENT: Negative for ear pain, congestion, sinus pain, sinus pressure, sore throat and voice change.    Neck: Negative for painful lymph nodes.   Eyes: Negative for eye redness.   Respiratory: Positive for cough. Negative for chest tightness, sputum production, bloody sputum, COPD, shortness of breath, stridor, wheezing and asthma.    Gastrointestinal: Negative for nausea and vomiting.   Musculoskeletal: Negative for muscle ache.   Skin: Negative for rash.   Allergic/Immunologic: Negative for seasonal allergies and asthma.   Hematologic/Lymphatic: Negative for swollen lymph nodes.       Objective:      Physical Exam   Constitutional: He is oriented to person, place, and time. He appears well-developed. He is cooperative.  Non-toxic appearance. He does not appear ill. No distress.   HENT:   Head: Normocephalic and atraumatic.   Ears:   Right Ear: Hearing, tympanic membrane, external ear and ear canal normal.   Left Ear: Hearing, tympanic membrane, external ear and ear canal normal.   Nose: No mucosal edema, rhinorrhea or nasal deformity. No epistaxis. Right sinus exhibits maxillary sinus tenderness. " Right sinus exhibits no frontal sinus tenderness. Left sinus exhibits maxillary sinus tenderness. Left sinus exhibits no frontal sinus tenderness.   Mouth/Throat: Uvula is midline, oropharynx is clear and moist and mucous membranes are normal. No trismus in the jaw. Normal dentition. No uvula swelling. No oropharyngeal exudate, posterior oropharyngeal edema or posterior oropharyngeal erythema.   Eyes: Conjunctivae and lids are normal. No scleral icterus.   Neck: Trachea normal, full passive range of motion without pain and phonation normal. Neck supple. No neck rigidity. No edema and no erythema present.   Cardiovascular: Normal rate, regular rhythm, normal heart sounds and normal pulses.   Pulmonary/Chest: Effort normal and breath sounds normal. No respiratory distress. He has no decreased breath sounds. He has no rhonchi.   Abdominal: Normal appearance.   Musculoskeletal: Normal range of motion.         General: No deformity.   Neurological: He is alert and oriented to person, place, and time. He exhibits normal muscle tone. Coordination normal.   Skin: Skin is warm, dry, intact, not diaphoretic and not pale. Psychiatric: His speech is normal and behavior is normal. Judgment and thought content normal.   Nursing note and vitals reviewed.        Assessment:       1. Bacterial sinusitis        Plan:     the patient states that he has had sinusitis for approximately 12 days.  States he has been trying over-the-counter treatment and nothing is working.  Patient does have some sinus tenderness upon examination.  Patient states he has been congested.  Patient states he has taken Augmentin without difficulty in the past.    Bacterial sinusitis  -     Discontinue: amoxicillin-clavulanate 875-125mg (AUGMENTIN) 875-125 mg per tablet; Take 1 tablet by mouth every 12 (twelve) hours. for 7 days  Dispense: 14 tablet; Refill: 0  -     amoxicillin-clavulanate 875-125mg (AUGMENTIN) 875-125 mg per tablet; Take 1 tablet by mouth  "every 12 (twelve) hours. for 7 days  Dispense: 14 tablet; Refill: 0          Patient Instructions                                                             Sinusitis   If your condition worsens or fails to improve we recommend that you receive another evaluation at the ER immediately or contact your PCP to discuss your concerns or return here. You must understand that you've received an urgent care treatment only and that you may be released before all your medical problems are known or treated. You the patient will arrange for followup care as instructed.   If we discussed that I think your illness is viral it will not respond to antibiotics and it will last 10-14 days. However, if over the next few days the symptoms worsen start the antibiotics I have given you.   -  If we discussed that you require antibiotics start them now and take them to completion.   -  If you are female and on BCP and do take the antibiotics, use additional methods to prevent pregnancy while on the antibiotics and for one cycle after.   -  Flonase (fluticasone) is a nasal spray which is available over the counter and may help with your symptoms   -  Zyrtec D, Claritin D or allegra D can also help with symptoms of congestion and drainage.   -  If you have hypertension avoid using the "D" which is the decongestant. Instead, you can use Coricidin HBP for your cold and cough symptoms.     -  If you just have drainage you can take plain Zyrtec, Claritin or Allegra   -  If you just have a congested feeling you can take pseudoephedrine (unless you have high blood pressure) which you have to sign for behind the counter. Do not buy the phenylephrine which is on the shelf as it is not effective   -  Rest and fluids are also important.   -  Tylenol or ibuprofen can also be used as directed for pain unless you have an allergy to them or medical condition such as stomach ulcers, kidney or liver disease or blood thinners etc for which you should not be " taking these type of medications.   -  If you are flying in the next few days Afrin nose drops for the airplane flight upon take off and landing may help. Other than at those times refrain from using afrin.   -  If you were prescribed a narcotic do not drive or operate heavy machinery while taking these medications.

## 2020-08-07 ENCOUNTER — INFUSION (OUTPATIENT)
Dept: INFUSION THERAPY | Facility: HOSPITAL | Age: 55
End: 2020-08-07
Attending: INTERNAL MEDICINE
Payer: OTHER GOVERNMENT

## 2020-08-07 ENCOUNTER — OFFICE VISIT (OUTPATIENT)
Dept: HEMATOLOGY/ONCOLOGY | Facility: CLINIC | Age: 55
End: 2020-08-07
Payer: OTHER GOVERNMENT

## 2020-08-07 ENCOUNTER — LAB VISIT (OUTPATIENT)
Dept: LAB | Facility: HOSPITAL | Age: 55
End: 2020-08-07
Payer: OTHER GOVERNMENT

## 2020-08-07 VITALS
DIASTOLIC BLOOD PRESSURE: 60 MMHG | HEART RATE: 86 BPM | WEIGHT: 188.5 LBS | RESPIRATION RATE: 18 BRPM | SYSTOLIC BLOOD PRESSURE: 107 MMHG | TEMPERATURE: 99 F | BODY MASS INDEX: 26.39 KG/M2 | HEIGHT: 71 IN | OXYGEN SATURATION: 99 %

## 2020-08-07 DIAGNOSIS — E85.81 AL AMYLOIDOSIS: ICD-10-CM

## 2020-08-07 DIAGNOSIS — D64.81 ANEMIA ASSOCIATED WITH CHEMOTHERAPY: ICD-10-CM

## 2020-08-07 DIAGNOSIS — N18.9 ACUTE KIDNEY INJURY SUPERIMPOSED ON CHRONIC KIDNEY DISEASE: ICD-10-CM

## 2020-08-07 DIAGNOSIS — N17.9 ACUTE KIDNEY INJURY SUPERIMPOSED ON CHRONIC KIDNEY DISEASE: ICD-10-CM

## 2020-08-07 DIAGNOSIS — E85.81 AL AMYLOIDOSIS: Primary | ICD-10-CM

## 2020-08-07 DIAGNOSIS — D47.2 SMOLDERING MULTIPLE MYELOMA: ICD-10-CM

## 2020-08-07 DIAGNOSIS — T45.1X5A ANEMIA ASSOCIATED WITH CHEMOTHERAPY: ICD-10-CM

## 2020-08-07 LAB
ALBUMIN SERPL BCP-MCNC: 3.1 G/DL (ref 3.5–5.2)
ALP SERPL-CCNC: 147 U/L (ref 55–135)
ALT SERPL W/O P-5'-P-CCNC: 15 U/L (ref 10–44)
ANION GAP SERPL CALC-SCNC: 7 MMOL/L (ref 8–16)
AST SERPL-CCNC: 27 U/L (ref 10–40)
BASOPHILS # BLD AUTO: 0.04 K/UL (ref 0–0.2)
BASOPHILS NFR BLD: 0.7 % (ref 0–1.9)
BILIRUB SERPL-MCNC: 0.7 MG/DL (ref 0.1–1)
BUN SERPL-MCNC: 33 MG/DL (ref 6–20)
CALCIUM SERPL-MCNC: 8.9 MG/DL (ref 8.7–10.5)
CHLORIDE SERPL-SCNC: 104 MMOL/L (ref 95–110)
CO2 SERPL-SCNC: 28 MMOL/L (ref 23–29)
CREAT SERPL-MCNC: 1.9 MG/DL (ref 0.5–1.4)
DIFFERENTIAL METHOD: ABNORMAL
EOSINOPHIL # BLD AUTO: 0.2 K/UL (ref 0–0.5)
EOSINOPHIL NFR BLD: 3.7 % (ref 0–8)
ERYTHROCYTE [DISTWIDTH] IN BLOOD BY AUTOMATED COUNT: 15.3 % (ref 11.5–14.5)
EST. GFR  (AFRICAN AMERICAN): 45.2 ML/MIN/1.73 M^2
EST. GFR  (NON AFRICAN AMERICAN): 39.1 ML/MIN/1.73 M^2
GLUCOSE SERPL-MCNC: 106 MG/DL (ref 70–110)
HCT VFR BLD AUTO: 33.2 % (ref 40–54)
HGB BLD-MCNC: 10.1 G/DL (ref 14–18)
IGA SERPL-MCNC: 32 MG/DL (ref 40–350)
IGG SERPL-MCNC: 582 MG/DL (ref 650–1600)
IGM SERPL-MCNC: 32 MG/DL (ref 50–300)
IMM GRANULOCYTES # BLD AUTO: 0.03 K/UL (ref 0–0.04)
IMM GRANULOCYTES NFR BLD AUTO: 0.6 % (ref 0–0.5)
LYMPHOCYTES # BLD AUTO: 0.6 K/UL (ref 1–4.8)
LYMPHOCYTES NFR BLD: 11.5 % (ref 18–48)
MCH RBC QN AUTO: 28.7 PG (ref 27–31)
MCHC RBC AUTO-ENTMCNC: 30.4 G/DL (ref 32–36)
MCV RBC AUTO: 94 FL (ref 82–98)
MONOCYTES # BLD AUTO: 0.6 K/UL (ref 0.3–1)
MONOCYTES NFR BLD: 11.9 % (ref 4–15)
NEUTROPHILS # BLD AUTO: 3.9 K/UL (ref 1.8–7.7)
NEUTROPHILS NFR BLD: 71.6 % (ref 38–73)
NRBC BLD-RTO: 0 /100 WBC
PLATELET # BLD AUTO: 205 K/UL (ref 150–350)
PMV BLD AUTO: 9.4 FL (ref 9.2–12.9)
POTASSIUM SERPL-SCNC: 4.7 MMOL/L (ref 3.5–5.1)
PROT SERPL-MCNC: 6 G/DL (ref 6–8.4)
RBC # BLD AUTO: 3.52 M/UL (ref 4.6–6.2)
SODIUM SERPL-SCNC: 139 MMOL/L (ref 136–145)
WBC # BLD AUTO: 5.4 K/UL (ref 3.9–12.7)

## 2020-08-07 PROCEDURE — 96401 CHEMO ANTI-NEOPL SQ/IM: CPT

## 2020-08-07 PROCEDURE — 86334 IMMUNOFIX E-PHORESIS SERUM: CPT | Mod: 26,,, | Performed by: PATHOLOGY

## 2020-08-07 PROCEDURE — 85025 COMPLETE CBC W/AUTO DIFF WBC: CPT

## 2020-08-07 PROCEDURE — 86334 PATHOLOGIST INTERPRETATION IFE: ICD-10-PCS | Mod: 26,,, | Performed by: PATHOLOGY

## 2020-08-07 PROCEDURE — 99215 PR OFFICE/OUTPT VISIT, EST, LEVL V, 40-54 MIN: ICD-10-PCS | Mod: S$PBB,,, | Performed by: INTERNAL MEDICINE

## 2020-08-07 PROCEDURE — 84165 PROTEIN E-PHORESIS SERUM: CPT

## 2020-08-07 PROCEDURE — 80053 COMPREHEN METABOLIC PANEL: CPT

## 2020-08-07 PROCEDURE — 99999 PR PBB SHADOW E&M-EST. PATIENT-LVL IV: CPT | Mod: PBBFAC,,, | Performed by: INTERNAL MEDICINE

## 2020-08-07 PROCEDURE — 82784 ASSAY IGA/IGD/IGG/IGM EACH: CPT | Mod: 59

## 2020-08-07 PROCEDURE — 83520 IMMUNOASSAY QUANT NOS NONAB: CPT

## 2020-08-07 PROCEDURE — 99999 PR PBB SHADOW E&M-EST. PATIENT-LVL IV: ICD-10-PCS | Mod: PBBFAC,,, | Performed by: INTERNAL MEDICINE

## 2020-08-07 PROCEDURE — 99215 OFFICE O/P EST HI 40 MIN: CPT | Mod: S$PBB,,, | Performed by: INTERNAL MEDICINE

## 2020-08-07 PROCEDURE — 99214 OFFICE O/P EST MOD 30 MIN: CPT | Mod: PBBFAC,25 | Performed by: INTERNAL MEDICINE

## 2020-08-07 PROCEDURE — 86334 IMMUNOFIX E-PHORESIS SERUM: CPT

## 2020-08-07 PROCEDURE — 63600175 PHARM REV CODE 636 W HCPCS: Mod: JG | Performed by: INTERNAL MEDICINE

## 2020-08-07 PROCEDURE — 84165 PATHOLOGIST INTERPRETATION SPE: ICD-10-PCS | Mod: 26,,, | Performed by: PATHOLOGY

## 2020-08-07 PROCEDURE — 84165 PROTEIN E-PHORESIS SERUM: CPT | Mod: 26,,, | Performed by: PATHOLOGY

## 2020-08-07 PROCEDURE — 36415 COLL VENOUS BLD VENIPUNCTURE: CPT

## 2020-08-07 RX ORDER — SODIUM CHLORIDE 0.9 % (FLUSH) 0.9 %
10 SYRINGE (ML) INJECTION
Status: CANCELLED | OUTPATIENT
Start: 2020-08-07

## 2020-08-07 RX ORDER — SODIUM CHLORIDE 0.9 % (FLUSH) 0.9 %
10 SYRINGE (ML) INJECTION
Status: CANCELLED | OUTPATIENT
Start: 2020-08-21

## 2020-08-07 RX ORDER — BORTEZOMIB 3.5 MG/1
1.3 INJECTION, POWDER, LYOPHILIZED, FOR SOLUTION INTRAVENOUS; SUBCUTANEOUS
Status: CANCELLED | OUTPATIENT
Start: 2020-08-21

## 2020-08-07 RX ORDER — HEPARIN 100 UNIT/ML
500 SYRINGE INTRAVENOUS
Status: DISCONTINUED | OUTPATIENT
Start: 2020-08-07 | End: 2020-08-07 | Stop reason: HOSPADM

## 2020-08-07 RX ORDER — HEPARIN 100 UNIT/ML
500 SYRINGE INTRAVENOUS
Status: CANCELLED | OUTPATIENT
Start: 2020-08-21

## 2020-08-07 RX ORDER — HEPARIN 100 UNIT/ML
500 SYRINGE INTRAVENOUS
Status: CANCELLED | OUTPATIENT
Start: 2020-08-07

## 2020-08-07 RX ORDER — AMOXICILLIN AND CLAVULANATE POTASSIUM 875; 125 MG/1; MG/1
TABLET, FILM COATED ORAL
COMMUNITY
Start: 2020-07-27 | End: 2020-08-07

## 2020-08-07 RX ORDER — BORTEZOMIB 3.5 MG/1
1.3 INJECTION, POWDER, LYOPHILIZED, FOR SOLUTION INTRAVENOUS; SUBCUTANEOUS
Status: CANCELLED | OUTPATIENT
Start: 2020-08-07

## 2020-08-07 RX ORDER — BORTEZOMIB 3.5 MG/1
1.3 INJECTION, POWDER, LYOPHILIZED, FOR SOLUTION INTRAVENOUS; SUBCUTANEOUS
Status: COMPLETED | OUTPATIENT
Start: 2020-08-07 | End: 2020-08-07

## 2020-08-07 RX ORDER — SODIUM CHLORIDE 0.9 % (FLUSH) 0.9 %
10 SYRINGE (ML) INJECTION
Status: DISCONTINUED | OUTPATIENT
Start: 2020-08-07 | End: 2020-08-07 | Stop reason: HOSPADM

## 2020-08-07 RX ADMIN — BORTEZOMIB 2.7 MG: 3.5 INJECTION, POWDER, LYOPHILIZED, FOR SOLUTION INTRAVENOUS; SUBCUTANEOUS at 04:08

## 2020-08-07 NOTE — PROGRESS NOTES
HEMATOLOGIC MALIGNANCIES PROGRESS NOTE    IDENTIFYING STATEMENT   Roc Lai Jr. ([place]) is a 55 y.o. male with a  of 1965 from [place] with the diagnosis of AL amyloidosis.      ONCOLOGY HISTORY:    1. AL amyloid with cardiac involvement              A. 2016: Initial evaluation with Dr. Carmichael. Reported 18 months of progressive decline (previously ran 3 miles easily, now can only walk 10 minutes). M-protein 0.19 g/dl, kappa 1.37 mg/dl, lambda 50 mg/dl, ratio 0.03. Abdominal fat pad biopsy had been negative for presence of amyloid. Cardiac MRI suggestive of infiltrative cardiomyopathy.               B. 1/3/2017: Endomyocardial biopsy - involved by AL amyloid              C. 2017: BMBx - 60% cellular marrow with 50% plasma cells, consistent with plasma cell myeloma.               D. 2/15/2017 - 2017: Completed 4 cycles bortezomib chemotherapy on [id number] trial.               E. 2017: Bortezomib every 2weeks x 2 doses              F. 2017: Change bortezomib to [id number] x 2 doses              G. 10/9/2017: Bortezomib q6 weeks; kappa 1.6 mg/dl, lambda 0.93 mg/dl, ratio 1.72              H. 2018: Bortezomib q8 weeks.               I. 2018: [id number] close out visit due to ineffectiveness.                J. 2020: kappa 2.73 mg/dl, lambda 28.33 mg/dl, ratio (lambda:kappa) 10.38, concerning for progressive disease             K. 2020: CyBorD started             L. 2020: kappa 2.50, lambda 4.25 gm/dl, ratio (lambda:kappa) 0.59, response to therapy            M. 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 Lai returns to clinic for follow-up of his AL amyloidosis. He reports  the following:    - Reports restless legs are better   - swelling is much reduced; feeling well today    Past Medical History, Past Social History and Past Family History have been reviewed and are unchanged except as noted in the interval history.    MEDICATIONS:     Current Outpatient Medications on File Prior to Visit   Medication Sig Dispense Refill    acyclovir (ZOVIRAX) 400 MG tablet TAKE 1 TABLET TWICE A  tablet 3    aspirin (ECOTRIN) 81 MG EC tablet Take 1 tablet (81 mg total) by mouth once daily. 150 tablet 2    BORTEZOMIB (VELCADE INJ) Inject as directed. Every 2 months      bumetanide (BUMEX) 2 MG tablet Take 1 tablet (2 mg total) by mouth 2 (two) times daily. 60 tablet 3    compress.stocking,knee,reg,med Misc 1 Pair by Misc.(Non-Drug; Combo Route) route once daily. 2 each 5    gabapentin (NEURONTIN) 100 MG capsule TAKE 3 CAPSULES EVERY EVENING 270 capsule 4    magnesium oxide (MAG-OX) 400 mg (241.3 mg magnesium) tablet Take 400 mg by mouth once daily.      pramipexole (MIRAPEX) 0.125 MG tablet Take 1 tablet (0.125 mg total) by mouth nightly. 30 tablet 2    spironolactone (ALDACTONE) 25 MG tablet TAKE 1 TABLET DAILY 90 tablet 3    sildenafil (VIAGRA) 50 MG tablet Take 1 tablet (50 mg total) by mouth daily as needed for Erectile Dysfunction. 7 tablet 2     No current facility-administered medications on file prior to visit.        ALLERGIES: Review of patient's allergies indicates:  No Known Allergies     ROS:       Review of Systems   Constitutional: Positive for fatigue. Negative for diaphoresis, fever and unexpected weight change.   HENT:   Negative for lump/mass and sore throat.    Eyes: Negative for icterus.   Respiratory: Negative for cough and shortness of breath.    Cardiovascular: Positive for leg swelling. Negative for chest pain and palpitations.   Gastrointestinal: Negative for abdominal distention, constipation, diarrhea, nausea and vomiting.   Genitourinary: Negative for  "dysuria and frequency.    Musculoskeletal: Negative for arthralgias, gait problem and myalgias.   Skin: Negative for rash.   Neurological: Positive for numbness. Negative for dizziness, gait problem and headaches.   Hematological: Negative for adenopathy. Does not bruise/bleed easily.   Psychiatric/Behavioral: The patient is not nervous/anxious.         PHYSICAL EXAM:  Vitals:    08/07/20 1508   BP: 107/60   Pulse: 86   Resp: 18   Temp: 98.7 °F (37.1 °C)   SpO2: 99%   Weight: 85.5 kg (188 lb 7.9 oz)   Height: 5' 11" (1.803 m)   PainSc: 0-No pain       KARNOFSKY PERFORMANCE STATUS 80%  ECOG 1    Physical Exam  Constitutional:       General: He is not in acute distress.     Appearance: He is well-developed.   HENT:      Head: Normocephalic and atraumatic.      Mouth/Throat:      Mouth: No oral lesions.   Eyes:      Conjunctiva/sclera: Conjunctivae normal.   Neck:      Thyroid: No thyromegaly.      Vascular: No JVD.   Cardiovascular:      Rate and Rhythm: Normal rate and regular rhythm.      Heart sounds: Normal heart sounds. No murmur.   Pulmonary:      Breath sounds: Normal breath sounds. No wheezing or rales.   Abdominal:      General: There is no distension.      Palpations: Abdomen is soft. There is no hepatomegaly, splenomegaly or mass.      Tenderness: There is no abdominal tenderness.   Musculoskeletal:      Right lower leg: Edema present.      Left lower leg: Edema present.   Lymphadenopathy:      Cervical: No cervical adenopathy.      Right cervical: No deep cervical adenopathy.     Left cervical: No deep cervical adenopathy.      Lower Body: No right inguinal adenopathy. No left inguinal adenopathy.   Skin:     Findings: No rash.   Neurological:      Mental Status: He is alert and oriented to person, place, and time.      Cranial Nerves: No cranial nerve deficit.      Coordination: Coordination normal.      Deep Tendon Reflexes: Reflexes are normal and symmetric.         LAB:   Results for orders placed or " performed in visit on 08/07/20   CBC auto differential   Result Value Ref Range    WBC 5.40 3.90 - 12.70 K/uL    RBC 3.52 (L) 4.60 - 6.20 M/uL    Hemoglobin 10.1 (L) 14.0 - 18.0 g/dL    Hematocrit 33.2 (L) 40.0 - 54.0 %    Mean Corpuscular Volume 94 82 - 98 fL    Mean Corpuscular Hemoglobin 28.7 27.0 - 31.0 pg    Mean Corpuscular Hemoglobin Conc 30.4 (L) 32.0 - 36.0 g/dL    RDW 15.3 (H) 11.5 - 14.5 %    Platelets 205 150 - 350 K/uL    MPV 9.4 9.2 - 12.9 fL    Immature Granulocytes 0.6 (H) 0.0 - 0.5 %    Gran # (ANC) 3.9 1.8 - 7.7 K/uL    Immature Grans (Abs) 0.03 0.00 - 0.04 K/uL    Lymph # 0.6 (L) 1.0 - 4.8 K/uL    Mono # 0.6 0.3 - 1.0 K/uL    Eos # 0.2 0.0 - 0.5 K/uL    Baso # 0.04 0.00 - 0.20 K/uL    nRBC 0 0 /100 WBC    Gran% 71.6 38.0 - 73.0 %    Lymph% 11.5 (L) 18.0 - 48.0 %    Mono% 11.9 4.0 - 15.0 %    Eosinophil% 3.7 0.0 - 8.0 %    Basophil% 0.7 0.0 - 1.9 %    Differential Method Automated    Comprehensive metabolic panel   Result Value Ref Range    Sodium 139 136 - 145 mmol/L    Potassium 4.7 3.5 - 5.1 mmol/L    Chloride 104 95 - 110 mmol/L    CO2 28 23 - 29 mmol/L    Glucose 106 70 - 110 mg/dL    BUN, Bld 33 (H) 6 - 20 mg/dL    Creatinine 1.9 (H) 0.5 - 1.4 mg/dL    Calcium 8.9 8.7 - 10.5 mg/dL    Total Protein 6.0 6.0 - 8.4 g/dL    Albumin 3.1 (L) 3.5 - 5.2 g/dL    Total Bilirubin 0.7 0.1 - 1.0 mg/dL    Alkaline Phosphatase 147 (H) 55 - 135 U/L    AST 27 10 - 40 U/L    ALT 15 10 - 44 U/L    Anion Gap 7 (L) 8 - 16 mmol/L    eGFR if African American 45.2 (A) >60 mL/min/1.73 m^2    eGFR if non  39.1 (A) >60 mL/min/1.73 m^2   Protein electrophoresis, serum   Result Value Ref Range    Protein, Serum 5.6 (L) 6.0 - 8.4 g/dL    Albumin grams/dl 3.32 (L) 3.35 - 5.55 g/dL    Alpha-1 grams/dl 0.42 (H) 0.17 - 0.41 g/dL    Alpha-2 grams/dl 0.62 0.43 - 0.99 g/dL    Beta grams/dl 0.72 0.50 - 1.10 g/dL    Gamma grams/dl 0.52 (L) 0.67 - 1.58 g/dL   Immunofixation electrophoresis   Result Value Ref Range     Immunofix Interp. SEE COMMENT    Immunoglobulin Free LT Chains Blood   Result Value Ref Range    Kappa Free Light Chains 2.26 (H) 0.33 - 1.94 mg/dL    Lambda Free Light Chains 3.97 (H) 0.57 - 2.63 mg/dL    Kappa/Lambda FLC Ratio 0.57 0.26 - 1.65   Immunoglobulins (IgG, IgA, IgM) Quantitative   Result Value Ref Range    IgG - Serum 582 (L) 650 - 1600 mg/dL    IgA 32 (L) 40 - 350 mg/dL    IgM 32 (L) 50 - 300 mg/dL   Pathologist Interpretation BROOKS   Result Value Ref Range    Pathologist Interpretation BROOKS REVIEWED    Pathologist Interpretation SPE   Result Value Ref Range    Pathologist Interpretation SPE REVIEWED        PROBLEMS ASSESSED THIS VISIT:    1. AL amyloidosis    2. Acute kidney injury superimposed on chronic kidney disease    3. Anemia associated with chemotherapy    4. Smoldering multiple myeloma        PLAN:       AL amyloidosis  Smoldering multiple myeloma    He has acheived VGPR, and we will continue maintenance bortezomib. His clinical status has improved substantially, and we discussed he may be considered for ASCT if he remains well.     Anemia due to antineoplastic chemotherapy  Mild. Monitor.     Acute kidney injury on chronic kidney disease  decrease bumetanide to once daily    BLE Swelling/Restless leg syndrome  Improved overall today  Using compression stockings per cardiology, swelling improved. Request for thigh high compression stocking today.   Order for thigh high compression stockings  Continue on Bumex 2 mg daily  Cardiology follow up  No c/o restless leg today    Follow-up  Please schedule labs (CBC, CMP, mag) and chemo for 8/21 and 9/4. Please schedule follow-up visit on 9/4 and additional labs (SPEP, BROOKS, free light chains).     Chris Atkins MD  Hematology and Stem Cell Transplant

## 2020-08-07 NOTE — Clinical Note
Please schedule labs (CBC, CMP, mag) and chemo for 8/21 and 9/4. Please schedule follow-up visit on 9/4 and additional labs (SPEP, BROOKS, free light chains).

## 2020-08-10 LAB
ALBUMIN SERPL ELPH-MCNC: 3.32 G/DL (ref 3.35–5.55)
ALPHA1 GLOB SERPL ELPH-MCNC: 0.42 G/DL (ref 0.17–0.41)
ALPHA2 GLOB SERPL ELPH-MCNC: 0.62 G/DL (ref 0.43–0.99)
B-GLOBULIN SERPL ELPH-MCNC: 0.72 G/DL (ref 0.5–1.1)
GAMMA GLOB SERPL ELPH-MCNC: 0.52 G/DL (ref 0.67–1.58)
INTERPRETATION SERPL IFE-IMP: NORMAL
KAPPA LC SER QL IA: 2.26 MG/DL (ref 0.33–1.94)
KAPPA LC/LAMBDA SER IA: 0.57 (ref 0.26–1.65)
LAMBDA LC SER QL IA: 3.97 MG/DL (ref 0.57–2.63)
PATHOLOGIST INTERPRETATION IFE: NORMAL
PATHOLOGIST INTERPRETATION SPE: NORMAL
PROT SERPL-MCNC: 5.6 G/DL (ref 6–8.4)

## 2020-08-21 ENCOUNTER — INFUSION (OUTPATIENT)
Dept: INFUSION THERAPY | Facility: HOSPITAL | Age: 55
End: 2020-08-21
Attending: INTERNAL MEDICINE
Payer: OTHER GOVERNMENT

## 2020-08-21 VITALS
BODY MASS INDEX: 26.39 KG/M2 | DIASTOLIC BLOOD PRESSURE: 66 MMHG | SYSTOLIC BLOOD PRESSURE: 106 MMHG | HEART RATE: 75 BPM | RESPIRATION RATE: 18 BRPM | TEMPERATURE: 96 F | HEIGHT: 71 IN | WEIGHT: 188.5 LBS

## 2020-08-21 DIAGNOSIS — E85.81 AL AMYLOIDOSIS: Primary | ICD-10-CM

## 2020-08-21 PROCEDURE — 63600175 PHARM REV CODE 636 W HCPCS: Mod: JG | Performed by: INTERNAL MEDICINE

## 2020-08-21 PROCEDURE — 96401 CHEMO ANTI-NEOPL SQ/IM: CPT

## 2020-08-21 RX ORDER — BORTEZOMIB 3.5 MG/1
1.3 INJECTION, POWDER, LYOPHILIZED, FOR SOLUTION INTRAVENOUS; SUBCUTANEOUS
Status: COMPLETED | OUTPATIENT
Start: 2020-08-21 | End: 2020-08-21

## 2020-08-21 RX ORDER — SODIUM CHLORIDE 0.9 % (FLUSH) 0.9 %
10 SYRINGE (ML) INJECTION
Status: DISCONTINUED | OUTPATIENT
Start: 2020-08-21 | End: 2020-08-21 | Stop reason: HOSPADM

## 2020-08-21 RX ORDER — HEPARIN 100 UNIT/ML
500 SYRINGE INTRAVENOUS
Status: DISCONTINUED | OUTPATIENT
Start: 2020-08-21 | End: 2020-08-21 | Stop reason: HOSPADM

## 2020-08-21 RX ADMIN — BORTEZOMIB 2.7 MG: 3.5 INJECTION, POWDER, LYOPHILIZED, FOR SOLUTION INTRAVENOUS; SUBCUTANEOUS at 03:08

## 2020-09-04 ENCOUNTER — INFUSION (OUTPATIENT)
Dept: INFUSION THERAPY | Facility: HOSPITAL | Age: 55
End: 2020-09-04
Attending: INTERNAL MEDICINE
Payer: OTHER GOVERNMENT

## 2020-09-04 ENCOUNTER — LAB VISIT (OUTPATIENT)
Dept: LAB | Facility: HOSPITAL | Age: 55
End: 2020-09-04
Payer: OTHER GOVERNMENT

## 2020-09-04 ENCOUNTER — OFFICE VISIT (OUTPATIENT)
Dept: HEMATOLOGY/ONCOLOGY | Facility: CLINIC | Age: 55
End: 2020-09-04
Payer: OTHER GOVERNMENT

## 2020-09-04 VITALS
HEART RATE: 81 BPM | BODY MASS INDEX: 27.75 KG/M2 | DIASTOLIC BLOOD PRESSURE: 53 MMHG | OXYGEN SATURATION: 100 % | HEIGHT: 71 IN | RESPIRATION RATE: 18 BRPM | WEIGHT: 198.19 LBS | TEMPERATURE: 99 F | SYSTOLIC BLOOD PRESSURE: 98 MMHG

## 2020-09-04 VITALS
SYSTOLIC BLOOD PRESSURE: 104 MMHG | RESPIRATION RATE: 18 BRPM | DIASTOLIC BLOOD PRESSURE: 64 MMHG | HEART RATE: 81 BPM | TEMPERATURE: 98 F

## 2020-09-04 DIAGNOSIS — D47.2 SMOLDERING MULTIPLE MYELOMA: ICD-10-CM

## 2020-09-04 DIAGNOSIS — E85.81 AL AMYLOIDOSIS: Primary | ICD-10-CM

## 2020-09-04 DIAGNOSIS — E85.81 AL AMYLOIDOSIS: ICD-10-CM

## 2020-09-04 DIAGNOSIS — I42.5 OTHER RESTRICTIVE CARDIOMYOPATHY: ICD-10-CM

## 2020-09-04 DIAGNOSIS — T45.1X5A ANEMIA ASSOCIATED WITH CHEMOTHERAPY: ICD-10-CM

## 2020-09-04 DIAGNOSIS — G25.81 RESTLESS LEG SYNDROME, UNCONTROLLED: ICD-10-CM

## 2020-09-04 DIAGNOSIS — D64.81 ANEMIA ASSOCIATED WITH CHEMOTHERAPY: ICD-10-CM

## 2020-09-04 LAB
ALBUMIN SERPL BCP-MCNC: 2.7 G/DL (ref 3.5–5.2)
ALP SERPL-CCNC: 117 U/L (ref 55–135)
ALT SERPL W/O P-5'-P-CCNC: 16 U/L (ref 10–44)
ANION GAP SERPL CALC-SCNC: 5 MMOL/L (ref 8–16)
AST SERPL-CCNC: 27 U/L (ref 10–40)
BASOPHILS # BLD AUTO: 0.04 K/UL (ref 0–0.2)
BASOPHILS NFR BLD: 0.8 % (ref 0–1.9)
BILIRUB SERPL-MCNC: 0.5 MG/DL (ref 0.1–1)
BUN SERPL-MCNC: 33 MG/DL (ref 6–20)
CALCIUM SERPL-MCNC: 8.4 MG/DL (ref 8.7–10.5)
CHLORIDE SERPL-SCNC: 101 MMOL/L (ref 95–110)
CO2 SERPL-SCNC: 28 MMOL/L (ref 23–29)
CREAT SERPL-MCNC: 1.8 MG/DL (ref 0.5–1.4)
DIFFERENTIAL METHOD: ABNORMAL
EOSINOPHIL # BLD AUTO: 0.1 K/UL (ref 0–0.5)
EOSINOPHIL NFR BLD: 2.3 % (ref 0–8)
ERYTHROCYTE [DISTWIDTH] IN BLOOD BY AUTOMATED COUNT: 16.4 % (ref 11.5–14.5)
EST. GFR  (AFRICAN AMERICAN): 48.3 ML/MIN/1.73 M^2
EST. GFR  (NON AFRICAN AMERICAN): 41.7 ML/MIN/1.73 M^2
GLUCOSE SERPL-MCNC: 106 MG/DL (ref 70–110)
HCT VFR BLD AUTO: 29.8 % (ref 40–54)
HGB BLD-MCNC: 9.1 G/DL (ref 14–18)
IMM GRANULOCYTES # BLD AUTO: 0.02 K/UL (ref 0–0.04)
IMM GRANULOCYTES NFR BLD AUTO: 0.4 % (ref 0–0.5)
LYMPHOCYTES # BLD AUTO: 0.5 K/UL (ref 1–4.8)
LYMPHOCYTES NFR BLD: 10.4 % (ref 18–48)
MAGNESIUM SERPL-MCNC: 2 MG/DL (ref 1.6–2.6)
MCH RBC QN AUTO: 28.2 PG (ref 27–31)
MCHC RBC AUTO-ENTMCNC: 30.5 G/DL (ref 32–36)
MCV RBC AUTO: 92 FL (ref 82–98)
MONOCYTES # BLD AUTO: 0.8 K/UL (ref 0.3–1)
MONOCYTES NFR BLD: 15.4 % (ref 4–15)
NEUTROPHILS # BLD AUTO: 3.6 K/UL (ref 1.8–7.7)
NEUTROPHILS NFR BLD: 70.7 % (ref 38–73)
NRBC BLD-RTO: 0 /100 WBC
PLATELET # BLD AUTO: 205 K/UL (ref 150–350)
PMV BLD AUTO: 9.1 FL (ref 9.2–12.9)
POTASSIUM SERPL-SCNC: 4.5 MMOL/L (ref 3.5–5.1)
PROT SERPL-MCNC: 5.3 G/DL (ref 6–8.4)
RBC # BLD AUTO: 3.23 M/UL (ref 4.6–6.2)
SODIUM SERPL-SCNC: 134 MMOL/L (ref 136–145)
WBC # BLD AUTO: 5.12 K/UL (ref 3.9–12.7)

## 2020-09-04 PROCEDURE — 83735 ASSAY OF MAGNESIUM: CPT

## 2020-09-04 PROCEDURE — 86334 PATHOLOGIST INTERPRETATION IFE: ICD-10-PCS | Mod: 26,,, | Performed by: PATHOLOGY

## 2020-09-04 PROCEDURE — 80053 COMPREHEN METABOLIC PANEL: CPT

## 2020-09-04 PROCEDURE — 84165 PROTEIN E-PHORESIS SERUM: CPT | Mod: 26,,, | Performed by: PATHOLOGY

## 2020-09-04 PROCEDURE — 84165 PATHOLOGIST INTERPRETATION SPE: ICD-10-PCS | Mod: 26,,, | Performed by: PATHOLOGY

## 2020-09-04 PROCEDURE — 63600175 PHARM REV CODE 636 W HCPCS: Mod: JG | Performed by: INTERNAL MEDICINE

## 2020-09-04 PROCEDURE — 86334 IMMUNOFIX E-PHORESIS SERUM: CPT

## 2020-09-04 PROCEDURE — 86334 IMMUNOFIX E-PHORESIS SERUM: CPT | Mod: 26,,, | Performed by: PATHOLOGY

## 2020-09-04 PROCEDURE — 96401 CHEMO ANTI-NEOPL SQ/IM: CPT

## 2020-09-04 PROCEDURE — 99215 PR OFFICE/OUTPT VISIT, EST, LEVL V, 40-54 MIN: ICD-10-PCS | Mod: S$PBB,,, | Performed by: INTERNAL MEDICINE

## 2020-09-04 PROCEDURE — 83520 IMMUNOASSAY QUANT NOS NONAB: CPT | Mod: 59

## 2020-09-04 PROCEDURE — 99999 PR PBB SHADOW E&M-EST. PATIENT-LVL IV: CPT | Mod: PBBFAC,,, | Performed by: INTERNAL MEDICINE

## 2020-09-04 PROCEDURE — 99999 PR PBB SHADOW E&M-EST. PATIENT-LVL IV: ICD-10-PCS | Mod: PBBFAC,,, | Performed by: INTERNAL MEDICINE

## 2020-09-04 PROCEDURE — 84165 PROTEIN E-PHORESIS SERUM: CPT

## 2020-09-04 PROCEDURE — 85025 COMPLETE CBC W/AUTO DIFF WBC: CPT

## 2020-09-04 PROCEDURE — 36415 COLL VENOUS BLD VENIPUNCTURE: CPT

## 2020-09-04 PROCEDURE — 99214 OFFICE O/P EST MOD 30 MIN: CPT | Mod: PBBFAC,25 | Performed by: INTERNAL MEDICINE

## 2020-09-04 PROCEDURE — 99215 OFFICE O/P EST HI 40 MIN: CPT | Mod: S$PBB,,, | Performed by: INTERNAL MEDICINE

## 2020-09-04 RX ORDER — HEPARIN 100 UNIT/ML
500 SYRINGE INTRAVENOUS
Status: CANCELLED | OUTPATIENT
Start: 2020-09-18

## 2020-09-04 RX ORDER — BORTEZOMIB 3.5 MG/1
1.3 INJECTION, POWDER, LYOPHILIZED, FOR SOLUTION INTRAVENOUS; SUBCUTANEOUS
Status: CANCELLED | OUTPATIENT
Start: 2020-09-04

## 2020-09-04 RX ORDER — HEPARIN 100 UNIT/ML
500 SYRINGE INTRAVENOUS
Status: CANCELLED | OUTPATIENT
Start: 2020-09-04

## 2020-09-04 RX ORDER — SODIUM CHLORIDE 0.9 % (FLUSH) 0.9 %
10 SYRINGE (ML) INJECTION
Status: CANCELLED | OUTPATIENT
Start: 2020-09-18

## 2020-09-04 RX ORDER — SODIUM CHLORIDE 0.9 % (FLUSH) 0.9 %
10 SYRINGE (ML) INJECTION
Status: CANCELLED | OUTPATIENT
Start: 2020-09-04

## 2020-09-04 RX ORDER — PRAMIPEXOLE DIHYDROCHLORIDE 0.12 MG/1
0.25 TABLET ORAL NIGHTLY
Qty: 60 TABLET | Refills: 2 | Status: ON HOLD | OUTPATIENT
Start: 2020-09-04 | End: 2021-01-01 | Stop reason: HOSPADM

## 2020-09-04 RX ORDER — BORTEZOMIB 3.5 MG/1
1.3 INJECTION, POWDER, LYOPHILIZED, FOR SOLUTION INTRAVENOUS; SUBCUTANEOUS
Status: CANCELLED | OUTPATIENT
Start: 2020-09-18

## 2020-09-04 RX ORDER — BORTEZOMIB 3.5 MG/1
1.3 INJECTION, POWDER, LYOPHILIZED, FOR SOLUTION INTRAVENOUS; SUBCUTANEOUS
Status: COMPLETED | OUTPATIENT
Start: 2020-09-04 | End: 2020-09-04

## 2020-09-04 RX ADMIN — BORTEZOMIB 2.7 MG: 3.5 INJECTION, POWDER, LYOPHILIZED, FOR SOLUTION INTRAVENOUS; SUBCUTANEOUS at 04:09

## 2020-09-04 NOTE — PROGRESS NOTES
HEMATOLOGIC MALIGNANCIES PROGRESS NOTE    IDENTIFYING STATEMENT   Roc Lai Jr. ([place]) is a 55 y.o. male with a  of 1965 from [place] with the diagnosis of AL amyloidosis.      ONCOLOGY HISTORY:    1. AL amyloid with cardiac involvement              A. 2016: Initial evaluation with Dr. Carmichael. Reported 18 months of progressive decline (previously ran 3 miles easily, now can only walk 10 minutes). M-protein 0.19 g/dl, kappa 1.37 mg/dl, lambda 50 mg/dl, ratio 0.03. Abdominal fat pad biopsy had been negative for presence of amyloid. Cardiac MRI suggestive of infiltrative cardiomyopathy.               B. 1/3/2017: Endomyocardial biopsy - involved by AL amyloid              C. 2017: BMBx - 60% cellular marrow with 50% plasma cells, consistent with plasma cell myeloma.               D. 2/15/2017 - 2017: Completed 4 cycles bortezomib chemotherapy on [id number] trial.               E. 2017: Bortezomib every 2weeks x 2 doses              F. 2017: Change bortezomib to [id number] x 2 doses              G. 10/9/2017: Bortezomib q6 weeks; kappa 1.6 mg/dl, lambda 0.93 mg/dl, ratio 1.72              H. 2018: Bortezomib q8 weeks.               I. 2018: [id number] close out visit due to ineffectiveness.                J. 2020: kappa 2.73 mg/dl, lambda 28.33 mg/dl, ratio (lambda:kappa) 10.38, concerning for progressive disease             K. 2020: CyBorD started             L. 2020: kappa 2.50, lambda 4.25 gm/dl, ratio (lambda:kappa) 0.59, response to therapy            M. 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 Lai returns to clinic for follow-up of his AL amyloidosis. He reports  the following:    - Reports restless legs are worse at this time, and pramipexole is not working as well.  - Swelling is mostly down, but it goes up and down depending on bumetanide use (up a bit when he doesn't take it as often)  - working and feeling well  - interested in transplant    Past Medical History, Past Social History and Past Family History have been reviewed and are unchanged except as noted in the interval history.    MEDICATIONS:     Current Outpatient Medications on File Prior to Visit   Medication Sig Dispense Refill    acyclovir (ZOVIRAX) 400 MG tablet TAKE 1 TABLET TWICE A  tablet 3    aspirin (ECOTRIN) 81 MG EC tablet Take 1 tablet (81 mg total) by mouth once daily. 150 tablet 2    BORTEZOMIB (VELCADE INJ) Inject as directed. Every 2 months      bumetanide (BUMEX) 2 MG tablet Take 1 tablet (2 mg total) by mouth 2 (two) times daily. 60 tablet 3    compress.stocking,knee,reg,med Misc 1 Pair by Misc.(Non-Drug; Combo Route) route once daily. 2 each 5    gabapentin (NEURONTIN) 100 MG capsule TAKE 3 CAPSULES EVERY EVENING 270 capsule 4    magnesium oxide (MAG-OX) 400 mg (241.3 mg magnesium) tablet Take 400 mg by mouth once daily.      sildenafil (VIAGRA) 50 MG tablet Take 1 tablet (50 mg total) by mouth daily as needed for Erectile Dysfunction. 7 tablet 2    spironolactone (ALDACTONE) 25 MG tablet TAKE 1 TABLET DAILY 90 tablet 3     No current facility-administered medications on file prior to visit.        ALLERGIES: Review of patient's allergies indicates:  No Known Allergies     ROS:       Review of Systems   Constitutional: Positive for fatigue. Negative for diaphoresis, fever and unexpected weight change.   HENT:   Negative for lump/mass and sore throat.    Eyes: Negative for icterus.   Respiratory: Negative for cough and shortness of breath.    Cardiovascular: Positive for leg swelling. Negative for chest pain and palpitations.   Gastrointestinal: Negative for abdominal  "distention, constipation, diarrhea, nausea and vomiting.   Genitourinary: Negative for dysuria and frequency.    Musculoskeletal: Negative for arthralgias, gait problem and myalgias.   Skin: Negative for rash.   Neurological: Positive for numbness. Negative for dizziness, gait problem and headaches.   Hematological: Negative for adenopathy. Does not bruise/bleed easily.   Psychiatric/Behavioral: The patient is not nervous/anxious.         PHYSICAL EXAM:  Vitals:    09/04/20 1531   BP: (!) 98/53   Pulse: 81   Resp: 18   Temp: 98.5 °F (36.9 °C)   TempSrc: Oral   SpO2: 100%   Weight: 89.9 kg (198 lb 3.2 oz)   Height: 5' 11" (1.803 m)   PainSc: 0-No pain       KARNOFSKY PERFORMANCE STATUS 80%  ECOG 1    Physical Exam  Constitutional:       General: He is not in acute distress.     Appearance: He is well-developed.   HENT:      Head: Normocephalic and atraumatic.      Mouth/Throat:      Mouth: No oral lesions.   Eyes:      Conjunctiva/sclera: Conjunctivae normal.   Neck:      Thyroid: No thyromegaly.      Vascular: No JVD.   Cardiovascular:      Rate and Rhythm: Normal rate and regular rhythm.      Heart sounds: Normal heart sounds. No murmur.   Pulmonary:      Breath sounds: Normal breath sounds. No wheezing or rales.   Abdominal:      General: There is no distension.      Palpations: Abdomen is soft. There is no hepatomegaly, splenomegaly or mass.      Tenderness: There is no abdominal tenderness.   Musculoskeletal:      Right lower leg: Edema present.      Left lower leg: Edema present.   Lymphadenopathy:      Cervical: No cervical adenopathy.      Right cervical: No deep cervical adenopathy.     Left cervical: No deep cervical adenopathy.      Lower Body: No right inguinal adenopathy. No left inguinal adenopathy.   Skin:     Findings: No rash.   Neurological:      Mental Status: He is alert and oriented to person, place, and time.      Cranial Nerves: No cranial nerve deficit.      Coordination: Coordination normal. "      Deep Tendon Reflexes: Reflexes are normal and symmetric.         LAB:   Results for orders placed or performed in visit on 09/04/20   CBC auto differential   Result Value Ref Range    WBC 5.12 3.90 - 12.70 K/uL    RBC 3.23 (L) 4.60 - 6.20 M/uL    Hemoglobin 9.1 (L) 14.0 - 18.0 g/dL    Hematocrit 29.8 (L) 40.0 - 54.0 %    Mean Corpuscular Volume 92 82 - 98 fL    Mean Corpuscular Hemoglobin 28.2 27.0 - 31.0 pg    Mean Corpuscular Hemoglobin Conc 30.5 (L) 32.0 - 36.0 g/dL    RDW 16.4 (H) 11.5 - 14.5 %    Platelets 205 150 - 350 K/uL    MPV 9.1 (L) 9.2 - 12.9 fL    Immature Granulocytes 0.4 0.0 - 0.5 %    Gran # (ANC) 3.6 1.8 - 7.7 K/uL    Immature Grans (Abs) 0.02 0.00 - 0.04 K/uL    Lymph # 0.5 (L) 1.0 - 4.8 K/uL    Mono # 0.8 0.3 - 1.0 K/uL    Eos # 0.1 0.0 - 0.5 K/uL    Baso # 0.04 0.00 - 0.20 K/uL    nRBC 0 0 /100 WBC    Gran% 70.7 38.0 - 73.0 %    Lymph% 10.4 (L) 18.0 - 48.0 %    Mono% 15.4 (H) 4.0 - 15.0 %    Eosinophil% 2.3 0.0 - 8.0 %    Basophil% 0.8 0.0 - 1.9 %    Differential Method Automated    Comprehensive metabolic panel   Result Value Ref Range    Sodium 134 (L) 136 - 145 mmol/L    Potassium 4.5 3.5 - 5.1 mmol/L    Chloride 101 95 - 110 mmol/L    CO2 28 23 - 29 mmol/L    Glucose 106 70 - 110 mg/dL    BUN, Bld 33 (H) 6 - 20 mg/dL    Creatinine 1.8 (H) 0.5 - 1.4 mg/dL    Calcium 8.4 (L) 8.7 - 10.5 mg/dL    Total Protein 5.3 (L) 6.0 - 8.4 g/dL    Albumin 2.7 (L) 3.5 - 5.2 g/dL    Total Bilirubin 0.5 0.1 - 1.0 mg/dL    Alkaline Phosphatase 117 55 - 135 U/L    AST 27 10 - 40 U/L    ALT 16 10 - 44 U/L    Anion Gap 5 (L) 8 - 16 mmol/L    eGFR if African American 48.3 (A) >60 mL/min/1.73 m^2    eGFR if non  41.7 (A) >60 mL/min/1.73 m^2   Protein electrophoresis, serum   Result Value Ref Range    Protein, Serum 4.8 (L) 6.0 - 8.4 g/dL    Albumin grams/dl 2.82 (L) 3.35 - 5.55 g/dL    Alpha-1 grams/dl 0.39 0.17 - 0.41 g/dL    Alpha-2 grams/dl 0.55 0.43 - 0.99 g/dL    Beta grams/dl 0.63 0.50 -  1.10 g/dL    Gamma grams/dl 0.40 (L) 0.67 - 1.58 g/dL   Immunofixation electrophoresis   Result Value Ref Range    Immunofix Interp. SEE COMMENT    Immunoglobulin Free LT Chains Blood   Result Value Ref Range    Kappa Free Light Chains 2.14 (H) 0.33 - 1.94 mg/dL    Lambda Free Light Chains 3.58 (H) 0.57 - 2.63 mg/dL    Kappa/Lambda FLC Ratio 0.60 0.26 - 1.65   Magnesium   Result Value Ref Range    Magnesium 2.0 1.6 - 2.6 mg/dL   Pathologist Interpretation BROOKS   Result Value Ref Range    Pathologist Interpretation BROOKS REVIEWED    Pathologist Interpretation SPE   Result Value Ref Range    Pathologist Interpretation SPE REVIEWED        PROBLEMS ASSESSED THIS VISIT:    1. AL amyloidosis    2. Restless leg syndrome, uncontrolled    3. Smoldering multiple myeloma    4. Other restrictive cardiomyopathy    5. Anemia associated with chemotherapy        PLAN:       AL amyloidosis  Smoldering multiple myeloma    He has acheived VGPR, and we will continue maintenance bortezomib. His clinical status has improved substantially, and we discussed he may be considered for ASCT if he remains well. He is interested in ASCT, so we will prepare to evaluate him in the future. His cardiac function remains stable, though we will need to check Troponin T, as a threshold of 0.06 mcg/L may be used to predict relative risk of mortality from ASCT in amyloidosis.     Anemia due to antineoplastic chemotherapy  Moderate. Monitor. Likely due to therapy and CKD    Chronic kidney disease, stage III  Continue bumetanide and monitor; likely related to CHF in some capacity    Infiltrative cardiomyopathy  BLE Swelling  Improved overall today  Using compression stockings per cardiology, swelling improved. Request for thigh high compression stocking today.   Order for thigh high compression stockings  Continue on Bumex 2 mg daily  Cardiology follow up    Restless leg syndrome  Increase pramipexole to 0.25 mg PO qhs    Follow-up  Please schedule labs (CBC,  CMP, mag) and chemo for 9/18, 10/2. Follow-up on 10/16 with CBC, CMP, mag, SPEP, BROOKS< free light chains, immunoglobulins, Troponin T, NT-proBNP and chemo appt.     Chris Atkins MD  Hematology and Stem Cell Transplant

## 2020-09-08 LAB
ALBUMIN SERPL ELPH-MCNC: 2.82 G/DL (ref 3.35–5.55)
ALPHA1 GLOB SERPL ELPH-MCNC: 0.39 G/DL (ref 0.17–0.41)
ALPHA2 GLOB SERPL ELPH-MCNC: 0.55 G/DL (ref 0.43–0.99)
B-GLOBULIN SERPL ELPH-MCNC: 0.63 G/DL (ref 0.5–1.1)
GAMMA GLOB SERPL ELPH-MCNC: 0.4 G/DL (ref 0.67–1.58)
INTERPRETATION SERPL IFE-IMP: NORMAL
KAPPA LC SER QL IA: 2.14 MG/DL (ref 0.33–1.94)
KAPPA LC/LAMBDA SER IA: 0.6 (ref 0.26–1.65)
LAMBDA LC SER QL IA: 3.58 MG/DL (ref 0.57–2.63)
PROT SERPL-MCNC: 4.8 G/DL (ref 6–8.4)

## 2020-09-09 LAB
PATHOLOGIST INTERPRETATION IFE: NORMAL
PATHOLOGIST INTERPRETATION SPE: NORMAL

## 2020-09-18 ENCOUNTER — INFUSION (OUTPATIENT)
Dept: INFUSION THERAPY | Facility: HOSPITAL | Age: 55
End: 2020-09-18
Attending: INTERNAL MEDICINE
Payer: OTHER GOVERNMENT

## 2020-09-18 ENCOUNTER — LAB VISIT (OUTPATIENT)
Dept: LAB | Facility: HOSPITAL | Age: 55
End: 2020-09-18
Payer: OTHER GOVERNMENT

## 2020-09-18 ENCOUNTER — TELEPHONE (OUTPATIENT)
Dept: HEMATOLOGY/ONCOLOGY | Facility: CLINIC | Age: 55
End: 2020-09-18

## 2020-09-18 VITALS
TEMPERATURE: 98 F | HEART RATE: 80 BPM | DIASTOLIC BLOOD PRESSURE: 61 MMHG | RESPIRATION RATE: 18 BRPM | SYSTOLIC BLOOD PRESSURE: 111 MMHG

## 2020-09-18 DIAGNOSIS — E85.81 AL AMYLOIDOSIS: Primary | ICD-10-CM

## 2020-09-18 DIAGNOSIS — I50.32 CHRONIC DIASTOLIC CONGESTIVE HEART FAILURE: ICD-10-CM

## 2020-09-18 DIAGNOSIS — E85.81 AL AMYLOIDOSIS: ICD-10-CM

## 2020-09-18 DIAGNOSIS — M79.89 LEG SWELLING: Primary | ICD-10-CM

## 2020-09-18 LAB
ALBUMIN SERPL BCP-MCNC: 2.8 G/DL (ref 3.5–5.2)
ALP SERPL-CCNC: 123 U/L (ref 55–135)
ALT SERPL W/O P-5'-P-CCNC: 19 U/L (ref 10–44)
ANION GAP SERPL CALC-SCNC: 10 MMOL/L (ref 8–16)
AST SERPL-CCNC: 29 U/L (ref 10–40)
BASOPHILS # BLD AUTO: 0.04 K/UL (ref 0–0.2)
BASOPHILS NFR BLD: 0.6 % (ref 0–1.9)
BILIRUB SERPL-MCNC: 0.6 MG/DL (ref 0.1–1)
BUN SERPL-MCNC: 31 MG/DL (ref 6–20)
CALCIUM SERPL-MCNC: 8.5 MG/DL (ref 8.7–10.5)
CHLORIDE SERPL-SCNC: 102 MMOL/L (ref 95–110)
CO2 SERPL-SCNC: 26 MMOL/L (ref 23–29)
CREAT SERPL-MCNC: 1.7 MG/DL (ref 0.5–1.4)
DIFFERENTIAL METHOD: ABNORMAL
EOSINOPHIL # BLD AUTO: 0.2 K/UL (ref 0–0.5)
EOSINOPHIL NFR BLD: 2.4 % (ref 0–8)
ERYTHROCYTE [DISTWIDTH] IN BLOOD BY AUTOMATED COUNT: 16.5 % (ref 11.5–14.5)
EST. GFR  (AFRICAN AMERICAN): 51.7 ML/MIN/1.73 M^2
EST. GFR  (NON AFRICAN AMERICAN): 44.7 ML/MIN/1.73 M^2
GLUCOSE SERPL-MCNC: 101 MG/DL (ref 70–110)
HCT VFR BLD AUTO: 31 % (ref 40–54)
HGB BLD-MCNC: 9.3 G/DL (ref 14–18)
IMM GRANULOCYTES # BLD AUTO: 0.04 K/UL (ref 0–0.04)
IMM GRANULOCYTES NFR BLD AUTO: 0.6 % (ref 0–0.5)
LYMPHOCYTES # BLD AUTO: 0.7 K/UL (ref 1–4.8)
LYMPHOCYTES NFR BLD: 11 % (ref 18–48)
MAGNESIUM SERPL-MCNC: 2.2 MG/DL (ref 1.6–2.6)
MCH RBC QN AUTO: 27.7 PG (ref 27–31)
MCHC RBC AUTO-ENTMCNC: 30 G/DL (ref 32–36)
MCV RBC AUTO: 92 FL (ref 82–98)
MONOCYTES # BLD AUTO: 0.9 K/UL (ref 0.3–1)
MONOCYTES NFR BLD: 14.5 % (ref 4–15)
NEUTROPHILS # BLD AUTO: 4.4 K/UL (ref 1.8–7.7)
NEUTROPHILS NFR BLD: 70.9 % (ref 38–73)
NRBC BLD-RTO: 0 /100 WBC
PLATELET # BLD AUTO: 206 K/UL (ref 150–350)
PMV BLD AUTO: 9.6 FL (ref 9.2–12.9)
POTASSIUM SERPL-SCNC: 4.5 MMOL/L (ref 3.5–5.1)
PROT SERPL-MCNC: 5.5 G/DL (ref 6–8.4)
RBC # BLD AUTO: 3.36 M/UL (ref 4.6–6.2)
SODIUM SERPL-SCNC: 138 MMOL/L (ref 136–145)
WBC # BLD AUTO: 6.2 K/UL (ref 3.9–12.7)

## 2020-09-18 PROCEDURE — 83735 ASSAY OF MAGNESIUM: CPT

## 2020-09-18 PROCEDURE — 96401 CHEMO ANTI-NEOPL SQ/IM: CPT

## 2020-09-18 PROCEDURE — 85025 COMPLETE CBC W/AUTO DIFF WBC: CPT

## 2020-09-18 PROCEDURE — 36415 COLL VENOUS BLD VENIPUNCTURE: CPT

## 2020-09-18 PROCEDURE — 63600175 PHARM REV CODE 636 W HCPCS: Mod: JG | Performed by: INTERNAL MEDICINE

## 2020-09-18 PROCEDURE — 80053 COMPREHEN METABOLIC PANEL: CPT

## 2020-09-18 RX ORDER — BORTEZOMIB 3.5 MG/1
1.3 INJECTION, POWDER, LYOPHILIZED, FOR SOLUTION INTRAVENOUS; SUBCUTANEOUS
Status: COMPLETED | OUTPATIENT
Start: 2020-09-18 | End: 2020-09-18

## 2020-09-18 RX ADMIN — BORTEZOMIB 2.7 MG: 3.5 INJECTION, POWDER, LYOPHILIZED, FOR SOLUTION INTRAVENOUS; SUBCUTANEOUS at 03:09

## 2020-09-18 NOTE — TELEPHONE ENCOUNTER
"----- Message from Esthela Davison sent at 9/18/2020  3:41 PM CDT -----  Patient Assist    Name of caller:  / chemo  Established or New patient?: est   Contact Preference: fax [phone]   Does Patient feel the need to see the MD today? No   Provider name:   What is the nature of the call?    - will need the orders for compression stocking  that are 20-30 thigh high  Will need it updated they already gave him some and he left with them  If  its not updated the insurance may bill him an they are trying to avoid that.       Additional Notes:   "Thank you for all that you do for our patients'"          "

## 2020-09-18 NOTE — TELEPHONE ENCOUNTER
"----- Message from Ana Luisa Hood sent at 9/18/2020 12:32 PM CDT -----  Consult/Advisory:    Name Of Caller:  Contact Preference?: [phone]    Provider Name: Dr Atkins     What is the nature of the call?:  Pt request a callback regarding order for compression socks. Currently at the store and advise order has changed please contact to discuss     Additional Notes:  "Thank you for all that you do for our patients'"           "

## 2020-09-21 ENCOUNTER — PATIENT MESSAGE (OUTPATIENT)
Dept: TRANSPLANT | Facility: CLINIC | Age: 55
End: 2020-09-21

## 2020-09-21 ENCOUNTER — PATIENT MESSAGE (OUTPATIENT)
Dept: HEMATOLOGY/ONCOLOGY | Facility: CLINIC | Age: 55
End: 2020-09-21

## 2020-09-28 ENCOUNTER — PATIENT MESSAGE (OUTPATIENT)
Dept: TRANSPLANT | Facility: CLINIC | Age: 55
End: 2020-09-28

## 2020-10-02 ENCOUNTER — INFUSION (OUTPATIENT)
Dept: INFUSION THERAPY | Facility: HOSPITAL | Age: 55
End: 2020-10-02
Attending: INTERNAL MEDICINE
Payer: OTHER GOVERNMENT

## 2020-10-02 ENCOUNTER — LAB VISIT (OUTPATIENT)
Dept: LAB | Facility: HOSPITAL | Age: 55
End: 2020-10-02
Payer: OTHER GOVERNMENT

## 2020-10-02 VITALS
RESPIRATION RATE: 18 BRPM | TEMPERATURE: 98 F | HEIGHT: 71 IN | HEART RATE: 81 BPM | BODY MASS INDEX: 28.7 KG/M2 | SYSTOLIC BLOOD PRESSURE: 110 MMHG | WEIGHT: 205 LBS | DIASTOLIC BLOOD PRESSURE: 65 MMHG

## 2020-10-02 DIAGNOSIS — E85.81 AL AMYLOIDOSIS: Primary | ICD-10-CM

## 2020-10-02 DIAGNOSIS — E85.81 AL AMYLOIDOSIS: ICD-10-CM

## 2020-10-02 LAB
ALBUMIN SERPL BCP-MCNC: 2.7 G/DL (ref 3.5–5.2)
ALP SERPL-CCNC: 139 U/L (ref 55–135)
ALT SERPL W/O P-5'-P-CCNC: 17 U/L (ref 10–44)
ANION GAP SERPL CALC-SCNC: 7 MMOL/L (ref 8–16)
AST SERPL-CCNC: 26 U/L (ref 10–40)
BASOPHILS # BLD AUTO: 0.04 K/UL (ref 0–0.2)
BASOPHILS NFR BLD: 0.7 % (ref 0–1.9)
BILIRUB SERPL-MCNC: 0.5 MG/DL (ref 0.1–1)
BUN SERPL-MCNC: 34 MG/DL (ref 6–20)
CALCIUM SERPL-MCNC: 8.3 MG/DL (ref 8.7–10.5)
CHLORIDE SERPL-SCNC: 102 MMOL/L (ref 95–110)
CO2 SERPL-SCNC: 26 MMOL/L (ref 23–29)
CREAT SERPL-MCNC: 1.6 MG/DL (ref 0.5–1.4)
DIFFERENTIAL METHOD: ABNORMAL
EOSINOPHIL # BLD AUTO: 0.2 K/UL (ref 0–0.5)
EOSINOPHIL NFR BLD: 2.8 % (ref 0–8)
ERYTHROCYTE [DISTWIDTH] IN BLOOD BY AUTOMATED COUNT: 15.9 % (ref 11.5–14.5)
EST. GFR  (AFRICAN AMERICAN): 55.6 ML/MIN/1.73 M^2
EST. GFR  (NON AFRICAN AMERICAN): 48.1 ML/MIN/1.73 M^2
GLUCOSE SERPL-MCNC: 105 MG/DL (ref 70–110)
HCT VFR BLD AUTO: 31 % (ref 40–54)
HGB BLD-MCNC: 9.5 G/DL (ref 14–18)
IMM GRANULOCYTES # BLD AUTO: 0.03 K/UL (ref 0–0.04)
IMM GRANULOCYTES NFR BLD AUTO: 0.5 % (ref 0–0.5)
LYMPHOCYTES # BLD AUTO: 0.6 K/UL (ref 1–4.8)
LYMPHOCYTES NFR BLD: 9.7 % (ref 18–48)
MAGNESIUM SERPL-MCNC: 2.2 MG/DL (ref 1.6–2.6)
MCH RBC QN AUTO: 27.3 PG (ref 27–31)
MCHC RBC AUTO-ENTMCNC: 30.6 G/DL (ref 32–36)
MCV RBC AUTO: 89 FL (ref 82–98)
MONOCYTES # BLD AUTO: 0.8 K/UL (ref 0.3–1)
MONOCYTES NFR BLD: 14 % (ref 4–15)
NEUTROPHILS # BLD AUTO: 4.3 K/UL (ref 1.8–7.7)
NEUTROPHILS NFR BLD: 72.3 % (ref 38–73)
NRBC BLD-RTO: 0 /100 WBC
PLATELET # BLD AUTO: 216 K/UL (ref 150–350)
PMV BLD AUTO: 9.2 FL (ref 9.2–12.9)
POTASSIUM SERPL-SCNC: 4.3 MMOL/L (ref 3.5–5.1)
PROT SERPL-MCNC: 5.7 G/DL (ref 6–8.4)
RBC # BLD AUTO: 3.48 M/UL (ref 4.6–6.2)
SODIUM SERPL-SCNC: 135 MMOL/L (ref 136–145)
WBC # BLD AUTO: 5.99 K/UL (ref 3.9–12.7)

## 2020-10-02 PROCEDURE — 80053 COMPREHEN METABOLIC PANEL: CPT

## 2020-10-02 PROCEDURE — 85025 COMPLETE CBC W/AUTO DIFF WBC: CPT

## 2020-10-02 PROCEDURE — 36415 COLL VENOUS BLD VENIPUNCTURE: CPT

## 2020-10-02 PROCEDURE — 83735 ASSAY OF MAGNESIUM: CPT

## 2020-10-02 PROCEDURE — 63600175 PHARM REV CODE 636 W HCPCS: Mod: JG | Performed by: INTERNAL MEDICINE

## 2020-10-02 PROCEDURE — 96401 CHEMO ANTI-NEOPL SQ/IM: CPT

## 2020-10-02 RX ORDER — HEPARIN 100 UNIT/ML
500 SYRINGE INTRAVENOUS
Status: CANCELLED | OUTPATIENT
Start: 2020-10-02

## 2020-10-02 RX ORDER — BORTEZOMIB 3.5 MG/1
1.3 INJECTION, POWDER, LYOPHILIZED, FOR SOLUTION INTRAVENOUS; SUBCUTANEOUS
Status: COMPLETED | OUTPATIENT
Start: 2020-10-02 | End: 2020-10-02

## 2020-10-02 RX ORDER — SODIUM CHLORIDE 0.9 % (FLUSH) 0.9 %
10 SYRINGE (ML) INJECTION
Status: CANCELLED | OUTPATIENT
Start: 2020-10-16

## 2020-10-02 RX ORDER — BORTEZOMIB 3.5 MG/1
1.3 INJECTION, POWDER, LYOPHILIZED, FOR SOLUTION INTRAVENOUS; SUBCUTANEOUS
Status: CANCELLED | OUTPATIENT
Start: 2020-10-16

## 2020-10-02 RX ORDER — HEPARIN 100 UNIT/ML
500 SYRINGE INTRAVENOUS
Status: CANCELLED | OUTPATIENT
Start: 2020-10-16

## 2020-10-02 RX ORDER — SODIUM CHLORIDE 0.9 % (FLUSH) 0.9 %
10 SYRINGE (ML) INJECTION
Status: CANCELLED | OUTPATIENT
Start: 2020-10-02

## 2020-10-02 RX ADMIN — BORTEZOMIB 2.8 MG: 3.5 INJECTION, POWDER, LYOPHILIZED, FOR SOLUTION INTRAVENOUS; SUBCUTANEOUS at 04:10

## 2020-10-05 ENCOUNTER — IMMUNIZATION (OUTPATIENT)
Dept: PHARMACY | Facility: CLINIC | Age: 55
End: 2020-10-05
Payer: OTHER GOVERNMENT

## 2020-10-12 ENCOUNTER — PATIENT MESSAGE (OUTPATIENT)
Dept: HEMATOLOGY/ONCOLOGY | Facility: CLINIC | Age: 55
End: 2020-10-12

## 2020-10-16 ENCOUNTER — OFFICE VISIT (OUTPATIENT)
Dept: HEMATOLOGY/ONCOLOGY | Facility: CLINIC | Age: 55
End: 2020-10-16
Payer: OTHER GOVERNMENT

## 2020-10-16 ENCOUNTER — OFFICE VISIT (OUTPATIENT)
Dept: TRANSPLANT | Facility: CLINIC | Age: 55
End: 2020-10-16
Attending: INTERNAL MEDICINE
Payer: OTHER GOVERNMENT

## 2020-10-16 ENCOUNTER — PATIENT MESSAGE (OUTPATIENT)
Dept: HEMATOLOGY/ONCOLOGY | Facility: CLINIC | Age: 55
End: 2020-10-16

## 2020-10-16 ENCOUNTER — HOSPITAL ENCOUNTER (OUTPATIENT)
Dept: CARDIOLOGY | Facility: CLINIC | Age: 55
Discharge: HOME | End: 2020-10-16
Attending: INTERNAL MEDICINE
Payer: OTHER GOVERNMENT

## 2020-10-16 VITALS
BODY MASS INDEX: 29.08 KG/M2 | DIASTOLIC BLOOD PRESSURE: 62 MMHG | WEIGHT: 207.69 LBS | HEART RATE: 63 BPM | SYSTOLIC BLOOD PRESSURE: 102 MMHG | HEIGHT: 71 IN

## 2020-10-16 VITALS
OXYGEN SATURATION: 99 % | HEIGHT: 71 IN | SYSTOLIC BLOOD PRESSURE: 106 MMHG | BODY MASS INDEX: 29.15 KG/M2 | DIASTOLIC BLOOD PRESSURE: 62 MMHG | RESPIRATION RATE: 18 BRPM | HEART RATE: 64 BPM | WEIGHT: 208.19 LBS | TEMPERATURE: 98 F

## 2020-10-16 DIAGNOSIS — D47.2 SMOLDERING MULTIPLE MYELOMA: ICD-10-CM

## 2020-10-16 DIAGNOSIS — R79.89 ELEVATED TROPONIN: ICD-10-CM

## 2020-10-16 DIAGNOSIS — I42.8 INFILTRATIVE CARDIOMYOPATHY: ICD-10-CM

## 2020-10-16 DIAGNOSIS — I49.8 JUNCTIONAL RHYTHM: ICD-10-CM

## 2020-10-16 DIAGNOSIS — I50.32 CHRONIC DIASTOLIC CONGESTIVE HEART FAILURE: ICD-10-CM

## 2020-10-16 DIAGNOSIS — I48.3 TYPICAL ATRIAL FLUTTER: ICD-10-CM

## 2020-10-16 DIAGNOSIS — E85.81 AL AMYLOIDOSIS: Primary | ICD-10-CM

## 2020-10-16 DIAGNOSIS — I42.8 INFILTRATIVE CARDIOMYOPATHY: Primary | ICD-10-CM

## 2020-10-16 DIAGNOSIS — N18.31 STAGE 3A CHRONIC KIDNEY DISEASE: ICD-10-CM

## 2020-10-16 DIAGNOSIS — L29.9 PRURITUS: ICD-10-CM

## 2020-10-16 DIAGNOSIS — Z76.82 BONE MARROW TRANSPLANT CANDIDATE: ICD-10-CM

## 2020-10-16 DIAGNOSIS — Q21.11 ASD (ATRIAL SEPTAL DEFECT), OSTIUM SECUNDUM: ICD-10-CM

## 2020-10-16 DIAGNOSIS — E85.81 AL AMYLOIDOSIS: ICD-10-CM

## 2020-10-16 DIAGNOSIS — Z76.82 STEM CELL TRANSPLANT CANDIDATE: Primary | ICD-10-CM

## 2020-10-16 PROCEDURE — 93010 EKG 12-LEAD: ICD-10-PCS | Mod: S$PBB,,, | Performed by: INTERNAL MEDICINE

## 2020-10-16 PROCEDURE — 99999 PR PBB SHADOW E&M-EST. PATIENT-LVL III: CPT | Mod: PBBFAC,,, | Performed by: INTERNAL MEDICINE

## 2020-10-16 PROCEDURE — 99214 OFFICE O/P EST MOD 30 MIN: CPT | Mod: S$PBB,,, | Performed by: INTERNAL MEDICINE

## 2020-10-16 PROCEDURE — 99214 PR OFFICE/OUTPT VISIT, EST, LEVL IV, 30-39 MIN: ICD-10-PCS | Mod: S$PBB,,, | Performed by: INTERNAL MEDICINE

## 2020-10-16 PROCEDURE — 99215 OFFICE O/P EST HI 40 MIN: CPT | Mod: S$PBB,,, | Performed by: INTERNAL MEDICINE

## 2020-10-16 PROCEDURE — 99215 PR OFFICE/OUTPT VISIT, EST, LEVL V, 40-54 MIN: ICD-10-PCS | Mod: S$PBB,,, | Performed by: INTERNAL MEDICINE

## 2020-10-16 PROCEDURE — 99999 PR PBB SHADOW E&M-EST. PATIENT-LVL IV: CPT | Mod: PBBFAC,,, | Performed by: INTERNAL MEDICINE

## 2020-10-16 PROCEDURE — 99213 OFFICE O/P EST LOW 20 MIN: CPT | Mod: PBBFAC,25,27 | Performed by: INTERNAL MEDICINE

## 2020-10-16 PROCEDURE — 99999 PR PBB SHADOW E&M-EST. PATIENT-LVL IV: ICD-10-PCS | Mod: PBBFAC,,, | Performed by: INTERNAL MEDICINE

## 2020-10-16 PROCEDURE — 99999 PR PBB SHADOW E&M-EST. PATIENT-LVL III: ICD-10-PCS | Mod: PBBFAC,,, | Performed by: INTERNAL MEDICINE

## 2020-10-16 PROCEDURE — 99214 OFFICE O/P EST MOD 30 MIN: CPT | Mod: PBBFAC | Performed by: INTERNAL MEDICINE

## 2020-10-16 PROCEDURE — 93010 ELECTROCARDIOGRAM REPORT: CPT | Mod: S$PBB,,, | Performed by: INTERNAL MEDICINE

## 2020-10-16 PROCEDURE — 93005 ELECTROCARDIOGRAM TRACING: CPT | Mod: PBBFAC | Performed by: INTERNAL MEDICINE

## 2020-10-16 RX ORDER — LORATADINE 10 MG/1
10 TABLET ORAL NIGHTLY
Qty: 30 TABLET | Refills: 2 | Status: SHIPPED | OUTPATIENT
Start: 2020-10-16 | End: 2021-01-01 | Stop reason: SDUPTHER

## 2020-10-16 NOTE — PROGRESS NOTES
HEMATOLOGIC MALIGNANCIES PROGRESS NOTE    IDENTIFYING STATEMENT   Roc Lai Jr. ([place]) is a 55 y.o. male with a  of 1965 from [place] with the diagnosis of AL amyloidosis.      ONCOLOGY HISTORY:    1. AL amyloid with cardiac involvement              A. 2016: Initial evaluation with Dr. Carmichael. Reported 18 months of progressive decline (previously ran 3 miles easily, now can only walk 10 minutes). M-protein 0.19 g/dl, kappa 1.37 mg/dl, lambda 50 mg/dl, ratio 0.03. Abdominal fat pad biopsy had been negative for presence of amyloid. Cardiac MRI suggestive of infiltrative cardiomyopathy.               B. 1/3/2017: Endomyocardial biopsy - involved by AL amyloid              C. 2017: BMBx - 60% cellular marrow with 50% plasma cells, consistent with plasma cell myeloma.               D. 2/15/2017 - 2017: Completed 4 cycles bortezomib chemotherapy on [id number] trial.               E. 2017: Bortezomib every 2weeks x 2 doses              F. 2017: Change bortezomib to [id number] x 2 doses              G. 10/9/2017: Bortezomib q6 weeks; kappa 1.6 mg/dl, lambda 0.93 mg/dl, ratio 1.72              H. 2018: Bortezomib q8 weeks.               I. 2018: [id number] close out visit due to ineffectiveness.                J. 2020: kappa 2.73 mg/dl, lambda 28.33 mg/dl, ratio (lambda:kappa) 10.38, concerning for progressive disease             K. 2020: CyBorD started             L. 2020: kappa 2.50, lambda 4.25 gm/dl, ratio (lambda:kappa) 0.59, response to therapy            M. 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 Lai returns to clinic for follow-up of his AL amyloidosis. He reports  the following:    - he is still having difficulty with restless leg syndrome - taking 0.25 mg pramipexole nightly and 300 mg gabapentin qhs  - he has started having diffuse pruritis at bedtime as well, which has become more severe than restless legs  - feels his neuropathy is more severe than before  - wants to know about transplant timing/dates.   - gaining weight    Past Medical History, Past Social History and Past Family History have been reviewed and are unchanged except as noted in the interval history.    MEDICATIONS:     Current Outpatient Medications on File Prior to Visit   Medication Sig Dispense Refill    acyclovir (ZOVIRAX) 400 MG tablet TAKE 1 TABLET TWICE A  tablet 3    aspirin (ECOTRIN) 81 MG EC tablet Take 1 tablet (81 mg total) by mouth once daily. 150 tablet 2    BORTEZOMIB (VELCADE INJ) Inject as directed. Every 2 months      bumetanide (BUMEX) 2 MG tablet Take 1 tablet (2 mg total) by mouth 2 (two) times daily. 60 tablet 3    compress.stocking,knee,reg,med Misc 1 Pair by Misc.(Non-Drug; Combo Route) route once daily. 2 each 5    flu vacc [id number] 6mos up,PF, (FLUARIX QUAD 0053-0805, PF,) 60 mcg (15 mcg x 4)/0.5 mL Syrg adminster 0.5 mL 0    gabapentin (NEURONTIN) 100 MG capsule TAKE 3 CAPSULES EVERY EVENING 270 capsule 4    magnesium oxide (MAG-OX) 400 mg (241.3 mg magnesium) tablet Take 400 mg by mouth once daily.      pramipexole (MIRAPEX) 0.125 MG tablet Take 2 tablets (0.25 mg total) by mouth every evening. 60 tablet 2    sildenafil (VIAGRA) 50 MG tablet Take 1 tablet (50 mg total) by mouth daily as needed for Erectile Dysfunction. 7 tablet 2    spironolactone (ALDACTONE) 25 MG tablet TAKE 1 TABLET DAILY 90 tablet 3     No current facility-administered medications on file prior to visit.        ALLERGIES: Review of patient's allergies indicates:  No Known Allergies     ROS:       Review of Systems   Constitutional: Positive for fatigue. Negative for diaphoresis, fever and  "unexpected weight change.   HENT:   Negative for lump/mass and sore throat.    Eyes: Negative for icterus.   Respiratory: Negative for cough and shortness of breath.    Cardiovascular: Positive for leg swelling. Negative for chest pain and palpitations.   Gastrointestinal: Negative for abdominal distention, constipation, diarrhea, nausea and vomiting.   Genitourinary: Negative for dysuria and frequency.    Musculoskeletal: Negative for arthralgias, gait problem and myalgias.   Skin: Negative for rash.   Neurological: Positive for numbness. Negative for dizziness, gait problem and headaches.   Hematological: Negative for adenopathy. Does not bruise/bleed easily.   Psychiatric/Behavioral: The patient is not nervous/anxious.         PHYSICAL EXAM:  Vitals:    10/16/20 1129   BP: 106/62   Pulse: 64   Resp: 18   Temp: 98.4 °F (36.9 °C)   TempSrc: Oral   SpO2: 99%   Weight: 94.4 kg (208 lb 3.2 oz)   Height: 5' 11" (1.803 m)   PainSc: 0-No pain       KARNOFSKY PERFORMANCE STATUS 80%  ECOG 1    Physical Exam  Constitutional:       General: He is not in acute distress.     Appearance: He is well-developed.   HENT:      Head: Normocephalic and atraumatic.      Mouth/Throat:      Mouth: No oral lesions.   Eyes:      Conjunctiva/sclera: Conjunctivae normal.   Neck:      Thyroid: No thyromegaly.      Vascular: No JVD.   Cardiovascular:      Rate and Rhythm: Normal rate and regular rhythm.      Heart sounds: Normal heart sounds. No murmur.   Pulmonary:      Breath sounds: Normal breath sounds. No wheezing or rales.   Abdominal:      General: There is no distension.      Palpations: Abdomen is soft. There is no hepatomegaly, splenomegaly or mass.      Tenderness: There is no abdominal tenderness.   Musculoskeletal:      Right lower leg: Edema present.      Left lower leg: Edema present.   Lymphadenopathy:      Cervical: No cervical adenopathy.      Right cervical: No deep cervical adenopathy.     Left cervical: No deep cervical " adenopathy.      Lower Body: No right inguinal adenopathy. No left inguinal adenopathy.   Skin:     Findings: No rash.   Neurological:      Mental Status: He is alert and oriented to person, place, and time.      Cranial Nerves: No cranial nerve deficit.      Coordination: Coordination normal.      Deep Tendon Reflexes: Reflexes are normal and symmetric.         LAB:   Results for orders placed or performed in visit on 10/16/20   CBC auto differential   Result Value Ref Range    WBC 6.27 3.90 - 12.70 K/uL    RBC 3.41 (L) 4.60 - 6.20 M/uL    Hemoglobin 9.2 (L) 14.0 - 18.0 g/dL    Hematocrit 30.3 (L) 40.0 - 54.0 %    Mean Corpuscular Volume 89 82 - 98 fL    Mean Corpuscular Hemoglobin 27.0 27.0 - 31.0 pg    Mean Corpuscular Hemoglobin Conc 30.4 (L) 32.0 - 36.0 g/dL    RDW 15.8 (H) 11.5 - 14.5 %    Platelets 209 150 - 350 K/uL    MPV 9.8 9.2 - 12.9 fL    Immature Granulocytes 0.5 0.0 - 0.5 %    Gran # (ANC) 4.9 1.8 - 7.7 K/uL    Immature Grans (Abs) 0.03 0.00 - 0.04 K/uL    Lymph # 0.5 (L) 1.0 - 4.8 K/uL    Mono # 0.7 0.3 - 1.0 K/uL    Eos # 0.1 0.0 - 0.5 K/uL    Baso # 0.04 0.00 - 0.20 K/uL    nRBC 0 0 /100 WBC    Gran% 77.5 (H) 38.0 - 73.0 %    Lymph% 8.3 (L) 18.0 - 48.0 %    Mono% 11.5 4.0 - 15.0 %    Eosinophil% 1.6 0.0 - 8.0 %    Basophil% 0.6 0.0 - 1.9 %    Differential Method Automated    Comprehensive metabolic panel   Result Value Ref Range    Sodium 136 136 - 145 mmol/L    Potassium 4.2 3.5 - 5.1 mmol/L    Chloride 101 95 - 110 mmol/L    CO2 26 23 - 29 mmol/L    Glucose 137 (H) 70 - 110 mg/dL    BUN, Bld 31 (H) 6 - 20 mg/dL    Creatinine 1.7 (H) 0.5 - 1.4 mg/dL    Calcium 8.5 (L) 8.7 - 10.5 mg/dL    Total Protein 5.8 (L) 6.0 - 8.4 g/dL    Albumin 2.8 (L) 3.5 - 5.2 g/dL    Total Bilirubin 0.7 0.1 - 1.0 mg/dL    Alkaline Phosphatase 129 55 - 135 U/L    AST 28 10 - 40 U/L    ALT 17 10 - 44 U/L    Anion Gap 9 8 - 16 mmol/L    eGFR if African American 51.7 (A) >60 mL/min/1.73 m^2    eGFR if non African American  44.7 (A) >60 mL/min/1.73 m^2   Protein electrophoresis, serum   Result Value Ref Range    Protein, Serum 5.5 (L) 6.0 - 8.4 g/dL   Immunoglobulins (IgG, IgA, IgM) Quantitative   Result Value Ref Range    IgG - Serum 653 650 - 1600 mg/dL    IgA 38 (L) 40 - 350 mg/dL    IgM 31 (L) 50 - 300 mg/dL   Magnesium   Result Value Ref Range    Magnesium 2.0 1.6 - 2.6 mg/dL       PROBLEMS ASSESSED THIS VISIT:    1. AL amyloidosis    2. Pruritus    3. Smoldering multiple myeloma    4. Infiltrative cardiomyopathy with elevated troponin but no CAD felt due to infiltrative CM;1/24/17 supplemented report from [place] + AL amyloid within heart        PLAN:       AL amyloidosis  Smoldering multiple myeloma    He has acheived VGPR, and we have continued maintenance bortezomib.    He is being considered for autologous stem cell transplant. He would like to perform this after Thanksgiving. We will plan for evaluation in approximately one month and then transplant after.     Will hold bortezomib for now pending light chains today to allow his neuropathy and restless leg syndrome symptoms to improve.     Anemia due to antineoplastic chemotherapy  Moderate. Monitor. Likely due to therapy and CKD    Chronic kidney disease, stage III  Continue bumetanide and monitor; likely related to CHF     Infiltrative cardiomyopathy  BLE Swelling  Weight has increased  Continue bumetanide  Troponin T pending to assess mortality risk of transplant  Will see Dr. Lao today - would like to know if he needs right heart cath for evaluation prior to transplant to assess risk status    Restless leg syndrome  Continue pramipexole  0.25 mg PO qhs  Continue gabapentin 300 mg PO qhs  Recommend loratadine 10 mg PO qhs for any possible allergy symptoms (pruritis)    Follow-up  Plan eval next month    Chris Atkins MD  Hematology and Stem Cell Transplant

## 2020-10-16 NOTE — Clinical Note
Can we schedule bone marrow biopsy during week of 11/16 please? Other follow-up will be related to transplant eval and scheduled by coordinators.

## 2020-10-18 PROBLEM — I49.8 JUNCTIONAL RHYTHM: Status: ACTIVE | Noted: 2020-10-18

## 2020-10-18 NOTE — PROGRESS NOTES
"Subjective:     Patient ID:  Roc Lai Jr. is a 55 y.o. 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 Atkins comes today for assessment prior to consideration of bone marrow transplant.  He reports walking 2 miles over 40 min of few times per week.  Sleeps on 1 pillow.  No PND.  He denies palpitations presyncope or syncope.  Denies dizzy or lightheaded sensations.  No chest discomfort.    Review of Systems   Constitution: Positive for malaise/fatigue and weight gain (up 6# on my scale but he did not note any real change over past few months). Negative for chills, fever and night sweats.   HENT: Positive for congestion (at night he feels congested ).    Cardiovascular: Positive for dyspnea on exertion (see HPI, well compensated) and leg swelling. Negative for chest pain, irregular heartbeat, near-syncope, orthopnea, palpitations, paroxysmal nocturnal dyspnea and syncope.   Respiratory: Negative for cough, sputum production and wheezing.    Hematologic/Lymphatic: Does not bruise/bleed easily.   Musculoskeletal: Positive for joint pain (Some since shoulder surgery last year). Negative for arthritis and stiffness.   Gastrointestinal: Negative for change in bowel habit, constipation, diarrhea, hematochezia and melena.   Genitourinary: Positive for nocturia (1-2 times per night ). Negative for hematuria.        ED but viagra helps   Neurological: Positive for numbness (some in all fingers both hands) and paresthesias (pins and needles below knee bilaterally which may be worse now but not sure). Negative for brief paralysis, dizziness, focal weakness, light-headedness, seizures and weakness.        Restless legs      Objective:   Physical Exam   Constitutional: He is oriented to person, place, and time. He appears well-developed and well-nourished. No distress.   /62   Pulse 63   Ht 5' 11" (1.803 m)   Wt 94.2 kg (207 lb " 10.8 oz)   BMI 28.96 kg/m²   Last visit wt 91.2 kg (201 lb 1 oz)   Visit before last visit here 8/15/19 wt 200#   HENT:   Head: Normocephalic and atraumatic.   Eyes: Conjunctivae are normal. Right eye exhibits no discharge. Left eye exhibits no discharge. No scleral icterus.   Neck: JVD (V wave to jaw; est CVP 20) present. No thyromegaly present.   Cardiovascular: Normal rate. Exam reveals gallop (S3).   No murmur heard.  On exam irregular rhythm--ordered EKG   Pulmonary/Chest: Effort normal and breath sounds normal.   Abdominal: Soft. Bowel sounds are normal. He exhibits distension (liver span 14 cm  ). He exhibits no mass. There is no abdominal tenderness. There is no rebound and no guarding.   Musculoskeletal:         General: Edema (1-2+ edema below knees bilaterally with stasis changes) present. No tenderness.   Neurological: He is alert and oriented to person, place, and time.   Skin: Skin is warm and dry. He is not diaphoretic.   Psychiatric: He has a normal mood and affect. His behavior is normal. Judgment and thought content normal.      5/8/2020 ECHO Conclusion   · Normal left ventricular systolic function. The estimated ejection fraction is 60%.  · Septal wall has abnormal motion. Systolic and diastolic flattening of the interventricular septum consistent with right ventricle pressure and volume overload.  · Concentric left ventricular remodeling.  · Left ventricular diastolic dysfunction.  · Mildly reduced right ventricular systolic function.  · Severe left atrial enlargement.  · Severe right atrial enlargement.  · Severe tricuspid regurgitation. PA pressure cannot be estinated accurately suspect PHTN.  · Elevated central venous pressure (15 mmHg).  · Small circumferential pericardial effusion.     Lab Results   Component Value Date     (H) 06/02/2020     (H) 05/27/2020     (H) 08/15/2019   NT-pro BNP 1024  Troponin T pending    10/16/2020     (L) 10/02/2020    K 4.2  10/16/2020    K 4.3 10/02/2020     (H) 10/16/2020     10/02/2020    BUN 31 (H) 10/16/2020    BUN 34 (H) 10/02/2020    CREATININE 1.7 (H) 10/16/2020    CREATININE 1.6 (H) 10/02/2020      AST 28 10/16/2020    ALT 17 10/16/2020    ALBUMIN 2.8 (L) 10/16/2020    PROT 5.8 (L) 10/16/2020    BILITOT 0.7 10/16/2020    WBC 6.27 10/16/2020    HGB 9.2 (L) 10/16/2020    HCT 30.3 (L) 10/16/2020     10/16/2020     EKG today:  Junctional rhythm, low voltage, inferior infarct pattern most likely pseudo infarct; read as right bundle but I am not convinced with retrograde P wave distorting QRS  Assessment:     1. Infiltrative cardiomyopathy with elevated troponin but no CAD felt due to infiltrative CM;1/24/17 supplemented report from [place] + AL amyloid within heart    2. Junctional rhythm    3. Chronic diastolic congestive heart failure    4. Elevated troponin with no CAD felt due to infiltrative CM 1/24/17 supplemented report from [place] + AL amyloid within heart    5. Typical atrial flutter s/p ablation and restoration sinus rhythm 2016    6. ASD (atrial septal defect), ostium secundum s/p closure    7. Smoldering multiple myeloma    8. Stage 3a chronic kidney disease      Plan:   Reviewed the EKG with him by phone and I believe that he requires pacemaker.  He is definitely asymptomatic so he will see Dr. Gregory as an outpatient this week to discuss permanent pacemaker placement  His physical examination, NT BNP may improve following pacemaker insertion so this should be reassessed regarding his risk for bone marrow transplant in view of his cardiac amyloid.

## 2020-10-19 ENCOUNTER — TELEPHONE (OUTPATIENT)
Dept: ELECTROPHYSIOLOGY | Facility: CLINIC | Age: 55
End: 2020-10-19

## 2020-10-19 ENCOUNTER — PATIENT MESSAGE (OUTPATIENT)
Dept: HEMATOLOGY/ONCOLOGY | Facility: CLINIC | Age: 55
End: 2020-10-19

## 2020-10-19 DIAGNOSIS — I42.9 CARDIOMYOPATHY, UNSPECIFIED TYPE: Primary | ICD-10-CM

## 2020-10-19 NOTE — TELEPHONE ENCOUNTER
----- Message from Nima Gregory MD sent at 10/19/2020  9:02 AM CDT -----  Lets get him in tomorrow into clinic thanks  ----- Message -----  From: James Lao Jr., MD  Sent: 10/18/2020   5:08 PM CDT  To: Nima Gregory MD, Chris Atkins MD

## 2020-10-20 ENCOUNTER — CLINICAL SUPPORT (OUTPATIENT)
Dept: CARDIOLOGY | Facility: HOSPITAL | Age: 55
End: 2020-10-20
Attending: INTERNAL MEDICINE
Payer: OTHER GOVERNMENT

## 2020-10-20 ENCOUNTER — OFFICE VISIT (OUTPATIENT)
Dept: ELECTROPHYSIOLOGY | Facility: CLINIC | Age: 55
End: 2020-10-20
Payer: OTHER GOVERNMENT

## 2020-10-20 VITALS
WEIGHT: 209.44 LBS | HEART RATE: 69 BPM | DIASTOLIC BLOOD PRESSURE: 72 MMHG | BODY MASS INDEX: 29.32 KG/M2 | HEIGHT: 71 IN | SYSTOLIC BLOOD PRESSURE: 118 MMHG

## 2020-10-20 DIAGNOSIS — E85.81 AL AMYLOIDOSIS: Primary | ICD-10-CM

## 2020-10-20 DIAGNOSIS — I42.9 CARDIOMYOPATHY, UNSPECIFIED TYPE: ICD-10-CM

## 2020-10-20 DIAGNOSIS — I42.8 INFILTRATIVE CARDIOMYOPATHY: ICD-10-CM

## 2020-10-20 PROCEDURE — 93226 XTRNL ECG REC<48 HR SCAN A/R: CPT

## 2020-10-20 PROCEDURE — 99245 PR OFFICE CONSULTATION,LEVEL V: ICD-10-PCS | Mod: S$PBB,,, | Performed by: INTERNAL MEDICINE

## 2020-10-20 PROCEDURE — 93227 XTRNL ECG REC<48 HR R&I: CPT | Mod: ,,, | Performed by: INTERNAL MEDICINE

## 2020-10-20 PROCEDURE — 93005 ELECTROCARDIOGRAM TRACING: CPT | Mod: PBBFAC | Performed by: INTERNAL MEDICINE

## 2020-10-20 PROCEDURE — 93010 ELECTROCARDIOGRAM REPORT: CPT | Mod: S$PBB,,, | Performed by: INTERNAL MEDICINE

## 2020-10-20 PROCEDURE — 93010 RHYTHM STRIP: ICD-10-PCS | Mod: S$PBB,,, | Performed by: INTERNAL MEDICINE

## 2020-10-20 PROCEDURE — 99214 OFFICE O/P EST MOD 30 MIN: CPT | Mod: PBBFAC,25 | Performed by: INTERNAL MEDICINE

## 2020-10-20 PROCEDURE — 93227 HOLTER MONITOR - 24 HOUR (CUPID ONLY): ICD-10-PCS | Mod: ,,, | Performed by: INTERNAL MEDICINE

## 2020-10-20 PROCEDURE — 99245 OFF/OP CONSLTJ NEW/EST HI 55: CPT | Mod: S$PBB,,, | Performed by: INTERNAL MEDICINE

## 2020-10-20 PROCEDURE — 99999 PR PBB SHADOW E&M-EST. PATIENT-LVL IV: CPT | Mod: PBBFAC,,, | Performed by: INTERNAL MEDICINE

## 2020-10-20 PROCEDURE — 99999 PR PBB SHADOW E&M-EST. PATIENT-LVL IV: ICD-10-PCS | Mod: PBBFAC,,, | Performed by: INTERNAL MEDICINE

## 2020-10-20 NOTE — PROGRESS NOTES
Subjective:    Patient ID:  Roc Lai Jr. is a 55 y.o. male who presents for follow-up of Cardiomyopathy      55 yoM CW AFL here for follow up.     2015: He is active and exercises regularly. During routine exercise he developed fatigue within a few minutes of his usual routine leading him to stop. He presented to his PCP where an arrhythmia was suspected. He underwent a stress test and echo with results listed below. His ECG was consistent with CW AFL (positive inferiorly with negative waves in V1). He was placed on aspirin as well as flecainide and underwent EMIL/CV. He has had resolution of his symptoms without clinical or documented recurrence. He has no risk factors for AF. Of note an ASD was detected on his EMIL. He does not have any risk factors for YAZMIN.     2/2016: Successful AFL ablation 9/8/15 with no known recurrence. He has had experienced shortness of breath with exercise. He can only exercise for a 6-10 minutes before getting dyspnea. His heart rate gets to 140 bpm with exercise. He now has to stop when before he was unable to. He has been off flecainide since prior to the RFA.     Interval history: First visit in over 4 years. He had his ASD closed with a closure device 9/26/2016. He has been diagnosed with cardiac amyloidosis. He is scheduled for BM biopsy and transplant for light chain AL amyloid. He had junctional rhythm on ECG 10/16/2020 with RBBB QRS. No syncope or near syncope. He has noticed worsened exertional fatigue.     Dg Lao    Echo 5/2020:  · Normal left ventricular systolic function. The estimated ejection fraction is 60%.  · Septal wall has abnormal motion. Systolic and diastolic flattening of the interventricular septum consistent with right ventricle pressure and volume overload.  · Concentric left ventricular remodeling.  · Left ventricular diastolic dysfunction.  · Mildly reduced right ventricular systolic function.  · Severe left atrial enlargement.  · Severe right  atrial enlargement.  · Severe tricuspid regurgitation. PA pressure cannot be estinated accurately suspect PHTN.  · Elevated central venous pressure (15 mmHg).  · Small circumferential pericardial effusion.    Echo 7/15:  CONCLUSIONS   1 - Normal left ventricular systolic function (EF 60-65%).   2 - Concentric remodeling.   3 - Left ventricular diastolic dysfunction.   4 - Biatrial enlargement.   5 - Trivial mitral regurgitation.   6 - Mild tricuspid regurgitation.   7 - Trivial pulmonic regurgitation.     NM stress 7/15:  Nuclear Quantitative Functional Analysis:   LVEF: 59 %    Impression: EQUIVOCAL MYOCARDIAL PERFUSION  1. There is a mild mostly reversible anterior wall defect of uncertain significance.   2. The perfusion scan is free of evidence for myocardial ischemia.   3. Resting wall motion is physiologic.   4. Resting LV function is normal.   5. The ventricular volumes are normal at rest and stress.   6. The extracardiac distribution of radioactivity is normal.     Past Medical History:  No date: Anticoagulant long-term use      Comment:  Aspirin therapy  No date: Basal cell carcinoma  12/1/2016: Cardiomyopathy  12/6/2016: Chronic diastolic congestive heart failure  No date: Coronary artery disease      Comment:  recent Artial Flutter diagnosed  7/30/2019: Smoldering multiple myeloma  7/28/2015: Typical atrial flutter s/p ablation and restoration sinus   rhythm 2016    Past Surgical History:  No date: ASD REPAIR  1996: BACK SURGERY      Comment:  Lower lunbar lamenectomy  1/23/2020: BONE MARROW BIOPSY; Right      Comment:  Procedure: Biopsy-bone marrow;  Surgeon: Chris Atkins MD;  Location: [facility] OR [address] [place] [address];  Service:                Oncology;  Laterality: Right;  4/27/2020: COLONOSCOPY; N/A      Comment:  Procedure: COLONOSCOPY;  Surgeon: Sal Darby MD;  Location: [facility] ([address] [place] [address]);  Service: Endoscopy;                Laterality: N/A;  chf/cardiomyopathy-Theresa Dover  "Mehnaz,               "high priority" r/s - ERWCovid screening test scheduled               on 20-instructions emailed to patient-BB  2014: KNEE SURGERY      Comment:  Rt knee, torn meniscus  2014: MOUTH SURGERY      Comment:  implant dental  9/8/15: RADIOFREQUENCY ABLATION      Comment:  Atrial flutter ablation  No date: SHOULDER SURGERY    Social History    Socioeconomic History      Marital status:       Spouse name: Not on file      Number of children: Not on file      Years of education: Not on file      Highest education level: Not on file    Occupational History      Not on file    Social Needs      Financial resource strain: Not on file      Food insecurity        Worry: Not on file        Inability: Not on file      Transportation needs        Medical: Not on file        Non-medical: Not on file    Tobacco Use      Smoking status: Former Smoker        Quit date: 1993        Years since quittin.2      Smokeless tobacco: Never Used    Substance and Sexual Activity      Alcohol use: Yes        Alcohol/week: 0.0 standard drinks        Comment: occassional      Drug use: No      Sexual activity: Yes        Partners: Female    Lifestyle      Physical activity        Days per week: Not on file        Minutes per session: Not on file      Stress: Not on file    Relationships      Social connections        Talks on phone: Not on file        Gets together: Not on file        Attends Jain service: Not on file        Active member of club or organization: Not on file        Attends meetings of clubs or organizations: Not on file        Relationship status: Not on file    Other Topics      Concerns:        Not on file    Social History Narrative      Not on file      Review of patient's family history indicates:  Problem: Hypertension      Relation: Mother          Age of Onset: (Not Specified)  Problem: Alcohol abuse      Relation: Father          Age of Onset: (Not Specified)  Problem: " Melanoma      Relation: Neg Hx          Age of Onset: (Not Specified)          Review of Systems   Constitution: Negative.   HENT: Negative.    Eyes: Negative.    Cardiovascular: Positive for dyspnea on exertion. Negative for chest pain, leg swelling, near-syncope, palpitations and syncope.   Respiratory: Positive for shortness of breath.    Endocrine: Negative.    Hematologic/Lymphatic: Negative.    Skin: Negative.    Musculoskeletal: Negative.    Gastrointestinal: Negative.    Genitourinary: Negative.    Neurological: Negative.  Negative for dizziness and light-headedness.   Psychiatric/Behavioral: Negative.    Allergic/Immunologic: Negative.         Objective:    Physical Exam   Constitutional: He is oriented to person, place, and time. He appears well-developed and well-nourished. No distress.   HENT:   Head: Normocephalic and atraumatic.   Eyes: Pupils are equal, round, and reactive to light. EOM are normal.   Neck: Normal range of motion. No JVD present. No thyromegaly present.   Cardiovascular: Normal rate, regular rhythm, S1 normal, S2 normal and normal heart sounds. PMI is not displaced. Exam reveals no gallop and no friction rub.   No murmur heard.  Pulmonary/Chest: Effort normal and breath sounds normal. No respiratory distress. He has no wheezes. He has no rales.   Abdominal: Soft. Bowel sounds are normal. He exhibits no distension. There is no abdominal tenderness. There is no rebound and no guarding.   Musculoskeletal: Normal range of motion.         General: No tenderness or edema.   Neurological: He is alert and oriented to person, place, and time. No cranial nerve deficit.   Skin: Skin is warm and dry. No rash noted. No erythema.   Psychiatric: He has a normal mood and affect. His behavior is normal. Judgment and thought content normal.   Vitals reviewed.    ECG: junctional rhythm, RBBB QRS, PACs with conduction        Assessment:       1. AL amyloidosis    2. Cardiomyopathy, unspecified type    3.  Infiltrative cardiomyopathy with elevated troponin but no CAD felt due to infiltrative CM;1/24/17 supplemented report from [place] + AL amyloid within heart         Plan:       55 yoM AL amyloidosis here for arrhythmia management. He has junctional rhythm with exertional limitations. He will require permanent pacing. With regard to the need for ICD, will assess with holter for the presence of VT/NSVT. If he has native conduction, which can be assessed at the time of implant, a dual chamber system can be used. Will await holter results and formulate plan accordingly.

## 2020-10-20 NOTE — LETTER
October 20, 2020      Nettie Schneider MD  [address]  [facility] [address]           [id number]  [address]  [facility] [address]  Phone: [phone]  Fax: [phone]          Patient: Roc Lai Jr.   MR Number: [id number]   YOB: 1965   Date of Visit: 10/20/2020       Dear Dr. Nettie Schneider:    Thank you for referring Roc Lai to me for evaluation. Attached you will find relevant portions of my assessment and plan of care.    If you have questions, please do not hesitate to call me. I look forward to following Roc Lai along with you.    Sincerely,    Nima Gregory MD    Enclosure  CC:  No Recipients    If you would like to receive this communication electronically, please contact [email or web address] or [phone] to request more information on [facility] access.    For providers and/or their staff who would like to refer a patient to [facility], please contact us through our one-stop-shop provider referral line, [facility], at [phone].    If you feel you have received this communication in error or would no longer like to receive these types of communications, please e-mail [email or web address]

## 2020-10-20 NOTE — H&P (VIEW-ONLY)
Subjective:    Patient ID:  Roc Lai Jr. is a 55 y.o. male who presents for follow-up of Cardiomyopathy      55 yoM CW AFL here for follow up.     2015: He is active and exercises regularly. During routine exercise he developed fatigue within a few minutes of his usual routine leading him to stop. He presented to his PCP where an arrhythmia was suspected. He underwent a stress test and echo with results listed below. His ECG was consistent with CW AFL (positive inferiorly with negative waves in V1). He was placed on aspirin as well as flecainide and underwent EMIL/CV. He has had resolution of his symptoms without clinical or documented recurrence. He has no risk factors for AF. Of note an ASD was detected on his EMIL. He does not have any risk factors for YAZMIN.     2/2016: Successful AFL ablation 9/8/15 with no known recurrence. He has had experienced shortness of breath with exercise. He can only exercise for a 6-10 minutes before getting dyspnea. His heart rate gets to 140 bpm with exercise. He now has to stop when before he was unable to. He has been off flecainide since prior to the RFA.     Interval history: First visit in over 4 years. He had his ASD closed with a closure device 9/26/2016. He has been diagnosed with cardiac amyloidosis. He is scheduled for BM biopsy and transplant for light chain AL amyloid. He had junctional rhythm on ECG 10/16/2020 with RBBB QRS. No syncope or near syncope. He has noticed worsened exertional fatigue.     Dg Lao    Echo 5/2020:  · Normal left ventricular systolic function. The estimated ejection fraction is 60%.  · Septal wall has abnormal motion. Systolic and diastolic flattening of the interventricular septum consistent with right ventricle pressure and volume overload.  · Concentric left ventricular remodeling.  · Left ventricular diastolic dysfunction.  · Mildly reduced right ventricular systolic function.  · Severe left atrial enlargement.  · Severe right  atrial enlargement.  · Severe tricuspid regurgitation. PA pressure cannot be estinated accurately suspect PHTN.  · Elevated central venous pressure (15 mmHg).  · Small circumferential pericardial effusion.    Echo 7/15:  CONCLUSIONS   1 - Normal left ventricular systolic function (EF 60-65%).   2 - Concentric remodeling.   3 - Left ventricular diastolic dysfunction.   4 - Biatrial enlargement.   5 - Trivial mitral regurgitation.   6 - Mild tricuspid regurgitation.   7 - Trivial pulmonic regurgitation.     NM stress 7/15:  Nuclear Quantitative Functional Analysis:   LVEF: 59 %    Impression: EQUIVOCAL MYOCARDIAL PERFUSION  1. There is a mild mostly reversible anterior wall defect of uncertain significance.   2. The perfusion scan is free of evidence for myocardial ischemia.   3. Resting wall motion is physiologic.   4. Resting LV function is normal.   5. The ventricular volumes are normal at rest and stress.   6. The extracardiac distribution of radioactivity is normal.     Past Medical History:  No date: Anticoagulant long-term use      Comment:  Aspirin therapy  No date: Basal cell carcinoma  12/1/2016: Cardiomyopathy  12/6/2016: Chronic diastolic congestive heart failure  No date: Coronary artery disease      Comment:  recent Artial Flutter diagnosed  7/30/2019: Smoldering multiple myeloma  7/28/2015: Typical atrial flutter s/p ablation and restoration sinus   rhythm 2016    Past Surgical History:  No date: ASD REPAIR  1996: BACK SURGERY      Comment:  Lower lunbar lamenectomy  1/23/2020: BONE MARROW BIOPSY; Right      Comment:  Procedure: Biopsy-bone marrow;  Surgeon: Chris Atkins MD;  Location: [facility] OR [address] [place] [address];  Service:                Oncology;  Laterality: Right;  4/27/2020: COLONOSCOPY; N/A      Comment:  Procedure: COLONOSCOPY;  Surgeon: Sal Darby MD;  Location: [facility] ([address] [place] [address]);  Service: Endoscopy;                Laterality: N/A;  chf/cardiomyopathy-Theresa [facility]  "Mehnaz,               "high priority" r/s - ERWCovid screening test scheduled               on 20-instructions emailed to patient-BB  2014: KNEE SURGERY      Comment:  Rt knee, torn meniscus  2014: MOUTH SURGERY      Comment:  implant dental  9/8/15: RADIOFREQUENCY ABLATION      Comment:  Atrial flutter ablation  No date: SHOULDER SURGERY    Social History    Socioeconomic History      Marital status:       Spouse name: Not on file      Number of children: Not on file      Years of education: Not on file      Highest education level: Not on file    Occupational History      Not on file    Social Needs      Financial resource strain: Not on file      Food insecurity        Worry: Not on file        Inability: Not on file      Transportation needs        Medical: Not on file        Non-medical: Not on file    Tobacco Use      Smoking status: Former Smoker        Quit date: 1993        Years since quittin.2      Smokeless tobacco: Never Used    Substance and Sexual Activity      Alcohol use: Yes        Alcohol/week: 0.0 standard drinks        Comment: occassional      Drug use: No      Sexual activity: Yes        Partners: Female    Lifestyle      Physical activity        Days per week: Not on file        Minutes per session: Not on file      Stress: Not on file    Relationships      Social connections        Talks on phone: Not on file        Gets together: Not on file        Attends Tenriism service: Not on file        Active member of club or organization: Not on file        Attends meetings of clubs or organizations: Not on file        Relationship status: Not on file    Other Topics      Concerns:        Not on file    Social History Narrative      Not on file      Review of patient's family history indicates:  Problem: Hypertension      Relation: Mother          Age of Onset: (Not Specified)  Problem: Alcohol abuse      Relation: Father          Age of Onset: (Not Specified)  Problem: " Melanoma      Relation: Neg Hx          Age of Onset: (Not Specified)          Review of Systems   Constitution: Negative.   HENT: Negative.    Eyes: Negative.    Cardiovascular: Positive for dyspnea on exertion. Negative for chest pain, leg swelling, near-syncope, palpitations and syncope.   Respiratory: Positive for shortness of breath.    Endocrine: Negative.    Hematologic/Lymphatic: Negative.    Skin: Negative.    Musculoskeletal: Negative.    Gastrointestinal: Negative.    Genitourinary: Negative.    Neurological: Negative.  Negative for dizziness and light-headedness.   Psychiatric/Behavioral: Negative.    Allergic/Immunologic: Negative.         Objective:    Physical Exam   Constitutional: He is oriented to person, place, and time. He appears well-developed and well-nourished. No distress.   HENT:   Head: Normocephalic and atraumatic.   Eyes: Pupils are equal, round, and reactive to light. EOM are normal.   Neck: Normal range of motion. No JVD present. No thyromegaly present.   Cardiovascular: Normal rate, regular rhythm, S1 normal, S2 normal and normal heart sounds. PMI is not displaced. Exam reveals no gallop and no friction rub.   No murmur heard.  Pulmonary/Chest: Effort normal and breath sounds normal. No respiratory distress. He has no wheezes. He has no rales.   Abdominal: Soft. Bowel sounds are normal. He exhibits no distension. There is no abdominal tenderness. There is no rebound and no guarding.   Musculoskeletal: Normal range of motion.         General: No tenderness or edema.   Neurological: He is alert and oriented to person, place, and time. No cranial nerve deficit.   Skin: Skin is warm and dry. No rash noted. No erythema.   Psychiatric: He has a normal mood and affect. His behavior is normal. Judgment and thought content normal.   Vitals reviewed.    ECG: junctional rhythm, RBBB QRS, PACs with conduction        Assessment:       1. AL amyloidosis    2. Cardiomyopathy, unspecified type    3.  Infiltrative cardiomyopathy with elevated troponin but no CAD felt due to infiltrative CM;1/24/17 supplemented report from [place] + AL amyloid within heart         Plan:       55 yoM AL amyloidosis here for arrhythmia management. He has junctional rhythm with exertional limitations. He will require permanent pacing. With regard to the need for ICD, will assess with holter for the presence of VT/NSVT. If he has native conduction, which can be assessed at the time of implant, a dual chamber system can be used. Will await holter results and formulate plan accordingly.

## 2020-10-21 ENCOUNTER — PATIENT MESSAGE (OUTPATIENT)
Dept: ELECTROPHYSIOLOGY | Facility: CLINIC | Age: 55
End: 2020-10-21

## 2020-10-21 LAB
OHS CV EVENT MONITOR DAY: 0
OHS CV HOLTER LENGTH DECIMAL HOURS: 24
OHS CV HOLTER LENGTH HOURS: 24
OHS CV HOLTER LENGTH MINUTES: 0

## 2020-10-23 ENCOUNTER — PATIENT MESSAGE (OUTPATIENT)
Dept: ELECTROPHYSIOLOGY | Facility: CLINIC | Age: 55
End: 2020-10-23

## 2020-10-26 ENCOUNTER — PATIENT MESSAGE (OUTPATIENT)
Dept: ELECTROPHYSIOLOGY | Facility: CLINIC | Age: 55
End: 2020-10-26

## 2020-10-26 DIAGNOSIS — I42.9 CARDIOMYOPATHY, UNSPECIFIED TYPE: Primary | ICD-10-CM

## 2020-10-28 ENCOUNTER — TELEPHONE (OUTPATIENT)
Dept: ELECTROPHYSIOLOGY | Facility: CLINIC | Age: 55
End: 2020-10-28

## 2020-10-28 NOTE — TELEPHONE ENCOUNTER
Contacted pt to confirm procedure for tomorrow. Spoke with pt and reviewed pre op instructions including: NPO after MN, okay to take meds with small sip of water in the morning, Hibiclens bath tonight and in the morning. Pt voiced understanding and denied any symptoms of SOB, H/A, Fever, cough, lost of taste or smell.  Pt advised of new visitor policy and the need to wear a mask at all times in the hospital.

## 2020-10-29 ENCOUNTER — ANESTHESIA (OUTPATIENT)
Dept: MEDSURG UNIT | Facility: HOSPITAL | Age: 55
End: 2020-10-29
Payer: OTHER GOVERNMENT

## 2020-10-29 ENCOUNTER — ANESTHESIA EVENT (OUTPATIENT)
Dept: MEDSURG UNIT | Facility: HOSPITAL | Age: 55
End: 2020-10-29
Payer: OTHER GOVERNMENT

## 2020-10-29 ENCOUNTER — HOSPITAL ENCOUNTER (OUTPATIENT)
Facility: HOSPITAL | Age: 55
Discharge: HOME | End: 2020-10-30
Attending: INTERNAL MEDICINE | Admitting: INTERNAL MEDICINE
Payer: OTHER GOVERNMENT

## 2020-10-29 DIAGNOSIS — E85.81 AL AMYLOIDOSIS: Primary | ICD-10-CM

## 2020-10-29 DIAGNOSIS — Z95.0 PACEMAKER: ICD-10-CM

## 2020-10-29 DIAGNOSIS — I42.9 CARDIOMYOPATHY, UNSPECIFIED TYPE: ICD-10-CM

## 2020-10-29 LAB
INR PPP: 1 (ref 0.8–1.2)
PROTHROMBIN TIME: 11.3 SEC (ref 9–12.5)
SARS-COV-2 RDRP RESP QL NAA+PROBE: NEGATIVE

## 2020-10-29 PROCEDURE — 00534 ANES INSERTION/RPLCMT CVDFB: CPT | Performed by: INTERNAL MEDICINE

## 2020-10-29 PROCEDURE — C1769 GUIDE WIRE: HCPCS | Performed by: INTERNAL MEDICINE

## 2020-10-29 PROCEDURE — 25000003 PHARM REV CODE 250: Performed by: NURSE ANESTHETIST, CERTIFIED REGISTERED

## 2020-10-29 PROCEDURE — 25000003 PHARM REV CODE 250: Performed by: INTERNAL MEDICINE

## 2020-10-29 PROCEDURE — G0378 HOSPITAL OBSERVATION PER HR: HCPCS

## 2020-10-29 PROCEDURE — 63600175 PHARM REV CODE 636 W HCPCS: Performed by: NURSE ANESTHETIST, CERTIFIED REGISTERED

## 2020-10-29 PROCEDURE — 85610 PROTHROMBIN TIME: CPT

## 2020-10-29 PROCEDURE — 37000008 HC ANESTHESIA 1ST 15 MINUTES: Performed by: INTERNAL MEDICINE

## 2020-10-29 PROCEDURE — 33249 INSJ/RPLCMT DEFIB W/LEAD(S): CPT | Mod: ,,, | Performed by: INTERNAL MEDICINE

## 2020-10-29 PROCEDURE — 37000009 HC ANESTHESIA EA ADD 15 MINS: Performed by: INTERNAL MEDICINE

## 2020-10-29 PROCEDURE — C1721 AICD, DUAL CHAMBER: HCPCS | Performed by: INTERNAL MEDICINE

## 2020-10-29 PROCEDURE — D9220A PRA ANESTHESIA: Mod: CRNA,,, | Performed by: NURSE ANESTHETIST, CERTIFIED REGISTERED

## 2020-10-29 PROCEDURE — C1894 INTRO/SHEATH, NON-LASER: HCPCS | Performed by: INTERNAL MEDICINE

## 2020-10-29 PROCEDURE — 25000003 PHARM REV CODE 250: Performed by: STUDENT IN AN ORGANIZED HEALTH CARE EDUCATION/TRAINING PROGRAM

## 2020-10-29 PROCEDURE — D9220A PRA ANESTHESIA: ICD-10-PCS | Mod: ANES,,, | Performed by: ANESTHESIOLOGY

## 2020-10-29 PROCEDURE — 93005 ELECTROCARDIOGRAM TRACING: CPT

## 2020-10-29 PROCEDURE — 93010 ELECTROCARDIOGRAM REPORT: CPT | Mod: ,,, | Performed by: INTERNAL MEDICINE

## 2020-10-29 PROCEDURE — U0002 COVID-19 LAB TEST NON-CDC: HCPCS

## 2020-10-29 PROCEDURE — 33249 INSJ/RPLCMT DEFIB W/LEAD(S): CPT | Performed by: INTERNAL MEDICINE

## 2020-10-29 PROCEDURE — C1777 LEAD, AICD, ENDO SINGLE COIL: HCPCS | Performed by: INTERNAL MEDICINE

## 2020-10-29 PROCEDURE — C2628 CATHETER, OCCLUSION: HCPCS | Performed by: INTERNAL MEDICINE

## 2020-10-29 PROCEDURE — C1898 LEAD, PMKR, OTHER THAN TRANS: HCPCS | Performed by: INTERNAL MEDICINE

## 2020-10-29 PROCEDURE — 63600175 PHARM REV CODE 636 W HCPCS: Performed by: STUDENT IN AN ORGANIZED HEALTH CARE EDUCATION/TRAINING PROGRAM

## 2020-10-29 PROCEDURE — 33249 PR ICD INSERT SNGL/DUAL W/LEADS: ICD-10-PCS | Mod: ,,, | Performed by: INTERNAL MEDICINE

## 2020-10-29 PROCEDURE — D9220A PRA ANESTHESIA: Mod: ANES,,, | Performed by: ANESTHESIOLOGY

## 2020-10-29 PROCEDURE — 63600175 PHARM REV CODE 636 W HCPCS: Performed by: INTERNAL MEDICINE

## 2020-10-29 PROCEDURE — 93010 ELECTROCARDIOGRAM REPORT: CPT | Mod: 76,,, | Performed by: INTERNAL MEDICINE

## 2020-10-29 PROCEDURE — 93010 EKG 12-LEAD: ICD-10-PCS | Mod: ,,, | Performed by: INTERNAL MEDICINE

## 2020-10-29 PROCEDURE — D9220A PRA ANESTHESIA: ICD-10-PCS | Mod: CRNA,,, | Performed by: NURSE ANESTHETIST, CERTIFIED REGISTERED

## 2020-10-29 DEVICE — DEFIBRILLATION LEAD
Type: IMPLANTABLE DEVICE | Site: HEART | Status: FUNCTIONAL
Brand: DURATA™

## 2020-10-29 DEVICE — PACING LEAD
Type: IMPLANTABLE DEVICE | Site: HEART | Status: FUNCTIONAL
Brand: TENDRIL™

## 2020-10-29 DEVICE — TIERED-THERAPY CARDIOVERTER/DEFIBRILLATOR VVEV VVIR
Type: IMPLANTABLE DEVICE | Site: CHEST | Status: FUNCTIONAL
Brand: FORTIFY ASSURA™

## 2020-10-29 RX ORDER — PRAMIPEXOLE DIHYDROCHLORIDE 0.12 MG/1
0.25 TABLET ORAL NIGHTLY
Status: DISCONTINUED | OUTPATIENT
Start: 2020-10-29 | End: 2020-10-30 | Stop reason: HOSPADM

## 2020-10-29 RX ORDER — MIDAZOLAM HYDROCHLORIDE 1 MG/ML
INJECTION, SOLUTION INTRAMUSCULAR; INTRAVENOUS
Status: DISCONTINUED | OUTPATIENT
Start: 2020-10-29 | End: 2020-10-29

## 2020-10-29 RX ORDER — CEFAZOLIN SODIUM 1 G/3ML
2 INJECTION, POWDER, FOR SOLUTION INTRAMUSCULAR; INTRAVENOUS
Status: COMPLETED | OUTPATIENT
Start: 2020-10-29 | End: 2020-10-30

## 2020-10-29 RX ORDER — VANCOMYCIN HYDROCHLORIDE 1 G/20ML
INJECTION, POWDER, LYOPHILIZED, FOR SOLUTION INTRAVENOUS
Status: DISCONTINUED | OUTPATIENT
Start: 2020-10-29 | End: 2020-10-29 | Stop reason: HOSPADM

## 2020-10-29 RX ORDER — ACYCLOVIR 200 MG/1
400 CAPSULE ORAL 2 TIMES DAILY
Status: DISCONTINUED | OUTPATIENT
Start: 2020-10-29 | End: 2020-10-30 | Stop reason: HOSPADM

## 2020-10-29 RX ORDER — HYDROCODONE BITARTRATE AND ACETAMINOPHEN 5; 325 MG/1; MG/1
1 TABLET ORAL EVERY 4 HOURS PRN
Status: DISCONTINUED | OUTPATIENT
Start: 2020-10-29 | End: 2020-10-30 | Stop reason: HOSPADM

## 2020-10-29 RX ORDER — PHENYLEPHRINE HYDROCHLORIDE 10 MG/ML
INJECTION INTRAVENOUS
Status: DISCONTINUED | OUTPATIENT
Start: 2020-10-29 | End: 2020-10-29

## 2020-10-29 RX ORDER — LIDOCAINE HCL/PF 100 MG/5ML
SYRINGE (ML) INTRAVENOUS
Status: DISCONTINUED | OUTPATIENT
Start: 2020-10-29 | End: 2020-10-29

## 2020-10-29 RX ORDER — SODIUM CHLORIDE 0.9 % (FLUSH) 0.9 %
3 SYRINGE (ML) INJECTION
Status: CANCELLED | OUTPATIENT
Start: 2020-10-29

## 2020-10-29 RX ORDER — ACETAMINOPHEN 325 MG/1
650 TABLET ORAL EVERY 4 HOURS PRN
Status: DISCONTINUED | OUTPATIENT
Start: 2020-10-29 | End: 2020-10-30 | Stop reason: HOSPADM

## 2020-10-29 RX ORDER — SODIUM CHLORIDE 0.9 G/100ML
IRRIGANT IRRIGATION
Status: DISCONTINUED | OUTPATIENT
Start: 2020-10-29 | End: 2020-10-29 | Stop reason: HOSPADM

## 2020-10-29 RX ORDER — SODIUM CHLORIDE 9 MG/ML
INJECTION, SOLUTION INTRAVENOUS CONTINUOUS PRN
Status: DISCONTINUED | OUTPATIENT
Start: 2020-10-29 | End: 2020-10-29

## 2020-10-29 RX ORDER — PROPOFOL 10 MG/ML
VIAL (ML) INTRAVENOUS CONTINUOUS PRN
Status: DISCONTINUED | OUTPATIENT
Start: 2020-10-29 | End: 2020-10-29

## 2020-10-29 RX ORDER — BUMETANIDE 1 MG/1
2 TABLET ORAL 2 TIMES DAILY
Status: DISCONTINUED | OUTPATIENT
Start: 2020-10-29 | End: 2020-10-30 | Stop reason: HOSPADM

## 2020-10-29 RX ORDER — LIDOCAINE HYDROCHLORIDE 20 MG/ML
INJECTION, SOLUTION INFILTRATION; PERINEURAL
Status: DISCONTINUED | OUTPATIENT
Start: 2020-10-29 | End: 2020-10-29 | Stop reason: HOSPADM

## 2020-10-29 RX ORDER — FENTANYL CITRATE 50 UG/ML
25 INJECTION, SOLUTION INTRAMUSCULAR; INTRAVENOUS EVERY 5 MIN PRN
Status: CANCELLED | OUTPATIENT
Start: 2020-10-29

## 2020-10-29 RX ORDER — SPIRONOLACTONE 25 MG/1
25 TABLET ORAL DAILY
Status: DISCONTINUED | OUTPATIENT
Start: 2020-10-30 | End: 2020-10-30 | Stop reason: HOSPADM

## 2020-10-29 RX ORDER — LANOLIN ALCOHOL/MO/W.PET/CERES
400 CREAM (GRAM) TOPICAL DAILY
Status: DISCONTINUED | OUTPATIENT
Start: 2020-10-30 | End: 2020-10-30 | Stop reason: HOSPADM

## 2020-10-29 RX ORDER — CEFAZOLIN SODIUM 1 G/3ML
2 INJECTION, POWDER, FOR SOLUTION INTRAMUSCULAR; INTRAVENOUS
Status: DISCONTINUED | OUTPATIENT
Start: 2020-10-29 | End: 2020-10-29 | Stop reason: HOSPADM

## 2020-10-29 RX ORDER — GABAPENTIN 300 MG/1
300 CAPSULE ORAL NIGHTLY
Status: DISCONTINUED | OUTPATIENT
Start: 2020-10-29 | End: 2020-10-30 | Stop reason: HOSPADM

## 2020-10-29 RX ORDER — PROPOFOL 10 MG/ML
VIAL (ML) INTRAVENOUS
Status: DISCONTINUED | OUTPATIENT
Start: 2020-10-29 | End: 2020-10-29

## 2020-10-29 RX ORDER — CEFAZOLIN SODIUM 1 G/3ML
INJECTION, POWDER, FOR SOLUTION INTRAMUSCULAR; INTRAVENOUS
Status: DISCONTINUED | OUTPATIENT
Start: 2020-10-29 | End: 2020-10-29

## 2020-10-29 RX ORDER — KETAMINE HCL IN 0.9 % NACL 50 MG/5 ML
SYRINGE (ML) INTRAVENOUS
Status: DISCONTINUED | OUTPATIENT
Start: 2020-10-29 | End: 2020-10-29

## 2020-10-29 RX ADMIN — CEFAZOLIN 2 G: 330 INJECTION, POWDER, FOR SOLUTION INTRAMUSCULAR; INTRAVENOUS at 12:10

## 2020-10-29 RX ADMIN — PROPOFOL 150 MCG/KG/MIN: 10 INJECTION, EMULSION INTRAVENOUS at 12:10

## 2020-10-29 RX ADMIN — SODIUM CHLORIDE: 0.9 INJECTION, SOLUTION INTRAVENOUS at 12:10

## 2020-10-29 RX ADMIN — LIDOCAINE HYDROCHLORIDE 20 MG: 20 INJECTION, SOLUTION INTRAVENOUS at 12:10

## 2020-10-29 RX ADMIN — PHENYLEPHRINE HYDROCHLORIDE 100 MCG: 10 INJECTION, SOLUTION INTRAMUSCULAR; INTRAVENOUS; SUBCUTANEOUS at 12:10

## 2020-10-29 RX ADMIN — ACYCLOVIR 400 MG: 200 CAPSULE ORAL at 09:10

## 2020-10-29 RX ADMIN — HYDROCODONE BITARTRATE AND ACETAMINOPHEN 1 TABLET: 5; 325 TABLET ORAL at 02:10

## 2020-10-29 RX ADMIN — HYDROCODONE BITARTRATE AND ACETAMINOPHEN 1 TABLET: 5; 325 TABLET ORAL at 10:10

## 2020-10-29 RX ADMIN — CEFAZOLIN 2 G: 1 INJECTION, POWDER, FOR SOLUTION INTRAMUSCULAR; INTRAVENOUS at 09:10

## 2020-10-29 RX ADMIN — SODIUM CHLORIDE, SODIUM GLUCONATE, SODIUM ACETATE, POTASSIUM CHLORIDE, MAGNESIUM CHLORIDE, SODIUM PHOSPHATE, DIBASIC, AND POTASSIUM PHOSPHATE: .53; .5; .37; .037; .03; .012; .00082 INJECTION, SOLUTION INTRAVENOUS at 12:10

## 2020-10-29 RX ADMIN — Medication 20 MG: at 12:10

## 2020-10-29 RX ADMIN — MIDAZOLAM 2 MG: 1 INJECTION INTRAMUSCULAR; INTRAVENOUS at 12:10

## 2020-10-29 RX ADMIN — GABAPENTIN 300 MG: 300 CAPSULE ORAL at 09:10

## 2020-10-29 RX ADMIN — PROPOFOL 40 MG: 10 INJECTION, EMULSION INTRAVENOUS at 12:10

## 2020-10-29 NOTE — TRANSFER OF CARE
"Anesthesia Transfer of Care Note    Patient: Roc Lai Jr.    Procedure(s) Performed: Procedure(s) (LRB):  Insertion, ICD, Dual Chamber (Left)    Patient location: PACU    Anesthesia Type: general    Post pain: adequate analgesia    Post assessment: no apparent anesthetic complications and tolerated procedure well    Post vital signs: stable    Level of consciousness: awake, alert and oriented    Nausea/Vomiting: no nausea/vomiting    Complications: none    Transfer of care protocol was followed      Last vitals:   Visit Vitals  /67 (BP Location: Right arm, Patient Position: Lying)   Pulse 61   Temp 36.7 °C (98.1 °F) (Oral)   Resp 18   Ht 5' 11" (1.803 m)   Wt 93 kg (205 lb)   SpO2 96%   BMI 28.59 kg/m²     "

## 2020-10-29 NOTE — INTERVAL H&P NOTE
The patient has been examined and the H&P has been reviewed:    I concur with the findings and no changes have occurred since H&P was written.    Surgery risks, benefits and alternative options discussed and understood by patient/family.    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  He has junctional rhythm with exertional limitations. He will require permanent pacing. Episode of NSVT. On holter so likely will receive ICD. Native conduction, which will be assessed at the time of implant, a dual chamber system can be used.         Active Hospital Problems    Diagnosis  POA    Cardiomyopathy [I42.9]  Yes      Resolved Hospital Problems   No resolved problems to display.

## 2020-10-29 NOTE — NURSING TRANSFER
Nursing Transfer Note      10/29/2020     Transfer To: [facility]    Transfer via stretcher    Transfer with reported to sav hassan rn    Transported by kellie rn    Medicines sent: none    Chart send with patient: Yes    Notified: also sent home monitor with patient    Patient reassessed at: see epic (date, time)    Upon arrival to floor: reported to sav hassan rn no complaints no distress noted.

## 2020-10-29 NOTE — Clinical Note
Lead was sutured in place. The lead was inserted in the left chest wall. A new lead was attached to the following location: right atrium.

## 2020-10-29 NOTE — BRIEF OP NOTE
: Nima Gregory MD  Date of procedure: 10/29/2020  Post-operative Diagnosis: AL amyloidosis/infiltrative CM    Procedure Performed: dual chamber ICD implant    Description of Procedure: The patient was brought to the EP lab in the fasting state. Prepped and draped in sterile fashion. Safety timeout was performed. Sedation administered by anesthesia staff. Selective venogram of the left axillary and cephalic veins performed via left ac IV. Lidocaine used for local anesthetic. Fluoroscopic guided axillary access utilized. Guide/J Wire advanced and confirmed in IVC. Incision made. Blunt and electrocautery dissection performed. Pocket made.  Second fluoroscopic guided axillary access obtained. Guide/J wire advanced and confirmed in IVC. Peel away sheath advanced over J wire. ICD lead advanced into RV. R waves and injury pattern adequate. Lead fixed into place and sutured in place. Second peel away sheath advanced over J wire. RA lead advanced into RA via peel away sheath. After adequate p waves and current of injury detected, the RA lead was fixed into place in the RA appendage. Peel away sheath removed and RA lead sutured into place. Pocket washed using antibiotic solution. Leads connected to generator. Generator placed into pocket, sutured in place, and washed with antibiotic solution. Deep layer closed with interrupted 3-0 suture. Intermediate layer closed with running 3-0 suture. Superficial layer closed with running 4-0 suture. Skin closed with Dermabond. Meplex dressing to be placed after dermabond has dried.     EBL: <10 mL    Specimens Removed: None  Complications: no immediate    1. Ancef 2 grams q8 hours x 2 doses (ordered)  2. Sling to left arm - wear for 48 hours, then only at night for 6 weeks.  3. NO HEPARIN PRODUCTS  4. Keflex 500 mg TID for 5 days at discharge (or after the 2 doses of IV antibiotics if still in the hospital)  5. No lifting left elbow above shoulder height  6. No  lifting over 5 pounds  7. No driving for 1 week and for 4 weeks if patient uses left arm to make turns  8. Dressing will be removed in AM by EP  9. Chest Xray (ordered)  10. Follow up in device clinic in 1 week for device and wound checks.     Dr Gregory, the attending electrophysiologist, was present for the entirety of this procedure.    Evangelina Mckeon MD PGY-5

## 2020-10-29 NOTE — ANESTHESIA PREPROCEDURE EVALUATION
10/29/2020  Roc Lai Jr. is a 55 y.o., male.    Anesthesia Evaluation    I have reviewed the Patient Summary Reports.    I have reviewed the Nursing Notes. I have reviewed the NPO Status.   I have reviewed the Medications.     Review of Systems  Anesthesia Hx:  No problems with previous Anesthesia  History of prior surgery of interest to airway management or planning: Denies Family Hx of Anesthesia complications.   Denies Personal Hx of Anesthesia complications.   Hematology/Oncology:         -- Anemia: Hematology Comments: 'Smoldering' MM Current/Recent Cancer.   EENT/Dental:EENT/Dental Normal   Cardiovascular:   Exercise tolerance: good Valvular problems/Murmurs (TR) Denies CAD.   Dysrhythmias (AJR with IVCD)  CHF (Infiltrative CMP) ECG has been reviewed.   TTE 5/2020:  · Normal left ventricular systolic function. The estimated ejection fraction is 60%.  · Septal wall has abnormal motion. Systolic and diastolic flattening of the interventricular septum consistent with right ventricle pressure and volume overload.  · Concentric left ventricular remodeling.  · Left ventricular diastolic dysfunction.  · Mildly reduced right ventricular systolic function.  · Severe left atrial enlargement.  · Severe right atrial enlargement.  · Severe tricuspid regurgitation. PA pressure cannot be estinated accurately suspect PHTN.  · Elevated central venous pressure (15 mmHg).  · Small circumferential pericardial effusion.   Pulmonary:  Pulmonary Normal    Renal/:   Chronic Renal Disease, CRI    Hepatic/GI:  Hepatic/GI Normal    Musculoskeletal:  Musculoskeletal Normal    Neurological:  Neurology Normal    Endocrine:  Endocrine Normal    Dermatological:  Skin Normal    Psych:  Psychiatric Normal           Physical Exam  General:  Well nourished    Airway/Jaw/Neck:  Airway Findings: Mouth Opening: Normal Tongue:  Normal  General Airway Assessment: Adult  Mallampati: II  TM Distance: Normal, at least 6 cm        Eyes/Ears/Nose:  EYES/EARS/NOSE FINDINGS: Normal   Dental:  DENTAL FINDINGS: Normal   Chest/Lungs:  Chest/Lungs Clear    Heart/Vascular:  Heart Findings: Normal Heart murmur: negative Vascular Findings: Normal    Abdomen:  Abdomen Findings: Normal    Musculoskeletal:  Musculoskeletal Findings: Normal   Skin:  Skin Findings: Normal    Mental Status:  Mental Status Findings: Normal        Anesthesia Plan  Type of Anesthesia, risks & benefits discussed:  Anesthesia Type:  general  Patient's Preference:   Intra-op Monitoring Plan: standard ASA monitors  Intra-op Monitoring Plan Comments:   Post Op Pain Control Plan: per primary service following discharge from PACU and IV/PO Opioids PRN  Post Op Pain Control Plan Comments:   Induction:   IV  Beta Blocker:  Patient is not currently on a Beta-Blocker (No further documentation required).       Informed Consent: Patient understands risks and agrees with Anesthesia plan.  Questions answered. Anesthesia consent signed with patient.  ASA Score: 3     Day of Surgery Review of History & Physical:    H&P update referred to the provider.         Ready For Surgery From Anesthesia Perspective.

## 2020-10-29 NOTE — PROGRESS NOTES
Patient admitted to recovery see [facility] for complete assessment pacu bcg's maintained safety measures verified patient instructed on pain scale when oral airway removed also called for ekg and chest xray ekg done and in chart. Also called patient's wife to update on patient location left message on voicemail.

## 2020-10-29 NOTE — ANESTHESIA POSTPROCEDURE EVALUATION
Anesthesia Post Evaluation    Patient: Roc Lai Jr.    Procedure(s) Performed: Procedure(s) (LRB):  Insertion, ICD, Dual Chamber (Left)    Final Anesthesia Type: general    Patient location during evaluation: PACU  Patient participation: Yes- Able to Participate  Level of consciousness: awake and alert and oriented  Post-procedure vital signs: reviewed and stable  Pain management: adequate  Airway patency: patent    PONV status at discharge: No PONV  Anesthetic complications: no      Cardiovascular status: blood pressure returned to baseline  Respiratory status: unassisted, room air and spontaneous ventilation  Hydration status: euvolemic  Follow-up not needed.          Vitals Value Taken Time   BP 96/53 10/29/20 1331   Temp 36.4 °C (97.5 °F) 10/29/20 1331   Pulse 80 10/29/20 1345   Resp 12 10/29/20 1331   SpO2 98 % 10/29/20 1345   Vitals shown include unvalidated device data.      No case tracking events are documented in the log.      Pain/Mami Score: Mami Score: 3 (10/29/2020  1:31 PM)

## 2020-10-29 NOTE — Clinical Note
Lead was sutured in place. The lead was inserted in the right ventricle. A new lead was attached to the following location: right ventricle.

## 2020-10-29 NOTE — Clinical Note
Lead was inserted under fluorscopic guidance. The lead was inserted in the right ventricle. A new lead was attached to the following location: right ventricle.

## 2020-10-30 VITALS
HEART RATE: 88 BPM | DIASTOLIC BLOOD PRESSURE: 53 MMHG | BODY MASS INDEX: 28.7 KG/M2 | WEIGHT: 205 LBS | OXYGEN SATURATION: 95 % | HEIGHT: 71 IN | SYSTOLIC BLOOD PRESSURE: 107 MMHG | TEMPERATURE: 98 F | RESPIRATION RATE: 18 BRPM

## 2020-10-30 DIAGNOSIS — I49.8 JUNCTIONAL RHYTHM: ICD-10-CM

## 2020-10-30 DIAGNOSIS — Z95.810 AUTOMATIC IMPLANTABLE CARDIOVERTER-DEFIBRILLATOR IN SITU: ICD-10-CM

## 2020-10-30 DIAGNOSIS — I50.32 CHRONIC DIASTOLIC CONGESTIVE HEART FAILURE: ICD-10-CM

## 2020-10-30 DIAGNOSIS — I42.8 INFILTRATIVE CARDIOMYOPATHY: Primary | ICD-10-CM

## 2020-10-30 PROCEDURE — 25000003 PHARM REV CODE 250: Performed by: STUDENT IN AN ORGANIZED HEALTH CARE EDUCATION/TRAINING PROGRAM

## 2020-10-30 PROCEDURE — G0378 HOSPITAL OBSERVATION PER HR: HCPCS

## 2020-10-30 PROCEDURE — 63600175 PHARM REV CODE 636 W HCPCS: Performed by: STUDENT IN AN ORGANIZED HEALTH CARE EDUCATION/TRAINING PROGRAM

## 2020-10-30 RX ADMIN — ACYCLOVIR 400 MG: 200 CAPSULE ORAL at 08:10

## 2020-10-30 RX ADMIN — ACETAMINOPHEN 650 MG: 325 TABLET ORAL at 05:10

## 2020-10-30 RX ADMIN — CEFAZOLIN 2 G: 1 INJECTION, POWDER, FOR SOLUTION INTRAMUSCULAR; INTRAVENOUS at 04:10

## 2020-10-30 RX ADMIN — BUMETANIDE 2 MG: 1 TABLET ORAL at 08:10

## 2020-10-30 RX ADMIN — Medication 400 MG: at 08:10

## 2020-10-30 RX ADMIN — SPIRONOLACTONE 25 MG: 25 TABLET ORAL at 08:10

## 2020-10-30 NOTE — NURSING
Patient is ready for discharge. Patient stable alert and oriented. IV and tele removed. No complaints of pain. Discussed discharge plan. Reviewed medications and side effects, appointments, and answered questions with patient and family. Patient walked off unit with spouse.

## 2020-10-30 NOTE — PLAN OF CARE
Problem: Adult Inpatient Plan of Care  Goal: Plan of Care Review  10/30/2020 0328 by Yas Escobar RN  Outcome: Ongoing, Progressing  10/30/2020 0325 by Yas Escobar RN  Flowsheets (Taken 10/30/2020 0325)  Plan of Care Reviewed With: patient  Goal: Patient-Specific Goal (Individualization)  10/30/2020 0328 by Yas Escobar RN  Outcome: Ongoing, Progressing  10/30/2020 0325 by Yas Escobar RN  Outcome: Ongoing, Progressing  Flowsheets (Taken 10/30/2020 0325)  Individualized Care Needs: none  Anxieties, Fears or Concerns: no  Patient-Specific Goals (Include Timeframe): maintain adequate pain control by the end of shift  Goal: Optimal Comfort and Wellbeing  10/30/2020 0327 by Yas Escobar RN  Outcome: Ongoing, Progressing  10/30/2020 0325 by Yas Escobar RN  Outcome: Ongoing, Progressing
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
## 2020-11-02 ENCOUNTER — PATIENT MESSAGE (OUTPATIENT)
Dept: HEMATOLOGY/ONCOLOGY | Facility: CLINIC | Age: 55
End: 2020-11-02

## 2020-11-03 ENCOUNTER — PATIENT MESSAGE (OUTPATIENT)
Dept: TRANSPLANT | Facility: CLINIC | Age: 55
End: 2020-11-03

## 2020-11-03 DIAGNOSIS — I50.32 CHRONIC DIASTOLIC CONGESTIVE HEART FAILURE: ICD-10-CM

## 2020-11-04 ENCOUNTER — PATIENT MESSAGE (OUTPATIENT)
Dept: TRANSPLANT | Facility: CLINIC | Age: 55
End: 2020-11-04

## 2020-11-04 ENCOUNTER — TELEPHONE (OUTPATIENT)
Dept: TRANSPLANT | Facility: CLINIC | Age: 55
End: 2020-11-04

## 2020-11-04 DIAGNOSIS — I50.22 CHRONIC SYSTOLIC CONGESTIVE HEART FAILURE: Primary | ICD-10-CM

## 2020-11-04 RX ORDER — BUMETANIDE 2 MG/1
2 TABLET ORAL 2 TIMES DAILY
Qty: 90 TABLET | Refills: 11 | Status: SHIPPED | OUTPATIENT
Start: 2020-11-04 | End: 2020-01-01 | Stop reason: SDUPTHER

## 2020-11-05 ENCOUNTER — CLINICAL SUPPORT (OUTPATIENT)
Dept: CARDIOLOGY | Facility: HOSPITAL | Age: 55
End: 2020-11-05
Attending: INTERNAL MEDICINE
Payer: OTHER GOVERNMENT

## 2020-11-05 DIAGNOSIS — I49.8 JUNCTIONAL RHYTHM: ICD-10-CM

## 2020-11-05 DIAGNOSIS — I50.32 CHRONIC DIASTOLIC CONGESTIVE HEART FAILURE: ICD-10-CM

## 2020-11-05 DIAGNOSIS — Z95.810 AUTOMATIC IMPLANTABLE CARDIOVERTER-DEFIBRILLATOR IN SITU: ICD-10-CM

## 2020-11-05 DIAGNOSIS — I42.8 INFILTRATIVE CARDIOMYOPATHY: ICD-10-CM

## 2020-11-05 PROCEDURE — 93283 PRGRMG EVAL IMPLANTABLE DFB: CPT

## 2020-11-05 PROCEDURE — 93283 PRGRMG EVAL IMPLANTABLE DFB: CPT | Mod: 26,,, | Performed by: INTERNAL MEDICINE

## 2020-11-05 PROCEDURE — 93283 CARDIAC DEVICE CHECK - IN CLINIC & HOSPITAL: ICD-10-PCS | Mod: 26,,, | Performed by: INTERNAL MEDICINE

## 2020-11-18 NOTE — PROGRESS NOTES
I would first try to take both bumex tablets at the same time when you are able to lie down with legs above level of your heart for 4 hrs.  Let me know how this works first as it is the least likely to hurt kidney function.      I will place this in your chart for easy reference and forward to HF nurses    Dr. WOODY  ===View-only below this line===      ----- Message -----       From:Roc Lai Jr.       Sent:11/16/2020  2:53 PM CST         To:James Lao Jr, MD    Subject:Non-Urgent Medical    Dr. Lao, I cannot seem to beat the swelling in my legs other than staying horizontal all weekend. During the work week, I wear the knee high compression stockings and sometimes the full leg stockings but by mid-day the upper thighs begin to swell. I spend most of the day sitting at a desk.  I am taking one bumex in the AM and one in the PM.  Is there anything else I can do to stop this or am I stuck with this?

## 2020-12-01 NOTE — TELEPHONE ENCOUNTER
Patient is s/p DC ICD placement 10/29/2020; remote interrogation demonstrating a presenting rhythm c/w probable junctional tach.  Implant notes indicate programming to DDD, however, his current settings are DDI 60 bpm.  Given his DDI setting, should the base rate be increased?  Any med changes?    Thanks,  Melinda

## 2020-12-06 NOTE — PATIENT INSTRUCTIONS
"You have tested negative for COVID-19 today.  If you did not have a close exposure (as defined below) you can return to your normal daily activities to include social distancing, wearing a mask and frequent handwashing.  A "close exposure" is defined as anyone who has had an exposure (masked or unmasked) to a known COVID -19 positive person within 6 ft for longer than 15 minutes. If your exposure meets this definition, you are required by CDC guidelines to quarantine for at least 7-10 days from time of exposure.  The CDC states that a test can be performed for an asymptomatic patient (someone who does not have any symptoms) after a close exposure, and that a test should be done if you develop symptoms after a close exposure as described above.  Specifically, you can test at day 5 or later if asymptomatic in order to get released from quarantine on day 7 or later.  If you develop symptoms sooner, you should test when your symptoms start.  If you developed symptoms since the exposure, and your test was negative today and less than 5 days from your exposure, you still have to quarantine for 7-10 days from the date of the exposure.  The 7-10 day quarantine begins from the day you were exposed, not the day of your test.  For example, if your exposure was on a Monday, and you waited until Friday of the same week to get tested and it was negative, your 7-10 day quarantine begins from that Monday, not the Friday you tested negative.  Please note, if you decide to test as an asymptomatic during your quarantine and you are positive, you will be restarting your quarantine and moving from a possible 10 day quarantine (if you do not test), to a 11 day or greater quarantine.    "

## 2020-12-06 NOTE — PROGRESS NOTES
CDC Testing and Quarantine Guidelines for Exposure:         A close exposure is defined as anyone who has had an exposure (masked or unmasked) to a known COVID -19 positive person within 6 ft for longer than 15 minutes. If your exposure meets this definition you are required by CDC guidelines to quarantine for at least 7-10 days from time of exposure. The CDC states that a test can be performed for an asymptomatic patient (someone who does not have any symptoms) after a close exposure, and that a test should be done if you develop symptoms after a close exposure as described above.  Specifically, you can test at day 5 or later if asymptomatic, in order to get released from quarantine on day 7 or later.  If you develop symptoms sooner, you should test when your symptoms start.  If you meet the definition of a close exposure, it will not matter whether you are experiencing symptoms- a quarantine for at least 7-10 days after a close exposure is required by CDC guidelines.  Please note, if you decide to test as an asymptomatic during your quarantine and you are positive, you will be restarting your quarantine and moving from a possible 10 day quarantine (if you do not test), to a 11 day or greater quarantine.  The CDC also suggests people still monitor for symptoms for a full 14 days and remember that the shorter quarantine options do not replace initial CDC guidance.  The CDC continues to recommend quarantining for 14 days as the best way to reduce risk for spreading COVID-19 - however, this is only a recommendation.  If your exposure does not meet the above definition, you can return to your normal daily activities to include social distancing, wearing a mask and frequent handwashing

## 2020-12-27 NOTE — PROGRESS NOTES
Subjective:     Patient ID:  Roc Lai Jr. is a 55 y.o. male who presents for follow-up of Congestive Heart Failure    HPI:  56 yo WM with AL myocardial amyloidosis is being treated by Dr. Atkins comes today for another assessment prior to consideration of bone marrow transplant.  with COVID he is not walking.  Sleeps on 2 pillows.  No PND.  He feels his edema is controlled by taking bumex at night as double dose and keeping legs up.  He denies palpitations presyncope or syncope.  Denies dizzy or lightheaded sensations.  No chest discomfort.    On 10/29/20 underwent dual chamber ICD set to avoid RV pacing.    Review of Systems   Constitution: Positive for malaise/fatigue. Negative for chills, fever, night sweats, weight gain and weight loss.   HENT: Positive for congestion (nightime).    Cardiovascular: Positive for dyspnea on exertion and leg swelling. Negative for chest pain, irregular heartbeat, near-syncope, orthopnea, palpitations, paroxysmal nocturnal dyspnea and syncope.   Respiratory: Negative for cough, sputum production and wheezing.    Endocrine: Positive for cold intolerance.   Hematologic/Lymphatic: Does not bruise/bleed easily.   Musculoskeletal: Positive for joint pain (Knee and shoulder). Negative for arthritis and stiffness.   Gastrointestinal: Negative for change in bowel habit, constipation, diarrhea, hematochezia and melena.   Genitourinary: Positive for nocturia (several times per night as taking diuretics to night only). Negative for hematuria.        ED but viagra helps   Neurological: Positive for numbness and paresthesias. Negative for brief paralysis, dizziness, focal weakness, light-headedness, seizures and weakness.        Restless legs      Objective:   Physical Exam   Constitutional: He is oriented to person, place, and time. He appears well-developed and well-nourished. No distress.   /64 (BP Location: Right arm, Patient Position: Sitting, BP Method: Large (Automatic))    "Pulse 81   Ht 6' 2" (1.88 m)   Wt 95.2 kg (209 lb 14.1 oz)   BMI 26.95 kg/m²   Last visit Wt 94.2 kg (207 lb 10.8 oz)  Visit before last visit wt 91.2 kg (201 lb 1 oz)   Visit before last visit here 8/15/19 wt 200#   HENT:   Head: Normocephalic and atraumatic.   Eyes: Conjunctivae are normal. Right eye exhibits no discharge. Left eye exhibits no discharge. No scleral icterus.   Neck: JVD (V wave to jaw) present. No thyromegaly present.   Cardiovascular: Normal rate and regular rhythm. Exam reveals gallop (S3).   No murmur heard.  Pulmonary/Chest: Effort normal and breath sounds normal.   Abdominal: Soft. Bowel sounds are normal. He exhibits no mass. Distention: liver span 14 cm. There is no abdominal tenderness. There is no rebound and no guarding.   Musculoskeletal:         General: Edema (1-2+ edema below knees bilaterally with stasis changes ) present. No tenderness.   Neurological: He is alert and oriented to person, place, and time.   Skin: Skin is warm and dry. He is not diaphoretic.   Psychiatric: He has a normal mood and affect. His behavior is normal. Judgment and thought content normal.      5/8/2020 ECHO Conclusion   · Normal left ventricular systolic function. The estimated ejection fraction is 60%.  · Septal wall has abnormal motion. Systolic and diastolic flattening of the interventricular septum consistent with right ventricle pressure and volume overload.  · Concentric left ventricular remodeling.  · Left ventricular diastolic dysfunction.  · Mildly reduced right ventricular systolic function.  · Severe left atrial enlargement.  · Severe right atrial enlargement.  · Severe tricuspid regurgitation. PA pressure cannot be estinated accurately suspect PHTN.  · Elevated central venous pressure (15 mmHg).  · Small circumferential pericardial effusion.     Lab Results   Component Value Date     (H) 11/30/2020     (H) 06/02/2020     (H) 05/27/2020   NT-pro BNP 1032  Troponin I " "0.054    Lab Results   Component Value Date     (L) 11/30/2020    K 4.3 11/30/2020    GLU 93 11/30/2020    BUN 34 (H) 11/30/2020    CREATININE 1.7 (H) 11/30/2020       Assessment:     1. Chronic diastolic congestive heart failure    2. Infiltrative cardiomyopathy with elevated troponin but no CAD felt due to infiltrative CM;1/24/17 supplemented report from [facility] + AL amyloid within heart    3. AL amyloidosis      Plan:   Unfortunately he did not improve as I had hoped after pacemaker.  He still reports feeling "fine" but exam has certainly not improved.  Try bumetanide BID on weekends and on weekdays when able to do AM dose with scheduling at work/driving  RTC 6-8 weeks  With his cardiac findings he is high risk for BMT; perhaps dual organ though renal function is a concern even if able to control light chain production which I understand from the patient is controlled.  Will see how he responds to this diuretic regimen and check BMP end of Dec 2020 before speaking with BMT team about their criteria for BMP re: cardiac involvement.    12/27/2020--my f/u lab not yet been performed.  This lab is from Dr. Atkins and drawn approx 9 days after visit above  Lab Results   Component Value Date     12/08/2020    K 4.2 12/08/2020     12/08/2020    CO2 26 12/08/2020    BUN 33 (H) 12/08/2020    CREATININE 1.8 (H) 12/08/2020    CALCIUM 8.5 (L) 12/08/2020    ANIONGAP 9 12/08/2020    ESTGFRAFRICA 48.3 (A) 12/08/2020    EGFRNONAA 41.7 (A) 12/08/2020     "

## 2021-01-01 ENCOUNTER — CLINICAL SUPPORT (OUTPATIENT)
Dept: REHABILITATION | Facility: HOSPITAL | Age: 56
End: 2021-01-01
Payer: OTHER GOVERNMENT

## 2021-01-01 ENCOUNTER — OFFICE VISIT (OUTPATIENT)
Dept: NEUROLOGY | Facility: CLINIC | Age: 56
End: 2021-01-01
Payer: OTHER GOVERNMENT

## 2021-01-01 ENCOUNTER — TELEPHONE (OUTPATIENT)
Dept: TRANSPLANT | Facility: CLINIC | Age: 56
End: 2021-01-01

## 2021-01-01 ENCOUNTER — TELEPHONE (OUTPATIENT)
Dept: TRANSPLANT | Facility: CLINIC | Age: 56
End: 2021-01-01
Payer: OTHER GOVERNMENT

## 2021-01-01 ENCOUNTER — OFFICE VISIT (OUTPATIENT)
Dept: HEMATOLOGY/ONCOLOGY | Facility: CLINIC | Age: 56
End: 2021-01-01
Payer: OTHER GOVERNMENT

## 2021-01-01 ENCOUNTER — PATIENT MESSAGE (OUTPATIENT)
Dept: TRANSPLANT | Facility: CLINIC | Age: 56
End: 2021-01-01

## 2021-01-01 ENCOUNTER — LAB VISIT (OUTPATIENT)
Dept: LAB | Facility: HOSPITAL | Age: 56
End: 2021-01-01
Payer: OTHER GOVERNMENT

## 2021-01-01 ENCOUNTER — PATIENT MESSAGE (OUTPATIENT)
Dept: ELECTROPHYSIOLOGY | Facility: CLINIC | Age: 56
End: 2021-01-01

## 2021-01-01 ENCOUNTER — OFFICE VISIT (OUTPATIENT)
Dept: ELECTROPHYSIOLOGY | Facility: CLINIC | Age: 56
End: 2021-01-01
Attending: INTERNAL MEDICINE
Payer: OTHER GOVERNMENT

## 2021-01-01 ENCOUNTER — HOSPITAL ENCOUNTER (INPATIENT)
Facility: HOSPITAL | Age: 56
LOS: 2 days | DRG: 871 | End: 2021-11-11
Attending: STUDENT IN AN ORGANIZED HEALTH CARE EDUCATION/TRAINING PROGRAM | Admitting: STUDENT IN AN ORGANIZED HEALTH CARE EDUCATION/TRAINING PROGRAM
Payer: OTHER GOVERNMENT

## 2021-01-01 ENCOUNTER — ANESTHESIA EVENT (OUTPATIENT)
Dept: CARDIOLOGY | Facility: HOSPITAL | Age: 56
DRG: 871 | End: 2021-01-01
Payer: OTHER GOVERNMENT

## 2021-01-01 ENCOUNTER — CLINICAL SUPPORT (OUTPATIENT)
Dept: CARDIOLOGY | Facility: HOSPITAL | Age: 56
End: 2021-01-01
Payer: OTHER GOVERNMENT

## 2021-01-01 ENCOUNTER — PATIENT MESSAGE (OUTPATIENT)
Dept: TRANSPLANT | Facility: CLINIC | Age: 56
End: 2021-01-01
Payer: OTHER GOVERNMENT

## 2021-01-01 ENCOUNTER — PATIENT MESSAGE (OUTPATIENT)
Dept: HEMATOLOGY/ONCOLOGY | Facility: CLINIC | Age: 56
End: 2021-01-01

## 2021-01-01 ENCOUNTER — PATIENT MESSAGE (OUTPATIENT)
Dept: REHABILITATION | Facility: HOSPITAL | Age: 56
End: 2021-01-01

## 2021-01-01 ENCOUNTER — OFFICE VISIT (OUTPATIENT)
Dept: URGENT CARE | Facility: CLINIC | Age: 56
End: 2021-01-01
Payer: OTHER GOVERNMENT

## 2021-01-01 ENCOUNTER — PATIENT MESSAGE (OUTPATIENT)
Dept: NEUROLOGY | Facility: CLINIC | Age: 56
End: 2021-01-01

## 2021-01-01 ENCOUNTER — TELEPHONE (OUTPATIENT)
Dept: OPHTHALMOLOGY | Facility: CLINIC | Age: 56
End: 2021-01-01

## 2021-01-01 ENCOUNTER — TELEPHONE (OUTPATIENT)
Dept: URGENT CARE | Facility: CLINIC | Age: 56
End: 2021-01-01

## 2021-01-01 ENCOUNTER — HOSPITAL ENCOUNTER (OUTPATIENT)
Dept: CARDIOLOGY | Facility: CLINIC | Age: 56
Discharge: HOME | End: 2021-02-01
Attending: INTERNAL MEDICINE
Payer: OTHER GOVERNMENT

## 2021-01-01 ENCOUNTER — TELEPHONE (OUTPATIENT)
Dept: HEMATOLOGY/ONCOLOGY | Facility: CLINIC | Age: 56
End: 2021-01-01

## 2021-01-01 ENCOUNTER — HOSPITAL ENCOUNTER (INPATIENT)
Facility: HOSPITAL | Age: 56
LOS: 14 days | Discharge: NURSING, REHAB OR LONG-TERM CARE | DRG: 061 | End: 2021-06-15
Attending: EMERGENCY MEDICINE | Admitting: PSYCHIATRY & NEUROLOGY
Payer: OTHER GOVERNMENT

## 2021-01-01 ENCOUNTER — ANESTHESIA EVENT (OUTPATIENT)
Dept: SURGERY | Facility: HOSPITAL | Age: 56
DRG: 871 | End: 2021-01-01
Payer: OTHER GOVERNMENT

## 2021-01-01 ENCOUNTER — TELEPHONE (OUTPATIENT)
Dept: NEUROLOGY | Facility: CLINIC | Age: 56
End: 2021-01-01

## 2021-01-01 ENCOUNTER — PROCEDURE VISIT (OUTPATIENT)
Dept: HEMATOLOGY/ONCOLOGY | Facility: CLINIC | Age: 56
End: 2021-01-01
Payer: OTHER GOVERNMENT

## 2021-01-01 ENCOUNTER — OFFICE VISIT (OUTPATIENT)
Dept: TRANSPLANT | Facility: CLINIC | Age: 56
End: 2021-01-01
Attending: INTERNAL MEDICINE
Payer: OTHER GOVERNMENT

## 2021-01-01 ENCOUNTER — DOCUMENTATION ONLY (OUTPATIENT)
Dept: TRANSPLANT | Facility: CLINIC | Age: 56
End: 2021-01-01

## 2021-01-01 ENCOUNTER — LAB VISIT (OUTPATIENT)
Dept: LAB | Facility: HOSPITAL | Age: 56
End: 2021-01-01
Attending: INTERNAL MEDICINE
Payer: OTHER GOVERNMENT

## 2021-01-01 ENCOUNTER — OFFICE VISIT (OUTPATIENT)
Dept: TRANSPLANT | Facility: CLINIC | Age: 56
DRG: 292 | End: 2021-01-01
Payer: OTHER GOVERNMENT

## 2021-01-01 ENCOUNTER — CLINICAL SUPPORT (OUTPATIENT)
Dept: CARDIOLOGY | Facility: HOSPITAL | Age: 56
End: 2021-01-01
Attending: INTERNAL MEDICINE
Payer: OTHER GOVERNMENT

## 2021-01-01 ENCOUNTER — DOCUMENTATION ONLY (OUTPATIENT)
Dept: CARDIOLOGY | Facility: HOSPITAL | Age: 56
End: 2021-01-01

## 2021-01-01 ENCOUNTER — OFFICE VISIT (OUTPATIENT)
Dept: TRANSPLANT | Facility: CLINIC | Age: 56
End: 2021-01-01
Payer: OTHER GOVERNMENT

## 2021-01-01 ENCOUNTER — TELEPHONE (OUTPATIENT)
Dept: ELECTROPHYSIOLOGY | Facility: CLINIC | Age: 56
End: 2021-01-01

## 2021-01-01 ENCOUNTER — PATIENT MESSAGE (OUTPATIENT)
Dept: HEMATOLOGY/ONCOLOGY | Facility: CLINIC | Age: 56
End: 2021-01-01
Payer: OTHER GOVERNMENT

## 2021-01-01 ENCOUNTER — HOSPITAL ENCOUNTER (INPATIENT)
Facility: HOSPITAL | Age: 56
LOS: 1 days | Discharge: ANOTHER HOSPITAL | DRG: 871 | End: 2021-11-09
Attending: EMERGENCY MEDICINE | Admitting: INTERNAL MEDICINE
Payer: OTHER GOVERNMENT

## 2021-01-01 ENCOUNTER — HOSPITAL ENCOUNTER (EMERGENCY)
Facility: HOSPITAL | Age: 56
Discharge: HOME | End: 2021-06-01
Attending: EMERGENCY MEDICINE
Payer: OTHER GOVERNMENT

## 2021-01-01 ENCOUNTER — HOSPITAL ENCOUNTER (OUTPATIENT)
Dept: RADIOLOGY | Facility: HOSPITAL | Age: 56
Discharge: HOME | End: 2021-05-19
Attending: INTERNAL MEDICINE
Payer: OTHER GOVERNMENT

## 2021-01-01 ENCOUNTER — ANESTHESIA (OUTPATIENT)
Dept: SURGERY | Facility: HOSPITAL | Age: 56
DRG: 871 | End: 2021-01-01
Payer: OTHER GOVERNMENT

## 2021-01-01 ENCOUNTER — ANESTHESIA (OUTPATIENT)
Dept: CARDIOLOGY | Facility: HOSPITAL | Age: 56
DRG: 871 | End: 2021-01-01
Payer: OTHER GOVERNMENT

## 2021-01-01 ENCOUNTER — PATIENT MESSAGE (OUTPATIENT)
Dept: SURGERY | Facility: CLINIC | Age: 56
End: 2021-01-01

## 2021-01-01 ENCOUNTER — TELEPHONE (OUTPATIENT)
Dept: TRANSPLANT | Facility: HOSPITAL | Age: 56
End: 2021-01-01

## 2021-01-01 ENCOUNTER — DOCUMENTATION ONLY (OUTPATIENT)
Dept: ELECTROPHYSIOLOGY | Facility: CLINIC | Age: 56
End: 2021-01-01

## 2021-01-01 ENCOUNTER — HOSPITAL ENCOUNTER (INPATIENT)
Facility: HOSPITAL | Age: 56
LOS: 5 days | Discharge: HOME | DRG: 292 | End: 2021-09-28
Attending: EMERGENCY MEDICINE | Admitting: INTERNAL MEDICINE
Payer: OTHER GOVERNMENT

## 2021-01-01 VITALS
RESPIRATION RATE: 16 BRPM | WEIGHT: 198 LBS | TEMPERATURE: 98 F | BODY MASS INDEX: 27.72 KG/M2 | HEIGHT: 71 IN | OXYGEN SATURATION: 96 % | DIASTOLIC BLOOD PRESSURE: 56 MMHG | SYSTOLIC BLOOD PRESSURE: 105 MMHG | HEART RATE: 77 BPM

## 2021-01-01 VITALS
OXYGEN SATURATION: 90 % | WEIGHT: 218.69 LBS | DIASTOLIC BLOOD PRESSURE: 57 MMHG | SYSTOLIC BLOOD PRESSURE: 76 MMHG | BODY MASS INDEX: 30.62 KG/M2 | HEIGHT: 71 IN | TEMPERATURE: 95 F

## 2021-01-01 VITALS
DIASTOLIC BLOOD PRESSURE: 56 MMHG | SYSTOLIC BLOOD PRESSURE: 100 MMHG | TEMPERATURE: 98 F | SYSTOLIC BLOOD PRESSURE: 115 MMHG | HEIGHT: 71 IN | BODY MASS INDEX: 26.27 KG/M2 | DIASTOLIC BLOOD PRESSURE: 56 MMHG | OXYGEN SATURATION: 98 % | HEART RATE: 100 BPM | BODY MASS INDEX: 26.85 KG/M2 | RESPIRATION RATE: 12 BRPM | WEIGHT: 191.81 LBS | HEIGHT: 71 IN | HEART RATE: 79 BPM | WEIGHT: 187.63 LBS

## 2021-01-01 VITALS
BODY MASS INDEX: 27.91 KG/M2 | HEIGHT: 71 IN | WEIGHT: 199.38 LBS | SYSTOLIC BLOOD PRESSURE: 116 MMHG | DIASTOLIC BLOOD PRESSURE: 68 MMHG | RESPIRATION RATE: 18 BRPM | HEART RATE: 102 BPM | OXYGEN SATURATION: 99 % | TEMPERATURE: 98 F

## 2021-01-01 VITALS
HEIGHT: 71 IN | OXYGEN SATURATION: 98 % | SYSTOLIC BLOOD PRESSURE: 110 MMHG | DIASTOLIC BLOOD PRESSURE: 74 MMHG | HEART RATE: 60 BPM | BODY MASS INDEX: 27.87 KG/M2 | TEMPERATURE: 98 F | RESPIRATION RATE: 14 BRPM | WEIGHT: 199.06 LBS

## 2021-01-01 VITALS
SYSTOLIC BLOOD PRESSURE: 83 MMHG | TEMPERATURE: 98 F | DIASTOLIC BLOOD PRESSURE: 40 MMHG | OXYGEN SATURATION: 96 % | RESPIRATION RATE: 18 BRPM | HEART RATE: 104 BPM | HEIGHT: 71 IN | BODY MASS INDEX: 29.26 KG/M2 | WEIGHT: 209 LBS

## 2021-01-01 VITALS
OXYGEN SATURATION: 97 % | WEIGHT: 198 LBS | SYSTOLIC BLOOD PRESSURE: 95 MMHG | BODY MASS INDEX: 27.72 KG/M2 | RESPIRATION RATE: 20 BRPM | DIASTOLIC BLOOD PRESSURE: 52 MMHG | HEART RATE: 87 BPM | TEMPERATURE: 96 F | HEIGHT: 71 IN

## 2021-01-01 VITALS
HEIGHT: 71 IN | SYSTOLIC BLOOD PRESSURE: 110 MMHG | HEART RATE: 84 BPM | DIASTOLIC BLOOD PRESSURE: 58 MMHG | BODY MASS INDEX: 28.43 KG/M2 | WEIGHT: 203.06 LBS

## 2021-01-01 VITALS
HEIGHT: 71 IN | BODY MASS INDEX: 29.78 KG/M2 | HEART RATE: 70 BPM | DIASTOLIC BLOOD PRESSURE: 67 MMHG | SYSTOLIC BLOOD PRESSURE: 110 MMHG | WEIGHT: 212.75 LBS

## 2021-01-01 VITALS
WEIGHT: 204.56 LBS | BODY MASS INDEX: 23.67 KG/M2 | DIASTOLIC BLOOD PRESSURE: 64 MMHG | HEIGHT: 78 IN | SYSTOLIC BLOOD PRESSURE: 102 MMHG | HEART RATE: 72 BPM

## 2021-01-01 VITALS
OXYGEN SATURATION: 99 % | WEIGHT: 200.5 LBS | DIASTOLIC BLOOD PRESSURE: 58 MMHG | HEIGHT: 71 IN | TEMPERATURE: 98 F | HEART RATE: 89 BPM | RESPIRATION RATE: 18 BRPM | SYSTOLIC BLOOD PRESSURE: 112 MMHG | BODY MASS INDEX: 28.07 KG/M2

## 2021-01-01 VITALS
BODY MASS INDEX: 26.6 KG/M2 | TEMPERATURE: 98 F | WEIGHT: 190 LBS | OXYGEN SATURATION: 100 % | HEIGHT: 71 IN | HEART RATE: 83 BPM | RESPIRATION RATE: 14 BRPM | DIASTOLIC BLOOD PRESSURE: 60 MMHG | SYSTOLIC BLOOD PRESSURE: 98 MMHG

## 2021-01-01 VITALS
DIASTOLIC BLOOD PRESSURE: 52 MMHG | BODY MASS INDEX: 27.9 KG/M2 | HEIGHT: 71 IN | WEIGHT: 199.31 LBS | HEART RATE: 72 BPM | SYSTOLIC BLOOD PRESSURE: 111 MMHG

## 2021-01-01 VITALS
HEIGHT: 71 IN | DIASTOLIC BLOOD PRESSURE: 68 MMHG | TEMPERATURE: 98 F | HEIGHT: 71 IN | OXYGEN SATURATION: 100 % | HEART RATE: 61 BPM | RESPIRATION RATE: 16 BRPM | HEART RATE: 83 BPM | WEIGHT: 191.69 LBS | BODY MASS INDEX: 28.31 KG/M2 | BODY MASS INDEX: 26.84 KG/M2 | SYSTOLIC BLOOD PRESSURE: 110 MMHG | SYSTOLIC BLOOD PRESSURE: 104 MMHG | WEIGHT: 202.25 LBS | DIASTOLIC BLOOD PRESSURE: 55 MMHG

## 2021-01-01 VITALS
WEIGHT: 204.25 LBS | DIASTOLIC BLOOD PRESSURE: 71 MMHG | OXYGEN SATURATION: 98 % | RESPIRATION RATE: 16 BRPM | SYSTOLIC BLOOD PRESSURE: 115 MMHG | HEIGHT: 71 IN | HEART RATE: 109 BPM | BODY MASS INDEX: 28.6 KG/M2

## 2021-01-01 VITALS
DIASTOLIC BLOOD PRESSURE: 69 MMHG | WEIGHT: 198.63 LBS | BODY MASS INDEX: 27.81 KG/M2 | HEIGHT: 71 IN | SYSTOLIC BLOOD PRESSURE: 110 MMHG | HEART RATE: 95 BPM

## 2021-01-01 DIAGNOSIS — Z74.09 IMPAIRED FUNCTIONAL MOBILITY, BALANCE, GAIT, AND ENDURANCE: ICD-10-CM

## 2021-01-01 DIAGNOSIS — Z76.82 STEM CELL TRANSPLANT CANDIDATE: Primary | ICD-10-CM

## 2021-01-01 DIAGNOSIS — I65.23 BILATERAL CAROTID ARTERY OCCLUSION: ICD-10-CM

## 2021-01-01 DIAGNOSIS — Z76.82 STEM CELL TRANSPLANT CANDIDATE: ICD-10-CM

## 2021-01-01 DIAGNOSIS — I50.41 ACUTE COMBINED SYSTOLIC AND DIASTOLIC CONGESTIVE HEART FAILURE: ICD-10-CM

## 2021-01-01 DIAGNOSIS — A41.9 SEVERE SEPSIS: ICD-10-CM

## 2021-01-01 DIAGNOSIS — L03.116 CELLULITIS OF LEFT LOWER EXTREMITY: ICD-10-CM

## 2021-01-01 DIAGNOSIS — N18.32 STAGE 3B CHRONIC KIDNEY DISEASE: ICD-10-CM

## 2021-01-01 DIAGNOSIS — I50.33 ACUTE ON CHRONIC DIASTOLIC CONGESTIVE HEART FAILURE: Primary | ICD-10-CM

## 2021-01-01 DIAGNOSIS — R29.898 FINE MOTOR IMPAIRMENT: ICD-10-CM

## 2021-01-01 DIAGNOSIS — I50.9 ACUTE CHF: ICD-10-CM

## 2021-01-01 DIAGNOSIS — R29.818 FINE MOTOR IMPAIRMENT: ICD-10-CM

## 2021-01-01 DIAGNOSIS — Z95.810 PRESENCE OF AUTOMATIC (IMPLANTABLE) CARDIAC DEFIBRILLATOR: Primary | ICD-10-CM

## 2021-01-01 DIAGNOSIS — I63.9 CEREBROVASCULAR ACCIDENT (CVA), UNSPECIFIED MECHANISM: Primary | ICD-10-CM

## 2021-01-01 DIAGNOSIS — R06.02 SOB (SHORTNESS OF BREATH): ICD-10-CM

## 2021-01-01 DIAGNOSIS — Z95.0 PRESENCE OF CARDIAC PACEMAKER: ICD-10-CM

## 2021-01-01 DIAGNOSIS — I49.8 JUNCTIONAL RHYTHM: ICD-10-CM

## 2021-01-01 DIAGNOSIS — E85.81 AL AMYLOIDOSIS: Primary | ICD-10-CM

## 2021-01-01 DIAGNOSIS — I50.32 CHRONIC DIASTOLIC CONGESTIVE HEART FAILURE: ICD-10-CM

## 2021-01-01 DIAGNOSIS — I42.5 OTHER RESTRICTIVE CARDIOMYOPATHY: ICD-10-CM

## 2021-01-01 DIAGNOSIS — R65.20 SEVERE SEPSIS: ICD-10-CM

## 2021-01-01 DIAGNOSIS — E85.81 AL AMYLOIDOSIS: ICD-10-CM

## 2021-01-01 DIAGNOSIS — R29.898 DECREASED GRIP STRENGTH OF LEFT HAND: ICD-10-CM

## 2021-01-01 DIAGNOSIS — Z95.810 PRESENCE OF AUTOMATIC (IMPLANTABLE) CARDIAC DEFIBRILLATOR: ICD-10-CM

## 2021-01-01 DIAGNOSIS — D47.2 SMOLDERING MULTIPLE MYELOMA: ICD-10-CM

## 2021-01-01 DIAGNOSIS — E66.9 OBESITY (BMI 30-39.9): ICD-10-CM

## 2021-01-01 DIAGNOSIS — I31.39 PERICARDIAL EFFUSION WITHOUT CARDIAC TAMPONADE: ICD-10-CM

## 2021-01-01 DIAGNOSIS — D47.2 SMOLDERING MULTIPLE MYELOMA: Primary | ICD-10-CM

## 2021-01-01 DIAGNOSIS — I42.8 INFILTRATIVE CARDIOMYOPATHY: ICD-10-CM

## 2021-01-01 DIAGNOSIS — I50.22 CHRONIC SYSTOLIC CONGESTIVE HEART FAILURE: ICD-10-CM

## 2021-01-01 DIAGNOSIS — I63.9 STROKE: ICD-10-CM

## 2021-01-01 DIAGNOSIS — Z95.810 AUTOMATIC IMPLANTABLE CARDIOVERTER-DEFIBRILLATOR IN SITU: ICD-10-CM

## 2021-01-01 DIAGNOSIS — I63.00 CEREBROVASCULAR ACCIDENT (CVA) DUE TO THROMBOSIS OF PRECEREBRAL ARTERY: ICD-10-CM

## 2021-01-01 DIAGNOSIS — I48.3 TYPICAL ATRIAL FLUTTER: ICD-10-CM

## 2021-01-01 DIAGNOSIS — I50.9 ACUTE DECOMPENSATED HEART FAILURE: Primary | ICD-10-CM

## 2021-01-01 DIAGNOSIS — Z78.9 IMPAIRED INSTRUMENTAL ACTIVITIES OF DAILY LIVING (IADL): ICD-10-CM

## 2021-01-01 DIAGNOSIS — H53.8 BLURRED VISION, RIGHT EYE: Primary | ICD-10-CM

## 2021-01-01 DIAGNOSIS — N17.9 ACUTE RENAL FAILURE SUPERIMPOSED ON STAGE 3B CHRONIC KIDNEY DISEASE: ICD-10-CM

## 2021-01-01 DIAGNOSIS — I50.22 CHRONIC SYSTOLIC CONGESTIVE HEART FAILURE: Primary | ICD-10-CM

## 2021-01-01 DIAGNOSIS — I48.91 ATRIAL FIBRILLATION WITH RVR: ICD-10-CM

## 2021-01-01 DIAGNOSIS — I42.9 CARDIOMYOPATHY, UNSPECIFIED TYPE: Primary | ICD-10-CM

## 2021-01-01 DIAGNOSIS — R07.9 CHEST PAIN: ICD-10-CM

## 2021-01-01 DIAGNOSIS — I50.32 CHRONIC DIASTOLIC CONGESTIVE HEART FAILURE: Primary | ICD-10-CM

## 2021-01-01 DIAGNOSIS — R79.89 ELEVATED TROPONIN: ICD-10-CM

## 2021-01-01 DIAGNOSIS — E87.20 LACTIC ACIDOSIS: Primary | ICD-10-CM

## 2021-01-01 DIAGNOSIS — R57.9 SHOCK: ICD-10-CM

## 2021-01-01 DIAGNOSIS — I48.3 TYPICAL ATRIAL FLUTTER: Primary | ICD-10-CM

## 2021-01-01 DIAGNOSIS — D63.1 ANEMIA DUE TO STAGE 3 CHRONIC KIDNEY DISEASE, UNSPECIFIED WHETHER STAGE 3A OR 3B CKD: ICD-10-CM

## 2021-01-01 DIAGNOSIS — E85.89 OTHER AMYLOIDOSIS: ICD-10-CM

## 2021-01-01 DIAGNOSIS — I47.20 VENTRICULAR TACHYCARDIA: ICD-10-CM

## 2021-01-01 DIAGNOSIS — N17.9 ACUTE RENAL FAILURE SUPERIMPOSED ON STAGE 3B CHRONIC KIDNEY DISEASE, UNSPECIFIED ACUTE RENAL FAILURE TYPE: ICD-10-CM

## 2021-01-01 DIAGNOSIS — K56.609 SMALL BOWEL OBSTRUCTION: ICD-10-CM

## 2021-01-01 DIAGNOSIS — E87.1 HYPONATREMIA: ICD-10-CM

## 2021-01-01 DIAGNOSIS — I50.33 ACUTE ON CHRONIC DIASTOLIC CONGESTIVE HEART FAILURE: ICD-10-CM

## 2021-01-01 DIAGNOSIS — Z92.82 RECEIVED INTRAVENOUS TISSUE PLASMINOGEN ACTIVATOR (TPA) IN EMERGENCY DEPARTMENT: ICD-10-CM

## 2021-01-01 DIAGNOSIS — I42.9 CARDIOMYOPATHY: ICD-10-CM

## 2021-01-01 DIAGNOSIS — E87.20 LACTIC ACIDOSIS: ICD-10-CM

## 2021-01-01 DIAGNOSIS — I65.23 CAROTID STENOSIS, BILATERAL: ICD-10-CM

## 2021-01-01 DIAGNOSIS — R06.09 DOE (DYSPNEA ON EXERTION): ICD-10-CM

## 2021-01-01 DIAGNOSIS — I48.91 A-FIB: ICD-10-CM

## 2021-01-01 DIAGNOSIS — N28.9 ACUTE RENAL INSUFFICIENCY: ICD-10-CM

## 2021-01-01 DIAGNOSIS — I48.0 PAROXYSMAL ATRIAL FIBRILLATION: ICD-10-CM

## 2021-01-01 DIAGNOSIS — I95.9 HYPOTENSION, UNSPECIFIED HYPOTENSION TYPE: ICD-10-CM

## 2021-01-01 DIAGNOSIS — I63.511 STROKE DUE TO OCCLUSION OF RIGHT MIDDLE CEREBRAL ARTERY: Primary | ICD-10-CM

## 2021-01-01 DIAGNOSIS — M72.6 NECROTIZING FASCIITIS: ICD-10-CM

## 2021-01-01 DIAGNOSIS — I63.9 CEREBROVASCULAR ACCIDENT (CVA), UNSPECIFIED MECHANISM: ICD-10-CM

## 2021-01-01 DIAGNOSIS — I63.511 STROKE DUE TO OCCLUSION OF RIGHT MIDDLE CEREBRAL ARTERY: ICD-10-CM

## 2021-01-01 DIAGNOSIS — I50.42 CHRONIC COMBINED SYSTOLIC (CONGESTIVE) AND DIASTOLIC (CONGESTIVE) HEART FAILURE: ICD-10-CM

## 2021-01-01 DIAGNOSIS — D63.0 ANEMIA IN NEOPLASTIC DISEASE: ICD-10-CM

## 2021-01-01 DIAGNOSIS — H54.61 VISION LOSS OF RIGHT EYE: Primary | ICD-10-CM

## 2021-01-01 DIAGNOSIS — I73.9 PERIPHERAL ARTERIAL DISEASE: ICD-10-CM

## 2021-01-01 DIAGNOSIS — R74.01 TRANSAMINITIS: ICD-10-CM

## 2021-01-01 DIAGNOSIS — R60.0 EDEMA OF BOTH LOWER LEGS DUE TO PERIPHERAL VENOUS INSUFFICIENCY: ICD-10-CM

## 2021-01-01 DIAGNOSIS — I48.91 ATRIAL FIBRILLATION: ICD-10-CM

## 2021-01-01 DIAGNOSIS — N18.32 ACUTE RENAL FAILURE SUPERIMPOSED ON STAGE 3B CHRONIC KIDNEY DISEASE: ICD-10-CM

## 2021-01-01 DIAGNOSIS — N17.9 AKI (ACUTE KIDNEY INJURY): ICD-10-CM

## 2021-01-01 DIAGNOSIS — I50.9 HEART FAILURE, UNSPECIFIED: ICD-10-CM

## 2021-01-01 DIAGNOSIS — I48.0 PAROXYSMAL ATRIAL FIBRILLATION WITH RVR: ICD-10-CM

## 2021-01-01 DIAGNOSIS — N18.30 ANEMIA DUE TO STAGE 3 CHRONIC KIDNEY DISEASE, UNSPECIFIED WHETHER STAGE 3A OR 3B CKD: ICD-10-CM

## 2021-01-01 DIAGNOSIS — Z74.09 IMPAIRED FUNCTIONAL MOBILITY, BALANCE, GAIT, AND ENDURANCE: Primary | ICD-10-CM

## 2021-01-01 DIAGNOSIS — M79.89 LEG SWELLING: ICD-10-CM

## 2021-01-01 DIAGNOSIS — D50.9 IRON DEFICIENCY ANEMIA: ICD-10-CM

## 2021-01-01 DIAGNOSIS — N18.31 STAGE 3A CHRONIC KIDNEY DISEASE: ICD-10-CM

## 2021-01-01 DIAGNOSIS — B02.9 HERPES ZOSTER WITHOUT COMPLICATION: Primary | ICD-10-CM

## 2021-01-01 DIAGNOSIS — R65.21 SEPTIC SHOCK: ICD-10-CM

## 2021-01-01 DIAGNOSIS — A41.9 SEPTIC SHOCK: ICD-10-CM

## 2021-01-01 DIAGNOSIS — I21.4 NSTEMI (NON-ST ELEVATED MYOCARDIAL INFARCTION): ICD-10-CM

## 2021-01-01 DIAGNOSIS — R65.10 SIRS (SYSTEMIC INFLAMMATORY RESPONSE SYNDROME): ICD-10-CM

## 2021-01-01 DIAGNOSIS — J96.01 ACUTE HYPOXEMIC RESPIRATORY FAILURE: ICD-10-CM

## 2021-01-01 DIAGNOSIS — R52 PAIN: ICD-10-CM

## 2021-01-01 DIAGNOSIS — I95.9 HYPOTENSION: ICD-10-CM

## 2021-01-01 DIAGNOSIS — L29.9 PRURITUS: ICD-10-CM

## 2021-01-01 DIAGNOSIS — I51.3 THROMBUS OF LEFT ATRIAL APPENDAGE: ICD-10-CM

## 2021-01-01 DIAGNOSIS — I42.9 CARDIOMYOPATHY, UNSPECIFIED TYPE: ICD-10-CM

## 2021-01-01 DIAGNOSIS — R05.9 COUGH: ICD-10-CM

## 2021-01-01 DIAGNOSIS — Z71.89 ADVANCE CARE PLANNING: ICD-10-CM

## 2021-01-01 DIAGNOSIS — I49.9 ARRHYTHMIA: ICD-10-CM

## 2021-01-01 DIAGNOSIS — I87.2 EDEMA OF BOTH LOWER LEGS DUE TO PERIPHERAL VENOUS INSUFFICIENCY: ICD-10-CM

## 2021-01-01 DIAGNOSIS — R23.8 VESICULAR RASH: Primary | ICD-10-CM

## 2021-01-01 DIAGNOSIS — R57.0 CARDIOGENIC SHOCK: Primary | ICD-10-CM

## 2021-01-01 DIAGNOSIS — R07.1 CHEST PAIN ON BREATHING: ICD-10-CM

## 2021-01-01 DIAGNOSIS — R78.81 BACTEREMIA: ICD-10-CM

## 2021-01-01 DIAGNOSIS — N18.32 ACUTE RENAL FAILURE SUPERIMPOSED ON STAGE 3B CHRONIC KIDNEY DISEASE, UNSPECIFIED ACUTE RENAL FAILURE TYPE: ICD-10-CM

## 2021-01-01 DIAGNOSIS — I42.5 OTHER RESTRICTIVE CARDIOMYOPATHY: Primary | ICD-10-CM

## 2021-01-01 LAB
ABO + RH BLD: NORMAL
ACANTHOCYTES BLD QL SMEAR: PRESENT
ALBUMIN SERPL BCP-MCNC: 1.2 G/DL (ref 3.5–5.2)
ALBUMIN SERPL BCP-MCNC: 1.4 G/DL (ref 3.5–5.2)
ALBUMIN SERPL BCP-MCNC: 1.4 G/DL (ref 3.5–5.2)
ALBUMIN SERPL BCP-MCNC: 1.5 G/DL (ref 3.5–5.2)
ALBUMIN SERPL BCP-MCNC: 1.7 G/DL (ref 3.5–5.2)
ALBUMIN SERPL BCP-MCNC: 1.8 G/DL (ref 3.5–5.2)
ALBUMIN SERPL BCP-MCNC: 2 G/DL (ref 3.5–5.2)
ALBUMIN SERPL BCP-MCNC: 2.1 G/DL (ref 3.5–5.2)
ALBUMIN SERPL BCP-MCNC: 2.2 G/DL (ref 3.5–5.2)
ALBUMIN SERPL BCP-MCNC: 2.3 G/DL (ref 3.5–5.2)
ALBUMIN SERPL BCP-MCNC: 2.4 G/DL (ref 3.5–5.2)
ALBUMIN SERPL BCP-MCNC: 2.5 G/DL (ref 3.5–5.2)
ALBUMIN SERPL BCP-MCNC: 2.5 G/DL (ref 3.5–5.2)
ALBUMIN SERPL BCP-MCNC: 2.6 G/DL (ref 3.5–5.2)
ALBUMIN SERPL BCP-MCNC: 2.9 G/DL (ref 3.5–5.2)
ALBUMIN SERPL ELPH-MCNC: 2.19 G/DL (ref 3.35–5.55)
ALBUMIN SERPL ELPH-MCNC: 2.29 G/DL (ref 3.35–5.55)
ALBUMIN SERPL ELPH-MCNC: 2.4 G/DL (ref 3.35–5.55)
ALBUMIN SERPL ELPH-MCNC: 2.67 G/DL (ref 3.35–5.55)
ALBUMIN SERPL ELPH-MCNC: 2.74 G/DL (ref 3.35–5.55)
ALBUMIN SERPL ELPH-MCNC: 2.81 G/DL (ref 3.35–5.55)
ALBUMIN SERPL ELPH-MCNC: 3.17 G/DL (ref 3.35–5.55)
ALLENS TEST: ABNORMAL
ALP SERPL-CCNC: 111 U/L (ref 55–135)
ALP SERPL-CCNC: 111 U/L (ref 55–135)
ALP SERPL-CCNC: 113 U/L (ref 55–135)
ALP SERPL-CCNC: 118 U/L (ref 55–135)
ALP SERPL-CCNC: 120 U/L (ref 55–135)
ALP SERPL-CCNC: 122 U/L (ref 55–135)
ALP SERPL-CCNC: 123 U/L (ref 55–135)
ALP SERPL-CCNC: 124 U/L (ref 55–135)
ALP SERPL-CCNC: 124 U/L (ref 55–135)
ALP SERPL-CCNC: 125 U/L (ref 55–135)
ALP SERPL-CCNC: 128 U/L (ref 55–135)
ALP SERPL-CCNC: 128 U/L (ref 55–135)
ALP SERPL-CCNC: 131 U/L (ref 55–135)
ALP SERPL-CCNC: 133 U/L (ref 55–135)
ALP SERPL-CCNC: 134 U/L (ref 55–135)
ALP SERPL-CCNC: 134 U/L (ref 55–135)
ALP SERPL-CCNC: 135 U/L (ref 55–135)
ALP SERPL-CCNC: 136 U/L (ref 55–135)
ALP SERPL-CCNC: 143 U/L (ref 55–135)
ALP SERPL-CCNC: 144 U/L (ref 55–135)
ALP SERPL-CCNC: 149 U/L (ref 55–135)
ALP SERPL-CCNC: 151 U/L (ref 55–135)
ALP SERPL-CCNC: 153 U/L (ref 55–135)
ALP SERPL-CCNC: 156 U/L (ref 55–135)
ALP SERPL-CCNC: 156 U/L (ref 55–135)
ALP SERPL-CCNC: 162 U/L (ref 55–135)
ALP SERPL-CCNC: 164 U/L (ref 55–135)
ALP SERPL-CCNC: 164 U/L (ref 55–135)
ALP SERPL-CCNC: 73 U/L (ref 55–135)
ALP SERPL-CCNC: 80 U/L (ref 55–135)
ALP SERPL-CCNC: 89 U/L (ref 55–135)
ALP SERPL-CCNC: 92 U/L (ref 55–135)
ALPHA1 GLOB SERPL ELPH-MCNC: 0.44 G/DL (ref 0.17–0.41)
ALPHA1 GLOB SERPL ELPH-MCNC: 0.45 G/DL (ref 0.17–0.41)
ALPHA1 GLOB SERPL ELPH-MCNC: 0.48 G/DL (ref 0.17–0.41)
ALPHA1 GLOB SERPL ELPH-MCNC: 0.49 G/DL (ref 0.17–0.41)
ALPHA1 GLOB SERPL ELPH-MCNC: 0.53 G/DL (ref 0.17–0.41)
ALPHA1 GLOB SERPL ELPH-MCNC: 0.57 G/DL (ref 0.17–0.41)
ALPHA1 GLOB SERPL ELPH-MCNC: 0.58 G/DL (ref 0.17–0.41)
ALPHA2 GLOB SERPL ELPH-MCNC: 0.57 G/DL (ref 0.43–0.99)
ALPHA2 GLOB SERPL ELPH-MCNC: 0.64 G/DL (ref 0.43–0.99)
ALPHA2 GLOB SERPL ELPH-MCNC: 0.64 G/DL (ref 0.43–0.99)
ALPHA2 GLOB SERPL ELPH-MCNC: 0.67 G/DL (ref 0.43–0.99)
ALPHA2 GLOB SERPL ELPH-MCNC: 0.71 G/DL (ref 0.43–0.99)
ALPHA2 GLOB SERPL ELPH-MCNC: 0.76 G/DL (ref 0.43–0.99)
ALPHA2 GLOB SERPL ELPH-MCNC: 0.77 G/DL (ref 0.43–0.99)
ALT SERPL W/O P-5'-P-CCNC: 14 U/L (ref 10–44)
ALT SERPL W/O P-5'-P-CCNC: 15 U/L (ref 10–44)
ALT SERPL W/O P-5'-P-CCNC: 16 U/L (ref 10–44)
ALT SERPL W/O P-5'-P-CCNC: 1735 U/L (ref 10–44)
ALT SERPL W/O P-5'-P-CCNC: 18 U/L (ref 10–44)
ALT SERPL W/O P-5'-P-CCNC: 18 U/L (ref 10–44)
ALT SERPL W/O P-5'-P-CCNC: 19 U/L (ref 10–44)
ALT SERPL W/O P-5'-P-CCNC: 19 U/L (ref 10–44)
ALT SERPL W/O P-5'-P-CCNC: 20 U/L (ref 10–44)
ALT SERPL W/O P-5'-P-CCNC: 21 U/L (ref 10–44)
ALT SERPL W/O P-5'-P-CCNC: 22 U/L (ref 10–44)
ALT SERPL W/O P-5'-P-CCNC: 23 U/L (ref 10–44)
ALT SERPL W/O P-5'-P-CCNC: 232 U/L (ref 10–44)
ALT SERPL W/O P-5'-P-CCNC: 25 U/L (ref 10–44)
ALT SERPL W/O P-5'-P-CCNC: 25 U/L (ref 10–44)
ALT SERPL W/O P-5'-P-CCNC: 26 U/L (ref 10–44)
ALT SERPL W/O P-5'-P-CCNC: 28 U/L (ref 10–44)
ALT SERPL W/O P-5'-P-CCNC: 28 U/L (ref 10–44)
ALT SERPL W/O P-5'-P-CCNC: 33 U/L (ref 10–44)
ALT SERPL W/O P-5'-P-CCNC: 36 U/L (ref 10–44)
ALT SERPL W/O P-5'-P-CCNC: 787 U/L (ref 10–44)
ANION GAP SERPL CALC-SCNC: 10 MMOL/L (ref 8–16)
ANION GAP SERPL CALC-SCNC: 11 MMOL/L (ref 8–16)
ANION GAP SERPL CALC-SCNC: 12 MMOL/L (ref 8–16)
ANION GAP SERPL CALC-SCNC: 13 MMOL/L (ref 8–16)
ANION GAP SERPL CALC-SCNC: 14 MMOL/L (ref 8–16)
ANION GAP SERPL CALC-SCNC: 14 MMOL/L (ref 8–16)
ANION GAP SERPL CALC-SCNC: 19 MMOL/L (ref 8–16)
ANION GAP SERPL CALC-SCNC: 19 MMOL/L (ref 8–16)
ANION GAP SERPL CALC-SCNC: 20 MMOL/L (ref 8–16)
ANION GAP SERPL CALC-SCNC: 21 MMOL/L (ref 8–16)
ANION GAP SERPL CALC-SCNC: 24 MMOL/L (ref 8–16)
ANION GAP SERPL CALC-SCNC: 5 MMOL/L (ref 8–16)
ANION GAP SERPL CALC-SCNC: 7 MMOL/L (ref 8–16)
ANION GAP SERPL CALC-SCNC: 8 MMOL/L (ref 8–16)
ANION GAP SERPL CALC-SCNC: 9 MMOL/L (ref 8–16)
ANISOCYTOSIS BLD QL SMEAR: SLIGHT
ANISOCYTOSIS BLD QL SMEAR: SLIGHT
AORTIC ROOT ANNULUS: 2.66 CM
AORTIC VALVE CUSP SEPERATION: 2.35 CM
APTT BLDCRRT: 100.8 SEC (ref 21–32)
APTT BLDCRRT: 119.1 SEC (ref 21–32)
APTT BLDCRRT: 27.4 SEC (ref 21–32)
APTT BLDCRRT: 64.2 SEC (ref 21–32)
APTT BLDCRRT: 68.3 SEC (ref 21–32)
ASCENDING AORTA: 2.72 CM
ASCENDING AORTA: 2.88 CM
ASCENDING AORTA: 2.9 CM
ASCENDING AORTA: 3.66 CM
AST SERPL-CCNC: 17 U/L (ref 10–40)
AST SERPL-CCNC: 1861 U/L (ref 10–40)
AST SERPL-CCNC: 20 U/L (ref 10–40)
AST SERPL-CCNC: 20 U/L (ref 10–40)
AST SERPL-CCNC: 21 U/L (ref 10–40)
AST SERPL-CCNC: 22 U/L (ref 10–40)
AST SERPL-CCNC: 22 U/L (ref 10–40)
AST SERPL-CCNC: 23 U/L (ref 10–40)
AST SERPL-CCNC: 23 U/L (ref 10–40)
AST SERPL-CCNC: 24 U/L (ref 10–40)
AST SERPL-CCNC: 25 U/L (ref 10–40)
AST SERPL-CCNC: 257 U/L (ref 10–40)
AST SERPL-CCNC: 27 U/L (ref 10–40)
AST SERPL-CCNC: 27 U/L (ref 10–40)
AST SERPL-CCNC: 28 U/L (ref 10–40)
AST SERPL-CCNC: 29 U/L (ref 10–40)
AST SERPL-CCNC: 30 U/L (ref 10–40)
AST SERPL-CCNC: 30 U/L (ref 10–40)
AST SERPL-CCNC: 33 U/L (ref 10–40)
AST SERPL-CCNC: 35 U/L (ref 10–40)
AST SERPL-CCNC: 36 U/L (ref 10–40)
AST SERPL-CCNC: 38 U/L (ref 10–40)
AST SERPL-CCNC: 42 U/L (ref 10–40)
AST SERPL-CCNC: 44 U/L (ref 10–40)
AST SERPL-CCNC: 51 U/L (ref 10–40)
AST SERPL-CCNC: 867 U/L (ref 10–40)
AV INDEX (PROSTH): 0.81
AV INDEX (PROSTH): 0.88
AV INDEX (PROSTH): 1.03
AV MEAN GRADIENT: 4 MMHG
AV MEAN GRADIENT: 4 MMHG
AV MEAN GRADIENT: 5 MMHG
AV PEAK GRADIENT: 5 MMHG
AV PEAK GRADIENT: 6 MMHG
AV PEAK GRADIENT: 8 MMHG
AV VALVE AREA: 2.41 CM2
AV VALVE AREA: 2.99 CM2
AV VALVE AREA: 3.97 CM2
AV VELOCITY RATIO: 0.81
AV VELOCITY RATIO: 0.82
AV VELOCITY RATIO: 0.98
B-GLOBULIN SERPL ELPH-MCNC: 0.64 G/DL (ref 0.5–1.1)
B-GLOBULIN SERPL ELPH-MCNC: 0.64 G/DL (ref 0.5–1.1)
B-GLOBULIN SERPL ELPH-MCNC: 0.66 G/DL (ref 0.5–1.1)
B-GLOBULIN SERPL ELPH-MCNC: 0.68 G/DL (ref 0.5–1.1)
B-GLOBULIN SERPL ELPH-MCNC: 0.69 G/DL (ref 0.5–1.1)
B-GLOBULIN SERPL ELPH-MCNC: 0.71 G/DL (ref 0.5–1.1)
B-GLOBULIN SERPL ELPH-MCNC: 0.76 G/DL (ref 0.5–1.1)
BASOPHILS # BLD AUTO: 0.02 K/UL (ref 0–0.2)
BASOPHILS # BLD AUTO: 0.03 K/UL (ref 0–0.2)
BASOPHILS # BLD AUTO: 0.04 K/UL (ref 0–0.2)
BASOPHILS # BLD AUTO: 0.05 K/UL (ref 0–0.2)
BASOPHILS # BLD AUTO: 0.06 K/UL (ref 0–0.2)
BASOPHILS # BLD AUTO: 0.09 K/UL (ref 0–0.2)
BASOPHILS # BLD AUTO: ABNORMAL K/UL (ref 0–0.2)
BASOPHILS NFR BLD: 0 % (ref 0–1.9)
BASOPHILS NFR BLD: 0.2 % (ref 0–1.9)
BASOPHILS NFR BLD: 0.2 % (ref 0–1.9)
BASOPHILS NFR BLD: 0.4 % (ref 0–1.9)
BASOPHILS NFR BLD: 0.5 % (ref 0–1.9)
BASOPHILS NFR BLD: 0.6 % (ref 0–1.9)
BASOPHILS NFR BLD: 0.7 % (ref 0–1.9)
BASOPHILS NFR BLD: 0.8 % (ref 0–1.9)
BASOPHILS NFR BLD: 0.9 % (ref 0–1.9)
BASOPHILS NFR BLD: 1 % (ref 0–1.9)
BASOPHILS NFR BLD: 1.1 % (ref 0–1.9)
BASOPHILS NFR BLD: 1.1 % (ref 0–1.9)
BILIRUB DIRECT SERPL-MCNC: 0.2 MG/DL (ref 0.1–0.3)
BILIRUB DIRECT SERPL-MCNC: 0.3 MG/DL (ref 0.1–0.3)
BILIRUB DIRECT SERPL-MCNC: 0.3 MG/DL (ref 0.1–0.3)
BILIRUB DIRECT SERPL-MCNC: 0.4 MG/DL (ref 0.1–0.3)
BILIRUB DIRECT SERPL-MCNC: 0.4 MG/DL (ref 0.1–0.3)
BILIRUB SERPL-MCNC: 0.4 MG/DL (ref 0.1–1)
BILIRUB SERPL-MCNC: 0.5 MG/DL (ref 0.1–1)
BILIRUB SERPL-MCNC: 0.6 MG/DL (ref 0.1–1)
BILIRUB SERPL-MCNC: 0.7 MG/DL (ref 0.1–1)
BILIRUB SERPL-MCNC: 0.8 MG/DL (ref 0.1–1)
BILIRUB SERPL-MCNC: 0.8 MG/DL (ref 0.1–1)
BILIRUB SERPL-MCNC: 1.3 MG/DL (ref 0.1–1)
BILIRUB SERPL-MCNC: 1.4 MG/DL (ref 0.1–1)
BILIRUB SERPL-MCNC: 1.5 MG/DL (ref 0.1–1)
BILIRUB SERPL-MCNC: 2.6 MG/DL (ref 0.1–1)
BILIRUB UR QL STRIP: NEGATIVE
BILIRUB UR QL STRIP: NEGATIVE
BLD GP AB SCN CELLS X3 SERPL QL: NORMAL
BNP SERPL-MCNC: 180 PG/ML (ref 0–99)
BNP SERPL-MCNC: 238 PG/ML (ref 0–99)
BNP SERPL-MCNC: 270 PG/ML (ref 0–99)
BNP SERPL-MCNC: 323 PG/ML (ref 0–99)
BNP SERPL-MCNC: 335 PG/ML (ref 0–99)
BNP SERPL-MCNC: 364 PG/ML (ref 0–99)
BNP SERPL-MCNC: 403 PG/ML (ref 0–99)
BODY SITE - BONE MARROW: NORMAL
BSA FOR ECHO PROCEDURE: 2.13 M2
BSA FOR ECHO PROCEDURE: 2.13 M2
BSA FOR ECHO PROCEDURE: 2.16 M2
BSA FOR ECHO PROCEDURE: 2.23 M2
BSA FOR ECHO PROCEDURE: 2.23 M2
BUN SERPL-MCNC: 24 MG/DL (ref 6–20)
BUN SERPL-MCNC: 25 MG/DL (ref 6–20)
BUN SERPL-MCNC: 27 MG/DL (ref 6–20)
BUN SERPL-MCNC: 28 MG/DL (ref 6–20)
BUN SERPL-MCNC: 28 MG/DL (ref 6–20)
BUN SERPL-MCNC: 30 MG/DL (ref 6–20)
BUN SERPL-MCNC: 30 MG/DL (ref 6–20)
BUN SERPL-MCNC: 31 MG/DL (ref 6–20)
BUN SERPL-MCNC: 32 MG/DL (ref 6–20)
BUN SERPL-MCNC: 34 MG/DL (ref 6–20)
BUN SERPL-MCNC: 35 MG/DL (ref 6–20)
BUN SERPL-MCNC: 35 MG/DL (ref 6–30)
BUN SERPL-MCNC: 36 MG/DL (ref 6–20)
BUN SERPL-MCNC: 37 MG/DL (ref 6–20)
BUN SERPL-MCNC: 38 MG/DL (ref 6–20)
BUN SERPL-MCNC: 39 MG/DL (ref 6–20)
BUN SERPL-MCNC: 40 MG/DL (ref 6–20)
BUN SERPL-MCNC: 40 MG/DL (ref 6–20)
BUN SERPL-MCNC: 41 MG/DL (ref 6–20)
BUN SERPL-MCNC: 41 MG/DL (ref 6–20)
BUN SERPL-MCNC: 42 MG/DL (ref 6–20)
BUN SERPL-MCNC: 43 MG/DL (ref 6–20)
BUN SERPL-MCNC: 44 MG/DL (ref 6–20)
BUN SERPL-MCNC: 45 MG/DL (ref 6–20)
BUN SERPL-MCNC: 47 MG/DL (ref 6–20)
BUN SERPL-MCNC: 48 MG/DL (ref 6–20)
BUN SERPL-MCNC: 51 MG/DL (ref 6–20)
BUN SERPL-MCNC: 52 MG/DL (ref 6–20)
BUN SERPL-MCNC: 59 MG/DL (ref 6–20)
BUN SERPL-MCNC: 59 MG/DL (ref 6–20)
BUN SERPL-MCNC: 61 MG/DL (ref 6–20)
BUN SERPL-MCNC: 63 MG/DL (ref 6–20)
BUN SERPL-MCNC: 65 MG/DL (ref 6–20)
BUN SERPL-MCNC: 67 MG/DL (ref 6–20)
BUN SERPL-MCNC: 69 MG/DL (ref 6–20)
BUN SERPL-MCNC: 73 MG/DL (ref 6–20)
BUN SERPL-MCNC: 74 MG/DL (ref 6–20)
BURR CELLS BLD QL SMEAR: ABNORMAL
BURR CELLS BLD QL SMEAR: ABNORMAL
CA-I BLDV-SCNC: 0.82 MMOL/L (ref 1.06–1.42)
CALCIUM SERPL-MCNC: 6.1 MG/DL (ref 8.7–10.5)
CALCIUM SERPL-MCNC: 6.2 MG/DL (ref 8.7–10.5)
CALCIUM SERPL-MCNC: 6.2 MG/DL (ref 8.7–10.5)
CALCIUM SERPL-MCNC: 6.8 MG/DL (ref 8.7–10.5)
CALCIUM SERPL-MCNC: 7 MG/DL (ref 8.7–10.5)
CALCIUM SERPL-MCNC: 7.2 MG/DL (ref 8.7–10.5)
CALCIUM SERPL-MCNC: 7.2 MG/DL (ref 8.7–10.5)
CALCIUM SERPL-MCNC: 7.8 MG/DL (ref 8.7–10.5)
CALCIUM SERPL-MCNC: 7.9 MG/DL (ref 8.7–10.5)
CALCIUM SERPL-MCNC: 8 MG/DL (ref 8.7–10.5)
CALCIUM SERPL-MCNC: 8.1 MG/DL (ref 8.7–10.5)
CALCIUM SERPL-MCNC: 8.2 MG/DL (ref 8.7–10.5)
CALCIUM SERPL-MCNC: 8.3 MG/DL (ref 8.7–10.5)
CALCIUM SERPL-MCNC: 8.4 MG/DL (ref 8.7–10.5)
CALCIUM SERPL-MCNC: 8.5 MG/DL (ref 8.7–10.5)
CALCIUM SERPL-MCNC: 8.6 MG/DL (ref 8.7–10.5)
CALCIUM SERPL-MCNC: 8.7 MG/DL (ref 8.7–10.5)
CALCIUM SERPL-MCNC: 8.8 MG/DL (ref 8.7–10.5)
CALCIUM SERPL-MCNC: 9 MG/DL (ref 8.7–10.5)
CALCIUM SERPL-MCNC: 9 MG/DL (ref 8.7–10.5)
CALCIUM SERPL-MCNC: 9.1 MG/DL (ref 8.7–10.5)
CHLORIDE SERPL-SCNC: 100 MMOL/L (ref 95–110)
CHLORIDE SERPL-SCNC: 101 MMOL/L (ref 95–110)
CHLORIDE SERPL-SCNC: 102 MMOL/L (ref 95–110)
CHLORIDE SERPL-SCNC: 103 MMOL/L (ref 95–110)
CHLORIDE SERPL-SCNC: 104 MMOL/L (ref 95–110)
CHLORIDE SERPL-SCNC: 105 MMOL/L (ref 95–110)
CHLORIDE SERPL-SCNC: 88 MMOL/L (ref 95–110)
CHLORIDE SERPL-SCNC: 91 MMOL/L (ref 95–110)
CHLORIDE SERPL-SCNC: 92 MMOL/L (ref 95–110)
CHLORIDE SERPL-SCNC: 92 MMOL/L (ref 95–110)
CHLORIDE SERPL-SCNC: 93 MMOL/L (ref 95–110)
CHLORIDE SERPL-SCNC: 95 MMOL/L (ref 95–110)
CHLORIDE SERPL-SCNC: 96 MMOL/L (ref 95–110)
CHLORIDE SERPL-SCNC: 96 MMOL/L (ref 95–110)
CHLORIDE SERPL-SCNC: 97 MMOL/L (ref 95–110)
CHLORIDE SERPL-SCNC: 97 MMOL/L (ref 95–110)
CHLORIDE SERPL-SCNC: 98 MMOL/L (ref 95–110)
CHLORIDE SERPL-SCNC: 99 MMOL/L (ref 95–110)
CHOLEST SERPL-MCNC: 181 MG/DL (ref 120–199)
CHOLEST/HDLC SERPL: 3.5 {RATIO} (ref 2–5)
CHROM BANDING METHOD: NORMAL
CHROMOSOME ANALYSIS BM ADDITIONAL INFORMATION: NORMAL
CHROMOSOME ANALYSIS BM RELEASED BY: NORMAL
CHROMOSOME ANALYSIS BM RESULT SUMMARY: NORMAL
CK MB SERPL-MCNC: 2.9 NG/ML (ref 0.1–6.5)
CK MB SERPL-MCNC: 4.3 NG/ML (ref 0.1–6.5)
CK MB SERPL-RTO: 1.8 % (ref 0–5)
CK MB SERPL-RTO: 3.3 % (ref 0–5)
CK SERPL-CCNC: 1865 U/L (ref 20–200)
CK SERPL-CCNC: 233 U/L (ref 20–200)
CK SERPL-CCNC: 555 U/L (ref 20–200)
CK SERPL-CCNC: 88 U/L (ref 20–200)
CK SERPL-CCNC: 927 U/L (ref 20–200)
CLARITY UR: CLEAR
CLARITY UR: CLEAR
CLINICAL CYTOGENETICIST REVIEW: NORMAL
CLINICAL DIAGNOSIS - BONE MARROW: NORMAL
CO2 SERPL-SCNC: 11 MMOL/L (ref 23–29)
CO2 SERPL-SCNC: 12 MMOL/L (ref 23–29)
CO2 SERPL-SCNC: 12 MMOL/L (ref 23–29)
CO2 SERPL-SCNC: 14 MMOL/L (ref 23–29)
CO2 SERPL-SCNC: 18 MMOL/L (ref 23–29)
CO2 SERPL-SCNC: 19 MMOL/L (ref 23–29)
CO2 SERPL-SCNC: 20 MMOL/L (ref 23–29)
CO2 SERPL-SCNC: 21 MMOL/L (ref 23–29)
CO2 SERPL-SCNC: 22 MMOL/L (ref 23–29)
CO2 SERPL-SCNC: 23 MMOL/L (ref 23–29)
CO2 SERPL-SCNC: 24 MMOL/L (ref 23–29)
CO2 SERPL-SCNC: 25 MMOL/L (ref 23–29)
CO2 SERPL-SCNC: 26 MMOL/L (ref 23–29)
CO2 SERPL-SCNC: 27 MMOL/L (ref 23–29)
CO2 SERPL-SCNC: 27 MMOL/L (ref 23–29)
CO2 SERPL-SCNC: 30 MMOL/L (ref 23–29)
COLOR UR: YELLOW
COLOR UR: YELLOW
COMMENT: NORMAL
CREAT SERPL-MCNC: 1.2 MG/DL (ref 0.5–1.4)
CREAT SERPL-MCNC: 1.3 MG/DL (ref 0.5–1.4)
CREAT SERPL-MCNC: 1.4 MG/DL (ref 0.5–1.4)
CREAT SERPL-MCNC: 1.4 MG/DL (ref 0.5–1.4)
CREAT SERPL-MCNC: 1.5 MG/DL (ref 0.5–1.4)
CREAT SERPL-MCNC: 1.6 MG/DL (ref 0.5–1.4)
CREAT SERPL-MCNC: 1.7 MG/DL (ref 0.5–1.4)
CREAT SERPL-MCNC: 1.8 MG/DL (ref 0.5–1.4)
CREAT SERPL-MCNC: 1.9 MG/DL (ref 0.5–1.4)
CREAT SERPL-MCNC: 1.9 MG/DL (ref 0.5–1.4)
CREAT SERPL-MCNC: 2 MG/DL (ref 0.5–1.4)
CREAT SERPL-MCNC: 2 MG/DL (ref 0.5–1.4)
CREAT SERPL-MCNC: 2.1 MG/DL (ref 0.5–1.4)
CREAT SERPL-MCNC: 2.1 MG/DL (ref 0.5–1.4)
CREAT SERPL-MCNC: 2.2 MG/DL (ref 0.5–1.4)
CREAT SERPL-MCNC: 2.2 MG/DL (ref 0.5–1.4)
CREAT SERPL-MCNC: 2.4 MG/DL (ref 0.5–1.4)
CREAT SERPL-MCNC: 2.4 MG/DL (ref 0.5–1.4)
CREAT SERPL-MCNC: 2.5 MG/DL (ref 0.5–1.4)
CREAT SERPL-MCNC: 2.6 MG/DL (ref 0.5–1.4)
CREAT SERPL-MCNC: 2.8 MG/DL (ref 0.5–1.4)
CREAT SERPL-MCNC: 2.9 MG/DL (ref 0.5–1.4)
CREAT SERPL-MCNC: 2.9 MG/DL (ref 0.5–1.4)
CREAT SERPL-MCNC: 3 MG/DL (ref 0.5–1.4)
CREAT UR-MCNC: 127.5 MG/DL (ref 23–375)
CREAT UR-MCNC: 92.9 MG/DL (ref 23–375)
CRP SERPL-MCNC: 259.8 MG/L (ref 0–8.2)
CTP QC/QA: YES
CV ECHO LV RWT: 0.36 CM
CV ECHO LV RWT: 0.55 CM
CV ECHO LV RWT: 0.64 CM
CV ECHO LV RWT: 0.79 CM
DELSYS: ABNORMAL
DIFFERENTIAL METHOD: ABNORMAL
DOP CALC AO PEAK VEL: 1.13 M/S
DOP CALC AO PEAK VEL: 1.26 M/S
DOP CALC AO PEAK VEL: 1.41 M/S
DOP CALC AO VTI: 19.4 CM
DOP CALC AO VTI: 20.99 CM
DOP CALC AO VTI: 21.01 CM
DOP CALC LVOT AREA: 2.7 CM2
DOP CALC LVOT AREA: 3 CM2
DOP CALC LVOT AREA: 3.7 CM2
DOP CALC LVOT AREA: 3.9 CM2
DOP CALC LVOT DIAMETER: 1.87 CM
DOP CALC LVOT DIAMETER: 1.96 CM
DOP CALC LVOT DIAMETER: 2.17 CM
DOP CALC LVOT DIAMETER: 2.22 CM
DOP CALC LVOT PEAK VEL: 0.91 M/S
DOP CALC LVOT PEAK VEL: 1.15 M/S
DOP CALC LVOT PEAK VEL: 1.24 M/S
DOP CALC LVOT STROKE VOLUME: 50.67 CM3
DOP CALC LVOT STROKE VOLUME: 57.92 CM3
DOP CALC LVOT STROKE VOLUME: 83.45 CM3
DOP CALCLVOT PEAK VEL VTI: 15.67 CM
DOP CALCLVOT PEAK VEL VTI: 18.46 CM
DOP CALCLVOT PEAK VEL VTI: 21.57 CM
E WAVE DECELERATION TIME: 178.18 MSEC
E WAVE DECELERATION TIME: 194.6 MSEC
E/A RATIO: 1.73
E/A RATIO: 2.54
E/E' RATIO: 10.92 M/S
E/E' RATIO: 19.56 M/S
ECHO LV POSTERIOR WALL: 0.81 CM (ref 0.6–1.1)
ECHO LV POSTERIOR WALL: 1.3 CM (ref 0.6–1.1)
ECHO LV POSTERIOR WALL: 1.38 CM (ref 0.6–1.1)
ECHO LV POSTERIOR WALL: 1.76 CM (ref 0.6–1.1)
EJECTION FRACTION: 55 %
EJECTION FRACTION: 55 %
EJECTION FRACTION: 60 %
EJECTION FRACTION: 60 %
EJECTION FRACTION: 65 %
EOSINOPHIL # BLD AUTO: 0 K/UL (ref 0–0.5)
EOSINOPHIL # BLD AUTO: 0.1 K/UL (ref 0–0.5)
EOSINOPHIL # BLD AUTO: 0.2 K/UL (ref 0–0.5)
EOSINOPHIL # BLD AUTO: 0.3 K/UL (ref 0–0.5)
EOSINOPHIL # BLD AUTO: 0.4 K/UL (ref 0–0.5)
EOSINOPHIL # BLD AUTO: ABNORMAL K/UL (ref 0–0.5)
EOSINOPHIL NFR BLD: 0 % (ref 0–8)
EOSINOPHIL NFR BLD: 0.4 % (ref 0–8)
EOSINOPHIL NFR BLD: 0.4 % (ref 0–8)
EOSINOPHIL NFR BLD: 1.3 % (ref 0–8)
EOSINOPHIL NFR BLD: 2.2 % (ref 0–8)
EOSINOPHIL NFR BLD: 2.7 % (ref 0–8)
EOSINOPHIL NFR BLD: 3.1 % (ref 0–8)
EOSINOPHIL NFR BLD: 3.2 % (ref 0–8)
EOSINOPHIL NFR BLD: 3.3 % (ref 0–8)
EOSINOPHIL NFR BLD: 3.3 % (ref 0–8)
EOSINOPHIL NFR BLD: 3.6 % (ref 0–8)
EOSINOPHIL NFR BLD: 3.7 % (ref 0–8)
EOSINOPHIL NFR BLD: 3.8 % (ref 0–8)
EOSINOPHIL NFR BLD: 3.9 % (ref 0–8)
EOSINOPHIL NFR BLD: 3.9 % (ref 0–8)
EOSINOPHIL NFR BLD: 4 % (ref 0–8)
EOSINOPHIL NFR BLD: 4.2 % (ref 0–8)
EOSINOPHIL NFR BLD: 4.3 % (ref 0–8)
EOSINOPHIL NFR BLD: 4.5 % (ref 0–8)
EOSINOPHIL NFR BLD: 4.6 % (ref 0–8)
EOSINOPHIL NFR BLD: 4.9 % (ref 0–8)
EOSINOPHIL NFR BLD: 5.4 % (ref 0–8)
EOSINOPHIL NFR BLD: 6 % (ref 0–8)
EOSINOPHIL NFR BLD: 6.8 % (ref 0–8)
EOSINOPHIL NFR BLD: 6.9 % (ref 0–8)
EOSINOPHIL NFR BLD: 6.9 % (ref 0–8)
EP: 5
ERYTHROCYTE [DISTWIDTH] IN BLOOD BY AUTOMATED COUNT: 16.4 % (ref 11.5–14.5)
ERYTHROCYTE [DISTWIDTH] IN BLOOD BY AUTOMATED COUNT: 16.6 % (ref 11.5–14.5)
ERYTHROCYTE [DISTWIDTH] IN BLOOD BY AUTOMATED COUNT: 16.7 % (ref 11.5–14.5)
ERYTHROCYTE [DISTWIDTH] IN BLOOD BY AUTOMATED COUNT: 16.8 % (ref 11.5–14.5)
ERYTHROCYTE [DISTWIDTH] IN BLOOD BY AUTOMATED COUNT: 16.9 % (ref 11.5–14.5)
ERYTHROCYTE [DISTWIDTH] IN BLOOD BY AUTOMATED COUNT: 17 % (ref 11.5–14.5)
ERYTHROCYTE [DISTWIDTH] IN BLOOD BY AUTOMATED COUNT: 17 % (ref 11.5–14.5)
ERYTHROCYTE [DISTWIDTH] IN BLOOD BY AUTOMATED COUNT: 17.1 % (ref 11.5–14.5)
ERYTHROCYTE [DISTWIDTH] IN BLOOD BY AUTOMATED COUNT: 17.2 % (ref 11.5–14.5)
ERYTHROCYTE [DISTWIDTH] IN BLOOD BY AUTOMATED COUNT: 17.3 % (ref 11.5–14.5)
ERYTHROCYTE [DISTWIDTH] IN BLOOD BY AUTOMATED COUNT: 17.4 % (ref 11.5–14.5)
ERYTHROCYTE [DISTWIDTH] IN BLOOD BY AUTOMATED COUNT: 17.9 % (ref 11.5–14.5)
ERYTHROCYTE [DISTWIDTH] IN BLOOD BY AUTOMATED COUNT: 18.2 % (ref 11.5–14.5)
ERYTHROCYTE [DISTWIDTH] IN BLOOD BY AUTOMATED COUNT: 18.2 % (ref 11.5–14.5)
ERYTHROCYTE [DISTWIDTH] IN BLOOD BY AUTOMATED COUNT: 18.4 % (ref 11.5–14.5)
ERYTHROCYTE [DISTWIDTH] IN BLOOD BY AUTOMATED COUNT: 18.5 % (ref 11.5–14.5)
ERYTHROCYTE [DISTWIDTH] IN BLOOD BY AUTOMATED COUNT: 18.6 % (ref 11.5–14.5)
ERYTHROCYTE [DISTWIDTH] IN BLOOD BY AUTOMATED COUNT: 18.7 % (ref 11.5–14.5)
ERYTHROCYTE [DISTWIDTH] IN BLOOD BY AUTOMATED COUNT: 19.1 % (ref 11.5–14.5)
ERYTHROCYTE [DISTWIDTH] IN BLOOD BY AUTOMATED COUNT: 22 % (ref 11.5–14.5)
ERYTHROCYTE [DISTWIDTH] IN BLOOD BY AUTOMATED COUNT: 22.9 % (ref 11.5–14.5)
ERYTHROCYTE [SEDIMENTATION RATE] IN BLOOD BY WESTERGREN METHOD: 12 MM/H
ERYTHROCYTE [SEDIMENTATION RATE] IN BLOOD BY WESTERGREN METHOD: 20 MM/H
ERYTHROCYTE [SEDIMENTATION RATE] IN BLOOD BY WESTERGREN METHOD: 40 MM/HR (ref 0–10)
ERYTHROCYTE [SEDIMENTATION RATE] IN BLOOD BY WESTERGREN METHOD: 8 MM/H
EST. GFR  (AFRICAN AMERICAN): 26 ML/MIN/1.73 M^2
EST. GFR  (AFRICAN AMERICAN): 26.9 ML/MIN/1.73 M^2
EST. GFR  (AFRICAN AMERICAN): 27 ML/MIN/1.73 M^2
EST. GFR  (AFRICAN AMERICAN): 28 ML/MIN/1.73 M^2
EST. GFR  (AFRICAN AMERICAN): 31 ML/MIN/1.73 M^2
EST. GFR  (AFRICAN AMERICAN): 32 ML/MIN/1.73 M^2
EST. GFR  (AFRICAN AMERICAN): 33.8 ML/MIN/1.73 M^2
EST. GFR  (AFRICAN AMERICAN): 38 ML/MIN/1.73 M^2
EST. GFR  (AFRICAN AMERICAN): 39.8 ML/MIN/1.73 M^2
EST. GFR  (AFRICAN AMERICAN): 40 ML/MIN/1.73 M^2
EST. GFR  (AFRICAN AMERICAN): 42.2 ML/MIN/1.73 M^2
EST. GFR  (AFRICAN AMERICAN): 42.5 ML/MIN/1.73 M^2
EST. GFR  (AFRICAN AMERICAN): 44.9 ML/MIN/1.73 M^2
EST. GFR  (AFRICAN AMERICAN): 44.9 ML/MIN/1.73 M^2
EST. GFR  (AFRICAN AMERICAN): 47.9 ML/MIN/1.73 M^2
EST. GFR  (AFRICAN AMERICAN): 47.9 ML/MIN/1.73 M^2
EST. GFR  (AFRICAN AMERICAN): 48.3 ML/MIN/1.73 M^2
EST. GFR  (AFRICAN AMERICAN): 48.3 ML/MIN/1.73 M^2
EST. GFR  (AFRICAN AMERICAN): 51.3 ML/MIN/1.73 M^2
EST. GFR  (AFRICAN AMERICAN): 51.7 ML/MIN/1.73 M^2
EST. GFR  (AFRICAN AMERICAN): 55.2 ML/MIN/1.73 M^2
EST. GFR  (AFRICAN AMERICAN): 55.6 ML/MIN/1.73 M^2
EST. GFR  (AFRICAN AMERICAN): 55.6 ML/MIN/1.73 M^2
EST. GFR  (AFRICAN AMERICAN): 59.7 ML/MIN/1.73 M^2
EST. GFR  (AFRICAN AMERICAN): >60 ML/MIN/1.73 M^2
EST. GFR  (NON AFRICAN AMERICAN): 22 ML/MIN/1.73 M^2
EST. GFR  (NON AFRICAN AMERICAN): 23 ML/MIN/1.73 M^2
EST. GFR  (NON AFRICAN AMERICAN): 23.3 ML/MIN/1.73 M^2
EST. GFR  (NON AFRICAN AMERICAN): 24 ML/MIN/1.73 M^2
EST. GFR  (NON AFRICAN AMERICAN): 27 ML/MIN/1.73 M^2
EST. GFR  (NON AFRICAN AMERICAN): 28 ML/MIN/1.73 M^2
EST. GFR  (NON AFRICAN AMERICAN): 29.3 ML/MIN/1.73 M^2
EST. GFR  (NON AFRICAN AMERICAN): 33 ML/MIN/1.73 M^2
EST. GFR  (NON AFRICAN AMERICAN): 34.4 ML/MIN/1.73 M^2
EST. GFR  (NON AFRICAN AMERICAN): 34.6 ML/MIN/1.73 M^2
EST. GFR  (NON AFRICAN AMERICAN): 36.5 ML/MIN/1.73 M^2
EST. GFR  (NON AFRICAN AMERICAN): 36.7 ML/MIN/1.73 M^2
EST. GFR  (NON AFRICAN AMERICAN): 38.8 ML/MIN/1.73 M^2
EST. GFR  (NON AFRICAN AMERICAN): 38.8 ML/MIN/1.73 M^2
EST. GFR  (NON AFRICAN AMERICAN): 41.4 ML/MIN/1.73 M^2
EST. GFR  (NON AFRICAN AMERICAN): 41.4 ML/MIN/1.73 M^2
EST. GFR  (NON AFRICAN AMERICAN): 41.7 ML/MIN/1.73 M^2
EST. GFR  (NON AFRICAN AMERICAN): 41.7 ML/MIN/1.73 M^2
EST. GFR  (NON AFRICAN AMERICAN): 44.4 ML/MIN/1.73 M^2
EST. GFR  (NON AFRICAN AMERICAN): 44.7 ML/MIN/1.73 M^2
EST. GFR  (NON AFRICAN AMERICAN): 47.8 ML/MIN/1.73 M^2
EST. GFR  (NON AFRICAN AMERICAN): 48.1 ML/MIN/1.73 M^2
EST. GFR  (NON AFRICAN AMERICAN): 48.1 ML/MIN/1.73 M^2
EST. GFR  (NON AFRICAN AMERICAN): 51.7 ML/MIN/1.73 M^2
EST. GFR  (NON AFRICAN AMERICAN): 52 ML/MIN/1.73 M^2
EST. GFR  (NON AFRICAN AMERICAN): 56.2 ML/MIN/1.73 M^2
EST. GFR  (NON AFRICAN AMERICAN): 56.6 ML/MIN/1.73 M^2
EST. GFR  (NON AFRICAN AMERICAN): >60 ML/MIN/1.73 M^2
ESTIMATED AVG GLUCOSE: 114 MG/DL (ref 68–131)
FERRITIN SERPL-MCNC: 428 NG/ML (ref 20–300)
FINAL PATHOLOGIC DIAGNOSIS: NORMAL
FIO2: 28
FIO2: 45
FIO2: 50
FLOW CYTOMETRY ANTIBODIES ANALYZED - BONE MARROW: NORMAL
FLOW CYTOMETRY COMMENT - BONE MARROW: NORMAL
FLOW CYTOMETRY INTERPRETATION - BONE MARROW: NORMAL
FLOW: 2
FLOW: 5
FOLATE SERPL-MCNC: 15.1 NG/ML (ref 4–24)
FRACTIONAL SHORTENING: 28 % (ref 28–44)
FRACTIONAL SHORTENING: 31 % (ref 28–44)
FRACTIONAL SHORTENING: 32 % (ref 28–44)
FRACTIONAL SHORTENING: 33 % (ref 28–44)
GAMMA GLOB SERPL ELPH-MCNC: 0.52 G/DL (ref 0.67–1.58)
GAMMA GLOB SERPL ELPH-MCNC: 0.58 G/DL (ref 0.67–1.58)
GAMMA GLOB SERPL ELPH-MCNC: 0.6 G/DL (ref 0.67–1.58)
GAMMA GLOB SERPL ELPH-MCNC: 0.61 G/DL (ref 0.67–1.58)
GAMMA GLOB SERPL ELPH-MCNC: 0.65 G/DL (ref 0.67–1.58)
GAMMA GLOB SERPL ELPH-MCNC: 0.7 G/DL (ref 0.67–1.58)
GAMMA GLOB SERPL ELPH-MCNC: 0.78 G/DL (ref 0.67–1.58)
GENETICIST REVIEW: NORMAL
GLUCOSE SERPL-MCNC: 100 MG/DL (ref 70–110)
GLUCOSE SERPL-MCNC: 100 MG/DL (ref 70–110)
GLUCOSE SERPL-MCNC: 101 MG/DL (ref 70–110)
GLUCOSE SERPL-MCNC: 102 MG/DL (ref 70–110)
GLUCOSE SERPL-MCNC: 103 MG/DL (ref 70–110)
GLUCOSE SERPL-MCNC: 104 MG/DL (ref 70–110)
GLUCOSE SERPL-MCNC: 106 MG/DL (ref 70–110)
GLUCOSE SERPL-MCNC: 109 MG/DL (ref 70–110)
GLUCOSE SERPL-MCNC: 110 MG/DL (ref 70–110)
GLUCOSE SERPL-MCNC: 111 MG/DL (ref 70–110)
GLUCOSE SERPL-MCNC: 111 MG/DL (ref 70–110)
GLUCOSE SERPL-MCNC: 112 MG/DL (ref 70–110)
GLUCOSE SERPL-MCNC: 112 MG/DL (ref 70–110)
GLUCOSE SERPL-MCNC: 113 MG/DL (ref 70–110)
GLUCOSE SERPL-MCNC: 114 MG/DL (ref 70–110)
GLUCOSE SERPL-MCNC: 114 MG/DL (ref 70–110)
GLUCOSE SERPL-MCNC: 115 MG/DL (ref 70–110)
GLUCOSE SERPL-MCNC: 118 MG/DL (ref 70–110)
GLUCOSE SERPL-MCNC: 118 MG/DL (ref 70–110)
GLUCOSE SERPL-MCNC: 120 MG/DL (ref 70–110)
GLUCOSE SERPL-MCNC: 121 MG/DL (ref 70–110)
GLUCOSE SERPL-MCNC: 122 MG/DL (ref 70–110)
GLUCOSE SERPL-MCNC: 123 MG/DL (ref 70–110)
GLUCOSE SERPL-MCNC: 128 MG/DL (ref 70–110)
GLUCOSE SERPL-MCNC: 132 MG/DL (ref 70–110)
GLUCOSE SERPL-MCNC: 132 MG/DL (ref 70–110)
GLUCOSE SERPL-MCNC: 133 MG/DL (ref 70–110)
GLUCOSE SERPL-MCNC: 134 MG/DL (ref 70–110)
GLUCOSE SERPL-MCNC: 135 MG/DL (ref 70–110)
GLUCOSE SERPL-MCNC: 137 MG/DL (ref 70–110)
GLUCOSE SERPL-MCNC: 138 MG/DL (ref 70–110)
GLUCOSE SERPL-MCNC: 139 MG/DL (ref 70–110)
GLUCOSE SERPL-MCNC: 141 MG/DL (ref 70–110)
GLUCOSE SERPL-MCNC: 144 MG/DL (ref 70–110)
GLUCOSE SERPL-MCNC: 145 MG/DL (ref 70–110)
GLUCOSE SERPL-MCNC: 150 MG/DL (ref 70–110)
GLUCOSE SERPL-MCNC: 160 MG/DL (ref 70–110)
GLUCOSE SERPL-MCNC: 171 MG/DL (ref 70–110)
GLUCOSE SERPL-MCNC: 43 MG/DL (ref 70–110)
GLUCOSE SERPL-MCNC: 63 MG/DL (ref 70–110)
GLUCOSE SERPL-MCNC: 72 MG/DL (ref 70–110)
GLUCOSE SERPL-MCNC: 78 MG/DL (ref 70–110)
GLUCOSE SERPL-MCNC: 86 MG/DL (ref 70–110)
GLUCOSE SERPL-MCNC: 90 MG/DL (ref 70–110)
GLUCOSE SERPL-MCNC: 93 MG/DL (ref 70–110)
GLUCOSE SERPL-MCNC: 94 MG/DL (ref 70–110)
GLUCOSE SERPL-MCNC: 97 MG/DL (ref 70–110)
GLUCOSE SERPL-MCNC: 99 MG/DL (ref 70–110)
GLUCOSE UR QL STRIP: NEGATIVE
GLUCOSE UR QL STRIP: NEGATIVE
GRAM STN SPEC: NORMAL
GROSS: NORMAL
HBA1C MFR BLD: 5.6 % (ref 4–5.6)
HCO3 UR-SCNC: 12.3 MMOL/L (ref 24–28)
HCO3 UR-SCNC: 13.8 MMOL/L (ref 24–28)
HCO3 UR-SCNC: 14.3 MMOL/L (ref 24–28)
HCO3 UR-SCNC: 14.9 MMOL/L (ref 24–28)
HCO3 UR-SCNC: 15.1 MMOL/L (ref 24–28)
HCO3 UR-SCNC: 17 MMOL/L (ref 24–28)
HCO3 UR-SCNC: 22.9 MMOL/L (ref 24–28)
HCO3 UR-SCNC: 23.2 MMOL/L (ref 24–28)
HCO3 UR-SCNC: 23.3 MMOL/L (ref 24–28)
HCO3 UR-SCNC: 24.3 MMOL/L (ref 24–28)
HCT VFR BLD AUTO: 21.8 % (ref 40–54)
HCT VFR BLD AUTO: 22.1 % (ref 40–54)
HCT VFR BLD AUTO: 24.6 % (ref 40–54)
HCT VFR BLD AUTO: 25.3 % (ref 40–54)
HCT VFR BLD AUTO: 25.6 % (ref 40–54)
HCT VFR BLD AUTO: 26.6 % (ref 40–54)
HCT VFR BLD AUTO: 26.7 % (ref 40–54)
HCT VFR BLD AUTO: 27 % (ref 40–54)
HCT VFR BLD AUTO: 27.1 % (ref 40–54)
HCT VFR BLD AUTO: 27.8 % (ref 40–54)
HCT VFR BLD AUTO: 27.9 % (ref 40–54)
HCT VFR BLD AUTO: 28 % (ref 40–54)
HCT VFR BLD AUTO: 28.5 % (ref 40–54)
HCT VFR BLD AUTO: 28.6 % (ref 40–54)
HCT VFR BLD AUTO: 28.9 % (ref 40–54)
HCT VFR BLD AUTO: 29.5 % (ref 40–54)
HCT VFR BLD AUTO: 30.3 % (ref 40–54)
HCT VFR BLD AUTO: 30.6 % (ref 40–54)
HCT VFR BLD AUTO: 30.6 % (ref 40–54)
HCT VFR BLD AUTO: 30.7 % (ref 40–54)
HCT VFR BLD AUTO: 30.8 % (ref 40–54)
HCT VFR BLD AUTO: 31 % (ref 40–54)
HCT VFR BLD AUTO: 31.2 % (ref 40–54)
HCT VFR BLD AUTO: 31.4 % (ref 40–54)
HCT VFR BLD AUTO: 31.5 % (ref 40–54)
HCT VFR BLD AUTO: 31.5 % (ref 40–54)
HCT VFR BLD AUTO: 31.6 % (ref 40–54)
HCT VFR BLD AUTO: 31.8 % (ref 40–54)
HCT VFR BLD AUTO: 32.9 % (ref 40–54)
HCT VFR BLD AUTO: 33 % (ref 40–54)
HCT VFR BLD AUTO: 33.5 % (ref 40–54)
HCT VFR BLD CALC: 30 %PCV (ref 36–54)
HDLC SERPL-MCNC: 52 MG/DL (ref 40–75)
HDLC SERPL: 28.7 % (ref 20–50)
HGB BLD-MCNC: 10.1 G/DL (ref 14–18)
HGB BLD-MCNC: 10.2 G/DL (ref 14–18)
HGB BLD-MCNC: 10.3 G/DL (ref 14–18)
HGB BLD-MCNC: 10.4 G/DL (ref 14–18)
HGB BLD-MCNC: 10.5 G/DL (ref 14–18)
HGB BLD-MCNC: 10.5 G/DL (ref 14–18)
HGB BLD-MCNC: 6.9 G/DL (ref 14–18)
HGB BLD-MCNC: 7 G/DL (ref 14–18)
HGB BLD-MCNC: 7.1 G/DL (ref 14–18)
HGB BLD-MCNC: 7.8 G/DL (ref 14–18)
HGB BLD-MCNC: 7.8 G/DL (ref 14–18)
HGB BLD-MCNC: 8 G/DL (ref 14–18)
HGB BLD-MCNC: 8 G/DL (ref 14–18)
HGB BLD-MCNC: 8.2 G/DL (ref 14–18)
HGB BLD-MCNC: 8.2 G/DL (ref 14–18)
HGB BLD-MCNC: 8.3 G/DL (ref 14–18)
HGB BLD-MCNC: 8.4 G/DL (ref 14–18)
HGB BLD-MCNC: 8.5 G/DL (ref 14–18)
HGB BLD-MCNC: 8.7 G/DL (ref 14–18)
HGB BLD-MCNC: 8.8 G/DL (ref 14–18)
HGB BLD-MCNC: 8.8 G/DL (ref 14–18)
HGB BLD-MCNC: 9.3 G/DL (ref 14–18)
HGB BLD-MCNC: 9.5 G/DL (ref 14–18)
HGB BLD-MCNC: 9.6 G/DL (ref 14–18)
HGB BLD-MCNC: 9.7 G/DL (ref 14–18)
HGB BLD-MCNC: 9.8 G/DL (ref 14–18)
HGB BLD-MCNC: 9.9 G/DL (ref 14–18)
HGB UR QL STRIP: ABNORMAL
HGB UR QL STRIP: NEGATIVE
HSV1 DNA SPEC QL NAA+PROBE: NEGATIVE
HSV2 DNA SPEC QL NAA+PROBE: NEGATIVE
HYALINE CASTS #/AREA URNS LPF: 6 /LPF
IGA SERPL-MCNC: 35 MG/DL (ref 40–350)
IGA SERPL-MCNC: 39 MG/DL (ref 40–350)
IGA SERPL-MCNC: 43 MG/DL (ref 40–350)
IGA SERPL-MCNC: 47 MG/DL (ref 40–350)
IGA SERPL-MCNC: 54 MG/DL (ref 40–350)
IGA SERPL-MCNC: 55 MG/DL (ref 40–350)
IGA SERPL-MCNC: 60 MG/DL (ref 40–350)
IGG SERPL-MCNC: 540 MG/DL (ref 650–1600)
IGG SERPL-MCNC: 623 MG/DL (ref 650–1600)
IGG SERPL-MCNC: 631 MG/DL (ref 650–1600)
IGG SERPL-MCNC: 634 MG/DL (ref 650–1600)
IGG SERPL-MCNC: 695 MG/DL (ref 650–1600)
IGG SERPL-MCNC: 718 MG/DL (ref 650–1600)
IGG SERPL-MCNC: 801 MG/DL (ref 650–1600)
IGM SERPL-MCNC: 106 MG/DL (ref 50–300)
IGM SERPL-MCNC: 124 MG/DL (ref 50–300)
IGM SERPL-MCNC: 40 MG/DL (ref 50–300)
IGM SERPL-MCNC: 58 MG/DL (ref 50–300)
IGM SERPL-MCNC: 63 MG/DL (ref 50–300)
IGM SERPL-MCNC: 69 MG/DL (ref 50–300)
IGM SERPL-MCNC: 70 MG/DL (ref 50–300)
IMM GRANULOCYTES # BLD AUTO: 0.02 K/UL (ref 0–0.04)
IMM GRANULOCYTES # BLD AUTO: 0.03 K/UL (ref 0–0.04)
IMM GRANULOCYTES # BLD AUTO: 0.04 K/UL (ref 0–0.04)
IMM GRANULOCYTES # BLD AUTO: 0.05 K/UL (ref 0–0.04)
IMM GRANULOCYTES # BLD AUTO: 0.06 K/UL (ref 0–0.04)
IMM GRANULOCYTES # BLD AUTO: 0.09 K/UL (ref 0–0.04)
IMM GRANULOCYTES # BLD AUTO: 0.09 K/UL (ref 0–0.04)
IMM GRANULOCYTES # BLD AUTO: 0.22 K/UL (ref 0–0.04)
IMM GRANULOCYTES # BLD AUTO: ABNORMAL K/UL (ref 0–0.04)
IMM GRANULOCYTES NFR BLD AUTO: 0.3 % (ref 0–0.5)
IMM GRANULOCYTES NFR BLD AUTO: 0.4 % (ref 0–0.5)
IMM GRANULOCYTES NFR BLD AUTO: 0.5 % (ref 0–0.5)
IMM GRANULOCYTES NFR BLD AUTO: 0.5 % (ref 0–0.5)
IMM GRANULOCYTES NFR BLD AUTO: 0.6 % (ref 0–0.5)
IMM GRANULOCYTES NFR BLD AUTO: 0.7 % (ref 0–0.5)
IMM GRANULOCYTES NFR BLD AUTO: 0.8 % (ref 0–0.5)
IMM GRANULOCYTES NFR BLD AUTO: 0.9 % (ref 0–0.5)
IMM GRANULOCYTES NFR BLD AUTO: 1.1 % (ref 0–0.5)
IMM GRANULOCYTES NFR BLD AUTO: 1.2 % (ref 0–0.5)
IMM GRANULOCYTES NFR BLD AUTO: ABNORMAL % (ref 0–0.5)
INR PPP: 1.1 (ref 0.8–1.2)
INR PPP: 1.8 (ref 0.8–1.2)
INTERPRETATION SERPL IFE-IMP: NORMAL
INTERVENTRICULAR SEPTUM: 1.03 CM (ref 0.6–1.1)
INTERVENTRICULAR SEPTUM: 1.3 CM (ref 0.6–1.1)
INTERVENTRICULAR SEPTUM: 1.49 CM (ref 0.6–1.1)
INTERVENTRICULAR SEPTUM: 1.52 CM (ref 0.6–1.1)
IP: 10
IP: 12
IRON SERPL-MCNC: <10 UG/DL (ref 45–160)
IVRT: 44.98 MSEC
IVRT: 88.49 MSEC
KAPPA LC SER QL IA: 3.62 MG/DL (ref 0.33–1.94)
KAPPA LC SER QL IA: 4.07 MG/DL (ref 0.33–1.94)
KAPPA LC SER QL IA: 4.89 MG/DL (ref 0.33–1.94)
KAPPA LC SER QL IA: 4.9 MG/DL (ref 0.33–1.94)
KAPPA LC SER QL IA: 5.16 MG/DL (ref 0.33–1.94)
KAPPA LC SER QL IA: 5.35 MG/DL (ref 0.33–1.94)
KAPPA LC SER QL IA: 5.35 MG/DL (ref 0.33–1.94)
KAPPA LC/LAMBDA SER IA: 0.33 (ref 0.26–1.65)
KAPPA LC/LAMBDA SER IA: 0.36 (ref 0.26–1.65)
KAPPA LC/LAMBDA SER IA: 0.4 (ref 0.26–1.65)
KAPPA LC/LAMBDA SER IA: 0.47 (ref 0.26–1.65)
KAPPA LC/LAMBDA SER IA: 0.52 (ref 0.26–1.65)
KARYOTYP MAR: NORMAL
KETONES UR QL STRIP: ABNORMAL
KETONES UR QL STRIP: NEGATIVE
LA MAJOR: 4.94 CM
LA MAJOR: 6.55 CM
LA MAJOR: 7.04 CM
LA MAJOR: 7.13 CM
LA MINOR: 5.47 CM
LA MINOR: 5.97 CM
LA MINOR: 6.57 CM
LA MINOR: 7.97 CM
LA WIDTH: 3.95 CM
LA WIDTH: 4.25 CM
LA WIDTH: 4.34 CM
LA WIDTH: 4.76 CM
LACTATE SERPL-SCNC: 10.6 MMOL/L (ref 0.5–2.2)
LACTATE SERPL-SCNC: 2.8 MMOL/L (ref 0.5–2.2)
LACTATE SERPL-SCNC: 3.9 MMOL/L (ref 0.5–2.2)
LACTATE SERPL-SCNC: 6 MMOL/L (ref 0.5–2.2)
LACTATE SERPL-SCNC: 6.6 MMOL/L (ref 0.5–2.2)
LACTATE SERPL-SCNC: 7.3 MMOL/L (ref 0.5–2.2)
LACTATE SERPL-SCNC: 8.4 MMOL/L (ref 0.5–2.2)
LAMBDA LC SER QL IA: 10.19 MG/DL (ref 0.57–2.63)
LAMBDA LC SER QL IA: 10.39 MG/DL (ref 0.57–2.63)
LAMBDA LC SER QL IA: 14.49 MG/DL (ref 0.57–2.63)
LAMBDA LC SER QL IA: 14.97 MG/DL (ref 0.57–2.63)
LAMBDA LC SER QL IA: 15.03 MG/DL (ref 0.57–2.63)
LAMBDA LC SER QL IA: 15.04 MG/DL (ref 0.57–2.63)
LAMBDA LC SER QL IA: 6.98 MG/DL (ref 0.57–2.63)
LDLC SERPL CALC-MCNC: 106.6 MG/DL (ref 63–159)
LEFT ATRIUM SIZE: 3.85 CM
LEFT ATRIUM SIZE: 4.4 CM
LEFT ATRIUM SIZE: 4.53 CM
LEFT ATRIUM SIZE: 4.65 CM
LEFT ATRIUM VOLUME INDEX MOD: 32.8 ML/M2
LEFT ATRIUM VOLUME INDEX MOD: 41.3 ML/M2
LEFT ATRIUM VOLUME INDEX: 43.4 ML/M2
LEFT ATRIUM VOLUME INDEX: 46.4 ML/M2
LEFT ATRIUM VOLUME INDEX: 49.3 ML/M2
LEFT ATRIUM VOLUME INDEX: 51.9 ML/M2
LEFT ATRIUM VOLUME MOD: 68.91 CM3
LEFT ATRIUM VOLUME MOD: 87.97 CM3
LEFT ATRIUM VOLUME: 101.72 CM3
LEFT ATRIUM VOLUME: 103.45 CM3
LEFT ATRIUM VOLUME: 110.45 CM3
LEFT ATRIUM VOLUME: 91.24 CM3
LEFT CBA DIAS: 6 CM/S
LEFT CBA SYS: 78 CM/S
LEFT CCA DIST DIAS: 7 CM/S
LEFT CCA DIST SYS: 77 CM/S
LEFT CCA MID DIAS: 6 CM/S
LEFT CCA MID SYS: 71 CM/S
LEFT CCA PROX DIAS: 11 CM/S
LEFT CCA PROX SYS: 60 CM/S
LEFT ECA DIAS: 0 CM/S
LEFT ECA SYS: 121 CM/S
LEFT ICA DIST DIAS: 0 CM/S
LEFT ICA DIST SYS: 0 CM/S
LEFT ICA MID DIAS: 0 CM/S
LEFT ICA MID SYS: 0 CM/S
LEFT ICA PROX DIAS: 0 CM/S
LEFT ICA PROX SYS: 0 CM/S
LEFT INTERNAL DIMENSION IN SYSTOLE: 2.97 CM (ref 2.1–4)
LEFT INTERNAL DIMENSION IN SYSTOLE: 3 CM (ref 2.1–4)
LEFT INTERNAL DIMENSION IN SYSTOLE: 3.18 CM (ref 2.1–4)
LEFT INTERNAL DIMENSION IN SYSTOLE: 3.27 CM (ref 2.1–4)
LEFT VENTRICLE DIASTOLIC VOLUME INDEX: 40.07 ML/M2
LEFT VENTRICLE DIASTOLIC VOLUME INDEX: 41.15 ML/M2
LEFT VENTRICLE DIASTOLIC VOLUME INDEX: 45.36 ML/M2
LEFT VENTRICLE DIASTOLIC VOLUME INDEX: 45.52 ML/M2
LEFT VENTRICLE DIASTOLIC VOLUME: 84.15 ML
LEFT VENTRICLE DIASTOLIC VOLUME: 90.12 ML
LEFT VENTRICLE DIASTOLIC VOLUME: 95.59 ML
LEFT VENTRICLE DIASTOLIC VOLUME: 96.62 ML
LEFT VENTRICLE MASS INDEX: 112 G/M2
LEFT VENTRICLE MASS INDEX: 116 G/M2
LEFT VENTRICLE MASS INDEX: 142 G/M2
LEFT VENTRICLE MASS INDEX: 67 G/M2
LEFT VENTRICLE SYSTOLIC VOLUME INDEX: 15.6 ML/M2
LEFT VENTRICLE SYSTOLIC VOLUME INDEX: 16.7 ML/M2
LEFT VENTRICLE SYSTOLIC VOLUME INDEX: 19 ML/M2
LEFT VENTRICLE SYSTOLIC VOLUME INDEX: 20.5 ML/M2
LEFT VENTRICLE SYSTOLIC VOLUME: 34.26 ML
LEFT VENTRICLE SYSTOLIC VOLUME: 35.06 ML
LEFT VENTRICLE SYSTOLIC VOLUME: 40.42 ML
LEFT VENTRICLE SYSTOLIC VOLUME: 43.05 ML
LEFT VENTRICULAR INTERNAL DIMENSION IN DIASTOLE: 4.32 CM (ref 3.5–6)
LEFT VENTRICULAR INTERNAL DIMENSION IN DIASTOLE: 4.45 CM (ref 3.5–6)
LEFT VENTRICULAR INTERNAL DIMENSION IN DIASTOLE: 4.56 CM (ref 3.5–6)
LEFT VENTRICULAR INTERNAL DIMENSION IN DIASTOLE: 4.7 CM (ref 3.5–6)
LEFT VENTRICULAR MASS: 139.81 G
LEFT VENTRICULAR MASS: 237.88 G
LEFT VENTRICULAR MASS: 242.77 G
LEFT VENTRICULAR MASS: 311.58 G
LEFT VERTEBRAL DIAS: 33 CM/S
LEFT VERTEBRAL SYS: 61 CM/S
LEUKOCYTE ESTERASE UR QL STRIP: NEGATIVE
LEUKOCYTE ESTERASE UR QL STRIP: NEGATIVE
LV LATERAL E/E' RATIO: 10.14 M/S
LV LATERAL E/E' RATIO: 17.6 M/S
LV SEPTAL E/E' RATIO: 11.83 M/S
LV SEPTAL E/E' RATIO: 22 M/S
LYMPHOCYTES # BLD AUTO: 0.6 K/UL (ref 1–4.8)
LYMPHOCYTES # BLD AUTO: 0.7 K/UL (ref 1–4.8)
LYMPHOCYTES # BLD AUTO: 0.8 K/UL (ref 1–4.8)
LYMPHOCYTES # BLD AUTO: 0.9 K/UL (ref 1–4.8)
LYMPHOCYTES # BLD AUTO: 1 K/UL (ref 1–4.8)
LYMPHOCYTES # BLD AUTO: 1.1 K/UL (ref 1–4.8)
LYMPHOCYTES # BLD AUTO: 1.2 K/UL (ref 1–4.8)
LYMPHOCYTES # BLD AUTO: 1.3 K/UL (ref 1–4.8)
LYMPHOCYTES # BLD AUTO: ABNORMAL K/UL (ref 1–4.8)
LYMPHOCYTES NFR BLD: 1 % (ref 18–48)
LYMPHOCYTES NFR BLD: 1 % (ref 18–48)
LYMPHOCYTES NFR BLD: 1.8 % (ref 18–48)
LYMPHOCYTES NFR BLD: 10.3 % (ref 18–48)
LYMPHOCYTES NFR BLD: 11.2 % (ref 18–48)
LYMPHOCYTES NFR BLD: 11.3 % (ref 18–48)
LYMPHOCYTES NFR BLD: 11.3 % (ref 18–48)
LYMPHOCYTES NFR BLD: 11.6 % (ref 18–48)
LYMPHOCYTES NFR BLD: 11.6 % (ref 18–48)
LYMPHOCYTES NFR BLD: 11.7 % (ref 18–48)
LYMPHOCYTES NFR BLD: 12 % (ref 18–48)
LYMPHOCYTES NFR BLD: 12.7 % (ref 18–48)
LYMPHOCYTES NFR BLD: 12.8 % (ref 18–48)
LYMPHOCYTES NFR BLD: 12.9 % (ref 18–48)
LYMPHOCYTES NFR BLD: 13 % (ref 18–48)
LYMPHOCYTES NFR BLD: 13.5 % (ref 18–48)
LYMPHOCYTES NFR BLD: 13.5 % (ref 18–48)
LYMPHOCYTES NFR BLD: 14.1 % (ref 18–48)
LYMPHOCYTES NFR BLD: 14.2 % (ref 18–48)
LYMPHOCYTES NFR BLD: 14.4 % (ref 18–48)
LYMPHOCYTES NFR BLD: 14.4 % (ref 18–48)
LYMPHOCYTES NFR BLD: 14.7 % (ref 18–48)
LYMPHOCYTES NFR BLD: 15 % (ref 18–48)
LYMPHOCYTES NFR BLD: 15.6 % (ref 18–48)
LYMPHOCYTES NFR BLD: 16.1 % (ref 18–48)
LYMPHOCYTES NFR BLD: 16.3 % (ref 18–48)
LYMPHOCYTES NFR BLD: 17.9 % (ref 18–48)
LYMPHOCYTES NFR BLD: 18.6 % (ref 18–48)
LYMPHOCYTES NFR BLD: 19.7 % (ref 18–48)
LYMPHOCYTES NFR BLD: 2 % (ref 18–48)
LYMPHOCYTES NFR BLD: 4 % (ref 18–48)
LYMPHOCYTES NFR BLD: 7 % (ref 18–48)
LYMPHOCYTES NFR BLD: 8.7 % (ref 18–48)
LYMPHOCYTES NFR BLD: 9.1 % (ref 18–48)
LYMPHOCYTES NFR BLD: 9.7 % (ref 18–48)
Lab: NORMAL
MAGNESIUM SERPL-MCNC: 1.9 MG/DL (ref 1.6–2.6)
MAGNESIUM SERPL-MCNC: 2 MG/DL (ref 1.6–2.6)
MAGNESIUM SERPL-MCNC: 2.1 MG/DL (ref 1.6–2.6)
MAGNESIUM SERPL-MCNC: 2.2 MG/DL (ref 1.6–2.6)
MCH RBC QN AUTO: 23.7 PG (ref 27–31)
MCH RBC QN AUTO: 24 PG (ref 27–31)
MCH RBC QN AUTO: 24.1 PG (ref 27–31)
MCH RBC QN AUTO: 24.2 PG (ref 27–31)
MCH RBC QN AUTO: 24.3 PG (ref 27–31)
MCH RBC QN AUTO: 24.4 PG (ref 27–31)
MCH RBC QN AUTO: 24.5 PG (ref 27–31)
MCH RBC QN AUTO: 24.7 PG (ref 27–31)
MCH RBC QN AUTO: 24.9 PG (ref 27–31)
MCH RBC QN AUTO: 24.9 PG (ref 27–31)
MCH RBC QN AUTO: 25.4 PG (ref 27–31)
MCH RBC QN AUTO: 25.4 PG (ref 27–31)
MCH RBC QN AUTO: 25.8 PG (ref 27–31)
MCH RBC QN AUTO: 25.8 PG (ref 27–31)
MCH RBC QN AUTO: 25.9 PG (ref 27–31)
MCH RBC QN AUTO: 26 PG (ref 27–31)
MCH RBC QN AUTO: 26.2 PG (ref 27–31)
MCH RBC QN AUTO: 26.3 PG (ref 27–31)
MCH RBC QN AUTO: 26.4 PG (ref 27–31)
MCH RBC QN AUTO: 26.5 PG (ref 27–31)
MCH RBC QN AUTO: 26.6 PG (ref 27–31)
MCH RBC QN AUTO: 26.8 PG (ref 27–31)
MCH RBC QN AUTO: 26.9 PG (ref 27–31)
MCH RBC QN AUTO: 27 PG (ref 27–31)
MCH RBC QN AUTO: 27.2 PG (ref 27–31)
MCHC RBC AUTO-ENTMCNC: 29.4 G/DL (ref 32–36)
MCHC RBC AUTO-ENTMCNC: 29.7 G/DL (ref 32–36)
MCHC RBC AUTO-ENTMCNC: 29.8 G/DL (ref 32–36)
MCHC RBC AUTO-ENTMCNC: 29.8 G/DL (ref 32–36)
MCHC RBC AUTO-ENTMCNC: 30.1 G/DL (ref 32–36)
MCHC RBC AUTO-ENTMCNC: 30.1 G/DL (ref 32–36)
MCHC RBC AUTO-ENTMCNC: 30.3 G/DL (ref 32–36)
MCHC RBC AUTO-ENTMCNC: 30.3 G/DL (ref 32–36)
MCHC RBC AUTO-ENTMCNC: 30.4 G/DL (ref 32–36)
MCHC RBC AUTO-ENTMCNC: 30.5 G/DL (ref 32–36)
MCHC RBC AUTO-ENTMCNC: 30.5 G/DL (ref 32–36)
MCHC RBC AUTO-ENTMCNC: 30.6 G/DL (ref 32–36)
MCHC RBC AUTO-ENTMCNC: 30.7 G/DL (ref 32–36)
MCHC RBC AUTO-ENTMCNC: 30.8 G/DL (ref 32–36)
MCHC RBC AUTO-ENTMCNC: 30.9 G/DL (ref 32–36)
MCHC RBC AUTO-ENTMCNC: 31 G/DL (ref 32–36)
MCHC RBC AUTO-ENTMCNC: 31.1 G/DL (ref 32–36)
MCHC RBC AUTO-ENTMCNC: 31.1 G/DL (ref 32–36)
MCHC RBC AUTO-ENTMCNC: 31.3 G/DL (ref 32–36)
MCHC RBC AUTO-ENTMCNC: 31.4 G/DL (ref 32–36)
MCHC RBC AUTO-ENTMCNC: 31.4 G/DL (ref 32–36)
MCHC RBC AUTO-ENTMCNC: 31.5 G/DL (ref 32–36)
MCHC RBC AUTO-ENTMCNC: 31.6 G/DL (ref 32–36)
MCHC RBC AUTO-ENTMCNC: 31.7 G/DL (ref 32–36)
MCHC RBC AUTO-ENTMCNC: 31.7 G/DL (ref 32–36)
MCHC RBC AUTO-ENTMCNC: 31.8 G/DL (ref 32–36)
MCHC RBC AUTO-ENTMCNC: 32.1 G/DL (ref 32–36)
MCHC RBC AUTO-ENTMCNC: 32.1 G/DL (ref 32–36)
MCHC RBC AUTO-ENTMCNC: 32.2 G/DL (ref 32–36)
MCHC RBC AUTO-ENTMCNC: 32.4 G/DL (ref 32–36)
MCHC RBC AUTO-ENTMCNC: 32.5 G/DL (ref 32–36)
MCV RBC AUTO: 79 FL (ref 82–98)
MCV RBC AUTO: 79 FL (ref 82–98)
MCV RBC AUTO: 80 FL (ref 82–98)
MCV RBC AUTO: 81 FL (ref 82–98)
MCV RBC AUTO: 82 FL (ref 82–98)
MCV RBC AUTO: 83 FL (ref 82–98)
MCV RBC AUTO: 84 FL (ref 82–98)
MCV RBC AUTO: 85 FL (ref 82–98)
MCV RBC AUTO: 85 FL (ref 82–98)
MCV RBC AUTO: 86 FL (ref 82–98)
MICROSCOPIC COMMENT: ABNORMAL
MICROSCOPIC EXAM: NORMAL
MIN VOL: 14.6
MIN VOL: 22
MIN VOL: 9
MODE: ABNORMAL
MONOCYTES # BLD AUTO: 0.1 K/UL (ref 0.3–1)
MONOCYTES # BLD AUTO: 0.2 K/UL (ref 0.3–1)
MONOCYTES # BLD AUTO: 0.4 K/UL (ref 0.3–1)
MONOCYTES # BLD AUTO: 0.5 K/UL (ref 0.3–1)
MONOCYTES # BLD AUTO: 0.6 K/UL (ref 0.3–1)
MONOCYTES # BLD AUTO: 0.7 K/UL (ref 0.3–1)
MONOCYTES # BLD AUTO: 0.8 K/UL (ref 0.3–1)
MONOCYTES # BLD AUTO: 0.9 K/UL (ref 0.3–1)
MONOCYTES # BLD AUTO: 0.9 K/UL (ref 0.3–1)
MONOCYTES # BLD AUTO: 1 K/UL (ref 0.3–1)
MONOCYTES # BLD AUTO: 2.8 K/UL (ref 0.3–1)
MONOCYTES # BLD AUTO: ABNORMAL K/UL (ref 0.3–1)
MONOCYTES NFR BLD: 1.8 % (ref 4–15)
MONOCYTES NFR BLD: 1.9 % (ref 4–15)
MONOCYTES NFR BLD: 10 % (ref 4–15)
MONOCYTES NFR BLD: 10.2 % (ref 4–15)
MONOCYTES NFR BLD: 10.7 % (ref 4–15)
MONOCYTES NFR BLD: 10.8 % (ref 4–15)
MONOCYTES NFR BLD: 11.1 % (ref 4–15)
MONOCYTES NFR BLD: 11.1 % (ref 4–15)
MONOCYTES NFR BLD: 11.3 % (ref 4–15)
MONOCYTES NFR BLD: 11.5 % (ref 4–15)
MONOCYTES NFR BLD: 11.7 % (ref 4–15)
MONOCYTES NFR BLD: 11.8 % (ref 4–15)
MONOCYTES NFR BLD: 11.9 % (ref 4–15)
MONOCYTES NFR BLD: 12 % (ref 4–15)
MONOCYTES NFR BLD: 12.3 % (ref 4–15)
MONOCYTES NFR BLD: 12.6 % (ref 4–15)
MONOCYTES NFR BLD: 14.5 % (ref 4–15)
MONOCYTES NFR BLD: 2 % (ref 4–15)
MONOCYTES NFR BLD: 2 % (ref 4–15)
MONOCYTES NFR BLD: 4 % (ref 4–15)
MONOCYTES NFR BLD: 5 % (ref 4–15)
MONOCYTES NFR BLD: 5.7 % (ref 4–15)
MONOCYTES NFR BLD: 7.6 % (ref 4–15)
MONOCYTES NFR BLD: 8 % (ref 4–15)
MONOCYTES NFR BLD: 8.5 % (ref 4–15)
MONOCYTES NFR BLD: 8.5 % (ref 4–15)
MONOCYTES NFR BLD: 8.9 % (ref 4–15)
MONOCYTES NFR BLD: 9.5 % (ref 4–15)
MONOCYTES NFR BLD: 9.6 % (ref 4–15)
MONOCYTES NFR BLD: 9.9 % (ref 4–15)
MONOCYTES NFR BLD: 9.9 % (ref 4–15)
MV PEAK A VEL: 0.28 M/S
MV PEAK A VEL: 0.51 M/S
MV PEAK E VEL: 0.71 M/S
MV PEAK E VEL: 0.88 M/S
MV STENOSIS PRESSURE HALF TIME: 51.67 MS
MV STENOSIS PRESSURE HALF TIME: 56.43 MS
MV VALVE AREA P 1/2 METHOD: 3.9 CM2
MV VALVE AREA P 1/2 METHOD: 4.26 CM2
NEUTROPHILS # BLD AUTO: 10 K/UL (ref 1.8–7.7)
NEUTROPHILS # BLD AUTO: 3.1 K/UL (ref 1.8–7.7)
NEUTROPHILS # BLD AUTO: 3.2 K/UL (ref 1.8–7.7)
NEUTROPHILS # BLD AUTO: 3.5 K/UL (ref 1.8–7.7)
NEUTROPHILS # BLD AUTO: 3.7 K/UL (ref 1.8–7.7)
NEUTROPHILS # BLD AUTO: 3.8 K/UL (ref 1.8–7.7)
NEUTROPHILS # BLD AUTO: 3.9 K/UL (ref 1.8–7.7)
NEUTROPHILS # BLD AUTO: 4.1 K/UL (ref 1.8–7.7)
NEUTROPHILS # BLD AUTO: 4.2 K/UL (ref 1.8–7.7)
NEUTROPHILS # BLD AUTO: 4.3 K/UL (ref 1.8–7.7)
NEUTROPHILS # BLD AUTO: 4.6 K/UL (ref 1.8–7.7)
NEUTROPHILS # BLD AUTO: 4.8 K/UL (ref 1.8–7.7)
NEUTROPHILS # BLD AUTO: 4.9 K/UL (ref 1.8–7.7)
NEUTROPHILS # BLD AUTO: 4.9 K/UL (ref 1.8–7.7)
NEUTROPHILS # BLD AUTO: 45.2 K/UL (ref 1.8–7.7)
NEUTROPHILS # BLD AUTO: 5 K/UL (ref 1.8–7.7)
NEUTROPHILS # BLD AUTO: 5 K/UL (ref 1.8–7.7)
NEUTROPHILS # BLD AUTO: 5.1 K/UL (ref 1.8–7.7)
NEUTROPHILS # BLD AUTO: 5.3 K/UL (ref 1.8–7.7)
NEUTROPHILS # BLD AUTO: 5.3 K/UL (ref 1.8–7.7)
NEUTROPHILS # BLD AUTO: 5.4 K/UL (ref 1.8–7.7)
NEUTROPHILS # BLD AUTO: 5.4 K/UL (ref 1.8–7.7)
NEUTROPHILS # BLD AUTO: 5.5 K/UL (ref 1.8–7.7)
NEUTROPHILS # BLD AUTO: 5.9 K/UL (ref 1.8–7.7)
NEUTROPHILS # BLD AUTO: 5.9 K/UL (ref 1.8–7.7)
NEUTROPHILS # BLD AUTO: 6 K/UL (ref 1.8–7.7)
NEUTROPHILS # BLD AUTO: 6 K/UL (ref 1.8–7.7)
NEUTROPHILS # BLD AUTO: 6.1 K/UL (ref 1.8–7.7)
NEUTROPHILS # BLD AUTO: 6.1 K/UL (ref 1.8–7.7)
NEUTROPHILS # BLD AUTO: 6.2 K/UL (ref 1.8–7.7)
NEUTROPHILS # BLD AUTO: 6.9 K/UL (ref 1.8–7.7)
NEUTROPHILS NFR BLD: 60.7 % (ref 38–73)
NEUTROPHILS NFR BLD: 63.1 % (ref 38–73)
NEUTROPHILS NFR BLD: 64 % (ref 38–73)
NEUTROPHILS NFR BLD: 64.6 % (ref 38–73)
NEUTROPHILS NFR BLD: 65.4 % (ref 38–73)
NEUTROPHILS NFR BLD: 65.6 % (ref 38–73)
NEUTROPHILS NFR BLD: 65.7 % (ref 38–73)
NEUTROPHILS NFR BLD: 66.1 % (ref 38–73)
NEUTROPHILS NFR BLD: 67.1 % (ref 38–73)
NEUTROPHILS NFR BLD: 68 % (ref 38–73)
NEUTROPHILS NFR BLD: 68.1 % (ref 38–73)
NEUTROPHILS NFR BLD: 69.9 % (ref 38–73)
NEUTROPHILS NFR BLD: 70.5 % (ref 38–73)
NEUTROPHILS NFR BLD: 70.6 % (ref 38–73)
NEUTROPHILS NFR BLD: 70.7 % (ref 38–73)
NEUTROPHILS NFR BLD: 71 % (ref 38–73)
NEUTROPHILS NFR BLD: 71.3 % (ref 38–73)
NEUTROPHILS NFR BLD: 71.4 % (ref 38–73)
NEUTROPHILS NFR BLD: 71.8 % (ref 38–73)
NEUTROPHILS NFR BLD: 71.8 % (ref 38–73)
NEUTROPHILS NFR BLD: 72.5 % (ref 38–73)
NEUTROPHILS NFR BLD: 72.9 % (ref 38–73)
NEUTROPHILS NFR BLD: 73.1 % (ref 38–73)
NEUTROPHILS NFR BLD: 73.5 % (ref 38–73)
NEUTROPHILS NFR BLD: 73.6 % (ref 38–73)
NEUTROPHILS NFR BLD: 74.4 % (ref 38–73)
NEUTROPHILS NFR BLD: 74.9 % (ref 38–73)
NEUTROPHILS NFR BLD: 75.3 % (ref 38–73)
NEUTROPHILS NFR BLD: 76.1 % (ref 38–73)
NEUTROPHILS NFR BLD: 77 % (ref 38–73)
NEUTROPHILS NFR BLD: 79 % (ref 38–73)
NEUTROPHILS NFR BLD: 81.5 % (ref 38–73)
NEUTROPHILS NFR BLD: 85.7 % (ref 38–73)
NEUTROPHILS NFR BLD: 90.3 % (ref 38–73)
NEUTROPHILS NFR BLD: 91.9 % (ref 38–73)
NEUTS BAND NFR BLD MANUAL: 15 %
NEUTS BAND NFR BLD MANUAL: 17 %
NEUTS BAND NFR BLD MANUAL: 26 %
NEUTS BAND NFR BLD MANUAL: 30 %
NITRITE UR QL STRIP: NEGATIVE
NITRITE UR QL STRIP: NEGATIVE
NONHDLC SERPL-MCNC: 129 MG/DL
NRBC BLD-RTO: 0 /100 WBC
NT-PROBNP SERPL-MCNC: 1003 PG/ML
NT-PROBNP SERPL-MCNC: 1233 PG/ML
NT-PROBNP SERPL-MCNC: 1274 PG/ML
NT-PROBNP SERPL-MCNC: 1316 PG/ML
NT-PROBNP SERPL-MCNC: 1322 PG/ML
NT-PROBNP SERPL-MCNC: 1797 PG/ML
NT-PROBNP SERPL-MCNC: 2256 PG/ML (ref 5–900)
OHS CV CAROTID RIGHT ICA EDV HIGHEST: 0
OHS CV CAROTID ULTRASOUND LEFT ICA/CCA RATIO: 0
OHS CV CAROTID ULTRASOUND RIGHT ICA/CCA RATIO: 0.52
OHS CV PV CAROTID LEFT HIGHEST CCA: 77
OHS CV PV CAROTID LEFT HIGHEST ICA: 0
OHS CV PV CAROTID RIGHT HIGHEST CCA: 79
OHS CV PV CAROTID RIGHT HIGHEST ICA: 38
OHS CV US CAROTID LEFT HIGHEST EDV: 0
OSMOLALITY SERPL: 286 MOSM/KG (ref 280–300)
OSMOLALITY UR: 339 MOSM/KG (ref 50–1200)
OVALOCYTES BLD QL SMEAR: ABNORMAL
OVALOCYTES BLD QL SMEAR: ABNORMAL
PATH REV BLD -IMP: NORMAL
PATHOLOGIST INTERPRETATION IFE: NORMAL
PATHOLOGIST INTERPRETATION SPE: NORMAL
PCO2 BLDA: 22.6 MMHG (ref 35–45)
PCO2 BLDA: 26.5 MMHG (ref 35–45)
PCO2 BLDA: 28.5 MMHG (ref 35–45)
PCO2 BLDA: 29.1 MMHG (ref 35–45)
PCO2 BLDA: 29.3 MMHG (ref 35–45)
PCO2 BLDA: 31 MMHG (ref 35–45)
PCO2 BLDA: 32 MMHG (ref 35–45)
PCO2 BLDA: 32.2 MMHG (ref 35–45)
PCO2 BLDA: 32.9 MMHG (ref 35–45)
PCO2 BLDA: 34 MMHG (ref 35–45)
PH SMN: 7.27 [PH] (ref 7.35–7.45)
PH SMN: 7.29 [PH] (ref 7.35–7.45)
PH SMN: 7.31 [PH] (ref 7.35–7.45)
PH SMN: 7.33 [PH] (ref 7.35–7.45)
PH SMN: 7.34 [PH] (ref 7.35–7.45)
PH SMN: 7.36 [PH] (ref 7.35–7.45)
PH SMN: 7.46 [PH] (ref 7.35–7.45)
PH SMN: 7.47 [PH] (ref 7.35–7.45)
PH SMN: 7.48 [PH] (ref 7.35–7.45)
PH SMN: 7.51 [PH] (ref 7.35–7.45)
PH UR STRIP: 5 [PH] (ref 5–8)
PH UR STRIP: 5 [PH] (ref 5–8)
PHOSPHATE SERPL-MCNC: 3.5 MG/DL (ref 2.7–4.5)
PHOSPHATE SERPL-MCNC: 3.8 MG/DL (ref 2.7–4.5)
PHOSPHATE SERPL-MCNC: 3.9 MG/DL (ref 2.7–4.5)
PHOSPHATE SERPL-MCNC: 3.9 MG/DL (ref 2.7–4.5)
PHOSPHATE SERPL-MCNC: 4.1 MG/DL (ref 2.7–4.5)
PHOSPHATE SERPL-MCNC: 4.2 MG/DL (ref 2.7–4.5)
PHOSPHATE SERPL-MCNC: 4.2 MG/DL (ref 2.7–4.5)
PHOSPHATE SERPL-MCNC: 4.4 MG/DL (ref 2.7–4.5)
PHOSPHATE SERPL-MCNC: 4.5 MG/DL (ref 2.7–4.5)
PHOSPHATE SERPL-MCNC: 4.6 MG/DL (ref 2.7–4.5)
PHOSPHATE SERPL-MCNC: 4.6 MG/DL (ref 2.7–4.5)
PHOSPHATE SERPL-MCNC: 4.8 MG/DL (ref 2.7–4.5)
PHOSPHATE SERPL-MCNC: 6.7 MG/DL (ref 2.7–4.5)
PHOSPHATE SERPL-MCNC: 8.1 MG/DL (ref 2.7–4.5)
PHOSPHATE SERPL-MCNC: 8.2 MG/DL (ref 2.7–4.5)
PHOSPHATE SERPL-MCNC: 8.7 MG/DL (ref 2.7–4.5)
PIP: 11
PISA TR MAX VEL: 1.32 M/S
PISA TR MAX VEL: 1.8 M/S
PISA TR MAX VEL: 2.06 M/S
PLASMA CELL PROLIF RELEASED BY: NORMAL
PLASMA CELL PROLIF RESULT SUMMARY: NORMAL
PLASMA CELL PROLIF RESULT TABLE: NORMAL
PLATELET # BLD AUTO: 164 K/UL (ref 150–350)
PLATELET # BLD AUTO: 169 K/UL (ref 150–450)
PLATELET # BLD AUTO: 182 K/UL (ref 150–450)
PLATELET # BLD AUTO: 187 K/UL (ref 150–450)
PLATELET # BLD AUTO: 189 K/UL (ref 150–450)
PLATELET # BLD AUTO: 190 K/UL (ref 150–450)
PLATELET # BLD AUTO: 195 K/UL (ref 150–450)
PLATELET # BLD AUTO: 198 K/UL (ref 150–450)
PLATELET # BLD AUTO: 200 K/UL (ref 150–450)
PLATELET # BLD AUTO: 204 K/UL (ref 150–450)
PLATELET # BLD AUTO: 206 K/UL (ref 150–450)
PLATELET # BLD AUTO: 212 K/UL (ref 150–450)
PLATELET # BLD AUTO: 212 K/UL (ref 150–450)
PLATELET # BLD AUTO: 215 K/UL (ref 150–450)
PLATELET # BLD AUTO: 215 K/UL (ref 150–450)
PLATELET # BLD AUTO: 218 K/UL (ref 150–450)
PLATELET # BLD AUTO: 219 K/UL (ref 150–450)
PLATELET # BLD AUTO: 225 K/UL (ref 150–450)
PLATELET # BLD AUTO: 227 K/UL (ref 150–450)
PLATELET # BLD AUTO: 228 K/UL (ref 150–450)
PLATELET # BLD AUTO: 237 K/UL (ref 150–450)
PLATELET # BLD AUTO: 245 K/UL (ref 150–450)
PLATELET # BLD AUTO: 246 K/UL (ref 150–450)
PLATELET # BLD AUTO: 247 K/UL (ref 150–450)
PLATELET # BLD AUTO: 250 K/UL (ref 150–450)
PLATELET # BLD AUTO: 251 K/UL (ref 150–450)
PLATELET # BLD AUTO: 254 K/UL (ref 150–450)
PLATELET # BLD AUTO: 282 K/UL (ref 150–450)
PLATELET # BLD AUTO: 292 K/UL (ref 150–450)
PLATELET # BLD AUTO: 297 K/UL (ref 150–450)
PLATELET # BLD AUTO: 300 K/UL (ref 150–450)
PLATELET # BLD AUTO: 323 K/UL (ref 150–450)
PLATELET # BLD AUTO: 346 K/UL (ref 150–450)
PLATELET BLD QL SMEAR: ABNORMAL
PMV BLD AUTO: 10.2 FL (ref 9.2–12.9)
PMV BLD AUTO: 8.2 FL (ref 9.2–12.9)
PMV BLD AUTO: 8.5 FL (ref 9.2–12.9)
PMV BLD AUTO: 8.8 FL (ref 9.2–12.9)
PMV BLD AUTO: 8.9 FL (ref 9.2–12.9)
PMV BLD AUTO: 9 FL (ref 9.2–12.9)
PMV BLD AUTO: 9.1 FL (ref 9.2–12.9)
PMV BLD AUTO: 9.1 FL (ref 9.2–12.9)
PMV BLD AUTO: 9.2 FL (ref 9.2–12.9)
PMV BLD AUTO: 9.3 FL (ref 9.2–12.9)
PMV BLD AUTO: 9.5 FL (ref 9.2–12.9)
PMV BLD AUTO: 9.6 FL (ref 9.2–12.9)
PMV BLD AUTO: 9.7 FL (ref 9.2–12.9)
PO2 BLDA: 100 MMHG (ref 80–100)
PO2 BLDA: 122 MMHG (ref 80–100)
PO2 BLDA: 126 MMHG (ref 80–100)
PO2 BLDA: 126 MMHG (ref 80–100)
PO2 BLDA: 207 MMHG (ref 80–100)
PO2 BLDA: 33 MMHG (ref 40–60)
PO2 BLDA: 80 MMHG (ref 80–100)
PO2 BLDA: 81 MMHG (ref 80–100)
PO2 BLDA: 85 MMHG (ref 80–100)
PO2 BLDA: 99 MMHG (ref 80–100)
POC BE: -1 MMOL/L
POC BE: -10 MMOL/L
POC BE: -11 MMOL/L
POC BE: -12 MMOL/L
POC BE: -12 MMOL/L
POC BE: -9 MMOL/L
POC BE: -9 MMOL/L
POC BE: 0 MMOL/L
POC BE: 0 MMOL/L
POC BE: 1 MMOL/L
POC IONIZED CALCIUM: 1.12 MMOL/L (ref 1.06–1.42)
POC PTINR: 1.1 (ref 0.9–1.2)
POC PTWBT: 12.6 SEC (ref 9.7–14.3)
POC SATURATED O2: 100 % (ref 95–100)
POC SATURATED O2: 60 % (ref 95–100)
POC SATURATED O2: 95 % (ref 95–100)
POC SATURATED O2: 97 % (ref 95–100)
POC SATURATED O2: 98 % (ref 95–100)
POC SATURATED O2: 99 % (ref 95–100)
POC TCO2 (MEASURED): 24 MMOL/L (ref 23–29)
POC TCO2: 13 MMOL/L (ref 23–27)
POC TCO2: 15 MMOL/L (ref 23–27)
POC TCO2: 15 MMOL/L (ref 23–27)
POC TCO2: 16 MMOL/L (ref 23–27)
POC TCO2: 16 MMOL/L (ref 24–29)
POC TCO2: 18 MMOL/L (ref 23–27)
POC TCO2: 24 MMOL/L (ref 23–27)
POC TCO2: 25 MMOL/L (ref 23–27)
POCT GLUCOSE: 102 MG/DL (ref 70–110)
POCT GLUCOSE: 103 MG/DL (ref 70–110)
POCT GLUCOSE: 103 MG/DL (ref 70–110)
POCT GLUCOSE: 108 MG/DL (ref 70–110)
POCT GLUCOSE: 110 MG/DL (ref 70–110)
POCT GLUCOSE: 114 MG/DL (ref 70–110)
POCT GLUCOSE: 114 MG/DL (ref 70–110)
POCT GLUCOSE: 117 MG/DL (ref 70–110)
POCT GLUCOSE: 118 MG/DL (ref 70–110)
POCT GLUCOSE: 120 MG/DL (ref 70–110)
POCT GLUCOSE: 124 MG/DL (ref 70–110)
POCT GLUCOSE: 125 MG/DL (ref 70–110)
POCT GLUCOSE: 128 MG/DL (ref 70–110)
POCT GLUCOSE: 128 MG/DL (ref 70–110)
POCT GLUCOSE: 132 MG/DL (ref 70–110)
POCT GLUCOSE: 132 MG/DL (ref 70–110)
POCT GLUCOSE: 134 MG/DL (ref 70–110)
POCT GLUCOSE: 135 MG/DL (ref 70–110)
POCT GLUCOSE: 137 MG/DL (ref 70–110)
POCT GLUCOSE: 138 MG/DL (ref 70–110)
POCT GLUCOSE: 139 MG/DL (ref 70–110)
POCT GLUCOSE: 140 MG/DL (ref 70–110)
POCT GLUCOSE: 141 MG/DL (ref 70–110)
POCT GLUCOSE: 141 MG/DL (ref 70–110)
POCT GLUCOSE: 143 MG/DL (ref 70–110)
POCT GLUCOSE: 144 MG/DL (ref 70–110)
POCT GLUCOSE: 148 MG/DL (ref 70–110)
POCT GLUCOSE: 149 MG/DL (ref 70–110)
POCT GLUCOSE: 177 MG/DL (ref 70–110)
POCT GLUCOSE: 178 MG/DL (ref 70–110)
POCT GLUCOSE: 178 MG/DL (ref 70–110)
POCT GLUCOSE: 182 MG/DL (ref 70–110)
POCT GLUCOSE: 193 MG/DL (ref 70–110)
POCT GLUCOSE: 21 MG/DL (ref 70–110)
POCT GLUCOSE: 257 MG/DL (ref 70–110)
POCT GLUCOSE: 57 MG/DL (ref 70–110)
POIKILOCYTOSIS BLD QL SMEAR: SLIGHT
POIKILOCYTOSIS BLD QL SMEAR: SLIGHT
POLYCHROMASIA BLD QL SMEAR: ABNORMAL
POLYCHROMASIA BLD QL SMEAR: ABNORMAL
POTASSIUM BLD-SCNC: 4.1 MMOL/L (ref 3.5–5.1)
POTASSIUM SERPL-SCNC: 3.3 MMOL/L (ref 3.5–5.1)
POTASSIUM SERPL-SCNC: 3.4 MMOL/L (ref 3.5–5.1)
POTASSIUM SERPL-SCNC: 3.5 MMOL/L (ref 3.5–5.1)
POTASSIUM SERPL-SCNC: 3.6 MMOL/L (ref 3.5–5.1)
POTASSIUM SERPL-SCNC: 3.6 MMOL/L (ref 3.5–5.1)
POTASSIUM SERPL-SCNC: 3.7 MMOL/L (ref 3.5–5.1)
POTASSIUM SERPL-SCNC: 3.7 MMOL/L (ref 3.5–5.1)
POTASSIUM SERPL-SCNC: 3.8 MMOL/L (ref 3.5–5.1)
POTASSIUM SERPL-SCNC: 3.9 MMOL/L (ref 3.5–5.1)
POTASSIUM SERPL-SCNC: 4 MMOL/L (ref 3.5–5.1)
POTASSIUM SERPL-SCNC: 4.1 MMOL/L (ref 3.5–5.1)
POTASSIUM SERPL-SCNC: 4.2 MMOL/L (ref 3.5–5.1)
POTASSIUM SERPL-SCNC: 4.3 MMOL/L (ref 3.5–5.1)
POTASSIUM SERPL-SCNC: 4.4 MMOL/L (ref 3.5–5.1)
POTASSIUM SERPL-SCNC: 4.5 MMOL/L (ref 3.5–5.1)
POTASSIUM SERPL-SCNC: 4.6 MMOL/L (ref 3.5–5.1)
POTASSIUM SERPL-SCNC: 4.7 MMOL/L (ref 3.5–5.1)
POTASSIUM SERPL-SCNC: 4.8 MMOL/L (ref 3.5–5.1)
POTASSIUM SERPL-SCNC: 4.9 MMOL/L (ref 3.5–5.1)
POTASSIUM SERPL-SCNC: 4.9 MMOL/L (ref 3.5–5.1)
POTASSIUM SERPL-SCNC: 5.1 MMOL/L (ref 3.5–5.1)
POTASSIUM SERPL-SCNC: 5.2 MMOL/L (ref 3.5–5.1)
PROT SERPL-MCNC: 3.6 G/DL (ref 6–8.4)
PROT SERPL-MCNC: 3.7 G/DL (ref 6–8.4)
PROT SERPL-MCNC: 4.3 G/DL (ref 6–8.4)
PROT SERPL-MCNC: 4.7 G/DL (ref 6–8.4)
PROT SERPL-MCNC: 4.8 G/DL (ref 6–8.4)
PROT SERPL-MCNC: 4.8 G/DL (ref 6–8.4)
PROT SERPL-MCNC: 4.9 G/DL (ref 6–8.4)
PROT SERPL-MCNC: 5 G/DL (ref 6–8.4)
PROT SERPL-MCNC: 5 G/DL (ref 6–8.4)
PROT SERPL-MCNC: 5.1 G/DL (ref 6–8.4)
PROT SERPL-MCNC: 5.2 G/DL (ref 6–8.4)
PROT SERPL-MCNC: 5.3 G/DL (ref 6–8.4)
PROT SERPL-MCNC: 5.4 G/DL (ref 6–8.4)
PROT SERPL-MCNC: 5.5 G/DL (ref 6–8.4)
PROT SERPL-MCNC: 5.7 G/DL (ref 6–8.4)
PROT SERPL-MCNC: 5.8 G/DL (ref 6–8.4)
PROT SERPL-MCNC: 6 G/DL (ref 6–8.4)
PROT SERPL-MCNC: 6 G/DL (ref 6–8.4)
PROT SERPL-MCNC: 6.1 G/DL (ref 6–8.4)
PROT UR QL STRIP: ABNORMAL
PROT UR QL STRIP: NEGATIVE
PROT UR-MCNC: 37 MG/DL
PROT/CREAT UR: 0.4 MG/G{CREAT} (ref 0–0.2)
PROTHROMBIN TIME: 11.9 SEC (ref 9–12.5)
PROTHROMBIN TIME: 18.5 SEC (ref 9–12.5)
PTH-INTACT SERPL-MCNC: 323.5 PG/ML (ref 9–77)
PV PEAK VELOCITY: 0.73 CM/S
RA MAJOR: 5.2 CM
RA MAJOR: 5.71 CM
RA MAJOR: 6.01 CM
RA MAJOR: 6.49 CM
RA PRESSURE: 15 MMHG
RA WIDTH: 3.91 CM
RA WIDTH: 4.31 CM
RA WIDTH: 4.32 CM
RA WIDTH: 4.83 CM
RBC # BLD AUTO: 2.59 M/UL (ref 4.6–6.2)
RBC # BLD AUTO: 2.64 M/UL (ref 4.6–6.2)
RBC # BLD AUTO: 2.96 M/UL (ref 4.6–6.2)
RBC # BLD AUTO: 3.21 M/UL (ref 4.6–6.2)
RBC # BLD AUTO: 3.21 M/UL (ref 4.6–6.2)
RBC # BLD AUTO: 3.28 M/UL (ref 4.6–6.2)
RBC # BLD AUTO: 3.35 M/UL (ref 4.6–6.2)
RBC # BLD AUTO: 3.36 M/UL (ref 4.6–6.2)
RBC # BLD AUTO: 3.39 M/UL (ref 4.6–6.2)
RBC # BLD AUTO: 3.42 M/UL (ref 4.6–6.2)
RBC # BLD AUTO: 3.47 M/UL (ref 4.6–6.2)
RBC # BLD AUTO: 3.48 M/UL (ref 4.6–6.2)
RBC # BLD AUTO: 3.53 M/UL (ref 4.6–6.2)
RBC # BLD AUTO: 3.55 M/UL (ref 4.6–6.2)
RBC # BLD AUTO: 3.58 M/UL (ref 4.6–6.2)
RBC # BLD AUTO: 3.64 M/UL (ref 4.6–6.2)
RBC # BLD AUTO: 3.64 M/UL (ref 4.6–6.2)
RBC # BLD AUTO: 3.65 M/UL (ref 4.6–6.2)
RBC # BLD AUTO: 3.66 M/UL (ref 4.6–6.2)
RBC # BLD AUTO: 3.66 M/UL (ref 4.6–6.2)
RBC # BLD AUTO: 3.68 M/UL (ref 4.6–6.2)
RBC # BLD AUTO: 3.69 M/UL (ref 4.6–6.2)
RBC # BLD AUTO: 3.73 M/UL (ref 4.6–6.2)
RBC # BLD AUTO: 3.74 M/UL (ref 4.6–6.2)
RBC # BLD AUTO: 3.75 M/UL (ref 4.6–6.2)
RBC # BLD AUTO: 3.78 M/UL (ref 4.6–6.2)
RBC # BLD AUTO: 3.82 M/UL (ref 4.6–6.2)
RBC # BLD AUTO: 3.82 M/UL (ref 4.6–6.2)
RBC # BLD AUTO: 3.83 M/UL (ref 4.6–6.2)
RBC # BLD AUTO: 3.84 M/UL (ref 4.6–6.2)
RBC # BLD AUTO: 3.89 M/UL (ref 4.6–6.2)
RBC # BLD AUTO: 3.94 M/UL (ref 4.6–6.2)
RBC # BLD AUTO: 3.99 M/UL (ref 4.6–6.2)
RBC # BLD AUTO: 4 M/UL (ref 4.6–6.2)
RBC # BLD AUTO: 4.06 M/UL (ref 4.6–6.2)
RBC #/AREA URNS HPF: 5 /HPF (ref 0–4)
REASON FOR REFERRAL (NARRATIVE): NORMAL
REASON FOR REFERRAL, PLASMA CELL PROLIF (PCPD), FISH: NORMAL
REF LAB TEST METHOD: NORMAL
REF LAB TEST METHOD: NORMAL
RESULTS, PLASMA CELL PROLIF (PCPD), FISH: NORMAL
RIGHT ARM DIASTOLIC BLOOD PRESSURE: 56 MMHG
RIGHT ARM SYSTOLIC BLOOD PRESSURE: 105 MMHG
RIGHT CBA DIAS: 0 CM/S
RIGHT CBA SYS: 61 CM/S
RIGHT CCA DIST DIAS: 0 CM/S
RIGHT CCA DIST SYS: 73 CM/S
RIGHT CCA MID DIAS: 0 CM/S
RIGHT CCA MID SYS: 79 CM/S
RIGHT CCA PROX DIAS: 0 CM/S
RIGHT CCA PROX SYS: 74 CM/S
RIGHT ECA DIAS: 0 CM/S
RIGHT ECA SYS: 103 CM/S
RIGHT ICA DIST DIAS: 0 CM/S
RIGHT ICA DIST SYS: 0 CM/S
RIGHT ICA MID DIAS: 0 CM/S
RIGHT ICA MID SYS: 0 CM/S
RIGHT ICA PROX DIAS: 0 CM/S
RIGHT ICA PROX SYS: 38 CM/S
RIGHT VENTRICULAR END-DIASTOLIC DIMENSION: 3.93 CM
RIGHT VENTRICULAR END-DIASTOLIC DIMENSION: 3.97 CM
RIGHT VENTRICULAR END-DIASTOLIC DIMENSION: 4.26 CM
RIGHT VENTRICULAR END-DIASTOLIC DIMENSION: 4.82 CM
RIGHT VERTEBRAL DIAS: 17 CM/S
RIGHT VERTEBRAL SYS: 64 CM/S
RV TISSUE DOPPLER FREE WALL SYSTOLIC VELOCITY 1 (APICAL 4 CHAMBER VIEW): 2.93 CM/S
RV TISSUE DOPPLER FREE WALL SYSTOLIC VELOCITY 1 (APICAL 4 CHAMBER VIEW): 7.9 CM/S
SAMPLE: ABNORMAL
SAMPLE: NORMAL
SARS-COV-2 RDRP RESP QL NAA+PROBE: NEGATIVE
SARS-COV-2 RNA RESP QL NAA+PROBE: NOT DETECTED
SATURATED IRON: ABNORMAL % (ref 20–50)
SERVICE CMNT-IMP: NORMAL
SERVICE CMNT-IMP: NORMAL
SINUS: 2.74 CM
SINUS: 3.08 CM
SINUS: 3.21 CM
SINUS: 3.25 CM
SINUS: 3.27 CM
SITE: ABNORMAL
SODIUM BLD-SCNC: 135 MMOL/L (ref 136–145)
SODIUM SERPL-SCNC: 122 MMOL/L (ref 136–145)
SODIUM SERPL-SCNC: 123 MMOL/L (ref 136–145)
SODIUM SERPL-SCNC: 123 MMOL/L (ref 136–145)
SODIUM SERPL-SCNC: 124 MMOL/L (ref 136–145)
SODIUM SERPL-SCNC: 124 MMOL/L (ref 136–145)
SODIUM SERPL-SCNC: 126 MMOL/L (ref 136–145)
SODIUM SERPL-SCNC: 127 MMOL/L (ref 136–145)
SODIUM SERPL-SCNC: 128 MMOL/L (ref 136–145)
SODIUM SERPL-SCNC: 128 MMOL/L (ref 136–145)
SODIUM SERPL-SCNC: 129 MMOL/L (ref 136–145)
SODIUM SERPL-SCNC: 129 MMOL/L (ref 136–145)
SODIUM SERPL-SCNC: 130 MMOL/L (ref 136–145)
SODIUM SERPL-SCNC: 131 MMOL/L (ref 136–145)
SODIUM SERPL-SCNC: 132 MMOL/L (ref 136–145)
SODIUM SERPL-SCNC: 133 MMOL/L (ref 136–145)
SODIUM SERPL-SCNC: 134 MMOL/L (ref 136–145)
SODIUM SERPL-SCNC: 135 MMOL/L (ref 136–145)
SODIUM SERPL-SCNC: 136 MMOL/L (ref 136–145)
SODIUM SERPL-SCNC: 137 MMOL/L (ref 136–145)
SODIUM SERPL-SCNC: 138 MMOL/L (ref 136–145)
SODIUM SERPL-SCNC: 138 MMOL/L (ref 136–145)
SODIUM SERPL-SCNC: 139 MMOL/L (ref 136–145)
SODIUM SERPL-SCNC: 139 MMOL/L (ref 136–145)
SODIUM SERPL-SCNC: 140 MMOL/L (ref 136–145)
SODIUM UR-SCNC: <20 MMOL/L (ref 20–250)
SP GR UR STRIP: 1.01 (ref 1–1.03)
SP GR UR STRIP: 1.01 (ref 1–1.03)
SP02: 100
SP02: 92
SP02: 96
SP02: 99
SPECIMEN SOURCE: NORMAL
SPECIMEN, PLASMA CELL PROLIF (PCPD), FISH: NORMAL
SPECIMEN: NORMAL
SPONT RATE: 25
SPONT RATE: 5
SPONT RATE: 7
STJ: 2.38 CM
STJ: 2.4 CM
STJ: 2.4 CM
STJ: 3.04 CM
SUPPLEMENTAL DIAGNOSIS: NORMAL
TDI LATERAL: 0.05 M/S
TDI LATERAL: 0.06 M/S
TDI LATERAL: 0.07 M/S
TDI SEPTAL: 0.04 M/S
TDI SEPTAL: 0.06 M/S
TDI SEPTAL: 0.07 M/S
TDI: 0.05 M/S
TDI: 0.07 M/S
TDI: 0.07 M/S
TOTAL IRON BINDING CAPACITY: 367 UG/DL (ref 250–450)
TOXIC GRANULES BLD QL SMEAR: PRESENT
TR MAX PG: 13 MMHG
TR MAX PG: 17 MMHG
TR MAX PG: 7 MMHG
TRANSFERRIN SERPL-MCNC: 248 MG/DL (ref 200–375)
TRICUSPID ANNULAR PLANE SYSTOLIC EXCURSION: 1.1 CM
TRICUSPID ANNULAR PLANE SYSTOLIC EXCURSION: 1.29 CM
TRICUSPID ANNULAR PLANE SYSTOLIC EXCURSION: 1.47 CM
TRIGL SERPL-MCNC: 112 MG/DL (ref 30–150)
TROPONIN I SERPL DL<=0.01 NG/ML-MCNC: 0.03 NG/ML (ref 0–0.03)
TROPONIN I SERPL DL<=0.01 NG/ML-MCNC: 0.04 NG/ML (ref 0–0.03)
TROPONIN I SERPL DL<=0.01 NG/ML-MCNC: 0.05 NG/ML (ref 0–0.03)
TROPONIN I SERPL DL<=0.01 NG/ML-MCNC: 0.11 NG/ML (ref 0–0.03)
TROPONIN I SERPL DL<=0.01 NG/ML-MCNC: 0.23 NG/ML (ref 0–0.03)
TROPONIN I SERPL DL<=0.01 NG/ML-MCNC: 0.33 NG/ML (ref 0–0.03)
TROPONIN I SERPL DL<=0.01 NG/ML-MCNC: 0.39 NG/ML (ref 0–0.03)
TROPONIN T SERPL-MCNC: 50 NG/L
TROPONIN T SERPL-MCNC: 52 NG/L
TROPONIN T SERPL-MCNC: 54 NG/L
TROPONIN T SERPL-MCNC: 54 NG/L
TROPONIN T SERPL-MCNC: 57 NG/L
TROPONIN T SERPL-MCNC: 66 NG/L
TSH SERPL DL<=0.005 MIU/L-ACNC: 2.48 UIU/ML (ref 0.4–4)
TV REST PULMONARY ARTERY PRESSURE: 22 MMHG
TV REST PULMONARY ARTERY PRESSURE: 28 MMHG
TV REST PULMONARY ARTERY PRESSURE: 32 MMHG
URN SPEC COLLECT METH UR: ABNORMAL
URN SPEC COLLECT METH UR: ABNORMAL
UROBILINOGEN UR STRIP-ACNC: 1 EU/DL
UROBILINOGEN UR STRIP-ACNC: NEGATIVE EU/DL
UUN UR-MCNC: 494 MG/DL (ref 140–1050)
VANCOMYCIN SERPL-MCNC: 13.5 UG/ML
VANCOMYCIN SERPL-MCNC: 18.8 UG/ML
VANCOMYCIN SERPL-MCNC: 34.2 UG/ML
VIT B12 SERPL-MCNC: >2000 PG/ML (ref 210–950)
VT: 480
WBC # BLD AUTO: 11.06 K/UL (ref 3.9–12.7)
WBC # BLD AUTO: 27.39 K/UL (ref 3.9–12.7)
WBC # BLD AUTO: 4.98 K/UL (ref 3.9–12.7)
WBC # BLD AUTO: 42.85 K/UL (ref 3.9–12.7)
WBC # BLD AUTO: 46.12 K/UL (ref 3.9–12.7)
WBC # BLD AUTO: 49.22 K/UL (ref 3.9–12.7)
WBC # BLD AUTO: 5.23 K/UL (ref 3.9–12.7)
WBC # BLD AUTO: 5.32 K/UL (ref 3.9–12.7)
WBC # BLD AUTO: 5.58 K/UL (ref 3.9–12.7)
WBC # BLD AUTO: 5.69 K/UL (ref 3.9–12.7)
WBC # BLD AUTO: 5.69 K/UL (ref 3.9–12.7)
WBC # BLD AUTO: 5.89 K/UL (ref 3.9–12.7)
WBC # BLD AUTO: 55.81 K/UL (ref 3.9–12.7)
WBC # BLD AUTO: 6.24 K/UL (ref 3.9–12.7)
WBC # BLD AUTO: 6.33 K/UL (ref 3.9–12.7)
WBC # BLD AUTO: 6.4 K/UL (ref 3.9–12.7)
WBC # BLD AUTO: 6.41 K/UL (ref 3.9–12.7)
WBC # BLD AUTO: 6.88 K/UL (ref 3.9–12.7)
WBC # BLD AUTO: 6.93 K/UL (ref 3.9–12.7)
WBC # BLD AUTO: 6.97 K/UL (ref 3.9–12.7)
WBC # BLD AUTO: 6.98 K/UL (ref 3.9–12.7)
WBC # BLD AUTO: 7.1 K/UL (ref 3.9–12.7)
WBC # BLD AUTO: 7.22 K/UL (ref 3.9–12.7)
WBC # BLD AUTO: 7.34 K/UL (ref 3.9–12.7)
WBC # BLD AUTO: 7.43 K/UL (ref 3.9–12.7)
WBC # BLD AUTO: 7.44 K/UL (ref 3.9–12.7)
WBC # BLD AUTO: 7.63 K/UL (ref 3.9–12.7)
WBC # BLD AUTO: 7.7 K/UL (ref 3.9–12.7)
WBC # BLD AUTO: 7.82 K/UL (ref 3.9–12.7)
WBC # BLD AUTO: 8.24 K/UL (ref 3.9–12.7)
WBC # BLD AUTO: 8.27 K/UL (ref 3.9–12.7)
WBC # BLD AUTO: 8.29 K/UL (ref 3.9–12.7)
WBC # BLD AUTO: 8.36 K/UL (ref 3.9–12.7)
WBC # BLD AUTO: 8.85 K/UL (ref 3.9–12.7)
WBC # BLD AUTO: 9.05 K/UL (ref 3.9–12.7)
WBC #/AREA URNS HPF: 1 /HPF (ref 0–5)

## 2021-01-01 PROCEDURE — 97112 NEUROMUSCULAR REEDUCATION: CPT | Mod: PO

## 2021-01-01 PROCEDURE — 88305 TISSUE EXAM BY PATHOLOGIST: CPT | Mod: 26,,, | Performed by: PATHOLOGY

## 2021-01-01 PROCEDURE — 25000003 PHARM REV CODE 250: Performed by: UROLOGY

## 2021-01-01 PROCEDURE — 99999 PR PBB SHADOW E&M-EST. PATIENT-LVL III: ICD-10-PCS | Mod: PBBFAC,,, | Performed by: NURSE PRACTITIONER

## 2021-01-01 PROCEDURE — 25000003 PHARM REV CODE 250: Performed by: PHYSICIAN ASSISTANT

## 2021-01-01 PROCEDURE — 63600175 PHARM REV CODE 636 W HCPCS: Performed by: HOSPITALIST

## 2021-01-01 PROCEDURE — 86334 PATHOLOGIST INTERPRETATION IFE: ICD-10-PCS | Mod: 26,,, | Performed by: PATHOLOGY

## 2021-01-01 PROCEDURE — 88237 TISSUE CULTURE BONE MARROW: CPT | Performed by: NURSE PRACTITIONER

## 2021-01-01 PROCEDURE — 83935 ASSAY OF URINE OSMOLALITY: CPT | Performed by: EMERGENCY MEDICINE

## 2021-01-01 PROCEDURE — 83880 ASSAY OF NATRIURETIC PEPTIDE: CPT | Performed by: PHYSICIAN ASSISTANT

## 2021-01-01 PROCEDURE — 25000003 PHARM REV CODE 250: Performed by: HOSPITALIST

## 2021-01-01 PROCEDURE — 99223 1ST HOSP IP/OBS HIGH 75: CPT | Mod: ,,, | Performed by: PHYSICIAN ASSISTANT

## 2021-01-01 PROCEDURE — 27000221 HC OXYGEN, UP TO 24 HOURS

## 2021-01-01 PROCEDURE — 99999 PR PBB SHADOW E&M-EST. PATIENT-LVL IV: ICD-10-PCS | Mod: PBBFAC,,, | Performed by: INTERNAL MEDICINE

## 2021-01-01 PROCEDURE — 99291 CRITICAL CARE FIRST HOUR: CPT | Mod: ,,, | Performed by: PSYCHIATRY & NEUROLOGY

## 2021-01-01 PROCEDURE — 80076 HEPATIC FUNCTION PANEL: CPT | Performed by: INTERNAL MEDICINE

## 2021-01-01 PROCEDURE — 93295 DEV INTERROG REMOTE 1/2/MLT: CPT | Mod: ,,, | Performed by: INTERNAL MEDICINE

## 2021-01-01 PROCEDURE — 99223 1ST HOSP IP/OBS HIGH 75: CPT | Mod: ,,, | Performed by: INTERNAL MEDICINE

## 2021-01-01 PROCEDURE — 84100 ASSAY OF PHOSPHORUS: CPT | Performed by: UROLOGY

## 2021-01-01 PROCEDURE — 96374 THER/PROPH/DIAG INJ IV PUSH: CPT

## 2021-01-01 PROCEDURE — 36000707: Performed by: SURGERY

## 2021-01-01 PROCEDURE — 78816 PET IMAGE W/CT FULL BODY: CPT | Mod: TC

## 2021-01-01 PROCEDURE — 99223 PR INITIAL HOSPITAL CARE,LEVL III: ICD-10-PCS | Mod: ,,, | Performed by: INTERNAL MEDICINE

## 2021-01-01 PROCEDURE — 99285 EMERGENCY DEPT VISIT HI MDM: CPT | Mod: 25

## 2021-01-01 PROCEDURE — 93295 CARDIAC DEVICE CHECK - REMOTE: ICD-10-PCS | Mod: ,,, | Performed by: INTERNAL MEDICINE

## 2021-01-01 PROCEDURE — 63600175 PHARM REV CODE 636 W HCPCS: Mod: JG | Performed by: STUDENT IN AN ORGANIZED HEALTH CARE EDUCATION/TRAINING PROGRAM

## 2021-01-01 PROCEDURE — 25000003 PHARM REV CODE 250: Performed by: NURSE PRACTITIONER

## 2021-01-01 PROCEDURE — 99233 SBSQ HOSP IP/OBS HIGH 50: CPT | Mod: ,,, | Performed by: INTERNAL MEDICINE

## 2021-01-01 PROCEDURE — 83735 ASSAY OF MAGNESIUM: CPT | Performed by: UROLOGY

## 2021-01-01 PROCEDURE — 88305 TISSUE EXAM BY PATHOLOGIST: CPT | Performed by: PATHOLOGY

## 2021-01-01 PROCEDURE — 86334 IMMUNOFIX E-PHORESIS SERUM: CPT | Performed by: INTERNAL MEDICINE

## 2021-01-01 PROCEDURE — 36415 COLL VENOUS BLD VENIPUNCTURE: CPT | Performed by: UROLOGY

## 2021-01-01 PROCEDURE — 80053 COMPREHEN METABOLIC PANEL: CPT

## 2021-01-01 PROCEDURE — 63600175 PHARM REV CODE 636 W HCPCS: Performed by: STUDENT IN AN ORGANIZED HEALTH CARE EDUCATION/TRAINING PROGRAM

## 2021-01-01 PROCEDURE — 99233 PR SUBSEQUENT HOSPITAL CARE,LEVL III: ICD-10-PCS | Mod: ,,, | Performed by: HOSPITALIST

## 2021-01-01 PROCEDURE — 84165 PROTEIN E-PHORESIS SERUM: CPT | Mod: 26,,, | Performed by: PATHOLOGY

## 2021-01-01 PROCEDURE — 99900035 HC TECH TIME PER 15 MIN (STAT)

## 2021-01-01 PROCEDURE — 97110 THERAPEUTIC EXERCISES: CPT

## 2021-01-01 PROCEDURE — 38222 DX BONE MARROW BX & ASPIR: CPT | Mod: PBBFAC | Performed by: NURSE PRACTITIONER

## 2021-01-01 PROCEDURE — 94660 CPAP INITIATION&MGMT: CPT

## 2021-01-01 PROCEDURE — 82803 BLOOD GASES ANY COMBINATION: CPT

## 2021-01-01 PROCEDURE — A4217 STERILE WATER/SALINE, 500 ML: HCPCS | Performed by: HOSPITALIST

## 2021-01-01 PROCEDURE — 20600001 HC STEP DOWN PRIVATE ROOM

## 2021-01-01 PROCEDURE — 36415 COLL VENOUS BLD VENIPUNCTURE: CPT | Performed by: INTERNAL MEDICINE

## 2021-01-01 PROCEDURE — 80053 COMPREHEN METABOLIC PANEL: CPT | Performed by: EMERGENCY MEDICINE

## 2021-01-01 PROCEDURE — 85025 COMPLETE CBC W/AUTO DIFF WBC: CPT | Performed by: UROLOGY

## 2021-01-01 PROCEDURE — 92507 TX SP LANG VOICE COMM INDIV: CPT

## 2021-01-01 PROCEDURE — 99233 PR SUBSEQUENT HOSPITAL CARE,LEVL III: ICD-10-PCS | Mod: ,,, | Performed by: PSYCHIATRY & NEUROLOGY

## 2021-01-01 PROCEDURE — 99999 PR PBB SHADOW E&M-EST. PATIENT-LVL IV: CPT | Mod: PBBFAC,,, | Performed by: INTERNAL MEDICINE

## 2021-01-01 PROCEDURE — 20000000 HC ICU ROOM

## 2021-01-01 PROCEDURE — 84484 ASSAY OF TROPONIN QUANT: CPT | Mod: 91 | Performed by: EMERGENCY MEDICINE

## 2021-01-01 PROCEDURE — 25000003 PHARM REV CODE 250: Performed by: INTERNAL MEDICINE

## 2021-01-01 PROCEDURE — 86334 IMMUNOFIX E-PHORESIS SERUM: CPT | Mod: 26,,, | Performed by: PATHOLOGY

## 2021-01-01 PROCEDURE — 97530 THERAPEUTIC ACTIVITIES: CPT

## 2021-01-01 PROCEDURE — 87075 CULTR BACTERIA EXCEPT BLOOD: CPT | Performed by: HOSPITALIST

## 2021-01-01 PROCEDURE — D9220A PRA ANESTHESIA: Mod: CRNA,,, | Performed by: NURSE ANESTHETIST, CERTIFIED REGISTERED

## 2021-01-01 PROCEDURE — 99214 OFFICE O/P EST MOD 30 MIN: CPT | Mod: S$GLB,,, | Performed by: NURSE PRACTITIONER

## 2021-01-01 PROCEDURE — 88364 INSITU HYBRIDIZATION (FISH): CPT | Mod: 26,,, | Performed by: PATHOLOGY

## 2021-01-01 PROCEDURE — 83735 ASSAY OF MAGNESIUM: CPT | Performed by: HOSPITALIST

## 2021-01-01 PROCEDURE — 99999 PR PBB SHADOW E&M-EST. PATIENT-LVL III: CPT | Mod: PBBFAC,,, | Performed by: NURSE PRACTITIONER

## 2021-01-01 PROCEDURE — 99285 EMERGENCY DEPT VISIT HI MDM: CPT | Mod: 25,27

## 2021-01-01 PROCEDURE — 88189 FLOWCYTOMETRY/READ 16 & >: CPT | Mod: 26,,, | Performed by: PATHOLOGY

## 2021-01-01 PROCEDURE — 63600175 PHARM REV CODE 636 W HCPCS: Performed by: NURSE PRACTITIONER

## 2021-01-01 PROCEDURE — 11000001 HC ACUTE MED/SURG PRIVATE ROOM

## 2021-01-01 PROCEDURE — 99204 PR OFFICE/OUTPT VISIT, NEW, LEVL IV, 45-59 MIN: ICD-10-PCS | Mod: S$PBB,,, | Performed by: STUDENT IN AN ORGANIZED HEALTH CARE EDUCATION/TRAINING PROGRAM

## 2021-01-01 PROCEDURE — 63600175 PHARM REV CODE 636 W HCPCS: Performed by: EMERGENCY MEDICINE

## 2021-01-01 PROCEDURE — 99214 PR OFFICE/OUTPT VISIT, EST, LEVL IV, 30-39 MIN: ICD-10-PCS | Mod: S$PBB,,, | Performed by: INTERNAL MEDICINE

## 2021-01-01 PROCEDURE — 83605 ASSAY OF LACTIC ACID: CPT | Performed by: EMERGENCY MEDICINE

## 2021-01-01 PROCEDURE — 81003 URINALYSIS AUTO W/O SCOPE: CPT | Performed by: EMERGENCY MEDICINE

## 2021-01-01 PROCEDURE — 85018 HEMOGLOBIN: CPT | Performed by: HOSPITALIST

## 2021-01-01 PROCEDURE — 80048 BASIC METABOLIC PNL TOTAL CA: CPT | Performed by: INTERNAL MEDICINE

## 2021-01-01 PROCEDURE — 88311 DECALCIFY TISSUE: CPT | Performed by: PATHOLOGY

## 2021-01-01 PROCEDURE — 97535 SELF CARE MNGMENT TRAINING: CPT

## 2021-01-01 PROCEDURE — 85025 COMPLETE CBC W/AUTO DIFF WBC: CPT | Performed by: INTERNAL MEDICINE

## 2021-01-01 PROCEDURE — 97110 THERAPEUTIC EXERCISES: CPT | Mod: PO

## 2021-01-01 PROCEDURE — 86140 C-REACTIVE PROTEIN: CPT | Performed by: NURSE PRACTITIONER

## 2021-01-01 PROCEDURE — 25000003 PHARM REV CODE 250: Performed by: STUDENT IN AN ORGANIZED HEALTH CARE EDUCATION/TRAINING PROGRAM

## 2021-01-01 PROCEDURE — 80053 COMPREHEN METABOLIC PANEL: CPT | Performed by: INTERNAL MEDICINE

## 2021-01-01 PROCEDURE — P9047 ALBUMIN (HUMAN), 25%, 50ML: HCPCS | Mod: JG | Performed by: PHYSICIAN ASSISTANT

## 2021-01-01 PROCEDURE — 84165 PROTEIN E-PHORESIS SERUM: CPT | Performed by: INTERNAL MEDICINE

## 2021-01-01 PROCEDURE — 83520 IMMUNOASSAY QUANT NOS NONAB: CPT | Performed by: INTERNAL MEDICINE

## 2021-01-01 PROCEDURE — 99999 PR PBB SHADOW E&M-EST. PATIENT-LVL IV: ICD-10-PCS | Mod: PBBFAC,,, | Performed by: STUDENT IN AN ORGANIZED HEALTH CARE EDUCATION/TRAINING PROGRAM

## 2021-01-01 PROCEDURE — 97110 THERAPEUTIC EXERCISES: CPT | Mod: CO

## 2021-01-01 PROCEDURE — 99222 PR INITIAL HOSPITAL CARE,LEVL II: ICD-10-PCS | Mod: ,,, | Performed by: NURSE PRACTITIONER

## 2021-01-01 PROCEDURE — 99214 OFFICE O/P EST MOD 30 MIN: CPT | Mod: S$PBB,,, | Performed by: INTERNAL MEDICINE

## 2021-01-01 PROCEDURE — 99222 PR INITIAL HOSPITAL CARE,LEVL II: ICD-10-PCS | Mod: ,,, | Performed by: STUDENT IN AN ORGANIZED HEALTH CARE EDUCATION/TRAINING PROGRAM

## 2021-01-01 PROCEDURE — 93010 EKG 12-LEAD: ICD-10-PCS | Mod: ,,, | Performed by: INTERNAL MEDICINE

## 2021-01-01 PROCEDURE — 87040 BLOOD CULTURE FOR BACTERIA: CPT | Performed by: EMERGENCY MEDICINE

## 2021-01-01 PROCEDURE — 36415 COLL VENOUS BLD VENIPUNCTURE: CPT | Performed by: STUDENT IN AN ORGANIZED HEALTH CARE EDUCATION/TRAINING PROGRAM

## 2021-01-01 PROCEDURE — 99999 PR PBB SHADOW E&M-EST. PATIENT-LVL IV: CPT | Mod: PBBFAC,,, | Performed by: STUDENT IN AN ORGANIZED HEALTH CARE EDUCATION/TRAINING PROGRAM

## 2021-01-01 PROCEDURE — 25500020 PHARM REV CODE 255: Performed by: EMERGENCY MEDICINE

## 2021-01-01 PROCEDURE — 87040 BLOOD CULTURE FOR BACTERIA: CPT | Mod: 59 | Performed by: EMERGENCY MEDICINE

## 2021-01-01 PROCEDURE — 93005 ELECTROCARDIOGRAM TRACING: CPT

## 2021-01-01 PROCEDURE — 97168 OT RE-EVAL EST PLAN CARE: CPT

## 2021-01-01 PROCEDURE — 36415 COLL VENOUS BLD VENIPUNCTURE: CPT | Performed by: PHYSICIAN ASSISTANT

## 2021-01-01 PROCEDURE — 93005 ELECTROCARDIOGRAM TRACING: CPT | Mod: PBBFAC | Performed by: INTERNAL MEDICINE

## 2021-01-01 PROCEDURE — 87205 SMEAR GRAM STAIN: CPT | Performed by: SURGERY

## 2021-01-01 PROCEDURE — 99291 CRITICAL CARE FIRST HOUR: CPT | Mod: 25,,, | Performed by: INTERNAL MEDICINE

## 2021-01-01 PROCEDURE — 84484 ASSAY OF TROPONIN QUANT: CPT | Performed by: INTERNAL MEDICINE

## 2021-01-01 PROCEDURE — 99291 CRITICAL CARE FIRST HOUR: CPT | Mod: ,,, | Performed by: PHYSICIAN ASSISTANT

## 2021-01-01 PROCEDURE — 97116 GAIT TRAINING THERAPY: CPT | Mod: CQ

## 2021-01-01 PROCEDURE — 92610 EVALUATE SWALLOWING FUNCTION: CPT

## 2021-01-01 PROCEDURE — 82800 BLOOD PH: CPT

## 2021-01-01 PROCEDURE — 99223 PR INITIAL HOSPITAL CARE,LEVL III: ICD-10-PCS | Mod: 57,,, | Performed by: SURGERY

## 2021-01-01 PROCEDURE — 99292 PR CRITICAL CARE, ADDL 30 MIN: ICD-10-PCS | Mod: ,,, | Performed by: NURSE PRACTITIONER

## 2021-01-01 PROCEDURE — 99291 PR CRITICAL CARE, E/M 30-74 MINUTES: ICD-10-PCS | Mod: ,,, | Performed by: INTERNAL MEDICINE

## 2021-01-01 PROCEDURE — 84484 ASSAY OF TROPONIN QUANT: CPT | Performed by: PHYSICIAN ASSISTANT

## 2021-01-01 PROCEDURE — 99239 HOSP IP/OBS DSCHRG MGMT >30: CPT | Mod: ,,, | Performed by: HOSPITALIST

## 2021-01-01 PROCEDURE — 80053 COMPREHEN METABOLIC PANEL: CPT | Performed by: UROLOGY

## 2021-01-01 PROCEDURE — 92526 ORAL FUNCTION THERAPY: CPT

## 2021-01-01 PROCEDURE — 99999 PR PBB SHADOW E&M-EST. PATIENT-LVL III: ICD-10-PCS | Mod: PBBFAC,,, | Performed by: INTERNAL MEDICINE

## 2021-01-01 PROCEDURE — 99291 CRITICAL CARE FIRST HOUR: CPT | Mod: ,,, | Performed by: INTERNAL MEDICINE

## 2021-01-01 PROCEDURE — 84156 ASSAY OF PROTEIN URINE: CPT | Performed by: INTERNAL MEDICINE

## 2021-01-01 PROCEDURE — 25000003 PHARM REV CODE 250: Performed by: EMERGENCY MEDICINE

## 2021-01-01 PROCEDURE — 99291 CRITICAL CARE FIRST HOUR: CPT | Mod: 25,,, | Performed by: PSYCHIATRY & NEUROLOGY

## 2021-01-01 PROCEDURE — 82784 ASSAY IGA/IGD/IGG/IGM EACH: CPT | Performed by: INTERNAL MEDICINE

## 2021-01-01 PROCEDURE — 88313 PR  SPECIAL STAINS,GROUP II: ICD-10-PCS | Mod: 26,,, | Performed by: PATHOLOGY

## 2021-01-01 PROCEDURE — 88341 IMHCHEM/IMCYTCHM EA ADD ANTB: CPT | Performed by: PATHOLOGY

## 2021-01-01 PROCEDURE — 80048 BASIC METABOLIC PNL TOTAL CA: CPT | Performed by: STUDENT IN AN ORGANIZED HEALTH CARE EDUCATION/TRAINING PROGRAM

## 2021-01-01 PROCEDURE — 83880 ASSAY OF NATRIURETIC PEPTIDE: CPT | Performed by: INTERNAL MEDICINE

## 2021-01-01 PROCEDURE — 99497 ADVNCD CARE PLAN 30 MIN: CPT | Mod: 25,,, | Performed by: INTERNAL MEDICINE

## 2021-01-01 PROCEDURE — 83880 ASSAY OF NATRIURETIC PEPTIDE: CPT

## 2021-01-01 PROCEDURE — 99291 PR CRITICAL CARE, E/M 30-74 MINUTES: ICD-10-PCS | Mod: ,,, | Performed by: PSYCHIATRY & NEUROLOGY

## 2021-01-01 PROCEDURE — 84165 PATHOLOGIST INTERPRETATION SPE: ICD-10-PCS | Mod: 26,,, | Performed by: PATHOLOGY

## 2021-01-01 PROCEDURE — 93010 ELECTROCARDIOGRAM REPORT: CPT | Mod: ,,, | Performed by: INTERNAL MEDICINE

## 2021-01-01 PROCEDURE — D9220A PRA ANESTHESIA: Mod: ANES,,, | Performed by: ANESTHESIOLOGY

## 2021-01-01 PROCEDURE — 99291 CRITICAL CARE FIRST HOUR: CPT | Mod: ,,, | Performed by: NURSE PRACTITIONER

## 2021-01-01 PROCEDURE — 36620 ARTERIAL LINE: ICD-10-PCS | Mod: 59,,, | Performed by: INTERNAL MEDICINE

## 2021-01-01 PROCEDURE — 88185 FLOWCYTOMETRY/TC ADD-ON: CPT | Performed by: PATHOLOGY

## 2021-01-01 PROCEDURE — 99233 SBSQ HOSP IP/OBS HIGH 50: CPT | Mod: ,,, | Performed by: HOSPITALIST

## 2021-01-01 PROCEDURE — 99233 SBSQ HOSP IP/OBS HIGH 50: CPT | Mod: ,,, | Performed by: NURSE PRACTITIONER

## 2021-01-01 PROCEDURE — 99233 PR SUBSEQUENT HOSPITAL CARE,LEVL III: ICD-10-PCS | Mod: ,,, | Performed by: INTERNAL MEDICINE

## 2021-01-01 PROCEDURE — 97530 THERAPEUTIC ACTIVITIES: CPT | Mod: PO

## 2021-01-01 PROCEDURE — 99214 OFFICE O/P EST MOD 30 MIN: CPT | Mod: S$PBB,,, | Performed by: NURSE PRACTITIONER

## 2021-01-01 PROCEDURE — 85007 BL SMEAR W/DIFF WBC COUNT: CPT | Performed by: HOSPITALIST

## 2021-01-01 PROCEDURE — 97110 THERAPEUTIC EXERCISES: CPT | Mod: CQ

## 2021-01-01 PROCEDURE — 85610 PROTHROMBIN TIME: CPT | Performed by: HOSPITALIST

## 2021-01-01 PROCEDURE — 80053 COMPREHEN METABOLIC PANEL: CPT | Performed by: HOSPITALIST

## 2021-01-01 PROCEDURE — 83930 ASSAY OF BLOOD OSMOLALITY: CPT | Performed by: EMERGENCY MEDICINE

## 2021-01-01 PROCEDURE — 99292 PR CRITICAL CARE, ADDL 30 MIN: ICD-10-PCS | Mod: ,,, | Performed by: INTERNAL MEDICINE

## 2021-01-01 PROCEDURE — 84100 ASSAY OF PHOSPHORUS: CPT | Performed by: HOSPITALIST

## 2021-01-01 PROCEDURE — 99214 PR OFFICE/OUTPT VISIT, EST, LEVL IV, 30-39 MIN: ICD-10-PCS | Mod: S$PBB,,, | Performed by: NURSE PRACTITIONER

## 2021-01-01 PROCEDURE — 25000003 PHARM REV CODE 250: Performed by: NURSE ANESTHETIST, CERTIFIED REGISTERED

## 2021-01-01 PROCEDURE — 99291 PR CRITICAL CARE, E/M 30-74 MINUTES: ICD-10-PCS | Mod: 25,,, | Performed by: NURSE PRACTITIONER

## 2021-01-01 PROCEDURE — 87206 SMEAR FLUORESCENT/ACID STAI: CPT | Performed by: HOSPITALIST

## 2021-01-01 PROCEDURE — 99213 OFFICE O/P EST LOW 20 MIN: CPT | Mod: PBBFAC,25 | Performed by: INTERNAL MEDICINE

## 2021-01-01 PROCEDURE — 99285 EMERGENCY DEPT VISIT HI MDM: CPT | Mod: ,,, | Performed by: EMERGENCY MEDICINE

## 2021-01-01 PROCEDURE — 99232 SBSQ HOSP IP/OBS MODERATE 35: CPT | Mod: ,,, | Performed by: NURSE PRACTITIONER

## 2021-01-01 PROCEDURE — D9220A PRA ANESTHESIA: ICD-10-PCS | Mod: ANES,,, | Performed by: ANESTHESIOLOGY

## 2021-01-01 PROCEDURE — 36620 ARTERIAL LINE: ICD-10-PCS | Mod: ,,, | Performed by: PHYSICIAN ASSISTANT

## 2021-01-01 PROCEDURE — 82550 ASSAY OF CK (CPK): CPT | Performed by: PHYSICIAN ASSISTANT

## 2021-01-01 PROCEDURE — 37000008 HC ANESTHESIA 1ST 15 MINUTES: Performed by: SURGERY

## 2021-01-01 PROCEDURE — 90945 DIALYSIS ONE EVALUATION: CPT

## 2021-01-01 PROCEDURE — 63600175 PHARM REV CODE 636 W HCPCS: Performed by: PHYSICIAN ASSISTANT

## 2021-01-01 PROCEDURE — 85027 COMPLETE CBC AUTOMATED: CPT | Performed by: HOSPITALIST

## 2021-01-01 PROCEDURE — 63600175 PHARM REV CODE 636 W HCPCS: Performed by: INTERNAL MEDICINE

## 2021-01-01 PROCEDURE — 97110 THERAPEUTIC EXERCISES: CPT | Mod: PO,CQ

## 2021-01-01 PROCEDURE — 82784 ASSAY IGA/IGD/IGG/IGM EACH: CPT | Mod: 59 | Performed by: INTERNAL MEDICINE

## 2021-01-01 PROCEDURE — 83605 ASSAY OF LACTIC ACID: CPT | Mod: 91 | Performed by: STUDENT IN AN ORGANIZED HEALTH CARE EDUCATION/TRAINING PROGRAM

## 2021-01-01 PROCEDURE — 88365 PR  TISSUE HYBRIDIZATION: ICD-10-PCS | Mod: 26,,, | Performed by: PATHOLOGY

## 2021-01-01 PROCEDURE — 37799 UNLISTED PX VASCULAR SURGERY: CPT

## 2021-01-01 PROCEDURE — 84540 ASSAY OF URINE/UREA-N: CPT | Performed by: EMERGENCY MEDICINE

## 2021-01-01 PROCEDURE — 99285 PR EMERGENCY DEPT VISIT,LEVEL V: ICD-10-PCS | Mod: ,,, | Performed by: EMERGENCY MEDICINE

## 2021-01-01 PROCEDURE — 99292 PR CRITICAL CARE, ADDL 30 MIN: ICD-10-PCS | Mod: 25,,, | Performed by: NURSE PRACTITIONER

## 2021-01-01 PROCEDURE — 99291 CRITICAL CARE FIRST HOUR: CPT | Mod: 25,,, | Performed by: NURSE PRACTITIONER

## 2021-01-01 PROCEDURE — 27603 PR DRAIN LOWER LEG DEEP ABSC/HEMATOMA: ICD-10-PCS | Mod: 51,LT,, | Performed by: SURGERY

## 2021-01-01 PROCEDURE — 97164 PT RE-EVAL EST PLAN CARE: CPT

## 2021-01-01 PROCEDURE — 97116 GAIT TRAINING THERAPY: CPT | Mod: PO

## 2021-01-01 PROCEDURE — 78816 NM PET CT WHOLE BODY: ICD-10-PCS | Mod: 26,PS,, | Performed by: RADIOLOGY

## 2021-01-01 PROCEDURE — 83036 HEMOGLOBIN GLYCOSYLATED A1C: CPT | Performed by: UROLOGY

## 2021-01-01 PROCEDURE — 25000003 PHARM REV CODE 250: Performed by: PSYCHIATRY & NEUROLOGY

## 2021-01-01 PROCEDURE — U0002 COVID-19 LAB TEST NON-CDC: HCPCS | Performed by: EMERGENCY MEDICINE

## 2021-01-01 PROCEDURE — 97530 THERAPEUTIC ACTIVITIES: CPT | Mod: CO

## 2021-01-01 PROCEDURE — 99213 OFFICE O/P EST LOW 20 MIN: CPT | Mod: PBBFAC | Performed by: INTERNAL MEDICINE

## 2021-01-01 PROCEDURE — 63600175 PHARM REV CODE 636 W HCPCS: Performed by: UROLOGY

## 2021-01-01 PROCEDURE — 99232 PR SUBSEQUENT HOSPITAL CARE,LEVL II: ICD-10-PCS | Mod: ,,, | Performed by: PSYCHIATRY & NEUROLOGY

## 2021-01-01 PROCEDURE — 99291 CRITICAL CARE FIRST HOUR: CPT | Mod: CS,,, | Performed by: EMERGENCY MEDICINE

## 2021-01-01 PROCEDURE — 99291 PR CRITICAL CARE, E/M 30-74 MINUTES: ICD-10-PCS | Mod: 25,,, | Performed by: INTERNAL MEDICINE

## 2021-01-01 PROCEDURE — 88274 CYTOGENETICS 25-99: CPT | Mod: 59 | Performed by: NURSE PRACTITIONER

## 2021-01-01 PROCEDURE — 99497 ADVNCD CARE PLAN 30 MIN: CPT | Mod: ,,, | Performed by: INTERNAL MEDICINE

## 2021-01-01 PROCEDURE — 99497 PR ADVNCD CARE PLAN 30 MIN: ICD-10-PCS | Mod: 25,,, | Performed by: INTERNAL MEDICINE

## 2021-01-01 PROCEDURE — 82570 ASSAY OF URINE CREATININE: CPT | Performed by: EMERGENCY MEDICINE

## 2021-01-01 PROCEDURE — 99284 EMERGENCY DEPT VISIT MOD MDM: CPT | Mod: 25

## 2021-01-01 PROCEDURE — 97116 GAIT TRAINING THERAPY: CPT

## 2021-01-01 PROCEDURE — 80048 BASIC METABOLIC PNL TOTAL CA: CPT | Mod: 91 | Performed by: STUDENT IN AN ORGANIZED HEALTH CARE EDUCATION/TRAINING PROGRAM

## 2021-01-01 PROCEDURE — 96365 THER/PROPH/DIAG IV INF INIT: CPT

## 2021-01-01 PROCEDURE — 85025 COMPLETE CBC W/AUTO DIFF WBC: CPT | Performed by: EMERGENCY MEDICINE

## 2021-01-01 PROCEDURE — 36620 INSERTION CATHETER ARTERY: CPT | Mod: 59,,, | Performed by: INTERNAL MEDICINE

## 2021-01-01 PROCEDURE — 80053 COMPREHEN METABOLIC PANEL: CPT | Performed by: NURSE PRACTITIONER

## 2021-01-01 PROCEDURE — 83735 ASSAY OF MAGNESIUM: CPT | Mod: 91 | Performed by: STUDENT IN AN ORGANIZED HEALTH CARE EDUCATION/TRAINING PROGRAM

## 2021-01-01 PROCEDURE — 99233 SBSQ HOSP IP/OBS HIGH 50: CPT | Mod: ,,, | Performed by: PSYCHIATRY & NEUROLOGY

## 2021-01-01 PROCEDURE — 85060 BLOOD SMEAR INTERPRETATION: CPT | Mod: ,,, | Performed by: PATHOLOGY

## 2021-01-01 PROCEDURE — 99222 1ST HOSP IP/OBS MODERATE 55: CPT | Mod: ,,, | Performed by: NURSE PRACTITIONER

## 2021-01-01 PROCEDURE — 88313 SPECIAL STAINS GROUP 2: CPT | Performed by: PATHOLOGY

## 2021-01-01 PROCEDURE — 93296 REM INTERROG EVL PM/IDS: CPT | Performed by: INTERNAL MEDICINE

## 2021-01-01 PROCEDURE — 86900 BLOOD TYPING SEROLOGIC ABO: CPT | Performed by: ANESTHESIOLOGY

## 2021-01-01 PROCEDURE — 99214 OFFICE O/P EST MOD 30 MIN: CPT | Mod: PBBFAC | Performed by: INTERNAL MEDICINE

## 2021-01-01 PROCEDURE — 88311 PR  DECALCIFY TISSUE: ICD-10-PCS | Mod: 26,,, | Performed by: PATHOLOGY

## 2021-01-01 PROCEDURE — 82784 ASSAY IGA/IGD/IGG/IGM EACH: CPT | Mod: 59

## 2021-01-01 PROCEDURE — 93010 ELECTROCARDIOGRAM REPORT: CPT | Mod: S$PBB,,, | Performed by: INTERNAL MEDICINE

## 2021-01-01 PROCEDURE — 88365 INSITU HYBRIDIZATION (FISH): CPT | Mod: 26,,, | Performed by: PATHOLOGY

## 2021-01-01 PROCEDURE — 80069 RENAL FUNCTION PANEL: CPT | Performed by: INTERNAL MEDICINE

## 2021-01-01 PROCEDURE — 85027 COMPLETE CBC AUTOMATED: CPT | Mod: 91 | Performed by: HOSPITALIST

## 2021-01-01 PROCEDURE — 27100171 HC OXYGEN HIGH FLOW UP TO 24 HOURS

## 2021-01-01 PROCEDURE — 63600175 PHARM REV CODE 636 W HCPCS: Performed by: NURSE ANESTHETIST, CERTIFIED REGISTERED

## 2021-01-01 PROCEDURE — 99232 PR SUBSEQUENT HOSPITAL CARE,LEVL II: ICD-10-PCS | Mod: ,,, | Performed by: NURSE PRACTITIONER

## 2021-01-01 PROCEDURE — 83880 ASSAY OF NATRIURETIC PEPTIDE: CPT | Performed by: NURSE PRACTITIONER

## 2021-01-01 PROCEDURE — 36000706: Performed by: SURGERY

## 2021-01-01 PROCEDURE — 27301 DRAIN THIGH/KNEE LESION: CPT | Mod: LT,,, | Performed by: SURGERY

## 2021-01-01 PROCEDURE — 97161 PT EVAL LOW COMPLEX 20 MIN: CPT

## 2021-01-01 PROCEDURE — 82550 ASSAY OF CK (CPK): CPT | Performed by: HOSPITALIST

## 2021-01-01 PROCEDURE — 99223 PR INITIAL HOSPITAL CARE,LEVL III: ICD-10-PCS | Mod: ,,, | Performed by: PHYSICIAN ASSISTANT

## 2021-01-01 PROCEDURE — 88365 INSITU HYBRIDIZATION (FISH): CPT | Performed by: PATHOLOGY

## 2021-01-01 PROCEDURE — 85610 PROTHROMBIN TIME: CPT | Performed by: EMERGENCY MEDICINE

## 2021-01-01 PROCEDURE — D9220A PRA ANESTHESIA: ICD-10-PCS | Mod: CRNA,,, | Performed by: NURSE ANESTHETIST, CERTIFIED REGISTERED

## 2021-01-01 PROCEDURE — 83735 ASSAY OF MAGNESIUM: CPT | Performed by: EMERGENCY MEDICINE

## 2021-01-01 PROCEDURE — 88313 SPECIAL STAINS GROUP 2: CPT | Mod: 26,,, | Performed by: PATHOLOGY

## 2021-01-01 PROCEDURE — 86334 IMMUNOFIX E-PHORESIS SERUM: CPT

## 2021-01-01 PROCEDURE — 82330 ASSAY OF CALCIUM: CPT | Performed by: EMERGENCY MEDICINE

## 2021-01-01 PROCEDURE — 99232 SBSQ HOSP IP/OBS MODERATE 35: CPT | Mod: ,,, | Performed by: PSYCHIATRY & NEUROLOGY

## 2021-01-01 PROCEDURE — 85025 COMPLETE CBC W/AUTO DIFF WBC: CPT | Performed by: NURSE PRACTITIONER

## 2021-01-01 PROCEDURE — 78816 PET IMAGE W/CT FULL BODY: CPT | Mod: 26,PS,, | Performed by: RADIOLOGY

## 2021-01-01 PROCEDURE — 63600175 PHARM REV CODE 636 W HCPCS: Mod: JG | Performed by: PHYSICIAN ASSISTANT

## 2021-01-01 PROCEDURE — 25000003 PHARM REV CODE 250

## 2021-01-01 PROCEDURE — 97530 THERAPEUTIC ACTIVITIES: CPT | Mod: CQ

## 2021-01-01 PROCEDURE — C1751 CATH, INF, PER/CENT/MIDLINE: HCPCS

## 2021-01-01 PROCEDURE — 88364 INSITU HYBRIDIZATION (FISH): CPT | Performed by: PATHOLOGY

## 2021-01-01 PROCEDURE — 99233 PR SUBSEQUENT HOSPITAL CARE,LEVL III: ICD-10-PCS | Mod: 25,,, | Performed by: INTERNAL MEDICINE

## 2021-01-01 PROCEDURE — 88341 IMHCHEM/IMCYTCHM EA ADD ANTB: CPT | Mod: 26,,, | Performed by: PATHOLOGY

## 2021-01-01 PROCEDURE — 97112 NEUROMUSCULAR REEDUCATION: CPT

## 2021-01-01 PROCEDURE — 87102 FUNGUS ISOLATION CULTURE: CPT | Performed by: HOSPITALIST

## 2021-01-01 PROCEDURE — 36415 COLL VENOUS BLD VENIPUNCTURE: CPT | Performed by: EMERGENCY MEDICINE

## 2021-01-01 PROCEDURE — 88311 DECALCIFY TISSUE: CPT | Mod: 26,,, | Performed by: PATHOLOGY

## 2021-01-01 PROCEDURE — 99292 CRITICAL CARE ADDL 30 MIN: CPT | Mod: ,,, | Performed by: INTERNAL MEDICINE

## 2021-01-01 PROCEDURE — 87529 HSV DNA AMP PROBE: CPT | Performed by: NURSE PRACTITIONER

## 2021-01-01 PROCEDURE — 99223 1ST HOSP IP/OBS HIGH 75: CPT | Mod: ,,, | Performed by: SURGERY

## 2021-01-01 PROCEDURE — 97162 PT EVAL MOD COMPLEX 30 MIN: CPT | Mod: PO

## 2021-01-01 PROCEDURE — 25500020 PHARM REV CODE 255: Performed by: HOSPITALIST

## 2021-01-01 PROCEDURE — 83605 ASSAY OF LACTIC ACID: CPT | Mod: 91 | Performed by: HOSPITALIST

## 2021-01-01 PROCEDURE — U0003 INFECTIOUS AGENT DETECTION BY NUCLEIC ACID (DNA OR RNA); SEVERE ACUTE RESPIRATORY SYNDROME CORONAVIRUS 2 (SARS-COV-2) (CORONAVIRUS DISEASE [COVID-19]), AMPLIFIED PROBE TECHNIQUE, MAKING USE OF HIGH THROUGHPUT TECHNOLOGIES AS DESCRIBED BY CMS-2020-01-R: HCPCS | Performed by: HOSPITALIST

## 2021-01-01 PROCEDURE — 36556 INSERT NON-TUNNEL CV CATH: CPT | Mod: ,,, | Performed by: NURSE PRACTITIONER

## 2021-01-01 PROCEDURE — 97112 NEUROMUSCULAR REEDUCATION: CPT | Mod: CQ

## 2021-01-01 PROCEDURE — 37000009 HC ANESTHESIA EA ADD 15 MINS: Performed by: SURGERY

## 2021-01-01 PROCEDURE — 38222 BONE MARROW: ICD-10-PCS | Mod: S$PBB,RT,, | Performed by: NURSE PRACTITIONER

## 2021-01-01 PROCEDURE — 84484 ASSAY OF TROPONIN QUANT: CPT | Mod: 91 | Performed by: PHYSICIAN ASSISTANT

## 2021-01-01 PROCEDURE — 27301 PR INCIS/DRAIN THIGH/KNEE ABSCESS,DEEP: ICD-10-PCS | Mod: LT,,, | Performed by: SURGERY

## 2021-01-01 PROCEDURE — 93283 PRGRMG EVAL IMPLANTABLE DFB: CPT

## 2021-01-01 PROCEDURE — 94761 N-INVAS EAR/PLS OXIMETRY MLT: CPT

## 2021-01-01 PROCEDURE — 99291 PR CRITICAL CARE, E/M 30-74 MINUTES: ICD-10-PCS | Mod: CS,,, | Performed by: EMERGENCY MEDICINE

## 2021-01-01 PROCEDURE — 93010 RHYTHM STRIP: ICD-10-PCS | Mod: S$PBB,,, | Performed by: INTERNAL MEDICINE

## 2021-01-01 PROCEDURE — 83970 ASSAY OF PARATHORMONE: CPT | Performed by: INTERNAL MEDICINE

## 2021-01-01 PROCEDURE — 99222 1ST HOSP IP/OBS MODERATE 55: CPT | Mod: ,,, | Performed by: STUDENT IN AN ORGANIZED HEALTH CARE EDUCATION/TRAINING PROGRAM

## 2021-01-01 PROCEDURE — 36410 VNPNXR 3YR/> PHY/QHP DX/THER: CPT

## 2021-01-01 PROCEDURE — 80048 BASIC METABOLIC PNL TOTAL CA: CPT | Performed by: EMERGENCY MEDICINE

## 2021-01-01 PROCEDURE — 83735 ASSAY OF MAGNESIUM: CPT | Performed by: STUDENT IN AN ORGANIZED HEALTH CARE EDUCATION/TRAINING PROGRAM

## 2021-01-01 PROCEDURE — 97165 OT EVAL LOW COMPLEX 30 MIN: CPT

## 2021-01-01 PROCEDURE — 82550 ASSAY OF CK (CPK): CPT | Performed by: EMERGENCY MEDICINE

## 2021-01-01 PROCEDURE — 84484 ASSAY OF TROPONIN QUANT: CPT

## 2021-01-01 PROCEDURE — 85652 RBC SED RATE AUTOMATED: CPT | Performed by: NURSE PRACTITIONER

## 2021-01-01 PROCEDURE — 82550 ASSAY OF CK (CPK): CPT | Performed by: INTERNAL MEDICINE

## 2021-01-01 PROCEDURE — 63600175 PHARM REV CODE 636 W HCPCS

## 2021-01-01 PROCEDURE — 99223 1ST HOSP IP/OBS HIGH 75: CPT | Mod: 57,,, | Performed by: SURGERY

## 2021-01-01 PROCEDURE — 83735 ASSAY OF MAGNESIUM: CPT | Performed by: INTERNAL MEDICINE

## 2021-01-01 PROCEDURE — 27000190 HC CPAP FULL FACE MASK W/VALVE

## 2021-01-01 PROCEDURE — 36556 PR INSERT NON-TUNNEL CV CATH 5+ YRS OLD: ICD-10-PCS | Mod: ,,, | Performed by: NURSE PRACTITIONER

## 2021-01-01 PROCEDURE — 99239 PR HOSPITAL DISCHARGE DAY,>30 MIN: ICD-10-PCS | Mod: ,,, | Performed by: HOSPITALIST

## 2021-01-01 PROCEDURE — 25500020 PHARM REV CODE 255: Performed by: PSYCHIATRY & NEUROLOGY

## 2021-01-01 PROCEDURE — 27603 DRAIN LOWER LEG LESION: CPT | Mod: 51,LT,, | Performed by: SURGERY

## 2021-01-01 PROCEDURE — 84443 ASSAY THYROID STIM HORMONE: CPT | Performed by: EMERGENCY MEDICINE

## 2021-01-01 PROCEDURE — 99291 PR CRITICAL CARE, E/M 30-74 MINUTES: ICD-10-PCS | Mod: ,,, | Performed by: PHYSICIAN ASSISTANT

## 2021-01-01 PROCEDURE — 84300 ASSAY OF URINE SODIUM: CPT | Performed by: EMERGENCY MEDICINE

## 2021-01-01 PROCEDURE — 80048 BASIC METABOLIC PNL TOTAL CA: CPT | Performed by: PHYSICIAN ASSISTANT

## 2021-01-01 PROCEDURE — 99291 PR CRITICAL CARE, E/M 30-74 MINUTES: ICD-10-PCS | Mod: 25,,, | Performed by: PSYCHIATRY & NEUROLOGY

## 2021-01-01 PROCEDURE — 99232 PR SUBSEQUENT HOSPITAL CARE,LEVL II: ICD-10-PCS | Mod: ,,, | Performed by: HOSPITALIST

## 2021-01-01 PROCEDURE — 25500020 PHARM REV CODE 255: Performed by: INTERNAL MEDICINE

## 2021-01-01 PROCEDURE — 87070 CULTURE OTHR SPECIMN AEROBIC: CPT | Performed by: SURGERY

## 2021-01-01 PROCEDURE — 87070 CULTURE OTHR SPECIMN AEROBIC: CPT | Mod: 59 | Performed by: HOSPITALIST

## 2021-01-01 PROCEDURE — 84484 ASSAY OF TROPONIN QUANT: CPT | Mod: 91 | Performed by: INTERNAL MEDICINE

## 2021-01-01 PROCEDURE — 80202 ASSAY OF VANCOMYCIN: CPT | Performed by: HOSPITALIST

## 2021-01-01 PROCEDURE — 99214 OFFICE O/P EST MOD 30 MIN: CPT | Mod: PBBFAC | Performed by: STUDENT IN AN ORGANIZED HEALTH CARE EDUCATION/TRAINING PROGRAM

## 2021-01-01 PROCEDURE — 12000002 HC ACUTE/MED SURGE SEMI-PRIVATE ROOM

## 2021-01-01 PROCEDURE — 85060 PATHOLOGIST REVIEW: ICD-10-PCS | Mod: ,,, | Performed by: PATHOLOGY

## 2021-01-01 PROCEDURE — 93283 PRGRMG EVAL IMPLANTABLE DFB: CPT | Mod: 26,,, | Performed by: INTERNAL MEDICINE

## 2021-01-01 PROCEDURE — 93283 CARDIAC DEVICE CHECK - IN CLINIC & HOSPITAL: ICD-10-PCS | Mod: 26,,, | Performed by: INTERNAL MEDICINE

## 2021-01-01 PROCEDURE — 85025 COMPLETE CBC W/AUTO DIFF WBC: CPT | Mod: 91 | Performed by: EMERGENCY MEDICINE

## 2021-01-01 PROCEDURE — 99233 SBSQ HOSP IP/OBS HIGH 50: CPT | Mod: 25,,, | Performed by: INTERNAL MEDICINE

## 2021-01-01 PROCEDURE — 99291 PR CRITICAL CARE, E/M 30-74 MINUTES: ICD-10-PCS | Mod: ,,, | Performed by: NURSE PRACTITIONER

## 2021-01-01 PROCEDURE — 97112 NEUROMUSCULAR REEDUCATION: CPT | Mod: CO

## 2021-01-01 PROCEDURE — A4217 STERILE WATER/SALINE, 500 ML: HCPCS | Performed by: PHYSICIAN ASSISTANT

## 2021-01-01 PROCEDURE — P9047 ALBUMIN (HUMAN), 25%, 50ML: HCPCS | Mod: JG | Performed by: NURSE PRACTITIONER

## 2021-01-01 PROCEDURE — 92523 SPEECH SOUND LANG COMPREHEN: CPT

## 2021-01-01 PROCEDURE — 85730 THROMBOPLASTIN TIME PARTIAL: CPT | Mod: 91 | Performed by: HOSPITALIST

## 2021-01-01 PROCEDURE — 82728 ASSAY OF FERRITIN: CPT | Performed by: HOSPITALIST

## 2021-01-01 PROCEDURE — 87186 SC STD MICRODIL/AGAR DIL: CPT | Performed by: EMERGENCY MEDICINE

## 2021-01-01 PROCEDURE — 99292 CRITICAL CARE ADDL 30 MIN: CPT | Mod: 25,,, | Performed by: NURSE PRACTITIONER

## 2021-01-01 PROCEDURE — 36620 INSERTION CATHETER ARTERY: CPT | Mod: ,,, | Performed by: PHYSICIAN ASSISTANT

## 2021-01-01 PROCEDURE — 85730 THROMBOPLASTIN TIME PARTIAL: CPT | Performed by: EMERGENCY MEDICINE

## 2021-01-01 PROCEDURE — 88342 IMHCHEM/IMCYTCHM 1ST ANTB: CPT | Mod: 26,59,, | Performed by: PATHOLOGY

## 2021-01-01 PROCEDURE — 88305 TISSUE EXAM BY PATHOLOGIST: ICD-10-PCS | Mod: 26,,, | Performed by: PATHOLOGY

## 2021-01-01 PROCEDURE — 88184 FLOWCYTOMETRY/ TC 1 MARKER: CPT | Performed by: PATHOLOGY

## 2021-01-01 PROCEDURE — 85097 PR  BONE MARROW,SMEAR INTERPRETATION: ICD-10-PCS | Mod: ,,, | Performed by: PATHOLOGY

## 2021-01-01 PROCEDURE — 99213 OFFICE O/P EST LOW 20 MIN: CPT | Mod: PBBFAC,25 | Performed by: NURSE PRACTITIONER

## 2021-01-01 PROCEDURE — 83605 ASSAY OF LACTIC ACID: CPT | Performed by: NURSE PRACTITIONER

## 2021-01-01 PROCEDURE — 97165 OT EVAL LOW COMPLEX 30 MIN: CPT | Mod: PO

## 2021-01-01 PROCEDURE — 88341 PR IHC OR ICC EACH ADD'L SINGLE ANTIBODY  STAINPR: ICD-10-PCS | Mod: 26,,, | Performed by: PATHOLOGY

## 2021-01-01 PROCEDURE — 85025 COMPLETE CBC W/AUTO DIFF WBC: CPT

## 2021-01-01 PROCEDURE — 83520 IMMUNOASSAY QUANT NOS NONAB: CPT

## 2021-01-01 PROCEDURE — 81000 URINALYSIS NONAUTO W/SCOPE: CPT | Performed by: EMERGENCY MEDICINE

## 2021-01-01 PROCEDURE — 83880 ASSAY OF NATRIURETIC PEPTIDE: CPT | Mod: 91

## 2021-01-01 PROCEDURE — 83735 ASSAY OF MAGNESIUM: CPT | Mod: 91 | Performed by: INTERNAL MEDICINE

## 2021-01-01 PROCEDURE — U0005 INFEC AGEN DETEC AMPLI PROBE: HCPCS | Performed by: HOSPITALIST

## 2021-01-01 PROCEDURE — 83605 ASSAY OF LACTIC ACID: CPT | Mod: 91 | Performed by: EMERGENCY MEDICINE

## 2021-01-01 PROCEDURE — 84466 ASSAY OF TRANSFERRIN: CPT | Performed by: HOSPITALIST

## 2021-01-01 PROCEDURE — 99999 PR PBB SHADOW E&M-EST. PATIENT-LVL III: CPT | Mod: PBBFAC,,, | Performed by: INTERNAL MEDICINE

## 2021-01-01 PROCEDURE — 82607 VITAMIN B-12: CPT | Performed by: HOSPITALIST

## 2021-01-01 PROCEDURE — 88342 CHG IMMUNOCYTOCHEMISTRY: ICD-10-PCS | Mod: 26,59,, | Performed by: PATHOLOGY

## 2021-01-01 PROCEDURE — 87116 MYCOBACTERIA CULTURE: CPT | Performed by: HOSPITALIST

## 2021-01-01 PROCEDURE — 82746 ASSAY OF FOLIC ACID SERUM: CPT | Performed by: HOSPITALIST

## 2021-01-01 PROCEDURE — 84165 PROTEIN E-PHORESIS SERUM: CPT

## 2021-01-01 PROCEDURE — 88342 IMHCHEM/IMCYTCHM 1ST ANTB: CPT | Performed by: PATHOLOGY

## 2021-01-01 PROCEDURE — 84484 ASSAY OF TROPONIN QUANT: CPT | Performed by: NURSE PRACTITIONER

## 2021-01-01 PROCEDURE — 99232 SBSQ HOSP IP/OBS MODERATE 35: CPT | Mod: ,,, | Performed by: HOSPITALIST

## 2021-01-01 PROCEDURE — A9698 NON-RAD CONTRAST MATERIALNOC: HCPCS | Performed by: INTERNAL MEDICINE

## 2021-01-01 PROCEDURE — 85025 COMPLETE CBC W/AUTO DIFF WBC: CPT | Performed by: HOSPITALIST

## 2021-01-01 PROCEDURE — 88189 PR  FLOWCYTOMETRY/READ, 16 & > MARKERS: ICD-10-PCS | Mod: 26,,, | Performed by: PATHOLOGY

## 2021-01-01 PROCEDURE — 99204 OFFICE O/P NEW MOD 45 MIN: CPT | Mod: S$PBB,,, | Performed by: STUDENT IN AN ORGANIZED HEALTH CARE EDUCATION/TRAINING PROGRAM

## 2021-01-01 PROCEDURE — 86920 COMPATIBILITY TEST SPIN: CPT | Performed by: ANESTHESIOLOGY

## 2021-01-01 PROCEDURE — 88271 CYTOGENETICS DNA PROBE: CPT | Mod: 59 | Performed by: NURSE PRACTITIONER

## 2021-01-01 PROCEDURE — 99223 PR INITIAL HOSPITAL CARE,LEVL III: ICD-10-PCS | Mod: ,,, | Performed by: SURGERY

## 2021-01-01 PROCEDURE — 63600175 PHARM REV CODE 636 W HCPCS: Mod: JG | Performed by: HOSPITALIST

## 2021-01-01 PROCEDURE — 83880 ASSAY OF NATRIURETIC PEPTIDE: CPT | Mod: 91 | Performed by: INTERNAL MEDICINE

## 2021-01-01 PROCEDURE — 85730 THROMBOPLASTIN TIME PARTIAL: CPT | Performed by: HOSPITALIST

## 2021-01-01 PROCEDURE — 99214 PR OFFICE/OUTPT VISIT, EST, LEVL IV, 30-39 MIN: ICD-10-PCS | Mod: S$GLB,,, | Performed by: NURSE PRACTITIONER

## 2021-01-01 PROCEDURE — 37000009 HC ANESTHESIA EA ADD 15 MINS: Performed by: INTERNAL MEDICINE

## 2021-01-01 PROCEDURE — 84484 ASSAY OF TROPONIN QUANT: CPT | Performed by: EMERGENCY MEDICINE

## 2021-01-01 PROCEDURE — 63600150 PHARM REV CODE 636: Performed by: INTERNAL MEDICINE

## 2021-01-01 PROCEDURE — 83520 IMMUNOASSAY QUANT NOS NONAB: CPT | Mod: 59 | Performed by: INTERNAL MEDICINE

## 2021-01-01 PROCEDURE — 88364 CHG INSITU HYBRIDIZATION (FISH: ICD-10-PCS | Mod: 26,,, | Performed by: PATHOLOGY

## 2021-01-01 PROCEDURE — 80053 COMPREHEN METABOLIC PANEL: CPT | Mod: 91 | Performed by: PHYSICIAN ASSISTANT

## 2021-01-01 PROCEDURE — 99291 CRITICAL CARE FIRST HOUR: CPT | Mod: 25

## 2021-01-01 PROCEDURE — 99497 PR ADVNCD CARE PLAN 30 MIN: ICD-10-PCS | Mod: ,,, | Performed by: INTERNAL MEDICINE

## 2021-01-01 PROCEDURE — 83880 ASSAY OF NATRIURETIC PEPTIDE: CPT | Performed by: EMERGENCY MEDICINE

## 2021-01-01 PROCEDURE — 99233 PR SUBSEQUENT HOSPITAL CARE,LEVL III: ICD-10-PCS | Mod: ,,, | Performed by: NURSE PRACTITIONER

## 2021-01-01 PROCEDURE — 87075 CULTR BACTERIA EXCEPT BLOOD: CPT | Mod: 59 | Performed by: SURGERY

## 2021-01-01 PROCEDURE — 37000008 HC ANESTHESIA 1ST 15 MINUTES: Performed by: INTERNAL MEDICINE

## 2021-01-01 PROCEDURE — 85097 BONE MARROW INTERPRETATION: CPT | Mod: ,,, | Performed by: PATHOLOGY

## 2021-01-01 PROCEDURE — 99292 CRITICAL CARE ADDL 30 MIN: CPT | Mod: ,,, | Performed by: NURSE PRACTITIONER

## 2021-01-01 PROCEDURE — 80061 LIPID PANEL: CPT | Performed by: EMERGENCY MEDICINE

## 2021-01-01 RX ORDER — NOREPINEPHRINE BITARTRATE/D5W 4MG/250ML
0.05 PLASTIC BAG, INJECTION (ML) INTRAVENOUS CONTINUOUS
Status: DISCONTINUED | OUTPATIENT
Start: 2021-01-01 | End: 2021-01-01

## 2021-01-01 RX ORDER — MEROPENEM AND SODIUM CHLORIDE 1 G/50ML
1 INJECTION, SOLUTION INTRAVENOUS
Status: DISCONTINUED | OUTPATIENT
Start: 2021-01-01 | End: 2021-01-01 | Stop reason: DRUGHIGH

## 2021-01-01 RX ORDER — POTASSIUM CHLORIDE 20 MEQ/1
60 TABLET, EXTENDED RELEASE ORAL ONCE
Status: COMPLETED | OUTPATIENT
Start: 2021-01-01 | End: 2021-01-01

## 2021-01-01 RX ORDER — IBUPROFEN 200 MG
16 TABLET ORAL
Status: CANCELLED | OUTPATIENT
Start: 2021-01-01

## 2021-01-01 RX ORDER — GABAPENTIN 300 MG/1
300 CAPSULE ORAL NIGHTLY
Status: DISCONTINUED | OUTPATIENT
Start: 2021-01-01 | End: 2021-01-01 | Stop reason: HOSPADM

## 2021-01-01 RX ORDER — BUMETANIDE 2 MG/1
2 TABLET ORAL 2 TIMES DAILY
Qty: 60 TABLET | Refills: 11 | Status: SHIPPED | OUTPATIENT
Start: 2021-01-01 | End: 2021-01-01

## 2021-01-01 RX ORDER — SODIUM CHLORIDE 9 MG/ML
INJECTION, SOLUTION INTRAVENOUS CONTINUOUS
Status: DISCONTINUED | OUTPATIENT
Start: 2021-01-01 | End: 2021-01-01

## 2021-01-01 RX ORDER — NAPROXEN SODIUM 220 MG/1
81 TABLET, FILM COATED ORAL DAILY
Status: DISCONTINUED | OUTPATIENT
Start: 2021-01-01 | End: 2021-01-01 | Stop reason: HOSPADM

## 2021-01-01 RX ORDER — ASPIRIN 325 MG
325 TABLET ORAL
Status: CANCELLED | OUTPATIENT
Start: 2021-01-01 | End: 2021-01-01

## 2021-01-01 RX ORDER — HYDROCODONE BITARTRATE AND ACETAMINOPHEN 500; 5 MG/1; MG/1
TABLET ORAL CONTINUOUS
Status: ACTIVE | OUTPATIENT
Start: 2021-01-01 | End: 2021-01-01

## 2021-01-01 RX ORDER — ONDANSETRON 2 MG/ML
4 INJECTION INTRAMUSCULAR; INTRAVENOUS EVERY 8 HOURS PRN
Status: DISCONTINUED | OUTPATIENT
Start: 2021-01-01 | End: 2021-01-01 | Stop reason: HOSPADM

## 2021-01-01 RX ORDER — MORPHINE SULFATE 4 MG/ML
4 INJECTION, SOLUTION INTRAMUSCULAR; INTRAVENOUS EVERY 4 HOURS PRN
Status: CANCELLED | OUTPATIENT
Start: 2021-01-01

## 2021-01-01 RX ORDER — BUMETANIDE 1 MG/1
3 TABLET ORAL 2 TIMES DAILY
Qty: 180 TABLET | Refills: 11 | Status: SHIPPED | OUTPATIENT
Start: 2021-01-01 | End: 2021-01-01

## 2021-01-01 RX ORDER — NOREPINEPHRINE BITARTRATE/D5W 4MG/250ML
0-3 PLASTIC BAG, INJECTION (ML) INTRAVENOUS CONTINUOUS
Status: DISCONTINUED | OUTPATIENT
Start: 2021-01-01 | End: 2021-01-01

## 2021-01-01 RX ORDER — NAPROXEN SODIUM 220 MG/1
81 TABLET, FILM COATED ORAL DAILY
Qty: 30 TABLET | Refills: 11 | Status: SHIPPED | OUTPATIENT
Start: 2021-01-01 | End: 2021-01-01 | Stop reason: ALTCHOICE

## 2021-01-01 RX ORDER — ATORVASTATIN CALCIUM 20 MG/1
40 TABLET, FILM COATED ORAL DAILY
Status: DISCONTINUED | OUTPATIENT
Start: 2021-01-01 | End: 2021-01-01 | Stop reason: HOSPADM

## 2021-01-01 RX ORDER — KETAMINE HYDROCHLORIDE 100 MG/ML
INJECTION, SOLUTION INTRAMUSCULAR; INTRAVENOUS
Status: DISCONTINUED | OUTPATIENT
Start: 2021-01-01 | End: 2021-01-01

## 2021-01-01 RX ORDER — FLUDROCORTISONE ACETATE 0.1 MG/1
100 TABLET ORAL DAILY
Status: DISCONTINUED | OUTPATIENT
Start: 2021-01-01 | End: 2021-01-01

## 2021-01-01 RX ORDER — PRAMIPEXOLE DIHYDROCHLORIDE 0.12 MG/1
0.25 TABLET ORAL NIGHTLY PRN
Status: DISCONTINUED | OUTPATIENT
Start: 2021-01-01 | End: 2021-01-01

## 2021-01-01 RX ORDER — METOCLOPRAMIDE HYDROCHLORIDE 5 MG/ML
10 INJECTION INTRAMUSCULAR; INTRAVENOUS EVERY 6 HOURS PRN
Status: CANCELLED | OUTPATIENT
Start: 2021-01-01

## 2021-01-01 RX ORDER — LORATADINE 10 MG/1
10 TABLET ORAL NIGHTLY
Qty: 30 TABLET | Refills: 2 | Status: SHIPPED | OUTPATIENT
Start: 2021-01-01 | End: 2021-01-01

## 2021-01-01 RX ORDER — ONDANSETRON 2 MG/ML
4 INJECTION INTRAMUSCULAR; INTRAVENOUS EVERY 8 HOURS PRN
Status: CANCELLED | OUTPATIENT
Start: 2021-01-01

## 2021-01-01 RX ORDER — SODIUM,POTASSIUM PHOSPHATES 280-250MG
2 POWDER IN PACKET (EA) ORAL
Status: DISCONTINUED | OUTPATIENT
Start: 2021-01-01 | End: 2021-01-01

## 2021-01-01 RX ORDER — SODIUM CHLORIDE 9 MG/ML
INJECTION, SOLUTION INTRAVENOUS ONCE
Status: COMPLETED | OUTPATIENT
Start: 2021-01-01 | End: 2021-01-01

## 2021-01-01 RX ORDER — SPIRONOLACTONE 25 MG/1
25 TABLET ORAL DAILY
Status: DISCONTINUED | OUTPATIENT
Start: 2021-01-01 | End: 2021-01-01

## 2021-01-01 RX ORDER — LORAZEPAM 2 MG/ML
0.5 INJECTION INTRAMUSCULAR ONCE
Status: COMPLETED | OUTPATIENT
Start: 2021-01-01 | End: 2021-01-01

## 2021-01-01 RX ORDER — LIDOCAINE 50 MG/G
3 PATCH TOPICAL
Status: DISCONTINUED | OUTPATIENT
Start: 2021-01-01 | End: 2021-01-01

## 2021-01-01 RX ORDER — CLOPIDOGREL BISULFATE 75 MG/1
75 TABLET ORAL DAILY
Qty: 30 TABLET | Refills: 11 | Status: ON HOLD | OUTPATIENT
Start: 2021-01-01 | End: 2021-01-01 | Stop reason: HOSPADM

## 2021-01-01 RX ORDER — FUROSEMIDE 10 MG/ML
120 INJECTION INTRAMUSCULAR; INTRAVENOUS
Status: COMPLETED | OUTPATIENT
Start: 2021-01-01 | End: 2021-01-01

## 2021-01-01 RX ORDER — BACLOFEN 10 MG/1
20 TABLET ORAL 3 TIMES DAILY
Status: DISCONTINUED | OUTPATIENT
Start: 2021-01-01 | End: 2021-01-01

## 2021-01-01 RX ORDER — POTASSIUM CHLORIDE 20 MEQ/1
20 TABLET, EXTENDED RELEASE ORAL DAILY
Qty: 30 TABLET | Refills: 1 | Status: ON HOLD | OUTPATIENT
Start: 2021-01-01 | End: 2021-01-01 | Stop reason: HOSPADM

## 2021-01-01 RX ORDER — TALC
6 POWDER (GRAM) TOPICAL NIGHTLY PRN
Status: CANCELLED | OUTPATIENT
Start: 2021-01-01

## 2021-01-01 RX ORDER — NOREPINEPHRINE BITARTRATE/D5W 8 MG/250ML
0-3 PLASTIC BAG, INJECTION (ML) INTRAVENOUS CONTINUOUS
Status: DISCONTINUED | OUTPATIENT
Start: 2021-01-01 | End: 2021-01-01

## 2021-01-01 RX ORDER — ENOXAPARIN SODIUM 100 MG/ML
30 INJECTION SUBCUTANEOUS EVERY 24 HOURS
Status: DISCONTINUED | OUTPATIENT
Start: 2021-01-01 | End: 2021-01-01

## 2021-01-01 RX ORDER — ENOXAPARIN SODIUM 100 MG/ML
40 INJECTION SUBCUTANEOUS EVERY 24 HOURS
Status: DISCONTINUED | OUTPATIENT
Start: 2021-01-01 | End: 2021-01-01 | Stop reason: HOSPADM

## 2021-01-01 RX ORDER — METOLAZONE 5 MG/1
10 TABLET ORAL WEEKLY
Qty: 16 TABLET | Refills: 0 | Status: SHIPPED | OUTPATIENT
Start: 2021-01-01 | End: 2021-01-01 | Stop reason: DRUGHIGH

## 2021-01-01 RX ORDER — BUMETANIDE 2 MG/1
2 TABLET ORAL 2 TIMES DAILY
Qty: 210 TABLET | Refills: 3 | Status: ON HOLD | OUTPATIENT
Start: 2021-01-01 | End: 2021-01-01 | Stop reason: HOSPADM

## 2021-01-01 RX ORDER — BACLOFEN 10 MG/1
10 TABLET ORAL 3 TIMES DAILY
Status: DISCONTINUED | OUTPATIENT
Start: 2021-01-01 | End: 2021-01-01

## 2021-01-01 RX ORDER — FENTANYL CITRATE 50 UG/ML
25 INJECTION, SOLUTION INTRAMUSCULAR; INTRAVENOUS ONCE
Status: COMPLETED | OUTPATIENT
Start: 2021-01-01 | End: 2021-01-01

## 2021-01-01 RX ORDER — SPIRONOLACTONE 25 MG/1
50 TABLET ORAL DAILY
Qty: 90 TABLET | Refills: 3 | Status: ON HOLD | OUTPATIENT
Start: 2021-01-01 | End: 2021-01-01 | Stop reason: HOSPADM

## 2021-01-01 RX ORDER — IBUPROFEN 200 MG
24 TABLET ORAL
Status: CANCELLED | OUTPATIENT
Start: 2021-01-01

## 2021-01-01 RX ORDER — CLINDAMYCIN PHOSPHATE 600 MG/50ML
600 INJECTION, SOLUTION INTRAVENOUS
Status: DISCONTINUED | OUTPATIENT
Start: 2021-01-01 | End: 2021-01-01

## 2021-01-01 RX ORDER — ATORVASTATIN CALCIUM 40 MG/1
40 TABLET, FILM COATED ORAL DAILY
Status: CANCELLED | OUTPATIENT
Start: 2021-01-01

## 2021-01-01 RX ORDER — ASPIRIN 325 MG
325 TABLET ORAL DAILY
Status: CANCELLED | OUTPATIENT
Start: 2021-01-01

## 2021-01-01 RX ORDER — SODIUM CHLORIDE 0.9 % (FLUSH) 0.9 %
10 SYRINGE (ML) INJECTION
Status: DISCONTINUED | OUTPATIENT
Start: 2021-01-01 | End: 2021-01-01 | Stop reason: HOSPADM

## 2021-01-01 RX ORDER — MUPIROCIN 20 MG/G
OINTMENT TOPICAL 2 TIMES DAILY
Status: DISCONTINUED | OUTPATIENT
Start: 2021-01-01 | End: 2021-01-01

## 2021-01-01 RX ORDER — IBUPROFEN 200 MG
16 TABLET ORAL
Status: DISCONTINUED | OUTPATIENT
Start: 2021-01-01 | End: 2021-01-01 | Stop reason: HOSPADM

## 2021-01-01 RX ORDER — SPIRONOLACTONE 25 MG/1
25 TABLET ORAL DAILY
Status: DISCONTINUED | OUTPATIENT
Start: 2021-01-01 | End: 2021-01-01 | Stop reason: HOSPADM

## 2021-01-01 RX ORDER — GLUCAGON 1 MG
1 KIT INJECTION
Status: DISCONTINUED | OUTPATIENT
Start: 2021-01-01 | End: 2021-01-01 | Stop reason: HOSPADM

## 2021-01-01 RX ORDER — LIDOCAINE HYDROCHLORIDE 20 MG/ML
10 SOLUTION OROPHARYNGEAL ONCE
Status: COMPLETED | OUTPATIENT
Start: 2021-01-01 | End: 2021-01-01

## 2021-01-01 RX ORDER — INDOMETHACIN 25 MG/1
150 CAPSULE ORAL ONCE
Status: COMPLETED | OUTPATIENT
Start: 2021-01-01 | End: 2021-01-01

## 2021-01-01 RX ORDER — HYDROMORPHONE HYDROCHLORIDE 2 MG/ML
0.5 INJECTION, SOLUTION INTRAMUSCULAR; INTRAVENOUS; SUBCUTANEOUS EVERY 4 HOURS PRN
Status: DISCONTINUED | OUTPATIENT
Start: 2021-01-01 | End: 2021-01-01

## 2021-01-01 RX ORDER — ACETAMINOPHEN 325 MG/1
650 TABLET ORAL EVERY 8 HOURS PRN
Status: CANCELLED | OUTPATIENT
Start: 2021-01-01

## 2021-01-01 RX ORDER — SODIUM CHLORIDE 9 MG/ML
INJECTION, SOLUTION INTRAVENOUS CONTINUOUS
Status: ACTIVE | OUTPATIENT
Start: 2021-01-01 | End: 2021-01-01

## 2021-01-01 RX ORDER — VALACYCLOVIR HYDROCHLORIDE 1 G/1
1000 TABLET, FILM COATED ORAL 3 TIMES DAILY
Qty: 21 TABLET | Refills: 0 | Status: SHIPPED | OUTPATIENT
Start: 2021-01-01 | End: 2021-01-01 | Stop reason: ALTCHOICE

## 2021-01-01 RX ORDER — CLOPIDOGREL BISULFATE 75 MG/1
75 TABLET ORAL DAILY
Status: DISCONTINUED | OUTPATIENT
Start: 2021-01-01 | End: 2021-01-01 | Stop reason: HOSPADM

## 2021-01-01 RX ORDER — LIDOCAINE HYDROCHLORIDE 10 MG/ML
10 INJECTION INFILTRATION; PERINEURAL ONCE
Status: COMPLETED | OUTPATIENT
Start: 2021-01-01 | End: 2021-01-01

## 2021-01-01 RX ORDER — BUMETANIDE 2 MG/1
2 TABLET ORAL DAILY
Qty: 90 TABLET | Refills: 3 | Status: CANCELLED | OUTPATIENT
Start: 2021-01-01

## 2021-01-01 RX ORDER — HEPARIN SODIUM,PORCINE/D5W 25000/250
0-40 INTRAVENOUS SOLUTION INTRAVENOUS CONTINUOUS
Status: DISCONTINUED | OUTPATIENT
Start: 2021-01-01 | End: 2021-01-01

## 2021-01-01 RX ORDER — MORPHINE SULFATE 2 MG/ML
3 INJECTION, SOLUTION INTRAMUSCULAR; INTRAVENOUS ONCE
Status: COMPLETED | OUTPATIENT
Start: 2021-01-01 | End: 2021-01-01

## 2021-01-01 RX ORDER — LIDOCAINE 50 MG/G
1 PATCH TOPICAL
Status: DISCONTINUED | OUTPATIENT
Start: 2021-01-01 | End: 2021-01-01

## 2021-01-01 RX ORDER — MORPHINE SULFATE 2 MG/ML
2 INJECTION, SOLUTION INTRAMUSCULAR; INTRAVENOUS ONCE
Status: COMPLETED | OUTPATIENT
Start: 2021-01-01 | End: 2021-01-01

## 2021-01-01 RX ORDER — FAMOTIDINE 20 MG/1
20 TABLET, FILM COATED ORAL 2 TIMES DAILY
Status: DISCONTINUED | OUTPATIENT
Start: 2021-01-01 | End: 2021-01-01

## 2021-01-01 RX ORDER — FUROSEMIDE 10 MG/ML
INJECTION INTRAMUSCULAR; INTRAVENOUS
Status: COMPLETED
Start: 2021-01-01 | End: 2021-01-01

## 2021-01-01 RX ORDER — NOREPINEPHRINE BITARTRATE/D5W 4MG/250ML
PLASTIC BAG, INJECTION (ML) INTRAVENOUS
Status: DISPENSED
Start: 2021-01-01 | End: 2021-01-01

## 2021-01-01 RX ORDER — SODIUM CHLORIDE 0.9 % (FLUSH) 0.9 %
10 SYRINGE (ML) INJECTION
Status: CANCELLED | OUTPATIENT
Start: 2021-01-01

## 2021-01-01 RX ORDER — LANOLIN ALCOHOL/MO/W.PET/CERES
800 CREAM (GRAM) TOPICAL
Status: DISCONTINUED | OUTPATIENT
Start: 2021-01-01 | End: 2021-01-01

## 2021-01-01 RX ORDER — PRAMIPEXOLE DIHYDROCHLORIDE 0.12 MG/1
0.25 TABLET ORAL NIGHTLY
Status: DISCONTINUED | OUTPATIENT
Start: 2021-01-01 | End: 2021-01-01 | Stop reason: HOSPADM

## 2021-01-01 RX ORDER — TALC
6 POWDER (GRAM) TOPICAL NIGHTLY
Status: DISCONTINUED | OUTPATIENT
Start: 2021-01-01 | End: 2021-01-01 | Stop reason: HOSPADM

## 2021-01-01 RX ORDER — LANOLIN ALCOHOL/MO/W.PET/CERES
400 CREAM (GRAM) TOPICAL ONCE
Status: COMPLETED | OUTPATIENT
Start: 2021-01-01 | End: 2021-01-01

## 2021-01-01 RX ORDER — POTASSIUM CHLORIDE 750 MG/1
40 CAPSULE, EXTENDED RELEASE ORAL ONCE
Status: COMPLETED | OUTPATIENT
Start: 2021-01-01 | End: 2021-01-01

## 2021-01-01 RX ORDER — ACYCLOVIR 200 MG/1
400 CAPSULE ORAL 2 TIMES DAILY
Status: DISCONTINUED | OUTPATIENT
Start: 2021-01-01 | End: 2021-01-01 | Stop reason: HOSPADM

## 2021-01-01 RX ORDER — ASPIRIN 325 MG
325 TABLET ORAL DAILY
Status: DISCONTINUED | OUTPATIENT
Start: 2021-01-01 | End: 2021-01-01

## 2021-01-01 RX ORDER — DOBUTAMINE HYDROCHLORIDE 400 MG/100ML
5 INJECTION INTRAVENOUS CONTINUOUS
Status: DISCONTINUED | OUTPATIENT
Start: 2021-01-01 | End: 2021-01-01

## 2021-01-01 RX ORDER — FUROSEMIDE 10 MG/ML
10 INJECTION INTRAMUSCULAR; INTRAVENOUS ONCE
Status: COMPLETED | OUTPATIENT
Start: 2021-01-01 | End: 2021-01-01

## 2021-01-01 RX ORDER — BUMETANIDE 2 MG/1
3 TABLET ORAL 2 TIMES DAILY
Qty: 90 TABLET | Refills: 3 | Status: SHIPPED | OUTPATIENT
Start: 2021-01-01 | End: 2021-01-01 | Stop reason: SDUPTHER

## 2021-01-01 RX ORDER — HEPARIN SODIUM,PORCINE/D5W 25000/250
0-40 INTRAVENOUS SOLUTION INTRAVENOUS CONTINUOUS
Status: DISCONTINUED | OUTPATIENT
Start: 2021-01-01 | End: 2021-01-01 | Stop reason: HOSPADM

## 2021-01-01 RX ORDER — PHENYLEPHRINE HYDROCHLORIDE 10 MG/ML
INJECTION INTRAVENOUS
Status: DISCONTINUED | OUTPATIENT
Start: 2021-01-01 | End: 2021-01-01

## 2021-01-01 RX ORDER — POLYETHYLENE GLYCOL 3350 17 G/17G
17 POWDER, FOR SOLUTION ORAL DAILY
Qty: 510 G | Refills: 0 | Status: SHIPPED | OUTPATIENT
Start: 2021-01-01 | End: 2021-01-01

## 2021-01-01 RX ORDER — INDOMETHACIN 25 MG/1
50 CAPSULE ORAL ONCE
Status: COMPLETED | OUTPATIENT
Start: 2021-01-01 | End: 2021-01-01

## 2021-01-01 RX ORDER — BUMETANIDE 2 MG/1
2 TABLET ORAL DAILY
Qty: 90 TABLET | Refills: 3 | Status: SHIPPED | OUTPATIENT
Start: 2021-01-01 | End: 2021-01-01 | Stop reason: SDUPTHER

## 2021-01-01 RX ORDER — INDOMETHACIN 25 MG/1
100 CAPSULE ORAL ONCE
Status: COMPLETED | OUTPATIENT
Start: 2021-01-01 | End: 2021-01-01

## 2021-01-01 RX ORDER — BACLOFEN 5 MG/1
5 TABLET ORAL ONCE
Status: COMPLETED | OUTPATIENT
Start: 2021-01-01 | End: 2021-01-01

## 2021-01-01 RX ORDER — INDOMETHACIN 25 MG/1
50 CAPSULE ORAL EVERY 4 HOURS
Status: COMPLETED | OUTPATIENT
Start: 2021-01-01 | End: 2021-01-01

## 2021-01-01 RX ORDER — HYDROMORPHONE HYDROCHLORIDE 2 MG/ML
1 INJECTION, SOLUTION INTRAMUSCULAR; INTRAVENOUS; SUBCUTANEOUS EVERY 4 HOURS PRN
Status: DISCONTINUED | OUTPATIENT
Start: 2021-01-01 | End: 2021-01-01

## 2021-01-01 RX ORDER — ACYCLOVIR 400 MG/1
400 TABLET ORAL 2 TIMES DAILY
Qty: 180 TABLET | Refills: 3 | Status: SHIPPED | OUTPATIENT
Start: 2021-01-01 | End: 2021-01-01

## 2021-01-01 RX ORDER — MORPHINE SULFATE 2 MG/ML
INJECTION, SOLUTION INTRAMUSCULAR; INTRAVENOUS
Status: COMPLETED
Start: 2021-01-01 | End: 2021-01-01

## 2021-01-01 RX ORDER — BUMETANIDE 1 MG/1
5 TABLET ORAL 2 TIMES DAILY
Qty: 60 TABLET | Refills: 6 | Status: CANCELLED | OUTPATIENT
Start: 2021-01-01

## 2021-01-01 RX ORDER — SODIUM CHLORIDE 9 MG/ML
INJECTION, SOLUTION INTRAVENOUS CONTINUOUS
Status: DISCONTINUED | OUTPATIENT
Start: 2021-01-01 | End: 2021-01-01 | Stop reason: HOSPADM

## 2021-01-01 RX ORDER — LANOLIN ALCOHOL/MO/W.PET/CERES
400 CREAM (GRAM) TOPICAL DAILY
Status: DISCONTINUED | OUTPATIENT
Start: 2021-01-01 | End: 2021-01-01 | Stop reason: HOSPADM

## 2021-01-01 RX ORDER — LORAZEPAM 2 MG/ML
1 INJECTION INTRAMUSCULAR
Status: DISCONTINUED | OUTPATIENT
Start: 2021-01-01 | End: 2021-01-01 | Stop reason: HOSPADM

## 2021-01-01 RX ORDER — POLYETHYLENE GLYCOL 3350 17 G/17G
17 POWDER, FOR SOLUTION ORAL DAILY
Status: DISCONTINUED | OUTPATIENT
Start: 2021-01-01 | End: 2021-01-01 | Stop reason: HOSPADM

## 2021-01-01 RX ORDER — GLUCAGON 1 MG
1 KIT INJECTION
Status: DISCONTINUED | OUTPATIENT
Start: 2021-01-01 | End: 2021-01-01

## 2021-01-01 RX ORDER — DIAZEPAM 5 MG/1
5 TABLET ORAL EVERY 8 HOURS PRN
Status: DISCONTINUED | OUTPATIENT
Start: 2021-01-01 | End: 2021-01-01

## 2021-01-01 RX ORDER — BUMETANIDE 1 MG/1
5 TABLET ORAL 2 TIMES DAILY
Qty: 60 TABLET | Refills: 6 | Status: ON HOLD | OUTPATIENT
Start: 2021-01-01 | End: 2021-01-01 | Stop reason: HOSPADM

## 2021-01-01 RX ORDER — BUMETANIDE 1 MG/1
1 TABLET ORAL DAILY
COMMUNITY
Start: 2021-01-01 | End: 2021-01-01 | Stop reason: SDUPTHER

## 2021-01-01 RX ORDER — HYDROMORPHONE HYDROCHLORIDE 1 MG/ML
0.5 INJECTION, SOLUTION INTRAMUSCULAR; INTRAVENOUS; SUBCUTANEOUS EVERY 4 HOURS PRN
Status: CANCELLED | OUTPATIENT
Start: 2021-01-01

## 2021-01-01 RX ORDER — INSULIN ASPART 100 [IU]/ML
0-5 INJECTION, SOLUTION INTRAVENOUS; SUBCUTANEOUS
Status: CANCELLED | OUTPATIENT
Start: 2021-01-01

## 2021-01-01 RX ORDER — CLOPIDOGREL BISULFATE 75 MG/1
75 TABLET ORAL DAILY
Status: ON HOLD | COMMUNITY
Start: 2021-01-01 | End: 2021-01-01 | Stop reason: HOSPADM

## 2021-01-01 RX ORDER — DIAZEPAM 2 MG/1
2 TABLET ORAL EVERY 8 HOURS PRN
Status: DISCONTINUED | OUTPATIENT
Start: 2021-01-01 | End: 2021-01-01 | Stop reason: HOSPADM

## 2021-01-01 RX ORDER — BUMETANIDE 1 MG/1
2 TABLET ORAL DAILY
Status: DISCONTINUED | OUTPATIENT
Start: 2021-01-01 | End: 2021-01-01

## 2021-01-01 RX ORDER — ACETAMINOPHEN 500 MG
1000 TABLET ORAL
Status: COMPLETED | OUTPATIENT
Start: 2021-01-01 | End: 2021-01-01

## 2021-01-01 RX ORDER — ALUMINUM HYDROXIDE, MAGNESIUM HYDROXIDE, AND SIMETHICONE 2400; 240; 2400 MG/30ML; MG/30ML; MG/30ML
30 SUSPENSION ORAL ONCE
Status: COMPLETED | OUTPATIENT
Start: 2021-01-01 | End: 2021-01-01

## 2021-01-01 RX ORDER — TALC
6 POWDER (GRAM) TOPICAL NIGHTLY
Qty: 30 TABLET | Refills: 0 | Status: SHIPPED | OUTPATIENT
Start: 2021-01-01 | End: 2021-01-01

## 2021-01-01 RX ORDER — LIDOCAINE HYDROCHLORIDE 20 MG/ML
INJECTION INTRAVENOUS
Status: DISCONTINUED | OUTPATIENT
Start: 2021-01-01 | End: 2021-01-01

## 2021-01-01 RX ORDER — POTASSIUM CHLORIDE 750 MG/1
40 CAPSULE, EXTENDED RELEASE ORAL
Status: COMPLETED | OUTPATIENT
Start: 2021-01-01 | End: 2021-01-01

## 2021-01-01 RX ORDER — BUMETANIDE 1 MG/1
2 TABLET ORAL 2 TIMES DAILY
Status: DISCONTINUED | OUTPATIENT
Start: 2021-01-01 | End: 2021-01-01

## 2021-01-01 RX ORDER — BUMETANIDE 1 MG/1
2 TABLET ORAL 2 TIMES DAILY
Status: DISCONTINUED | OUTPATIENT
Start: 2021-01-01 | End: 2021-01-01 | Stop reason: HOSPADM

## 2021-01-01 RX ORDER — SODIUM,POTASSIUM PHOSPHATES 280-250MG
2 POWDER IN PACKET (EA) ORAL
Status: DISCONTINUED | OUTPATIENT
Start: 2021-01-01 | End: 2021-01-01 | Stop reason: HOSPADM

## 2021-01-01 RX ORDER — ETOMIDATE 2 MG/ML
INJECTION INTRAVENOUS
Status: DISCONTINUED | OUTPATIENT
Start: 2021-01-01 | End: 2021-01-01

## 2021-01-01 RX ORDER — FUROSEMIDE 10 MG/ML
80 INJECTION INTRAMUSCULAR; INTRAVENOUS 2 TIMES DAILY
Status: DISCONTINUED | OUTPATIENT
Start: 2021-01-01 | End: 2021-01-01 | Stop reason: HOSPADM

## 2021-01-01 RX ORDER — HYDROMORPHONE HYDROCHLORIDE 2 MG/ML
1 INJECTION, SOLUTION INTRAMUSCULAR; INTRAVENOUS; SUBCUTANEOUS ONCE
Status: COMPLETED | OUTPATIENT
Start: 2021-01-01 | End: 2021-01-01

## 2021-01-01 RX ORDER — ASPIRIN 81 MG/1
81 TABLET ORAL DAILY
Status: DISCONTINUED | OUTPATIENT
Start: 2021-01-01 | End: 2021-01-01

## 2021-01-01 RX ORDER — MEROPENEM AND SODIUM CHLORIDE 1 G/50ML
1 INJECTION, SOLUTION INTRAVENOUS
Status: DISCONTINUED | OUTPATIENT
Start: 2021-01-01 | End: 2021-01-01

## 2021-01-01 RX ORDER — MUPIROCIN 20 MG/G
OINTMENT TOPICAL 2 TIMES DAILY
Status: COMPLETED | OUTPATIENT
Start: 2021-01-01 | End: 2021-01-01

## 2021-01-01 RX ORDER — FUROSEMIDE 10 MG/ML
80 INJECTION INTRAMUSCULAR; INTRAVENOUS ONCE
Status: COMPLETED | OUTPATIENT
Start: 2021-01-01 | End: 2021-01-01

## 2021-01-01 RX ORDER — ALBUMIN HUMAN 250 G/1000ML
25 SOLUTION INTRAVENOUS ONCE
Status: COMPLETED | OUTPATIENT
Start: 2021-01-01 | End: 2021-01-01

## 2021-01-01 RX ORDER — CLOPIDOGREL BISULFATE 75 MG/1
75 TABLET ORAL DAILY
Status: CANCELLED | OUTPATIENT
Start: 2021-01-01

## 2021-01-01 RX ORDER — IBUPROFEN 200 MG
24 TABLET ORAL
Status: DISCONTINUED | OUTPATIENT
Start: 2021-01-01 | End: 2021-01-01 | Stop reason: HOSPADM

## 2021-01-01 RX ORDER — HEPARIN SODIUM 5000 [USP'U]/ML
5000 INJECTION, SOLUTION INTRAVENOUS; SUBCUTANEOUS EVERY 8 HOURS
Status: DISCONTINUED | OUTPATIENT
Start: 2021-01-01 | End: 2021-01-01

## 2021-01-01 RX ORDER — DILTIAZEM HYDROCHLORIDE 5 MG/ML
10 INJECTION INTRAVENOUS
Status: COMPLETED | OUTPATIENT
Start: 2021-01-01 | End: 2021-01-01

## 2021-01-01 RX ORDER — BACLOFEN 5 MG/1
5 TABLET ORAL 3 TIMES DAILY PRN
Status: DISCONTINUED | OUTPATIENT
Start: 2021-01-01 | End: 2021-01-01

## 2021-01-01 RX ORDER — NOREPINEPHRINE BITARTRATE/D5W 4MG/250ML
0.05 PLASTIC BAG, INJECTION (ML) INTRAVENOUS CONTINUOUS
Status: DISCONTINUED | OUTPATIENT
Start: 2021-01-01 | End: 2021-01-01 | Stop reason: HOSPADM

## 2021-01-01 RX ORDER — PRAMIPEXOLE DIHYDROCHLORIDE 0.12 MG/1
0.12 TABLET ORAL 2 TIMES DAILY
Status: DISCONTINUED | OUTPATIENT
Start: 2021-01-01 | End: 2021-01-01

## 2021-01-01 RX ORDER — VANCOMYCIN HCL IN 5 % DEXTROSE 1G/250ML
1000 PLASTIC BAG, INJECTION (ML) INTRAVENOUS ONCE
Status: COMPLETED | OUTPATIENT
Start: 2021-01-01 | End: 2021-01-01

## 2021-01-01 RX ORDER — ATORVASTATIN CALCIUM 40 MG/1
40 TABLET, FILM COATED ORAL DAILY
Qty: 90 TABLET | Refills: 3 | Status: ON HOLD | OUTPATIENT
Start: 2021-01-01 | End: 2021-01-01 | Stop reason: HOSPADM

## 2021-01-01 RX ORDER — METOLAZONE 5 MG/1
10 TABLET ORAL
Qty: 16 TABLET | Refills: 1 | Status: SHIPPED | OUTPATIENT
Start: 2021-01-01 | End: 2021-01-01

## 2021-01-01 RX ORDER — IBUPROFEN 200 MG
16 TABLET ORAL
Status: DISCONTINUED | OUTPATIENT
Start: 2021-01-01 | End: 2021-01-01

## 2021-01-01 RX ORDER — MAGNESIUM SULFATE HEPTAHYDRATE 40 MG/ML
2 INJECTION, SOLUTION INTRAVENOUS
Status: ACTIVE | OUTPATIENT
Start: 2021-01-01 | End: 2021-01-01

## 2021-01-01 RX ORDER — DILTIAZEM HCL 1 MG/ML
5 INJECTION, SOLUTION INTRAVENOUS
Status: DISCONTINUED | OUTPATIENT
Start: 2021-01-01 | End: 2021-01-01

## 2021-01-01 RX ORDER — ACETAMINOPHEN 325 MG/1
650 TABLET ORAL EVERY 6 HOURS PRN
Status: DISCONTINUED | OUTPATIENT
Start: 2021-01-01 | End: 2021-01-01

## 2021-01-01 RX ORDER — IBUPROFEN 200 MG
24 TABLET ORAL
Status: DISCONTINUED | OUTPATIENT
Start: 2021-01-01 | End: 2021-01-01

## 2021-01-01 RX ORDER — ACETAMINOPHEN 325 MG/1
650 TABLET ORAL EVERY 6 HOURS PRN
Qty: 30 TABLET | Refills: 0 | Status: ON HOLD | OUTPATIENT
Start: 2021-01-01 | End: 2021-01-01 | Stop reason: HOSPADM

## 2021-01-01 RX ORDER — SODIUM BICARBONATE 650 MG/1
650 TABLET ORAL
COMMUNITY
Start: 2021-01-01 | End: 2021-01-01

## 2021-01-01 RX ORDER — ONDANSETRON 2 MG/ML
INJECTION INTRAMUSCULAR; INTRAVENOUS
Status: DISCONTINUED | OUTPATIENT
Start: 2021-01-01 | End: 2021-01-01

## 2021-01-01 RX ORDER — HYDROMORPHONE HYDROCHLORIDE 2 MG/ML
0.5 INJECTION, SOLUTION INTRAMUSCULAR; INTRAVENOUS; SUBCUTANEOUS EVERY 6 HOURS PRN
Status: DISCONTINUED | OUTPATIENT
Start: 2021-01-01 | End: 2021-01-01

## 2021-01-01 RX ORDER — HYDROMORPHONE HYDROCHLORIDE 2 MG/ML
1 INJECTION, SOLUTION INTRAMUSCULAR; INTRAVENOUS; SUBCUTANEOUS
Status: DISCONTINUED | OUTPATIENT
Start: 2021-01-01 | End: 2021-01-01 | Stop reason: HOSPADM

## 2021-01-01 RX ORDER — BACLOFEN 10 MG/1
20 TABLET ORAL ONCE
Status: COMPLETED | OUTPATIENT
Start: 2021-01-01 | End: 2021-01-01

## 2021-01-01 RX ORDER — GLUCAGON 1 MG
1 KIT INJECTION
Status: CANCELLED | OUTPATIENT
Start: 2021-01-01

## 2021-01-01 RX ORDER — ACYCLOVIR 400 MG/1
400 TABLET ORAL 2 TIMES DAILY
Status: ON HOLD | COMMUNITY
End: 2021-01-01 | Stop reason: HOSPADM

## 2021-01-01 RX ORDER — OXYCODONE HYDROCHLORIDE 5 MG/1
5 TABLET ORAL EVERY 6 HOURS PRN
Status: DISCONTINUED | OUTPATIENT
Start: 2021-01-01 | End: 2021-01-01

## 2021-01-01 RX ORDER — ASPIRIN 325 MG
325 TABLET ORAL
Status: COMPLETED | OUTPATIENT
Start: 2021-01-01 | End: 2021-01-01

## 2021-01-01 RX ORDER — ALBUMIN HUMAN 250 G/1000ML
12.5 SOLUTION INTRAVENOUS ONCE
Status: COMPLETED | OUTPATIENT
Start: 2021-01-01 | End: 2021-01-01

## 2021-01-01 RX ORDER — INSULIN ASPART 100 [IU]/ML
1-10 INJECTION, SOLUTION INTRAVENOUS; SUBCUTANEOUS
Status: DISCONTINUED | OUTPATIENT
Start: 2021-01-01 | End: 2021-01-01

## 2021-01-01 RX ORDER — ACETAMINOPHEN 325 MG/1
650 TABLET ORAL EVERY 6 HOURS PRN
Status: DISCONTINUED | OUTPATIENT
Start: 2021-01-01 | End: 2021-01-01 | Stop reason: HOSPADM

## 2021-01-01 RX ADMIN — ACETAMINOPHEN 650 MG: 325 TABLET ORAL at 04:06

## 2021-01-01 RX ADMIN — AMIODARONE HYDROCHLORIDE 1 MG/MIN: 1.8 INJECTION, SOLUTION INTRAVENOUS at 12:11

## 2021-01-01 RX ADMIN — SODIUM CHLORIDE: 0.9 INJECTION, SOLUTION INTRAVENOUS at 04:06

## 2021-01-01 RX ADMIN — GABAPENTIN 300 MG: 300 CAPSULE ORAL at 08:06

## 2021-01-01 RX ADMIN — SODIUM CHLORIDE: 0.9 INJECTION, SOLUTION INTRAVENOUS at 08:11

## 2021-01-01 RX ADMIN — Medication 2 G: at 05:06

## 2021-01-01 RX ADMIN — SODIUM CHLORIDE: 0.9 INJECTION, SOLUTION INTRAVENOUS at 12:06

## 2021-01-01 RX ADMIN — POTASSIUM CHLORIDE 40 MEQ: 10 CAPSULE, COATED, EXTENDED RELEASE ORAL at 08:09

## 2021-01-01 RX ADMIN — APIXABAN 5 MG: 5 TABLET, FILM COATED ORAL at 12:06

## 2021-01-01 RX ADMIN — BUMETANIDE 2 MG: 1 TABLET ORAL at 08:06

## 2021-01-01 RX ADMIN — ASPIRIN 81 MG CHEWABLE TABLET 81 MG: 81 TABLET CHEWABLE at 08:06

## 2021-01-01 RX ADMIN — VASOPRESSIN 0.04 UNITS/MIN: 20 INJECTION INTRAVENOUS at 02:11

## 2021-01-01 RX ADMIN — SODIUM CHLORIDE 73.2 ML/HR: 9 INJECTION, SOLUTION INTRAVENOUS at 10:06

## 2021-01-01 RX ADMIN — Medication 1 MCG/KG/MIN: at 11:11

## 2021-01-01 RX ADMIN — NOREPINEPHRINE BITARTRATE 2.26 MCG/KG/MIN: 1 INJECTION, SOLUTION, CONCENTRATE INTRAVENOUS at 12:11

## 2021-01-01 RX ADMIN — ACYCLOVIR 400 MG: 200 CAPSULE ORAL at 10:09

## 2021-01-01 RX ADMIN — Medication 0.02 MCG/KG/MIN: at 08:06

## 2021-01-01 RX ADMIN — CLOPIDOGREL 75 MG: 75 TABLET, FILM COATED ORAL at 09:09

## 2021-01-01 RX ADMIN — ATORVASTATIN CALCIUM 40 MG: 20 TABLET, FILM COATED ORAL at 08:09

## 2021-01-01 RX ADMIN — MELATONIN TAB 3 MG 6 MG: 3 TAB at 08:06

## 2021-01-01 RX ADMIN — Medication 400 MG: at 08:09

## 2021-01-01 RX ADMIN — AMIODARONE HYDROCHLORIDE 150 MG: 1.5 INJECTION, SOLUTION INTRAVENOUS at 12:11

## 2021-01-01 RX ADMIN — DILTIAZEM HYDROCHLORIDE 10 MG: 5 INJECTION INTRAVENOUS at 03:11

## 2021-01-01 RX ADMIN — FUROSEMIDE 10 MG: 10 INJECTION, SOLUTION INTRAMUSCULAR; INTRAVENOUS at 12:06

## 2021-01-01 RX ADMIN — Medication 800 MG: at 07:06

## 2021-01-01 RX ADMIN — ALBUMIN (HUMAN) 25 G: 12.5 SOLUTION INTRAVENOUS at 10:06

## 2021-01-01 RX ADMIN — Medication 2 G: at 08:06

## 2021-01-01 RX ADMIN — BACLOFEN 5 MG: 5 TABLET ORAL at 06:06

## 2021-01-01 RX ADMIN — Medication 1.1 MCG/KG/MIN: at 04:11

## 2021-01-01 RX ADMIN — DOXYCYCLINE 100 MG: 100 INJECTION, POWDER, LYOPHILIZED, FOR SOLUTION INTRAVENOUS at 02:11

## 2021-01-01 RX ADMIN — PRAMIPEXOLE DIHYDROCHLORIDE 0.25 MG: 0.12 TABLET ORAL at 08:06

## 2021-01-01 RX ADMIN — HEPARIN SODIUM AND DEXTROSE 18 UNITS/KG/HR: 10000; 5 INJECTION INTRAVENOUS at 11:11

## 2021-01-01 RX ADMIN — MUPIROCIN: 20 OINTMENT TOPICAL at 08:06

## 2021-01-01 RX ADMIN — SPIRONOLACTONE 25 MG: 25 TABLET ORAL at 12:09

## 2021-01-01 RX ADMIN — HEPARIN SODIUM 5000 UNITS: 5000 INJECTION INTRAVENOUS; SUBCUTANEOUS at 05:06

## 2021-01-01 RX ADMIN — Medication 0.02 MCG/KG/MIN: at 03:06

## 2021-01-01 RX ADMIN — SPIRONOLACTONE 25 MG: 25 TABLET, FILM COATED ORAL at 09:06

## 2021-01-01 RX ADMIN — WATER: 1000 INJECTION, SOLUTION INTRAVENOUS at 11:06

## 2021-01-01 RX ADMIN — NOREPINEPHRINE BITARTRATE 2.5 MCG/KG/MIN: 1 INJECTION, SOLUTION, CONCENTRATE INTRAVENOUS at 03:11

## 2021-01-01 RX ADMIN — MUPIROCIN: 20 OINTMENT TOPICAL at 10:06

## 2021-01-01 RX ADMIN — BACLOFEN 10 MG: 10 TABLET ORAL at 08:06

## 2021-01-01 RX ADMIN — Medication 2 G: at 11:06

## 2021-01-01 RX ADMIN — CLOPIDOGREL 75 MG: 75 TABLET, FILM COATED ORAL at 09:06

## 2021-01-01 RX ADMIN — FUROSEMIDE 10 MG/HR: 10 INJECTION, SOLUTION INTRAMUSCULAR; INTRAVENOUS at 03:09

## 2021-01-01 RX ADMIN — MUPIROCIN: 20 OINTMENT TOPICAL at 03:06

## 2021-01-01 RX ADMIN — HYDROCORTISONE SODIUM SUCCINATE 100 MG: 100 INJECTION, POWDER, FOR SOLUTION INTRAMUSCULAR; INTRAVENOUS at 02:11

## 2021-01-01 RX ADMIN — VASOPRESSIN 0.04 UNITS/MIN: 20 INJECTION INTRAVENOUS at 03:11

## 2021-01-01 RX ADMIN — NOREPINEPHRINE BITARTRATE 1.68 MCG/KG/MIN: 1 INJECTION, SOLUTION, CONCENTRATE INTRAVENOUS at 06:11

## 2021-01-01 RX ADMIN — ACETAMINOPHEN 650 MG: 325 TABLET ORAL at 08:06

## 2021-01-01 RX ADMIN — VASOPRESSIN 0.04 UNITS/MIN: 20 INJECTION INTRAVENOUS at 06:11

## 2021-01-01 RX ADMIN — SPIRONOLACTONE 25 MG: 25 TABLET ORAL at 08:09

## 2021-01-01 RX ADMIN — GABAPENTIN 300 MG: 300 CAPSULE ORAL at 09:06

## 2021-01-01 RX ADMIN — ACYCLOVIR 400 MG: 200 CAPSULE ORAL at 08:09

## 2021-01-01 RX ADMIN — ACETAMINOPHEN 650 MG: 325 TABLET ORAL at 03:06

## 2021-01-01 RX ADMIN — NOREPINEPHRINE BITARTRATE 1.6 MCG/KG/MIN: 1 INJECTION, SOLUTION, CONCENTRATE INTRAVENOUS at 02:11

## 2021-01-01 RX ADMIN — ASPIRIN 81 MG CHEWABLE TABLET 81 MG: 81 TABLET CHEWABLE at 09:06

## 2021-01-01 RX ADMIN — ALTEPLASE 73.2 MG: KIT at 09:06

## 2021-01-01 RX ADMIN — ENOXAPARIN SODIUM 40 MG: 40 INJECTION SUBCUTANEOUS at 05:09

## 2021-01-01 RX ADMIN — CLOPIDOGREL 75 MG: 75 TABLET, FILM COATED ORAL at 08:06

## 2021-01-01 RX ADMIN — BACLOFEN 10 MG: 10 TABLET ORAL at 05:06

## 2021-01-01 RX ADMIN — Medication 2 G: at 12:06

## 2021-01-01 RX ADMIN — Medication 400 MG: at 11:09

## 2021-01-01 RX ADMIN — HYDROMORPHONE HYDROCHLORIDE 1 MG: 2 INJECTION INTRAMUSCULAR; INTRAVENOUS; SUBCUTANEOUS at 08:11

## 2021-01-01 RX ADMIN — HYDROMORPHONE HYDROCHLORIDE 0.5 MG: 2 INJECTION INTRAMUSCULAR; INTRAVENOUS; SUBCUTANEOUS at 12:11

## 2021-01-01 RX ADMIN — SODIUM BICARBONATE 50 MEQ: 84 INJECTION, SOLUTION INTRAVENOUS at 02:11

## 2021-01-01 RX ADMIN — HEPARIN SODIUM 5000 UNITS: 5000 INJECTION INTRAVENOUS; SUBCUTANEOUS at 02:06

## 2021-01-01 RX ADMIN — SPIRONOLACTONE 25 MG: 25 TABLET, FILM COATED ORAL at 08:06

## 2021-01-01 RX ADMIN — GABAPENTIN 300 MG: 300 CAPSULE ORAL at 11:09

## 2021-01-01 RX ADMIN — AMIODARONE HYDROCHLORIDE 0.5 MG/MIN: 1.8 INJECTION, SOLUTION INTRAVENOUS at 06:11

## 2021-01-01 RX ADMIN — Medication 1.3 MCG/KG/MIN: at 05:11

## 2021-01-01 RX ADMIN — LIDOCAINE HYDROCHLORIDE 60 MG: 20 INJECTION, SOLUTION INTRAVENOUS at 01:11

## 2021-01-01 RX ADMIN — NOREPINEPHRINE BITARTRATE 0.43 MCG/KG/MIN: 1 INJECTION, SOLUTION, CONCENTRATE INTRAVENOUS at 08:11

## 2021-01-01 RX ADMIN — HEPARIN SODIUM 18 UNITS/KG/HR: 10000 INJECTION, SOLUTION INTRAVENOUS at 03:11

## 2021-01-01 RX ADMIN — ASPIRIN 81 MG CHEWABLE TABLET 81 MG: 81 TABLET CHEWABLE at 10:06

## 2021-01-01 RX ADMIN — DEXTROSE MONOHYDRATE 12.5 G: 25 INJECTION, SOLUTION INTRAVENOUS at 08:11

## 2021-01-01 RX ADMIN — ATORVASTATIN CALCIUM 40 MG: 20 TABLET, FILM COATED ORAL at 08:06

## 2021-01-01 RX ADMIN — PIPERACILLIN AND TAZOBACTAM 4.5 G: 4; .5 INJECTION, POWDER, LYOPHILIZED, FOR SOLUTION INTRAVENOUS; PARENTERAL at 04:11

## 2021-01-01 RX ADMIN — SODIUM BICARBONATE 50 MEQ: 84 INJECTION, SOLUTION INTRAVENOUS at 09:11

## 2021-01-01 RX ADMIN — POTASSIUM CHLORIDE 60 MEQ: 1500 TABLET, EXTENDED RELEASE ORAL at 12:09

## 2021-01-01 RX ADMIN — MELATONIN TAB 3 MG 6 MG: 3 TAB at 09:06

## 2021-01-01 RX ADMIN — ACETAMINOPHEN 650 MG: 325 TABLET ORAL at 05:06

## 2021-01-01 RX ADMIN — NOREPINEPHRINE BITARTRATE 1.5 MCG/KG/MIN: 1 INJECTION, SOLUTION, CONCENTRATE INTRAVENOUS at 11:11

## 2021-01-01 RX ADMIN — KETAMINE HYDROCHLORIDE 50 MG: 100 INJECTION, SOLUTION, CONCENTRATE INTRAMUSCULAR; INTRAVENOUS at 01:11

## 2021-01-01 RX ADMIN — LIDOCAINE HYDROCHLORIDE 1 ML: 10 INJECTION, SOLUTION INFILTRATION; PERINEURAL at 11:06

## 2021-01-01 RX ADMIN — MUPIROCIN: 20 OINTMENT TOPICAL at 09:11

## 2021-01-01 RX ADMIN — MUPIROCIN: 20 OINTMENT TOPICAL at 09:06

## 2021-01-01 RX ADMIN — CLOPIDOGREL 75 MG: 75 TABLET, FILM COATED ORAL at 08:09

## 2021-01-01 RX ADMIN — HEPARIN SODIUM 5000 UNITS: 5000 INJECTION INTRAVENOUS; SUBCUTANEOUS at 09:06

## 2021-01-01 RX ADMIN — DEXTROSE MONOHYDRATE 25 G: 25 INJECTION, SOLUTION INTRAVENOUS at 11:11

## 2021-01-01 RX ADMIN — MEROPENEM AND SODIUM CHLORIDE 1 G: 1 INJECTION, SOLUTION INTRAVENOUS at 06:11

## 2021-01-01 RX ADMIN — MUPIROCIN: 20 OINTMENT TOPICAL at 10:11

## 2021-01-01 RX ADMIN — HYDROMORPHONE HYDROCHLORIDE 0.5 MG: 2 INJECTION INTRAMUSCULAR; INTRAVENOUS; SUBCUTANEOUS at 08:11

## 2021-01-01 RX ADMIN — NOREPINEPHRINE BITARTRATE 1.8 MCG/KG/MIN: 1 INJECTION, SOLUTION, CONCENTRATE INTRAVENOUS at 03:11

## 2021-01-01 RX ADMIN — SODIUM BICARBONATE 50 MEQ: 84 INJECTION, SOLUTION INTRAVENOUS at 11:11

## 2021-01-01 RX ADMIN — NOREPINEPHRINE BITARTRATE 1.66 MCG/KG/MIN: 1 INJECTION, SOLUTION, CONCENTRATE INTRAVENOUS at 04:11

## 2021-01-01 RX ADMIN — ASPIRIN 81 MG CHEWABLE TABLET 81 MG: 81 TABLET CHEWABLE at 09:09

## 2021-01-01 RX ADMIN — FUROSEMIDE 80 MG: 10 INJECTION, SOLUTION INTRAMUSCULAR; INTRAVENOUS at 11:09

## 2021-01-01 RX ADMIN — MUPIROCIN: 20 OINTMENT TOPICAL at 08:11

## 2021-01-01 RX ADMIN — NOREPINEPHRINE BITARTRATE 2 MCG/KG/MIN: 1 INJECTION, SOLUTION, CONCENTRATE INTRAVENOUS at 06:11

## 2021-01-01 RX ADMIN — CLINDAMYCIN IN 5 PERCENT DEXTROSE 600 MG: 12 INJECTION, SOLUTION INTRAVENOUS at 08:11

## 2021-01-01 RX ADMIN — FUROSEMIDE 10 MG: 10 INJECTION, SOLUTION INTRAMUSCULAR; INTRAVENOUS at 04:06

## 2021-01-01 RX ADMIN — DOXYCYCLINE 100 MG: 100 INJECTION, POWDER, LYOPHILIZED, FOR SOLUTION INTRAVENOUS at 03:11

## 2021-01-01 RX ADMIN — SODIUM CHLORIDE 125 ML/HR: 0.9 INJECTION, SOLUTION INTRAVENOUS at 03:11

## 2021-01-01 RX ADMIN — ASPIRIN 81 MG CHEWABLE TABLET 81 MG: 81 TABLET CHEWABLE at 08:09

## 2021-01-01 RX ADMIN — POLYETHYLENE GLYCOL 3350 17 G: 17 POWDER, FOR SOLUTION ORAL at 09:06

## 2021-01-01 RX ADMIN — ETOMIDATE 8 MG: 2 INJECTION, SOLUTION INTRAVENOUS at 01:11

## 2021-01-01 RX ADMIN — Medication 0.05 MCG/KG/MIN: at 08:11

## 2021-01-01 RX ADMIN — CALCIUM GLUCONATE 1 G: 98 INJECTION, SOLUTION INTRAVENOUS at 11:11

## 2021-01-01 RX ADMIN — SPIRONOLACTONE 25 MG: 25 TABLET, FILM COATED ORAL at 05:06

## 2021-01-01 RX ADMIN — Medication 0.06 MCG/KG/MIN: at 12:06

## 2021-01-01 RX ADMIN — Medication 0.08 MCG/KG/MIN: at 06:06

## 2021-01-01 RX ADMIN — BUMETANIDE 2 MG: 1 TABLET ORAL at 12:06

## 2021-01-01 RX ADMIN — SODIUM CHLORIDE 1000 ML: 0.9 INJECTION, SOLUTION INTRAVENOUS at 05:11

## 2021-01-01 RX ADMIN — FUROSEMIDE 10 MG/HR: 10 INJECTION, SOLUTION INTRAMUSCULAR; INTRAVENOUS at 09:09

## 2021-01-01 RX ADMIN — MUPIROCIN: 20 OINTMENT TOPICAL at 07:06

## 2021-01-01 RX ADMIN — VASOPRESSIN 0.04 UNITS/MIN: 20 INJECTION INTRAVENOUS at 09:11

## 2021-01-01 RX ADMIN — HYDROMORPHONE HYDROCHLORIDE 0.5 MG: 2 INJECTION INTRAMUSCULAR; INTRAVENOUS; SUBCUTANEOUS at 04:11

## 2021-01-01 RX ADMIN — HYDROCORTISONE SODIUM SUCCINATE 100 MG: 100 INJECTION, POWDER, FOR SOLUTION INTRAMUSCULAR; INTRAVENOUS at 09:11

## 2021-01-01 RX ADMIN — MELATONIN TAB 3 MG 6 MG: 3 TAB at 07:06

## 2021-01-01 RX ADMIN — NOREPINEPHRINE BITARTRATE 2.44 MCG/KG/MIN: 1 INJECTION, SOLUTION, CONCENTRATE INTRAVENOUS at 08:11

## 2021-01-01 RX ADMIN — MORPHINE SULFATE 3 MG: 2 INJECTION, SOLUTION INTRAMUSCULAR; INTRAVENOUS at 10:06

## 2021-01-01 RX ADMIN — BACLOFEN 10 MG: 10 TABLET ORAL at 02:06

## 2021-01-01 RX ADMIN — POLYETHYLENE GLYCOL 3350 17 G: 17 POWDER, FOR SOLUTION ORAL at 08:06

## 2021-01-01 RX ADMIN — FUROSEMIDE 10 MG/HR: 10 INJECTION, SOLUTION INTRAMUSCULAR; INTRAVENOUS at 07:09

## 2021-01-01 RX ADMIN — MICAFUNGIN SODIUM 100 MG: 100 INJECTION, POWDER, LYOPHILIZED, FOR SOLUTION INTRAVENOUS at 06:11

## 2021-01-01 RX ADMIN — SODIUM BICARBONATE: 84 INJECTION, SOLUTION INTRAVENOUS at 10:11

## 2021-01-01 RX ADMIN — ASPIRIN 325 MG ORAL TABLET 325 MG: 325 PILL ORAL at 11:11

## 2021-01-01 RX ADMIN — PRAMIPEXOLE DIHYDROCHLORIDE 0.25 MG: 0.12 TABLET ORAL at 08:09

## 2021-01-01 RX ADMIN — HYDROCORTISONE SODIUM SUCCINATE 100 MG: 100 INJECTION, POWDER, FOR SOLUTION INTRAMUSCULAR; INTRAVENOUS at 06:11

## 2021-01-01 RX ADMIN — NOREPINEPHRINE BITARTRATE 1.68 MCG/KG/MIN: 1 INJECTION, SOLUTION, CONCENTRATE INTRAVENOUS at 09:11

## 2021-01-01 RX ADMIN — ENOXAPARIN SODIUM 40 MG: 40 INJECTION SUBCUTANEOUS at 04:09

## 2021-01-01 RX ADMIN — NOREPINEPHRINE BITARTRATE 2.44 MCG/KG/MIN: 1 INJECTION, SOLUTION, CONCENTRATE INTRAVENOUS at 10:11

## 2021-01-01 RX ADMIN — PRAMIPEXOLE DIHYDROCHLORIDE 0.25 MG: 0.12 TABLET ORAL at 07:06

## 2021-01-01 RX ADMIN — ACYCLOVIR 400 MG: 200 CAPSULE ORAL at 09:09

## 2021-01-01 RX ADMIN — CLINDAMYCIN IN 5 PERCENT DEXTROSE 600 MG: 12 INJECTION, SOLUTION INTRAVENOUS at 09:11

## 2021-01-01 RX ADMIN — SPIRONOLACTONE 25 MG: 25 TABLET ORAL at 09:09

## 2021-01-01 RX ADMIN — FUROSEMIDE 10 MG: 10 INJECTION, SOLUTION INTRAMUSCULAR; INTRAVENOUS at 10:06

## 2021-01-01 RX ADMIN — VASOPRESSIN 0.04 UNITS/MIN: 20 INJECTION INTRAVENOUS at 12:11

## 2021-01-01 RX ADMIN — ONDANSETRON 4 MG: 2 INJECTION INTRAMUSCULAR; INTRAVENOUS at 02:11

## 2021-01-01 RX ADMIN — OXYCODONE 5 MG: 5 TABLET ORAL at 12:11

## 2021-01-01 RX ADMIN — KETAMINE HYDROCHLORIDE 10 MG: 100 INJECTION, SOLUTION, CONCENTRATE INTRAMUSCULAR; INTRAVENOUS at 07:11

## 2021-01-01 RX ADMIN — SODIUM CHLORIDE 250 ML: 0.9 INJECTION, SOLUTION INTRAVENOUS at 05:06

## 2021-01-01 RX ADMIN — SODIUM CHLORIDE 250 ML: 0.9 INJECTION, SOLUTION INTRAVENOUS at 07:06

## 2021-01-01 RX ADMIN — PRAMIPEXOLE DIHYDROCHLORIDE 0.25 MG: 0.12 TABLET ORAL at 09:09

## 2021-01-01 RX ADMIN — GABAPENTIN 300 MG: 300 CAPSULE ORAL at 08:09

## 2021-01-01 RX ADMIN — SODIUM BICARBONATE: 84 INJECTION, SOLUTION INTRAVENOUS at 11:11

## 2021-01-01 RX ADMIN — MORPHINE SULFATE 2 MG: 2 INJECTION, SOLUTION INTRAMUSCULAR; INTRAVENOUS at 04:06

## 2021-01-01 RX ADMIN — PHENYLEPHRINE HYDROCHLORIDE 200 MCG: 10 INJECTION INTRAVENOUS at 01:11

## 2021-01-01 RX ADMIN — ATORVASTATIN CALCIUM 40 MG: 20 TABLET, FILM COATED ORAL at 10:06

## 2021-01-01 RX ADMIN — OXYCODONE 5 MG: 5 TABLET ORAL at 11:11

## 2021-01-01 RX ADMIN — SODIUM CHLORIDE 1000 ML: 0.9 INJECTION, SOLUTION INTRAVENOUS at 07:11

## 2021-01-01 RX ADMIN — FUROSEMIDE 120 MG: 10 INJECTION, SOLUTION INTRAMUSCULAR; INTRAVENOUS at 09:09

## 2021-01-01 RX ADMIN — EPINEPHRINE 0.36 MCG/KG/MIN: 1 INJECTION INTRAMUSCULAR; INTRAVENOUS; SUBCUTANEOUS at 09:11

## 2021-01-01 RX ADMIN — IOHEXOL 125 ML: 350 INJECTION, SOLUTION INTRAVENOUS at 03:11

## 2021-01-01 RX ADMIN — FENTANYL CITRATE 25 MCG: 50 INJECTION INTRAMUSCULAR; INTRAVENOUS at 09:06

## 2021-01-01 RX ADMIN — BACLOFEN 5 MG: 5 TABLET ORAL at 07:06

## 2021-01-01 RX ADMIN — EPINEPHRINE 0.26 MCG/KG/MIN: 1 INJECTION INTRAMUSCULAR; INTRAVENOUS; SUBCUTANEOUS at 10:11

## 2021-01-01 RX ADMIN — IOHEXOL 150 ML: 350 INJECTION, SOLUTION INTRAVENOUS at 03:06

## 2021-01-01 RX ADMIN — GABAPENTIN 300 MG: 300 CAPSULE ORAL at 09:09

## 2021-01-01 RX ADMIN — SODIUM BICARBONATE 50 MEQ: 84 INJECTION, SOLUTION INTRAVENOUS at 06:11

## 2021-01-01 RX ADMIN — CLINDAMYCIN IN 5 PERCENT DEXTROSE 600 MG: 12 INJECTION, SOLUTION INTRAVENOUS at 03:11

## 2021-01-01 RX ADMIN — VANCOMYCIN HYDROCHLORIDE 2000 MG: 1 INJECTION, POWDER, LYOPHILIZED, FOR SOLUTION INTRAVENOUS at 06:11

## 2021-01-01 RX ADMIN — Medication 2 G: at 09:06

## 2021-01-01 RX ADMIN — ACETAMINOPHEN 1000 MG: 500 TABLET ORAL at 02:06

## 2021-01-01 RX ADMIN — TOBRAMYCIN SULFATE 255 MG: 40 INJECTION, SOLUTION INTRAMUSCULAR; INTRAVENOUS at 12:11

## 2021-01-01 RX ADMIN — ACETAMINOPHEN 650 MG: 325 TABLET ORAL at 10:06

## 2021-01-01 RX ADMIN — HEPARIN SODIUM 5000 UNITS: 5000 INJECTION INTRAVENOUS; SUBCUTANEOUS at 10:06

## 2021-01-01 RX ADMIN — ATORVASTATIN CALCIUM 40 MG: 20 TABLET, FILM COATED ORAL at 09:06

## 2021-01-01 RX ADMIN — FUROSEMIDE 80 MG: 10 INJECTION INTRAMUSCULAR; INTRAVENOUS at 08:09

## 2021-01-01 RX ADMIN — AMIODARONE HYDROCHLORIDE 0.5 MG/MIN: 1.8 INJECTION, SOLUTION INTRAVENOUS at 04:11

## 2021-01-01 RX ADMIN — SODIUM BICARBONATE 100 MEQ: 84 INJECTION, SOLUTION INTRAVENOUS at 03:11

## 2021-01-01 RX ADMIN — NOREPINEPHRINE BITARTRATE 2.4 MCG/KG/MIN: 1 INJECTION, SOLUTION, CONCENTRATE INTRAVENOUS at 05:11

## 2021-01-01 RX ADMIN — FUROSEMIDE 20 MG/HR: 10 INJECTION, SOLUTION INTRAMUSCULAR; INTRAVENOUS at 10:09

## 2021-01-01 RX ADMIN — BACLOFEN 5 MG: 5 TABLET ORAL at 08:06

## 2021-01-01 RX ADMIN — FENTANYL CITRATE 25 MCG: 50 INJECTION INTRAMUSCULAR; INTRAVENOUS at 12:06

## 2021-01-01 RX ADMIN — PHENYLEPHRINE HYDROCHLORIDE 0.05 MCG/KG/MIN: 10 INJECTION INTRAVENOUS at 06:11

## 2021-01-01 RX ADMIN — ONDANSETRON 4 MG: 2 INJECTION, SOLUTION INTRAMUSCULAR; INTRAVENOUS at 01:11

## 2021-01-01 RX ADMIN — ASPIRIN 81 MG: 81 TABLET, COATED ORAL at 08:11

## 2021-01-01 RX ADMIN — CLINDAMYCIN IN 5 PERCENT DEXTROSE 600 MG: 12 INJECTION, SOLUTION INTRAVENOUS at 12:11

## 2021-01-01 RX ADMIN — HYDROMORPHONE HYDROCHLORIDE 0.5 MG: 2 INJECTION INTRAMUSCULAR; INTRAVENOUS; SUBCUTANEOUS at 06:11

## 2021-01-01 RX ADMIN — NOREPINEPHRINE BITARTRATE 2.28 MCG/KG/MIN: 1 INJECTION, SOLUTION, CONCENTRATE INTRAVENOUS at 04:11

## 2021-01-01 RX ADMIN — NOREPINEPHRINE BITARTRATE 1.4 MCG/KG/MIN: 1 INJECTION, SOLUTION, CONCENTRATE INTRAVENOUS at 07:11

## 2021-01-01 RX ADMIN — EPINEPHRINE 0.36 MCG/KG/MIN: 1 INJECTION INTRAMUSCULAR; INTRAVENOUS; SUBCUTANEOUS at 01:11

## 2021-01-01 RX ADMIN — LORAZEPAM 0.5 MG: 2 INJECTION INTRAMUSCULAR; INTRAVENOUS at 02:06

## 2021-01-01 RX ADMIN — ACETAMINOPHEN 650 MG: 325 TABLET ORAL at 06:06

## 2021-01-01 RX ADMIN — ALBUMIN (HUMAN) 12.5 G: 12.5 SOLUTION INTRAVENOUS at 11:06

## 2021-01-01 RX ADMIN — PRAMIPEXOLE DIHYDROCHLORIDE 0.25 MG: 0.12 TABLET ORAL at 09:06

## 2021-01-01 RX ADMIN — BACLOFEN 10 MG: 10 TABLET ORAL at 07:06

## 2021-01-01 RX ADMIN — HEPARIN SODIUM 5000 UNITS: 5000 INJECTION INTRAVENOUS; SUBCUTANEOUS at 06:06

## 2021-01-01 RX ADMIN — INSULIN ASPART 2 UNITS: 100 INJECTION, SOLUTION INTRAVENOUS; SUBCUTANEOUS at 04:06

## 2021-01-01 RX ADMIN — NOREPINEPHRINE BITARTRATE 2.26 MCG/KG/MIN: 1 INJECTION, SOLUTION, CONCENTRATE INTRAVENOUS at 02:11

## 2021-01-01 RX ADMIN — HEPARIN SODIUM 18 UNITS/KG/HR: 10000 INJECTION, SOLUTION INTRAVENOUS at 12:11

## 2021-01-01 RX ADMIN — HYDROMORPHONE HYDROCHLORIDE 1 MG: 2 INJECTION INTRAMUSCULAR; INTRAVENOUS; SUBCUTANEOUS at 09:11

## 2021-01-01 RX ADMIN — NOREPINEPHRINE BITARTRATE 1.67 MCG/KG/MIN: 1 INJECTION, SOLUTION, CONCENTRATE INTRAVENOUS at 01:11

## 2021-01-01 RX ADMIN — GABAPENTIN 300 MG: 300 CAPSULE ORAL at 07:06

## 2021-01-01 RX ADMIN — Medication 2 G: at 02:06

## 2021-01-01 RX ADMIN — FUROSEMIDE 10 MG: 10 INJECTION, SOLUTION INTRAMUSCULAR; INTRAVENOUS at 09:06

## 2021-01-01 RX ADMIN — POTASSIUM CHLORIDE 40 MEQ: 10 CAPSULE, COATED, EXTENDED RELEASE ORAL at 10:09

## 2021-01-01 RX ADMIN — BACLOFEN 10 MG: 10 TABLET ORAL at 01:06

## 2021-01-01 RX ADMIN — Medication 2 G: at 06:06

## 2021-01-01 RX ADMIN — ASPIRIN 325 MG ORAL TABLET 325 MG: 325 PILL ORAL at 09:11

## 2021-01-01 RX ADMIN — BACLOFEN 10 MG: 10 TABLET ORAL at 09:06

## 2021-01-01 RX ADMIN — Medication 400 MG: at 09:09

## 2021-01-01 RX ADMIN — PIPERACILLIN AND TAZOBACTAM 4.5 G: 4; .5 INJECTION, POWDER, LYOPHILIZED, FOR SOLUTION INTRAVENOUS; PARENTERAL at 02:11

## 2021-01-01 RX ADMIN — BACLOFEN 20 MG: 10 TABLET ORAL at 02:06

## 2021-01-01 RX ADMIN — EPINEPHRINE 0.02 MCG/KG/MIN: 1 INJECTION INTRAMUSCULAR; INTRAVENOUS; SUBCUTANEOUS at 06:11

## 2021-01-01 RX ADMIN — ACETAMINOPHEN 650 MG: 325 TABLET ORAL at 09:11

## 2021-01-01 RX ADMIN — ENOXAPARIN SODIUM 40 MG: 40 INJECTION SUBCUTANEOUS at 11:09

## 2021-01-01 RX ADMIN — VANCOMYCIN HYDROCHLORIDE 1000 MG: 1 INJECTION, POWDER, LYOPHILIZED, FOR SOLUTION INTRAVENOUS at 09:11

## 2021-01-01 RX ADMIN — BACLOFEN 5 MG: 5 TABLET ORAL at 01:06

## 2021-01-01 RX ADMIN — PIPERACILLIN AND TAZOBACTAM 4.5 G: 4; .5 INJECTION, POWDER, LYOPHILIZED, FOR SOLUTION INTRAVENOUS; PARENTERAL at 05:11

## 2021-01-01 RX ADMIN — FUROSEMIDE 80 MG: 10 INJECTION INTRAMUSCULAR; INTRAVENOUS at 12:09

## 2021-01-01 RX ADMIN — MEROPENEM AND SODIUM CHLORIDE 1 G: 1 INJECTION, SOLUTION INTRAVENOUS at 05:11

## 2021-01-01 RX ADMIN — ACETAMINOPHEN 650 MG: 325 TABLET ORAL at 02:06

## 2021-01-01 RX ADMIN — LIDOCAINE HYDROCHLORIDE 10 ML: 20 SOLUTION ORAL; TOPICAL at 05:06

## 2021-01-01 RX ADMIN — BUMETANIDE 2 MG: 1 TABLET ORAL at 09:06

## 2021-01-01 RX ADMIN — ATORVASTATIN CALCIUM 40 MG: 20 TABLET, FILM COATED ORAL at 09:09

## 2021-01-01 RX ADMIN — HEPARIN SODIUM 5000 UNITS: 5000 INJECTION INTRAVENOUS; SUBCUTANEOUS at 03:06

## 2021-01-01 RX ADMIN — EPINEPHRINE 0.28 MCG/KG/MIN: 1 INJECTION INTRAMUSCULAR; INTRAVENOUS; SUBCUTANEOUS at 02:11

## 2021-01-01 RX ADMIN — HEPARIN SODIUM 10 UNITS/KG/HR: 10000 INJECTION, SOLUTION INTRAVENOUS at 01:11

## 2021-01-01 RX ADMIN — ACETAMINOPHEN 650 MG: 325 TABLET ORAL at 11:06

## 2021-01-01 RX ADMIN — HYDROCORTISONE SODIUM SUCCINATE 100 MG: 100 INJECTION, POWDER, FOR SOLUTION INTRAMUSCULAR; INTRAVENOUS at 05:11

## 2021-01-01 RX ADMIN — CLINDAMYCIN IN 5 PERCENT DEXTROSE 600 MG: 12 INJECTION, SOLUTION INTRAVENOUS at 04:11

## 2021-01-01 RX ADMIN — Medication 0.8 MCG/KG/MIN: at 12:11

## 2021-01-01 RX ADMIN — PRAMIPEXOLE DIHYDROCHLORIDE 0.25 MG: 0.12 TABLET ORAL at 11:09

## 2021-01-01 RX ADMIN — SODIUM BICARBONATE 150 MEQ: 84 INJECTION, SOLUTION INTRAVENOUS at 06:11

## 2021-01-01 RX ADMIN — PENICILLIN G BENZATHINE 1.2 MILLION UNITS: 1200000 INJECTION, SUSPENSION INTRAMUSCULAR at 08:11

## 2021-01-01 RX ADMIN — PIPERACILLIN AND TAZOBACTAM 4.5 G: 4; .5 INJECTION, POWDER, LYOPHILIZED, FOR SOLUTION INTRAVENOUS; PARENTERAL at 08:11

## 2021-01-01 RX ADMIN — FLUDROCORTISONE ACETATE 100 MCG: 0.1 TABLET ORAL at 08:11

## 2021-01-01 RX ADMIN — MUPIROCIN: 20 OINTMENT TOPICAL at 12:11

## 2021-01-01 RX ADMIN — POTASSIUM BICARBONATE 50 MEQ: 978 TABLET, EFFERVESCENT ORAL at 04:06

## 2021-01-01 RX ADMIN — LIDOCAINE 1 PATCH: 50 PATCH TOPICAL at 08:11

## 2021-01-01 RX ADMIN — FUROSEMIDE 10 MG/HR: 10 INJECTION, SOLUTION INTRAMUSCULAR; INTRAVENOUS at 12:09

## 2021-01-01 RX ADMIN — BARIUM SULFATE 900 ML: 20 SUSPENSION ORAL at 11:05

## 2021-01-01 RX ADMIN — FLUDROCORTISONE ACETATE 100 MCG: 0.1 TABLET ORAL at 03:11

## 2021-01-01 RX ADMIN — HYDROMORPHONE HYDROCHLORIDE 1 MG: 2 INJECTION INTRAMUSCULAR; INTRAVENOUS; SUBCUTANEOUS at 03:11

## 2021-01-01 RX ADMIN — MORPHINE SULFATE 2 MG: 2 INJECTION, SOLUTION INTRAMUSCULAR; INTRAVENOUS at 09:06

## 2021-01-01 RX ADMIN — VASOPRESSIN 0.04 UNITS/MIN: 20 INJECTION INTRAVENOUS at 11:11

## 2021-01-01 RX ADMIN — ETOMIDATE 2 MG: 2 INJECTION, SOLUTION INTRAVENOUS at 01:11

## 2021-01-01 RX ADMIN — FUROSEMIDE 80 MG: 10 INJECTION INTRAMUSCULAR; INTRAVENOUS at 09:09

## 2021-01-01 RX ADMIN — NOREPINEPHRINE BITARTRATE 2.46 MCG/KG/MIN: 1 INJECTION, SOLUTION, CONCENTRATE INTRAVENOUS at 12:11

## 2021-01-01 RX ADMIN — NOREPINEPHRINE BITARTRATE 2.08 MCG/KG/MIN: 1 INJECTION, SOLUTION, CONCENTRATE INTRAVENOUS at 09:11

## 2021-01-01 RX ADMIN — Medication 1 MCG/KG/MIN: at 10:11

## 2021-01-01 RX ADMIN — SPIRONOLACTONE 25 MG: 25 TABLET, FILM COATED ORAL at 10:06

## 2021-01-01 RX ADMIN — POTASSIUM CHLORIDE 40 MEQ: 10 CAPSULE, COATED, EXTENDED RELEASE ORAL at 06:09

## 2021-01-01 RX ADMIN — LORAZEPAM 1 MG: 2 INJECTION INTRAMUSCULAR; INTRAVENOUS at 03:11

## 2021-01-01 RX ADMIN — IOHEXOL 100 ML: 350 INJECTION, SOLUTION INTRAVENOUS at 09:06

## 2021-01-01 RX ADMIN — VASOPRESSIN 0.04 UNITS/MIN: 20 INJECTION INTRAVENOUS at 08:11

## 2021-01-01 RX ADMIN — VANCOMYCIN HYDROCHLORIDE 1500 MG: 1.5 INJECTION, POWDER, LYOPHILIZED, FOR SOLUTION INTRAVENOUS at 07:11

## 2021-01-01 RX ADMIN — ALUMINUM HYDROXIDE, MAGNESIUM HYDROXIDE, AND DIMETHICONE 30 ML: 400; 400; 40 SUSPENSION ORAL at 10:06

## 2021-01-01 RX ADMIN — ETOMIDATE 2 MG: 2 INJECTION, SOLUTION INTRAVENOUS at 07:11

## 2021-01-01 RX ADMIN — NOREPINEPHRINE BITARTRATE 1.78 MCG/KG/MIN: 1 INJECTION, SOLUTION, CONCENTRATE INTRAVENOUS at 12:11

## 2021-01-01 RX ADMIN — SODIUM CHLORIDE: 0.9 INJECTION, SOLUTION INTRAVENOUS at 03:06

## 2021-01-01 RX ADMIN — DIAZEPAM 5 MG: 5 TABLET ORAL at 04:06

## 2021-01-01 RX ADMIN — ACYCLOVIR 400 MG: 200 CAPSULE ORAL at 12:09

## 2021-01-01 RX ADMIN — PRAMIPEXOLE DIHYDROCHLORIDE 0.12 MG: 0.12 TABLET ORAL at 05:06

## 2021-01-22 PROBLEM — N18.32 STAGE 3B CHRONIC KIDNEY DISEASE: Status: ACTIVE | Noted: 2020-02-06

## 2021-06-01 PROBLEM — I63.231 STROKE DUE TO OCCLUSION OF RIGHT CAROTID ARTERY: Status: ACTIVE | Noted: 2021-01-01

## 2021-06-01 PROBLEM — I63.9 STROKE: Status: ACTIVE | Noted: 2021-01-01

## 2021-06-01 PROBLEM — I65.22 CAROTID STENOSIS, LEFT: Status: ACTIVE | Noted: 2021-01-01

## 2021-06-01 PROBLEM — Z92.82 RECEIVED INTRAVENOUS TISSUE PLASMINOGEN ACTIVATOR (TPA) IN EMERGENCY DEPARTMENT: Status: ACTIVE | Noted: 2021-01-01

## 2021-06-01 PROBLEM — I63.511 STROKE DUE TO OCCLUSION OF RIGHT MIDDLE CEREBRAL ARTERY: Status: ACTIVE | Noted: 2021-01-01

## 2021-06-01 PROBLEM — I63.232 STROKE DUE TO STENOSIS OF LEFT CAROTID ARTERY: Status: ACTIVE | Noted: 2021-01-01

## 2021-06-01 PROBLEM — I65.23 BILATERAL CAROTID ARTERY OCCLUSION: Status: ACTIVE | Noted: 2021-01-01

## 2021-06-03 PROBLEM — G93.6 CYTOTOXIC CEREBRAL EDEMA: Status: ACTIVE | Noted: 2021-01-01

## 2021-06-03 PROBLEM — I63.9 STROKE: Status: ACTIVE | Noted: 2021-01-01

## 2021-06-04 PROBLEM — I63.9 STROKE: Status: ACTIVE | Noted: 2021-01-01

## 2021-06-09 PROBLEM — N17.9 AKI (ACUTE KIDNEY INJURY): Status: ACTIVE | Noted: 2021-01-01

## 2021-06-10 PROBLEM — I63.9 STROKE: Status: ACTIVE | Noted: 2021-01-01

## 2021-07-01 PROBLEM — Z74.09 IMPAIRED FUNCTIONAL MOBILITY, BALANCE, GAIT, AND ENDURANCE: Status: ACTIVE | Noted: 2021-01-01

## 2021-07-02 PROBLEM — R29.818 FINE MOTOR IMPAIRMENT: Status: ACTIVE | Noted: 2021-01-01

## 2021-07-02 PROBLEM — R29.898 DECREASED GRIP STRENGTH OF LEFT HAND: Status: ACTIVE | Noted: 2021-01-01

## 2021-07-02 PROBLEM — R29.898 FINE MOTOR IMPAIRMENT: Status: ACTIVE | Noted: 2021-01-01

## 2021-07-02 PROBLEM — Z78.9 IMPAIRED INSTRUMENTAL ACTIVITIES OF DAILY LIVING (IADL): Status: ACTIVE | Noted: 2021-01-01

## 2021-07-19 PROBLEM — R05.9 COUGH: Status: ACTIVE | Noted: 2021-01-01

## 2021-09-23 PROBLEM — I50.9 ACUTE CHF: Status: ACTIVE | Noted: 2021-01-01

## 2021-09-24 PROBLEM — Z92.82 RECEIVED INTRAVENOUS TISSUE PLASMINOGEN ACTIVATOR (TPA) IN EMERGENCY DEPARTMENT: Status: RESOLVED | Noted: 2021-01-01 | Resolved: 2021-01-01

## 2021-09-24 PROBLEM — R05.9 COUGH: Status: RESOLVED | Noted: 2021-01-01 | Resolved: 2021-01-01

## 2021-09-24 PROBLEM — Z78.9 IMPAIRED INSTRUMENTAL ACTIVITIES OF DAILY LIVING (IADL): Status: RESOLVED | Noted: 2021-01-01 | Resolved: 2021-01-01

## 2021-09-28 PROBLEM — I50.9 ACUTE CHF: Status: RESOLVED | Noted: 2021-01-01 | Resolved: 2021-01-01

## 2021-09-28 PROBLEM — N17.9 AKI (ACUTE KIDNEY INJURY): Status: RESOLVED | Noted: 2021-01-01 | Resolved: 2021-01-01

## 2021-10-05 PROBLEM — I48.91 ATRIAL FIBRILLATION: Status: ACTIVE | Noted: 2021-01-01

## 2021-11-08 PROBLEM — R65.20 SEVERE SEPSIS: Status: ACTIVE | Noted: 2021-01-01

## 2021-11-08 PROBLEM — I21.4 NSTEMI (NON-ST ELEVATED MYOCARDIAL INFARCTION): Status: ACTIVE | Noted: 2021-01-01

## 2021-11-08 PROBLEM — E87.20 LACTIC ACIDOSIS: Status: ACTIVE | Noted: 2021-01-01

## 2021-11-08 PROBLEM — A41.9 SEVERE SEPSIS: Status: ACTIVE | Noted: 2021-01-01

## 2021-11-09 PROBLEM — Z71.89 ADVANCE CARE PLANNING: Status: ACTIVE | Noted: 2021-01-01

## 2021-11-09 PROBLEM — A41.9 SEVERE SEPSIS: Status: RESOLVED | Noted: 2021-01-01 | Resolved: 2021-01-01

## 2021-11-09 PROBLEM — I51.3 THROMBUS OF LEFT ATRIAL APPENDAGE: Status: ACTIVE | Noted: 2021-01-01

## 2021-11-09 PROBLEM — I48.0 PAROXYSMAL ATRIAL FIBRILLATION: Status: ACTIVE | Noted: 2021-01-01

## 2021-11-09 PROBLEM — I31.39 PERICARDIAL EFFUSION WITHOUT CARDIAC TAMPONADE: Status: ACTIVE | Noted: 2021-01-01

## 2021-11-09 PROBLEM — R65.20 SEVERE SEPSIS: Status: RESOLVED | Noted: 2021-01-01 | Resolved: 2021-01-01

## 2021-11-09 PROBLEM — N18.30 ACUTE RENAL FAILURE SUPERIMPOSED ON STAGE 3 CHRONIC KIDNEY DISEASE: Status: ACTIVE | Noted: 2021-01-01

## 2021-11-09 PROBLEM — A41.9 SEPTIC SHOCK: Status: ACTIVE | Noted: 2021-01-01

## 2021-11-09 PROBLEM — N18.32 ACUTE RENAL FAILURE SUPERIMPOSED ON STAGE 3B CHRONIC KIDNEY DISEASE: Status: ACTIVE | Noted: 2021-01-01

## 2021-11-09 PROBLEM — R57.0 CARDIOGENIC SHOCK: Status: ACTIVE | Noted: 2021-01-01

## 2021-11-09 PROBLEM — R57.9 SHOCK: Status: ACTIVE | Noted: 2021-01-01

## 2021-11-09 PROBLEM — R17 TOTAL BILIRUBIN, ELEVATED: Status: RESOLVED | Noted: 2018-02-21 | Resolved: 2021-01-01

## 2021-11-09 PROBLEM — N18.9 ACUTE KIDNEY INJURY SUPERIMPOSED ON CKD: Status: ACTIVE | Noted: 2021-01-01

## 2021-11-09 PROBLEM — E66.9 OBESITY (BMI 30-39.9): Status: ACTIVE | Noted: 2021-01-01

## 2021-11-09 PROBLEM — R65.21 SEPTIC SHOCK: Status: ACTIVE | Noted: 2021-01-01

## 2021-11-09 PROBLEM — J96.01 ACUTE HYPOXEMIC RESPIRATORY FAILURE: Status: ACTIVE | Noted: 2021-01-01

## 2021-11-09 PROBLEM — R65.10 SIRS (SYSTEMIC INFLAMMATORY RESPONSE SYNDROME): Status: ACTIVE | Noted: 2021-01-01

## 2021-11-09 PROBLEM — L03.116 CELLULITIS OF LEFT LOWER EXTREMITY: Status: ACTIVE | Noted: 2021-01-01

## 2021-11-09 PROBLEM — E87.1 HYPONATREMIA: Status: ACTIVE | Noted: 2021-01-01

## 2021-11-09 PROBLEM — R78.81 BACTEREMIA: Status: ACTIVE | Noted: 2021-01-01

## 2021-11-10 PROBLEM — K68.3 RETROPERITONEAL HEMATOMA: Status: ACTIVE | Noted: 2021-01-01

## 2021-11-10 PROBLEM — K72.00 SHOCK LIVER: Status: ACTIVE | Noted: 2021-01-01

## 2021-11-10 PROBLEM — D62 ACUTE BLOOD LOSS ANEMIA: Status: ACTIVE | Noted: 2021-01-01

## 2021-11-10 PROBLEM — K56.609 SMALL BOWEL OBSTRUCTION: Status: ACTIVE | Noted: 2021-01-01

## 2021-11-10 PROBLEM — R74.01 TRANSAMINITIS: Status: ACTIVE | Noted: 2021-01-01

## 2021-11-10 PROBLEM — D50.9 IRON DEFICIENCY ANEMIA: Status: ACTIVE | Noted: 2019-06-09

## 2021-11-10 PROBLEM — B95.5 BACTEREMIA DUE TO STREPTOCOCCUS: Status: ACTIVE | Noted: 2021-01-01

## 2021-11-11 PROBLEM — N17.0 ACUTE RENAL FAILURE WITH ACUTE TUBULAR NECROSIS SUPERIMPOSED ON STAGE 3B CHRONIC KIDNEY DISEASE: Status: ACTIVE | Noted: 2021-01-01

## 2021-11-11 PROBLEM — R52 PAIN: Status: ACTIVE | Noted: 2021-01-01

## 2021-11-11 PROBLEM — R58 RETROPERITONEAL BLEED: Status: ACTIVE | Noted: 2021-01-01

## 2021-11-12 LAB
BACTERIA BLD CULT: ABNORMAL

## 2021-11-13 LAB
BACTERIA BLD CULT: NORMAL
BACTERIA BLD CULT: NORMAL

## 2021-11-14 LAB
BACTERIA SPEC AEROBE CULT: NO GROWTH
BACTERIA SPEC AEROBE CULT: NO GROWTH
BACTERIA SPEC ANAEROBE CULT: NORMAL
BACTERIA SPEC ANAEROBE CULT: NORMAL
BLD PROD TYP BPU: NORMAL
BLD PROD TYP BPU: NORMAL
BLOOD UNIT EXPIRATION DATE: NORMAL
BLOOD UNIT EXPIRATION DATE: NORMAL
BLOOD UNIT TYPE CODE: 6200
BLOOD UNIT TYPE CODE: 6200
BLOOD UNIT TYPE: NORMAL
BLOOD UNIT TYPE: NORMAL
CODING SYSTEM: NORMAL
CODING SYSTEM: NORMAL
DISPENSE STATUS: NORMAL
DISPENSE STATUS: NORMAL
TRANS ERYTHROCYTES VOL PATIENT: NORMAL ML
TRANS ERYTHROCYTES VOL PATIENT: NORMAL ML

## 2021-11-24 ENCOUNTER — TELEPHONE (OUTPATIENT)
Dept: INFECTIOUS DISEASES | Facility: HOSPITAL | Age: 56
End: 2021-11-24
Payer: OTHER GOVERNMENT

## 2021-12-13 LAB — FUNGUS SPEC CULT: NORMAL

## 2021-12-30 LAB
ACID FAST MOD KINY STN SPEC: NORMAL
MYCOBACTERIUM SPEC QL CULT: NORMAL

## 2024-04-19 NOTE — NURSING TRANSFER
Nursing Transfer Note      10/29/2020     Transfer To: [id number]    Transfer via stretcher    Transfer with  to O2    Transported by PCT    Medicines sent: no    Chart send with patient: Yes    Notified: spouse    Patient reassessed at: 10/29/2020 @1640    Upon arrival to floor: cardiac monitor applied, patient oriented to room, call bell in reach and bed in lowest position   19-Apr-2024 03:16

## (undated) DEVICE — SLING AND SWATHE UNIV ADLT

## (undated) DEVICE — SYR SLIP TIP 10ML SHIELD

## (undated) DEVICE — SHEATH SAFE ULTRA 9FR

## (undated) DEVICE — DRAPE INCISE IOBAN 2 23X17IN

## (undated) DEVICE — SEE MEDLINE ITEM 157128

## (undated) DEVICE — ADHESIVE DERMABOND ADVANCED

## (undated) DEVICE — BALLOON WEDGE PRESSURE CATHETER

## (undated) DEVICE — SYR 10CC LUER LOCK

## (undated) DEVICE — SHEATH SAFESHEATH II ULTRA 6FR

## (undated) DEVICE — SEE MEDLINE ITEM 157110

## (undated) DEVICE — PACEMAKER SHEET

## (undated) DEVICE — ELECTRODE REM PLYHSV RETURN 9

## (undated) DEVICE — NDL SPINAL 20GX3.5 HUB

## (undated) DEVICE — PAD DEFIB CADENCE ADULT R2

## (undated) DEVICE — SEE L#120831

## (undated) DEVICE — NDL HYPO REG 25G X 1 1/2

## (undated) DEVICE — PACK PACER PERMANENT

## (undated) DEVICE — COVER OVERHEAD SURG LT BLUE

## (undated) DEVICE — APPLICATOR CHLORAPREP ORN 26ML

## (undated) DEVICE — WIRE WHISPER MS 014 X 190

## (undated) DEVICE — CANISTER SUCTION 2 LTR

## (undated) DEVICE — GLOVE BIOGEL 7.5

## (undated) DEVICE — Device

## (undated) DEVICE — ELECTRODE PAD DEFIB STERILE

## (undated) DEVICE — DRESSING AQUACEL FOAM 5 X 5

## (undated) DEVICE — SAFESHEATH II 8FR 13CM

## (undated) DEVICE — GAUZE SPONGE 4X4 12PLY

## (undated) DEVICE — WIRE GUIDE WHOLEY MOD J .035

## (undated) DEVICE — PACK ENDOSCOPY GENERAL

## (undated) DEVICE — TOWEL OR DISP STRL BLUE 4/PK